# Patient Record
Sex: MALE | Race: WHITE | Employment: FULL TIME | URBAN - METROPOLITAN AREA
[De-identification: names, ages, dates, MRNs, and addresses within clinical notes are randomized per-mention and may not be internally consistent; named-entity substitution may affect disease eponyms.]

---

## 2017-09-01 PROCEDURE — 88305 TISSUE EXAM BY PATHOLOGIST: CPT | Performed by: INTERNAL MEDICINE

## 2017-09-05 ENCOUNTER — HOSPITAL ENCOUNTER (OUTPATIENT)
Dept: LAB | Age: 52
Discharge: HOME | End: 2017-09-05

## 2017-11-06 PROBLEM — N40.0 BENIGN PROSTATIC HYPERPLASIA WITHOUT LOWER URINARY TRACT SYMPTOMS: Status: ACTIVE | Noted: 2017-11-06

## 2017-11-06 PROBLEM — M17.11 ARTHRITIS OF KNEE, RIGHT: Status: ACTIVE | Noted: 2017-11-06

## 2018-02-26 PROBLEM — E66.01 OBESITY, MORBID (HCC): Status: ACTIVE | Noted: 2018-02-26

## 2018-05-23 ENCOUNTER — HOSPITAL ENCOUNTER (OUTPATIENT)
Dept: LAB | Age: 53
Discharge: HOME | End: 2018-05-23
Payer: COMMERCIAL

## 2018-05-23 LAB
ALBUMIN SERPL-MCNC: 3.5 G/DL (ref 3.5–5)
ALBUMIN/GLOB SERPL: 1 {RATIO} (ref 1.2–3.5)
ALP SERPL-CCNC: 87 U/L (ref 50–136)
ALT SERPL-CCNC: 25 U/L (ref 12–65)
ANION GAP SERPL CALC-SCNC: 6 MMOL/L (ref 7–16)
AST SERPL-CCNC: 19 U/L (ref 15–37)
BILIRUB SERPL-MCNC: 0.4 MG/DL (ref 0.2–1.1)
BUN SERPL-MCNC: 9 MG/DL (ref 6–23)
CALCIUM SERPL-MCNC: 9.1 MG/DL (ref 8.3–10.4)
CHLORIDE SERPL-SCNC: 108 MMOL/L (ref 98–107)
CHOLEST SERPL-MCNC: 177 MG/DL
CO2 SERPL-SCNC: 28 MMOL/L (ref 21–32)
CREAT SERPL-MCNC: 0.8 MG/DL (ref 0.8–1.5)
GLOBULIN SER CALC-MCNC: 3.4 G/DL (ref 2.3–3.5)
GLUCOSE SERPL-MCNC: 96 MG/DL (ref 65–100)
HDLC SERPL-MCNC: 33 MG/DL (ref 40–60)
HDLC SERPL: 5.4 {RATIO}
LDLC SERPL CALC-MCNC: 112.6 MG/DL
LIPID PROFILE,FLP: ABNORMAL
POTASSIUM SERPL-SCNC: 4.2 MMOL/L (ref 3.5–5.1)
PROT SERPL-MCNC: 6.9 G/DL (ref 6.3–8.2)
SODIUM SERPL-SCNC: 142 MMOL/L (ref 136–145)
TRIGL SERPL-MCNC: 157 MG/DL (ref 35–150)
URATE SERPL-MCNC: 4.3 MG/DL (ref 2.6–6)
VLDLC SERPL CALC-MCNC: 31.4 MG/DL (ref 6–23)

## 2018-05-23 PROCEDURE — 80061 LIPID PANEL: CPT | Performed by: INTERNAL MEDICINE

## 2018-05-23 PROCEDURE — 84550 ASSAY OF BLOOD/URIC ACID: CPT | Performed by: INTERNAL MEDICINE

## 2018-05-23 PROCEDURE — 80053 COMPREHEN METABOLIC PANEL: CPT | Performed by: INTERNAL MEDICINE

## 2018-05-23 PROCEDURE — 36415 COLL VENOUS BLD VENIPUNCTURE: CPT | Performed by: INTERNAL MEDICINE

## 2019-03-06 ENCOUNTER — HOSPITAL ENCOUNTER (OUTPATIENT)
Dept: WOUND CARE | Age: 54
Discharge: HOME | End: 2019-03-06
Attending: PODIATRIST
Payer: COMMERCIAL

## 2019-03-06 VITALS
TEMPERATURE: 98 F | HEART RATE: 76 BPM | DIASTOLIC BLOOD PRESSURE: 98 MMHG | RESPIRATION RATE: 18 BRPM | OXYGEN SATURATION: 95 % | BODY MASS INDEX: 39.17 KG/M2 | WEIGHT: 315 LBS | SYSTOLIC BLOOD PRESSURE: 116 MMHG | HEIGHT: 75 IN

## 2019-03-06 DIAGNOSIS — L97.313 NON-PRESSURE CHRONIC ULCER OF RIGHT ANKLE WITH NECROSIS OF MUSCLE (HCC): Primary | ICD-10-CM

## 2019-03-06 PROBLEM — I87.331 CHRONIC VENOUS HYPERTENSION WITH ULCER AND INFLAMMATION INVOLVING RIGHT SIDE (HCC): Status: ACTIVE | Noted: 2019-03-06

## 2019-03-06 PROBLEM — L97.919 CHRONIC VENOUS HYPERTENSION WITH ULCER AND INFLAMMATION INVOLVING RIGHT SIDE (HCC): Status: ACTIVE | Noted: 2019-03-06

## 2019-03-06 PROCEDURE — 29581 APPL MULTLAYER CMPRN SYS LEG: CPT

## 2019-03-06 PROCEDURE — 99215 OFFICE O/P EST HI 40 MIN: CPT

## 2019-03-06 NOTE — PROGRESS NOTES
Wound Center Progress Note    Patient: Marco A Valerio MRN: [id number]  SSN: [id number]    YOB: 1965  Age: 46 y.o. Sex: male      Subjective:     Chief Complaint:  Marco A Valerio is a 46 y.o. WHITE OR  male who presents with a wound to the right lower extremity at the medial ankle. This began 1 year ago when he had a rock hit the area during yard work. He has known venous disease and was recommended to have vein stripping but is debating this. He admits to pain at 2/10 and drainage. He is currently on keflex from his PCP. No culture is on file. No imaging is on file. He does not wear compression and works at [employer] on his feet on concrete floors. History of Present Illness:     Nature: 2/10, burning  Location: medial right ankle  Duration:   Onset: Patient states it started as trauma  Course: Stable  Aggravating/Alleviating: Worsened with pressure and dependency, improved with compression and rest  Treatment: peroxide, neosporin    Past Medical History:   Diagnosis Date    Anxiety     Erectile dysfunction 2014    Gout 2014    History of peptic ulcer     Hypertension     Osteoarthritis of hand, primary localized 2014    Personal history of urinary calculi     x2    Wart 2014      Past Surgical History:   Procedure Laterality Date    HX WISDOM TEETH EXTRACTION       History reviewed. No pertinent family history. Social History     Tobacco Use    Smoking status: Former Smoker     Years: 5.00     Last attempt to quit: 2011     Years since quittin.6    Smokeless tobacco: Never Used   Substance Use Topics    Alcohol use: No       Prior to Admission medications    Medication Sig Start Date End Date Taking? Authorizing Provider   cephALEXin (KEFLEX) 500 mg capsule Take 1 Cap by mouth three (3) times daily.  19   Ming Rubin MD   meloxicam (MOBIC) 7.5 mg tablet TAKE 1 TABLET BY MOUTH EVERY DAY 19 Azalea Oakes MD   sertraline (ZOLOFT) 50 mg tablet Take 1/2 tablet by mouth before breakfast, lunch and dinner. 1/25/19   Azalea Oakes MD   ondansetron (ZOFRAN ODT) 4 mg disintegrating tablet Take 1 Tab by mouth every eight (8) hours as needed for Nausea. 1/18/19   Azalea Oakes MD   lisinopril (PRINIVIL, ZESTRIL) 40 mg tablet Take 1 Tab by mouth daily. 1/18/19   Azalea Oakes MD   allopurinol (ZYLOPRIM) 300 mg tablet Take 1 Tab by mouth daily. 12/27/18   Azalea Oakes MD   colchicine 0.6 mg tablet Take 1 Tab by mouth daily. 11/21/18   Azalea Oakes MD   raNITIdine (ZANTAC) 300 mg tab Take 1 Tab by mouth daily. If not covered- buy otc 11/21/18   Azalea Oakes MD   furosemide (LASIX) 40 mg tablet TAKE 1 TAB BY MOUTH DAILY. 6/7/18   Azalea Oakes MD   HYDROcodone-acetaminophen (NORCO) 7.5-325 mg per tablet TAKE 1 TABLET BY MOUTH EVERY 4 TO 6 HOURS AS NEEDED FOR PAIN 5/14/18   Provider, Historical   cyclobenzaprine (FLEXERIL) 5 mg tablet Take 1 Tab by mouth nightly. 5/23/18   Caitlyn Rubin MD   clobetasol (TEMOVATE) 0.05 % ointment Apply  to affected area two (2) times a day. 5/23/18   Azalea Oakes MD   tamsulosin (FLOMAX) 0.4 mg capsule Take 1 Cap by mouth daily. 2/26/18   Gearline Dakins, MD     Allergies   Allergen Reactions    Codeine Nausea Only    Sulfa (Sulfonamide Antibiotics) Rash        Review of Systems:  The patient has no difficulty with chest pain or shortness of breath. No fever or chills. No Nausea, vomiting or diarrhea. Comprehensive review of systems was otherwise unremarkable except as noted above. Lab Results   Component Value Date/Time    Hemoglobin A1c (POC) 5.6 06/06/2016 01:15 PM        Immunization History   Administered Date(s) Administered    Tdap 02/26/2018       Body mass index is 41.05 kg/m². Counseling regarding nutrition done: Yes.  Recommend Brennon nutritional supplement for wound healing. Current medications:  Current Outpatient Medications   Medication Sig Dispense Refill    cephALEXin (KEFLEX) 500 mg capsule Take 1 Cap by mouth three (3) times daily. 21 Cap 0    meloxicam (MOBIC) 7.5 mg tablet TAKE 1 TABLET BY MOUTH EVERY DAY 90 Tab 0    sertraline (ZOLOFT) 50 mg tablet Take 1/2 tablet by mouth before breakfast, lunch and dinner. 90 Tab 3    ondansetron (ZOFRAN ODT) 4 mg disintegrating tablet Take 1 Tab by mouth every eight (8) hours as needed for Nausea. 90 Tab 3    lisinopril (PRINIVIL, ZESTRIL) 40 mg tablet Take 1 Tab by mouth daily. 90 Tab 3    allopurinol (ZYLOPRIM) 300 mg tablet Take 1 Tab by mouth daily. 90 Tab 3    colchicine 0.6 mg tablet Take 1 Tab by mouth daily. 90 Tab 1    raNITIdine (ZANTAC) 300 mg tab Take 1 Tab by mouth daily. If not covered- buy otc 90 Tab 1    furosemide (LASIX) 40 mg tablet TAKE 1 TAB BY MOUTH DAILY. 90 Tab 1    HYDROcodone-acetaminophen (NORCO) 7.5-325 mg per tablet TAKE 1 TABLET BY MOUTH EVERY 4 TO 6 HOURS AS NEEDED FOR PAIN  0    cyclobenzaprine (FLEXERIL) 5 mg tablet Take 1 Tab by mouth nightly. 90 Tab 3    clobetasol (TEMOVATE) 0.05 % ointment Apply  to affected area two (2) times a day. 60 g 3    tamsulosin (FLOMAX) 0.4 mg capsule Take 1 Cap by mouth daily. 90 Cap 3         Objective:     Physical Exam:     Visit Vitals  BP (!) 116/98 (BP 1 Location: Left arm)   Pulse 76   Temp 98 °F (36.7 °C)   Resp 18   Ht 6' 3\" (1.905 m)   Wt 149 kg (328 lb 6.4 oz)   SpO2 95%   BMI 41.05 kg/m²       General: well developed, well nourished, pleasant , NAD. Hygiene good  Psych: cooperative. Pleasant. No anxiety or depression. Normal mood and affect. HEENT: Normocephalic, atraumatic. EOMI. Conjunctiva clear. No scleral icterus. Neck: Normal range of motion. No masses. Chest: Good air entry bilaterally.   Respirations nonlabored  Cardio: Normal heart sounds,no rubs, murmurs or gallops  Abdomen: Soft, nontender, nondistended, normoactive bowel sounds    Vascular: DP and PT pulses are palpable at 2/4 bilateral. Skin temperature is uniform from proximal to distal bilateral. Hair growth is absent bilateral. No erythema or heat is appreciated bilateral.   Derm: Nails 1-5 bilateral are within normal limits. No paronychial infections are noted. Skin is atrophic and xerotic. No subcutaneous masses or hyperpigmented lesions are present. Neuro: Epicritic sensation is present bilateral. Protective sensation is present with 5.07 SWMF testing to all 10 sites bilateral. Muscle tone and bulk is symmetric bilateral.  Msk: Muscle strength is 5/5 for all prime movers of the foot bilateral. ROM of all joints is full and fluid without pain. No effusions are palpable. Ulceration to the medial right ankle below the malleolus. There is tendon visible within the wound and fibrosis surrounding. Rim is punched out and thickened. Serous drainage without odor. +2 pitting edema bilateral with multiple varicosities. Ulcer Description:   Wound Ankle Medial;Right (Active)   Wound Length (cm) 1.5 cm 3/6/2019  8:27 AM   Wound Width (cm) 1.5 cm 3/6/2019  8:27 AM   Wound Depth (cm) 0.3 3/6/2019  8:27 AM   Wound Surface area (cm^2) 2.25 cm^2 3/6/2019  8:27 AM   Tissue Type Percent Red 5 3/6/2019  8:27 AM   Tissue Type Percent Yellow 95 3/6/2019  8:27 AM   Drainage Amount  Small  3/6/2019  8:27 AM   Drainage Color Serosanguinous 3/6/2019  8:27 AM   Wound Odor None 3/6/2019  8:27 AM   Periwound Skin Condition Edematous 3/6/2019  8:27 AM   Cleansing and Cleansing Agents  Normal saline 3/6/2019  8:27 AM   Number of days: 0             Data Review:   No results found for this or any previous visit (from the past 336 hour(s)). I independently reviewed recent labs, pathology reports, microbiology reports and radiology studies as noted above.        Assessment:     46 y.o. male with venous hypertension and ulceration to the medial right ankle    Plan:     Patient was examined and evaluated today. They were educated on the barriers to healing. Radiographs ordered with plan for MRI pending those. Bedside ABIs performed and were 1.0. Coban 2 wrap applied. Discussed elevation and use of compression stockings long term. Return Friday for dressing change. Return in 1 week for serial debridement. Any procedures done today in the wound center are documented in a seperate note in connect care made part of this record by reference. Patient instructed on the following: Follow all wound dressing instructions. Do not get dressing or wound wet. May shower if wound can be effectively kept dry. Cast covers may be purchased at [facility] and [employer]. Should you experience increased redness, swelling, pain, foul odor, size of wound(s), or have a temperature over 101 degrees please contact the [address] [place] [address] Road at [phone] or if after hours contact your primary care physician or go to the hospital emergency department. Orders Placed This Encounter    XR ANKLE RT MIN 3 V     Standing Status:   Future     Standing Expiration Date:   4/6/2020     Order Specific Question:   Reason for Exam     Answer:   ulceration medial ankle -  eval forOM    WOUND CARE, DRESSING CHANGE     Cleanse wound with normal saline, Hydrofera Blue: Cut to wound size, moisten with saline, and apply to wound bed, cover with ABD, wrap with Coban 2 bilaterally. If there is any problem with the dressing (too tight, slides down, etc.) Patient to return to wound clinic to have re-wrapped by clinician. DO NOT GET WET. May purchase cast cover from [employer] to use when showering. [facility] [address] [facility]. Return in 1 week for appointment with physician. Patient to have xray and ABIs as ordered by physician.      Standing Status:   Standing     Number of Occurrences:   1       Follow-up Information     Follow up With Specialties Details Why Contact Info    [facility] Wound Care In 1 week Friday with clinician Herson Gibson [place] [id number]  [phone]             Signed By: Fabian Blackwood DPM     March 6, 2019      \

## 2019-03-06 NOTE — WOUND CARE
03/06/19 0827   Wound Ankle Medial;Right   Date First Assessed/Time First Assessed: 03/06/19 0826   POA: Yes  Location: Ankle  Orientation: Medial;Right   Dressing Status  (no dressing)   Dressing Type  (none)   Wound Length (cm) 1.5 cm   Wound Width (cm) 1.5 cm   Wound Depth (cm) 0.3   Wound Surface area (cm^2) 2.25 cm^2   Tissue Type Percent Red 5   Tissue Type Percent Yellow 95   Drainage Amount  Small    Drainage Color Serosanguinous   Wound Odor None   Periwound Skin Condition Edematous   Cleansing and Cleansing Agents  Normal saline

## 2019-03-06 NOTE — WOUND CARE
[address]  [address], [address] [facility]  Phone: [phone]  Fax: [phone]    Patient: Rupert Manning MRN: [id number]  SSN: [id number]    YOB: 1965  Age: 46 y.o. Sex: male       Return Appointment: 1 week with Lynne Licona DPM    Instructions: Cleanse wound with normal saline, Hydrofera Blue: Cut to wound size, moisten with saline, and apply to wound bed, cover with ABD, wrap with Coban 2 bilaterally. If there is any problem with the dressing (too tight, slides down, etc.) Patient to return to wound clinic to have re-wrapped by clinician. DO NOT GET WET. May purchase cast cover from [employer] to use when showering. Return to [address] [facility] Friday for dressing change. Return in 1 week for appointment with physician. Patient to have xray and ABIs as ordered by physician. Should you experience increased redness, swelling, pain, foul odor, size of wound(s), or have a temperature over 101 degrees please contact the [address] [place] [address] Road at [phone] or if after hours contact your primary care physician or go to the hospital emergency department.     Signed By: Suman Sepulveda RN     March 6, 2019

## 2019-11-14 ENCOUNTER — APPOINTMENT (OUTPATIENT)
Dept: ULTRASOUND IMAGING | Age: 54
DRG: 872 | End: 2019-11-14
Attending: FAMILY MEDICINE
Payer: COMMERCIAL

## 2019-11-14 ENCOUNTER — HOSPITAL ENCOUNTER (INPATIENT)
Age: 54
LOS: 6 days | Discharge: HOME | DRG: 872 | End: 2019-11-20
Attending: EMERGENCY MEDICINE | Admitting: FAMILY MEDICINE
Payer: COMMERCIAL

## 2019-11-14 ENCOUNTER — APPOINTMENT (OUTPATIENT)
Dept: GENERAL RADIOLOGY | Age: 54
DRG: 872 | End: 2019-11-14
Attending: EMERGENCY MEDICINE
Payer: COMMERCIAL

## 2019-11-14 DIAGNOSIS — T14.8XXA WOUND INFECTION: Primary | ICD-10-CM

## 2019-11-14 DIAGNOSIS — E78.00 PURE HYPERCHOLESTEROLEMIA: ICD-10-CM

## 2019-11-14 DIAGNOSIS — I10 ESSENTIAL HYPERTENSION WITH GOAL BLOOD PRESSURE LESS THAN 130/85: ICD-10-CM

## 2019-11-14 DIAGNOSIS — L08.9 WOUND INFECTION: Primary | ICD-10-CM

## 2019-11-14 DIAGNOSIS — Z87.39 HISTORY OF GOUT: ICD-10-CM

## 2019-11-14 DIAGNOSIS — Z87.442 HISTORY OF KIDNEY STONES: ICD-10-CM

## 2019-11-14 DIAGNOSIS — M10.00 IDIOPATHIC GOUT, UNSPECIFIED CHRONICITY, UNSPECIFIED SITE: ICD-10-CM

## 2019-11-14 PROBLEM — S91.001A ANKLE WOUND, RIGHT, INITIAL ENCOUNTER: Status: ACTIVE | Noted: 2019-11-14

## 2019-11-14 PROBLEM — A41.9 SEPSIS (HCC): Status: ACTIVE | Noted: 2019-11-14

## 2019-11-14 LAB
ALBUMIN SERPL-MCNC: 3.1 G/DL (ref 3.5–5)
ALBUMIN/GLOB SERPL: 0.8 {RATIO} (ref 1.2–3.5)
ALP SERPL-CCNC: 81 U/L (ref 50–136)
ALT SERPL-CCNC: 25 U/L (ref 12–65)
ANION GAP SERPL CALC-SCNC: 5 MMOL/L (ref 7–16)
AST SERPL-CCNC: 16 U/L (ref 15–37)
BASOPHILS # BLD: 0.1 K/UL (ref 0–0.2)
BASOPHILS NFR BLD: 1 % (ref 0–2)
BILIRUB SERPL-MCNC: 0.5 MG/DL (ref 0.2–1.1)
BUN SERPL-MCNC: 12 MG/DL (ref 6–23)
CALCIUM SERPL-MCNC: 9.2 MG/DL (ref 8.3–10.4)
CHLORIDE SERPL-SCNC: 110 MMOL/L (ref 98–107)
CO2 SERPL-SCNC: 29 MMOL/L (ref 21–32)
CREAT SERPL-MCNC: 0.65 MG/DL (ref 0.8–1.5)
DIFFERENTIAL METHOD BLD: ABNORMAL
EOSINOPHIL # BLD: 0.1 K/UL (ref 0–0.8)
EOSINOPHIL NFR BLD: 1 % (ref 0.5–7.8)
ERYTHROCYTE [DISTWIDTH] IN BLOOD BY AUTOMATED COUNT: 12.4 % (ref 11.9–14.6)
GLOBULIN SER CALC-MCNC: 4.1 G/DL (ref 2.3–3.5)
GLUCOSE SERPL-MCNC: 98 MG/DL (ref 65–100)
HCT VFR BLD AUTO: 43.8 % (ref 41.1–50.3)
HGB BLD-MCNC: 15.1 G/DL (ref 13.6–17.2)
IMM GRANULOCYTES # BLD AUTO: 0.1 K/UL (ref 0–0.5)
IMM GRANULOCYTES NFR BLD AUTO: 0 % (ref 0–5)
LACTATE BLD-SCNC: 0.9 MMOL/L (ref 0.5–1.9)
LYMPHOCYTES # BLD: 1.6 K/UL (ref 0.5–4.6)
LYMPHOCYTES NFR BLD: 13 % (ref 13–44)
MCH RBC QN AUTO: 35 PG (ref 26.1–32.9)
MCHC RBC AUTO-ENTMCNC: 34.5 G/DL (ref 31.4–35)
MCV RBC AUTO: 101.4 FL (ref 79.6–97.8)
MONOCYTES # BLD: 0.8 K/UL (ref 0.1–1.3)
MONOCYTES NFR BLD: 6 % (ref 4–12)
NEUTS SEG # BLD: 10.1 K/UL (ref 1.7–8.2)
NEUTS SEG NFR BLD: 80 % (ref 43–78)
NRBC # BLD: 0 K/UL (ref 0–0.2)
PLATELET # BLD AUTO: 251 K/UL (ref 150–450)
PMV BLD AUTO: 10.5 FL (ref 9.4–12.3)
POTASSIUM SERPL-SCNC: 3.8 MMOL/L (ref 3.5–5.1)
PROCALCITONIN SERPL-MCNC: <0.1 NG/ML
PROT SERPL-MCNC: 7.2 G/DL (ref 6.3–8.2)
RBC # BLD AUTO: 4.32 M/UL (ref 4.23–5.6)
SODIUM SERPL-SCNC: 144 MMOL/L (ref 136–145)
WBC # BLD AUTO: 12.8 K/UL (ref 4.3–11.1)

## 2019-11-14 PROCEDURE — 74011250636 HC RX REV CODE- 250/636: Performed by: FAMILY MEDICINE

## 2019-11-14 PROCEDURE — 65660000000 HC RM CCU STEPDOWN

## 2019-11-14 PROCEDURE — 74011250636 HC RX REV CODE- 250/636: Performed by: EMERGENCY MEDICINE

## 2019-11-14 PROCEDURE — 87040 BLOOD CULTURE FOR BACTERIA: CPT

## 2019-11-14 PROCEDURE — 83605 ASSAY OF LACTIC ACID: CPT

## 2019-11-14 PROCEDURE — 87186 SC STD MICRODIL/AGAR DIL: CPT

## 2019-11-14 PROCEDURE — 84145 PROCALCITONIN (PCT): CPT

## 2019-11-14 PROCEDURE — 74011000258 HC RX REV CODE- 258: Performed by: EMERGENCY MEDICINE

## 2019-11-14 PROCEDURE — 93922 UPR/L XTREMITY ART 2 LEVELS: CPT

## 2019-11-14 PROCEDURE — 93971 EXTREMITY STUDY: CPT

## 2019-11-14 PROCEDURE — 81003 URINALYSIS AUTO W/O SCOPE: CPT

## 2019-11-14 PROCEDURE — 99284 EMERGENCY DEPT VISIT MOD MDM: CPT | Performed by: EMERGENCY MEDICINE

## 2019-11-14 PROCEDURE — 71045 X-RAY EXAM CHEST 1 VIEW: CPT

## 2019-11-14 PROCEDURE — 87077 CULTURE AEROBIC IDENTIFY: CPT

## 2019-11-14 PROCEDURE — 96376 TX/PRO/DX INJ SAME DRUG ADON: CPT | Performed by: EMERGENCY MEDICINE

## 2019-11-14 PROCEDURE — 87205 SMEAR GRAM STAIN: CPT

## 2019-11-14 PROCEDURE — 93005 ELECTROCARDIOGRAM TRACING: CPT | Performed by: FAMILY MEDICINE

## 2019-11-14 PROCEDURE — 80053 COMPREHEN METABOLIC PANEL: CPT

## 2019-11-14 PROCEDURE — 85025 COMPLETE CBC W/AUTO DIFF WBC: CPT

## 2019-11-14 PROCEDURE — 73590 X-RAY EXAM OF LOWER LEG: CPT

## 2019-11-14 PROCEDURE — 96375 TX/PRO/DX INJ NEW DRUG ADDON: CPT | Performed by: EMERGENCY MEDICINE

## 2019-11-14 PROCEDURE — 77030020263 HC SOL INJ SOD CL0.9% LFCR 1000ML

## 2019-11-14 PROCEDURE — 36415 COLL VENOUS BLD VENIPUNCTURE: CPT

## 2019-11-14 RX ORDER — HEPARIN SODIUM 5000 [USP'U]/ML
5000 INJECTION, SOLUTION INTRAVENOUS; SUBCUTANEOUS EVERY 8 HOURS
Status: DISCONTINUED | OUTPATIENT
Start: 2019-11-14 | End: 2019-11-15

## 2019-11-14 RX ORDER — LISINOPRIL 20 MG/1
40 TABLET ORAL DAILY
Status: DISCONTINUED | OUTPATIENT
Start: 2019-11-15 | End: 2019-11-20 | Stop reason: HOSPADM

## 2019-11-14 RX ORDER — ALLOPURINOL 300 MG/1
300 TABLET ORAL DAILY
Status: DISCONTINUED | OUTPATIENT
Start: 2019-11-15 | End: 2019-11-20 | Stop reason: HOSPADM

## 2019-11-14 RX ORDER — NYSTATIN 100000 [USP'U]/G
POWDER TOPICAL 2 TIMES DAILY
Status: DISCONTINUED | OUTPATIENT
Start: 2019-11-15 | End: 2019-11-20 | Stop reason: HOSPADM

## 2019-11-14 RX ORDER — ONDANSETRON 2 MG/ML
4 INJECTION INTRAMUSCULAR; INTRAVENOUS
Status: COMPLETED | OUTPATIENT
Start: 2019-11-14 | End: 2019-11-14

## 2019-11-14 RX ORDER — HYDROMORPHONE HYDROCHLORIDE 1 MG/ML
1 INJECTION, SOLUTION INTRAMUSCULAR; INTRAVENOUS; SUBCUTANEOUS
Status: DISCONTINUED | OUTPATIENT
Start: 2019-11-14 | End: 2019-11-16

## 2019-11-14 RX ORDER — HYDRALAZINE HYDROCHLORIDE 20 MG/ML
20 INJECTION INTRAMUSCULAR; INTRAVENOUS
Status: DISCONTINUED | OUTPATIENT
Start: 2019-11-14 | End: 2019-11-20 | Stop reason: HOSPADM

## 2019-11-14 RX ORDER — SODIUM CHLORIDE 0.9 % (FLUSH) 0.9 %
5-10 SYRINGE (ML) INJECTION AS NEEDED
Status: DISCONTINUED | OUTPATIENT
Start: 2019-11-14 | End: 2019-11-20 | Stop reason: HOSPADM

## 2019-11-14 RX ORDER — ALBUTEROL SULFATE 0.83 MG/ML
2.5 SOLUTION RESPIRATORY (INHALATION)
Status: DISCONTINUED | OUTPATIENT
Start: 2019-11-14 | End: 2019-11-20 | Stop reason: HOSPADM

## 2019-11-14 RX ORDER — MORPHINE SULFATE 4 MG/ML
4 INJECTION INTRAVENOUS
Status: COMPLETED | OUTPATIENT
Start: 2019-11-14 | End: 2019-11-14

## 2019-11-14 RX ORDER — ONDANSETRON 2 MG/ML
4 INJECTION INTRAMUSCULAR; INTRAVENOUS
Status: DISCONTINUED | OUTPATIENT
Start: 2019-11-14 | End: 2019-11-20 | Stop reason: HOSPADM

## 2019-11-14 RX ORDER — COLCHICINE 0.6 MG/1
0.6 TABLET ORAL DAILY
Status: DISCONTINUED | OUTPATIENT
Start: 2019-11-15 | End: 2019-11-17

## 2019-11-14 RX ORDER — SERTRALINE HYDROCHLORIDE 50 MG/1
75 TABLET, FILM COATED ORAL DAILY
COMMUNITY
End: 2019-11-20

## 2019-11-14 RX ORDER — VANCOMYCIN 2 GRAM/500 ML IN 0.9 % SODIUM CHLORIDE INTRAVENOUS
2 ONCE
Status: DISCONTINUED | OUTPATIENT
Start: 2019-11-14 | End: 2019-11-14 | Stop reason: DRUGHIGH

## 2019-11-14 RX ORDER — TAMSULOSIN HYDROCHLORIDE 0.4 MG/1
0.4 CAPSULE ORAL DAILY
Status: DISCONTINUED | OUTPATIENT
Start: 2019-11-15 | End: 2019-11-20 | Stop reason: HOSPADM

## 2019-11-14 RX ORDER — ACETAMINOPHEN 325 MG/1
650 TABLET ORAL
Status: DISCONTINUED | OUTPATIENT
Start: 2019-11-14 | End: 2019-11-20 | Stop reason: HOSPADM

## 2019-11-14 RX ORDER — NALOXONE HYDROCHLORIDE 0.4 MG/ML
0.4 INJECTION, SOLUTION INTRAMUSCULAR; INTRAVENOUS; SUBCUTANEOUS AS NEEDED
Status: DISCONTINUED | OUTPATIENT
Start: 2019-11-14 | End: 2019-11-20 | Stop reason: HOSPADM

## 2019-11-14 RX ORDER — VANCOMYCIN 2 GRAM/500 ML IN 0.9 % SODIUM CHLORIDE INTRAVENOUS
2000 EVERY 12 HOURS
Status: DISCONTINUED | OUTPATIENT
Start: 2019-11-15 | End: 2019-11-19

## 2019-11-14 RX ORDER — FUROSEMIDE 40 MG/1
40 TABLET ORAL DAILY
Status: DISCONTINUED | OUTPATIENT
Start: 2019-11-15 | End: 2019-11-20 | Stop reason: HOSPADM

## 2019-11-14 RX ORDER — CYCLOBENZAPRINE HCL 10 MG
5 TABLET ORAL
Status: DISCONTINUED | OUTPATIENT
Start: 2019-11-14 | End: 2019-11-20 | Stop reason: HOSPADM

## 2019-11-14 RX ORDER — HYDROCODONE BITARTRATE AND ACETAMINOPHEN 5; 325 MG/1; MG/1
1 TABLET ORAL
Status: DISCONTINUED | OUTPATIENT
Start: 2019-11-14 | End: 2019-11-20 | Stop reason: HOSPADM

## 2019-11-14 RX ORDER — SERTRALINE HYDROCHLORIDE 50 MG/1
50 TABLET, FILM COATED ORAL DAILY
Status: DISCONTINUED | OUTPATIENT
Start: 2019-11-15 | End: 2019-11-20 | Stop reason: HOSPADM

## 2019-11-14 RX ADMIN — VANCOMYCIN HYDROCHLORIDE 2500 MG: 10 INJECTION, POWDER, LYOPHILIZED, FOR SOLUTION INTRAVENOUS at 20:59

## 2019-11-14 RX ADMIN — HYDROMORPHONE HYDROCHLORIDE 1 MG: 1 INJECTION, SOLUTION INTRAMUSCULAR; INTRAVENOUS; SUBCUTANEOUS at 21:02

## 2019-11-14 RX ADMIN — Medication 5 ML: at 21:05

## 2019-11-14 RX ADMIN — PIPERACILLIN SODIUM AND TAZOBACTAM SODIUM 4.5 G: 4; .5 INJECTION, POWDER, LYOPHILIZED, FOR SOLUTION INTRAVENOUS at 20:04

## 2019-11-14 RX ADMIN — MORPHINE SULFATE 4 MG: 4 INJECTION INTRAVENOUS at 19:37

## 2019-11-14 RX ADMIN — ONDANSETRON 4 MG: 2 INJECTION INTRAMUSCULAR; INTRAVENOUS at 19:37

## 2019-11-14 RX ADMIN — SODIUM CHLORIDE 1000 ML: 900 INJECTION, SOLUTION INTRAVENOUS at 20:03

## 2019-11-14 NOTE — ED TRIAGE NOTES
has a wound on his right ankle. States began about 1 year ago.  worsened after he went to the beach in April and August. Arrives with area wrapped.

## 2019-11-15 LAB
APPEARANCE UR: CLEAR
ATRIAL RATE: 86 BPM
BASOPHILS # BLD: 0.1 K/UL (ref 0–0.2)
BASOPHILS NFR BLD: 1 % (ref 0–2)
BILIRUB UR QL: NEGATIVE
CALCULATED P AXIS, ECG09: 42 DEGREES
CALCULATED R AXIS, ECG10: 36 DEGREES
CALCULATED T AXIS, ECG11: 33 DEGREES
COLOR UR: YELLOW
DIAGNOSIS, 93000: NORMAL
DIFFERENTIAL METHOD BLD: ABNORMAL
EOSINOPHIL # BLD: 0.1 K/UL (ref 0–0.8)
EOSINOPHIL NFR BLD: 1 % (ref 0.5–7.8)
ERYTHROCYTE [DISTWIDTH] IN BLOOD BY AUTOMATED COUNT: 12.8 % (ref 11.9–14.6)
GLUCOSE UR STRIP.AUTO-MCNC: NEGATIVE MG/DL
HCT VFR BLD AUTO: 36.6 % (ref 41.1–50.3)
HGB BLD-MCNC: 12.2 G/DL (ref 13.6–17.2)
HGB UR QL STRIP: NEGATIVE
IMM GRANULOCYTES # BLD AUTO: 0 K/UL (ref 0–0.5)
IMM GRANULOCYTES NFR BLD AUTO: 0 % (ref 0–5)
KETONES UR QL STRIP.AUTO: NEGATIVE MG/DL
LEUKOCYTE ESTERASE UR QL STRIP.AUTO: NEGATIVE
LYMPHOCYTES # BLD: 1.6 K/UL (ref 0.5–4.6)
LYMPHOCYTES NFR BLD: 16 % (ref 13–44)
MCH RBC QN AUTO: 34.9 PG (ref 26.1–32.9)
MCHC RBC AUTO-ENTMCNC: 33.3 G/DL (ref 31.4–35)
MCV RBC AUTO: 104.6 FL (ref 79.6–97.8)
MONOCYTES # BLD: 0.9 K/UL (ref 0.1–1.3)
MONOCYTES NFR BLD: 9 % (ref 4–12)
NEUTS SEG # BLD: 7.5 K/UL (ref 1.7–8.2)
NEUTS SEG NFR BLD: 73 % (ref 43–78)
NITRITE UR QL STRIP.AUTO: NEGATIVE
NRBC # BLD: 0 K/UL (ref 0–0.2)
P-R INTERVAL, ECG05: 150 MS
PH UR STRIP: 5.5 [PH] (ref 5–9)
PLATELET # BLD AUTO: 116 K/UL (ref 150–450)
PMV BLD AUTO: 11.5 FL (ref 9.4–12.3)
PROT UR STRIP-MCNC: NEGATIVE MG/DL
Q-T INTERVAL, ECG07: 372 MS
QRS DURATION, ECG06: 120 MS
QTC CALCULATION (BEZET), ECG08: 445 MS
RBC # BLD AUTO: 3.5 M/UL (ref 4.23–5.6)
SP GR UR REFRACTOMETRY: 1.03 (ref 1–1.02)
UROBILINOGEN UR QL STRIP.AUTO: 1 EU/DL (ref 0.2–1)
VENTRICULAR RATE, ECG03: 86 BPM
WBC # BLD AUTO: 10.2 K/UL (ref 4.3–11.1)

## 2019-11-15 PROCEDURE — 36415 COLL VENOUS BLD VENIPUNCTURE: CPT

## 2019-11-15 PROCEDURE — 74011250636 HC RX REV CODE- 250/636: Performed by: FAMILY MEDICINE

## 2019-11-15 PROCEDURE — 77030018836 HC SOL IRR NACL ICUM -A

## 2019-11-15 PROCEDURE — 74011250637 HC RX REV CODE- 250/637: Performed by: INTERNAL MEDICINE

## 2019-11-15 PROCEDURE — 77030020263 HC SOL INJ SOD CL0.9% LFCR 1000ML

## 2019-11-15 PROCEDURE — 74011250637 HC RX REV CODE- 250/637: Performed by: FAMILY MEDICINE

## 2019-11-15 PROCEDURE — 74011000258 HC RX REV CODE- 258: Performed by: FAMILY MEDICINE

## 2019-11-15 PROCEDURE — 74011250636 HC RX REV CODE- 250/636: Performed by: INTERNAL MEDICINE

## 2019-11-15 PROCEDURE — 74011250637 HC RX REV CODE- 250/637: Performed by: SURGERY

## 2019-11-15 PROCEDURE — 85025 COMPLETE CBC W/AUTO DIFF WBC: CPT

## 2019-11-15 PROCEDURE — 65660000000 HC RM CCU STEPDOWN

## 2019-11-15 RX ORDER — ENOXAPARIN SODIUM 100 MG/ML
40 INJECTION SUBCUTANEOUS EVERY 24 HOURS
Status: DISCONTINUED | OUTPATIENT
Start: 2019-11-15 | End: 2019-11-16

## 2019-11-15 RX ORDER — SAME BUTANEDISULFONATE/BETAINE 400-600 MG
250 POWDER IN PACKET (EA) ORAL 2 TIMES DAILY
Status: DISCONTINUED | OUTPATIENT
Start: 2019-11-15 | End: 2019-11-20 | Stop reason: HOSPADM

## 2019-11-15 RX ORDER — DIPHENHYDRAMINE HCL 50 MG
50 CAPSULE ORAL
Status: DISCONTINUED | OUTPATIENT
Start: 2019-11-15 | End: 2019-11-20 | Stop reason: HOSPADM

## 2019-11-15 RX ADMIN — ENOXAPARIN SODIUM 40 MG: 40 INJECTION SUBCUTANEOUS at 17:25

## 2019-11-15 RX ADMIN — PIPERACILLIN SODIUM,TAZOBACTAM SODIUM 3.38 G: 3; .375 INJECTION, POWDER, FOR SOLUTION INTRAVENOUS at 13:27

## 2019-11-15 RX ADMIN — HEPARIN SODIUM 5000 UNITS: 5000 INJECTION INTRAVENOUS; SUBCUTANEOUS at 00:08

## 2019-11-15 RX ADMIN — DIPHENHYDRAMINE HYDROCHLORIDE 50 MG: 25 CAPSULE ORAL at 14:03

## 2019-11-15 RX ADMIN — Medication 1 AMPULE: at 05:52

## 2019-11-15 RX ADMIN — CYCLOBENZAPRINE 5 MG: 10 TABLET, FILM COATED ORAL at 00:07

## 2019-11-15 RX ADMIN — Medication 250 MG: at 17:25

## 2019-11-15 RX ADMIN — PIPERACILLIN SODIUM,TAZOBACTAM SODIUM 3.38 G: 3; .375 INJECTION, POWDER, FOR SOLUTION INTRAVENOUS at 03:31

## 2019-11-15 RX ADMIN — SODIUM CHLORIDE 1000 ML: 900 INJECTION, SOLUTION INTRAVENOUS at 00:03

## 2019-11-15 RX ADMIN — HEPARIN SODIUM 5000 UNITS: 5000 INJECTION INTRAVENOUS; SUBCUTANEOUS at 08:43

## 2019-11-15 RX ADMIN — PIPERACILLIN SODIUM,TAZOBACTAM SODIUM 3.38 G: 3; .375 INJECTION, POWDER, FOR SOLUTION INTRAVENOUS at 21:51

## 2019-11-15 RX ADMIN — HYDROMORPHONE HYDROCHLORIDE 1 MG: 1 INJECTION, SOLUTION INTRAMUSCULAR; INTRAVENOUS; SUBCUTANEOUS at 08:36

## 2019-11-15 RX ADMIN — LISINOPRIL 40 MG: 20 TABLET ORAL at 08:43

## 2019-11-15 RX ADMIN — Medication 1 AMPULE: at 00:08

## 2019-11-15 RX ADMIN — CYCLOBENZAPRINE 5 MG: 10 TABLET, FILM COATED ORAL at 21:55

## 2019-11-15 RX ADMIN — TAMSULOSIN HYDROCHLORIDE 0.4 MG: 0.4 CAPSULE ORAL at 08:46

## 2019-11-15 RX ADMIN — HYDRALAZINE HYDROCHLORIDE 20 MG: 20 INJECTION INTRAMUSCULAR; INTRAVENOUS at 00:11

## 2019-11-15 RX ADMIN — VANCOMYCIN HYDROCHLORIDE 2000 MG: 10 INJECTION, POWDER, LYOPHILIZED, FOR SOLUTION INTRAVENOUS at 21:51

## 2019-11-15 RX ADMIN — HYDROCODONE BITARTRATE AND ACETAMINOPHEN 1 TABLET: 5; 325 TABLET ORAL at 14:03

## 2019-11-15 RX ADMIN — ALLOPURINOL 300 MG: 300 TABLET ORAL at 08:42

## 2019-11-15 RX ADMIN — VANCOMYCIN HYDROCHLORIDE 2000 MG: 10 INJECTION, POWDER, LYOPHILIZED, FOR SOLUTION INTRAVENOUS at 08:46

## 2019-11-15 RX ADMIN — Medication 10 ML: at 21:51

## 2019-11-15 RX ADMIN — SERTRALINE HYDROCHLORIDE 50 MG: 50 TABLET ORAL at 08:45

## 2019-11-15 RX ADMIN — HYDROMORPHONE HYDROCHLORIDE 1 MG: 1 INJECTION, SOLUTION INTRAMUSCULAR; INTRAVENOUS; SUBCUTANEOUS at 21:55

## 2019-11-15 RX ADMIN — FUROSEMIDE 40 MG: 40 TABLET ORAL at 08:42

## 2019-11-15 RX ADMIN — Medication 1 AMPULE: at 13:26

## 2019-11-15 RX ADMIN — SODIUM CHLORIDE 1000 ML: 900 INJECTION, SOLUTION INTRAVENOUS at 02:24

## 2019-11-15 RX ADMIN — HYDROCODONE BITARTRATE AND ACETAMINOPHEN 1 TABLET: 5; 325 TABLET ORAL at 02:33

## 2019-11-15 RX ADMIN — NYSTATIN: 100000 POWDER TOPICAL at 17:25

## 2019-11-15 RX ADMIN — Medication: at 21:59

## 2019-11-15 RX ADMIN — SODIUM CHLORIDE 110 ML: 900 INJECTION, SOLUTION INTRAVENOUS at 03:22

## 2019-11-15 RX ADMIN — SODIUM CHLORIDE 1000 ML: 900 INJECTION, SOLUTION INTRAVENOUS at 01:33

## 2019-11-15 NOTE — WOUND CARE
Pt seen for RLE wound, vascular present during assessment and removed bandage. Noted partial thickness wounds extending from mid tibial area around calf to medial malleolus, wounds weeping large amount of serous drainage, very painful. Pulses palpable. Cleansed with normal saline gently, primary RN gave pain medication, Applied zinc barrier cream to edges of wound for periwound protection, applied ABD pads over all open areas, covered with kerlix and secured with Ace bandage. Recommend dressing change 2 times per day and as needed. Redness has receded since admission and per family is looking a lot better, starting with light ace compression for now and will follow up Monday for possible tighter compression as tolerated. Wound team will continue to follow while in acute care setting.

## 2019-11-15 NOTE — PROGRESS NOTES
Hospitalist Progress Note    11/15/2019  Admit Date: 2019  6:22 PM   NAME: Irwin Clements   :  1965   MRN:  [id number]   Attending: Kamala Mata DO  PCP:  Ana Kennedy MD    SUBJECTIVE:   Patient admitted with severe right leg cellulitis    The pain in his right leg is better, his fever spikes has come down. His tachycardia is improving. Wound care consulted. Wound assessment assessment was reviewed with the with the team.    Review of Systems negative with exception of pertinent positives noted above. PHYSICAL EXAM     Visit Vitals  /69   Pulse 79   Temp 98.4 °F (36.9 °C)   Resp 20   Ht 6' 3\" (1.905 m)   Wt 137 kg (302 lb)   SpO2 94%   BMI 37.75 kg/m²      Temp (24hrs), Av.6 °F (37 °C), Min:97.6 °F (36.4 °C), Max:99.4 °F (37.4 °C)    Oxygen Therapy  O2 Sat (%): 94 % (11/15/19 0726)  O2 Device: Room air (11/15/19 0720)    Intake/Output Summary (Last 24 hours) at 11/15/2019 1130  Last data filed at 11/15/2019 0323  Gross per 24 hour   Intake 4611.26 ml   Output 425 ml   Net 4186.26 ml       General: No acute distress. Obese, alert.    Lungs:  CTAB. Normal expansion  Heart:  RRR, no murmur, rub, or gallop  Abdomen: Soft, distended, non-tender, +bs  Extremities:  partial thickness wounds extending from mid tibial area around calf to medial malleolus, wounds weeping large amount of serous drainage, very painful. Pulses palpable. Cleansed with normal saline gently, primary RN gave pain medication,  SKIN: Noted partial thickness wounds extending from mid tibial area around calf to medial malleolus, wounds weeping large amount of serous drainage, very painful. Pulses palpable. Cleansed with normal saline gently, primary RN gave pain medication,  Neurologic:  No focal deficits. Moves all extremities.     LABS AND STUDIES:  Recent Results (from the past 24 hour(s))   CBC WITH AUTOMATED DIFF    Collection Time: 19  5:54 PM   Result Value Ref Range    WBC 12.8 (H) 4.3 - 11.1 K/uL    RBC 4.32 4.23 - 5.6 M/uL    HGB 15.1 13.6 - 17.2 g/dL    HCT 43.8 41.1 - 50.3 %    .4 (H) 79.6 - 97.8 FL    MCH 35.0 (H) 26.1 - 32.9 PG    MCHC 34.5 31.4 - 35.0 g/dL    RDW 12.4 11.9 - 14.6 %    PLATELET 442 937 - 872 K/uL    MPV 10.5 9.4 - 12.3 FL    ABSOLUTE NRBC 0.00 0.0 - 0.2 K/uL    DF AUTOMATED      NEUTROPHILS 80 (H) 43 - 78 %    LYMPHOCYTES 13 13 - 44 %    MONOCYTES 6 4.0 - 12.0 %    EOSINOPHILS 1 0.5 - 7.8 %    BASOPHILS 1 0.0 - 2.0 %    IMMATURE GRANULOCYTES 0 0.0 - 5.0 %    ABS. NEUTROPHILS 10.1 (H) 1.7 - 8.2 K/UL    ABS. LYMPHOCYTES 1.6 0.5 - 4.6 K/UL    ABS. MONOCYTES 0.8 0.1 - 1.3 K/UL    ABS. EOSINOPHILS 0.1 0.0 - 0.8 K/UL    ABS. BASOPHILS 0.1 0.0 - 0.2 K/UL    ABS. IMM. GRANS. 0.1 0.0 - 0.5 K/UL   METABOLIC PANEL, COMPREHENSIVE    Collection Time: 11/14/19  5:54 PM   Result Value Ref Range    Sodium 144 136 - 145 mmol/L    Potassium 3.8 3.5 - 5.1 mmol/L    Chloride 110 (H) 98 - 107 mmol/L    CO2 29 21 - 32 mmol/L    Anion gap 5 (L) 7 - 16 mmol/L    Glucose 98 65 - 100 mg/dL    BUN 12 6 - 23 MG/DL    Creatinine 0.65 (L) 0.8 - 1.5 MG/DL    GFR est AA >60 >60 ml/min/1.73m2    GFR est non-AA >60 >60 ml/min/1.73m2    Calcium 9.2 8.3 - 10.4 MG/DL    Bilirubin, total 0.5 0.2 - 1.1 MG/DL    ALT (SGPT) 25 12 - 65 U/L    AST (SGOT) 16 15 - 37 U/L    Alk.  phosphatase 81 50 - 136 U/L    Protein, total 7.2 6.3 - 8.2 g/dL    Albumin 3.1 (L) 3.5 - 5.0 g/dL    Globulin 4.1 (H) 2.3 - 3.5 g/dL    A-G Ratio 0.8 (L) 1.2 - 3.5     PROCALCITONIN    Collection Time: 11/14/19  5:54 PM   Result Value Ref Range    Procalcitonin <0.1 ng/mL   CULTURE, BLOOD    Collection Time: 11/14/19  8:20 PM   Result Value Ref Range    Special Requests: LEFT  Antecubital        Culture result: NO GROWTH AFTER 11 HOURS     EKG, 12 LEAD, INITIAL    Collection Time: 11/14/19  9:04 PM   Result Value Ref Range    Ventricular Rate 86 BPM    Atrial Rate 86 BPM    P-R Interval 150 ms    QRS Duration 120 ms    Q-T Interval 372 ms    QTC Calculation (Bezet) 445 ms    Calculated P Axis 42 degrees    Calculated R Axis 36 degrees    Calculated T Axis 33 degrees    Diagnosis       Normal sinus rhythm    No previous ECGs available  Confirmed by [facility]  MD ()EDI ([id number]) on 11/15/2019 7:36:00 AM     POC LACTIC ACID    Collection Time: 11/14/19  9:18 PM   Result Value Ref Range    Lactic Acid (POC) 0.90 0.5 - 1.9 mmol/L   CULTURE, WOUND W GRAM STAIN    Collection Time: 11/14/19 11:13 PM   Result Value Ref Range    Special Requests: NO SPECIAL REQUESTS      GRAM STAIN NO WBCS SEEN      GRAM STAIN FEW GRAM POSITIVE COCCI      Culture result:        NO GROWTH AFTER SHORT PERIOD OF INCUBATION. FURTHER RESULTS TO FOLLOW AFTER OVERNIGHT INCUBATION. URINALYSIS W/ RFLX MICROSCOPIC    Collection Time: 11/14/19 11:17 PM   Result Value Ref Range    Color YELLOW      Appearance CLEAR      Specific gravity 1.027 (H) 1.001 - 1.023      pH (UA) 5.5 5.0 - 9.0      Protein NEGATIVE  NEG mg/dL    Glucose NEGATIVE  mg/dL    Ketone NEGATIVE  NEG mg/dL    Bilirubin NEGATIVE  NEG      Blood NEGATIVE  NEG      Urobilinogen 1.0 0.2 - 1.0 EU/dL    Nitrites NEGATIVE  NEG      Leukocyte Esterase NEGATIVE  NEG     CBC WITH AUTOMATED DIFF    Collection Time: 11/15/19  7:24 AM   Result Value Ref Range    WBC 10.2 4.3 - 11.1 K/uL    RBC 3.50 (L) 4.23 - 5.6 M/uL    HGB 12.2 (L) 13.6 - 17.2 g/dL    HCT 36.6 (L) 41.1 - 50.3 %    .6 (H) 79.6 - 97.8 FL    MCH 34.9 (H) 26.1 - 32.9 PG    MCHC 33.3 31.4 - 35.0 g/dL    RDW 12.8 11.9 - 14.6 %    PLATELET 358 (L) 028 - 450 K/uL    MPV 11.5 9.4 - 12.3 FL    ABSOLUTE NRBC 0.00 0.0 - 0.2 K/uL    NEUTROPHILS 73 43 - 78 %    LYMPHOCYTES 16 13 - 44 %    MONOCYTES 9 4.0 - 12.0 %    EOSINOPHILS 1 0.5 - 7.8 %    BASOPHILS 1 0.0 - 2.0 %    IMMATURE GRANULOCYTES 0 0.0 - 5.0 %    ABS. NEUTROPHILS 7.5 1.7 - 8.2 K/UL    ABS. LYMPHOCYTES 1.6 0.5 - 4.6 K/UL    ABS. MONOCYTES 0.9 0.1 - 1.3 K/UL    ABS.  EOSINOPHILS 0.1 0.0 - 0.8 K/UL ABS. BASOPHILS 0.1 0.0 - 0.2 K/UL    ABS. IMM. GRANS. 0.0 0.0 - 0.5 K/UL    DF AUTOMATED          Personally reviewed all labs, meds, and studies for past 24hrs. ASSESSMENT      Active Hospital Problems    Diagnosis Date Noted    Sepsis ([facility] 11/14/2019    Ankle wound, right, initial encounter 11/14/2019    Obesity, morbid ([facility] 02/26/2018    Benign prostatic hyperplasia without lower urinary tract symptoms 11/06/2017    HTN (hypertension) 03/16/2015    DIANA (generalized anxiety disorder) 05/07/2014    Gout 01/20/2014     1. Sepsis, secondary to right leg cellulitis with underlying chronic venous insufficiency and chronic lower extremity edema  2.. Obesity  3. Benign prostate hypertrophy  4.   Thrombocytopenia likely due to sepsis and infection    Doppler ultrasound of right leg showed no DVT    PLAN:  · Continue empiric Zosyn and vancomycin  · Continue wound care and dressing change  · Check on final wound culture results  · Pain control  · Change heparin to Lovenox for DVT prophylaxis  · Recheck CBC, his low platelets likely due to sepsis and infection      DVT prophylaxis: lovenox    Alfonso Barrios MD

## 2019-11-15 NOTE — H&P
Hospitalist Admission History and Physical     NAME:  Julia Kelly   Age:  53 y.o.  :   1965   MRN:   [id number]  PCP: Nicho Farris MD  Consulting MD:  Treatment Team: Attending Provider: Nupur French DO; Primary Nurse: Darryl Marquez    Chief Complaint   Patient presents with    Skin Problem         HPI:   Patient is a 53 y.o. male who presented to the ED for cc right ankle wound that has been worsening over the past 1 year. Patient has poor medical follow up and limited access to health care. Has been to wound care once per the patient but did not go back for his right ankle. Has been treated with doxycycline as outpatient. Right ankle wound started after a  accident one year ago. Since accident, his leg has continued to worse. Now having significant pain and clear discharge to area of concern. Hx of depression, HTN, gout, and homeless. Vitals - RR 18    Labs- WBC 12.8,   Past Medical History:   Diagnosis Date    Anxiety     Erectile dysfunction 2014    Gout 2014    History of peptic ulcer     Hypertension     Osteoarthritis of hand, primary localized 2014    Personal history of urinary calculi     x2    Wart 2014        Past Surgical History:   Procedure Laterality Date    HX WISDOM TEETH EXTRACTION          No family history on file. Social History     Social History Narrative    Not on file        Social History     Tobacco Use    Smoking status: Former Smoker     Years: 5.00     Last attempt to quit: 2011     Years since quittin.3    Smokeless tobacco: Never Used   Substance Use Topics    Alcohol use: No        Social History     Substance and Sexual Activity   Drug Use No         Allergies   Allergen Reactions    Codeine Nausea Only    Sulfa (Sulfonamide Antibiotics) Rash       Prior to Admission medications    Medication Sig Start Date End Date Taking?  Authorizing Provider   sertraline (ZOLOFT) 50 mg tablet Take 1/2 tablet by mouth before breakfast, lunch and dinner. 11/14/19   Angelina Callejas MD   lisinopril (PRINIVIL, ZESTRIL) 40 mg tablet Take 1 Tab by mouth daily. 11/14/19   Angelina Callejas MD   allopurinol (ZYLOPRIM) 300 mg tablet Take 1 Tab by mouth daily. 11/14/19   Angelina Callejas MD   cyclobenzaprine (FLEXERIL) 5 mg tablet Take 1 Tab by mouth nightly. 11/11/19   Neel Rubin MD   meloxicam (MOBIC) 15 mg tablet Take 1 Tab by mouth daily. As needed 10/10/19   Angelina Callejas MD   clobetasol (TEMOVATE) 0.05 % ointment Apply  to affected area two (2) times a day. 6/4/19   Angelina Callejas MD   colchicine 0.6 mg tablet Take 1 Tab by mouth daily. 6/4/19   Angelina Callejas MD   furosemide (LASIX) 40 mg tablet TAKE 1 TAB BY MOUTH DAILY. 6/4/19   Angelina Callejas MD   tamsulosin (FLOMAX) 0.4 mg capsule Take 1 Cap by mouth daily. 4/4/19   Angelina Callejas MD   ondansetron (ZOFRAN ODT) 4 mg disintegrating tablet Take 1 Tab by mouth every eight (8) hours as needed for Nausea. 1/18/19   Angelina Callejas MD           Review of Systems    Constitutional:NAD  Eyes:  no change in visual acuity, no photophobia  Ears, nose, mouth, throat, and face: no  Odynphagia, dysphagia, no thrush or exudate, negative for chronic sinus congestion, recurrent headaches  Respiratory: \"chest congestion\"  Cardiovascular: negative for CP, palpitations, or PND  Gastrointestinal: negative for abdominal pain, no hematemesis, hematochezia or BRBPR  Genitourinary: no urgency, frequency, or dysuria, no nocturia  Integument/breast: Right leg wound.    Hematologic/lymphatic: negative for known bleeding disorder  Musculoskeletal:Right leg pain  Neurological: negative for lightheadedness, syncope or presyncopal events, no seizure or CVA history  Behavioral/Psych: negative for depression or chronic anxiety,   Endocrine: negative for polydyspia, polyuria or intolerance to heat or cold  Allergic/Immunologic: negative for chronic allergic rhinitis, or known connective tissue disorder      Objective:     Visit Vitals  BP (!) 159/96 (BP 1 Location: Right arm, BP Patient Position: At rest)   Pulse 90   Temp 98.3 °F (36.8 °C)   Resp 22   Ht 6' 3\" (1.905 m)   Wt 137 kg (302 lb)   SpO2 96%   BMI 37.75 kg/m²        No intake/output data recorded. No intake/output data recorded. Data Review:   Recent Results (from the past 24 hour(s))   CBC WITH AUTOMATED DIFF    Collection Time: 11/14/19  5:54 PM   Result Value Ref Range    WBC 12.8 (H) 4.3 - 11.1 K/uL    RBC 4.32 4.23 - 5.6 M/uL    HGB 15.1 13.6 - 17.2 g/dL    HCT 43.8 41.1 - 50.3 %    .4 (H) 79.6 - 97.8 FL    MCH 35.0 (H) 26.1 - 32.9 PG    MCHC 34.5 31.4 - 35.0 g/dL    RDW 12.4 11.9 - 14.6 %    PLATELET 490 662 - 252 K/uL    MPV 10.5 9.4 - 12.3 FL    ABSOLUTE NRBC 0.00 0.0 - 0.2 K/uL    DF AUTOMATED      NEUTROPHILS 80 (H) 43 - 78 %    LYMPHOCYTES 13 13 - 44 %    MONOCYTES 6 4.0 - 12.0 %    EOSINOPHILS 1 0.5 - 7.8 %    BASOPHILS 1 0.0 - 2.0 %    IMMATURE GRANULOCYTES 0 0.0 - 5.0 %    ABS. NEUTROPHILS 10.1 (H) 1.7 - 8.2 K/UL    ABS. LYMPHOCYTES 1.6 0.5 - 4.6 K/UL    ABS. MONOCYTES 0.8 0.1 - 1.3 K/UL    ABS. EOSINOPHILS 0.1 0.0 - 0.8 K/UL    ABS. BASOPHILS 0.1 0.0 - 0.2 K/UL    ABS. IMM. GRANS. 0.1 0.0 - 0.5 K/UL   METABOLIC PANEL, COMPREHENSIVE    Collection Time: 11/14/19  5:54 PM   Result Value Ref Range    Sodium 144 136 - 145 mmol/L    Potassium 3.8 3.5 - 5.1 mmol/L    Chloride 110 (H) 98 - 107 mmol/L    CO2 29 21 - 32 mmol/L    Anion gap 5 (L) 7 - 16 mmol/L    Glucose 98 65 - 100 mg/dL    BUN 12 6 - 23 MG/DL    Creatinine 0.65 (L) 0.8 - 1.5 MG/DL    GFR est AA >60 >60 ml/min/1.73m2    GFR est non-AA >60 >60 ml/min/1.73m2    Calcium 9.2 8.3 - 10.4 MG/DL    Bilirubin, total 0.5 0.2 - 1.1 MG/DL    ALT (SGPT) 25 12 - 65 U/L    AST (SGOT) 16 15 - 37 U/L    Alk.  phosphatase 81 50 - 136 U/L    Protein, total 7.2 6.3 - 8.2 g/dL    Albumin 3.1 (L) 3.5 - 5.0 g/dL    Globulin 4.1 (H) 2.3 - 3.5 g/dL    A-G Ratio 0.8 (L) 1.2 - 3.5         Physical Exam:     General:  Alert, cooperative   Eyes:  Conjunctivae/corneas clear. Ears:  Normal TMs and external ear canals both ears. Nose: Nares normal. Septum midline. Mouth/Throat: Dry oral mucosa. Neck:  no JVD. Back:   deferred   Lungs: Inspiratory wheezing bilaterally    Heart:  Regular rate and rhythm, S1, S2 normal, no murmur, click, rub or gallop. Abdomen:   Soft, non-tender. Bowel sounds normal. No masses,  No organomegaly. Obese    Extremities: Good popliteal and femoral pulses bilaterally. Slow DP pulse to right foot. From dorsal portion of foot to 3/4 shin that is superficial skin sloughing with clear drainage noted. No gas formation or black necrosis. Pulses: 2+ and symmetric all extremities. Skin: As above, psoriatic patches noted on LE bilaterally    Lymph nodes: Cervical, supraclavicular, and axillary nodes normal.   Neurologic: CNII-XII intact. Assessment and Plan     Principal Problem:    Sepsis ([address] (11/14/2019)    Active Problems:    Gout (1/20/2014)      DIANA (generalized anxiety disorder) (5/7/2014)      HTN (hypertension) (3/16/2015)      Benign prostatic hyperplasia without lower urinary tract symptoms (11/6/2017)      Obesity, morbid ([address] (2/26/2018)      Ankle wound, right, initial encounter (11/14/2019)    Sepsis secondary to right LE wound - Start Zosyn and Vancomycin. Order wound culture. Consult both vascular surgery and wound care to see. Sepsis protocol. Right LE wound - as above. Order US right leg to ensure no DVT. HTN- Lisinopril 40mg daily. Chronic LE edema - Lasix 40mg daily    Gout - allopurinol, colchicine    Depression - Zoloft 50mg daily     Chronic pain - Flexeril 5mg qHS    Chest x ray has been ordered.      DVT prophylaxis - Heparin     Signed By: Aixa Lozano DO   November 14, 2019

## 2019-11-15 NOTE — PROGRESS NOTES
Spoke to Mr. Sunny Dao and his wife Julia Fisher (her cell is [phone]) in room 219 about Case Management and discharge planning. They live in [place], and he worked at [employer] However, he has been \"limping\" for about 4 weeks due to his right LE wound. They have been to [facility] wound center in the past, but had some issues with the co-pays and deductibles. We discussed discharge planning. Likely will be home, with OP wound follow-up. We also discussed financial assistance for their hospital co-pays and deductible, but said that there is really not a mechanism for that as he has [facility] [employer]. Suggested that if they receive a bill, ask the business office if they could write off any of the bill, or if there is a payment plan available. Case Management to follow and assist as much as feasible. Care Management Interventions  Current Support Network: Lives with Spouse, Own Home  Confirm Follow Up Transport: Family  Plan discussed with Pt/Family/Caregiver:  Yes

## 2019-11-15 NOTE — PROGRESS NOTES
11/14/19 6801   Dual Skin Pressure Injury Assessment   Dual Skin Pressure Injury Assessment WDL   Second Care Provider (Based on Facility Policy) John Costa   Skin Integumentary   Skin Integumentary (WDL) X   Skin Color Appropriate for ethnicity; Red;Pawel  (RLE)   Skin Condition/Temp Dry;Flaky; Inflamed;Peeling; Warm   Skin Integrity Tattoos (comment); Weeping   Turgor Non-tenting   Hair Growth Present   Varicosities Present  (RLE)    Pressure  Injury Documentation No Pressure Injury Noted-Pressure Ulcer Prevention Initiated   Wound Prevention and Protection Methods   Orientation of Wound Prevention Posterior   Location of Wound Prevention Sacrum/Coccyx   Dressing Present  No   Wound Offloading (Prevention Methods) Bed, pressure reduction mattress     Patient presented with red, inflamed, peeling, and weeping RLE. Xeroform, ABD, and Kerlix used to cover. Patient verbalized scaly, light red patches on LLE (below knee) and above coccyx are psoriasis. Skin intact.

## 2019-11-15 NOTE — ED NOTES
Dressing taken down while Dr Aamir Appiah in room. Areas of skin sloughing and erythema marked with skin pen by Dr. Aamir Appiah.  Pictures taken for pt chart

## 2019-11-15 NOTE — ED PROVIDER NOTES
Patient is a 52 yo male who presents with wound to right lower leg. States he has had wound for the past 11 months and went to wound care once in February (9 months ago) however stopped going because he felt he could manage it on his own and states over the last month her has had drainage, worsening wound, fevers/chills, fatigue and nausea. Went to PCP today who sent him to ED for further management           Past Medical History:   Diagnosis Date    Anxiety     Erectile dysfunction 2014    Gout 2014    History of peptic ulcer     Hypertension     Osteoarthritis of hand, primary localized 2014    Personal history of urinary calculi     x2    Wart 2014       Past Surgical History:   Procedure Laterality Date    HX WISDOM TEETH EXTRACTION           No family history on file.     Social History     Socioeconomic History    Marital status:      Spouse name: Not on file    Number of children: Not on file    Years of education: Not on file    Highest education level: Not on file   Occupational History    Not on file   Social Needs    Financial resource strain: Not on file    Food insecurity:     Worry: Not on file     Inability: Not on file    Transportation needs:     Medical: Not on file     Non-medical: Not on file   Tobacco Use    Smoking status: Former Smoker     Years: 5.00     Last attempt to quit: 2011     Years since quittin.3    Smokeless tobacco: Never Used   Substance and Sexual Activity    Alcohol use: No    Drug use: No    Sexual activity: Yes     Partners: Female     Birth control/protection: None   Lifestyle    Physical activity:     Days per week: Not on file     Minutes per session: Not on file    Stress: Not on file   Relationships    Social connections:     Talks on phone: Not on file     Gets together: Not on file     Attends Congregational service: Not on file     Active member of club or organization: Not on file     Attends meetings of clubs or organizations: Not on file     Relationship status: Not on file    Intimate partner violence:     Fear of current or ex partner: Not on file     Emotionally abused: Not on file     Physically abused: Not on file     Forced sexual activity: Not on file   Other Topics Concern    Not on file   Social History Narrative    Not on file         ALLERGIES: Codeine and Sulfa (sulfonamide antibiotics)    Review of Systems   Constitutional: Positive for chills, fatigue and fever. HENT: Negative for rhinorrhea and sore throat. Eyes: Negative for visual disturbance. Respiratory: Negative for cough and shortness of breath. Cardiovascular: Negative for chest pain and leg swelling. Gastrointestinal: Positive for nausea. Negative for abdominal pain, diarrhea and vomiting. Genitourinary: Negative for dysuria. Musculoskeletal: Negative for back pain and neck pain. Skin: Positive for rash and wound. Neurological: Negative for weakness and headaches. Psychiatric/Behavioral: The patient is not nervous/anxious. Vitals:    11/14/19 1745   BP: (!) 168/106   Pulse: 92   Resp: 18   Temp: 97.7 °F (36.5 °C)   SpO2: 97%   Weight: 137 kg (302 lb)   Height: 6' 3\" (1.905 m)            Physical Exam   Constitutional: He is oriented to person, place, and time. He appears well-developed and well-nourished. HENT:   Head: Normocephalic. Right Ear: External ear normal.   Left Ear: External ear normal.   Eyes: Pupils are equal, round, and reactive to light. Conjunctivae and EOM are normal.   Neck: Normal range of motion. Neck supple. No tracheal deviation present. Cardiovascular: Normal rate, regular rhythm, normal heart sounds and intact distal pulses. No murmur heard. Pulmonary/Chest: Effort normal and breath sounds normal. No respiratory distress. Abdominal: Soft. There is no tenderness. Musculoskeletal: Normal range of motion. He exhibits edema and tenderness.         Legs:  Ulceration of right lower extremity just proximal to ankle with erythema extending to dorsal foot and up to mid lower leg below the knee. There is drainage noted. DP pulse strong and easily detected in RLE by doppler   Neurological: He is alert and oriented to person, place, and time. No cranial nerve deficit. Skin: No rash noted. Nursing note and vitals reviewed. MDM  Number of Diagnoses or Management Options     Amount and/or Complexity of Data Reviewed  Clinical lab tests: ordered and reviewed  Tests in the radiology section of CPT®: ordered and reviewed  Tests in the medicine section of CPT®: ordered and reviewed  Review and summarize past medical records: yes    Risk of Complications, Morbidity, and/or Mortality  Presenting problems: high  Diagnostic procedures: high  Management options: high    Patient Progress  Patient progress: stable         Procedures    Recent Results (from the past 12 hour(s))   CBC WITH AUTOMATED DIFF    Collection Time: 11/14/19  5:54 PM   Result Value Ref Range    WBC 12.8 (H) 4.3 - 11.1 K/uL    RBC 4.32 4.23 - 5.6 M/uL    HGB 15.1 13.6 - 17.2 g/dL    HCT 43.8 41.1 - 50.3 %    .4 (H) 79.6 - 97.8 FL    MCH 35.0 (H) 26.1 - 32.9 PG    MCHC 34.5 31.4 - 35.0 g/dL    RDW 12.4 11.9 - 14.6 %    PLATELET 380 399 - 768 K/uL    MPV 10.5 9.4 - 12.3 FL    ABSOLUTE NRBC 0.00 0.0 - 0.2 K/uL    DF AUTOMATED      NEUTROPHILS 80 (H) 43 - 78 %    LYMPHOCYTES 13 13 - 44 %    MONOCYTES 6 4.0 - 12.0 %    EOSINOPHILS 1 0.5 - 7.8 %    BASOPHILS 1 0.0 - 2.0 %    IMMATURE GRANULOCYTES 0 0.0 - 5.0 %    ABS. NEUTROPHILS 10.1 (H) 1.7 - 8.2 K/UL    ABS. LYMPHOCYTES 1.6 0.5 - 4.6 K/UL    ABS. MONOCYTES 0.8 0.1 - 1.3 K/UL    ABS. EOSINOPHILS 0.1 0.0 - 0.8 K/UL    ABS. BASOPHILS 0.1 0.0 - 0.2 K/UL    ABS. IMM.  GRANS. 0.1 0.0 - 0.5 K/UL   METABOLIC PANEL, COMPREHENSIVE    Collection Time: 11/14/19  5:54 PM   Result Value Ref Range    Sodium 144 136 - 145 mmol/L    Potassium 3.8 3.5 - 5.1 mmol/L    Chloride 110 (H) 98 - 107 mmol/L    CO2 29 21 - 32 mmol/L    Anion gap 5 (L) 7 - 16 mmol/L    Glucose 98 65 - 100 mg/dL    BUN 12 6 - 23 MG/DL    Creatinine 0.65 (L) 0.8 - 1.5 MG/DL    GFR est AA >60 >60 ml/min/1.73m2    GFR est non-AA >60 >60 ml/min/1.73m2    Calcium 9.2 8.3 - 10.4 MG/DL    Bilirubin, total 0.5 0.2 - 1.1 MG/DL    ALT (SGPT) 25 12 - 65 U/L    AST (SGOT) 16 15 - 37 U/L    Alk. phosphatase 81 50 - 136 U/L    Protein, total 7.2 6.3 - 8.2 g/dL    Albumin 3.1 (L) 3.5 - 5.0 g/dL    Globulin 4.1 (H) 2.3 - 3.5 g/dL    A-G Ratio 0.8 (L) 1.2 - 3.5       52 yo male with right lower extremity wound infection:    Will start BSA, fluids, discussed with hospitalist for admission for wound management, IV abx and further care.

## 2019-11-15 NOTE — ED NOTES
Spoke with Monse Friedman in [address]. Pt to be brought to US with wound undressed.

## 2019-11-15 NOTE — PROGRESS NOTES
Pharmacokinetic Consult to Pharmacist    Ekaterinacarl Esteban is a 53 y.o. male being treated for SSTI with vancomycin. Height: 6' 3\" (190.5 cm)  Weight: 137 kg (302 lb)  Lab Results   Component Value Date/Time    BUN 12 11/14/2019 05:54 PM    Creatinine 0.65 (L) 11/14/2019 05:54 PM    WBC 12.8 (H) 11/14/2019 05:54 PM    Lactic Acid (POC) 0.90 11/14/2019 09:18 PM      Estimated Creatinine Clearance: 177.9 mL/min (A) (by C-G formula based on SCr of 0.65 mg/dL (L)). Day 1 of vancomycin. Goal trough is 10-20. Vancomycin dose initiated at 2500 mg x 1 dose; followed by Vanc 2000 mg IV q12h. Will continue to follow patient. Thank you,  Chris Garcia, PharmD.

## 2019-11-15 NOTE — PROGRESS NOTES
Nutrition  Reason for assessment: BPA - Malnutrition Screening Tool positive for unintended weight loss. Recently Lost Weight Without Trying: Unsure  If Yes, How Much Weight Loss: >33 lbs  Eating Poorly Due to Decreased Appetite: No(Pt has been trying to change his diet)  Assessment:   Admitted with sepsis, severe leg cellulitis. PMH remarkable for HTN, morbid obesity, CAD, Gout, chronic venous insufficiency. Food/Nutrition Patient History:  Visit with patient and wife at bedside. They recall modifying diet over the past year to promote weight loss secondary to recommendation from MD.  Changes made include decreasing soda intake, limiting salt, work, cutting portions, changing eating schedule. Both are uncertain of his UBW and report various numbers. Wife indicates he was over 400#, he reports 29# wt loss and doesn't recall a UBW, she reports 60# wt loss over ~1.5 year and states 39# wt loss \"prior to weight loss stalling out. \" They deny any changes in oral intake or weight that were not intentional.  Wife expressed concerns about inclusion of family  - kids playing football, different work schedules in sustaining lifestyle modifications. Pt does express concern about increase in fluid loss via wound over the past week. DIET CARDIAC Regular    Skin Status per Wound Care: partial thickness wounds extending from mid tibial area around calf to medial malleolus, wounds weeping large amount of serous drainage,  Anthropometrics:Height: 6' 3\" (190.5 cm),  Weight: 137 kg (302 lb), unspecified  , Body mass index is 37.75 kg/m². BMI class of  obese. Macronutrient needs: 137 kg listed weight  EER:  9866-4656 kcal /day (15-20 kcal/kg)  EPR:   grams protein/day (15% calories)    Intake/Comparative Standards: Recorded meal(s): none.  Pt and wife report 100% intake this am. This pattern if sustained will potentially meets ~100% of kcal and ~100% of protein needs    Nutrition Diagnosis:   Food and nutrition related knowledge deficit related to general healthy nutrition as evidenced by limited exposure to information. Intervention:  Meals and snacks: Continue current diet. Nutrition education: Provided patient and wife with verbal instruction on heart healthy diet and consuming adequate nutrients. Patient verbalizes good understanding of diet. Expect good compliance. Coordination of nutrition care: Discussed with Bryan Ricketts RN. Discharge Nutrition Plan: Too soon to determine.        [place], [place], [address]

## 2019-11-15 NOTE — CONSULTS
[address] FAX: [phone]    Vascular Surgery Consult    Subjective:      Eddie Mott is a 53 y.o. male who presented to the ER yesterday for c/o right lower extremity ulceration. The ulceration has been present for over a year but worsening the past few weeks. He is having increased drainage, pain and edema. He was followed by the wound care center at [facility] and sounds like he was evaluated by vascular surgery at [facility]. It was recommend he undergo vein stripping, however, the patient felt like the procedure was too invasive and did not want to go through with it. The patient had an appt to be evaluated by [facility] for potential vein treatment yesterday, but due to his admission missed his appt. He is unable to wear compression hose d/t pain. PMH: depression, gout, varicose veins, osteoarthritis, PUD and kidney stones. Past Medical History:   Diagnosis Date    Anxiety     Erectile dysfunction 2014    Gout 2014    History of peptic ulcer     Hypertension     Osteoarthritis of hand, primary localized 2014    Personal history of urinary calculi     x2    Wart 2014     Past Surgical History:   Procedure Laterality Date    HX WISDOM TEETH EXTRACTION        No family history on file.   Social History     Socioeconomic History    Marital status:      Spouse name: Not on file    Number of children: Not on file    Years of education: Not on file    Highest education level: Not on file   Tobacco Use    Smoking status: Former Smoker     Years: 5.00     Last attempt to quit: 2011     Years since quittin.3    Smokeless tobacco: Never Used   Substance and Sexual Activity    Alcohol use: No    Drug use: No    Sexual activity: Yes     Partners: Female     Birth control/protection: None      Current Facility-Administered Medications   Medication Dose Route Frequency    influenza vaccine  (6 mos+)(PF) (FLUARIX/FLULAVAL/FLUZONE QUAD) injection 0.5 mL  0.5 mL IntraMUSCular PRIOR TO DISCHARGE    pantothenic ac-min oil-pet,hyd (AQUAPHOR) 41 % ointment   Topical BID    acetaminophen (TYLENOL) tablet 650 mg  650 mg Oral Q4H PRN    HYDROcodone-acetaminophen (NORCO) 5-325 mg per tablet 1 Tab  1 Tab Oral Q4H PRN    naloxone (NARCAN) injection 0.4 mg  0.4 mg IntraVENous PRN    ondansetron (ZOFRAN) injection 4 mg  4 mg IntraVENous Q4H PRN    heparin (porcine) injection 5,000 Units  5,000 Units SubCUTAneous Q8H    sodium chloride (NS) flush 5-10 mL  5-10 mL IntraVENous PRN    piperacillin-tazobactam (ZOSYN) 3.375 g in 0.9% sodium chloride (MBP/ADV) 100 mL  3.375 g IntraVENous Q8H    allopurinol (ZYLOPRIM) tablet 300 mg  300 mg Oral DAILY    colchicine tablet 0.6 mg  0.6 mg Oral DAILY    cyclobenzaprine (FLEXERIL) tablet 5 mg  5 mg Oral QHS    furosemide (LASIX) tablet 40 mg  40 mg Oral DAILY    lisinopril (PRINIVIL, ZESTRIL) tablet 40 mg  40 mg Oral DAILY    sertraline (ZOLOFT) tablet 50 mg  50 mg Oral DAILY    tamsulosin (FLOMAX) capsule 0.4 mg  0.4 mg Oral DAILY    albuterol (PROVENTIL VENTOLIN) nebulizer solution 2.5 mg  2.5 mg Nebulization Q6H PRN    HYDROmorphone (PF) (DILAUDID) injection 1 mg  1 mg IntraVENous Q3H PRN    nystatin (MYCOSTATIN) 100,000 unit/gram powder   Topical BID    vancomycin (VANCOCIN) 2000 mg in  ml infusion  2,000 mg IntraVENous Q12H    alcohol 62% (NOZIN) nasal  1 Ampule  1 Ampule Topical Q8H    hydrALAZINE (APRESOLINE) 20 mg/mL injection 20 mg  20 mg IntraVENous Q6H PRN      Allergies   Allergen Reactions    Codeine Nausea Only    Sulfa (Sulfonamide Antibiotics) Rash       Review of Systems:  A comprehensive review of systems was negative except for that written in the History of Present Illness.     Objective:     Patient Vitals for the past 8 hrs:   BP Temp Pulse Resp SpO2   11/15/19 0726 131/69 98.4 °F (36.9 °C) 79 20 94 %   11/15/19 0333 146/79 99.4 °F (37.4 °C) 94 20 92 %   11/15/19 0258 132/81 98.9 °F (37.2 °C) 96 20 96 %   11/15/19 0229 156/89 99.1 °F (37.3 °C) 96 20 96 %     Temp (24hrs), Av.6 °F (37 °C), Min:97.6 °F (36.4 °C), Max:99.4 °F (37.4 °C)      Physical Exam:  GENERAL: alert, cooperative, no distress, appears stated age, LUNG: clear to auscultation bilaterally, HEART: regular rate and rhythm, S1, S2 normal, no murmur, click, rub or gallop, EXTREMITIES:  varicose veins noted to right upper thigh, ulcerations to right lower extremity consistent with venous disease, mild erythema and edema to RLE and foot. Palpable distal pulses. Lipodermatosclerosis. CEAP 6      Arterial duplex performed-History: Skin ulcerations of the right lower extremity with pain.     FINDINGS:     Ankle-brachial indices were obtained bilaterally. Noncompressible tibial vessels  on the right and at the left posterior tibial artery. Left anterior tibial ankle  brachial index of 1.1. Right and left toe index of 0.84 and 0.86 respectively.     IMPRESSION  IMPRESSION:     Evidence to suggest the presence of small vessel arterial occlusive disease. Assessment/Plan     Patient is a 44-year-old male admitted with right lower extremity cellulitis. Palpable distal pulses. Toe pressure of 148 on the right and 151 on the left. He should have sufficient blood flow for wound healing. Based on physical exam he has a large varicosities to his right upper thigh. This is likely contributing to his venous insufficiency with ulcerations to his right lower extremity. Continue antibiotics per primary team.  Would recommend continued wound care, elevation and compression. We will have him follow-up in our office in 2 weeks for a reflux study and follow-up with myself and Dr. Valerie Yepez to discuss vein treatment. Discharge when primary team feels patient is medically stable.     Reflux study on 19 @ 1:00 pm Vascular Surgery Office    Follow-up visit 19 @ 1:00 pm [facility], NP Vascular Surgery    Thank you for allowing us to participate in the care of Mr. Karlene Oakley. We will sign off and will follow him as an outpatient for potential vein treatment. Signed By: Ayush Maxwell NP     November 15, 2019       Elements of this note have been dictated using speech recognition software. As a result, errors of speech recognition may have occurred.

## 2019-11-15 NOTE — PROGRESS NOTES
[employer] received request to offer assistance with [address]. Spoke with nurse to ensure that patient was sufficiently alert and oriented to proceed with consult. I provided an overview of the document for Mr. Yifan Falcon and his wife. Mr. Yifan Falcon did not complete the HCPOA document during my visit; patient and spouse plan to complete it at a later time. I provided contact information in the event patient has further questions. Chaplains remain available for support. Rev.  Brenda Castro MDiv, BS  Board Certified

## 2019-11-15 NOTE — PROGRESS NOTES
TRANSFER - IN REPORT:    Verbal report received from [address] [place] [address] on Cande Munoz  being received from ED for routine progression of care      Report consisted of patients Situation, Background, Assessment and   Recommendations(SBAR). Information from the following report(s) SBAR, Kardex, ED Summary, [facility] and Recent Results was reviewed with the receiving nurse. Opportunity for questions and clarification was provided. Assessment completed upon patients arrival to unit and care assumed.

## 2019-11-15 NOTE — ED NOTES
Verbal report received from CAROL Jackson for continuation of patient care upon shift change. Patient care transferred at this time.

## 2019-11-15 NOTE — ROUTINE PROCESS
TRANSFER - OUT REPORT: 
 
Verbal report given to CAROL Martins on [employer]  being transferred to [facility] for routine progression of care Report consisted of patients Situation, Background, Assessment and  
Recommendations(SBAR). Information from the following report(s) SBAR, Kardex, ED Summary, MAR, Accordion and Recent Results was reviewed with the receiving nurse. Lines:  
Peripheral IV 11/14/19 Left Antecubital (Active) Site Assessment Clean, dry, & intact 11/14/2019  8:02 PM  
Phlebitis Assessment 0 11/14/2019  8:02 PM  
Infiltration Assessment 0 11/14/2019  8:02 PM  
Dressing Status Clean, dry, & intact 11/14/2019  8:02 PM  
Dressing Type Transparent 11/14/2019  8:02 PM  
Hub Color/Line Status Green 11/14/2019  8:02 PM  
Action Taken Blood drawn 11/14/2019  8:02 PM  
   
Peripheral IV 11/14/19 Right Hand (Active) Site Assessment Clean, dry, & intact 11/14/2019  8:28 PM  
Phlebitis Assessment 0 11/14/2019  8:28 PM  
Infiltration Assessment 0 11/14/2019  8:28 PM  
Dressing Status Clean, dry, & intact 11/14/2019  8:28 PM  
Dressing Type Transparent 11/14/2019  8:28 PM  
Hub Color/Line Status Pink 11/14/2019  8:28 PM  
  
 
Opportunity for questions and clarification was provided. Patient transported with: 
 [employer]

## 2019-11-15 NOTE — PROGRESS NOTES
Fluid boluses (initially ordered prior to patient's arrival to unit) were started following arrival to unit. Bolus administrations are now complete.

## 2019-11-15 NOTE — PROGRESS NOTES
Paged dr regarding patient's vitals. Dr to place order for BP. Dr advised this nurse to give the fluids as ordered for sepsis. This nurse explained that when patient arrived, an empty bag of NS was hanging. ED nurse, Princess Bettencourt, explained that patient received only 1 bag of NS while in ED.        Visit Vitals  BP (!) 184/110   Pulse 91   Temp 97.9 °F (36.6 °C)   Resp 24   Ht 6' 3\" (1.905 m)   Wt 137 kg (302 lb)   SpO2 96%   BMI 37.75 kg/m²

## 2019-11-16 LAB
ERYTHROCYTE [DISTWIDTH] IN BLOOD BY AUTOMATED COUNT: 12.6 % (ref 11.9–14.6)
HCT VFR BLD AUTO: 36.8 % (ref 41.1–50.3)
HGB BLD-MCNC: 12.2 G/DL (ref 13.6–17.2)
MAGNESIUM SERPL-MCNC: 2 MG/DL (ref 1.8–2.4)
MCH RBC QN AUTO: 34.4 PG (ref 26.1–32.9)
MCHC RBC AUTO-ENTMCNC: 33.2 G/DL (ref 31.4–35)
MCV RBC AUTO: 103.7 FL (ref 79.6–97.8)
NRBC # BLD: 0 K/UL (ref 0–0.2)
PHOSPHATE SERPL-MCNC: 2.4 MG/DL (ref 2.5–4.5)
PLATELET # BLD AUTO: 195 K/UL (ref 150–450)
PMV BLD AUTO: 9.9 FL (ref 9.4–12.3)
RBC # BLD AUTO: 3.55 M/UL (ref 4.23–5.6)
T4 FREE SERPL-MCNC: 1.1 NG/DL (ref 0.78–1.46)
TSH SERPL DL<=0.005 MIU/L-ACNC: 2.24 UIU/ML (ref 0.36–3.74)
VANCOMYCIN TROUGH SERPL-MCNC: 13.3 UG/ML (ref 5–20)
WBC # BLD AUTO: 9.2 K/UL (ref 4.3–11.1)

## 2019-11-16 PROCEDURE — 84439 ASSAY OF FREE THYROXINE: CPT

## 2019-11-16 PROCEDURE — 65660000000 HC RM CCU STEPDOWN

## 2019-11-16 PROCEDURE — 74011250636 HC RX REV CODE- 250/636: Performed by: FAMILY MEDICINE

## 2019-11-16 PROCEDURE — 74011000250 HC RX REV CODE- 250: Performed by: INTERNAL MEDICINE

## 2019-11-16 PROCEDURE — 74011250637 HC RX REV CODE- 250/637: Performed by: INTERNAL MEDICINE

## 2019-11-16 PROCEDURE — 74011000258 HC RX REV CODE- 258: Performed by: FAMILY MEDICINE

## 2019-11-16 PROCEDURE — 74011250636 HC RX REV CODE- 250/636: Performed by: INTERNAL MEDICINE

## 2019-11-16 PROCEDURE — 85027 COMPLETE CBC AUTOMATED: CPT

## 2019-11-16 PROCEDURE — 80202 ASSAY OF VANCOMYCIN: CPT

## 2019-11-16 PROCEDURE — 74011250637 HC RX REV CODE- 250/637: Performed by: FAMILY MEDICINE

## 2019-11-16 PROCEDURE — 74011250637 HC RX REV CODE- 250/637: Performed by: SURGERY

## 2019-11-16 PROCEDURE — 84100 ASSAY OF PHOSPHORUS: CPT

## 2019-11-16 PROCEDURE — 77030018836 HC SOL IRR NACL ICUM -A

## 2019-11-16 PROCEDURE — 93005 ELECTROCARDIOGRAM TRACING: CPT | Performed by: FAMILY MEDICINE

## 2019-11-16 PROCEDURE — 84443 ASSAY THYROID STIM HORMONE: CPT

## 2019-11-16 PROCEDURE — 83735 ASSAY OF MAGNESIUM: CPT

## 2019-11-16 PROCEDURE — 36415 COLL VENOUS BLD VENIPUNCTURE: CPT

## 2019-11-16 RX ORDER — DILTIAZEM HYDROCHLORIDE 5 MG/ML
10 INJECTION INTRAVENOUS ONCE
Status: COMPLETED | OUTPATIENT
Start: 2019-11-16 | End: 2019-11-16

## 2019-11-16 RX ORDER — HYDROMORPHONE HYDROCHLORIDE 1 MG/ML
2 INJECTION, SOLUTION INTRAMUSCULAR; INTRAVENOUS; SUBCUTANEOUS
Status: DISCONTINUED | OUTPATIENT
Start: 2019-11-16 | End: 2019-11-18

## 2019-11-16 RX ORDER — HEPARIN SODIUM 5000 [USP'U]/100ML
12-25 INJECTION, SOLUTION INTRAVENOUS
Status: DISCONTINUED | OUTPATIENT
Start: 2019-11-17 | End: 2019-11-18

## 2019-11-16 RX ORDER — HEPARIN SODIUM 5000 [USP'U]/ML
60 INJECTION, SOLUTION INTRAVENOUS; SUBCUTANEOUS ONCE
Status: DISCONTINUED | OUTPATIENT
Start: 2019-11-16 | End: 2019-11-16

## 2019-11-16 RX ORDER — ENOXAPARIN SODIUM 150 MG/ML
140 INJECTION SUBCUTANEOUS EVERY 12 HOURS
Status: DISCONTINUED | OUTPATIENT
Start: 2019-11-16 | End: 2019-11-16

## 2019-11-16 RX ORDER — LORAZEPAM 2 MG/ML
1 INJECTION INTRAMUSCULAR ONCE
Status: COMPLETED | OUTPATIENT
Start: 2019-11-16 | End: 2019-11-16

## 2019-11-16 RX ADMIN — Medication: at 16:30

## 2019-11-16 RX ADMIN — ENOXAPARIN SODIUM 40 MG: 40 INJECTION SUBCUTANEOUS at 17:30

## 2019-11-16 RX ADMIN — DIPHENHYDRAMINE HYDROCHLORIDE 50 MG: 25 CAPSULE ORAL at 10:30

## 2019-11-16 RX ADMIN — DILTIAZEM HYDROCHLORIDE 10 MG: 5 INJECTION INTRAVENOUS at 19:04

## 2019-11-16 RX ADMIN — Medication 250 MG: at 17:30

## 2019-11-16 RX ADMIN — Medication 250 MG: at 10:23

## 2019-11-16 RX ADMIN — Medication 1 AMPULE: at 13:04

## 2019-11-16 RX ADMIN — FUROSEMIDE 40 MG: 40 TABLET ORAL at 10:21

## 2019-11-16 RX ADMIN — NYSTATIN: 100000 POWDER TOPICAL at 17:31

## 2019-11-16 RX ADMIN — DIPHENHYDRAMINE HYDROCHLORIDE 50 MG: 25 CAPSULE ORAL at 20:43

## 2019-11-16 RX ADMIN — ALLOPURINOL 300 MG: 300 TABLET ORAL at 10:20

## 2019-11-16 RX ADMIN — HYDROCODONE BITARTRATE AND ACETAMINOPHEN 1 TABLET: 5; 325 TABLET ORAL at 04:56

## 2019-11-16 RX ADMIN — NYSTATIN: 100000 POWDER TOPICAL at 10:23

## 2019-11-16 RX ADMIN — LISINOPRIL 40 MG: 20 TABLET ORAL at 10:22

## 2019-11-16 RX ADMIN — TAMSULOSIN HYDROCHLORIDE 0.4 MG: 0.4 CAPSULE ORAL at 10:24

## 2019-11-16 RX ADMIN — LORAZEPAM 1 MG: 2 INJECTION INTRAMUSCULAR; INTRAVENOUS at 19:36

## 2019-11-16 RX ADMIN — ACETAMINOPHEN 650 MG: 325 TABLET, FILM COATED ORAL at 20:42

## 2019-11-16 RX ADMIN — VANCOMYCIN HYDROCHLORIDE 2000 MG: 10 INJECTION, POWDER, LYOPHILIZED, FOR SOLUTION INTRAVENOUS at 21:22

## 2019-11-16 RX ADMIN — Medication 10 ML: at 19:36

## 2019-11-16 RX ADMIN — Medication 1 AMPULE: at 06:04

## 2019-11-16 RX ADMIN — ENOXAPARIN SODIUM 140 MG: 150 INJECTION SUBCUTANEOUS at 20:14

## 2019-11-16 RX ADMIN — SODIUM CHLORIDE 2.5 MG/HR: 900 INJECTION, SOLUTION INTRAVENOUS at 21:07

## 2019-11-16 RX ADMIN — ACETAMINOPHEN 650 MG: 325 TABLET, FILM COATED ORAL at 16:43

## 2019-11-16 RX ADMIN — Medication 1 AMPULE: at 21:42

## 2019-11-16 RX ADMIN — PIPERACILLIN SODIUM,TAZOBACTAM SODIUM 3.38 G: 3; .375 INJECTION, POWDER, FOR SOLUTION INTRAVENOUS at 13:03

## 2019-11-16 RX ADMIN — SERTRALINE HYDROCHLORIDE 50 MG: 50 TABLET ORAL at 10:24

## 2019-11-16 RX ADMIN — VANCOMYCIN HYDROCHLORIDE 2000 MG: 10 INJECTION, POWDER, LYOPHILIZED, FOR SOLUTION INTRAVENOUS at 10:25

## 2019-11-16 RX ADMIN — HYDROMORPHONE HYDROCHLORIDE 1 MG: 1 INJECTION, SOLUTION INTRAMUSCULAR; INTRAVENOUS; SUBCUTANEOUS at 16:06

## 2019-11-16 RX ADMIN — PIPERACILLIN SODIUM,TAZOBACTAM SODIUM 3.38 G: 3; .375 INJECTION, POWDER, FOR SOLUTION INTRAVENOUS at 06:04

## 2019-11-16 RX ADMIN — HYDROCODONE BITARTRATE AND ACETAMINOPHEN 1 TABLET: 5; 325 TABLET ORAL at 10:30

## 2019-11-16 RX ADMIN — PIPERACILLIN SODIUM,TAZOBACTAM SODIUM 3.38 G: 3; .375 INJECTION, POWDER, FOR SOLUTION INTRAVENOUS at 22:41

## 2019-11-16 NOTE — PROGRESS NOTES
Patient premedicated with ordered 1mg of Dilaudid. Dressing changed to RLE with assistance of Prudencio Chiang RN. Unable to remove old dressing until saturated with normal saline. Old dressing removed with small amount of bleeding. Excoriated through most of lower right extremity with large amounts of flaky skin around foot and ankle. Xeroform applied and dressing changed per order. Patient tolerated.

## 2019-11-16 NOTE — PROGRESS NOTES
END OF SHIFT NOTE:    INTAKE/OUTPUT  11/15 0701 - 11/16 0700  In: 2122 [P.O.:480; I.V.:1642]  Out: 2500 [Urine:2500]  Voiding: YES  Catheter: NO  Drain:              Flatus: Patient does have flatus present. Stool:  0 occurrences. Characteristics:  Stool Assessment  Stool Color: Brown  Stool Appearance: Formed  Stool Amount: Large  Stool Source/Status: Rectum    Emesis: 0 occurrences. Characteristics:        VITAL SIGNS  Patient Vitals for the past 12 hrs:   Temp Pulse Resp BP SpO2   11/16/19 0445 98.7 °F (37.1 °C) 76 20 151/84 96 %   11/16/19 0011 98.6 °F (37 °C) 81 22 139/84 96 %   11/15/19 1934 98.1 °F (36.7 °C) 84 20 141/79 94 %       Pain Assessment  Pain Intensity 1: 5 (11/16/19 0456)  Pain Location 1: Leg  Pain Intervention(s) 1: Medication (see MAR)  Patient Stated Pain Goal: 0    Ambulating  Not oob this shift. Shift report given to oncoming nurse at the bedside.     Sugey Castaneda RN

## 2019-11-16 NOTE — PROGRESS NOTES
Hospitalist Progress Note    2019  Admit Date: 2019  6:22 PM   NAME: Ivonne Elmore   :  1965   MRN:  [id number]   Attending: Abrahan Levy DO  PCP:  Rodrigo Prince MD    SUBJECTIVE:   Patient admitted with severe right leg cellulitis    Overnight, the pain in his right leg is better. Still swollen and red with a receding. No fevers or chills. No shortness of breath. Review of Systems negative with exception of pertinent positives noted above. PHYSICAL EXAM     Visit Vitals  BP (!) 150/92   Pulse 75   Temp 98 °F (36.7 °C)   Resp 20   Ht 6' 3\" (1.905 m)   Wt 137 kg (302 lb)   SpO2 96%   BMI 37.75 kg/m²      Temp (24hrs), Av.3 °F (36.8 °C), Min:97.9 °F (36.6 °C), Max:98.7 °F (37.1 °C)    Oxygen Therapy  O2 Sat (%): 96 % (19 0808)  O2 Device: Room air (11/15/19 1400)    Intake/Output Summary (Last 24 hours) at 2019 1114  Last data filed at 2019 0808  Gross per 24 hour   Intake 2122 ml   Output 2700 ml   Net -578 ml       General: No acute distress. Obese, alert.    Lungs:  CTAB. Normal expansion  Heart:  RRR, no murmur, rub, or gallop  Abdomen: Soft, distended, non-tender, +bs  Extremities:  partial thickness wounds extending from mid tibial area around calf to medial malleolus, erythema improving and receding wounds weeping large amount of serous drainage, painful. SKIN: partial thickness wounds extending from mid tibial area around calf to medial malleolus, erythema improving and receding wounds weeping large amount of serous drainage, painful. Neurologic:  No focal deficits. Moves all extremities.     LABS AND STUDIES:  Recent Results (from the past 24 hour(s))   CBC W/O DIFF    Collection Time: 19  8:14 AM   Result Value Ref Range    WBC 9.2 4.3 - 11.1 K/uL    RBC 3.55 (L) 4.23 - 5.6 M/uL    HGB 12.2 (L) 13.6 - 17.2 g/dL    HCT 36.8 (L) 41.1 - 50.3 %    .7 (H) 79.6 - 97.8 FL    MCH 34.4 (H) 26.1 - 32.9 PG    MCHC 33.2 31.4 - 35.0 g/dL    RDW 12.6 11.9 - 14.6 %    PLATELET 059 987 - 886 K/uL    MPV 9.9 9.4 - 12.3 FL    ABSOLUTE NRBC 0.00 0.0 - 0.2 K/uL   VANCOMYCIN, TROUGH    Collection Time: 11/16/19  8:14 AM   Result Value Ref Range    Vancomycin,trough 13.3 5 - 20 ug/mL        Personally reviewed all labs, meds, and studies for past 24hrs. ASSESSMENT      Active Hospital Problems    Diagnosis Date Noted    Sepsis ([facility] [address] 11/14/2019    Ankle wound, right, initial encounter 11/14/2019    Obesity, morbid ([facility] [address] 02/26/2018    Benign prostatic hyperplasia without lower urinary tract symptoms 11/06/2017    HTN (hypertension) 03/16/2015    DIANA (generalized anxiety disorder) 05/07/2014    Gout 01/20/2014     1. Sepsis, secondary to right leg cellulitis with underlying chronic venous insufficiency and chronic lower extremity edema  2.. Obesity  3. Benign prostate hypertrophy  4. Thrombocytopenia likely due to sepsis and infection, resolved. Doppler ultrasound of right leg showed no DVT    Swab cultures growing moderate gram-negative rods and few gram-positive cocci, awaiting susceptibility results.     PLAN:  · Continue empiric Zosyn and vancomycin  · Continue wound care and dressing change  · Check on final wound culture results, preliminary results growing both gram-negative and gram-positive  · Pain control  · Thrombocytopenia resolved, thus reassuring      DVT prophylaxis: hal Kelly MD

## 2019-11-16 NOTE — PROGRESS NOTES
Pharmacokinetic Consult to Pharmacist    Tawnya Salvador is a 53 y.o. male being treated for SSTI with vancomycin. Height: 6' 3\" (190.5 cm)  Weight: 137 kg (302 lb)  Lab Results   Component Value Date/Time    BUN 12 11/14/2019 05:54 PM    Creatinine 0.65 (L) 11/14/2019 05:54 PM    WBC 9.2 11/16/2019 08:14 AM    Procalcitonin <0.1 11/14/2019 05:54 PM    Lactic Acid (POC) 0.90 11/14/2019 09:18 PM      Estimated Creatinine Clearance: 177.9 mL/min (A) (by C-G formula based on SCr of 0.65 mg/dL (L)). Lab Results   Component Value Date/Time    Vancomycin,trough 13.3 11/16/2019 08:14 AM       Day 3 of vancomycin. Goal trough is 10-20. Trough therapeutic. Will continue 2000 mg q12h. Pharmacy will continue to follow patient and check levels when clinically indicated. Thank you,  Opal Spann, Pharm. D.  PGY1 Pharmacy Resident  [phone]

## 2019-11-17 PROBLEM — L03.119 CELLULITIS OF LEG: Status: ACTIVE | Noted: 2019-11-17

## 2019-11-17 PROBLEM — I48.91 ATRIAL FIBRILLATION WITH RAPID VENTRICULAR RESPONSE (HCC): Status: ACTIVE | Noted: 2019-11-17

## 2019-11-17 LAB
ANION GAP SERPL CALC-SCNC: 6 MMOL/L (ref 7–16)
APTT PPP: 36.7 SEC (ref 24.7–39.8)
APTT PPP: 43.9 SEC (ref 24.7–39.8)
BASOPHILS # BLD: 0.1 K/UL (ref 0–0.2)
BASOPHILS NFR BLD: 1 % (ref 0–2)
BUN SERPL-MCNC: 8 MG/DL (ref 6–23)
CALCIUM SERPL-MCNC: 8 MG/DL (ref 8.3–10.4)
CHLORIDE SERPL-SCNC: 108 MMOL/L (ref 98–107)
CO2 SERPL-SCNC: 29 MMOL/L (ref 21–32)
CREAT SERPL-MCNC: 0.65 MG/DL (ref 0.8–1.5)
DIFFERENTIAL METHOD BLD: ABNORMAL
EOSINOPHIL # BLD: 0.3 K/UL (ref 0–0.8)
EOSINOPHIL NFR BLD: 3 % (ref 0.5–7.8)
ERYTHROCYTE [DISTWIDTH] IN BLOOD BY AUTOMATED COUNT: 12.5 % (ref 11.9–14.6)
GLUCOSE SERPL-MCNC: 116 MG/DL (ref 65–100)
HCT VFR BLD AUTO: 37.6 % (ref 41.1–50.3)
HGB BLD-MCNC: 12.5 G/DL (ref 13.6–17.2)
IMM GRANULOCYTES # BLD AUTO: 0 K/UL (ref 0–0.5)
IMM GRANULOCYTES NFR BLD AUTO: 0 % (ref 0–5)
LYMPHOCYTES # BLD: 1.6 K/UL (ref 0.5–4.6)
LYMPHOCYTES NFR BLD: 17 % (ref 13–44)
MCH RBC QN AUTO: 34.4 PG (ref 26.1–32.9)
MCHC RBC AUTO-ENTMCNC: 33.2 G/DL (ref 31.4–35)
MCV RBC AUTO: 103.6 FL (ref 79.6–97.8)
MONOCYTES # BLD: 0.7 K/UL (ref 0.1–1.3)
MONOCYTES NFR BLD: 8 % (ref 4–12)
NEUTS SEG # BLD: 6.6 K/UL (ref 1.7–8.2)
NEUTS SEG NFR BLD: 72 % (ref 43–78)
NRBC # BLD: 0 K/UL (ref 0–0.2)
PLATELET # BLD AUTO: 113 K/UL (ref 150–450)
PMV BLD AUTO: 12.6 FL (ref 9.4–12.3)
POTASSIUM SERPL-SCNC: 3.4 MMOL/L (ref 3.5–5.1)
RBC # BLD AUTO: 3.63 M/UL (ref 4.23–5.6)
SODIUM SERPL-SCNC: 143 MMOL/L (ref 136–145)
WBC # BLD AUTO: 9.2 K/UL (ref 4.3–11.1)

## 2019-11-17 PROCEDURE — 85730 THROMBOPLASTIN TIME PARTIAL: CPT

## 2019-11-17 PROCEDURE — 74011000258 HC RX REV CODE- 258: Performed by: FAMILY MEDICINE

## 2019-11-17 PROCEDURE — 74011250636 HC RX REV CODE- 250/636: Performed by: INTERNAL MEDICINE

## 2019-11-17 PROCEDURE — 80048 BASIC METABOLIC PNL TOTAL CA: CPT

## 2019-11-17 PROCEDURE — 74011250637 HC RX REV CODE- 250/637: Performed by: PHYSICIAN ASSISTANT

## 2019-11-17 PROCEDURE — 77030012935 HC DRSG AQUACEL BMS -B

## 2019-11-17 PROCEDURE — 74011250637 HC RX REV CODE- 250/637: Performed by: FAMILY MEDICINE

## 2019-11-17 PROCEDURE — 85025 COMPLETE CBC W/AUTO DIFF WBC: CPT

## 2019-11-17 PROCEDURE — 36415 COLL VENOUS BLD VENIPUNCTURE: CPT

## 2019-11-17 PROCEDURE — 74011250637 HC RX REV CODE- 250/637: Performed by: INTERNAL MEDICINE

## 2019-11-17 PROCEDURE — 74011250636 HC RX REV CODE- 250/636: Performed by: FAMILY MEDICINE

## 2019-11-17 PROCEDURE — 65660000000 HC RM CCU STEPDOWN

## 2019-11-17 PROCEDURE — 77030019605

## 2019-11-17 RX ORDER — DILTIAZEM HYDROCHLORIDE 180 MG/1
180 CAPSULE, COATED, EXTENDED RELEASE ORAL DAILY
Status: DISCONTINUED | OUTPATIENT
Start: 2019-11-17 | End: 2019-11-18

## 2019-11-17 RX ORDER — HEPARIN SODIUM 5000 [USP'U]/ML
35 INJECTION, SOLUTION INTRAVENOUS; SUBCUTANEOUS ONCE
Status: COMPLETED | OUTPATIENT
Start: 2019-11-17 | End: 2019-11-17

## 2019-11-17 RX ORDER — POTASSIUM CHLORIDE 20 MEQ/1
20 TABLET, EXTENDED RELEASE ORAL ONCE
Status: COMPLETED | OUTPATIENT
Start: 2019-11-17 | End: 2019-11-17

## 2019-11-17 RX ORDER — COLCHICINE 0.6 MG/1
0.6 TABLET ORAL
Status: DISCONTINUED | OUTPATIENT
Start: 2019-11-17 | End: 2019-11-20 | Stop reason: HOSPADM

## 2019-11-17 RX ADMIN — PIPERACILLIN SODIUM,TAZOBACTAM SODIUM 3.38 G: 3; .375 INJECTION, POWDER, FOR SOLUTION INTRAVENOUS at 21:50

## 2019-11-17 RX ADMIN — DIPHENHYDRAMINE HYDROCHLORIDE 50 MG: 25 CAPSULE ORAL at 10:08

## 2019-11-17 RX ADMIN — Medication 250 MG: at 17:38

## 2019-11-17 RX ADMIN — Medication: at 09:03

## 2019-11-17 RX ADMIN — VANCOMYCIN HYDROCHLORIDE 2000 MG: 10 INJECTION, POWDER, LYOPHILIZED, FOR SOLUTION INTRAVENOUS at 21:37

## 2019-11-17 RX ADMIN — HEPARIN SODIUM 12 UNITS/KG/HR: 5000 INJECTION, SOLUTION INTRAVENOUS at 02:34

## 2019-11-17 RX ADMIN — SODIUM CHLORIDE 15 MG/HR: 900 INJECTION, SOLUTION INTRAVENOUS at 04:42

## 2019-11-17 RX ADMIN — ACETAMINOPHEN 650 MG: 325 TABLET, FILM COATED ORAL at 22:32

## 2019-11-17 RX ADMIN — VANCOMYCIN HYDROCHLORIDE 2000 MG: 10 INJECTION, POWDER, LYOPHILIZED, FOR SOLUTION INTRAVENOUS at 10:06

## 2019-11-17 RX ADMIN — Medication 1 AMPULE: at 13:40

## 2019-11-17 RX ADMIN — DIPHENHYDRAMINE HYDROCHLORIDE 50 MG: 25 CAPSULE ORAL at 21:36

## 2019-11-17 RX ADMIN — HEPARIN SODIUM 12 UNITS/KG/HR: 5000 INJECTION, SOLUTION INTRAVENOUS at 07:17

## 2019-11-17 RX ADMIN — SERTRALINE HYDROCHLORIDE 50 MG: 50 TABLET ORAL at 08:55

## 2019-11-17 RX ADMIN — Medication: at 15:10

## 2019-11-17 RX ADMIN — PIPERACILLIN SODIUM,TAZOBACTAM SODIUM 3.38 G: 3; .375 INJECTION, POWDER, FOR SOLUTION INTRAVENOUS at 13:36

## 2019-11-17 RX ADMIN — NYSTATIN: 100000 POWDER TOPICAL at 08:56

## 2019-11-17 RX ADMIN — NYSTATIN: 100000 POWDER TOPICAL at 17:38

## 2019-11-17 RX ADMIN — HYDROMORPHONE HYDROCHLORIDE 2 MG: 1 INJECTION, SOLUTION INTRAMUSCULAR; INTRAVENOUS; SUBCUTANEOUS at 15:09

## 2019-11-17 RX ADMIN — ALLOPURINOL 300 MG: 300 TABLET ORAL at 08:55

## 2019-11-17 RX ADMIN — HYDROMORPHONE HYDROCHLORIDE 2 MG: 1 INJECTION, SOLUTION INTRAMUSCULAR; INTRAVENOUS; SUBCUTANEOUS at 05:49

## 2019-11-17 RX ADMIN — LISINOPRIL 40 MG: 20 TABLET ORAL at 08:53

## 2019-11-17 RX ADMIN — PIPERACILLIN SODIUM,TAZOBACTAM SODIUM 3.38 G: 3; .375 INJECTION, POWDER, FOR SOLUTION INTRAVENOUS at 05:53

## 2019-11-17 RX ADMIN — SODIUM CHLORIDE 15 MG/HR: 900 INJECTION, SOLUTION INTRAVENOUS at 00:55

## 2019-11-17 RX ADMIN — FUROSEMIDE 40 MG: 40 TABLET ORAL at 08:55

## 2019-11-17 RX ADMIN — HEPARIN SODIUM 20 UNITS/KG/HR: 5000 INJECTION, SOLUTION INTRAVENOUS at 18:50

## 2019-11-17 RX ADMIN — Medication 1 AMPULE: at 06:01

## 2019-11-17 RX ADMIN — HEPARIN SODIUM 4900 UNITS: 5000 INJECTION INTRAVENOUS; SUBCUTANEOUS at 09:47

## 2019-11-17 RX ADMIN — Medication 1 AMPULE: at 21:35

## 2019-11-17 RX ADMIN — POTASSIUM CHLORIDE 20 MEQ: 20 TABLET, EXTENDED RELEASE ORAL at 20:18

## 2019-11-17 RX ADMIN — HEPARIN SODIUM 4900 UNITS: 5000 INJECTION INTRAVENOUS; SUBCUTANEOUS at 18:47

## 2019-11-17 RX ADMIN — Medication 250 MG: at 08:55

## 2019-11-17 RX ADMIN — TAMSULOSIN HYDROCHLORIDE 0.4 MG: 0.4 CAPSULE ORAL at 08:55

## 2019-11-17 RX ADMIN — CYCLOBENZAPRINE 5 MG: 10 TABLET, FILM COATED ORAL at 22:28

## 2019-11-17 RX ADMIN — DILTIAZEM HYDROCHLORIDE 180 MG: 180 CAPSULE, COATED, EXTENDED RELEASE ORAL at 09:56

## 2019-11-17 NOTE — PROGRESS NOTES
Problem: Nutrition Deficit  Goal: *Optimize nutritional status  11/17/2019 0307 by Carlos Morales RN  Outcome: Progressing Towards Goal  11/17/2019 0303 by Carlos Morales RN  Outcome: Progressing Towards Goal     Problem: Falls - Risk of  Goal: *Absence of Falls  Description  Document Jonnathan Saldaña Fall Risk and appropriate interventions in the flowsheet. 11/17/2019 0307 by Carlos Morales RN  Outcome: Progressing Towards Goal  Note:   Fall Risk Interventions:  Mobility Interventions: Bed/chair exit alarm, Communicate number of staff needed for ambulation/transfer, Patient to call before getting OOB         Medication Interventions: Patient to call before getting OOB, Teach patient to arise slowly, Bed/chair exit alarm    Elimination Interventions: Bed/chair exit alarm, Call light in reach, Patient to call for help with toileting needs           11/17/2019 0303 by Carlos Morales RN  Outcome: Progressing Towards Goal  Note:   Fall Risk Interventions:  Mobility Interventions: Bed/chair exit alarm, Communicate number of staff needed for ambulation/transfer, Patient to call before getting OOB         Medication Interventions: Patient to call before getting OOB, Teach patient to arise slowly, Bed/chair exit alarm    Elimination Interventions: Bed/chair exit alarm, Call light in reach, Patient to call for help with toileting needs              Problem: Pressure Injury - Risk of  Goal: *Prevention of pressure injury  Description  Document Missael Scale and appropriate interventions in the flowsheet.   11/17/2019 0307 by Carlos Morales RN  Outcome: Progressing Towards Goal  Note:   Pressure Injury Interventions:       Moisture Interventions: Minimize layers    Activity Interventions: Increase time out of bed, Pressure redistribution bed/mattress(bed type)    Mobility Interventions: Pressure redistribution bed/mattress (bed type), HOB 30 degrees or less    Nutrition Interventions: Document food/fluid/supplement intake    Friction and Shear Interventions: Minimize layers, Foam dressings/transparent film/skin sealants, Transfer aides:transfer board/Nilda lift/ceiling lift             11/17/2019 0303 by Abiodun Butt RN  Outcome: Progressing Towards Goal  Note:   Pressure Injury Interventions:       Moisture Interventions: Minimize layers    Activity Interventions: Increase time out of bed, Pressure redistribution bed/mattress(bed type)    Mobility Interventions: Pressure redistribution bed/mattress (bed type), HOB 30 degrees or less    Nutrition Interventions: Document food/fluid/supplement intake    Friction and Shear Interventions: Minimize layers, Foam dressings/transparent film/skin sealants, Transfer aides:transfer board/Nilda lift/ceiling lift                Problem: Lower Extremity Wound Care  Goal: *Non-infected wound: Improvement of existing wound, absence of infection, and maintenance of skin integrity  11/17/2019 0307 by Abiodun Butt RN  Outcome: Progressing Towards Goal  11/17/2019 0303 by Abiodun Butt RN  Outcome: Progressing Towards Goal  Goal: *Infected Wound: Prevention of further infection and promotion of healing  Description  Infection control procedures (eg: clean dressings, clean gloves, hand washing, precautions to isolate wound from contamination, sterile instruments used for wound debridement) should be implemented.   11/17/2019 0307 by Abiodun Butt RN  Outcome: Progressing Towards Goal  11/17/2019 0303 by Abiodun Butt RN  Outcome: Progressing Towards Goal  Goal: Interventions  Outcome: Progressing Towards Goal     Problem: Afib Pathway: Day 1  Goal: Activity/Safety  Outcome: Progressing Towards Goal  Goal: Consults, if ordered  Outcome: Progressing Towards Goal  Goal: Diagnostic Test/Procedures  Outcome: Progressing Towards Goal  Goal: Nutrition/Diet  Outcome: Progressing Towards Goal  Goal: Medications  Outcome: Progressing Towards Goal  Goal: Respiratory  Outcome: Progressing Towards Goal  Goal: Treatments/Interventions/Procedures  Outcome: Progressing Towards Goal  Goal: Psychosocial  Outcome: Progressing Towards Goal  Goal: *Optimal pain control at patient's stated goal  Outcome: Progressing Towards Goal  Goal: *Hemodynamically stable  Outcome: Progressing Towards Goal  Goal: *Stable cardiac rhythm  Outcome: Progressing Towards Goal  Goal: *Lungs clear or at baseline  Outcome: Progressing Towards Goal  Goal: *Labs within defined limits  Outcome: Progressing Towards Goal  Goal: *Describes available resources and support systems  Outcome: Progressing Towards Goal

## 2019-11-17 NOTE — PROGRESS NOTES
TRANSFER - IN REPORT:    Verbal report received from [facility] on [employer] being received from 2nd floor for routine progression of care. Report consisted of patients Situation, Background, Assessment and Recommendations(SBAR). Information from the following report(s) SBAR, Kardex, MAR, Recent Results, Med Rec Status and Cardiac Rhythm Afib 150's was reviewed. Opportunity for questions and clarification was provided. Assessment completed upon patients arrival to unit and care assumed. Patient received to room 317. Patient connected to monitor and assessment completed. Plan of care reviewed. Patient oriented to room and call light. Patient aware to use call light to communicate any chest pain or needs.      Admission skin assessment completed with second RN and reveals the following: See skin note

## 2019-11-17 NOTE — PROGRESS NOTES
Pt's R leg dressing change completed. Cleaned with wound cleanser, foot cleaned with CHG wipes. \  Removed old dressing. Replaced with xeroform, abd pads, kerlix, and Ace wrap. Skin remains incredibly raw, moist, red, inflamed. Back side of leg near ankle bled and wept during dressing change. Will ensure wound comes back to see patient tomorrow, as pt and pt's wife concerned that skin is being ripped off when dressing changes occur, despite adequate saturation.

## 2019-11-17 NOTE — PROGRESS NOTES
Bedside and Verbal shift change report given to Joselin Rodriguez RN (oncoming nurse) by self Lakesha montoya). Report included the following information SBAR, Kardex, Intake/Output, MAR, Recent Results and Cardiac Rhythm SR with PACs/PVCs. Heparin gtt verified at bedside.

## 2019-11-17 NOTE — PROGRESS NOTES
Cardizem given by ICU nurses. Heartrate 120's in afib. ICU rovers calling hospitalists for plan/anxiety. 1937-given Ativan IV slowly. 2015 /afib/dozing Ativan effective. Lovenox given. 2030-To be transferred to 3rd floor. They will call when ready for report.

## 2019-11-17 NOTE — CONSULTS
[facility] Cardiology Consultation        Date of  Admission: 11/14/2019  6:22 PM     Primary Care Physician: Dr. Golden Weeks  Primary Cardiologist: none  Referring Physician: Dr. Antolin Noble Physician: Dr. Anahi Brady    CC/Reason for consult: AF    HPI:  Rajesh Sandra is a 53 y.o. obese WM with h/o HTN, chronic LE swelling, DIANA, Gout and GERD who has had a RLE ulcer for 11 months per Pt and wife. He saw wound care many months ago but was unable to afford the care. He has been dressing it himself with wife until it started draining and getting worse. He was seen by PCP and sent to ED for evaluation. Pt was admitted with RLE wound, sepsis and started on IV antibiotics. On the afternoon of 11/16, he had dressing change and some of his \"skin was ripped off. \" he then had acute onset of palpitations and feeling \"gas in my chest like I needed to burp. \" His HR was elevated and monitor room reported AF with RVR. Pt was started on IV Cardizem and heparin bolus and drip by the hospitalist. He was transferred to the telemetry floor. [facility] Cardiology was consulted for AF on 11/17. Pt is in NSR on monitor and ECG shows NSR. There is one rhythm strip showing what appears to be AF from yesterday. His electrolytes are normal and TSH is normal. Echo has been ordered and is pending.       Past Medical History:   Diagnosis Date    Anxiety     Erectile dysfunction 1/20/2014    Gout 1/20/2014    History of peptic ulcer     Hypertension     Osteoarthritis of hand, primary localized 1/20/2014    Personal history of urinary calculi     x2    Wart 1/20/2014      Past Surgical History:   Procedure Laterality Date    HX WISDOM TEETH EXTRACTION         Allergies   Allergen Reactions    Codeine Nausea Only    Sulfa (Sulfonamide Antibiotics) Rash      Social History     Socioeconomic History    Marital status:      Spouse name: Not on file    Number of children: Not on file    Years of education: Not on file    Highest education level: Not on file   Occupational History    Not on file   Social Needs    Financial resource strain: Not on file    Food insecurity:     Worry: Not on file     Inability: Not on file    Transportation needs:     Medical: Not on file     Non-medical: Not on file   Tobacco Use    Smoking status: Former Smoker     Years: 5.00     Last attempt to quit: 2011     Years since quittin.3    Smokeless tobacco: Never Used   Substance and Sexual Activity    Alcohol use: No    Drug use: No    Sexual activity: Yes     Partners: Female     Birth control/protection: None   Lifestyle    Physical activity:     Days per week: Not on file     Minutes per session: Not on file    Stress: Not on file   Relationships    Social connections:     Talks on phone: Not on file     Gets together: Not on file     Attends Holiness service: Not on file     Active member of club or organization: Not on file     Attends meetings of clubs or organizations: Not on file     Relationship status: Not on file    Intimate partner violence:     Fear of current or ex partner: Not on file     Emotionally abused: Not on file     Physically abused: Not on file     Forced sexual activity: Not on file   Other Topics Concern    Not on file   Social History Narrative    Not on file     No family history on file.      Current Facility-Administered Medications   Medication Dose Route Frequency    heparin (porcine) injection 4,900 Units  35 Units/kg IntraVENous ONCE    dilTIAZem CD (CARDIZEM CD) capsule 180 mg  180 mg Oral DAILY    HYDROmorphone (PF) (DILAUDID) injection 2 mg  2 mg IntraVENous Q6H PRN    heparin 25,000 units in dextrose 500 mL infusion  12-25 Units/kg/hr (Adjusted) IntraVENous TITRATE    influenza vaccine 2019- (6 mos+)(PF) (FLUARIX/FLULAVAL/FLUZONE QUAD) injection 0.5 mL  0.5 mL IntraMUSCular PRIOR TO DISCHARGE    pantothenic ac-min oil-pet,hyd (AQUAPHOR) 41 % ointment   Topical BID    Saccharomyces boulardii (FLORASTOR) capsule 250 mg  250 mg Oral BID    diphenhydrAMINE (BENADRYL) capsule 50 mg  50 mg Oral Q6H PRN    acetaminophen (TYLENOL) tablet 650 mg  650 mg Oral Q4H PRN    HYDROcodone-acetaminophen (NORCO) 5-325 mg per tablet 1 Tab  1 Tab Oral Q4H PRN    naloxone (NARCAN) injection 0.4 mg  0.4 mg IntraVENous PRN    ondansetron (ZOFRAN) injection 4 mg  4 mg IntraVENous Q4H PRN    sodium chloride (NS) flush 5-10 mL  5-10 mL IntraVENous PRN    piperacillin-tazobactam (ZOSYN) 3.375 g in 0.9% sodium chloride (MBP/ADV) 100 mL  3.375 g IntraVENous Q8H    allopurinol (ZYLOPRIM) tablet 300 mg  300 mg Oral DAILY    colchicine tablet 0.6 mg  0.6 mg Oral DAILY    cyclobenzaprine (FLEXERIL) tablet 5 mg  5 mg Oral QHS    furosemide (LASIX) tablet 40 mg  40 mg Oral DAILY    lisinopril (PRINIVIL, ZESTRIL) tablet 40 mg  40 mg Oral DAILY    sertraline (ZOLOFT) tablet 50 mg  50 mg Oral DAILY    tamsulosin (FLOMAX) capsule 0.4 mg  0.4 mg Oral DAILY    albuterol (PROVENTIL VENTOLIN) nebulizer solution 2.5 mg  2.5 mg Nebulization Q6H PRN    nystatin (MYCOSTATIN) 100,000 unit/gram powder   Topical BID    vancomycin (VANCOCIN) 2000 mg in  ml infusion  2,000 mg IntraVENous Q12H    alcohol 62% (NOZIN) nasal  1 Ampule  1 Ampule Topical Q8H    hydrALAZINE (APRESOLINE) 20 mg/mL injection 20 mg  20 mg IntraVENous Q6H PRN       Review of symptoms:  General: no recent weight loss/gain, weakness, +fatigue, +fever or chills   Skin: no rashes, lumps,+ skin changes   HEENT: no headache, dizziness, lightheadedness, vision changes, hearing changes, tinnitus, vertigo, sinus pressure/pain, bleeding gums, sore throat, or hoarseness   Neck: no swollen glands, goiter, pain or stiffness   Respiratory: no cough, sputum, hemoptysis, dyspnea, wheezing   Cardiovascular: no chest pain or discomfort, +palpitations, no dyspnea, orthopnea, paroxysmal nocturnal dyspnea, + chronic peripheral edema   Gastrointestinal: no trouble swallowing, heartburn, change of appetite, nausea, change in bowel habits, pain with defecation, rectal bleeding or black/tarry stools, hemorrhoids, constipation, diarrhea, abdominal pain, jaundice, liver or gallbladder problems   Urinary: no frequency, urgency , hematuria, burning/pain with urination, recent flank pain, polyuria, nocturia, or difficulty urinating   Peripheral Vascular: +claudication, leg cramps, +prior DVT, +swelling of calves, legs, or feet, color change, or swelling with redness or tenderness   Musculoskeletal: no muscle or joint pain/stiffness, joint swelling, erythema of joints, or back pain   Psychiatric: + anxiety, no depression, mental disorders, or excessive stress   Neurological: no history of CVA, dizziness, no sensory or motor loss, seizures, syncope, tremors, numbness, tingling, no changes in mood, attention, or speech, no changes in orientation, memory, insight, or judgment. no headache, vertigo. Hematologic: no anemia, easy bruising or bleeding   Endocrine: no diabetes, thyroid problems, heat or cold intolerance, excessive sweating, polyuria, polydipsia        Subjective:   Physical Exam    Visit Vitals  /77   Pulse 71   Temp 98.6 °F (37 °C)   Resp 16   Ht 6' 4\" (1.93 m)   Wt 139.9 kg (308 lb 6.4 oz)   SpO2 96%   BMI 37.54 kg/m²     General Appearance:  Well developed, obese, alert and oriented x 3, and individual in no acute distress. Ears/Nose/Mouth/Throat:   Hearing grossly normal.         Neck: Supple. Chest:   Lungs clear to auscultation bilaterally. Cardiovascular:  Regular rate and rhythm, S1, S2 normal, no murmur. Abdomen:   Soft, non-tender, bowel sounds are active. Extremities: + edema bilaterally. +RLE wound wrapped   Skin: Warm and dry.                Labs:   Recent Results (from the past 24 hour(s))   MAGNESIUM    Collection Time: 11/16/19  7:04 PM   Result Value Ref Range    Magnesium 2.0 1.8 - 2.4 mg/dL   PHOSPHORUS    Collection Time: 11/16/19 7:04 PM   Result Value Ref Range    Phosphorus 2.4 (L) 2.5 - 4.5 MG/DL   TSH 3RD GENERATION    Collection Time: 11/16/19  7:04 PM   Result Value Ref Range    TSH 2.240 0.358 - 3.740 uIU/mL   T4, FREE    Collection Time: 11/16/19  7:04 PM   Result Value Ref Range    T4, Free 1.1 0.78 - 1.46 NG/DL   EKG, 12 LEAD, INITIAL    Collection Time: 11/17/19  7:45 AM   Result Value Ref Range    Ventricular Rate 70 BPM    Atrial Rate 70 BPM    P-R Interval 164 ms    QRS Duration 128 ms    Q-T Interval 396 ms    QTC Calculation (Bezet) 427 ms    Calculated P Axis 36 degrees    Calculated R Axis 32 degrees    Calculated T Axis 19 degrees    Diagnosis       Normal sinus rhythm  Non-specific intra-ventricular conduction block  Abnormal ECG  When compared with ECG of 14-NOV-2019 21:04,  No significant change was found     PTT    Collection Time: 11/17/19  8:21 AM   Result Value Ref Range    aPTT 43.9 (H) 24.7 - 39.8 SEC       Pt has been seen and examined by Dr. Kelton Mendoza. He agrees with the following assessment and plan. Assessment/Plan:        Diagnosis    Sepsis ([address] secondary to worsening chronic RLE wound- IV antibiotics per primary team    Ankle wound, right- IV antibiotics per primary team    Paroxysmal Atrial fibrillation- in NSR this AM on IV Cardizem per primary team, change to PO Cardizem, continue heparin for now until echo results obtained then consider NOAC, TSH normal    H/O RLE DVT- no DVT on US    Obesity, morbid ([address]    Benign prostatic hyperplasia without lower urinary tract symptoms    HTN (hypertension)- monitor with addition of Cardizem    DIANA (generalized anxiety disorder)- meds per primary team    Gout- meds per primary team       Thank you for consulting [facility] and allowing us to participate in the care of this patient. We will continue to follow along with you.     Renetta Huston PA-C

## 2019-11-17 NOTE — PROGRESS NOTES
Called about atrial flutter that is new and elevated HR as high as 150 despite cardizem bolus earlier today. Order EKG. Consult cardiology for AM. Place on cardizem drip and heparin drip since CHADSVASC score elevated. ECHO ordered. Check phosphorus and TSH/T4.  Transfer to telemetry

## 2019-11-17 NOTE — PROGRESS NOTES
Problem: Nutrition Deficit  Goal: *Optimize nutritional status  Outcome: Progressing Towards Goal     Problem: Falls - Risk of  Goal: *Absence of Falls  Description  Document Desiree william Fall Risk and appropriate interventions in the flowsheet. Outcome: Progressing Towards Goal  Note:   Fall Risk Interventions:  Mobility Interventions: Bed/chair exit alarm, Communicate number of staff needed for ambulation/transfer, Patient to call before getting OOB         Medication Interventions: Patient to call before getting OOB, Teach patient to arise slowly, Bed/chair exit alarm    Elimination Interventions: Bed/chair exit alarm, Call light in reach, Patient to call for help with toileting needs              Problem: Pressure Injury - Risk of  Goal: *Prevention of pressure injury  Description  Document Missael Scale and appropriate interventions in the flowsheet. Outcome: Progressing Towards Goal  Note:   Pressure Injury Interventions:       Moisture Interventions: Minimize layers    Activity Interventions: Increase time out of bed, Pressure redistribution bed/mattress(bed type)    Mobility Interventions: Pressure redistribution bed/mattress (bed type), HOB 30 degrees or less    Nutrition Interventions: Document food/fluid/supplement intake    Friction and Shear Interventions: Minimize layers, Foam dressings/transparent film/skin sealants, Transfer aides:transfer board/Nilda lift/ceiling lift                Problem: Lower Extremity Wound Care  Goal: *Non-infected wound: Improvement of existing wound, absence of infection, and maintenance of skin integrity  Outcome: Progressing Towards Goal  Goal: *Infected Wound: Prevention of further infection and promotion of healing  Description  Infection control procedures (eg: clean dressings, clean gloves, hand washing, precautions to isolate wound from contamination, sterile instruments used for wound debridement) should be implemented.   Outcome: Progressing Towards Goal

## 2019-11-17 NOTE — PROGRESS NOTES
Remains in afib -130 after cardizem administration. Pt reports severe anxiety. Investigation reveals patient has consumed significant amounts of caffeine throughout the day to stay awake for football games (multiple coffee, pepsi, code [place] due, etc.). Discussed with Dr. Nicol Newell. Will administer 1mg ativan and monitor. BP remains stable. Will update MD on patient condition post ativan administration. Pt and family updated on plan of care and agreeable.

## 2019-11-17 NOTE — PROGRESS NOTES
Bedside and Verbal shift change report given to self (oncoming nurse) by Citlaly Jacinto RN (offgoing nurse). Report included the following information SBAR, Kardex, Intake/Output, MAR, Recent Results and Cardiac Rhythm SR. Cardizem and heparin gtts verified at bedside.

## 2019-11-17 NOTE — PROGRESS NOTES
Dressing changed on R-lower leg. 2 mgs of Dilaudid given prior to dressing change. Wound on back of ankle roughly the size of the bottom of a soda can. Wound and rest of leg washed with sterile saline and covered with xeroform, ABD pads and then Kerlix. Ace wrap reapplied after dressing changed. Pt. Has no complaints during or after dressing changed. Aquaphor applied to Pt.s foot and Zinc cream applied where needed.

## 2019-11-17 NOTE — PROGRESS NOTES
Bedside and Verbal report given to Amanda Aguirre RN by self. Report included SBAR, Kardex, ED summary, procedure summary, recent results and cardiac rhythm SR. Heparin and Cardizem drip rate verified with oncoming RN. Hourly vitals printed and placed in paper chart.

## 2019-11-17 NOTE — PROGRESS NOTES
Bedside and Verbal shift change report given to Fernando Wade (oncoming nurse) by [facility] (offgoing nurse). Report included the following information SBAR, Kardex and Recent Results.

## 2019-11-17 NOTE — PROGRESS NOTES
TRANSFER - OUT REPORT:    Verbal report given to Jose(name) on [employer]  being transferred to #christina(unit) for routine progression of care       Report consisted of patients Situation, Background, Assessment and   Recommendations(SBAR). Information from the following report(s) Kardex was reviewed with the receiving nurse. Lines:   Peripheral IV 11/14/19 Left Antecubital (Active)   Site Assessment Clean, dry, & intact 11/16/2019  7:10 AM   Phlebitis Assessment 0 11/16/2019  7:10 AM   Infiltration Assessment 0 11/16/2019  7:10 AM   Dressing Status Clean, dry, & intact 11/16/2019  7:10 AM   Dressing Type Tape;Transparent 11/16/2019  7:10 AM   Hub Color/Line Status Green;Flushed;Patent 11/16/2019  7:10 AM   Action Taken Blood drawn 11/14/2019  8:02 PM       Peripheral IV 11/14/19 Right Hand (Active)   Site Assessment Clean, dry, & intact 11/16/2019  7:10 AM   Phlebitis Assessment 0 11/16/2019  7:10 AM   Infiltration Assessment 0 11/16/2019  7:10 AM   Dressing Status Clean, dry, & intact 11/16/2019  7:10 AM   Dressing Type Tape;Transparent 11/16/2019  7:10 AM   Hub Color/Line Status Pink;Flushed;Patent 11/16/2019  7:10 AM        Opportunity for questions and clarification was provided.       Patient transported with:   Registered Nurse

## 2019-11-17 NOTE — CONSULTS
[facility] CARDIOLOGY     11/17/2019     9:56 AM    I have personally seen and examined Justyn Evans with  P[facility] DASILVA. I agree and confirm findings in history, physical exam, and assessment/plan as outlined . Atrial fibrillation has converted. Transition iv to po Cardizem. Continue heparin until echo results are known. Will follow along.       Eda Cortez MD

## 2019-11-17 NOTE — PROGRESS NOTES
Hospitalist Note     Admit Date:  2019  6:22 PM   Name:  Caitie Blanton   Age:  53 y.o.  :  1965   MRN:  [id number]   PCP:  Otoniel Lewis MD  Treatment Team: Attending Provider: Rossi Davila DO; Care Manager: Wolfgang iRzo RN; Consulting Provider: Charmayne Arms    HPI/Subjective:     Mr. Anel Virk is a 54 yo PMH of chronic right ankle wound admitted with worsening cellulitis. He had been on prior outpatient doxycycline and started on vancomycin/ zosyn. BC NGTD, wound cx pending (polymicrobial). He has been seen by vascular surgery who welcomes wound care, compression and LE elevation. He developed afib with RVR requiring transfer to tele and cardiology following. He has converted to NSR. He has been placed on IV heparin and cardizem. ECHO pending. Discharge plans pending. 19 family present, thinks RLE improving, eating ok, has chronic edema and wound to RLE       Objective:     Patient Vitals for the past 24 hrs:   Temp Pulse Resp BP SpO2   19 0853  71  124/77    19 0845 98.5 °F (36.9 °C) 70 16 130/78 93 %   19 0519 98.6 °F (37 °C) 82 16 117/66 96 %   19 0309  97      19 0057 98.5 °F (36.9 °C) (!) 107 18 128/70 98 %   19 2127 98.2 °F (36.8 °C) (!) 147 16 126/85 95 %   19  (!) 128 18 138/79 96 %   19 1917 99 °F (37.2 °C) (!) 137 20 143/89 96 %   19 1734    (!) 159/92    19 1727  (!) 145 22 (!) 150/96 96 %   19 1554 97.7 °F (36.5 °C) 80 20 154/81 95 %     Oxygen Therapy  O2 Sat (%): 93 % (19 0845)  O2 Device: Room air (19 0747)    Estimated body mass index is 37.54 kg/m² as calculated from the following:    Height as of this encounter: 6' 4\" (1.93 m). Weight as of this encounter: 139.9 kg (308 lb 6.4 oz).       Intake/Output Summary (Last 24 hours) at 2019 1158  Last data filed at 2019 1009  Gross per 24 hour   Intake 0 ml   Output 3255 ml   Net -3255 ml       *Note that automatically entered I/Os may not be accurate; dependent on patient compliance with collection and accurate  by techs. General:    Well nourished. Alert. Obese, no distress   CV:   RRR. No murmur, rub, or gallop. 1+ edema   Lungs:   CTAB. No wheezing, rhonchi, or rales. anterior  Abdomen:   Soft, nontender, nondistended. Obese, decreased BS  Extremities: Warm and dry.     Skin:     Venous stasis to RLE/ wrapped   Neuro:  No gross focal deficits    Data Review:  I have reviewed all labs, meds, and studies from the last 24 hours:    Recent Results (from the past 24 hour(s))   MAGNESIUM    Collection Time: 11/16/19  7:04 PM   Result Value Ref Range    Magnesium 2.0 1.8 - 2.4 mg/dL   PHOSPHORUS    Collection Time: 11/16/19  7:04 PM   Result Value Ref Range    Phosphorus 2.4 (L) 2.5 - 4.5 MG/DL   TSH 3RD GENERATION    Collection Time: 11/16/19  7:04 PM   Result Value Ref Range    TSH 2.240 0.358 - 3.740 uIU/mL   T4, FREE    Collection Time: 11/16/19  7:04 PM   Result Value Ref Range    T4, Free 1.1 0.78 - 1.46 NG/DL   EKG, 12 LEAD, INITIAL    Collection Time: 11/17/19  7:45 AM   Result Value Ref Range    Ventricular Rate 70 BPM    Atrial Rate 70 BPM    P-R Interval 164 ms    QRS Duration 128 ms    Q-T Interval 396 ms    QTC Calculation (Bezet) 427 ms    Calculated P Axis 36 degrees    Calculated R Axis 32 degrees    Calculated T Axis 19 degrees    Diagnosis       Normal sinus rhythm  Non-specific intra-ventricular conduction block  Abnormal ECG  When compared with ECG of 14-NOV-2019 21:04,  No significant change was found  Confirmed by Kay Bal ([id number]) on 11/17/2019 11:50:20 AM     PTT    Collection Time: 11/17/19  8:21 AM   Result Value Ref Range    aPTT 43.9 (H) 24.7 - 23.8 SEC   METABOLIC PANEL, BASIC    Collection Time: 11/17/19  8:21 AM   Result Value Ref Range    Sodium 143 136 - 145 mmol/L    Potassium 3.4 (L) 3.5 - 5.1 mmol/L    Chloride 108 (H) 98 - 107 mmol/L CO2 29 21 - 32 mmol/L    Anion gap 6 (L) 7 - 16 mmol/L    Glucose 116 (H) 65 - 100 mg/dL    BUN 8 6 - 23 MG/DL    Creatinine 0.65 (L) 0.8 - 1.5 MG/DL    GFR est AA >60 >60 ml/min/1.73m2    GFR est non-AA >60 >60 ml/min/1.73m2    Calcium 8.0 (L) 8.3 - 10.4 MG/DL        All Micro Results     Procedure Component Value Units Date/Time    CULTURE, Honey Jaymie STAIN [[id number]]  (Abnormal) Collected:  11/14/19 2313    Order Status:  Completed Specimen:  Wound from Ankle Updated:  11/17/19 0913     Special Requests: NO SPECIAL REQUESTS        GRAM STAIN NO WBCS SEEN         FEW GRAM POSITIVE COCCI        Culture result:       MODERATE GRAM NEGATIVE RODS IDENTIFICATION AND SUSCEPTIBILITY TO FOLLOW                  MODERATE 2ND GRAM NEGATIVE ENMANUEL IDENTIFICATION AND SUSCEPTIBILITY TO FOLLOW                  MODERATE STAPHYLOCOCCUS AUREUS SUBCULTURE IN PROGRESS                  MODERATE MIXED SKIN YUE ISOLATED          CULTURE, BLOOD [[id number]] Collected:  11/14/19 2020    Order Status:  Completed Specimen:  Blood Updated:  11/17/19 0710     Special Requests: --        LEFT  Antecubital       Culture result: NO GROWTH 3 DAYS       CULTURE, BLOOD [[id number]] Collected:  11/14/19 2239    Order Status:  Completed Specimen:  Blood Updated:  11/17/19 0710     Special Requests: --        LEFT  HAND       Culture result: NO GROWTH 2 DAYS             No results found for this visit on 11/14/19.     Current Meds:  Current Facility-Administered Medications   Medication Dose Route Frequency    dilTIAZem CD (CARDIZEM CD) capsule 180 mg  180 mg Oral DAILY    [START ON 11/18/2019] Vancomycin Trough Reminder   Other ONCE    colchicine tablet 0.6 mg  0.6 mg Oral DAILY PRN    HYDROmorphone (PF) (DILAUDID) injection 2 mg  2 mg IntraVENous Q6H PRN    heparin 25,000 units in dextrose 500 mL infusion  12-25 Units/kg/hr (Adjusted) IntraVENous TITRATE    influenza vaccine 2019-20 (6 mos+)(PF) (FLUARIX/FLULAVAL/FLUZONE QUAD) injection 0.5 mL 0.5 mL IntraMUSCular PRIOR TO DISCHARGE    pantothenic ac-min oil-pet,hyd (AQUAPHOR) 41 % ointment   Topical BID    Saccharomyces boulardii (FLORASTOR) capsule 250 mg  250 mg Oral BID    diphenhydrAMINE (BENADRYL) capsule 50 mg  50 mg Oral Q6H PRN    acetaminophen (TYLENOL) tablet 650 mg  650 mg Oral Q4H PRN    HYDROcodone-acetaminophen (NORCO) 5-325 mg per tablet 1 Tab  1 Tab Oral Q4H PRN    naloxone (NARCAN) injection 0.4 mg  0.4 mg IntraVENous PRN    ondansetron (ZOFRAN) injection 4 mg  4 mg IntraVENous Q4H PRN    sodium chloride (NS) flush 5-10 mL  5-10 mL IntraVENous PRN    piperacillin-tazobactam (ZOSYN) 3.375 g in 0.9% sodium chloride (MBP/ADV) 100 mL  3.375 g IntraVENous Q8H    allopurinol (ZYLOPRIM) tablet 300 mg  300 mg Oral DAILY    cyclobenzaprine (FLEXERIL) tablet 5 mg  5 mg Oral QHS    furosemide (LASIX) tablet 40 mg  40 mg Oral DAILY    lisinopril (PRINIVIL, ZESTRIL) tablet 40 mg  40 mg Oral DAILY    sertraline (ZOLOFT) tablet 50 mg  50 mg Oral DAILY    tamsulosin (FLOMAX) capsule 0.4 mg  0.4 mg Oral DAILY    albuterol (PROVENTIL VENTOLIN) nebulizer solution 2.5 mg  2.5 mg Nebulization Q6H PRN    nystatin (MYCOSTATIN) 100,000 unit/gram powder   Topical BID    vancomycin (VANCOCIN) 2000 mg in  ml infusion  2,000 mg IntraVENous Q12H    alcohol 62% (NOZIN) nasal  1 Ampule  1 Ampule Topical Q8H    hydrALAZINE (APRESOLINE) 20 mg/mL injection 20 mg  20 mg IntraVENous Q6H PRN       Other Studies (last 24 hours):  No results found.     Assessment and Plan:     Hospital Problems as of 11/17/2019 Date Reviewed: 7/10/2019          Codes Class Noted - Resolved POA    Atrial fibrillation with rapid ventricular response ([facility] ICD-10-CM: I48.91  ICD-9-CM: 427.31  11/17/2019 - Present Yes        Cellulitis of leg ICD-10-CM: A42.314  ICD-9-CM: 682.6  11/17/2019 - Present Yes        * (Principal) Sepsis ([facility] ICD-10-CM: A41.9  ICD-9-CM: 038.9, 995.91  11/14/2019 - Present Unknown Ankle wound, right, initial encounter ICD-10-CM: S91.001A  ICD-9-CM: 891.0  11/14/2019 - Present Unknown        Obesity, morbid ([address] ICD-10-CM: E66.01  ICD-9-CM: 278.01  2/26/2018 - Present Yes        Benign prostatic hyperplasia without lower urinary tract symptoms ICD-10-CM: N40.0  ICD-9-CM: 600.00  11/6/2017 - Present Yes        HTN (hypertension) ICD-10-CM: I10  ICD-9-CM: 401.9  3/16/2015 - Present Yes        DIANA (generalized anxiety disorder) ICD-10-CM: F41.1  ICD-9-CM: 300.02  5/7/2014 - Present Yes        Gout ICD-10-CM: M10.9  ICD-9-CM: 274.9  1/20/2014 - Present Yes              Plan:  · RLE cellulitis/ sepsis: continue wound care, followup wound cultures and BC,  Continue vancomycin/ zosyn, needs outpatient vascular followup  · AFIB with RVR: currently in NSR, on IV heparin with change to oral cardizem per cardiology, followup ECHO  · HTN: continued lisinopril  · Depression: continued zoloft  · Gout: continued allopurinol    DC planning/Dispo:  Pending to home      Diet:  DIET CARDIAC  DVT ppx:   IV heparin    Signed:  Curry Tolbert MD

## 2019-11-18 PROBLEM — E87.6 HYPOKALEMIA: Status: ACTIVE | Noted: 2019-11-18

## 2019-11-18 LAB
ANION GAP SERPL CALC-SCNC: 4 MMOL/L (ref 7–16)
APTT PPP: 34.7 SEC (ref 24.7–39.8)
APTT PPP: 63.2 SEC (ref 24.7–39.8)
ATRIAL RATE: 70 BPM
BASOPHILS # BLD: 0.1 K/UL (ref 0–0.2)
BASOPHILS NFR BLD: 1 % (ref 0–2)
BUN SERPL-MCNC: 10 MG/DL (ref 6–23)
CALCIUM SERPL-MCNC: 8.6 MG/DL (ref 8.3–10.4)
CALCULATED P AXIS, ECG09: 36 DEGREES
CALCULATED R AXIS, ECG10: 32 DEGREES
CALCULATED T AXIS, ECG11: 19 DEGREES
CHLORIDE SERPL-SCNC: 108 MMOL/L (ref 98–107)
CO2 SERPL-SCNC: 31 MMOL/L (ref 21–32)
CREAT SERPL-MCNC: 0.71 MG/DL (ref 0.8–1.5)
DIAGNOSIS, 93000: NORMAL
DIFFERENTIAL METHOD BLD: ABNORMAL
EOSINOPHIL # BLD: 0.3 K/UL (ref 0–0.8)
EOSINOPHIL NFR BLD: 3 % (ref 0.5–7.8)
ERYTHROCYTE [DISTWIDTH] IN BLOOD BY AUTOMATED COUNT: 12.6 % (ref 11.9–14.6)
GLUCOSE SERPL-MCNC: 117 MG/DL (ref 65–100)
HCT VFR BLD AUTO: 36.6 % (ref 41.1–50.3)
HGB BLD-MCNC: 12.4 G/DL (ref 13.6–17.2)
IMM GRANULOCYTES # BLD AUTO: 0 K/UL (ref 0–0.5)
IMM GRANULOCYTES NFR BLD AUTO: 0 % (ref 0–5)
LYMPHOCYTES # BLD: 1.8 K/UL (ref 0.5–4.6)
LYMPHOCYTES NFR BLD: 17 % (ref 13–44)
MAGNESIUM SERPL-MCNC: 1.9 MG/DL (ref 1.8–2.4)
MCH RBC QN AUTO: 35.1 PG (ref 26.1–32.9)
MCHC RBC AUTO-ENTMCNC: 33.9 G/DL (ref 31.4–35)
MCV RBC AUTO: 103.7 FL (ref 79.6–97.8)
MONOCYTES # BLD: 0.9 K/UL (ref 0.1–1.3)
MONOCYTES NFR BLD: 9 % (ref 4–12)
NEUTS SEG # BLD: 7.3 K/UL (ref 1.7–8.2)
NEUTS SEG NFR BLD: 70 % (ref 43–78)
NRBC # BLD: 0 K/UL (ref 0–0.2)
P-R INTERVAL, ECG05: 164 MS
PLATELET # BLD AUTO: 149 K/UL (ref 150–450)
PMV BLD AUTO: 11.8 FL (ref 9.4–12.3)
POTASSIUM SERPL-SCNC: 3.4 MMOL/L (ref 3.5–5.1)
Q-T INTERVAL, ECG07: 396 MS
QRS DURATION, ECG06: 128 MS
QTC CALCULATION (BEZET), ECG08: 427 MS
RBC # BLD AUTO: 3.53 M/UL (ref 4.23–5.6)
SODIUM SERPL-SCNC: 143 MMOL/L (ref 136–145)
VANCOMYCIN TROUGH SERPL-MCNC: 11.6 UG/ML (ref 5–20)
VENTRICULAR RATE, ECG03: 70 BPM
WBC # BLD AUTO: 10.5 K/UL (ref 4.3–11.1)

## 2019-11-18 PROCEDURE — 65660000000 HC RM CCU STEPDOWN

## 2019-11-18 PROCEDURE — 74011000258 HC RX REV CODE- 258: Performed by: FAMILY MEDICINE

## 2019-11-18 PROCEDURE — 74011250636 HC RX REV CODE- 250/636: Performed by: PHYSICIAN ASSISTANT

## 2019-11-18 PROCEDURE — 83735 ASSAY OF MAGNESIUM: CPT

## 2019-11-18 PROCEDURE — 80202 ASSAY OF VANCOMYCIN: CPT

## 2019-11-18 PROCEDURE — 77030018836 HC SOL IRR NACL ICUM -A

## 2019-11-18 PROCEDURE — 85730 THROMBOPLASTIN TIME PARTIAL: CPT

## 2019-11-18 PROCEDURE — 74011250636 HC RX REV CODE- 250/636: Performed by: FAMILY MEDICINE

## 2019-11-18 PROCEDURE — 74011250637 HC RX REV CODE- 250/637: Performed by: INTERNAL MEDICINE

## 2019-11-18 PROCEDURE — 77030019605

## 2019-11-18 PROCEDURE — 74011250637 HC RX REV CODE- 250/637: Performed by: PHYSICIAN ASSISTANT

## 2019-11-18 PROCEDURE — 97161 PT EVAL LOW COMPLEX 20 MIN: CPT

## 2019-11-18 PROCEDURE — 74011250637 HC RX REV CODE- 250/637: Performed by: FAMILY MEDICINE

## 2019-11-18 PROCEDURE — 80048 BASIC METABOLIC PNL TOTAL CA: CPT

## 2019-11-18 PROCEDURE — 74011250636 HC RX REV CODE- 250/636: Performed by: INTERNAL MEDICINE

## 2019-11-18 PROCEDURE — 85025 COMPLETE CBC W/AUTO DIFF WBC: CPT

## 2019-11-18 PROCEDURE — 29581 APPL MULTLAYER CMPRN SYS LEG: CPT

## 2019-11-18 PROCEDURE — 97116 GAIT TRAINING THERAPY: CPT

## 2019-11-18 PROCEDURE — 36415 COLL VENOUS BLD VENIPUNCTURE: CPT

## 2019-11-18 PROCEDURE — 2W1QX6Z COMPRESSION OF RIGHT LOWER LEG USING PRESSURE DRESSING: ICD-10-PCS | Performed by: INTERNAL MEDICINE

## 2019-11-18 PROCEDURE — C8929 TTE W OR WO FOL WCON,DOPPLER: HCPCS

## 2019-11-18 PROCEDURE — 97165 OT EVAL LOW COMPLEX 30 MIN: CPT

## 2019-11-18 RX ORDER — DILTIAZEM HYDROCHLORIDE 120 MG/1
240 CAPSULE, COATED, EXTENDED RELEASE ORAL DAILY
Status: DISCONTINUED | OUTPATIENT
Start: 2019-11-19 | End: 2019-11-20 | Stop reason: HOSPADM

## 2019-11-18 RX ORDER — HEPARIN SODIUM 5000 [USP'U]/ML
35 INJECTION, SOLUTION INTRAVENOUS; SUBCUTANEOUS ONCE
Status: COMPLETED | OUTPATIENT
Start: 2019-11-18 | End: 2019-11-18

## 2019-11-18 RX ORDER — DILTIAZEM HYDROCHLORIDE 30 MG/1
60 TABLET, FILM COATED ORAL ONCE
Status: COMPLETED | OUTPATIENT
Start: 2019-11-18 | End: 2019-11-18

## 2019-11-18 RX ORDER — POTASSIUM CHLORIDE 20 MEQ/1
40 TABLET, EXTENDED RELEASE ORAL
Status: COMPLETED | OUTPATIENT
Start: 2019-11-18 | End: 2019-11-18

## 2019-11-18 RX ADMIN — ACETAMINOPHEN 650 MG: 325 TABLET, FILM COATED ORAL at 20:38

## 2019-11-18 RX ADMIN — HYDROCODONE BITARTRATE AND ACETAMINOPHEN 1 TABLET: 5; 325 TABLET ORAL at 08:11

## 2019-11-18 RX ADMIN — HYDROCODONE BITARTRATE AND ACETAMINOPHEN 1 TABLET: 5; 325 TABLET ORAL at 18:51

## 2019-11-18 RX ADMIN — APIXABAN 5 MG: 5 TABLET, FILM COATED ORAL at 21:44

## 2019-11-18 RX ADMIN — DILTIAZEM HYDROCHLORIDE 60 MG: 30 TABLET, FILM COATED ORAL at 13:49

## 2019-11-18 RX ADMIN — Medication 1 AMPULE: at 05:33

## 2019-11-18 RX ADMIN — COLCHICINE 0.6 MG: 0.6 TABLET, FILM COATED ORAL at 15:43

## 2019-11-18 RX ADMIN — Medication 1 AMPULE: at 13:50

## 2019-11-18 RX ADMIN — PIPERACILLIN SODIUM,TAZOBACTAM SODIUM 3.38 G: 3; .375 INJECTION, POWDER, FOR SOLUTION INTRAVENOUS at 05:33

## 2019-11-18 RX ADMIN — Medication: at 08:14

## 2019-11-18 RX ADMIN — Medication 250 MG: at 08:13

## 2019-11-18 RX ADMIN — VANCOMYCIN HYDROCHLORIDE 2000 MG: 10 INJECTION, POWDER, LYOPHILIZED, FOR SOLUTION INTRAVENOUS at 09:40

## 2019-11-18 RX ADMIN — LISINOPRIL 40 MG: 20 TABLET ORAL at 08:13

## 2019-11-18 RX ADMIN — DILTIAZEM HYDROCHLORIDE 180 MG: 180 CAPSULE, COATED, EXTENDED RELEASE ORAL at 08:12

## 2019-11-18 RX ADMIN — APIXABAN 5 MG: 5 TABLET, FILM COATED ORAL at 09:39

## 2019-11-18 RX ADMIN — FUROSEMIDE 40 MG: 40 TABLET ORAL at 08:12

## 2019-11-18 RX ADMIN — VANCOMYCIN HYDROCHLORIDE 2000 MG: 10 INJECTION, POWDER, LYOPHILIZED, FOR SOLUTION INTRAVENOUS at 21:41

## 2019-11-18 RX ADMIN — HYDROMORPHONE HYDROCHLORIDE 1 MG: 1 INJECTION, SOLUTION INTRAMUSCULAR; INTRAVENOUS; SUBCUTANEOUS at 09:50

## 2019-11-18 RX ADMIN — POTASSIUM CHLORIDE 40 MEQ: 20 TABLET, EXTENDED RELEASE ORAL at 11:47

## 2019-11-18 RX ADMIN — HYDROCODONE BITARTRATE AND ACETAMINOPHEN 1 TABLET: 5; 325 TABLET ORAL at 12:09

## 2019-11-18 RX ADMIN — Medication 250 MG: at 17:53

## 2019-11-18 RX ADMIN — Medication 1 AMPULE: at 21:45

## 2019-11-18 RX ADMIN — CYCLOBENZAPRINE 5 MG: 10 TABLET, FILM COATED ORAL at 21:44

## 2019-11-18 RX ADMIN — HYDROMORPHONE HYDROCHLORIDE 2 MG: 1 INJECTION, SOLUTION INTRAMUSCULAR; INTRAVENOUS; SUBCUTANEOUS at 03:00

## 2019-11-18 RX ADMIN — NYSTATIN: 100000 POWDER TOPICAL at 17:54

## 2019-11-18 RX ADMIN — ALLOPURINOL 300 MG: 300 TABLET ORAL at 08:13

## 2019-11-18 RX ADMIN — SERTRALINE HYDROCHLORIDE 50 MG: 50 TABLET ORAL at 08:12

## 2019-11-18 RX ADMIN — PIPERACILLIN SODIUM,TAZOBACTAM SODIUM 3.38 G: 3; .375 INJECTION, POWDER, FOR SOLUTION INTRAVENOUS at 21:35

## 2019-11-18 RX ADMIN — HEPARIN SODIUM 4900 UNITS: 5000 INJECTION INTRAVENOUS; SUBCUTANEOUS at 02:08

## 2019-11-18 RX ADMIN — PERFLUTREN 1 ML: 6.52 INJECTION, SUSPENSION INTRAVENOUS at 09:20

## 2019-11-18 RX ADMIN — Medication: at 17:00

## 2019-11-18 RX ADMIN — PIPERACILLIN SODIUM,TAZOBACTAM SODIUM 3.38 G: 3; .375 INJECTION, POWDER, FOR SOLUTION INTRAVENOUS at 13:52

## 2019-11-18 RX ADMIN — TAMSULOSIN HYDROCHLORIDE 0.4 MG: 0.4 CAPSULE ORAL at 08:12

## 2019-11-18 RX ADMIN — NYSTATIN: 100000 POWDER TOPICAL at 08:16

## 2019-11-18 NOTE — PROGRESS NOTES
Care Management Interventions  PCP Verified by CM: Yes  Last Visit to PCP: 09/19/19  Palliative Care Criteria Met (RRAT>21 & CHF Dx)?: No(RRAT 5 Dx Sepsis)  Transition of Care Consult (CM Consult): Discharge Planning  Discharge Durable Medical Equipment: No(none)  Physical Therapy Consult: Yes  Occupational Therapy Consult: No  Speech Therapy Consult: No  Current Support Network: Lives with Spouse  Confirm Follow Up Transport: Family  Plan discussed with Pt/Family/Caregiver: Yes  Freedom of Choice Offered: Yes  Discharge Location  Discharge Placement: Other:  Patient alert and oriented x 3, independent of ADL's and lives with his wife René Dowling in one story home with 2 steps. He requires no DME and has transportation. He has [employer] and obtains medications at [facility] [phone]. He has wound care and will need follow up for dressings at d/c. CM following for d/c.

## 2019-11-18 NOTE — PROGRESS NOTES
Pt back in AF rate 110-130's per RN- will give IR Cardizem 60 mg x 1 now and  increase Cardizem CD dose to 240 mg starting tomorrow. Pt started on Eliquis. May need additional medications, monitor.     Renetta Huston PA-C

## 2019-11-18 NOTE — PROGRESS NOTES
Verbal and bedside report received from Crys Lee, [facility].

## 2019-11-18 NOTE — PROGRESS NOTES
Problem: Falls - Risk of  Goal: *Absence of Falls  Description  Document Mekhi Sweeney Fall Risk and appropriate interventions in the flowsheet. Outcome: Progressing Towards Goal  Note:   Fall Risk Interventions:  Mobility Interventions: Communicate number of staff needed for ambulation/transfer         Medication Interventions: Patient to call before getting OOB, Teach patient to arise slowly    Elimination Interventions: Call light in reach, Urinal in reach, Patient to call for help with toileting needs              Problem: Pressure Injury - Risk of  Goal: *Prevention of pressure injury  Description  Document Missael Scale and appropriate interventions in the flowsheet.   Outcome: Progressing Towards Goal  Note:   Pressure Injury Interventions:       Moisture Interventions: Absorbent underpads    Activity Interventions: Increase time out of bed    Mobility Interventions: Pressure redistribution bed/mattress (bed type)    Nutrition Interventions: Document food/fluid/supplement intake    Friction and Shear Interventions: Apply protective barrier, creams and emollients
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
## 2019-11-18 NOTE — WOUND CARE
Pt seen for follow up on RLE wound. Pt premedicated with pain medication for wrap removal. Removed ace and kerlix, soaked dressing with saline for removal. Cleansed RLE with saline, applied aquaphor to RLE, applied aquacel AG over open area on inner right ankle, covered other smaller open areas with xeroform gauze covered then with ABD and secured with kerlix and coban for compression. Recommend MWF dressing changes by wound care nurse. Pt informed that if the dressing causes too much pain we can go back to ACE daily. Wounds draining significantly less and a lot of the previous open areas have now healed. Expect wound to do so even more with a tighter compression if pt can tolerate. Wound team will follow up Wed for next dressing change.

## 2019-11-18 NOTE — PROGRESS NOTES
Problem: Mobility Impaired (Adult and Pediatric)  Goal: *Acute Goals and Plan of Care (Insert Text)  Description  LTG:  (1.)Mr. Clover Farris will transfer from bed to chair and chair to bed with INDEPENDENT using the least restrictive device within 7 treatment day(s). (2.)Mr. Clover Farris will ambulate with MODIFIED INDEPENDENCE for 250+ feet with the least restrictive device within 7 treatment day(s). (3.)Mr. Clover Farris will perform 2 steps without handrail and CGA within 7 treatment days for safety ascending and descending stairs for home.   ____________________________________________________________________________________________   Outcome: Progressing Towards Goal     PHYSICAL THERAPY: Initial Assessment and PM 11/18/2019  INPATIENT: PT Visit Days : 1  Payor: Loura Kussmaul / Plan: [place] [facility] / Product Type: PPO /       NAME/AGE/GENDER: Bethel Forbes is a 53 y.o. male   PRIMARY DIAGNOSIS: Sepsis ([address] [A41.9]  Ankle wound, right, initial encounter [S91.001A] Sepsis ([facility]   Sepsis ([facility]       ICD-10: Treatment Diagnosis:    Generalized Muscle Weakness (M62.81)  Difficulty in walking, Not elsewhere classified (R26.2)   Precaution/Allergies:  Codeine and Sulfa (sulfonamide antibiotics)      ASSESSMENT:     Mr. Clover Farris presents with decreased transfers, ambulation, balance, activity tolerance, and overall general functional mobility s/p hospital admission with above. Pt to ED on 11/14/19 with wound to R ankle, sepsis. Pt today A & O x 4, spouse present, states pain 7/10, has received pain medication. Pt reports lives in 1 story home, 2 steps to enter with spouse and 13year old. Pt works at [employer], concerned regarding returning to work, hospital bills, etc. Pt states uses [employer] at work to [facility] [employer] on for ambulating support, states independent ADLs at home. Pt has not been driving, spouse drives pt to work. Pt report no falls. Pt today independent with bed mobility and transfers.  Pt ambulated into hallway with RW for 100' with CGA to SBA. Treatment consisted of gait training with use of RW, weight shifting, posture, walker safety. Co-treatment provided this date with OT, PT working on balance and gait, while OT addressing self care and transfers. Pt overall doing well with mobility, pain R LE limits activity. Pt may like to try crutches or cane for another type of support for ambulation next visit. PT to cont to follow for acute care needs, discharge needs pending progress with mobility. This section established at most recent assessment   PROBLEM LIST (Impairments causing functional limitations):  Decreased ADL/Functional Activities  Decreased Transfer Abilities  Decreased Ambulation Ability/Technique  Decreased Balance  Decreased Activity Tolerance  Decreased [place] with Home Exercise Program   INTERVENTIONS PLANNED: (Benefits and precautions of physical therapy have been discussed with the patient.)  Balance Exercise  Bed Mobility  Family Education  Gait Training  Home Exercise Program (HEP)  Therapeutic Activites  Therapeutic Exercise/Strengthening  Transfer Training     TREATMENT PLAN: Frequency/Duration: 2 times a week for duration of hospital stay  Rehabilitation Potential For Stated Goals: Good     REHAB RECOMMENDATIONS (at time of discharge pending progress):    Placement: It is my opinion, based on this patient's performance to date, that Mr. Niki Palma may benefit from participating in 1-2 additional therapy sessions in order to continue to assess for rehab potential and then make recommendation for disposition at discharge. Equipment:   None at this time-possible walker or crutches pending progress              HISTORY:   History of Present Injury/Illness (Reason for Referral):  Patient is a 53 y.o. male who presented to the ED for cc right ankle wound that has been worsening over the past 1 year. Patient has poor medical follow up and limited access to health care.  Has been to wound care once per the patient but did not go back for his right ankle. Has been treated with doxycycline as outpatient. Right ankle wound started after a  accident one year ago. Since accident, his leg has continued to worse. Now having significant pain and clear discharge to area of concern. Hx of depression, HTN, gout, and homeless. Past Medical History/Comorbidities:   Mr. Jude Segura  has a past medical history of Anxiety, Erectile dysfunction (1/20/2014), Gout (1/20/2014), History of peptic ulcer, Hypertension, Osteoarthritis of hand, primary localized (1/20/2014), Personal history of urinary calculi, and Wart (1/20/2014). Mr. Jude Segura  has a past surgical history that includes hx wisdom teeth extraction. Social History/Living Environment:   Home Environment: Private residence  # Steps to Enter: 2  Rails to Enter: No  One/Two Story Residence: One story  Living Alone: No  Support Systems: Family member(s), Spouse/Significant Other/Partner  Patient Expects to be Discharged to[de-identified] Private residence  Current DME Used/Available at Home: (none)  Tub or Shower Type: Tub/Shower combination  Prior Level of Function/Work/Activity:  Lives with spouse, independent at baseline  Personal Factors:          Sex:  male        Age:  53 y.o.         Overall Behavior:  agreeable     Number of Personal Factors/Comorbidities that affect the Plan of Care:  Obesity, HTN 1-2: MODERATE COMPLEXITY   EXAMINATION:   Most Recent Physical Functioning:   Gross Assessment:  AROM: Generally decreased, functional(B LE)  Strength: Generally decreased, functional(B LE)  Coordination: Generally decreased, functional  Sensation: Intact(to light touch B LE)               Posture:  Posture (WDL): Within defined limits  Balance:  Sitting: Intact  Standing: Impaired  Standing - Static: Good  Standing - Dynamic : Good;Fair(with RW) Bed Mobility:  Rolling: Independent  Supine to Sit: Independent  Sit to Supine: Independent  Scooting: Independent  Wheelchair Mobility:     Transfers:  Sit to Stand: Stand-by assistance  Stand to Sit: Stand-by assistance  Gait:  Right Side Weight Bearing: As tolerated  Base of Support: Widened  Speed/Perla: Slow  Step Length: Left shortened;Right shortened  Swing Pattern: Left symmetrical;Right symmetrical  Gait Abnormalities: Antalgic;Decreased step clearance  Distance (ft): 100 Feet (ft)  Assistive Device: Walker, rolling  Ambulation - Level of Assistance: Stand-by assistance  Interventions: Verbal cues      Body Structures Involved:  Muscles Body Functions Affected: Movement Related Activities and Participation Affected:  General Tasks and Demands  Mobility  Self Care   Number of elements that affect the Plan of Care: 4+: HIGH COMPLEXITY   CLINICAL PRESENTATION:   Presentation: Evolving clinical presentation with changing clinical characteristics: MODERATE COMPLEXITY   CLINICAL DECISION MAKIN [facility] Mobility Inpatient Short Form  How much difficulty does the patient currently have. .. Unable A Lot A Little None   1. Turning over in bed (including adjusting bedclothes, sheets and blankets)? ? 1   ? 2   ? 3   ? 4   2. Sitting down on and standing up from a chair with arms ( e.g., wheelchair, bedside commode, etc.)   ? 1   ? 2   ? 3   ? 4   3. Moving from lying on back to sitting on the side of the bed?   ? 1   ? 2   ? 3   ? 4   How much help from another person does the patient currently need. .. Total A Lot A Little None   4. Moving to and from a bed to a chair (including a wheelchair)? ? 1   ? 2   ? 3   ? 4   5. Need to walk in hospital room? ? 1   ? 2   ? 3   ? 4   6. Climbing 3-5 steps with a railing? ? 1   ? 2   ? 3   ? 4   © , Trustees of [address] [place] [address] [address] [id number], under license to [employer].  All rights reserved      Score:  Initial: 21 Most Recent: X (Date: -- )    Interpretation of Tool:  Represents activities that are increasingly more difficult (i.e. Bed mobility, Transfers, Gait). Medical Necessity:     Patient is expected to demonstrate progress in strength, range of motion, balance and coordination   to decrease assistance required with overall functional mobility, transfers, ambulation   . Patient demonstrates good   rehab potential due to higher previous functional level. Reason for Services/Other Comments:  Patient continues to require present interventions due to patient's inability to perform transfers and ambulation safely and effectively   . Use of outcome tool(s) and clinical judgement create a POC that gives a: Clear prediction of patient's progress: LOW COMPLEXITY            TREATMENT:   (In addition to Assessment/Re-Assessment sessions the following treatments were rendered)   Pre-treatment Symptoms/Complaints:  \"I just need to get out of here and back to work\"  Pain: Initial:   Pain Intensity 1: 7(recently had medication)  Pain Location 1: Leg  Pain Orientation 1: Right  Post Session:  7/10 post session     Today's treatment session addressed Decreased Ambulation Ability/Technique to progress towards achieving goal(s) 2. During this session, Occupational Therapy addressed self care and transfers  to progress towards their discipline specific goal(s). Co-treatment was necessary to improve patient's ability to increase activity demands and ability to return to normal functional activity. Gait Training ( 8 min):  Gait training to improve and/or restore physical functioning as related to mobility, strength, balance and coordination. Ambulated 100 Feet (ft) with Stand-by assistance using a Walker, rolling and minimal Verbal cues related to their stance phase and stride length to promote proper body alignment, promote proper body posture and promote proper body breathing techniques. Instruction in performance of walker safety, posture to correct overall functional ambulation .     Braces/Orthotics/Lines/Etc:   O2 Device: Room air  Treatment/Session Assessment:    Response to Treatment:  tolerated well  Interdisciplinary Collaboration:   Physical Therapist  Occupational Therapist  Registered Nurse  After treatment position/precautions:   Supine in bed  Bed/Chair-wheels locked  Bed in low position  Call light within reach  RN notified  Family at bedside   Compliance with Program/Exercises: Will assess as treatment progresses  Recommendations/Intent for next treatment session: \"Next visit will focus on advancements to more challenging activities\".   Total Treatment Duration:  PT Patient Time In/Time Out  Time In: 1308  Time Out: [address] [place],

## 2019-11-18 NOTE — ADT AUTH CERT NOTES
Margarita Larios Physician Provider Summary Title Resident? Provider type DO No Physician Primary Contact Information Phone Fax E-mail Address Mika Lainez Not available [address] [place] [phone] Specialties Family Practice     
      
NPI AND UPIN  
 
NPI NPI  
NATIONAL PROVIDER ID [id number]

## 2019-11-18 NOTE — PROGRESS NOTES
Pharmacokinetic Consult to Pharmacist    Rosendo Manuel is a 53 y.o. male being treated for SSTI with vancomycin and pip/tazo. Height: 6' 4\" (193 cm)  Weight: 141.5 kg (312 lb)  Lab Results   Component Value Date/Time    BUN 10 11/18/2019 03:45 AM    Creatinine 0.71 (L) 11/18/2019 03:45 AM    WBC 10.5 11/18/2019 03:45 AM    Procalcitonin <0.1 11/14/2019 05:54 PM    Lactic Acid (POC) 0.90 11/14/2019 09:18 PM      Estimated Creatinine Clearance: 180.7 mL/min (A) (based on SCr of 0.71 mg/dL (L)). Lab Results   Component Value Date/Time    Vancomycin,trough 11.6 11/18/2019 09:37 AM       Day 5 of vancomycin. Goal trough is 10-20. No changes, continue 2g q12h. Move next dose forward to 1700. Will continue to follow patient. Thank you,  Casey Rosado, Pharm. D.   Clinical Pharmacist  [phone]

## 2019-11-18 NOTE — PROGRESS NOTES
PT note: PT evaluation received and chart reviewed. Pt about to have pain medication given for dressing change to LE. Will attempt evaluation later after dressing change performed. Thank you.    Silvia Leventhal, PT  11/18/2019

## 2019-11-18 NOTE — PROGRESS NOTES
am  11/18/2019 6:37 AM    Admit Date: 11/14/2019    Admit Diagnosis: Sepsis ([facility] [A41.9]; Ankle wound, right, initial encounter [S91.001A]      Subjective:    Patient with a brief episode of PAF on a monitor strip.  Now back in nsr    Objective:      Visit Vitals  /90 (BP 1 Location: Left arm, BP Patient Position: At rest)   Pulse 77   Temp 98.4 °F (36.9 °C)   Resp 18   Ht 6' 4\" (1.93 m)   Wt 141.5 kg (312 lb)   SpO2 96%   BMI 37.98 kg/m²       ROS:  General ROS: negative for - chills  Hematological and Lymphatic ROS: negative for - blood clots or jaundice  Respiratory ROS: no cough, shortness of breath, or wheezing  Cardiovascular ROS: no chest pain or dyspnea on exertion  Gastrointestinal ROS: no abdominal pain, change in bowel habits, or black or bloody stools  Neurological ROS: no TIA or stroke symptoms    Physical Exam:    Physical Examination: General appearance - alert, well appearing, and in no distress  Mental status - alert, oriented to person, place, and time  Eyes - pupils equal and reactive, extraocular eye movements intact  Neck/lymph - supple, no significant adenopathy  Chest/CV - clear to auscultation, no wheezes, rales or rhonchi, symmetric air entry  Heart - normal rate, regular rhythm, normal S1, S2, no murmurs, rubs, clicks or gallops  Abdomen/GI - soft, nontender, nondistended, no masses or organomegaly  Musculoskeletal - no joint tenderness, deformity or swelling  Extremities - peripheral pulses normal, no pedal edema, no clubbing or cyanosis  Skin - normal coloration and turgor, no rashes, no suspicious skin lesions noted    Current Facility-Administered Medications   Medication Dose Route Frequency    dilTIAZem CD (CARDIZEM CD) capsule 180 mg  180 mg Oral DAILY    Vancomycin Trough Reminder   Other ONCE    colchicine tablet 0.6 mg  0.6 mg Oral DAILY PRN    HYDROmorphone (PF) (DILAUDID) injection 2 mg  2 mg IntraVENous Q6H PRN    heparin 25,000 units in dextrose 500 mL infusion 12-25 Units/kg/hr (Adjusted) IntraVENous TITRATE    influenza vaccine 2019-20 (6 mos+)(PF) (FLUARIX/FLULAVAL/FLUZONE QUAD) injection 0.5 mL  0.5 mL IntraMUSCular PRIOR TO DISCHARGE    pantothenic ac-min oil-pet,hyd (AQUAPHOR) 41 % ointment   Topical BID    Saccharomyces boulardii (FLORASTOR) capsule 250 mg  250 mg Oral BID    diphenhydrAMINE (BENADRYL) capsule 50 mg  50 mg Oral Q6H PRN    acetaminophen (TYLENOL) tablet 650 mg  650 mg Oral Q4H PRN    HYDROcodone-acetaminophen (NORCO) 5-325 mg per tablet 1 Tab  1 Tab Oral Q4H PRN    naloxone (NARCAN) injection 0.4 mg  0.4 mg IntraVENous PRN    ondansetron (ZOFRAN) injection 4 mg  4 mg IntraVENous Q4H PRN    sodium chloride (NS) flush 5-10 mL  5-10 mL IntraVENous PRN    piperacillin-tazobactam (ZOSYN) 3.375 g in 0.9% sodium chloride (MBP/ADV) 100 mL  3.375 g IntraVENous Q8H    allopurinol (ZYLOPRIM) tablet 300 mg  300 mg Oral DAILY    cyclobenzaprine (FLEXERIL) tablet 5 mg  5 mg Oral QHS    furosemide (LASIX) tablet 40 mg  40 mg Oral DAILY    lisinopril (PRINIVIL, ZESTRIL) tablet 40 mg  40 mg Oral DAILY    sertraline (ZOLOFT) tablet 50 mg  50 mg Oral DAILY    tamsulosin (FLOMAX) capsule 0.4 mg  0.4 mg Oral DAILY    albuterol (PROVENTIL VENTOLIN) nebulizer solution 2.5 mg  2.5 mg Nebulization Q6H PRN    nystatin (MYCOSTATIN) 100,000 unit/gram powder   Topical BID    vancomycin (VANCOCIN) 2000 mg in  ml infusion  2,000 mg IntraVENous Q12H    alcohol 62% (NOZIN) nasal  1 Ampule  1 Ampule Topical Q8H    hydrALAZINE (APRESOLINE) 20 mg/mL injection 20 mg  20 mg IntraVENous Q6H PRN       Data Review:   @LABRCNT(Na,K,BUN,CREA,WBC,HGB,HCT,PLT,INR,TRP,TCHOL*,Triglyceride*,LDL*,LDLCPOC HDL*,HDL])@    TELEMETRY: nsr    Assessment/Plan:     Principal Problem:    Sepsis ([address] (11/14/2019)    Active Problems:    Gout (1/20/2014)      DIANA (generalized anxiety disorder) (5/7/2014)      HTN (hypertension) (3/16/2015)The current medical regimen is effective;  continue present plan and medications. Benign prostatic hyperplasia without lower urinary tract symptoms (11/6/2017)      Obesity, morbid ([facility] (2/26/2018)      Ankle wound, right, initial encounter (11/14/2019)      Atrial fibrillation with rapid ventricular response ([facility] (11/17/2019)brief transient AF. Was back in nsr by time he was seen by cardiology. Continue CCB no need for [facility] unless he has recurrance      Cellulitis of leg (11/17/2019)    Call if needed.  Ok to stop heparin urmila Snider MD

## 2019-11-18 NOTE — PROGRESS NOTES
Bedside and Verbal shift change report given to Norma Philippe (oncoming nurse) by Joy Mauricio RN (offgoing nurse). Report included the following information SBAR, Kardex and Recent Results.

## 2019-11-18 NOTE — PROGRESS NOTES
Bedside and Verbal shift change report given to Libertad Bain (oncoming nurse) by Adin Verma (offgoing nurse). Report included the following information SBAR, Kardex and Recent Results.

## 2019-11-18 NOTE — PROGRESS NOTES
Hospitalist Note     Admit Date:  2019  6:22 PM   Name:  Titus Coffey   Age:  53 y.o.  :  1965   MRN:  [id number]   PCP:  Valeria Jerry MD  Treatment Team: Attending Provider: Cliff Sandhu MD; Physical Therapist: Cesia Strickland PT; Occupational Therapist: Mame Velasquez OT; Care Manager: Srinivasa Velazquez RN; Consulting Provider: Stuart Rosales MD    HPI/Subjective:       Mr. Prachi Kenney is a 54 yo PMH of chronic right ankle wound admitted with worsening cellulitis. He had been on prior outpatient doxycycline and was unable to followup with wound care. since admit he started on vancomycin/ zosyn. BC NGTD, wound cx pending (polymicrobial). He has been seen by vascular surgery who welcomes wound care, compression and LE elevation, office followup. He developed afib with RVR requiring transfer to tele and cardiology following. He has spontaneously converted to NSR. He was placed on IV heparin then eliquis  and cardizem. ECHO pending. Discharge plans pending. 18 wife present and concerned about him coming home and going back in to afib, sleepy but had dilaudid for his wound care, wound looks better per wife,     Objective:     Patient Vitals for the past 24 hrs:   Temp Pulse Resp BP SpO2   19 1314 98.7 °F (37.1 °C) 75 22 114/62 96 %   19 0856 98.4 °F (36.9 °C) 89 18 127/74 95 %   19 0519 98.4 °F (36.9 °C) 77 18 157/90 96 %   19 0048 98.6 °F (37 °C) 84 18 155/72 93 %   19 2056 99 °F (37.2 °C) 90 18 134/80 97 %   19 1700 98.4 °F (36.9 °C) 73 16 147/90 96 %     Oxygen Therapy  O2 Sat (%): 96 % (19 1314)  O2 Device: Room air (19 1308)    Estimated body mass index is 37.98 kg/m² as calculated from the following:    Height as of this encounter: 6' 4\" (1.93 m). Weight as of this encounter: 141.5 kg (312 lb).       Intake/Output Summary (Last 24 hours) at 2019 1416  Last data filed at 2019 1210  Gross per 24 hour   Intake 1080 ml   Output 2475 ml   Net -1395 ml       *Note that automatically entered I/Os may not be accurate; dependent on patient compliance with collection and accurate  by techs. General:    Well nourished. Sleepy,  Obese, no distress   CV:   RRR. No murmur, rub, or gallop. 1+ edema   Lungs:   CTAB. No wheezing, rhonchi, or rales. anterior  Abdomen:   Soft, nontender, nondistended. Obese, decreased BS  Extremities: Warm and dry. Skin:     Venous stasis to RLE/ wrapped / less erythema  Neuro:  No gross focal deficits    Data Review:  I have reviewed all labs, meds, and studies from the last 24 hours:    Recent Results (from the past 24 hour(s))   PTT    Collection Time: 11/17/19  5:59 PM   Result Value Ref Range    aPTT 36.7 24.7 - 39.8 SEC   PTT    Collection Time: 11/18/19 12:35 AM   Result Value Ref Range    aPTT 63.2 (H) 24.7 - 39.8 SEC   CBC WITH AUTOMATED DIFF    Collection Time: 11/18/19  3:45 AM   Result Value Ref Range    WBC 10.5 4.3 - 11.1 K/uL    RBC 3.53 (L) 4.23 - 5.6 M/uL    HGB 12.4 (L) 13.6 - 17.2 g/dL    HCT 36.6 (L) 41.1 - 50.3 %    .7 (H) 79.6 - 97.8 FL    MCH 35.1 (H) 26.1 - 32.9 PG    MCHC 33.9 31.4 - 35.0 g/dL    RDW 12.6 11.9 - 14.6 %    PLATELET 907 (L) 365 - 450 K/uL    MPV 11.8 9.4 - 12.3 FL    ABSOLUTE NRBC 0.00 0.0 - 0.2 K/uL    DF AUTOMATED      NEUTROPHILS 70 43 - 78 %    LYMPHOCYTES 17 13 - 44 %    MONOCYTES 9 4.0 - 12.0 %    EOSINOPHILS 3 0.5 - 7.8 %    BASOPHILS 1 0.0 - 2.0 %    IMMATURE GRANULOCYTES 0 0.0 - 5.0 %    ABS. NEUTROPHILS 7.3 1.7 - 8.2 K/UL    ABS. LYMPHOCYTES 1.8 0.5 - 4.6 K/UL    ABS. MONOCYTES 0.9 0.1 - 1.3 K/UL    ABS. EOSINOPHILS 0.3 0.0 - 0.8 K/UL    ABS. BASOPHILS 0.1 0.0 - 0.2 K/UL    ABS. IMM.  GRANS. 0.0 0.0 - 0.5 K/UL   METABOLIC PANEL, BASIC    Collection Time: 11/18/19  3:45 AM   Result Value Ref Range    Sodium 143 136 - 145 mmol/L    Potassium 3.4 (L) 3.5 - 5.1 mmol/L    Chloride 108 (H) 98 - 107 mmol/L    CO2 31 21 - 32 mmol/L    Anion gap 4 (L) 7 - 16 mmol/L    Glucose 117 (H) 65 - 100 mg/dL    BUN 10 6 - 23 MG/DL    Creatinine 0.71 (L) 0.8 - 1.5 MG/DL    GFR est AA >60 >60 ml/min/1.73m2    GFR est non-AA >60 >60 ml/min/1.73m2    Calcium 8.6 8.3 - 10.4 MG/DL   VANCOMYCIN, TROUGH    Collection Time: 11/18/19  9:37 AM   Result Value Ref Range    Vancomycin,trough 11.6 5 - 20 ug/mL   PTT    Collection Time: 11/18/19  9:37 AM   Result Value Ref Range    aPTT 34.7 24.7 - 39.8 SEC   MAGNESIUM    Collection Time: 11/18/19  9:37 AM   Result Value Ref Range    Magnesium 1.9 1.8 - 2.4 mg/dL        All Micro Results     Procedure Component Value Units Date/Time    CULTURE, Fatoumata Ready STAIN [[id number]]  (Abnormal)  (Susceptibility) Collected:  11/14/19 2313    Order Status:  Completed Specimen:  Wound from Ankle Updated:  11/18/19 0656     Special Requests: NO SPECIAL REQUESTS        GRAM STAIN NO WBCS SEEN         FEW GRAM POSITIVE COCCI        Culture result:       MODERATE PROTEUS VULGARIS/PENNERI                  MODERATE CITROBACTER BRAAKII                  MODERATE STAPHYLOCOCCUS AUREUS SENSITIVITY TO FOLLOW                  MODERATE MIXED SKIN YUE ISOLATED          CULTURE, BLOOD [[id number]] Collected:  11/14/19 2020    Order Status:  Completed Specimen:  Blood Updated:  11/18/19 0618     Special Requests: --        LEFT  Antecubital       Culture result: NO GROWTH 4 DAYS       CULTURE, BLOOD [[id number]] Collected:  11/14/19 2239    Order Status:  Completed Specimen:  Blood Updated:  11/18/19 0618     Special Requests: --        LEFT  HAND       Culture result: NO GROWTH 3 DAYS             No results found for this visit on 11/14/19.     Current Meds:  Current Facility-Administered Medications   Medication Dose Route Frequency    apixaban (ELIQUIS) tablet 5 mg  5 mg Oral BID    [START ON 11/19/2019] dilTIAZem CD (CARDIZEM CD) capsule 240 mg  240 mg Oral DAILY    colchicine tablet 0.6 mg  0.6 mg Oral DAILY PRN    influenza vaccine 2019-20 (6 mos+)(PF) (FLUARIX/FLULAVAL/FLUZONE QUAD) injection 0.5 mL  0.5 mL IntraMUSCular PRIOR TO DISCHARGE    pantothenic ac-min oil-pet,hyd (AQUAPHOR) 41 % ointment   Topical BID    Saccharomyces boulardii (FLORASTOR) capsule 250 mg  250 mg Oral BID    diphenhydrAMINE (BENADRYL) capsule 50 mg  50 mg Oral Q6H PRN    acetaminophen (TYLENOL) tablet 650 mg  650 mg Oral Q4H PRN    HYDROcodone-acetaminophen (NORCO) 5-325 mg per tablet 1 Tab  1 Tab Oral Q4H PRN    naloxone (NARCAN) injection 0.4 mg  0.4 mg IntraVENous PRN    ondansetron (ZOFRAN) injection 4 mg  4 mg IntraVENous Q4H PRN    sodium chloride (NS) flush 5-10 mL  5-10 mL IntraVENous PRN    piperacillin-tazobactam (ZOSYN) 3.375 g in 0.9% sodium chloride (MBP/ADV) 100 mL  3.375 g IntraVENous Q8H    allopurinol (ZYLOPRIM) tablet 300 mg  300 mg Oral DAILY    cyclobenzaprine (FLEXERIL) tablet 5 mg  5 mg Oral QHS    furosemide (LASIX) tablet 40 mg  40 mg Oral DAILY    lisinopril (PRINIVIL, ZESTRIL) tablet 40 mg  40 mg Oral DAILY    sertraline (ZOLOFT) tablet 50 mg  50 mg Oral DAILY    tamsulosin (FLOMAX) capsule 0.4 mg  0.4 mg Oral DAILY    albuterol (PROVENTIL VENTOLIN) nebulizer solution 2.5 mg  2.5 mg Nebulization Q6H PRN    nystatin (MYCOSTATIN) 100,000 unit/gram powder   Topical BID    vancomycin (VANCOCIN) 2000 mg in  ml infusion  2,000 mg IntraVENous Q12H    alcohol 62% (NOZIN) nasal  1 Ampule  1 Ampule Topical Q8H    hydrALAZINE (APRESOLINE) 20 mg/mL injection 20 mg  20 mg IntraVENous Q6H PRN       Other Studies (last 24 hours):  No results found.     Assessment and Plan:     Hospital Problems as of 11/18/2019 Date Reviewed: 7/10/2019          Codes Class Noted - Resolved POA    Hypokalemia ICD-10-CM: E87.6  ICD-9-CM: 276.8  11/18/2019 - Present No        Atrial fibrillation with rapid ventricular response ([address] ICD-10-CM: I48.91  ICD-9-CM: 427.31  11/17/2019 - Present Yes        Cellulitis of leg ICD-10-CM: L03.119  ICD-9-CM: 682.6  11/17/2019 - Present Yes        * (Principal) Sepsis ([facility] ICD-10-CM: A41.9  ICD-9-CM: 038.9, 995.91  11/14/2019 - Present Unknown        Ankle wound, right, initial encounter ICD-10-CM: S91.001A  ICD-9-CM: 891.0  11/14/2019 - Present Unknown        Obesity, morbid ([facility] ICD-10-CM: E66.01  ICD-9-CM: 278.01  2/26/2018 - Present Yes        Benign prostatic hyperplasia without lower urinary tract symptoms ICD-10-CM: N40.0  ICD-9-CM: 600.00  11/6/2017 - Present Yes        HTN (hypertension) ICD-10-CM: I10  ICD-9-CM: 401.9  3/16/2015 - Present Yes        DIANA (generalized anxiety disorder) ICD-10-CM: F41.1  ICD-9-CM: 300.02  5/7/2014 - Present Yes        Gout ICD-10-CM: M10.9  ICD-9-CM: 274.9  1/20/2014 - Present Yes              Plan:    · RLE cellulitis/ sepsis: continue wound care, followup final wound cultures  and BC,  Continue vancomycin/ zosyn, needs outpatient vascular followup, stop IV narcotics, cotninued prn norco  · AFIB with RVR: currently in NSR, on  eliquis and  oral cardizem per cardiology, followup ECHO  · HTN: continued lisinopril  · Depression: continued zoloft  · Gout: continued allopurinol  · Hypokalemia: replace and repeat lab tomorrow     DC planning/Dispo:  Pending to home, PT/OT      Diet:  DIET CARDIAC  DVT ppx: eliquis     Signed:  Mireille Armas MD

## 2019-11-19 LAB
ANION GAP SERPL CALC-SCNC: 5 MMOL/L (ref 7–16)
BACTERIA SPEC CULT: ABNORMAL
BACTERIA SPEC CULT: NORMAL
BASOPHILS # BLD: 0.1 K/UL (ref 0–0.2)
BASOPHILS NFR BLD: 1 % (ref 0–2)
BUN SERPL-MCNC: 9 MG/DL (ref 6–23)
CALCIUM SERPL-MCNC: 8.8 MG/DL (ref 8.3–10.4)
CHLORIDE SERPL-SCNC: 108 MMOL/L (ref 98–107)
CO2 SERPL-SCNC: 30 MMOL/L (ref 21–32)
CREAT SERPL-MCNC: 0.69 MG/DL (ref 0.8–1.5)
DIFFERENTIAL METHOD BLD: ABNORMAL
EOSINOPHIL # BLD: 0.4 K/UL (ref 0–0.8)
EOSINOPHIL NFR BLD: 5 % (ref 0.5–7.8)
ERYTHROCYTE [DISTWIDTH] IN BLOOD BY AUTOMATED COUNT: 12.5 % (ref 11.9–14.6)
GLUCOSE SERPL-MCNC: 95 MG/DL (ref 65–100)
GRAM STN SPEC: ABNORMAL
GRAM STN SPEC: ABNORMAL
HCT VFR BLD AUTO: 37 % (ref 41.1–50.3)
HGB BLD-MCNC: 12.1 G/DL (ref 13.6–17.2)
IMM GRANULOCYTES # BLD AUTO: 0 K/UL (ref 0–0.5)
IMM GRANULOCYTES NFR BLD AUTO: 0 % (ref 0–5)
LYMPHOCYTES # BLD: 1.1 K/UL (ref 0.5–4.6)
LYMPHOCYTES NFR BLD: 14 % (ref 13–44)
MAGNESIUM SERPL-MCNC: 2 MG/DL (ref 1.8–2.4)
MCH RBC QN AUTO: 33.9 PG (ref 26.1–32.9)
MCHC RBC AUTO-ENTMCNC: 32.7 G/DL (ref 31.4–35)
MCV RBC AUTO: 103.6 FL (ref 79.6–97.8)
MONOCYTES # BLD: 0.6 K/UL (ref 0.1–1.3)
MONOCYTES NFR BLD: 7 % (ref 4–12)
NEUTS SEG # BLD: 5.9 K/UL (ref 1.7–8.2)
NEUTS SEG NFR BLD: 73 % (ref 43–78)
NRBC # BLD: 0 K/UL (ref 0–0.2)
PLATELET # BLD AUTO: 135 K/UL (ref 150–450)
PLATELET COMMENTS,PCOM: ABNORMAL
PMV BLD AUTO: 11.7 FL (ref 9.4–12.3)
POTASSIUM SERPL-SCNC: 3.6 MMOL/L (ref 3.5–5.1)
RBC # BLD AUTO: 3.57 M/UL (ref 4.23–5.6)
SERVICE CMNT-IMP: ABNORMAL
SERVICE CMNT-IMP: NORMAL
SODIUM SERPL-SCNC: 143 MMOL/L (ref 136–145)
WBC # BLD AUTO: 8.1 K/UL (ref 4.3–11.1)

## 2019-11-19 PROCEDURE — 74011250637 HC RX REV CODE- 250/637: Performed by: INTERNAL MEDICINE

## 2019-11-19 PROCEDURE — 85025 COMPLETE CBC W/AUTO DIFF WBC: CPT

## 2019-11-19 PROCEDURE — 74011250637 HC RX REV CODE- 250/637: Performed by: PHYSICIAN ASSISTANT

## 2019-11-19 PROCEDURE — 83735 ASSAY OF MAGNESIUM: CPT

## 2019-11-19 PROCEDURE — 74011000258 HC RX REV CODE- 258: Performed by: FAMILY MEDICINE

## 2019-11-19 PROCEDURE — 74011250637 HC RX REV CODE- 250/637: Performed by: FAMILY MEDICINE

## 2019-11-19 PROCEDURE — 80048 BASIC METABOLIC PNL TOTAL CA: CPT

## 2019-11-19 PROCEDURE — 74011250636 HC RX REV CODE- 250/636: Performed by: FAMILY MEDICINE

## 2019-11-19 PROCEDURE — 65660000000 HC RM CCU STEPDOWN

## 2019-11-19 PROCEDURE — 36415 COLL VENOUS BLD VENIPUNCTURE: CPT

## 2019-11-19 PROCEDURE — 77030012935 HC DRSG AQUACEL BMS -B

## 2019-11-19 RX ORDER — AMOXICILLIN AND CLAVULANATE POTASSIUM 875; 125 MG/1; MG/1
1 TABLET, FILM COATED ORAL EVERY 12 HOURS
Status: DISCONTINUED | OUTPATIENT
Start: 2019-11-19 | End: 2019-11-20 | Stop reason: HOSPADM

## 2019-11-19 RX ADMIN — Medication: at 16:00

## 2019-11-19 RX ADMIN — LISINOPRIL 40 MG: 20 TABLET ORAL at 08:46

## 2019-11-19 RX ADMIN — NYSTATIN: 100000 POWDER TOPICAL at 08:45

## 2019-11-19 RX ADMIN — Medication 250 MG: at 08:46

## 2019-11-19 RX ADMIN — DILTIAZEM HYDROCHLORIDE 240 MG: 120 CAPSULE, COATED, EXTENDED RELEASE ORAL at 08:46

## 2019-11-19 RX ADMIN — CIPROFLOXACIN HYDROCHLORIDE 750 MG: 250 TABLET, FILM COATED ORAL at 21:19

## 2019-11-19 RX ADMIN — FUROSEMIDE 40 MG: 40 TABLET ORAL at 08:46

## 2019-11-19 RX ADMIN — ALLOPURINOL 300 MG: 300 TABLET ORAL at 08:46

## 2019-11-19 RX ADMIN — Medication 250 MG: at 17:16

## 2019-11-19 RX ADMIN — APIXABAN 5 MG: 5 TABLET, FILM COATED ORAL at 08:46

## 2019-11-19 RX ADMIN — AMOXICILLIN AND CLAVULANATE POTASSIUM 1 TABLET: 875; 125 TABLET, FILM COATED ORAL at 12:31

## 2019-11-19 RX ADMIN — Medication: at 08:00

## 2019-11-19 RX ADMIN — PIPERACILLIN SODIUM,TAZOBACTAM SODIUM 3.38 G: 3; .375 INJECTION, POWDER, FOR SOLUTION INTRAVENOUS at 05:44

## 2019-11-19 RX ADMIN — NYSTATIN: 100000 POWDER TOPICAL at 18:00

## 2019-11-19 RX ADMIN — HYDROCODONE BITARTRATE AND ACETAMINOPHEN 1 TABLET: 5; 325 TABLET ORAL at 08:48

## 2019-11-19 RX ADMIN — Medication 1 AMPULE: at 21:20

## 2019-11-19 RX ADMIN — Medication 1 AMPULE: at 05:44

## 2019-11-19 RX ADMIN — CIPROFLOXACIN HYDROCHLORIDE 750 MG: 250 TABLET, FILM COATED ORAL at 12:30

## 2019-11-19 RX ADMIN — TAMSULOSIN HYDROCHLORIDE 0.4 MG: 0.4 CAPSULE ORAL at 08:45

## 2019-11-19 RX ADMIN — VANCOMYCIN HYDROCHLORIDE 2000 MG: 10 INJECTION, POWDER, LYOPHILIZED, FOR SOLUTION INTRAVENOUS at 09:19

## 2019-11-19 RX ADMIN — CYCLOBENZAPRINE 5 MG: 10 TABLET, FILM COATED ORAL at 21:20

## 2019-11-19 RX ADMIN — SERTRALINE HYDROCHLORIDE 50 MG: 50 TABLET ORAL at 08:46

## 2019-11-19 RX ADMIN — DIPHENHYDRAMINE HYDROCHLORIDE 50 MG: 25 CAPSULE ORAL at 22:38

## 2019-11-19 RX ADMIN — HYDROCODONE BITARTRATE AND ACETAMINOPHEN 1 TABLET: 5; 325 TABLET ORAL at 18:30

## 2019-11-19 RX ADMIN — AMOXICILLIN AND CLAVULANATE POTASSIUM 1 TABLET: 875; 125 TABLET, FILM COATED ORAL at 21:20

## 2019-11-19 RX ADMIN — APIXABAN 5 MG: 5 TABLET, FILM COATED ORAL at 21:19

## 2019-11-19 RX ADMIN — Medication 1 AMPULE: at 13:26

## 2019-11-19 NOTE — PROGRESS NOTES
am  11/19/2019 6:37 AM    Admit Date: 11/14/2019    Admit Diagnosis: Sepsis ([facility] [A41.9]; Ankle wound, right, initial encounter [S91.001A]      Subjective:    Patient with a brief episode of PAF on a monitor strip. Was back in nsr and then some recurrent PAF. Added eliquis. Increased meds.  Seems to be back in nsr     Objective:      Visit Vitals  /74   Pulse 72   Temp 98.6 °F (37 °C)   Resp 18   Ht 6' 4\" (1.93 m)   Wt 141.5 kg (312 lb)   SpO2 96%   BMI 37.98 kg/m²       ROS:  General ROS: negative for - chills  Hematological and Lymphatic ROS: negative for - blood clots or jaundice  Respiratory ROS: no cough, shortness of breath, or wheezing  Cardiovascular ROS: no chest pain or dyspnea on exertion  Gastrointestinal ROS: no abdominal pain, change in bowel habits, or black or bloody stools  Neurological ROS: no TIA or stroke symptoms    Physical Exam:    Physical Examination: General appearance - alert, well appearing, and in no distress  Mental status - alert, oriented to person, place, and time  Eyes - pupils equal and reactive, extraocular eye movements intact  Neck/lymph - supple, no significant adenopathy  Chest/CV - clear to auscultation, no wheezes, rales or rhonchi, symmetric air entry  Heart - normal rate, regular rhythm, normal S1, S2, no murmurs, rubs, clicks or gallops  Abdomen/GI - soft, nontender, nondistended, no masses or organomegaly  Musculoskeletal - no joint tenderness, deformity or swelling  Extremities - peripheral pulses normal, no pedal edema, no clubbing or cyanosis  Skin - normal coloration and turgor, no rashes, no suspicious skin lesions noted    Current Facility-Administered Medications   Medication Dose Route Frequency    apixaban (ELIQUIS) tablet 5 mg  5 mg Oral BID    dilTIAZem CD (CARDIZEM CD) capsule 240 mg  240 mg Oral DAILY    colchicine tablet 0.6 mg  0.6 mg Oral DAILY PRN    influenza vaccine 2019-20 (6 mos+)(PF) (FLUARIX/FLULAVAL/FLUZONE QUAD) injection 0.5 mL  0.5 mL IntraMUSCular PRIOR TO DISCHARGE    pantothenic ac-min oil-pet,hyd (AQUAPHOR) 41 % ointment   Topical BID    Saccharomyces boulardii (FLORASTOR) capsule 250 mg  250 mg Oral BID    diphenhydrAMINE (BENADRYL) capsule 50 mg  50 mg Oral Q6H PRN    acetaminophen (TYLENOL) tablet 650 mg  650 mg Oral Q4H PRN    HYDROcodone-acetaminophen (NORCO) 5-325 mg per tablet 1 Tab  1 Tab Oral Q4H PRN    naloxone (NARCAN) injection 0.4 mg  0.4 mg IntraVENous PRN    ondansetron (ZOFRAN) injection 4 mg  4 mg IntraVENous Q4H PRN    sodium chloride (NS) flush 5-10 mL  5-10 mL IntraVENous PRN    piperacillin-tazobactam (ZOSYN) 3.375 g in 0.9% sodium chloride (MBP/ADV) 100 mL  3.375 g IntraVENous Q8H    allopurinol (ZYLOPRIM) tablet 300 mg  300 mg Oral DAILY    cyclobenzaprine (FLEXERIL) tablet 5 mg  5 mg Oral QHS    furosemide (LASIX) tablet 40 mg  40 mg Oral DAILY    lisinopril (PRINIVIL, ZESTRIL) tablet 40 mg  40 mg Oral DAILY    sertraline (ZOLOFT) tablet 50 mg  50 mg Oral DAILY    tamsulosin (FLOMAX) capsule 0.4 mg  0.4 mg Oral DAILY    albuterol (PROVENTIL VENTOLIN) nebulizer solution 2.5 mg  2.5 mg Nebulization Q6H PRN    nystatin (MYCOSTATIN) 100,000 unit/gram powder   Topical BID    vancomycin (VANCOCIN) 2000 mg in  ml infusion  2,000 mg IntraVENous Q12H    alcohol 62% (NOZIN) nasal  1 Ampule  1 Ampule Topical Q8H    hydrALAZINE (APRESOLINE) 20 mg/mL injection 20 mg  20 mg IntraVENous Q6H PRN       Data Review:   @LABRCNT(Na,K,BUN,CREA,WBC,HGB,HCT,PLT,INR,TRP,TCHOL*,Triglyceride*,LDL*,LDLCPOC HDL*,HDL])@    TELEMETRY: nsr    Assessment/Plan:     Principal Problem:    Sepsis ([address] (11/14/2019)    Active Problems:    Gout (1/20/2014)      DIANA (generalized anxiety disorder) (5/7/2014)      HTN (hypertension) (3/16/2015)The current medical regimen is effective;  continue present plan and medications.       Benign prostatic hyperplasia without lower urinary tract symptoms (11/6/2017)      Obesity, morbid ([address] (2/26/2018)      Ankle wound, right, initial encounter (11/14/2019)      Atrial fibrillation with rapid ventricular response ([address] (11/17/2019)brief transient AF. Was back in nsr by time he was seen by cardiology. Continue CCB no need for [facility] unless he has recurrance      Cellulitis of leg (11/17/2019)    Call if needed. He may have more issues with PAF but as long as he is rate controlled and on [facility] then we are ok.       West García MD

## 2019-11-19 NOTE — PROGRESS NOTES
Follow up with patient and wife for d/c needs. He works at [employer] and is about to lose his insurance. Discussed options for insurance ie [employer], financial assistance with hospital bill. Explained if loses insurance then could check into the [facility] or [employer] and provided written information. Also discussed Medicaid, [employer](when no insurance) and good rx for medication assistance and provided written information on these services. They are to call HR see when his insurance will term. Discussed d/c plans and he states cannot afford to go to outpatient clinic and is planning to go back to work so will not qualify for home health as he is not home bound. Discussed wife doing dressing changes and she agreed she could learn and was doing it in past. She is concerned about the cost of supplies. Spoke with wound care Merna Hodge and could consider changed to Xerform as the Aquacel is expensive. Some his supplies will need to be purchased online. He is requesting [facility] paperwork to be completed and once received will see if MD can complete for patient. Current d/c plan is home with spouse.

## 2019-11-19 NOTE — PROGRESS NOTES
Right lower extremity dressing change completed. Patient unable to tolerate compression on leg, dressing changed per wound order. Old dressing removed, mild drainage on inner ankle and back of leg, yellow drainage. Skin is red and tender. Site cleaned with wound cleanser, xeroform and aquacel ag placed over site, ABD pad placed on top, leg wrapped in kerlex and ACE bandage. During change patient tolerated well, after dressing change complaining of pain, pain meds given.

## 2019-11-19 NOTE — PROGRESS NOTES
Hospitalist Note     Admit Date:  2019  6:22 PM   Name:  Igor Blanco   Age:  53 y.o.  :  1965   MRN:  [id number]   PCP:  Luis M Quintero MD  Treatment Team: Attending Provider: Rosendo Blanco MD; Care Manager: Krista Delcid RN    HPI/Subjective:       Mr. Angie Chahal is a 52 yo PMH of chronic right ankle wound admitted with worsening cellulitis. He had been on prior outpatient doxycycline and was unable to followup with wound care. since admit he started on vancomycin/ zosyn. BC NGTD, wound cx polymicrobial with MSSA/proteus/ citrobacter. He has been seen by vascular surgery who recommends wound care, compression and LE elevation, office followup. He developed afib with RVR requiring transfer to tele and cardiology following. He has spontaneously converted to NSR. He was placed on IV heparin then eliquis  and cardizem. ECHO shows preserved EF. Discharge plans pending to home. 19 wife present, feels much better, wound improving, eating ok, some pain, thinks has cold and dry throat     Objective:     Patient Vitals for the past 24 hrs:   Temp Pulse Resp BP SpO2   19 1315 98.3 °F (36.8 °C) 77 18 150/76 96 %   19 0853 98.3 °F (36.8 °C) 78 20 151/89 97 %   19 0444 98.6 °F (37 °C) 72 18 134/74 96 %   19 0021 98.6 °F (37 °C) 78 18 141/78 96 %   19 2157 99.1 °F (37.3 °C) 79 18 147/81 97 %   19 1649 98.2 °F (36.8 °C) 91 20 152/83 96 %     Oxygen Therapy  O2 Sat (%): 96 % (19 1315)  O2 Device: Room air (19 1234)    Estimated body mass index is 37.98 kg/m² as calculated from the following:    Height as of this encounter: 6' 4\" (1.93 m). Weight as of this encounter: 141.5 kg (312 lb).       Intake/Output Summary (Last 24 hours) at 2019 1440  Last data filed at 2019 1336  Gross per 24 hour   Intake 360 ml   Output 3250 ml   Net -2890 ml       *Note that automatically entered I/Os may not be accurate; dependent on patient compliance with collection and accurate  by [email or web address]. General:    Well nourished. Alert, no distress,  Obese  CV:   RRR. No murmur, rub, or gallop. 1+ edema   Lungs:   CTAB. No wheezing, rhonchi, or rales. anterior  Abdomen:   Soft, nontender, nondistended. Obese, decreased BS  Extremities: Warm and dry. Skin:     Venous stasis to RLE/ wrapped / less erythema  Neuro:  No gross focal deficits    Data Review:  I have reviewed all labs, meds, and studies from the last 24 hours:    Recent Results (from the past 24 hour(s))   METABOLIC PANEL, BASIC    Collection Time: 11/19/19  4:13 AM   Result Value Ref Range    Sodium 143 136 - 145 mmol/L    Potassium 3.6 3.5 - 5.1 mmol/L    Chloride 108 (H) 98 - 107 mmol/L    CO2 30 21 - 32 mmol/L    Anion gap 5 (L) 7 - 16 mmol/L    Glucose 95 65 - 100 mg/dL    BUN 9 6 - 23 MG/DL    Creatinine 0.69 (L) 0.8 - 1.5 MG/DL    GFR est AA >60 >60 ml/min/1.73m2    GFR est non-AA >60 >60 ml/min/1.73m2    Calcium 8.8 8.3 - 10.4 MG/DL   MAGNESIUM    Collection Time: 11/19/19  4:13 AM   Result Value Ref Range    Magnesium 2.0 1.8 - 2.4 mg/dL   CBC WITH AUTOMATED DIFF    Collection Time: 11/19/19 10:04 AM   Result Value Ref Range    WBC 8.1 4.3 - 11.1 K/uL    RBC 3.57 (L) 4.23 - 5.6 M/uL    HGB 12.1 (L) 13.6 - 17.2 g/dL    HCT 37.0 (L) 41.1 - 50.3 %    .6 (H) 79.6 - 97.8 FL    MCH 33.9 (H) 26.1 - 32.9 PG    MCHC 32.7 31.4 - 35.0 g/dL    RDW 12.5 11.9 - 14.6 %    PLATELET 789 (L) 953 - 450 K/uL    MPV 11.7 9.4 - 12.3 FL    ABSOLUTE NRBC 0.00 0.0 - 0.2 K/uL    NEUTROPHILS 73 43 - 78 %    LYMPHOCYTES 14 13 - 44 %    MONOCYTES 7 4.0 - 12.0 %    EOSINOPHILS 5 0.5 - 7.8 %    BASOPHILS 1 0.0 - 2.0 %    IMMATURE GRANULOCYTES 0 0.0 - 5.0 %    ABS. NEUTROPHILS 5.9 1.7 - 8.2 K/UL    ABS. LYMPHOCYTES 1.1 0.5 - 4.6 K/UL    ABS. MONOCYTES 0.6 0.1 - 1.3 K/UL    ABS. EOSINOPHILS 0.4 0.0 - 0.8 K/UL    ABS. BASOPHILS 0.1 0.0 - 0.2 K/UL    ABS. IMM.  GRANS. 0.0 0.0 - 0.5 K/UL    PLATELET COMMENTS RARE PLT CLUMPS     DF AUTOMATED          All Micro Results     Procedure Component Value Units Date/Time    CULTURE, BLOOD [[id number]] Collected:  19    Order Status:  Completed Specimen:  Blood Updated:  19     Special Requests: --        LEFT  HAND       Culture result: NO GROWTH 4 DAYS       CULTURE, BLOOD [[id number]] Collected:  19    Order Status:  Completed Specimen:  Blood Updated:  19     Special Requests: --        LEFT  Antecubital       Culture result: NO GROWTH 5 DAYS       CULTURE, WOUND Jade Geovani STAIN [[id number]]  (Abnormal)  (Susceptibility) Collected:  19    Order Status:  Completed Specimen:  Wound from Ankle Updated:  1949     Special Requests: NO SPECIAL REQUESTS        GRAM STAIN NO WBCS SEEN         FEW GRAM POSITIVE COCCI        Culture result:       MODERATE PROTEUS VULGARIS/PENNERI                  MODERATE CITROBACTER BRAAKII                  MODERATE STAPHYLOCOCCUS AUREUS SENSITIVITY TO FOLLOW                  MODERATE MIXED SKIN YUE ISOLATED                Results for orders placed or performed during the hospital encounter of 19   2D ECHO COMPLETE ADULT (TTE) W OR WO CONTR    Narrative    [facility] [address] [facility], [address] [place] [address]  [phone]    Transthoracic Echocardiogram  2D, M-mode, Doppler, and Color Doppler    Patient: Newton Wagner  MR #: [id number]  : 1965  Age: 53 years  Gender: Male  Study date: 2019  Account #: [de-identified]  Height: 76 in  Weight: 311.3 lb  BSA: 2.68 mï¾²  Status:Routine  Location: 317  BP: 157/ 90    Allergies: CODEINE, SULFA (SULFONAMIDE ANTIBIOTICS)    Sonographer:  ROJELIO Mulligan  Group:  [id number] [facility]  Referring Physician:  Doyle Monk MD  Reading Physician:  DR. JP MANTILLA DO    INDICATIONS: Atrial Fibrillation  *Technically difficult study. Limited windows, Definity used.     PROCEDURE: This was a routine study. A transthoracic echocardiogram was  performed. The study included complete 2D imaging, M-mode, complete spectral  Doppler, and color Doppler. Intravenous contrast (Definity) was administered. Echocardiographic views were limited by poor acoustic window availability. This  was a technically difficult study. LEFT VENTRICLE: Size was normal. Systolic function was normal. Ejection  fraction was estimated to be greater than 55 %. This study was inadequate for  the evaluation of regional wall motion. Wall thickness was normal. Left  ventricular diastolic function parameters were normal. Avg E/e': 9.05.    RIGHT VENTRICLE: The size was normal. Systolic function appears normal. The  tricuspid jet envelope definition was inadequate for estimation of RV   systolic  pressure. LEFT ATRIUM: Size was normal.    RIGHT ATRIUM: Size was normal.    SYSTEMIC VEINS: IVC: The inferior vena cava was mildly dilated. The  respirophasic change in diameter was more than 50%. AORTIC VALVE: The valve was trileaflet. Leaflets exhibited mildly increased  thickness and normal cuspal separation. There was no evidence for stenosis. There was no insufficiency. MITRAL VALVE: Valve structure was normal. There was no evidence for stenosis. There was no regurgitation. TRICUSPID VALVE: Not well visualized. There was no evidence for stenosis. There  was mild regurgitation. PULMONIC VALVE: Not well visualized. There was no evidence for stenosis. There  was no insufficiency. PERICARDIUM: There was no pericardial effusion. AORTA: The root exhibited normal size. SUMMARY:    -  Left ventricle: Systolic function was normal. Ejection fraction was  estimated to be greater than 55 %. This study was inadequate for the   evaluation  of regional wall motion. -  Inferior vena cava, hepatic veins: The inferior vena cava was mildly  dilated. The respirophasic change in diameter was more than 50%.     - Tricuspid valve: There was mild regurgitation. SYSTEM MEASUREMENT TABLES    2D mode  AoR Diam (2D): 2.9 cm  LA Dimension (2D): 3.7 cm  Left Atrium Systolic Volume Index; Method of Disks, Biplane; 2D mode;: 19   ml/m2  IVS/LVPW (2D): 1  IVSd (2D): 1 cm  LVIDd (2D): 4.9 cm  LVIDs (2D): 3.1 cm  LVOT Area (2D): 3.8 cm2  LVPWd (2D): 1.1 cm  RVIDd (2D): 1.9 cm    Tissue Doppler Imaging  LV Peak Early García Tissue Tommy; Lateral MA (TDI): 9 cm/s  LV Peak Early García Tissue Tommy; Medial MA (TDI): 8.6 cm/s    Unspecified Scan Mode  Peak Grad; Mean; Antegrade Flow: 11 mm[Hg]  Vmax; Antegrade Flow: 169 cm/s  LVOT Diam: 2.2 cm  MV Peak Tommy/LV Peak Tissue Tommy E-Wave; Lateral MA: 8.8  MV Peak Tommy/LV Peak Tissue Tommy E-Wave; Medial MA: 9.3    Prepared and signed by    DR. JP MANTILLA DO  Signed 18-Nov-2019 15:26:43         Current Meds:  Current Facility-Administered Medications   Medication Dose Route Frequency    ciprofloxacin HCl (CIPRO) tablet 750 mg  750 mg Oral Q12H    amoxicillin-clavulanate (AUGMENTIN) 875-125 mg per tablet 1 Tab  1 Tab Oral Q12H    apixaban (ELIQUIS) tablet 5 mg  5 mg Oral BID    dilTIAZem CD (CARDIZEM CD) capsule 240 mg  240 mg Oral DAILY    colchicine tablet 0.6 mg  0.6 mg Oral DAILY PRN    influenza vaccine 2019-20 (6 mos+)(PF) (FLUARIX/FLULAVAL/FLUZONE QUAD) injection 0.5 mL  0.5 mL IntraMUSCular PRIOR TO DISCHARGE    pantothenic ac-min oil-pet,hyd (AQUAPHOR) 41 % ointment   Topical BID    Saccharomyces boulardii (FLORASTOR) capsule 250 mg  250 mg Oral BID    diphenhydrAMINE (BENADRYL) capsule 50 mg  50 mg Oral Q6H PRN    acetaminophen (TYLENOL) tablet 650 mg  650 mg Oral Q4H PRN    HYDROcodone-acetaminophen (NORCO) 5-325 mg per tablet 1 Tab  1 Tab Oral Q4H PRN    naloxone (NARCAN) injection 0.4 mg  0.4 mg IntraVENous PRN    ondansetron (ZOFRAN) injection 4 mg  4 mg IntraVENous Q4H PRN    sodium chloride (NS) flush 5-10 mL  5-10 mL IntraVENous PRN    allopurinol (ZYLOPRIM) tablet 300 mg  300 mg Oral DAILY    cyclobenzaprine (FLEXERIL) tablet 5 mg  5 mg Oral QHS    furosemide (LASIX) tablet 40 mg  40 mg Oral DAILY    lisinopril (PRINIVIL, ZESTRIL) tablet 40 mg  40 mg Oral DAILY    sertraline (ZOLOFT) tablet 50 mg  50 mg Oral DAILY    tamsulosin (FLOMAX) capsule 0.4 mg  0.4 mg Oral DAILY    albuterol (PROVENTIL VENTOLIN) nebulizer solution 2.5 mg  2.5 mg Nebulization Q6H PRN    nystatin (MYCOSTATIN) 100,000 unit/gram powder   Topical BID    alcohol 62% (NOZIN) nasal  1 Ampule  1 Ampule Topical Q8H    hydrALAZINE (APRESOLINE) 20 mg/mL injection 20 mg  20 mg IntraVENous Q6H PRN       Other Studies (last 24 hours):  No results found.     Assessment and Plan:     Hospital Problems as of 11/19/2019 Date Reviewed: 7/10/2019          Codes Class Noted - Resolved POA    Hypokalemia ICD-10-CM: E87.6  ICD-9-CM: 276.8  11/18/2019 - Present No        Atrial fibrillation with rapid ventricular response ([facility] ICD-10-CM: I48.91  ICD-9-CM: 427.31  11/17/2019 - Present Yes        Cellulitis of leg ICD-10-CM: L03.119  ICD-9-CM: 682.6  11/17/2019 - Present Yes        * (Principal) Sepsis ([facility] ICD-10-CM: A41.9  ICD-9-CM: 038.9, 995.91  11/14/2019 - Present Unknown        Ankle wound, right, initial encounter ICD-10-CM: S91.001A  ICD-9-CM: 891.0  11/14/2019 - Present Unknown        Obesity, morbid ([facility] ICD-10-CM: E66.01  ICD-9-CM: 278.01  2/26/2018 - Present Yes        Benign prostatic hyperplasia without lower urinary tract symptoms ICD-10-CM: N40.0  ICD-9-CM: 600.00  11/6/2017 - Present Yes        HTN (hypertension) ICD-10-CM: I10  ICD-9-CM: 401.9  3/16/2015 - Present Yes        DIANA (generalized anxiety disorder) ICD-10-CM: F41.1  ICD-9-CM: 300.02  5/7/2014 - Present Yes        Gout ICD-10-CM: M10.9  ICD-9-CM: 274.9  1/20/2014 - Present Yes              Plan:    · RLE cellulitis/ sepsis: continued wound care, stop vancomycin/ zosyn and start augmentin/ cipro, needs outpatient vascular followup  · AFIB with RVR: currently in NSR, on  eliquis and  oral cardizem per cardiology  · HTN: continued lisinopril  · Depression: continued zoloft  · Gout: continued allopurinol  · Hypokalemia: replaced    DC planning/Dispo:  Pending to home tomororw, PT/OT      Diet:  DIET CARDIAC  DVT ppx: eliquis     Signed:  Iza Lew MD

## 2019-11-19 NOTE — PROGRESS NOTES
Bedside and Verbal shift change report given to Saniya Sales (oncoming nurse) by Xi Cabral (offgoing nurse). Report included the following information SBAR, Kardex and Recent Results.

## 2019-11-19 NOTE — PROGRESS NOTES
Bedside and Verbal shift change report given to Fernando Wade (oncoming nurse) by Ramon Griffith RN (offgoing nurse). Report included the following information SBAR, Kardex and Recent Results.

## 2019-11-19 NOTE — PROGRESS NOTES
Bedside and Verbal shift change report given to self (oncoming nurse) by Roni Diana RN (offgoing nurse). Report included the following information SBAR, Kardex, Intake/Output, MAR and Recent Results.

## 2019-11-19 NOTE — PROGRESS NOTES
Problem: Falls - Risk of  Goal: *Absence of Falls  Description  Document Montez Patel Fall Risk and appropriate interventions in the flowsheet.   Outcome: Progressing Towards Goal  Note:   Fall Risk Interventions:  Mobility Interventions: Patient to call before getting OOB         Medication Interventions: Patient to call before getting OOB, Teach patient to arise slowly    Elimination Interventions: Urinal in reach, Call light in reach

## 2019-11-19 NOTE — PROGRESS NOTES
Problem: Nutrition Deficit  Goal: *Optimize nutritional status  Outcome: Progressing Towards Goal     Problem: Falls - Risk of  Goal: *Absence of Falls  Description  Document Dion Plummer Fall Risk and appropriate interventions in the flowsheet. Outcome: Progressing Towards Goal  Note:   Fall Risk Interventions:  Mobility Interventions: Patient to call before getting OOB         Medication Interventions: Patient to call before getting OOB, Teach patient to arise slowly    Elimination Interventions: Call light in reach, Patient to call for help with toileting needs              Problem: Patient Education: Go to Patient Education Activity  Goal: Patient/Family Education  Outcome: Progressing Towards Goal     Problem: Pressure Injury - Risk of  Goal: *Prevention of pressure injury  Description  Document Missael Scale and appropriate interventions in the flowsheet.   Outcome: Progressing Towards Goal  Note:   Pressure Injury Interventions:       Moisture Interventions: Apply protective barrier, creams and emollients    Activity Interventions: Increase time out of bed, Pressure redistribution bed/mattress(bed type)    Mobility Interventions: Assess need for specialty bed, HOB 30 degrees or less, Pressure redistribution bed/mattress (bed type)    Nutrition Interventions: Document food/fluid/supplement intake, Discuss nutritional consult with provider, Offer support with meals,snacks and hydration    Friction and Shear Interventions: HOB 30 degrees or less

## 2019-11-19 NOTE — PROGRESS NOTES
Patient could not tolerate pain from new compression dressing that was ordered today. Right leg dressing cleaned with wound . Xeroform, aquacel, abd pads, kerlix, and ace bandage applied.

## 2019-11-19 NOTE — PROGRESS NOTES
Bedside and Verbal shift change report given to Tyra Esquivel RN (oncoming nurse) by self Kenny montoya). Report included the following information SBAR, Kardex, Intake/Output, MAR and Recent Results.

## 2019-11-20 ENCOUNTER — HOME HEALTH ADMISSION (OUTPATIENT)
Dept: HOME HEALTH SERVICES | Facility: HOME HEALTH | Age: 54
End: 2019-11-20

## 2019-11-20 VITALS
BODY MASS INDEX: 38.13 KG/M2 | SYSTOLIC BLOOD PRESSURE: 129 MMHG | TEMPERATURE: 98.5 F | WEIGHT: 313.1 LBS | HEIGHT: 76 IN | DIASTOLIC BLOOD PRESSURE: 78 MMHG | OXYGEN SATURATION: 95 % | HEART RATE: 80 BPM | RESPIRATION RATE: 18 BRPM

## 2019-11-20 LAB
BACTERIA SPEC CULT: NORMAL
SERVICE CMNT-IMP: NORMAL

## 2019-11-20 PROCEDURE — 74011250637 HC RX REV CODE- 250/637: Performed by: INTERNAL MEDICINE

## 2019-11-20 PROCEDURE — 74011250637 HC RX REV CODE- 250/637: Performed by: PHYSICIAN ASSISTANT

## 2019-11-20 PROCEDURE — 74011250637 HC RX REV CODE- 250/637: Performed by: FAMILY MEDICINE

## 2019-11-20 PROCEDURE — A6223 GAUZE >16<=48 NO W/SAL W/O B: HCPCS

## 2019-11-20 RX ORDER — CIPROFLOXACIN 750 MG/1
750 TABLET, FILM COATED ORAL EVERY 12 HOURS
Qty: 20 TAB | Refills: 0 | Status: SHIPPED | OUTPATIENT
Start: 2019-11-20 | End: 2019-11-30

## 2019-11-20 RX ORDER — AMOXICILLIN AND CLAVULANATE POTASSIUM 875; 125 MG/1; MG/1
1 TABLET, FILM COATED ORAL EVERY 12 HOURS
Qty: 20 TAB | Refills: 0 | Status: SHIPPED | OUTPATIENT
Start: 2019-11-20 | End: 2019-11-30

## 2019-11-20 RX ORDER — DILTIAZEM HYDROCHLORIDE 240 MG/1
240 CAPSULE, COATED, EXTENDED RELEASE ORAL DAILY
Qty: 90 CAP | Refills: 0 | Status: SHIPPED | OUTPATIENT
Start: 2019-11-21 | End: 2020-01-29 | Stop reason: SDUPTHER

## 2019-11-20 RX ORDER — NYSTATIN 100000 [USP'U]/G
POWDER TOPICAL 2 TIMES DAILY
Qty: 1 BOTTLE | Refills: 0 | Status: SHIPPED | OUTPATIENT
Start: 2019-11-20 | End: 2020-01-15

## 2019-11-20 RX ORDER — TAMSULOSIN HYDROCHLORIDE 0.4 MG/1
0.4 CAPSULE ORAL DAILY
Qty: 90 CAP | Refills: 0 | Status: SHIPPED | OUTPATIENT
Start: 2019-11-21 | End: 2020-01-29 | Stop reason: SDUPTHER

## 2019-11-20 RX ORDER — ALLOPURINOL 300 MG/1
300 TABLET ORAL DAILY
Qty: 90 TAB | Refills: 0 | Status: SHIPPED | OUTPATIENT
Start: 2019-11-20 | End: 2020-01-29 | Stop reason: SDUPTHER

## 2019-11-20 RX ORDER — LISINOPRIL 40 MG/1
40 TABLET ORAL DAILY
Qty: 90 TAB | Refills: 0 | Status: SHIPPED | OUTPATIENT
Start: 2019-11-20 | End: 2020-01-22

## 2019-11-20 RX ORDER — COLCHICINE 0.6 MG/1
0.6 TABLET ORAL DAILY
Qty: 90 TAB | Refills: 0 | Status: SHIPPED | OUTPATIENT
Start: 2019-11-20 | End: 2021-10-31

## 2019-11-20 RX ORDER — SERTRALINE HYDROCHLORIDE 50 MG/1
50 TABLET, FILM COATED ORAL DAILY
Qty: 90 TAB | Refills: 0 | Status: SHIPPED | OUTPATIENT
Start: 2019-11-21 | End: 2020-01-29 | Stop reason: SDUPTHER

## 2019-11-20 RX ORDER — SAME BUTANEDISULFONATE/BETAINE 400-600 MG
250 POWDER IN PACKET (EA) ORAL 2 TIMES DAILY
Qty: 14 CAP | Refills: 0 | Status: SHIPPED | OUTPATIENT
Start: 2019-11-20 | End: 2019-11-27

## 2019-11-20 RX ORDER — FUROSEMIDE 40 MG/1
40 TABLET ORAL DAILY
Qty: 90 TAB | Refills: 0 | Status: SHIPPED | OUTPATIENT
Start: 2019-11-20 | End: 2020-01-29 | Stop reason: SDUPTHER

## 2019-11-20 RX ADMIN — ALLOPURINOL 300 MG: 300 TABLET ORAL at 09:18

## 2019-11-20 RX ADMIN — Medication 1 AMPULE: at 06:06

## 2019-11-20 RX ADMIN — NYSTATIN: 100000 POWDER TOPICAL at 09:21

## 2019-11-20 RX ADMIN — APIXABAN 5 MG: 5 TABLET, FILM COATED ORAL at 09:18

## 2019-11-20 RX ADMIN — AMOXICILLIN AND CLAVULANATE POTASSIUM 1 TABLET: 875; 125 TABLET, FILM COATED ORAL at 09:18

## 2019-11-20 RX ADMIN — LISINOPRIL 40 MG: 20 TABLET ORAL at 09:18

## 2019-11-20 RX ADMIN — Medication 250 MG: at 09:18

## 2019-11-20 RX ADMIN — FUROSEMIDE 40 MG: 40 TABLET ORAL at 09:18

## 2019-11-20 RX ADMIN — SERTRALINE HYDROCHLORIDE 50 MG: 50 TABLET ORAL at 09:18

## 2019-11-20 RX ADMIN — CIPROFLOXACIN HYDROCHLORIDE 750 MG: 250 TABLET, FILM COATED ORAL at 09:18

## 2019-11-20 RX ADMIN — TAMSULOSIN HYDROCHLORIDE 0.4 MG: 0.4 CAPSULE ORAL at 09:18

## 2019-11-20 RX ADMIN — Medication: at 09:21

## 2019-11-20 RX ADMIN — DILTIAZEM HYDROCHLORIDE 240 MG: 120 CAPSULE, COATED, EXTENDED RELEASE ORAL at 09:18

## 2019-11-20 NOTE — DISCHARGE SUMMARY
Hospitalist Discharge Summary     Admit Date:  2019  6:22 PM   Name:  Srinivasa Shin   Age:  53 y.o.  :  1965   MRN:  [id number]   PCP:  Stewart Godinez MD  Treatment Team: Attending Provider: Hailee Roman MD; Care Manager: Maryana Keita, RN; Physical Therapist: Madonna Echavarria, PT, DPT    Problem List for this Hospitalization:  Hospital Problems as of 2019 Date Reviewed: 7/10/2019          Codes Class Noted - Resolved POA    Hypokalemia ICD-10-CM: E87.6  ICD-9-CM: 276.8  2019 - Present No        Atrial fibrillation with rapid ventricular response ([facility] ICD-10-CM: I48.91  ICD-9-CM: 427.31  2019 - Present Yes        Cellulitis of leg ICD-10-CM: L03.119  ICD-9-CM: 682.6  2019 - Present Yes        * (Principal) Sepsis ([facility] ICD-10-CM: A41.9  ICD-9-CM: 038.9, 995.91  2019 - Present Unknown        Ankle wound, right, initial encounter ICD-10-CM: S91.001A  ICD-9-CM: 891.0  2019 - Present Unknown        Obesity, morbid ([facility] ICD-10-CM: E66.01  ICD-9-CM: 278.01  2018 - Present Yes        Benign prostatic hyperplasia without lower urinary tract symptoms ICD-10-CM: N40.0  ICD-9-CM: 600.00  2017 - Present Yes        HTN (hypertension) ICD-10-CM: I10  ICD-9-CM: 401.9  3/16/2015 - Present Yes        DIANA (generalized anxiety disorder) ICD-10-CM: F41.1  ICD-9-CM: 300.02  2014 - Present Yes        Gout ICD-10-CM: M10.9  ICD-9-CM: 274.9  2014 - Present Yes                Hospital Course:    Mr. Мария Renee is a 54 yo male with PMH of chronic right ankle wound admitted with worsening cellulitis. He had been on prior outpatient doxycycline and was unable to followup with wound care. since admit he started on vancomycin/ zosyn. BC NGTD, wound cx polymicrobial with MSSA/proteus/ citrobacter. he will complete 14 days total antibiotics and will discharge with augmentin and cipro.   He has been seen by vascular surgery who recommends wound care, compression and LE elevation, office followup. He developed afib with RVR requiring transfer to tele and cardiology following. He has spontaneously converted to NSR. He was placed on IV heparin then eliquis  and cardizem. He has requested a change to xarelto due to finances. ECHO shows preserved EF. Discharge plans are to home. Disposition: Home or Self Care  Activity: Activity as tolerated  Diet: DIET CARDIAC Regular  Code Status: Full Code      Follow up instructions, discharge meds at bottom of this note. Plan was discussed with patient and wife. All questions answered. Patient was stable at time of discharge. Patient will call a physician or return if any concerns. Diagnostic Imaging/Tests:   Xr Tib/fib Rt    Result Date: 11/14/2019  Right tibia fibula HISTORY: Right leg wound, redness, swelling and drainage AP and lateral views of the right tibia and fibula were obtained. No prior studies are available for comparison. FINDINGS: There is no evidence of acute fracture or patient. There is diffuse soft tissue swelling. There is no soft tissue gas. There is no bony destruction. IMPRESSION: Diffuse soft tissue swelling without evidence of acute bony abnormality. Xr Chest Port    Result Date: 11/14/2019  Portable chest: History: Sepsis. Comparison: None Findings: A single view of the chest was obtained at 2108 hours. The cardiac and mediastinal silhouette are normal in size and configuration. The lungs and pleural spaces are clear. The pulmonary vascularity is within normal limits. Impression: Unremarkable portable chest radiograph     Duplex Lower Ext Venous Right    Result Date: 11/15/2019  Right lower extremity duplex venous doppler exam. INDICATION: Erythema, edema and tenderness to palpation at the right calf. Question DVT. Earnest Majano  TECHNIQUE: Gray-scale ultrasound with and without compression and color Doppler evaluation were performed of the deep veins of the right lower extremity from the level of the right common femoral vein to the level of the right popliteal vein. Peroneal and posterior tibial veins also interrogated. FINDINGS: The right common femoral vein, right femoral vein, peroneal, posterior tibial, and right popliteal veins are compressible and opacify with color at color Doppler evaluation. There is no evidence of deep vein thrombosis. Benign-appearing lymph noted is noted in the right groin, with echogenic hilum. IMPRESSION: Negative for DVT. Ankle Brachial Index    Result Date: 11/15/2019  History: Skin ulcerations of the right lower extremity with pain. FINDINGS: Ankle-brachial indices were obtained bilaterally. Noncompressible tibial vessels on the right and at the left posterior tibial artery. Left anterior tibial ankle brachial index of 1.1. Right and left toe index of 0.84 and 0.86 respectively. IMPRESSION: Evidence to suggest the presence of small vessel arterial occlusive disease. Echocardiogram results:  Results for orders placed or performed during the hospital encounter of 19   2D ECHO COMPLETE ADULT (TTE) W OR [address]  [phone]    Transthoracic Echocardiogram  2D, M-mode, Doppler, and Color Doppler    Patient: Dahlia Abebe  MR #: [id number]  : 1965  Age: 53 years  Gender: Male  Study date: 2019  Account #: [de-identified]  Height: 76 in  Weight: 311.3 lb  BSA: 2.68 mï¾²  Status:Routine  Location: [facility]  BP: 157/ 90    Allergies: CODEINE, SULFA (SULFONAMIDE ANTIBIOTICS)    Sonographer:  Marci [facility]  Group:  [id number] Cardiology  Referring Physician:  Sofi Reyes MD  Reading Physician:  DR. JP MANTILLA DO    INDICATIONS: Atrial Fibrillation  *Technically difficult study. Limited windows, Definity used. PROCEDURE: This was a routine study. A transthoracic echocardiogram was  performed.  The study included complete 2D imaging, M-mode, complete spectral  Doppler, and color Doppler. Intravenous contrast (Definity) was administered. Echocardiographic views were limited by poor acoustic window availability. This  was a technically difficult study. LEFT VENTRICLE: Size was normal. Systolic function was normal. Ejection  fraction was estimated to be greater than 55 %. This study was inadequate for  the evaluation of regional wall motion. Wall thickness was normal. Left  ventricular diastolic function parameters were normal. Avg E/e': 9.05.    RIGHT VENTRICLE: The size was normal. Systolic function appears normal. The  tricuspid jet envelope definition was inadequate for estimation of RV   systolic  pressure. LEFT ATRIUM: Size was normal.    RIGHT ATRIUM: Size was normal.    SYSTEMIC VEINS: IVC: The inferior vena cava was mildly dilated. The  respirophasic change in diameter was more than 50%. AORTIC VALVE: The valve was trileaflet. Leaflets exhibited mildly increased  thickness and normal cuspal separation. There was no evidence for stenosis. There was no insufficiency. MITRAL VALVE: Valve structure was normal. There was no evidence for stenosis. There was no regurgitation. TRICUSPID VALVE: Not well visualized. There was no evidence for stenosis. There  was mild regurgitation. PULMONIC VALVE: Not well visualized. There was no evidence for stenosis. There  was no insufficiency. PERICARDIUM: There was no pericardial effusion. AORTA: The root exhibited normal size. SUMMARY:    -  Left ventricle: Systolic function was normal. Ejection fraction was  estimated to be greater than 55 %. This study was inadequate for the   evaluation  of regional wall motion. -  Inferior vena cava, hepatic veins: The inferior vena cava was mildly  dilated. The respirophasic change in diameter was more than 50%. -  Tricuspid valve: There was mild regurgitation.     SYSTEM MEASUREMENT TABLES    2D mode  AoR Diam (2D): 2.9 cm  LA Dimension (2D): 3.7 cm  Left Atrium Systolic Volume Index; Method of Disks, Biplane; 2D mode;: 19   ml/m2  IVS/LVPW (2D): 1  IVSd (2D): 1 cm  LVIDd (2D): 4.9 cm  LVIDs (2D): 3.1 cm  LVOT Area (2D): 3.8 cm2  LVPWd (2D): 1.1 cm  RVIDd (2D): 1.9 cm    Tissue Doppler Imaging  LV Peak Early García Tissue Tommy; Lateral MA (TDI): 9 cm/s  LV Peak Early García Tissue Tommy; Medial MA (TDI): 8.6 cm/s    Unspecified Scan Mode  Peak Grad; Mean; Antegrade Flow: 11 mm[Hg]  Vmax; Antegrade Flow: 169 cm/s  LVOT Diam: 2.2 cm  MV Peak Tommy/LV Peak Tissue Tommy E-Wave; Lateral MA: 8.8  MV Peak Tommy/LV Peak Tissue Tommy E-Wave; Medial MA: 9.3    Prepared and signed by    DR. Faustino Muller DO  Signed 18-Nov-2019 15:26:43         Procedures done this admission:  * No surgery found *    All Micro Results     Procedure Component Value Units Date/Time    CULTURE, BLOOD [[id number]] Collected:  11/14/19 2239    Order Status:  Completed Specimen:  Blood Updated:  11/20/19 0903     Special Requests: --        LEFT  HAND       Culture result: NO GROWTH 5 DAYS       CULTURE, BLOOD [[id number]] Collected:  11/14/19 2020    Order Status:  Completed Specimen:  Blood Updated:  11/19/19 0752     Special Requests: --        LEFT  Antecubital       Culture result: NO GROWTH 5 DAYS       CULTURE, WOUND Marleny Tello STAIN [[id number]]  (Abnormal)  (Susceptibility) Collected:  11/14/19 2313    Order Status:  Completed Specimen:  Wound from Ankle Updated:  11/19/19 0635     Special Requests: NO SPECIAL REQUESTS        GRAM STAIN NO WBCS SEEN         FEW GRAM POSITIVE COCCI        Culture result:       MODERATE PROTEUS VULGARIS/PENNERI                  MODERATE CITROBACTER BRAAKII                  MODERATE STAPHYLOCOCCUS AUREUS SENSITIVITY TO FOLLOW                  MODERATE MIXED SKIN YUE ISOLATED                Labs: Results:       BMP, Mg, Phos Recent Labs     11/19/19  0413 11/18/19  0937 11/18/19  0345     --  143   K 3.6  --  3.4*   *  --  108* CO2 30  --  31   AGAP 5*  --  4*   BUN 9  --  10   CREA 0.69*  --  0.71*   CA 8.8  --  8.6   GLU 95  --  117*   MG 2.0 1.9  --       CBC Recent Labs     11/19/19  1004 11/18/19  0345   WBC 8.1 10.5   RBC 3.57* 3.53*   HGB 12.1* 12.4*   HCT 37.0* 36.6*   * 149*   GRANS 73 70   LYMPH 14 17   EOS 5 3   MONOS 7 9   BASOS 1 1   IG 0 0   ANEU 5.9 7.3   ABL 1.1 1.8   FLOWER 0.4 0.3   ABM 0.6 0.9   ABB 0.1 0.1   AIG 0.0 0.0      LFT No results for input(s): SGOT, ALT, TBIL, AP, TP, ALB, GLOB, AGRAT, GPT in the last 72 hours.    Cardiac Testing Lab Results   Component Value Date/Time    B-type Natriuretic Peptide 11.8 03/17/2016 10:47 AM      Coagulation Tests Lab Results   Component Value Date/Time    aPTT 34.7 11/18/2019 09:37 AM    aPTT 63.2 (H) 11/18/2019 12:35 AM    aPTT 36.7 11/17/2019 05:59 PM      A1c No results found for: HBA1C, HGBE8, QVD2BWQN, BQU9MGPC   Lipid Panel Lab Results   Component Value Date/Time    Cholesterol, total 173 07/10/2019 11:20 AM    HDL Cholesterol 32 (L) 07/10/2019 11:20 AM    LDL, calculated 121 (H) 07/10/2019 11:20 AM    VLDL, calculated 20 07/10/2019 11:20 AM    Triglyceride 102 07/10/2019 11:20 AM    CHOL/HDL Ratio 5.4 05/23/2018 01:50 PM      Thyroid Panel Lab Results   Component Value Date/Time    TSH 2.240 11/16/2019 07:04 PM    TSH 1.080 07/10/2019 11:20 AM    T4, Total 7.1 08/24/2016 11:21 AM    T4, Free 1.1 11/16/2019 07:04 PM        Most Recent UA Lab Results   Component Value Date/Time    Color YELLOW 11/14/2019 11:17 PM    Appearance CLEAR 11/14/2019 11:17 PM    Specific gravity 1.027 (H) 11/14/2019 11:17 PM    pH (UA) 5.5 11/14/2019 11:17 PM    Protein NEGATIVE  11/14/2019 11:17 PM    Glucose NEGATIVE  11/14/2019 11:17 PM    Ketone NEGATIVE  11/14/2019 11:17 PM    Bilirubin NEGATIVE  11/14/2019 11:17 PM    Blood NEGATIVE  11/14/2019 11:17 PM    Urobilinogen 1.0 11/14/2019 11:17 PM    Nitrites NEGATIVE  11/14/2019 11:17 PM    Leukocyte Esterase NEGATIVE  11/14/2019 11:17 PM Allergies   Allergen Reactions    Codeine Nausea Only    Sulfa (Sulfonamide Antibiotics) Rash     Immunization History   Administered Date(s) Administered    Tdap 02/26/2018       All Labs from Last 24 Hrs:  No results found for this or any previous visit (from the past 24 hour(s)).     Current Med List in Hospital:   Current Facility-Administered Medications   Medication Dose Route Frequency    ciprofloxacin HCl (CIPRO) tablet 750 mg  750 mg Oral Q12H    amoxicillin-clavulanate (AUGMENTIN) 875-125 mg per tablet 1 Tab  1 Tab Oral Q12H    apixaban (ELIQUIS) tablet 5 mg  5 mg Oral BID    dilTIAZem CD (CARDIZEM CD) capsule 240 mg  240 mg Oral DAILY    colchicine tablet 0.6 mg  0.6 mg Oral DAILY PRN    influenza vaccine 2019-20 (6 mos+)(PF) (FLUARIX/FLULAVAL/FLUZONE QUAD) injection 0.5 mL  0.5 mL IntraMUSCular PRIOR TO DISCHARGE    pantothenic ac-min oil-pet,hyd (AQUAPHOR) 41 % ointment   Topical BID    Saccharomyces boulardii (FLORASTOR) capsule 250 mg  250 mg Oral BID    diphenhydrAMINE (BENADRYL) capsule 50 mg  50 mg Oral Q6H PRN    acetaminophen (TYLENOL) tablet 650 mg  650 mg Oral Q4H PRN    HYDROcodone-acetaminophen (NORCO) 5-325 mg per tablet 1 Tab  1 Tab Oral Q4H PRN    naloxone (NARCAN) injection 0.4 mg  0.4 mg IntraVENous PRN    ondansetron (ZOFRAN) injection 4 mg  4 mg IntraVENous Q4H PRN    sodium chloride (NS) flush 5-10 mL  5-10 mL IntraVENous PRN    allopurinol (ZYLOPRIM) tablet 300 mg  300 mg Oral DAILY    cyclobenzaprine (FLEXERIL) tablet 5 mg  5 mg Oral QHS    furosemide (LASIX) tablet 40 mg  40 mg Oral DAILY    lisinopril (PRINIVIL, ZESTRIL) tablet 40 mg  40 mg Oral DAILY    sertraline (ZOLOFT) tablet 50 mg  50 mg Oral DAILY    tamsulosin (FLOMAX) capsule 0.4 mg  0.4 mg Oral DAILY    albuterol (PROVENTIL VENTOLIN) nebulizer solution 2.5 mg  2.5 mg Nebulization Q6H PRN    nystatin (MYCOSTATIN) 100,000 unit/gram powder   Topical BID    alcohol 62% (NOZIN) nasal  1 Ampule  1 Ampule Topical Q8H    hydrALAZINE (APRESOLINE) 20 mg/mL injection 20 mg  20 mg IntraVENous Q6H PRN       Discharge Exam:  Patient Vitals for the past 24 hrs:   Temp Pulse Resp BP SpO2   11/20/19 1315 98.5 °F (36.9 °C) 80 18 129/78 95 %   11/20/19 0850 98.4 °F (36.9 °C) 75 17 132/67 94 %   11/20/19 0548 98.4 °F (36.9 °C) 75 18 141/74 91 %   11/20/19 0004 99 °F (37.2 °C) 79 18 (!) 159/98 98 %   11/19/19 2043 98.3 °F (36.8 °C) 82 16 155/62 97 %   11/19/19 1646 98.1 °F (36.7 °C) 78 18 163/72 98 %     Oxygen Therapy  O2 Sat (%): 95 % (11/20/19 1315)  O2 Device: Room air (11/20/19 1214)    Estimated body mass index is 38.11 kg/m² as calculated from the following:    Height as of this encounter: 6' 4\" (1.93 m). Weight as of this encounter: 142 kg (313 lb 1.6 oz). Intake/Output Summary (Last 24 hours) at 11/20/2019 1433  Last data filed at 11/20/2019 0415  Gross per 24 hour   Intake 720 ml   Output    Net 720 ml       *Note that automatically entered I/Os may not be accurate; dependent on patient compliance with collection and accurate  by assistants. General:    Well nourished. Alert. Obese, no distress   CV:   Regular rate and rhythm. No murmur, rub, or gallop. 1+ edema  Lungs:  Clear to auscultation bilaterally. No wheezing, rhonchi, or rales. anterior  Abdomen: Soft, nontender, nondistended. Bowel sounds normal.   Neurologic:  grossly intact  Skin:     RLE wrapped  Psych:  Normal mood and affect. Discharge Info:   Current Discharge Medication List      START taking these medications    Details   Saccharomyces boulardii (FLORASTOR) 250 mg capsule Take 1 Cap by mouth two (2) times a day for 7 days. Qty: 14 Cap, Refills: 0      nystatin (MYCOSTATIN) powder Apply  to affected area two (2) times a day. Qty: 1 Bottle, Refills: 0      dilTIAZem CD (CARDIZEM CD) 240 mg ER capsule Take 1 Cap by mouth daily.   Qty: 90 Cap, Refills: 0      ciprofloxacin HCl (CIPRO) 750 mg tablet Take 1 Tab by mouth every twelve (12) hours for 10 days. Qty: 20 Tab, Refills: 0      amoxicillin-clavulanate (AUGMENTIN) 875-125 mg per tablet Take 1 Tab by mouth every twelve (12) hours for 10 days. Qty: 20 Tab, Refills: 0      rivaroxaban (XARELTO) 20 mg tab tablet Take 1 Tab by mouth daily (with dinner). Qty: 90 Tab, Refills: 0         CONTINUE these medications which have CHANGED    Details   tamsulosin (FLOMAX) 0.4 mg capsule Take 1 Cap by mouth daily. Qty: 90 Cap, Refills: 0      sertraline (ZOLOFT) 50 mg tablet Take 1 Tab by mouth daily. Qty: 90 Tab, Refills: 0      furosemide (LASIX) 40 mg tablet Take 1 Tab by mouth daily. Qty: 90 Tab, Refills: 0      colchicine 0.6 mg tablet Take 1 Tab by mouth daily. Qty: 90 Tab, Refills: 0      lisinopril (PRINIVIL, ZESTRIL) 40 mg tablet Take 1 Tab by mouth daily. Qty: 90 Tab, Refills: 0    Associated Diagnoses: Essential hypertension with goal blood pressure less than 130/85; History of kidney stones; Pure hypercholesterolemia; History of gout      allopurinol (ZYLOPRIM) 300 mg tablet Take 1 Tab by mouth daily. Qty: 90 Tab, Refills: 0    Associated Diagnoses: Idiopathic gout, unspecified chronicity, unspecified site         CONTINUE these medications which have NOT CHANGED    Details   cyclobenzaprine (FLEXERIL) 5 mg tablet Take 1 Tab by mouth nightly. Qty: 90 Tab, Refills: 0    Associated Diagnoses: Osteoarthritis of hand, primary localized, unspecified laterality      ondansetron (ZOFRAN ODT) 4 mg disintegrating tablet Take 1 Tab by mouth every eight (8) hours as needed for Nausea. Qty: 90 Tab, Refills: 3    Associated Diagnoses: History of kidney stones; Essential hypertension with goal blood pressure less than 130/85; Pure hypercholesterolemia; History of gout      clobetasol (TEMOVATE) 0.05 % ointment Apply  to affected area two (2) times a day.   Qty: 60 g, Refills: 3    Comments: NEEDS NEW RX  Associated Diagnoses: Psoriasis         STOP taking these medications       meloxicam (MOBIC) 15 mg tablet Comments:   Reason for Stopping: Follow Up Orders: Follow-up Appointments   Procedures    FOLLOW UP VISIT Appointment in: One Week 1 week pcp perri, 2 weeks [facility] cardiology, 4 weeks vascular surgery     1 week pcp perri, 2 weeks [facility] cardiology, 4 weeks vascular surgery     Standing Status:   Standing     Number of Occurrences:   1     Order Specific Question:   Appointment in     Answer: One Week       Follow-up Information     Follow up With Specialties Details Why Contact Info    [address] [place] [address] Vascular Surgery On 11/27/2019 Reflux ultrasound at 1:00 pm on 11/27/19 [address] [facility] [id number]  [phone]    Standard, Teresa Garcia NP Vascular Surgery On 12/2/2019 Follow-up reflux study @ 1:00 pm [address] [facility]      Paresh Piña MD Internal Medicine  12/3/19 at 3:00  [facility]      Jerad Guerra MD Cardiology  Monday December 9 at 3:45 PM [email or web address]  [address] [facility] [address]  [address] [place] [address].          Time spent in patient discharge planning and coordination 45 minutes.     Signed:  Grayson Watkins MD

## 2019-11-20 NOTE — PROGRESS NOTES
[address]  Face to Face Encounter    Patients Name: Srinivasa Shin    YOB: 1965    Ordering Physician:     Primary Diagnosis: Sepsis ([facility] [A41.9]  Ankle wound, right, initial encounter [S91.001A]    Date of Face to Face:   11/20/2019                                  Face to Face Encounter findings are related to primary reason for home care:   yes. 1. I certify that the patient needs intermittent care as follows: skilled nursing care:  skilled observation/assessment, patient education and complex care plan management    2. I certify that this patient is homebound, that is: 1) patient requires the use of a  device, special transportation, or assistance of another to leave the home; or 2) patient's condition makes leaving the home medically contraindicated; and 3) patient has a normal inability to leave the home and leaving the home requires considerable and taxing effort. Patient may leave the home for infrequent and short duration for medical reasons, and occasional absences for non-medical reasons. Homebound status is due to the following functional limitations:     3. I certify that this patient is under my care and that I, or a nurse practitioner or  [id number], or clinical nurse specialist, or certified nurse midwife, working with me, had a Face-to-Face Encounter that meets the physician Face-to-Face Encounter requirements. The following are the clinical findings from the [address] [place] [address] encounter that support the need for skilled services and is a summary of the encounter: [address] [place] [address]  11/20/2019      THE FOLLOWING TO BE COMPLETED BY THE COMMUNITY PHYSICIAN:    I concur with the findings described above from the [facility] encounter that this patient is homebound and in need of a skilled service.     Certifying Physician: _____________________________________      Printed Certifying Physician Name: _____________________________________    Date: _________________

## 2019-11-20 NOTE — PROGRESS NOTES
Per patient request, dressing change completed before discharge. Wife completed majority of change with RN supervision. Old dressing removed. Aquacel AG, xeroform, and ABD pad place over site, wrapped with gauze and ACE wrap. No bleeding during dressing change. Site is pink and tender. On inner ankle small amount of yellow slough. Patient tolerated well.

## 2019-11-20 NOTE — PROGRESS NOTES
Bedside and Verbal shift change report given to Jannet William (oncoming nurse) by Chelsea Peck (offgoing nurse). Report included the following information SBAR, Kardex, Intake/Output and Recent Results.

## 2019-11-20 NOTE — PROGRESS NOTES
[address] November 20, 2019       RE: Maximus Balderrama      To Whom It May Concern,    This is to certify that Maximus Balderrama may may return to work on 12-2-19.           Sincerely,  Donita Mcmullen MD

## 2019-11-20 NOTE — PROGRESS NOTES
Patient agrees with [facility] upon discharge. Order, referral, face to face and placed on the AVS.  Details given to patient and family. [facility] for wound care.

## 2019-11-20 NOTE — PROGRESS NOTES
Bedside and Verbal shift change report given to self (oncoming nurse) by Britany Dias RN (offgoing nurse). Report included the following information SBAR, Kardex, Intake/Output, MAR and Recent Results.

## 2019-11-20 NOTE — DISCHARGE INSTRUCTIONS
DISCHARGE SUMMARY from Nurse    PATIENT INSTRUCTIONS:    After general anesthesia or intravenous sedation, for 24 hours or while taking prescription Narcotics:  · Limit your activities  · Do not drive and operate hazardous machinery  · Do not make important personal or business decisions  · Do  not drink alcoholic beverages  · If you have not urinated within 8 hours after discharge, please contact your surgeon on call. Report the following to your surgeon:  · Excessive pain, swelling, redness or odor of or around the surgical area  · Temperature over 100.5  · Nausea and vomiting lasting longer than 4 hours or if unable to take medications  · Any signs of decreased circulation or nerve impairment to extremity: change in color, persistent  numbness, tingling, coldness or increase pain  · Any questions    What to do at Home:  Recommended activity: Activity as tolerated,     If you experience any of the following symptoms palpitations, chest pain, increased fever, please follow up with MD.    *  Please give a list of your current medications to your Primary Care Provider. *  Please update this list whenever your medications are discontinued, doses are      changed, or new medications (including over-the-counter products) are added. *  Please carry medication information at all times in case of emergency situations. These are general instructions for a healthy lifestyle:    No smoking/ No tobacco products/ Avoid exposure to second hand smoke  Surgeon General's Warning:  Quitting smoking now greatly reduces serious risk to your health.     Obesity, smoking, and sedentary lifestyle greatly increases your risk for illness    A healthy diet, regular physical exercise & weight monitoring are important for maintaining a healthy lifestyle    You may be retaining fluid if you have a history of heart failure or if you experience any of the following symptoms:  Weight gain of 3 pounds or more overnight or 5 pounds in a week, increased swelling in our hands or feet or shortness of breath while lying flat in bed. Please call your doctor as soon as you notice any of these symptoms; do not wait until your next office visit. The discharge information has been reviewed with the patient. The patient verbalized understanding. Discharge medications reviewed with the patient and appropriate educational materials and side effects teaching were provided.   ___________________________________________________________________________________________________________________________________

## 2019-11-20 NOTE — PROGRESS NOTES
Discharge instructions and medications reviewed with patient. Patient verbalizes understanding. All questions answered. IV and monitor removed by primary RN.

## 2019-11-20 NOTE — WOUND CARE
Follow up. Patient has been seen by wound team, reccommended Aquacel ag over deepest area and xeroform over shallow areas with compression. Patient has not been able to tolerate the coban wrap and has returned to ace wraps (loose). Patients spouse states she feels she can change the bandage. Patient admits to socioeconomic issues that will prevent purchasing supplies on his own. Given supplies of Aquacel ag, and hydrafera blue ready that can be used as substitute as needed for the Aquacel ag, patient and spouse verbalize understanding of all products use. Current plan is to go home with home health.

## 2019-11-20 NOTE — PROGRESS NOTES
Met with patient and his wife regarding discharge needs. Patient concerned about a leave of absence form to be filled out. Spoke to [employer], she has not received a email from his company. Relayed information to the patient. They would like home health for wound care, and the patient was calling his insurance company to see if they cover [place]. This patient is scheduled to go home today.

## 2019-11-21 ENCOUNTER — TELEPHONE (OUTPATIENT)
Dept: HOME HEALTH SERVICES | Facility: HOME HEALTH | Age: 54
End: 2019-11-21

## 2019-11-21 NOTE — TELEPHONE ENCOUNTER
Mr. Jolly Sandra returned my call. He had no medication questions, but did need help getting a slipper of some kind to wear on the foot with the cellulitis so not to damage it. He said the [facility] RN was coming out tomorrow and could I let her know of this need. I called our [facility] intake and found that he had been referred to [facility] because  was unable to see him tomorrow. I explained the shoe need to our intake RN who said the [facility] nurse should be able to help him with this. I called him back and told him to discuss with the nurse tomorrow.

## 2019-11-21 NOTE — TELEPHONE ENCOUNTER
[address] Dayron Rodriguez Pharmacist consult.  was discharged yesterday from [facility] with sepsis due to cellulitis of right ankle. I called to review his discharge medications and got voice mail. I left my name and cell phone number. I asked him to call me with any medication questions or issues.

## 2019-11-22 ENCOUNTER — TELEPHONE (OUTPATIENT)
Dept: HOME HEALTH SERVICES | Facility: HOME HEALTH | Age: 54
End: 2019-11-22

## 2019-11-22 NOTE — TELEPHONE ENCOUNTER
Shahana Joe called because the [facility] nurse said he just needed to go to a drugstore and buy his slippers. He needs a place where his insurance will pay. I do not have that information, but recommended he call the  wound center. He asked me to text him their number which I did. I hope he can get something so he does not re-damage his foot just wearing socks.

## 2019-12-03 PROBLEM — I87.2 VENOUS (PERIPHERAL) INSUFFICIENCY: Status: ACTIVE | Noted: 2019-12-03

## 2019-12-03 PROBLEM — I83.90 VARICOSE VEIN OF LEG: Status: ACTIVE | Noted: 2019-12-03

## 2020-01-15 ENCOUNTER — HOSPITAL ENCOUNTER (INPATIENT)
Age: 55
LOS: 7 days | Discharge: HOME HEALTH | DRG: 603 | End: 2020-01-22
Attending: EMERGENCY MEDICINE | Admitting: INTERNAL MEDICINE
Payer: COMMERCIAL

## 2020-01-15 DIAGNOSIS — L02.415 CELLULITIS AND ABSCESS OF RIGHT LOWER EXTREMITY: Primary | ICD-10-CM

## 2020-01-15 DIAGNOSIS — L03.115 CELLULITIS AND ABSCESS OF RIGHT LOWER EXTREMITY: Primary | ICD-10-CM

## 2020-01-15 PROBLEM — E66.01 OBESITY, MORBID (HCC): Chronic | Status: ACTIVE | Noted: 2018-02-26

## 2020-01-15 PROBLEM — L97.919 CHRONIC VENOUS HYPERTENSION WITH ULCER AND INFLAMMATION INVOLVING RIGHT SIDE (HCC): Chronic | Status: ACTIVE | Noted: 2019-03-06

## 2020-01-15 PROBLEM — I87.331 CHRONIC VENOUS HYPERTENSION WITH ULCER AND INFLAMMATION INVOLVING RIGHT SIDE (HCC): Chronic | Status: ACTIVE | Noted: 2019-03-06

## 2020-01-15 PROBLEM — D68.9 COAGULOPATHY (HCC): Status: ACTIVE | Noted: 2020-01-15

## 2020-01-15 PROBLEM — I77.1 ARTERIAL INSUFFICIENCY (HCC): Chronic | Status: ACTIVE | Noted: 2020-01-15

## 2020-01-15 PROBLEM — L03.119 CELLULITIS OF LEG: Chronic | Status: ACTIVE | Noted: 2019-11-17

## 2020-01-15 PROBLEM — I87.2 VENOUS (PERIPHERAL) INSUFFICIENCY: Chronic | Status: ACTIVE | Noted: 2019-12-03

## 2020-01-15 LAB
ALBUMIN SERPL-MCNC: 3.6 G/DL (ref 3.5–5)
ALBUMIN/GLOB SERPL: 0.9 {RATIO} (ref 1.2–3.5)
ALP SERPL-CCNC: 96 U/L (ref 50–136)
ALT SERPL-CCNC: 18 U/L (ref 12–65)
ANION GAP SERPL CALC-SCNC: 4 MMOL/L (ref 7–16)
AST SERPL-CCNC: 17 U/L (ref 15–37)
BASOPHILS # BLD: 0 K/UL (ref 0–0.2)
BASOPHILS NFR BLD: 0 % (ref 0–2)
BILIRUB SERPL-MCNC: 0.7 MG/DL (ref 0.2–1.1)
BUN SERPL-MCNC: 11 MG/DL (ref 6–23)
CALCIUM SERPL-MCNC: 9.7 MG/DL (ref 8.3–10.4)
CHLORIDE SERPL-SCNC: 108 MMOL/L (ref 98–107)
CO2 SERPL-SCNC: 28 MMOL/L (ref 21–32)
CREAT SERPL-MCNC: 0.72 MG/DL (ref 0.8–1.5)
CRP SERPL-MCNC: 3.4 MG/DL (ref 0–0.9)
DIFFERENTIAL METHOD BLD: ABNORMAL
EOSINOPHIL # BLD: 0.2 K/UL (ref 0–0.8)
EOSINOPHIL NFR BLD: 2 % (ref 0.5–7.8)
ERYTHROCYTE [DISTWIDTH] IN BLOOD BY AUTOMATED COUNT: 13.2 % (ref 11.9–14.6)
GLOBULIN SER CALC-MCNC: 3.8 G/DL (ref 2.3–3.5)
GLUCOSE BLD STRIP.AUTO-MCNC: 115 MG/DL (ref 65–100)
GLUCOSE BLD STRIP.AUTO-MCNC: 81 MG/DL (ref 65–100)
GLUCOSE SERPL-MCNC: 96 MG/DL (ref 65–100)
HCT VFR BLD AUTO: 44.3 % (ref 41.1–50.3)
HGB BLD-MCNC: 15 G/DL (ref 13.6–17.2)
IMM GRANULOCYTES # BLD AUTO: 0 K/UL (ref 0–0.5)
IMM GRANULOCYTES NFR BLD AUTO: 0 % (ref 0–5)
LACTATE BLD-SCNC: 0.9 MMOL/L (ref 0.5–1.9)
LYMPHOCYTES # BLD: 1.3 K/UL (ref 0.5–4.6)
LYMPHOCYTES NFR BLD: 12 % (ref 13–44)
MCH RBC QN AUTO: 34.5 PG (ref 26.1–32.9)
MCHC RBC AUTO-ENTMCNC: 33.9 G/DL (ref 31.4–35)
MCV RBC AUTO: 101.8 FL (ref 79.6–97.8)
MONOCYTES # BLD: 0.8 K/UL (ref 0.1–1.3)
MONOCYTES NFR BLD: 8 % (ref 4–12)
NEUTS SEG # BLD: 8.3 K/UL (ref 1.7–8.2)
NEUTS SEG NFR BLD: 77 % (ref 43–78)
NRBC # BLD: 0 K/UL (ref 0–0.2)
PLATELET # BLD AUTO: 207 K/UL (ref 150–450)
PMV BLD AUTO: 10.8 FL (ref 9.4–12.3)
POTASSIUM SERPL-SCNC: 4.2 MMOL/L (ref 3.5–5.1)
PROT SERPL-MCNC: 7.4 G/DL (ref 6.3–8.2)
RBC # BLD AUTO: 4.35 M/UL (ref 4.23–5.6)
SODIUM SERPL-SCNC: 140 MMOL/L (ref 136–145)
WBC # BLD AUTO: 10.7 K/UL (ref 4.3–11.1)

## 2020-01-15 PROCEDURE — 86140 C-REACTIVE PROTEIN: CPT

## 2020-01-15 PROCEDURE — 86580 TB INTRADERMAL TEST: CPT | Performed by: NURSE PRACTITIONER

## 2020-01-15 PROCEDURE — 83605 ASSAY OF LACTIC ACID: CPT

## 2020-01-15 PROCEDURE — 65270000029 HC RM PRIVATE

## 2020-01-15 PROCEDURE — 96374 THER/PROPH/DIAG INJ IV PUSH: CPT | Performed by: EMERGENCY MEDICINE

## 2020-01-15 PROCEDURE — 74011000258 HC RX REV CODE- 258: Performed by: EMERGENCY MEDICINE

## 2020-01-15 PROCEDURE — 93005 ELECTROCARDIOGRAM TRACING: CPT | Performed by: EMERGENCY MEDICINE

## 2020-01-15 PROCEDURE — 80053 COMPREHEN METABOLIC PANEL: CPT

## 2020-01-15 PROCEDURE — 87040 BLOOD CULTURE FOR BACTERIA: CPT

## 2020-01-15 PROCEDURE — 85025 COMPLETE CBC W/AUTO DIFF WBC: CPT

## 2020-01-15 PROCEDURE — 74011000302 HC RX REV CODE- 302: Performed by: NURSE PRACTITIONER

## 2020-01-15 PROCEDURE — 74011250637 HC RX REV CODE- 250/637: Performed by: NURSE PRACTITIONER

## 2020-01-15 PROCEDURE — 74011250636 HC RX REV CODE- 250/636: Performed by: NURSE PRACTITIONER

## 2020-01-15 PROCEDURE — 99284 EMERGENCY DEPT VISIT MOD MDM: CPT | Performed by: EMERGENCY MEDICINE

## 2020-01-15 PROCEDURE — 94760 N-INVAS EAR/PLS OXIMETRY 1: CPT

## 2020-01-15 PROCEDURE — 87205 SMEAR GRAM STAIN: CPT

## 2020-01-15 PROCEDURE — 96375 TX/PRO/DX INJ NEW DRUG ADDON: CPT | Performed by: EMERGENCY MEDICINE

## 2020-01-15 PROCEDURE — 82962 GLUCOSE BLOOD TEST: CPT

## 2020-01-15 PROCEDURE — 74011250636 HC RX REV CODE- 250/636: Performed by: EMERGENCY MEDICINE

## 2020-01-15 RX ORDER — ONDANSETRON 2 MG/ML
4 INJECTION INTRAMUSCULAR; INTRAVENOUS
Status: COMPLETED | OUTPATIENT
Start: 2020-01-15 | End: 2020-01-15

## 2020-01-15 RX ORDER — CYCLOBENZAPRINE HCL 10 MG
5 TABLET ORAL
Status: DISCONTINUED | OUTPATIENT
Start: 2020-01-15 | End: 2020-01-22 | Stop reason: HOSPADM

## 2020-01-15 RX ORDER — METOCLOPRAMIDE HYDROCHLORIDE 5 MG/ML
10 INJECTION INTRAMUSCULAR; INTRAVENOUS
Status: ACTIVE | OUTPATIENT
Start: 2020-01-15 | End: 2020-01-16

## 2020-01-15 RX ORDER — MORPHINE SULFATE 2 MG/ML
4 INJECTION, SOLUTION INTRAMUSCULAR; INTRAVENOUS
Status: DISCONTINUED | OUTPATIENT
Start: 2020-01-15 | End: 2020-01-16 | Stop reason: SDUPTHER

## 2020-01-15 RX ORDER — HYOSCYAMINE SULFATE 0.12 MG/1
0.12 TABLET SUBLINGUAL
Status: DISCONTINUED | OUTPATIENT
Start: 2020-01-15 | End: 2020-01-22 | Stop reason: HOSPADM

## 2020-01-15 RX ORDER — SODIUM CHLORIDE 0.9 % (FLUSH) 0.9 %
5-40 SYRINGE (ML) INJECTION EVERY 8 HOURS
Status: DISCONTINUED | OUTPATIENT
Start: 2020-01-15 | End: 2020-01-22 | Stop reason: HOSPADM

## 2020-01-15 RX ORDER — MORPHINE SULFATE 4 MG/ML
4 INJECTION INTRAVENOUS
Status: COMPLETED | OUTPATIENT
Start: 2020-01-15 | End: 2020-01-15

## 2020-01-15 RX ORDER — DILTIAZEM HYDROCHLORIDE 120 MG/1
240 CAPSULE, COATED, EXTENDED RELEASE ORAL DAILY
Status: DISCONTINUED | OUTPATIENT
Start: 2020-01-16 | End: 2020-01-22 | Stop reason: HOSPADM

## 2020-01-15 RX ORDER — LORAZEPAM 2 MG/ML
1 INJECTION INTRAMUSCULAR
Status: DISCONTINUED | OUTPATIENT
Start: 2020-01-15 | End: 2020-01-22 | Stop reason: HOSPADM

## 2020-01-15 RX ORDER — NALOXONE HYDROCHLORIDE 0.4 MG/ML
0.4 INJECTION, SOLUTION INTRAMUSCULAR; INTRAVENOUS; SUBCUTANEOUS AS NEEDED
Status: DISCONTINUED | OUTPATIENT
Start: 2020-01-15 | End: 2020-01-22 | Stop reason: HOSPADM

## 2020-01-15 RX ORDER — TAMSULOSIN HYDROCHLORIDE 0.4 MG/1
0.4 CAPSULE ORAL DAILY
Status: DISCONTINUED | OUTPATIENT
Start: 2020-01-16 | End: 2020-01-22 | Stop reason: HOSPADM

## 2020-01-15 RX ORDER — SODIUM CHLORIDE 0.9 % (FLUSH) 0.9 %
5-40 SYRINGE (ML) INJECTION AS NEEDED
Status: DISCONTINUED | OUTPATIENT
Start: 2020-01-15 | End: 2020-01-22 | Stop reason: HOSPADM

## 2020-01-15 RX ORDER — FAMOTIDINE 20 MG/1
40 TABLET, FILM COATED ORAL 2 TIMES DAILY
Status: DISCONTINUED | OUTPATIENT
Start: 2020-01-15 | End: 2020-01-22 | Stop reason: HOSPADM

## 2020-01-15 RX ORDER — MORPHINE SULFATE 2 MG/ML
4 INJECTION, SOLUTION INTRAMUSCULAR; INTRAVENOUS
Status: DISCONTINUED | OUTPATIENT
Start: 2020-01-15 | End: 2020-01-22 | Stop reason: HOSPADM

## 2020-01-15 RX ORDER — ONDANSETRON 2 MG/ML
4 INJECTION INTRAMUSCULAR; INTRAVENOUS
Status: DISCONTINUED | OUTPATIENT
Start: 2020-01-15 | End: 2020-01-15 | Stop reason: SDUPTHER

## 2020-01-15 RX ORDER — ALLOPURINOL 300 MG/1
300 TABLET ORAL DAILY
Status: DISCONTINUED | OUTPATIENT
Start: 2020-01-16 | End: 2020-01-22 | Stop reason: HOSPADM

## 2020-01-15 RX ORDER — ACETAMINOPHEN 325 MG/1
650 TABLET ORAL
Status: DISCONTINUED | OUTPATIENT
Start: 2020-01-15 | End: 2020-01-22 | Stop reason: HOSPADM

## 2020-01-15 RX ORDER — LISINOPRIL 20 MG/1
40 TABLET ORAL DAILY
Status: DISCONTINUED | OUTPATIENT
Start: 2020-01-16 | End: 2020-01-22 | Stop reason: HOSPADM

## 2020-01-15 RX ORDER — FUROSEMIDE 40 MG/1
40 TABLET ORAL DAILY
Status: DISCONTINUED | OUTPATIENT
Start: 2020-01-16 | End: 2020-01-22 | Stop reason: HOSPADM

## 2020-01-15 RX ORDER — ONDANSETRON 2 MG/ML
4 INJECTION INTRAMUSCULAR; INTRAVENOUS
Status: DISCONTINUED | OUTPATIENT
Start: 2020-01-15 | End: 2020-01-22 | Stop reason: HOSPADM

## 2020-01-15 RX ORDER — VANCOMYCIN 2 GRAM/500 ML IN 0.9 % SODIUM CHLORIDE INTRAVENOUS
2000 EVERY 12 HOURS
Status: DISCONTINUED | OUTPATIENT
Start: 2020-01-16 | End: 2020-01-16

## 2020-01-15 RX ORDER — OXYCODONE HYDROCHLORIDE 5 MG/1
10 TABLET ORAL
Status: DISCONTINUED | OUTPATIENT
Start: 2020-01-15 | End: 2020-01-16

## 2020-01-15 RX ADMIN — CYCLOBENZAPRINE 5 MG: 10 TABLET, FILM COATED ORAL at 21:56

## 2020-01-15 RX ADMIN — TUBERCULIN PURIFIED PROTEIN DERIVATIVE 5 UNITS: 5 INJECTION, SOLUTION INTRADERMAL at 23:25

## 2020-01-15 RX ADMIN — PIPERACILLIN AND TAZOBACTAM 4.5 G: 4; .5 INJECTION, POWDER, FOR SOLUTION INTRAVENOUS at 17:45

## 2020-01-15 RX ADMIN — ONDANSETRON 4 MG: 2 INJECTION INTRAMUSCULAR; INTRAVENOUS at 20:23

## 2020-01-15 RX ADMIN — VANCOMYCIN HYDROCHLORIDE 2500 MG: 10 INJECTION, POWDER, LYOPHILIZED, FOR SOLUTION INTRAVENOUS at 17:46

## 2020-01-15 RX ADMIN — ONDANSETRON 4 MG: 2 INJECTION INTRAMUSCULAR; INTRAVENOUS at 17:19

## 2020-01-15 RX ADMIN — FAMOTIDINE 40 MG: 20 TABLET, FILM COATED ORAL at 20:20

## 2020-01-15 RX ADMIN — MORPHINE SULFATE 4 MG: 4 INJECTION INTRAVENOUS at 17:19

## 2020-01-15 RX ADMIN — Medication 10 ML: at 22:01

## 2020-01-15 NOTE — PROGRESS NOTES
TRANSFER - IN REPORT:    Verbal report received from [facility]) on [employer]  being received from ER(unit) for routine progression of care      Report consisted of patients Situation, Background, Assessment and   Recommendations(SBAR). Information from the following report(s) SBAR was reviewed with the receiving nurse. Opportunity for questions and clarification was provided. Assessment completed upon patients arrival to unit and care assumed. Report given to Richie Knight RN.

## 2020-01-15 NOTE — H&P
Hospitalist Admission History and Physical     CHIEF COMPLAINT:  Acute on chronic right lower extremity cellulitis with weeping    Subjective:   Patient is a morbidly obese, unkempt, chronically ill appearing  53 y.o.  male who presents to ER today with long standing right lower extremity chronic stasis dermatitis under care of wound clinic and vascular surgery. .  Initial injury was being struck with a rock in April of last year while mowing. Had problems since with underlying vascular disease and chronic bilateral lower extremity lymphedema and stasis dermatitis. Chronic wound with admission for cellulitis in November of last year, wound grew MSSA/citrobacter/proteus . At one point, wound involved muscle necrosis. While he was hospitalized, he developed PAFand placed on cardizem and xaralto. Spontaneously converted to SR. He was cleared to return to work at [employer] 12/5, was doing well with all open areas scabbed over when pain, beefy erythema and weeping began to re-occur 2 days ago with sudden onset gush of clear fluid while at work today, soaking dressing and filling shoe. Wound not viewed by me as RNs dressed same just prior to my arrival.  foot is beefy red and grossly edematous. Erythema extends proximally to just below knee. Afebrile, does not appear septic. Additionally, 14 yo son has had N/V/D illness with patient reporting 2 day history of nausea and diarrhea. Additional history as noted below.      Past Medical History:   Diagnosis Date    Anxiety     Cellulitis of leg 11/17/2019    Coagulopathy ([facility] [address] 1/15/2020    Taking xaralto     Erectile dysfunction 1/20/2014    DIANA (generalized anxiety disorder) 5/7/2014    Gout 1/20/2014    History of gout 1/20/2014    History of peptic ulcer     HTN (hypertension) 3/16/2015    Hypertension     Obesity, morbid ([address] 2/26/2018    Osteoarthritis of hand, primary localized 1/20/2014    Personal history of urinary calculi     x2    Venous (peripheral) insufficiency 12/3/2019    Wart 1/20/2014      Past Surgical History:   Procedure Laterality Date    HX WISDOM TEETH EXTRACTION        History reviewed. No pertinent family history. Social History     Patient does not qualify to have social determinant information on file (likely too young). Social History Narrative    1/15/20:  PATIENT IS  TO [facility], THEY HAVE A 13YEAR OLD SON. 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 Social History     Substance and Sexual Activity   Drug Use No     Allergies   Allergen Reactions    Codeine Nausea Only    Sulfa (Sulfonamide Antibiotics) Rash     Prior to Admission medications    Medication Sig Start Date End Date Taking? Authorizing Provider   acetaminophen (TYLENOL) 500 mg tablet Take  by mouth every six (6) hours as needed for Pain. Provider, Historical   famotidine (PEPCID) 40 mg tablet Take 1 Tab by mouth two (2) times a day. 12/3/19   Dean Alvarenga MD   dilTIAZem CD (CARDIZEM CD) 240 mg ER capsule Take 1 Cap by mouth daily. 11/21/19   Julienne Duke MD   tamsulosin (FLOMAX) 0.4 mg capsule Take 1 Cap by mouth daily. 11/21/19   Julienne Duke MD   sertraline (ZOLOFT) 50 mg tablet Take 1 Tab by mouth daily. Patient taking differently: Take 75 mg by mouth daily. 11/21/19   Julienne Duke MD   furosemide (LASIX) 40 mg tablet Take 1 Tab by mouth daily. 11/20/19   Julienne Duke MD   colchicine 0.6 mg tablet Take 1 Tab by mouth daily. Patient taking differently: Take 0.6 mg by mouth daily. Takes for flair ups only 11/20/19   Julienne Duke MD   lisinopril (PRINIVIL, ZESTRIL) 40 mg tablet Take 1 Tab by mouth daily. 11/20/19   Julienne Duke MD   allopurinol (ZYLOPRIM) 300 mg tablet Take 1 Tab by mouth daily. 11/20/19   Julienne Duke MD   rivaroxaban (XARELTO) 20 mg tab tablet Take 1 Tab by mouth daily (with dinner).  11/20/19   Julienne Duke MD cyclobenzaprine (FLEXERIL) 5 mg tablet Take 1 Tab by mouth nightly. 11/11/19   Danna Gonzalez MD   ondansetron (ZOFRAN ODT) 4 mg disintegrating tablet Take 1 Tab by mouth every eight (8) hours as needed for Nausea. 1/18/19   Renea Rubin MD     HOME MEDS REVIEWED AND RECONCILED BY ME     PHP:  Danna Gonzalez MD  CARDIOLOGY:  [facility]     Review of Systems  A comprehensive review of systems was negative except for that written in the HPI. Objective:     Visit Vitals  /69   Pulse 82   Temp 97.7 °F (36.5 °C)   Resp 16   Ht 6' 4\" (1.93 m)   Wt 138.3 kg (305 lb)   SpO2 97%   BMI 37.13 kg/m²      Data Review:   Recent Results (from the past 24 hour(s))   CBC WITH AUTOMATED DIFF    Collection Time: 01/15/20  3:54 PM   Result Value Ref Range    WBC 10.7 4.3 - 11.1 K/uL    RBC 4.35 4.23 - 5.6 M/uL    HGB 15.0 13.6 - 17.2 g/dL    HCT 44.3 41.1 - 50.3 %    .8 (H) 79.6 - 97.8 FL    MCH 34.5 (H) 26.1 - 32.9 PG    MCHC 33.9 31.4 - 35.0 g/dL    RDW 13.2 11.9 - 14.6 %    PLATELET 504 172 - 648 K/uL    MPV 10.8 9.4 - 12.3 FL    ABSOLUTE NRBC 0.00 0.0 - 0.2 K/uL    DF AUTOMATED      NEUTROPHILS 77 43 - 78 %    LYMPHOCYTES 12 (L) 13 - 44 %    MONOCYTES 8 4.0 - 12.0 %    EOSINOPHILS 2 0.5 - 7.8 %    BASOPHILS 0 0.0 - 2.0 %    IMMATURE GRANULOCYTES 0 0.0 - 5.0 %    ABS. NEUTROPHILS 8.3 (H) 1.7 - 8.2 K/UL    ABS. LYMPHOCYTES 1.3 0.5 - 4.6 K/UL    ABS. MONOCYTES 0.8 0.1 - 1.3 K/UL    ABS. EOSINOPHILS 0.2 0.0 - 0.8 K/UL    ABS. BASOPHILS 0.0 0.0 - 0.2 K/UL    ABS. IMM.  GRANS. 0.0 0.0 - 0.5 K/UL   METABOLIC PANEL, COMPREHENSIVE    Collection Time: 01/15/20  3:54 PM   Result Value Ref Range    Sodium 140 136 - 145 mmol/L    Potassium 4.2 3.5 - 5.1 mmol/L    Chloride 108 (H) 98 - 107 mmol/L    CO2 28 21 - 32 mmol/L    Anion gap 4 (L) 7 - 16 mmol/L    Glucose 96 65 - 100 mg/dL    BUN 11 6 - 23 MG/DL    Creatinine 0.72 (L) 0.8 - 1.5 MG/DL    GFR est AA >60 >60 ml/min/1.73m2    GFR est non-AA >60 >60 ml/min/1.73m2    Calcium 9.7 8.3 - 10.4 MG/DL    Bilirubin, total 0.7 0.2 - 1.1 MG/DL    ALT (SGPT) 18 12 - 65 U/L    AST (SGOT) 17 15 - 37 U/L    Alk. phosphatase 96 50 - 136 U/L    Protein, total 7.4 6.3 - 8.2 g/dL    Albumin 3.6 3.5 - 5.0 g/dL    Globulin 3.8 (H) 2.3 - 3.5 g/dL    A-G Ratio 0.9 (L) 1.2 - 3.5     C REACTIVE PROTEIN, QT    Collection Time: 01/15/20  3:54 PM   Result Value Ref Range    C-Reactive protein 3.4 (H) 0.0 - 0.9 mg/dL   POC LACTIC ACID    Collection Time: 01/15/20  5:40 PM   Result Value Ref Range    Lactic Acid (POC) 0.90 0.5 - 1.9 mmol/L   GLUCOSE, POC    Collection Time: 01/15/20  6:14 PM   Result Value Ref Range    Glucose (POC) 81 65 - 100 mg/dL     Old records reviewed. PHYSICAL EXAM:  General appearance: Morbidly obese, unkempt. Oriented and alert, cooperative, does not appear septic. Head: Normocephalic, without obvious abnormality, atraumatic  Eyes: conjunctivae/corneas clear. PERRL  Throat: Lips, mucosa, and tongue normal. Teeth and gums normal  Neck: supple, symmetrical, trachea midline, and no JVD  Lungs: clear to auscultation bilaterally, diminished in bases  Heart: regular rate and rhythm, S1, S2 normal, no murmur, click, rub or gallop  Abdomen: Protuberant, soft, non-tender. Bowel sounds normal. No masses,  no organomegaly  Extremities: Right leg as above. Left leg with chronic expansive edema with mild erythema, no wound, no weeping. Skin thickened. Upper extremities normal, atraumatic, no cyanosis or edema  Skin: Skin color, texture, turgor normal other than noted.  No additional rashes or lesions  Neurologic: Grossly normal    Assessment/Plan:   Cellulitis of right leg:  Hospitalization 11/19 for same    Blood and wound cultures ordered   Continue zosyn begun in ER    Wound care consult    ID and vascular surgery consults   Monitor distal circulation    PT, OT, CM consults    Doppler in am    Analgesics     Chronic venous hypertension with ulcer and inflammation involving right side under care of Vascular surgery    Underlying lymphedema     PAD/PVD:  Taking xaralto at home    Will begin heparin for DVT prophy and hold xaralto  in case of intervention, reinstate after plan in place    RIght LE doppler in am    Nausea and diarrhea: exposed to Infectious gastritis   Stool cultures if diarrhea continues   Antiemetics      History of PAF on xaralto and cardizem   Currently in SR   Remote telemetry   Holding xaralto, reinstate ASAP    EF:  55%    History of gout:  Continue home allopurinol    Uric acid level in am     DIANA:  Continue home meds    HTN:  Continue home meds    Obesity, morbid     Coagulopathy on xaralto:  Holding as noted above. Discussed plans and findings with patient and Dr Carmencita Diaz, all in agreement with plan of care.      Signed By: Dioni Samano NP     January 15, 2020

## 2020-01-15 NOTE — ED NOTES
TRANSFER - OUT REPORT:    Verbal report given to Ericka Ma RN(name) on [employer]  being transferred to [id number] (unit) for routine progression of care       Report consisted of patients Situation, Background, Assessment and   Recommendations(SBAR). Information from the following report(s) SBAR was reviewed with the receiving nurse. Lines:   Peripheral IV 01/15/20 Left Antecubital (Active)       Peripheral IV 01/15/20 Left Hand (Active)   Site Assessment Clean, dry, & intact 1/15/2020  5:46 PM   Phlebitis Assessment 0 1/15/2020  5:46 PM   Infiltration Assessment 0 1/15/2020  5:46 PM   Dressing Status Clean, dry, & intact 1/15/2020  5:46 PM   Hub Color/Line Status Pink 1/15/2020  5:46 PM        Opportunity for questions and clarification was provided.

## 2020-01-15 NOTE — PROGRESS NOTES
responded to request by pt for care. Pt has many things on his plate, health, familial, amongst or things. Pt expressed gratefulness for care received from Alia. Pt is leaning into his alyce during this time.  offered prayer with Pt twice. Pt offered his own prayer and this seemed to help calm him down. No additional needs at this time. Please consult Spiritual Care as needed. Cuco [employer] Kimberly.

## 2020-01-15 NOTE — ED TRIAGE NOTES
Patient reports cellulitis to right leg. Hospitalization in November for this. Wearing boot to triage.  Patient also reports nausea since today

## 2020-01-15 NOTE — ED PROVIDER NOTES
[facility] Emergency Department  Arrival Date/Time: 1/15/2020 @  3:53 PM      [address]  MRN: [id number]      53 y.o. male    YOB: 1965   [phone] (home)     [facility] EMERGENCY DEPT ER10/10  Seen on 1/15/2020 @ 4:28 PM      Today's Chief Complaint:   Chief Complaint   Patient presents with    Wound Check     HPI: 50-year-old male presents to the emergency department with worsening redness swelling drainage of the right lower extremity    Patient has chronic wound. He was hit with a rock while mowing the lawn. Has had problems since then. Has some pre-existing lymphedema of the right lower extremity    He seen vascular. He seen wound care    He was admitted in November of last year was on IV antibiotics and went home. Was being seen by home health until recently when he went back to work. He works in the [employer] on [employer] comes in today with increased pain swelling redness and drainage. There is a large amount of purulent material in the patient's cast shoe. His bandages are soaked through. The right lower extremity is beefy red tender swollen and weeping   HPI    Review of Systems: Review of Systems   Constitutional: Negative. HENT: Negative. Respiratory: Negative. Negative for shortness of breath. Cardiovascular: Negative. Gastrointestinal: Negative. Negative for nausea and vomiting. Musculoskeletal: Negative. Skin: Positive for rash and wound. Neurological: Negative. Psychiatric/Behavioral: Negative. Past Medical History: Primary Care Doctor: Brandan Walden MD  Meds, PMH, PSHx, SocHx at end of this note     Allergies: Allergies   Allergen Reactions    Codeine Nausea Only    Sulfa (Sulfonamide Antibiotics) Rash         Patel Anti-Platelet Anticoagulant Meds             rivaroxaban (XARELTO) 20 mg tab tablet Take 1 Tab by mouth daily (with dinner).           Physical Exam:  Nursing documentation reviewed. Patient Vitals for the past 24 hrs:   Temp Pulse Resp BP SpO2   01/15/20 1552 97.7 °F (36.5 °C) 90 16 133/83 95 %     Oxygen Therapy  O2 Sat (%): 95 % (01/15/20 1552)  O2 Device: Room air (01/15/20 1552) Vital signs were reviewed. Physical Exam  Vitals signs and nursing note reviewed. Constitutional:       Appearance: Normal appearance. HENT:      Head: Normocephalic and atraumatic. Mouth/Throat:      Mouth: Mucous membranes are moist.   Neck:      Musculoskeletal: Normal range of motion. Cardiovascular:      Rate and Rhythm: Normal rate and regular rhythm. Pulses: Normal pulses. Heart sounds: Normal heart sounds. Pulmonary:      Effort: No respiratory distress. Breath sounds: Normal breath sounds. Skin:     Capillary Refill: Capillary refill takes less than 2 seconds. Neurological:      Mental Status: He is alert and oriented to person, place, and time. Psychiatric:         Mood and Affect: Mood normal.                         MEDICAL DECISION MAKING:   Differential Diagnosis:    MDM  Number of Diagnoses or Management Options  Diagnosis management comments: 79-year-old male history of right lower extremity wound chronic lymphedema    Presents with increased pain swelling redness    Please see the pictures    Patient is leg is beefy red indurated tender with some satellite lesions    His foot is swollen the skin is thickened. Has the appearance of being soaked in water. The wound itself smelled sickly sweet.        Amount and/or Complexity of Data Reviewed  Clinical lab tests: ordered and reviewed  Tests in the radiology section of CPT®: ordered and reviewed  Decide to obtain previous medical records or to obtain history from someone other than the patient: yes  Review and summarize past medical records: yes  Discuss the patient with other providers: yes  Independent visualization of images, tracings, or specimens: yes    Risk of Complications, Morbidity, and/or Mortality  Presenting problems: high  Diagnostic procedures: minimal  Management options: moderate          Data/Management:    Lab findings during this visit:   Recent Results (from the past 48 hour(s))   CBC WITH AUTOMATED DIFF    Collection Time: 01/15/20  3:54 PM   Result Value Ref Range    WBC 10.7 4.3 - 11.1 K/uL    RBC 4.35 4.23 - 5.6 M/uL    HGB 15.0 13.6 - 17.2 g/dL    HCT 44.3 41.1 - 50.3 %    .8 (H) 79.6 - 97.8 FL    MCH 34.5 (H) 26.1 - 32.9 PG    MCHC 33.9 31.4 - 35.0 g/dL    RDW 13.2 11.9 - 14.6 %    PLATELET 954 785 - 125 K/uL    MPV 10.8 9.4 - 12.3 FL    ABSOLUTE NRBC 0.00 0.0 - 0.2 K/uL    DF AUTOMATED      NEUTROPHILS 77 43 - 78 %    LYMPHOCYTES 12 (L) 13 - 44 %    MONOCYTES 8 4.0 - 12.0 %    EOSINOPHILS 2 0.5 - 7.8 %    BASOPHILS 0 0.0 - 2.0 %    IMMATURE GRANULOCYTES 0 0.0 - 5.0 %    ABS. NEUTROPHILS 8.3 (H) 1.7 - 8.2 K/UL    ABS. LYMPHOCYTES 1.3 0.5 - 4.6 K/UL    ABS. MONOCYTES 0.8 0.1 - 1.3 K/UL    ABS. EOSINOPHILS 0.2 0.0 - 0.8 K/UL    ABS. BASOPHILS 0.0 0.0 - 0.2 K/UL    ABS. IMM. GRANS. 0.0 0.0 - 0.5 K/UL   METABOLIC PANEL, COMPREHENSIVE    Collection Time: 01/15/20  3:54 PM   Result Value Ref Range    Sodium 140 136 - 145 mmol/L    Potassium 4.2 3.5 - 5.1 mmol/L    Chloride 108 (H) 98 - 107 mmol/L    CO2 28 21 - 32 mmol/L    Anion gap 4 (L) 7 - 16 mmol/L    Glucose 96 65 - 100 mg/dL    BUN 11 6 - 23 MG/DL    Creatinine 0.72 (L) 0.8 - 1.5 MG/DL    GFR est AA >60 >60 ml/min/1.73m2    GFR est non-AA >60 >60 ml/min/1.73m2    Calcium 9.7 8.3 - 10.4 MG/DL    Bilirubin, total PENDING MG/DL    ALT (SGPT) PENDING U/L    AST (SGOT) 17 15 - 37 U/L    Alk. phosphatase PENDING U/L    Protein, total PENDING g/dL    Albumin 3.6 3.5 - 5.0 g/dL    Globulin PENDING g/dL    A-G Ratio PENDING         Radiology studies during this visit: No results found.     Medications given in the ED: Medications - No data to display    Recheck and Additional Documentation:  (use .addrecheck .addsepsis   . addstroke   . addhip  . addhandoff  . addcctime)     58-year-old male with cellulitis, possibly a yeast cellulitis. Worsening. States he lives at home with his wife. Has been changing his dressings. Dressings are removed had not been changed in some time. They were soaked with drainage. Foul-smelling    White count is minimally elevated. He is not febrile. Although he reports chills at home. Procedure Documentation:   Procedures     Other ED Course Notes:        Assessment and Plan:    Impression: No diagnosis found. Disposition:    Follow-up:   Follow-up Information    None       DC Med:   Current Discharge Medication List          Past Medical History:      Past Medical History:   Diagnosis Date    Anxiety     Erectile dysfunction 2014    Gout 2014    History of peptic ulcer     Hypertension     Osteoarthritis of hand, primary localized 2014    Personal history of urinary calculi     x2    Wart 2014     Past Surgical History:   Procedure Laterality Date    HX WISDOM TEETH EXTRACTION       Social History     Tobacco Use    Smoking status: Former Smoker     Years: 5.00     Last attempt to quit: 2011     Years since quittin.4    Smokeless tobacco: Never Used   Substance Use Topics    Alcohol use: No    Drug use: No     Prior to Admission Medications   Prescriptions Last Dose Informant Patient Reported? Taking?   acetaminophen (TYLENOL) 500 mg tablet   Yes No   Sig: Take  by mouth every six (6) hours as needed for Pain. allopurinol (ZYLOPRIM) 300 mg tablet   No No   Sig: Take 1 Tab by mouth daily. clobetasol (TEMOVATE) 0.05 % ointment   No No   Sig: Apply  to affected area two (2) times a day. colchicine 0.6 mg tablet   No No   Sig: Take 1 Tab by mouth daily. cyclobenzaprine (FLEXERIL) 5 mg tablet   No No   Sig: Take 1 Tab by mouth nightly. dilTIAZem CD (CARDIZEM CD) 240 mg ER capsule   No No   Sig: Take 1 Cap by mouth daily.    famotidine (PEPCID) 40 mg tablet   No No   Sig: Take 1 Tab by mouth two (2) times a day. furosemide (LASIX) 40 mg tablet   No No   Sig: Take 1 Tab by mouth daily. lisinopril (PRINIVIL, ZESTRIL) 40 mg tablet   No No   Sig: Take 1 Tab by mouth daily. nystatin (MYCOSTATIN) powder   No No   Sig: Apply  to affected area two (2) times a day. ondansetron (ZOFRAN ODT) 4 mg disintegrating tablet   No No   Sig: Take 1 Tab by mouth every eight (8) hours as needed for Nausea. rivaroxaban (XARELTO) 20 mg tab tablet   No No   Sig: Take 1 Tab by mouth daily (with dinner). sertraline (ZOLOFT) 50 mg tablet   No No   Sig: Take 1 Tab by mouth daily. tamsulosin (FLOMAX) 0.4 mg capsule   No No   Sig: Take 1 Cap by mouth daily.       Facility-Administered Medications: None

## 2020-01-16 ENCOUNTER — APPOINTMENT (OUTPATIENT)
Dept: ULTRASOUND IMAGING | Age: 55
DRG: 603 | End: 2020-01-16
Payer: COMMERCIAL

## 2020-01-16 LAB
ALBUMIN SERPL-MCNC: 3 G/DL (ref 3.5–5)
ALBUMIN/GLOB SERPL: 0.9 {RATIO} (ref 1.2–3.5)
ALP SERPL-CCNC: 85 U/L (ref 50–136)
ALT SERPL-CCNC: 14 U/L (ref 12–65)
ANION GAP SERPL CALC-SCNC: 5 MMOL/L (ref 7–16)
AST SERPL-CCNC: 8 U/L (ref 15–37)
ATRIAL RATE: 53 BPM
BILIRUB SERPL-MCNC: 0.5 MG/DL (ref 0.2–1.1)
BUN SERPL-MCNC: 14 MG/DL (ref 6–23)
CALCIUM SERPL-MCNC: 9 MG/DL (ref 8.3–10.4)
CALCULATED P AXIS, ECG09: 57 DEGREES
CALCULATED R AXIS, ECG10: 50 DEGREES
CALCULATED T AXIS, ECG11: 50 DEGREES
CHLORIDE SERPL-SCNC: 110 MMOL/L (ref 98–107)
CO2 SERPL-SCNC: 26 MMOL/L (ref 21–32)
CREAT SERPL-MCNC: 1 MG/DL (ref 0.8–1.5)
DIAGNOSIS, 93000: NORMAL
GLOBULIN SER CALC-MCNC: 3.4 G/DL (ref 2.3–3.5)
GLUCOSE SERPL-MCNC: 88 MG/DL (ref 65–100)
MM INDURATION POC: 0 MM (ref 0–5)
P-R INTERVAL, ECG05: 200 MS
POTASSIUM SERPL-SCNC: 4 MMOL/L (ref 3.5–5.1)
PPD POC: NEGATIVE NEGATIVE
PROT SERPL-MCNC: 6.4 G/DL (ref 6.3–8.2)
PSA SERPL-MCNC: 0.3 NG/ML
Q-T INTERVAL, ECG07: 426 MS
QRS DURATION, ECG06: 122 MS
QTC CALCULATION (BEZET), ECG08: 399 MS
SODIUM SERPL-SCNC: 141 MMOL/L (ref 136–145)
URATE SERPL-MCNC: 5.2 MG/DL (ref 2.6–6)
VENTRICULAR RATE, ECG03: 53 BPM

## 2020-01-16 PROCEDURE — 74011000258 HC RX REV CODE- 258: Performed by: INTERNAL MEDICINE

## 2020-01-16 PROCEDURE — 97161 PT EVAL LOW COMPLEX 20 MIN: CPT

## 2020-01-16 PROCEDURE — 36415 COLL VENOUS BLD VENIPUNCTURE: CPT

## 2020-01-16 PROCEDURE — 84550 ASSAY OF BLOOD/URIC ACID: CPT

## 2020-01-16 PROCEDURE — 80053 COMPREHEN METABOLIC PANEL: CPT

## 2020-01-16 PROCEDURE — 74011250637 HC RX REV CODE- 250/637: Performed by: INTERNAL MEDICINE

## 2020-01-16 PROCEDURE — 97116 GAIT TRAINING THERAPY: CPT

## 2020-01-16 PROCEDURE — 93971 EXTREMITY STUDY: CPT

## 2020-01-16 PROCEDURE — 74011250636 HC RX REV CODE- 250/636: Performed by: INTERNAL MEDICINE

## 2020-01-16 PROCEDURE — 74011000258 HC RX REV CODE- 258: Performed by: NURSE PRACTITIONER

## 2020-01-16 PROCEDURE — 65660000000 HC RM CCU STEPDOWN

## 2020-01-16 PROCEDURE — 74011250636 HC RX REV CODE- 250/636: Performed by: NURSE PRACTITIONER

## 2020-01-16 PROCEDURE — 84153 ASSAY OF PSA TOTAL: CPT

## 2020-01-16 PROCEDURE — 74011250637 HC RX REV CODE- 250/637: Performed by: NURSE PRACTITIONER

## 2020-01-16 RX ORDER — HEPARIN SODIUM 5000 [USP'U]/ML
5000 INJECTION, SOLUTION INTRAVENOUS; SUBCUTANEOUS EVERY 8 HOURS
Status: DISCONTINUED | OUTPATIENT
Start: 2020-01-16 | End: 2020-01-17

## 2020-01-16 RX ORDER — OXYCODONE HYDROCHLORIDE 5 MG/1
5 TABLET ORAL
Status: DISCONTINUED | OUTPATIENT
Start: 2020-01-16 | End: 2020-01-22 | Stop reason: HOSPADM

## 2020-01-16 RX ORDER — FLUCONAZOLE 100 MG/1
200 TABLET ORAL DAILY
Status: DISCONTINUED | OUTPATIENT
Start: 2020-01-17 | End: 2020-01-21

## 2020-01-16 RX ORDER — CIPROFLOXACIN 500 MG/1
750 TABLET ORAL EVERY 12 HOURS
Status: DISCONTINUED | OUTPATIENT
Start: 2020-01-16 | End: 2020-01-22 | Stop reason: HOSPADM

## 2020-01-16 RX ORDER — FLUCONAZOLE 100 MG/1
400 TABLET ORAL DAILY
Status: COMPLETED | OUTPATIENT
Start: 2020-01-16 | End: 2020-01-16

## 2020-01-16 RX ADMIN — OXYCODONE HYDROCHLORIDE 5 MG: 5 TABLET ORAL at 10:07

## 2020-01-16 RX ADMIN — FAMOTIDINE 40 MG: 20 TABLET, FILM COATED ORAL at 10:07

## 2020-01-16 RX ADMIN — SODIUM CHLORIDE 1000 MG: 900 INJECTION, SOLUTION INTRAVENOUS at 21:15

## 2020-01-16 RX ADMIN — SERTRALINE HYDROCHLORIDE 75 MG: 50 TABLET ORAL at 10:08

## 2020-01-16 RX ADMIN — CYCLOBENZAPRINE 5 MG: 10 TABLET, FILM COATED ORAL at 21:16

## 2020-01-16 RX ADMIN — PIPERACILLIN AND TAZOBACTAM 3.38 G: 3; .375 INJECTION, POWDER, FOR SOLUTION INTRAVENOUS at 11:51

## 2020-01-16 RX ADMIN — FLUCONAZOLE 400 MG: 100 TABLET ORAL at 14:07

## 2020-01-16 RX ADMIN — Medication 10 ML: at 05:31

## 2020-01-16 RX ADMIN — ONDANSETRON 4 MG: 2 INJECTION INTRAMUSCULAR; INTRAVENOUS at 21:16

## 2020-01-16 RX ADMIN — HEPARIN SODIUM 5000 UNITS: 5000 INJECTION INTRAVENOUS; SUBCUTANEOUS at 05:31

## 2020-01-16 RX ADMIN — ACETAMINOPHEN 650 MG: 325 TABLET, FILM COATED ORAL at 06:38

## 2020-01-16 RX ADMIN — FUROSEMIDE 40 MG: 40 TABLET ORAL at 10:06

## 2020-01-16 RX ADMIN — ACETAMINOPHEN 650 MG: 325 TABLET, FILM COATED ORAL at 21:16

## 2020-01-16 RX ADMIN — SODIUM CHLORIDE 1000 MG: 900 INJECTION, SOLUTION INTRAVENOUS at 14:03

## 2020-01-16 RX ADMIN — PIPERACILLIN AND TAZOBACTAM 3.38 G: 3; .375 INJECTION, POWDER, FOR SOLUTION INTRAVENOUS at 04:15

## 2020-01-16 RX ADMIN — Medication 10 ML: at 21:26

## 2020-01-16 RX ADMIN — DILTIAZEM HYDROCHLORIDE 240 MG: 120 CAPSULE, COATED, EXTENDED RELEASE ORAL at 10:07

## 2020-01-16 RX ADMIN — FAMOTIDINE 40 MG: 20 TABLET, FILM COATED ORAL at 18:19

## 2020-01-16 RX ADMIN — HEPARIN SODIUM 5000 UNITS: 5000 INJECTION INTRAVENOUS; SUBCUTANEOUS at 21:16

## 2020-01-16 RX ADMIN — HEPARIN SODIUM 5000 UNITS: 5000 INJECTION INTRAVENOUS; SUBCUTANEOUS at 14:08

## 2020-01-16 RX ADMIN — CIPROFLOXACIN HYDROCHLORIDE 750 MG: 500 TABLET, FILM COATED ORAL at 14:07

## 2020-01-16 RX ADMIN — CIPROFLOXACIN HYDROCHLORIDE 750 MG: 500 TABLET, FILM COATED ORAL at 21:15

## 2020-01-16 RX ADMIN — Medication 10 ML: at 14:08

## 2020-01-16 RX ADMIN — TAMSULOSIN HYDROCHLORIDE 0.4 MG: 0.4 CAPSULE ORAL at 10:08

## 2020-01-16 RX ADMIN — ACETAMINOPHEN 650 MG: 325 TABLET, FILM COATED ORAL at 02:24

## 2020-01-16 RX ADMIN — ALLOPURINOL 300 MG: 300 TABLET ORAL at 10:08

## 2020-01-16 NOTE — WOUND CARE
Patient has chronic venous reflux disease, has been offered some vascular procedures to hopefully ease this and has not had time/ funds to accomplish as of yet. The edema and weeping will likely be chronic until or IF a procedure can be done to help with venous return. Elevating leg with compression is likely the only things that will help until then. Discussed at length with patient, he is willing to proceed, however his socioeconomic issues are obstacles. Patient told me that the ulcers nearly healed and the erythema and improved greatly when Home health signed off, he and his wife were changing the bandage, they explained the steps they were taking and I do believe they were doing a good job. Patient then started back to work and was on his feet for hours at a time without ability to elevate his legs. Today the erythema is mixed with a maculopapular rash and concerns for yeast/ fungus infection possibly with bacterial infection, noted ID following and antibiotics on board. Recommend daily cleansing with dermal wound , applying antifungal ointment to all areas, even webbed spaces between toes, cover open areas with xeroform and ABD and wrap with kerlex. Will add compression as infection/ erythema begin to resolve. Patient should elevate leg >30 degrees at all times when in bed and at least 2 pillows in recliner. Patient voiced concerns for boot that was made at orthotist for walking that was soiled and discarded by ER per patient report, notified NP Georgia Tatum of patients request for a new boot. Will monitor and consider compression.

## 2020-01-16 NOTE — PROGRESS NOTES
01/15/20 1930   Dual Skin Pressure Injury Assessment   Dual Skin Pressure Injury Assessment X   Second Care Provider (Based on [address] [place] [address]) Art Brown RN   Foot Right  (Cellulitis)   Skin Integumentary   Skin Integumentary (WDL) X   Skin Color Red  (BLE)   Skin Condition/Temp Dry;Flaky; Other (comment)  (cellulitis right leg)   Skin Integrity Wound (add Wound LDA); Weeping  (right leg)   Turgor Non-tenting   Hair Growth Present   Varicosities Absent    Pressure  Injury Documentation No Pressure Injury Noted-Pressure Ulcer Prevention Initiated   Wound Prevention and Protection Methods   Orientation of Wound Prevention Posterior   Location of Wound Prevention Sacrum/Coccyx   Dressing Present  No   Wound Offloading (Prevention Methods) Bed, pressure reduction mattress   Pt has cellulitis to right lower leg from approx. Knee down to foot. Dressing c/d/i. Wound care consulted. No redness or breakdown noted to sacrum. Psoriasis spot below left knee.

## 2020-01-16 NOTE — PROGRESS NOTES
Problem: Mobility Impaired (Adult and Pediatric)  Goal: *Acute Goals and Plan of Care (Insert Text)  Description  Acute PT Goals:  1. Patient will transfer bed to chair with INDEPENDENCE within 7 days. 2. Patient will demonstrate GOOD DYNAMIC STANDING balance within 7 day(s). 3. Patient will ambulate 150+ feet using least restrictive assistive device and MODIFIED INDEPENDENCE within 7 days. 4. Patient will tolerate 25+ minutes of therapeutic activity/exercise and/or neuromuscular re-education while maintaining stable vitals to improve functional strength and activity tolerance within 7 days. Outcome: Progressing Towards Goal       PHYSICAL THERAPY: Initial Assessment, Daily Note, and AM 1/16/2020  INPATIENT: PT Visit Days : 1  Payor: Delena Boeck / Plan: [facility] [email or web address] [facility] / Product Type: PPO /       NAME/AGE/GENDER: Brittani Caldwell is a 53 y.o. male   PRIMARY DIAGNOSIS: Cellulitis of right leg [L03.115] Cellulitis of right leg Cellulitis of right leg        ICD-10: Treatment Diagnosis:    · Difficulty in walking, Not elsewhere classified (R26.2)   Precaution/Allergies:  Codeine and Sulfa (sulfonamide antibiotics)      ASSESSMENT:     Mr. Geovany Goel is a 53year old male admitted with cellulitis of R LE. Patient seen this AM for initial physical therapy evaluation: presents supine in bed, oriented x4, and endorses 7/10 R LE pain. Patient lives with his spouse/family in a single story residence with 2 steps to enter. At baseline, patient is independent with ADLs, ambulates community-level distances without utilizing an assistive device, and works at [employer] Today, B UE and L LE strength/AROM WFL, R LE AROM limited distally by pain and edema. Pitting edema L LE. Patient endorses has been WBAT with PRAFO boot from orthotist for off loading R LE; boot soiled on admission and will need new; RN notified stated  addressing.  Mr. Geovany Goel performed bed mobility with modified independence, transfers with SBA, and ambulation x5ft within room with SBA. Demonstrated a slow, step-to, antalgic gait pattern with fair dynamic standing balance. Patient limited by pain in R LE; additional ambulation deferred until [facility] boot arrives. Encouraged elevation of R LE; patient voiced understanding. Will follow for gait training and functional mobility. Mr. Ming Baeza presents with decreased functional mobility and balance/gait status from baseline. Recommend continued skilled PT services to address stated deficits. Will follow and progress toward stated goals during acute stay. This section established at most recent assessment   PROBLEM LIST (Impairments causing functional limitations):  1. Decreased Strength  2. Decreased ADL/Functional Activities  3. Decreased Transfer Abilities  4. Decreased Ambulation Ability/Technique  5. Decreased Balance  6. Increased Pain  7. Decreased Activity Tolerance  8. Decreased Flexibility/Joint Mobility  9. Decreased Knowledge of Precautions  10. Decreased Skin Integrity/Hygeine   INTERVENTIONS PLANNED: (Benefits and precautions of physical therapy have been discussed with the patient.)  1. Balance Exercise  2. Bed Mobility  3. Family Education  4. Gait Training  5. Group Therapy  6. Home Exercise Program (HEP)  7. Neuromuscular Re-education/Strengthening  8. Range of Motion (ROM)  9. Therapeutic Activites  10. Therapeutic Exercise/Strengthening  11. Transfer Training     TREATMENT PLAN: Frequency/Duration: 3 times a week for duration of hospital stay  Rehabilitation Potential For Stated Goals: Good     REHAB RECOMMENDATIONS (at time of discharge pending progress):    Placement: It is my opinion, based on this patient's performance to date, that Mr. Ming Baeza may benefit from being discharged with NO further skilled therapy due to the high likelihood of returning to baseline.   Equipment:    May need RW to off load R LE; TBD pending clinical course               HISTORY:   History of Present Injury/Illness (Reason for Referral):  R LE cellulitis   Past Medical History/Comorbidities:   Mr. Lea Boswell  has a past medical history of Anxiety, Cellulitis of leg (11/17/2019), Coagulopathy ([facility] [address] (1/15/2020), Erectile dysfunction (1/20/2014), DIANA (generalized anxiety disorder) (5/7/2014), Gout (1/20/2014), History of gout (1/20/2014), History of peptic ulcer, HTN (hypertension) (3/16/2015), Hypertension, Obesity, morbid ([facility] [address] (2/26/2018), Osteoarthritis of hand, primary localized (1/20/2014), Personal history of urinary calculi, Venous (peripheral) insufficiency (12/3/2019), and Wart (1/20/2014). Mr. Lea Boswell  has a past surgical history that includes hx wisdom teeth extraction. Social History/Living Environment:   Home Environment: Private residence  # Steps to Enter: 2  One/Two Story Residence: One story  Living Alone: No  Support Systems: Spouse/Significant Other/Partner, Family member(s)  Patient Expects to be Discharged to[de-identified] Apartment  Current DME Used/Available at Home: None  Prior Level of Function/Work/Activity:  At baseline, patient is independent with ADLs, ambulates community-level distances without utilizing an assistive device, and works at [employer] Number of Personal Factors/Comorbidities that affect the Plan of Care: 0: LOW COMPLEXITY   EXAMINATION:   Most Recent Physical Functioning:   Gross Assessment:  AROM: Generally decreased, functional(distal R LE)  Strength: Within functional limits  Sensation: Intact(Light touch B UE/LE)               Posture:     Balance:  Sitting: Intact  Standing: Impaired  Standing - Static: Good  Standing - Dynamic : Fair Bed Mobility:  Supine to Sit: Modified independent  Sit to Supine: Modified independent  Scooting: Modified independent  Wheelchair Mobility:     Transfers:  Sit to Stand: Stand-by assistance  Stand to Sit: Stand-by assistance  Gait:     Base of Support: Shift to left  Speed/Perla: Shuffled; Slow  Gait Abnormalities: Step to gait  Distance (ft): 5 Feet (ft)  Assistive Device: (None; may benefit from RW with pain )  Ambulation - Level of Assistance: Stand-by assistance  Interventions: Safety awareness training; Tactile cues; Verbal cues      Body Structures Involved:  1. None Body Functions Affected:  1. Movement Related  2. Skin Related Activities and Participation Affected:  1. Mobility   Number of elements that affect the Plan of Care: 3: MODERATE COMPLEXITY   CLINICAL PRESENTATION:   Presentation: Stable and uncomplicated: LOW COMPLEXITY   CLINICAL DECISION MAKIN[address] [place] [id number] AM-PAC 6 Clicks   Basic Mobility Inpatient Short Form  How much difficulty does the patient currently have. .. Unable A Lot A Little None   1. Turning over in bed (including adjusting bedclothes, sheets and blankets)? [] 1   [] 2   [] 3   [x] 4   2. Sitting down on and standing up from a chair with arms ( e.g., wheelchair, bedside commode, etc.)   [] 1   [] 2   [x] 3   [] 4   3. Moving from lying on back to sitting on the side of the bed? [] 1   [] 2   [x] 3   [] 4   How much help from another person does the patient currently need. .. Total A Lot A Little None   4. Moving to and from a bed to a chair (including a wheelchair)? [] 1   [] 2   [x] 3   [] 4   5. Need to walk in hospital room? [] 1   [] 2   [x] 3   [] 4   6. Climbing 3-5 steps with a railing? [] 1   [] 2   [x] 3   [] 4   © , Trustees of [address] [place] [id number], under license to [employer]. All rights reserved      Score:  Initial: 19 Most Recent: 19 (Date: 20 )    Interpretation of Tool:  Represents activities that are increasingly more difficult (i.e. Bed mobility, Transfers, Gait). Medical Necessity:     · Patient demonstrates   · good  ·  rehab potential due to higher previous functional level. Reason for Services/Other Comments:  · Patient continues to require skilled intervention due to   · Decreased functional mobility and balance/gait status from baseline.     · .   Use of outcome tool(s) and clinical judgement create a POC that gives a: Clear prediction of patient's progress: LOW COMPLEXITY            TREATMENT:   (In addition to Assessment/Re-Assessment sessions the following treatments were rendered)   Pre-treatment Symptoms/Complaints:    Pain: Initial:   Pain Intensity 1: 7  Pain Location 1: Leg  Pain Orientation 1: Right  Pain Intervention(s) 1: Emotional support, Repositioned, Rest  Post Session:  7/10; endorses comfort in supine and recently medicated per RN. Initial Evaluation (Untimed Charge)  Gait Training (  10 Minutes):  Gait training to improve and/or restore physical functioning as related to mobility, strength and balance. Ambulated 5 Feet (ft) with Stand-by assistance using a (None; may benefit from RW with pain ) and minimal Safety awareness training; Tactile cues; Verbal cues related to their activity pacing, gait mechanics, and to limit weight bearing through R LE.    Braces/Orthotics/Lines/Etc:   · IV  · O2 Device: Room air  Treatment/Session Assessment:    · Response to Treatment:  See above. · Interdisciplinary Collaboration:   o Physical Therapist  o Registered Nurse  · After treatment position/precautions:   o Supine in bed  o Bed/Chair-wheels locked  o Bed in low position  o Call light within reach  o RN notified  o Side rails x 2  o Patient needs met and in NAD   · Compliance with Program/Exercises: Will assess as treatment progresses  · Recommendations/Intent for next treatment session: \"Next visit will focus on advancements to more challenging activities and reduction in assistance provided\".   Total Treatment Duration:  PT Patient Time In/Time Out  Time In: 1028  Time Out: Shiloh Pompa DPT

## 2020-01-16 NOTE — PROGRESS NOTES
Hourly rounds performed. Pain medication given per MAR. Leg redressed by wound care. Will continue to monitor.

## 2020-01-16 NOTE — PROGRESS NOTES
Hourly rounds completed this shift. Pt laying in bed with wife at bedside. All needs met at this time. Bed low/locked. Call light within reach. Will continue to monitor and give bedside report to oncoming nurse.

## 2020-01-16 NOTE — PROGRESS NOTES
Pharmacokinetic Consult to Pharmacist    Wild Augustus is a 53 y.o. male being treated for cellulitis with vancomycin and zosyn. Height: 6' 4\" (193 cm)  Weight: 138.3 kg (305 lb)  Lab Results   Component Value Date/Time    BUN 11 01/15/2020 03:54 PM    Creatinine 0.72 (L) 01/15/2020 03:54 PM    WBC 10.7 01/15/2020 03:54 PM    Procalcitonin <0.1 11/14/2019 05:54 PM    Lactic Acid (POC) 0.90 01/15/2020 05:40 PM      Estimated Creatinine Clearance: 176.1 mL/min (A) (based on SCr of 0.72 mg/dL (L)). CULTURES:  pending    Day 1 of vancomycin. Goal trough is 10 -20. Vancomycin dose initiated at 2.5g x1, then 2g q 12h. Will continue to follow patient.       Thank you,  Carola Vasquez, PharmD  Clinical Pharmacist  [phone]
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
## 2020-01-16 NOTE — CONSULTS
[address] [facility]  [phone]    History and Physical   [address] [facility] date: 1/15/2020    MRN: [id number]     : 1965     Age: 53 y.o.          2020 12:54 PM    Subjective/HPI:   This patient is a 53 y.o. white male seen at the request of Hospitalist service and evaluated for venous reflux disease. He presented to the ED 1/15/2020 with worsening R LE edema, erythema and drainage and was admitted for acute-on-chronic cellulitis. Patient is well-known to our office, was last seen 2019 for extensive venous reflux disease and R LE ulcer (that has since healed). Office imaging noted both superficial and deep venous reflux; Dr. Mirella Munoz recommended iliac venogram to address deep venous issues followed by radiofrequency ablation for superficial. Patient and his wife were apprehensive about treatment and decided to pursue an additional opinion. [facility] vascular surgeon Celine Waller had recommended vein stripping. After internet research and evaluation by an [facility] physician, patient and his wife chose to focus on treatment for superficial venous disease only but patient has not yet received treatment. Today patient dozes throughout much of H&P, wife provides most of history. This cellulitis occurrence began 2d ago with profuse R LE drainage without inciting incident or prior trauma. Patient admits to URI-type symptoms several days prior, also notes teenage son at home with GI virus symptoms. He admits to recent nausea and diarrhea but denies fever or chills. Wife notes hospitalization 2019 for cellulitis had significantly worse symptoms and that patient developed atrial fibrillation during that admission. PMH with HTN, aFib, HL, gout, OA, BPH and generalized anxiety disorder. Patient's past surgical, family and social histories were reviewed as noted here and below.     Review of Systems  Pertinent items are noted in HPI. Past Medical History:   Diagnosis Date    Anxiety     Cellulitis of leg 2019    Coagulopathy ([facility] [address] 1/15/2020    Taking xaralto     Erectile dysfunction 2014    DIANA (generalized anxiety disorder) 2014    Gout 2014    History of gout 2014    History of peptic ulcer     HTN (hypertension) 3/16/2015    Hypertension     Obesity, morbid ([facility] [address] 2018    Osteoarthritis of hand, primary localized 2014    Personal history of urinary calculi     x2    Venous (peripheral) insufficiency 12/3/2019    Wart 2014      Past Surgical History:   Procedure Laterality Date    HX WISDOM TEETH EXTRACTION        Allergies   Allergen Reactions    Codeine Nausea Only    Sulfa (Sulfonamide Antibiotics) Rash      Social History     Tobacco Use    Smoking status: Former Smoker     Years: 5.00     Last attempt to quit: 2011     Years since quittin.4    Smokeless tobacco: Never Used    Tobacco comment: quit in the 's    Substance Use Topics    Alcohol use: No      Social History     Patient does not qualify to have social determinant information on file (likely too young). Social History Narrative    1/15/20:  PATIENT IS  TO [facility], THEY HAVE A 13YEAR OLD SON. PATIENT RECENTLY WENT BACK TO WORK AT THE HOME "[employer]. History reviewed. No pertinent family history. Prior to Admission Medications   Prescriptions Last Dose Informant Patient Reported? Taking?   acetaminophen (TYLENOL) 500 mg tablet Unknown at Unknown time  Yes No   Sig: Take  by mouth every six (6) hours as needed for Pain. allopurinol (ZYLOPRIM) 300 mg tablet 2020 at Unknown time  No Yes   Sig: Take 1 Tab by mouth daily. colchicine 0.6 mg tablet Not Taking at Unknown time  No No   Sig: Take 1 Tab by mouth daily. Patient taking differently: Take 0.6 mg by mouth daily.  Takes for flair ups only   cyclobenzaprine (FLEXERIL) 5 mg tablet 2020  No No   Sig: Take 1 Tab by mouth nightly. dilTIAZem CD (CARDIZEM CD) 240 mg ER capsule 1/15/2020 at Unknown time  No Yes   Sig: Take 1 Cap by mouth daily. famotidine (PEPCID) 40 mg tablet 1/15/2020 at Unknown time  No Yes   Sig: Take 1 Tab by mouth two (2) times a day. furosemide (LASIX) 40 mg tablet 1/14/2020 at Unknown time  No Yes   Sig: Take 1 Tab by mouth daily. lisinopril (PRINIVIL, ZESTRIL) 40 mg tablet 1/15/2020 at Unknown time  No Yes   Sig: Take 1 Tab by mouth daily. ondansetron (ZOFRAN ODT) 4 mg disintegrating tablet Unknown at Unknown time  No No   Sig: Take 1 Tab by mouth every eight (8) hours as needed for Nausea. rivaroxaban (XARELTO) 20 mg tab tablet 1/14/2020 at Unknown time  No Yes   Sig: Take 1 Tab by mouth daily (with dinner). sertraline (ZOLOFT) 50 mg tablet 1/15/2020 at Unknown time  No Yes   Sig: Take 1 Tab by mouth daily. Patient taking differently: Take 75 mg by mouth daily. tamsulosin (FLOMAX) 0.4 mg capsule 1/14/2020 at Unknown time  No Yes   Sig: Take 1 Cap by mouth daily.       Facility-Administered Medications: None     Current Facility-Administered Medications   Medication Dose Route Frequency    heparin (porcine) injection 5,000 Units  5,000 Units SubCUTAneous Q8H    influenza vaccine 2019-20 (6 mos+)(PF) (FLUARIX/FLULAVAL/FLUZONE QUAD) injection 0.5 mL  0.5 mL IntraMUSCular PRIOR TO DISCHARGE    oxyCODONE IR (ROXICODONE) tablet 5 mg  5 mg Oral Q4H PRN    ondansetron (ZOFRAN) injection 4 mg  4 mg IntraVENous Q4H PRN    hyoscyamine SL (LEVSIN/SL) tablet 0.125 mg  0.125 mg SubLINGual Q4H PRN    allopurinoL (ZYLOPRIM) tablet 300 mg  300 mg Oral DAILY    cyclobenzaprine (FLEXERIL) tablet 5 mg  5 mg Oral QHS    dilTIAZem CD (CARDIZEM CD) capsule 240 mg  240 mg Oral DAILY    famotidine (PEPCID) tablet 40 mg  40 mg Oral BID    furosemide (LASIX) tablet 40 mg  40 mg Oral DAILY    lisinopril (PRINIVIL, ZESTRIL) tablet 40 mg  40 mg Oral DAILY    sodium chloride (NS) flush 5-40 mL  5-40 mL IntraVENous Q8H    sodium chloride (NS) flush 5-40 mL  5-40 mL IntraVENous PRN    tuberculin injection 5 Units  5 Units IntraDERMal ONCE    acetaminophen (TYLENOL) tablet 650 mg  650 mg Oral Q4H PRN    morphine injection 4 mg  4 mg IntraVENous Q4H PRN    naloxone (NARCAN) injection 0.4 mg  0.4 mg IntraVENous PRN    sertraline (ZOLOFT) tablet 75 mg  75 mg Oral DAILY    tamsulosin (FLOMAX) capsule 0.4 mg  0.4 mg Oral DAILY    LORazepam (ATIVAN) injection 1 mg  1 mg IntraVENous Q4H PRN    piperacillin-tazobactam (ZOSYN) 3.375 g in 0.9% sodium chloride (MBP/ADV) 100 mL  3.375 g IntraVENous Q8H    vancomycin (VANCOCIN) 2000 mg in  ml infusion  2,000 mg IntraVENous Q12H     Objective:     Vitals:    01/16/20 0305 01/16/20 0607 01/16/20 0723 01/16/20 1201   BP: 94/60  99/67 114/68   Pulse: 74  70 74   Resp: 18  18 18   Temp: 98.1 °F (36.7 °C)  97.7 °F (36.5 °C) 98 °F (36.7 °C)   SpO2: 92%  94% 95%   Weight:  301 lb 3.2 oz (136.6 kg)     Height:           Physical Exam:   General well-developed, obese, drowsy, in no acute distress  Skin  (see below for LE) warm, moist with texture appropriate for age, no jaundice or rashes  HEENT normocephalic, no lesions to the scalp; extraocular muscles intact, nares patent, mouth with adequate dentition, oral mucosa moist  Neck  thick but supple, trachea midline  Lungs  respirations unlabored, lungs clear to auscultation bilaterally  Heart  regular rate and mostly regular underlying rhythm, no appreciable murmurs  Abdomen soft, non-tender, protuberant but non-distended, bowel sounds normoactive  Extremities B UE warm with grossly normal ROM; R LE markedly edematous with bright erythema starting ~5\" distal to knee, skin intact but alternating maceration with crusted, dry flaking patches; distal left leg with hemosiderin staining  Pulses  easily palpable right DP, PT despite edema  Neurological alert and oriented; no gross sensorimotor deficits     Data Review   Recent Labs 01/15/20  1554   WBC 10.7   HGB 15.0   HCT 44.3        Recent Labs     01/16/20  0614 01/15/20  1554    140   K 4.0 4.2   * 108*   CO2 26 28   GLU 88 96   BUN 14 11   CREA 1.00 0.72*       Imaging  Examination:  Grayscale and color doppler ultrasound of right lower extremity, 1/16/2020. IMPRESSION  Impression: No evidence of right lower extremity deep venous thrombosis. Incidental note of lymphadenopathy in the right proximal thigh, may be  hyperplastic or neoplastic. Clinical correlation is recommended.       Assessment / Plan / Recommendations / Medical Decision Making:     Hospital Problems  Date Reviewed: 1/15/2020          Codes Class Noted POA    * (Principal) Cellulitis of right leg ICD-10-CM: L03.115  ICD-9-CM: 682.6  1/15/2020 Yes        Arterial insufficiency (HCC) (Chronic) ICD-10-CM: I77.1  ICD-9-CM: 447.1  1/15/2020 Yes        Coagulopathy ([facility] [address] ICD-10-CM: D68.9  ICD-9-CM: 286.9  1/15/2020 Unknown    Overview Signed 1/15/2020  6:29 PM by Tomi Tsai NP     Taking xaralto              Venous (peripheral) insufficiency (Chronic) ICD-10-CM: G06.2  ICD-9-CM: 459.81  12/3/2019 Yes        Chronic venous hypertension with ulcer and inflammation involving right side (HCC) (Chronic) ICD-10-CM: I87.331, L97.919  ICD-9-CM: 459.33  3/6/2019 Yes        Obesity, morbid (HCC) (Chronic) ICD-10-CM: E66.01  ICD-9-CM: 278.01  2/26/2018 Yes        HTN (hypertension) (Chronic) ICD-10-CM: I10  ICD-9-CM: 401.9  3/16/2015 Yes        DIANA (generalized anxiety disorder) (Chronic) ICD-10-CM: F41.1  ICD-9-CM: 300.02  5/7/2014 Yes        History of gout (Chronic) ICD-10-CM: Z87.39  ICD-9-CM: V12.29  1/20/2014 Yes              Patient Active Problem List    Diagnosis Date Noted    Cellulitis of right leg 01/15/2020    Arterial insufficiency ([address] 01/15/2020    Coagulopathy ([facility] [address] 01/15/2020    Venous (peripheral) insufficiency 12/03/2019    Varicose vein of leg 12/03/2019    Hypokalemia 11/18/2019    Atrial fibrillation with rapid ventricular response ([facility] [address] 11/17/2019    Sepsis ([address] 11/14/2019    Ankle wound, right, initial encounter 11/14/2019    Chronic venous hypertension with ulcer and inflammation involving right side ([address] 03/06/2019    Non-pressure chronic ulcer of right ankle with necrosis of muscle ([address] 03/06/2019    Obesity, morbid ([address] 02/26/2018    Arthritis of knee, right 11/06/2017    Benign prostatic hyperplasia without lower urinary tract symptoms 11/06/2017    HTN (hypertension) 03/16/2015    DIANA (generalized anxiety disorder) 05/07/2014    Generalized OA 05/02/2014    Osteoarthritis of hand, primary localized 01/20/2014    History of gout 01/20/2014    Erectile dysfunction 01/20/2014    Wart 01/20/2014    Plantar warts 01/20/2014    Hyperlipidemia 01/20/2014         Madelin Hector is a 53 y.o. male with known superficial and deep venous reflux disease. Patient's R LE ulcer has healed and B LE without new lesions. Patient's preference is to focus on treatment for superficial venous disease.  - recommend >30 degree elevation of R LE unless patient ambulating  - continue medical management  Patient was discussed with Dr. Butler, who agreed with assessment and plan. We will be available should patient change his mind and opt to pursue iliac venogram with intervention, but otherwise we will sign off. Thank you very much for this referral. We appreciate the opportunity to participate in this patient's care. We will follow along with above stated plan. Dominic Tamayo PA-C  Physician Assistant with [facility]  Yodit Malagon.  Chanelle Rojas MD / J Luis Degroot MD

## 2020-01-16 NOTE — PROGRESS NOTES
Hospitalist Progress Note    Subjective:   Daily Progress Note: 1/16/2020 2:38 PM    Patient presented to ER 1/15 with long standing right lower extremity chronic stasis dermatitis under care of wound clinic and vascular surgery. Initial injury was being struck with a rock in April of last year while mowing. Had problems since with underlying vascular disease and chronic bilateral lower extremity lymphedema and stasis dermatitis. Chronic wound with admission for cellulitis in November of last year, wound grew MSSA/citrobacter/proteus . At one point, wound involved muscle necrosis. While he was hospitalized, he developed PAFand placed on cardizem and xaralto. Spontaneously converted to SR. He was cleared to return to work at [employer] 12/5, was doing well with all open areas scabbed over when pain, beefy erythema and weeping began to re-occur 2 days ago with sudden onset gush of clear fluid while at work today, soaking dressing and filling shoe. Afebrile, does not appear septic. Additionally, 14 yo son has had N/V/D illness with patient reporting 2 day history of nausea and diarrhea. Holding xaralto in case intervention is needed, using heparin SQ for DVT prophy. Admitted on zosyn, vanc, ID, wound, and vascular consults. Additional history as noted below    1/16:  ID in, discontinue zosyn and vanc, begin cefazolin, cipro, fluconazole, screening for HIV and HCV. PT in for eval.  Vascular in with recommendations for surgery, patient unable to comply in the past and present due to lack of funds. Per wound care and vascular, current condition will re-occur until surgery can take place. Vascular signing off. Feeling much better today with notable improvement of heat, bright erythema and edema. Continued puffy distal edema. Continues to soak wraps frequently. Reinstating xaralto, discontinuing heparin.      ADDITIONAL HISTORY:  Morbid obesity, anxiety, chronic and acute right lower leg wound/cellulitis with proteus/MSSA/citrobacter. Coagulopathy on xaralto, history of PAF, ED, DIANA, gout, hypertension, hyperlipidemia, OA, venous insufficiency  Current Facility-Administered Medications   Medication Dose Route Frequency    heparin (porcine) injection 5,000 Units  5,000 Units SubCUTAneous Q8H    influenza vaccine 2019-20 (6 mos+)(PF) (FLUARIX/FLULAVAL/FLUZONE QUAD) injection 0.5 mL  0.5 mL IntraMUSCular PRIOR TO DISCHARGE    oxyCODONE IR (ROXICODONE) tablet 5 mg  5 mg Oral Q4H PRN    ceFAZolin (ANCEF) 1,000 mg in 0.9% sodium chloride (MBP/ADV) 50 mL ADV  1 g IntraVENous Q8H    ciprofloxacin HCl (CIPRO) tablet 750 mg  750 mg Oral Q12H    [START ON 1/17/2020] fluconazole (DIFLUCAN) tablet 200 mg  200 mg Oral DAILY    ondansetron (ZOFRAN) injection 4 mg  4 mg IntraVENous Q4H PRN    hyoscyamine SL (LEVSIN/SL) tablet 0.125 mg  0.125 mg SubLINGual Q4H PRN    allopurinoL (ZYLOPRIM) tablet 300 mg  300 mg Oral DAILY    cyclobenzaprine (FLEXERIL) tablet 5 mg  5 mg Oral QHS    dilTIAZem CD (CARDIZEM CD) capsule 240 mg  240 mg Oral DAILY    famotidine (PEPCID) tablet 40 mg  40 mg Oral BID    furosemide (LASIX) tablet 40 mg  40 mg Oral DAILY    lisinopril (PRINIVIL, ZESTRIL) tablet 40 mg  40 mg Oral DAILY    sodium chloride (NS) flush 5-40 mL  5-40 mL IntraVENous Q8H    sodium chloride (NS) flush 5-40 mL  5-40 mL IntraVENous PRN    tuberculin injection 5 Units  5 Units IntraDERMal ONCE    acetaminophen (TYLENOL) tablet 650 mg  650 mg Oral Q4H PRN    morphine injection 4 mg  4 mg IntraVENous Q4H PRN    naloxone (NARCAN) injection 0.4 mg  0.4 mg IntraVENous PRN    sertraline (ZOLOFT) tablet 75 mg  75 mg Oral DAILY    tamsulosin (FLOMAX) capsule 0.4 mg  0.4 mg Oral DAILY    LORazepam (ATIVAN) injection 1 mg  1 mg IntraVENous Q4H PRN      Review of Systems  A comprehensive review of systems was negative except for that written in the HPI.     Objective:     Visit Vitals  /68 (BP 1 Location: Right arm, BP Patient Position: At rest)   Pulse 74   Temp 98 °F (36.7 °C)   Resp 18   Ht 6' 4\" (1.93 m)   Wt 136.6 kg (301 lb 3.2 oz)   SpO2 95%   BMI 36.66 kg/m²      O2 Device: Room air    Temp (24hrs), Av.8 °F (36.6 °C), Min:97.4 °F (36.3 °C), Max:98.1 °F (36.7 °C)    701 - 1900  In: -   Out: 811 [UQUZ]  1901 -  07  In: -   Out: 450 [Urine:450]    General appearance: Morbidly obese, unkempt. Oriented and alert, cooperative, does not appear septic. Reports feeling much better today. Head: Normocephalic, without obvious abnormality, atraumatic  Eyes: conjunctivae/corneas clear. PERRL  Throat: Lips, mucosa, and tongue normal. Teeth and gums normal  Neck: supple, symmetrical, trachea midline, and no JVD  Lungs: clear to auscultation bilaterally, diminished in bases  Heart: regular rate and rhythm, S1, S2 normal, no murmur, click, rub or gallop  Abdomen: Protuberant, soft, non-tender. Bowel sounds normal. No masses,  no organomegaly  Extremities: Right leg wrapped, dressing dry. no erythema above dressing. Foot erythema, edema and heat improved. Left leg with chronic expansive edema with mild erythema, no wound, no weeping. Skin thickened. Upper extremities normal, atraumatic, no cyanosis or edema  Skin: Skin color, texture, turgor normal other than noted.  No additional rashes or lesions  Neurologic: Grossly normal    Additional comments: Notes,orders, test results, vitals reviewed    Data Review  Recent Results (from the past 24 hour(s))   CBC WITH AUTOMATED DIFF    Collection Time: 01/15/20  3:54 PM   Result Value Ref Range    WBC 10.7 4.3 - 11.1 K/uL    RBC 4.35 4.23 - 5.6 M/uL    HGB 15.0 13.6 - 17.2 g/dL    HCT 44.3 41.1 - 50.3 %    .8 (H) 79.6 - 97.8 FL    MCH 34.5 (H) 26.1 - 32.9 PG    MCHC 33.9 31.4 - 35.0 g/dL    RDW 13.2 11.9 - 14.6 %    PLATELET 549 881 - 433 K/uL    MPV 10.8 9.4 - 12.3 FL    ABSOLUTE NRBC 0.00 0.0 - 0.2 K/uL    DF AUTOMATED      NEUTROPHILS 77 43 - 78 %    LYMPHOCYTES 12 (L) 13 - 44 %    MONOCYTES 8 4.0 - 12.0 %    EOSINOPHILS 2 0.5 - 7.8 %    BASOPHILS 0 0.0 - 2.0 %    IMMATURE GRANULOCYTES 0 0.0 - 5.0 %    ABS. NEUTROPHILS 8.3 (H) 1.7 - 8.2 K/UL    ABS. LYMPHOCYTES 1.3 0.5 - 4.6 K/UL    ABS. MONOCYTES 0.8 0.1 - 1.3 K/UL    ABS. EOSINOPHILS 0.2 0.0 - 0.8 K/UL    ABS. BASOPHILS 0.0 0.0 - 0.2 K/UL    ABS. IMM. GRANS. 0.0 0.0 - 0.5 K/UL   METABOLIC PANEL, COMPREHENSIVE    Collection Time: 01/15/20  3:54 PM   Result Value Ref Range    Sodium 140 136 - 145 mmol/L    Potassium 4.2 3.5 - 5.1 mmol/L    Chloride 108 (H) 98 - 107 mmol/L    CO2 28 21 - 32 mmol/L    Anion gap 4 (L) 7 - 16 mmol/L    Glucose 96 65 - 100 mg/dL    BUN 11 6 - 23 MG/DL    Creatinine 0.72 (L) 0.8 - 1.5 MG/DL    GFR est AA >60 >60 ml/min/1.73m2    GFR est non-AA >60 >60 ml/min/1.73m2    Calcium 9.7 8.3 - 10.4 MG/DL    Bilirubin, total 0.7 0.2 - 1.1 MG/DL    ALT (SGPT) 18 12 - 65 U/L    AST (SGOT) 17 15 - 37 U/L    Alk.  phosphatase 96 50 - 136 U/L    Protein, total 7.4 6.3 - 8.2 g/dL    Albumin 3.6 3.5 - 5.0 g/dL    Globulin 3.8 (H) 2.3 - 3.5 g/dL    A-G Ratio 0.9 (L) 1.2 - 3.5     C REACTIVE PROTEIN, QT    Collection Time: 01/15/20  3:54 PM   Result Value Ref Range    C-Reactive protein 3.4 (H) 0.0 - 0.9 mg/dL   POC LACTIC ACID    Collection Time: 01/15/20  5:40 PM   Result Value Ref Range    Lactic Acid (POC) 0.90 0.5 - 1.9 mmol/L   CULTURE, BLOOD    Collection Time: 01/15/20  5:44 PM   Result Value Ref Range    Special Requests: LEFT  HAND        Culture result: NO GROWTH AFTER 14 HOURS     CULTURE, BLOOD    Collection Time: 01/15/20  5:47 PM   Result Value Ref Range    Special Requests: RIGHT  HAND        Culture result: NO GROWTH AFTER 14 HOURS     GLUCOSE, POC    Collection Time: 01/15/20  6:14 PM   Result Value Ref Range    Glucose (POC) 81 65 - 100 mg/dL   EKG, 12 LEAD, INITIAL    Collection Time: 01/15/20  6:21 PM   Result Value Ref Range    Ventricular Rate 53 BPM    Atrial Rate 53 BPM P-R Interval 200 ms    QRS Duration 122 ms    Q-T Interval 426 ms    QTC Calculation (Bezet) 399 ms    Calculated P Axis 57 degrees    Calculated R Axis 50 degrees    Calculated T Axis 50 degrees    Diagnosis         Sinus bradycardia  Non-specific intra-ventricular conduction delay  Borderline ECG  When compared with ECG of 17-NOV-2019 07:45,  No significant change was found  Confirmed by Andrew Rowley ([id number]) on 1/16/2020 7:38:28 AM     GLUCOSE, POC    Collection Time: 01/15/20  9:22 PM   Result Value Ref Range    Glucose (POC) 115 (H) 65 - 100 mg/dL   URIC ACID    Collection Time: 01/16/20  6:14 AM   Result Value Ref Range    Uric acid 5.2 2.6 - 6.0 MG/DL   PSA SCREENING (SCREENING)    Collection Time: 01/16/20  6:14 AM   Result Value Ref Range    Prostate Specific Ag 0.3 <0.5 ng/mL   METABOLIC PANEL, COMPREHENSIVE    Collection Time: 01/16/20  6:14 AM   Result Value Ref Range    Sodium 141 136 - 145 mmol/L    Potassium 4.0 3.5 - 5.1 mmol/L    Chloride 110 (H) 98 - 107 mmol/L    CO2 26 21 - 32 mmol/L    Anion gap 5 (L) 7 - 16 mmol/L    Glucose 88 65 - 100 mg/dL    BUN 14 6 - 23 MG/DL    Creatinine 1.00 0.8 - 1.5 MG/DL    GFR est AA >60 >60 ml/min/1.73m2    GFR est non-AA >60 >60 ml/min/1.73m2    Calcium 9.0 8.3 - 10.4 MG/DL    Bilirubin, total 0.5 0.2 - 1.1 MG/DL    ALT (SGPT) 14 12 - 65 U/L    AST (SGOT) 8 (L) 15 - 37 U/L    Alk. phosphatase 85 50 - 136 U/L    Protein, total 6.4 6.3 - 8.2 g/dL    Albumin 3.0 (L) 3.5 - 5.0 g/dL    Globulin 3.4 2.3 - 3.5 g/dL    A-G Ratio 0.9 (L) 1.2 - 3.5          1/16:  ULTRASOUND LOWER EXTREMITY;  No evidence of right lower extremity deep venous thrombosis. Incidental note of lymphadenopathy in the right proximal thigh, may be hyperplastic or neoplastic.   Clinical correlation is recommended     Assessment/Plan:   Cellulitis of right leg:  Hospitalization 11/19 for same               Blood and wound cultures ordered              Wound care in, will follow: new boot  ordered ID in:  abx changed to ancef, cipro po,  and diflucan   Vascular surgery in:  Needs surgery in  order for wounds to heal permanently,  patient unable to afford               Monitor distal circulation               PT, OT, CM consults               Doppler RLE negative for DVT               Analgesics      Chronic venous hypertension with ulcer and inflammation involving right side under care of Vascular surgery               Underlying lymphedema    Needs surgery as above, unable to afford     PAD/PVD:  Taking xaralto at home, discontinuing   Prophylactic heparing, reinstate home  dose xaralto (20 mg daily)              RIght LE doppler negative for DVT     Nausea and diarrhea: exposed to Infectious gastritis              Stool cultures if diarrhea continues              Antiemetics          History of PAF on xaralto and cardizem              Currently in SR              Remote telemetry              Holding xaralto, reinstate ASAP               EF:  55%     History of gout:  Continue home allopurinol               Uric acid level WNL 1/16      DIANA:  Continue home meds     HTN:  Continue home meds     Obesity, morbid      Coagulopathy on xaralto:  reinstate tomorrow     Care Plan discussed with: Patient, wound care, and Nurse    Signed By: Cailtyn Morris NP     January 16, 2020

## 2020-01-16 NOTE — PROGRESS NOTES
Problem: Falls - Risk of  Goal: *Absence of Falls  Description  Document Kamille Billings Fall Risk and appropriate interventions in the flowsheet.   Outcome: Progressing Towards Goal  Note: Fall Risk Interventions:  Mobility Interventions: Communicate number of staff needed for ambulation/transfer, OT consult for ADLs, Patient to call before getting OOB, PT Consult for mobility concerns, PT Consult for assist device competence         Medication Interventions: Evaluate medications/consider consulting pharmacy, Patient to call before getting OOB, Teach patient to arise slowly                   Problem: Patient Education: Go to Patient Education Activity  Goal: Patient/Family Education  Outcome: Progressing Towards Goal     Problem: Cellulitis Care Plan (Adult)  Goal: *Control of acute pain  Outcome: Progressing Towards Goal  Goal: *Skin integrity maintained  Outcome: Progressing Towards Goal  Goal: *Absence of infection signs and symptoms  Outcome: Progressing Towards Goal     Problem: Patient Education: Go to Patient Education Activity  Goal: Patient/Family Education  Outcome: Progressing Towards Goal

## 2020-01-16 NOTE — PROGRESS NOTES
Care Management Interventions  PCP Verified by CM: Yes(Dr. Rubin)  Mode of Transport at Discharge: Self  Transition of Care Consult (CM Consult): Discharge Planning  Discharge Durable Medical Equipment: No  Physical Therapy Consult: Yes  Occupational Therapy Consult: Yes  Speech Therapy Consult: No  Current Support Network: Own Home, Lives with Spouse  Confirm Follow Up Transport: Self  Discharge Location  Discharge Placement: Home    CM met with patient in room, Patient alert and orient wife at bedside. Patient verified demographic information to be correct. Patient stated he and his wife live in a one story home that has 2 steps to enter the residence. Patient stated his nephew is currently living with them. DME: n/a  O2: n/a  CPAP: n/a  HD: n/a  ADL: patient stated he is independent at baseline, patient drives and continues to work at [employer]. Patient would not qualify for [facility] as he is not home bound. Patient stated he has has someone from [address] [place] [address] visiting his home and teaching his wife how to do the dressing changes. Once they taught his wife they no longer came to the residence. Patient stated he has never been to [facility] and would like to return home at discharge. Patient stated he has been dealing with this wound for over 1year. Patient requested CM to call [employer] to bring patient a new shoe that was thrown away in the ER. Patient stated they threw the shoe in the trash as the shoe was extremely soiled from the weeping of his leg and were concerned this would cause infection. CM reached out to [facility] and the shoe has been ordered. [facility] will call CM when shoe is available and the shoe can be delivered to the hospital.  Patient has been notified. CM will continue to follow patient during hospitalization for discharge planning and needs. Please consult CM for new needs.

## 2020-01-17 LAB
ANION GAP SERPL CALC-SCNC: 4 MMOL/L (ref 7–16)
BASOPHILS # BLD: 0.1 K/UL (ref 0–0.2)
BASOPHILS NFR BLD: 1 % (ref 0–2)
BUN SERPL-MCNC: 15 MG/DL (ref 6–23)
CALCIUM SERPL-MCNC: 8.9 MG/DL (ref 8.3–10.4)
CHLORIDE SERPL-SCNC: 111 MMOL/L (ref 98–107)
CO2 SERPL-SCNC: 28 MMOL/L (ref 21–32)
CREAT SERPL-MCNC: 1.46 MG/DL (ref 0.8–1.5)
DIFFERENTIAL METHOD BLD: ABNORMAL
EOSINOPHIL # BLD: 0.3 K/UL (ref 0–0.8)
EOSINOPHIL NFR BLD: 3 % (ref 0.5–7.8)
ERYTHROCYTE [DISTWIDTH] IN BLOOD BY AUTOMATED COUNT: 13.2 % (ref 11.9–14.6)
GLUCOSE SERPL-MCNC: 101 MG/DL (ref 65–100)
HCT VFR BLD AUTO: 37.6 % (ref 41.1–50.3)
HGB BLD-MCNC: 12.4 G/DL (ref 13.6–17.2)
IMM GRANULOCYTES # BLD AUTO: 0 K/UL (ref 0–0.5)
IMM GRANULOCYTES NFR BLD AUTO: 1 % (ref 0–5)
LYMPHOCYTES # BLD: 1.5 K/UL (ref 0.5–4.6)
LYMPHOCYTES NFR BLD: 17 % (ref 13–44)
MCH RBC QN AUTO: 34.4 PG (ref 26.1–32.9)
MCHC RBC AUTO-ENTMCNC: 33 G/DL (ref 31.4–35)
MCV RBC AUTO: 104.4 FL (ref 79.6–97.8)
MONOCYTES # BLD: 0.8 K/UL (ref 0.1–1.3)
MONOCYTES NFR BLD: 10 % (ref 4–12)
NEUTS SEG # BLD: 6 K/UL (ref 1.7–8.2)
NEUTS SEG NFR BLD: 69 % (ref 43–78)
NRBC # BLD: 0 K/UL (ref 0–0.2)
PLATELET # BLD AUTO: 162 K/UL (ref 150–450)
PMV BLD AUTO: 10.7 FL (ref 9.4–12.3)
POTASSIUM SERPL-SCNC: 3.9 MMOL/L (ref 3.5–5.1)
RBC # BLD AUTO: 3.6 M/UL (ref 4.23–5.6)
SODIUM SERPL-SCNC: 143 MMOL/L (ref 136–145)
WBC # BLD AUTO: 8.7 K/UL (ref 4.3–11.1)

## 2020-01-17 PROCEDURE — 80048 BASIC METABOLIC PNL TOTAL CA: CPT

## 2020-01-17 PROCEDURE — 74011000258 HC RX REV CODE- 258: Performed by: INTERNAL MEDICINE

## 2020-01-17 PROCEDURE — 97165 OT EVAL LOW COMPLEX 30 MIN: CPT

## 2020-01-17 PROCEDURE — 74011250636 HC RX REV CODE- 250/636: Performed by: INTERNAL MEDICINE

## 2020-01-17 PROCEDURE — 87389 HIV-1 AG W/HIV-1&-2 AB AG IA: CPT

## 2020-01-17 PROCEDURE — 85025 COMPLETE CBC W/AUTO DIFF WBC: CPT

## 2020-01-17 PROCEDURE — 65660000000 HC RM CCU STEPDOWN

## 2020-01-17 PROCEDURE — 74011250637 HC RX REV CODE- 250/637: Performed by: INTERNAL MEDICINE

## 2020-01-17 PROCEDURE — 86803 HEPATITIS C AB TEST: CPT

## 2020-01-17 PROCEDURE — 97535 SELF CARE MNGMENT TRAINING: CPT

## 2020-01-17 PROCEDURE — 74011250637 HC RX REV CODE- 250/637: Performed by: NURSE PRACTITIONER

## 2020-01-17 PROCEDURE — 36415 COLL VENOUS BLD VENIPUNCTURE: CPT

## 2020-01-17 RX ADMIN — Medication 10 ML: at 05:37

## 2020-01-17 RX ADMIN — Medication 10 ML: at 17:24

## 2020-01-17 RX ADMIN — CYCLOBENZAPRINE 5 MG: 10 TABLET, FILM COATED ORAL at 21:55

## 2020-01-17 RX ADMIN — ACETAMINOPHEN 650 MG: 325 TABLET, FILM COATED ORAL at 10:32

## 2020-01-17 RX ADMIN — FLUCONAZOLE 200 MG: 100 TABLET ORAL at 08:35

## 2020-01-17 RX ADMIN — TAMSULOSIN HYDROCHLORIDE 0.4 MG: 0.4 CAPSULE ORAL at 08:34

## 2020-01-17 RX ADMIN — DILTIAZEM HYDROCHLORIDE 240 MG: 120 CAPSULE, COATED, EXTENDED RELEASE ORAL at 08:34

## 2020-01-17 RX ADMIN — FAMOTIDINE 40 MG: 20 TABLET, FILM COATED ORAL at 17:23

## 2020-01-17 RX ADMIN — SODIUM CHLORIDE 1000 MG: 900 INJECTION, SOLUTION INTRAVENOUS at 21:54

## 2020-01-17 RX ADMIN — CIPROFLOXACIN HYDROCHLORIDE 750 MG: 500 TABLET, FILM COATED ORAL at 21:54

## 2020-01-17 RX ADMIN — FUROSEMIDE 40 MG: 40 TABLET ORAL at 08:34

## 2020-01-17 RX ADMIN — SODIUM CHLORIDE 1000 MG: 900 INJECTION, SOLUTION INTRAVENOUS at 17:23

## 2020-01-17 RX ADMIN — Medication 10 ML: at 21:55

## 2020-01-17 RX ADMIN — RIVAROXABAN 20 MG: 20 TABLET, FILM COATED ORAL at 17:23

## 2020-01-17 RX ADMIN — ACETAMINOPHEN 650 MG: 325 TABLET, FILM COATED ORAL at 23:20

## 2020-01-17 RX ADMIN — SODIUM CHLORIDE 1000 MG: 900 INJECTION, SOLUTION INTRAVENOUS at 05:37

## 2020-01-17 RX ADMIN — FAMOTIDINE 40 MG: 20 TABLET, FILM COATED ORAL at 08:34

## 2020-01-17 RX ADMIN — CIPROFLOXACIN HYDROCHLORIDE 750 MG: 500 TABLET, FILM COATED ORAL at 08:34

## 2020-01-17 RX ADMIN — ALLOPURINOL 300 MG: 300 TABLET ORAL at 08:34

## 2020-01-17 RX ADMIN — SERTRALINE HYDROCHLORIDE 75 MG: 50 TABLET ORAL at 08:34

## 2020-01-17 NOTE — PROGRESS NOTES
Infectious Disease Consult    Today's Date: 1/17/2020   Admit Date: 1/15/2020    Impression:   · RLE Cellulitis: Primary symptom was of increasing redness and drainage; ED note described purulent drainage. · Right leg lymphedema  · Onychomycosis    Plan:   · On cefazolin, cipro, and fluconazole  · Once cellulitis has been treated, then he is going to need a strategy of edema management with compression. · Also, on discharge recommend terbinafine to treat onychomycosis as a portal for cellulitis. Anti-infectives:   · Cefazolin 1 gm IV q8h (rx 01/16-)  · Cipro po 750 BID (rx 01/16-)  · Fluconazole 200mg po Q24h (400mg rx 01/16/20)  · Vancomycin (01/15-01/16/20)  · Zosyn (01/15-01/16/20)    Subjective:   RLL still very painful. Tolerating antibiotics without side effects.     Patient Active Problem List   Diagnosis Code    Osteoarthritis of hand, primary localized M19.049    History of gout Z87.39    Erectile dysfunction N52.9    Wart B07.9    Plantar warts B07.0    Hyperlipidemia E78.5    Generalized OA M15.9    DIANA (generalized anxiety disorder) F41.1    HTN (hypertension) I10    Arthritis of knee, right M17.11    Benign prostatic hyperplasia without lower urinary tract symptoms N40.0    Obesity, morbid ([facility]) E66.01    Chronic venous hypertension with ulcer and inflammation involving right side ([facility]) I87.331, L97.919    Non-pressure chronic ulcer of right ankle with necrosis of muscle ([facility]) L97.313    Sepsis ([facility] [address] A41.9    Ankle wound, right, initial encounter S91.001A    Atrial fibrillation with rapid ventricular response ([facility]) I48.91    Hypokalemia E87.6    Venous (peripheral) insufficiency I87.2    Varicose vein of leg I83.90    Cellulitis of right leg L03.115    Arterial insufficiency ([facility]) I77.1    Coagulopathy ([facility]) D68.9       Allergies   Allergen Reactions    Codeine Nausea Only    Sulfa (Sulfonamide Antibiotics) Rash        Review of Systems:  A comprehensive review of systems was negative except for that written in the History of Present Illness. Objective:     Visit Vitals  BP (!) 94/34 (BP Patient Position: Standing)   Pulse 73   Temp 97.6 °F (36.4 °C)   Resp 16   Ht 6' 4\" (1.93 m)   Wt 137.8 kg (303 lb 12.8 oz)   SpO2 96%   BMI 36.98 kg/m²     Temp (24hrs), Av.8 °F (36.6 °C), Min:97.4 °F (36.3 °C), Max:98.1 °F (36.7 °C)       Lines:  Peripheral IV:       Physical Exam:    General:  Alert, cooperative, obese, well developed, appears stated age   Eyes:  Sclera anicteric. Pupils equally round and reactive to light. Mouth/Throat: Mucous membranes normal, oral pharynx clear   Neck: Supple   Lungs:   Clear to auscultation bilaterally, good effort   CV:  Regular rate and rhythm,soft 1/6 SM   Abdomen:   Soft, non-tender.  bowel sounds normal. non-distended   Extremities: RLE photos from ED reviewed; essentially no change: large area of demarcated erythema circumferential from ankle proximally to mid calf; shallow ulceration with minimal warmth; 2-3 plus diffuse edema; above and below erythema is scaly hypertrophic skin; + sloughing c/w tinea infection   Skin: Skin color, texture, turgor normal. no acute rash or lesions; right foot onychomycosis   Lymph nodes: Cervical and supraclavicular normal   Musculoskeletal: No swelling or deformity   Lines/Devices:  Intact, no erythema, drainage or tenderness   Psych: Alert and oriented, normal mood affect given the setting       Data Review:     CBC:  Recent Labs     20  0743 01/15/20  1554   WBC 8.7 10.7   GRANS 69 77   MONOS 10 8   EOS 3 2   ANEU 6.0 8.3*   ABL 1.5 1.3   HGB 12.4* 15.0   HCT 37.6* 44.3    207       BMP:  Recent Labs     20  0743 20  0614 01/15/20  1554   CREA 1.46 1.00 0.72*   BUN 15 14 11    141 140   K 3.9 4.0 4.2   * 110* 108*   CO2 28 26 28   AGAP 4* 5* 4*   * 88 96       LFTS:  Recent Labs     20  0614 01/15/20  1554   TBILI 0.5 0.7   ALT 14 18   SGOT 8* 17   AP 85 96 TP 6.4 7.4   ALB 3.0* 3.6       Microbiology:     All Micro Results     Procedure Component Value Units Date/Time    CULTURE, BLOOD [[id number]] Collected:  01/15/20 1744    Order Status:  Completed Specimen:  Blood Updated:  01/17/20 0740     Special Requests: --        LEFT  HAND       Culture result: NO GROWTH 2 DAYS       CULTURE, BLOOD [[id number]] Collected:  01/15/20 1747    Order Status:  Completed Specimen:  Blood Updated:  01/17/20 0740     Special Requests: --        RIGHT  HAND       Culture result: NO GROWTH 2 DAYS       CULTURE, WOUND Carola Aguirre STAIN [[id number]]     Order Status:  Canceled Specimen:  Wound Drainage           Imaging:   RLE ultrasound (01/15/20): IMPRESSION  Impression: No evidence of right lower extremity deep venous thrombosis. Incidental note of lymphadenopathy in the right proximal thigh, may be  hyperplastic or neoplastic. Clinical correlation is recommended.     Signed By: Clarissa Saha MD     January 17, 2020

## 2020-01-17 NOTE — PROGRESS NOTES
Problem: Falls - Risk of  Goal: *Absence of Falls  Description  Document Fredy Luis Fall Risk and appropriate interventions in the flowsheet.   Outcome: Progressing Towards Goal  Note: Fall Risk Interventions:  Mobility Interventions: Communicate number of staff needed for ambulation/transfer, OT consult for ADLs, Patient to call before getting OOB, PT Consult for mobility concerns, PT Consult for assist device competence         Medication Interventions: Evaluate medications/consider consulting pharmacy, Patient to call before getting OOB, Teach patient to arise slowly                   Problem: Patient Education: Go to Patient Education Activity  Goal: Patient/Family Education  Outcome: Progressing Towards Goal     Problem: Cellulitis Care Plan (Adult)  Goal: *Control of acute pain  Outcome: Progressing Towards Goal  Goal: *Skin integrity maintained  Outcome: Progressing Towards Goal  Goal: *Absence of infection signs and symptoms  Outcome: Progressing Towards Goal     Problem: Patient Education: Go to Patient Education Activity  Goal: Patient/Family Education  Outcome: Progressing Towards Goal     Problem: Patient Education: Go to Patient Education Activity  Goal: Patient/Family Education  Outcome: Progressing Towards Goal

## 2020-01-17 NOTE — PROGRESS NOTES
Hourly rounds completed, all needs have been met. Pt ambulated the halls this am. Dsg to R LE changed. New leads placed, old leads removed due to monitor not reading well. No other needs at this time.

## 2020-01-17 NOTE — PROGRESS NOTES
Hospitalist Progress Note    Subjective:   Daily Progress Note: 1/17/2020 11:06 AM    Patient presented to ER 1/15 with long standing right lower extremity chronic stasis dermatitis under care of wound clinic and vascular surgery. Initial injury was being struck with a rock in April of last year while mowing.  Had problems since with underlying vascular disease and chronic bilateral lower extremity lymphedema and stasis dermatitis.  Chronic wound with admission for cellulitis in November of last year, wound grew MSSA/citrobacter/proteus . At one point, wound involved muscle necrosis.  While he was hospitalized, he developed PAFand placed on cardizem and xaralto.  Spontaneously converted to [address] [place] [address] was cleared to return to work at [employer] 12/5, was doing well with all open areas scabbed over when pain, beefy erythema and weeping began to re-occur 2 days ago with sudden onset gush of clear fluid while at work today, soaking dressing and filling shoe.  Afebrile, does not appear septic.  Additionally, 12 yo son has had N/V/D illness with patient reporting 2 day history of nausea and diarrhea. Holding xaralto in case intervention is needed, using heparin SQ for DVT prophy. Admitted on zosyn, vanc, ID, wound, and vascular consults.  Fausto James history as noted below   1/16:  ID in, discontinue zosyn and vanc, begin cefazolin, cipro, fluconazole. PT in for eval.  Vascular in with recommendations for surgery, patient unable to comply in the past and present due to lack of funds. Per wound care and vascular, current condition will re-occur until surgery can take place. Vascular signing off. Feeling much better today with notable improvement of heat, bright erythema and edema. Continued puffy distal edema. Continues to soak wraps frequently. Reinstating xaralto, discontinuing heparin. GI symptoms subsided. 1/17:  Improving daily, beefy erythema subsiding.   Spoke with wife on phone, updating regarding plan and condition.      ADDITIONAL HISTORY:  Morbid obesity, anxiety, chronic and acute right lower leg wound/cellulitis with proteus/MSSA/citrobacter. Coagulopathy on xaralto, history of PAF, ED, DIANA, gout, hypertension, hyperlipidemia, OA, venous insufficiency      Current Facility-Administered Medications   Medication Dose Route Frequency    rivaroxaban (XARELTO) tablet 20 mg  20 mg Oral DAILY WITH DINNER    influenza vaccine 2019-20 (6 mos+)(PF) (FLUARIX/FLULAVAL/FLUZONE QUAD) injection 0.5 mL  0.5 mL IntraMUSCular PRIOR TO DISCHARGE    oxyCODONE IR (ROXICODONE) tablet 5 mg  5 mg Oral Q4H PRN    ceFAZolin (ANCEF) 1,000 mg in 0.9% sodium chloride (MBP/ADV) 50 mL ADV  1 g IntraVENous Q8H    ciprofloxacin HCl (CIPRO) tablet 750 mg  750 mg Oral Q12H    fluconazole (DIFLUCAN) tablet 200 mg  200 mg Oral DAILY    ondansetron (ZOFRAN) injection 4 mg  4 mg IntraVENous Q4H PRN    hyoscyamine SL (LEVSIN/SL) tablet 0.125 mg  0.125 mg SubLINGual Q4H PRN    allopurinoL (ZYLOPRIM) tablet 300 mg  300 mg Oral DAILY    cyclobenzaprine (FLEXERIL) tablet 5 mg  5 mg Oral QHS    dilTIAZem CD (CARDIZEM CD) capsule 240 mg  240 mg Oral DAILY    famotidine (PEPCID) tablet 40 mg  40 mg Oral BID    furosemide (LASIX) tablet 40 mg  40 mg Oral DAILY    lisinopril (PRINIVIL, ZESTRIL) tablet 40 mg  40 mg Oral DAILY    sodium chloride (NS) flush 5-40 mL  5-40 mL IntraVENous Q8H    sodium chloride (NS) flush 5-40 mL  5-40 mL IntraVENous PRN    acetaminophen (TYLENOL) tablet 650 mg  650 mg Oral Q4H PRN    morphine injection 4 mg  4 mg IntraVENous Q4H PRN    naloxone (NARCAN) injection 0.4 mg  0.4 mg IntraVENous PRN    sertraline (ZOLOFT) tablet 75 mg  75 mg Oral DAILY    tamsulosin (FLOMAX) capsule 0.4 mg  0.4 mg Oral DAILY    LORazepam (ATIVAN) injection 1 mg  1 mg IntraVENous Q4H PRN        Review of Systems  A comprehensive review of systems was negative except for that written in the HPI.     Objective:     Visit Vitals  BP 110/62 (BP 1 Location: Right arm, BP Patient Position: At rest)   Pulse 71   Temp 97.4 °F (36.3 °C)   Resp 16   Ht 6' 4\" (1.93 m)   Wt 137.8 kg (303 lb 12.8 oz)   SpO2 98%   BMI 36.98 kg/m²      O2 Device: Room air    Temp (24hrs), Av.8 °F (36.6 °C), Min:97.4 °F (36.3 °C), Max:98.1 °F (36.7 °C)     07 - 1900  In: -   Out: 950 [Urine:950]  01/15 1901 -  07  In: -   Out: 3300 [Urine:3300]    General appearance: Morbidly obese, unkempt.  Oriented and alert, cooperative, does not appear septic.  Reports feeling much better today. Head: Normocephalic, without obvious abnormality, atraumatic  Eyes: conjunctivae/corneas clear. PERRL  Throat: Lips, mucosa, and tongue normal. Teeth and gums normal  Neck: supple, symmetrical, trachea midline, and no JVD  Lungs: clear to auscultation bilaterally, diminished in bases  Heart: regular rate and rhythm, S1, S2 normal, no murmur, click, rub or gallop  Abdomen: Protuberant, soft, non-tender. Bowel sounds normal. No masses,  no organomegaly  Extremities: Right leg wrapped, dressing dry. no erythema above dressing. Foot erythema, edema and heat improved.   Left leg with chronic expansive edema with mild erythema, no wound, no weeping.  Skin thickened.  Upper extremities normal, atraumatic, no cyanosis or edema  Skin: Skin color, texture, turgor normal other than noted. No additional rashes or lesions  Neurologic: Grossly normal    Additional comments: Notes,orders, test results, vitals reviewed    Data Review  Recent Results (from the past 24 hour(s))   PLEASE READ & DOCUMENT PPD TEST IN 24 HRS    Collection Time: 20 11:25 PM   Result Value Ref Range    PPD Negative Negative    mm Induration 0 0 - 5 mm   METABOLIC PANEL, BASIC    Collection Time: 20  7:43 AM   Result Value Ref Range    Sodium 143 136 - 145 mmol/L    Potassium 3.9 3.5 - 5.1 mmol/L    Chloride 111 (H) 98 - 107 mmol/L    CO2 28 21 - 32 mmol/L    Anion gap 4 (L) 7 - 16 mmol/L Glucose 101 (H) 65 - 100 mg/dL    BUN 15 6 - 23 MG/DL    Creatinine 1.46 0.8 - 1.5 MG/DL    GFR est AA >60 >60 ml/min/1.73m2    GFR est non-AA 53 (L) >60 ml/min/1.73m2    Calcium 8.9 8.3 - 10.4 MG/DL   CBC WITH AUTOMATED DIFF    Collection Time: 01/17/20  7:43 AM   Result Value Ref Range    WBC 8.7 4.3 - 11.1 K/uL    RBC 3.60 (L) 4.23 - 5.6 M/uL    HGB 12.4 (L) 13.6 - 17.2 g/dL    HCT 37.6 (L) 41.1 - 50.3 %    .4 (H) 79.6 - 97.8 FL    MCH 34.4 (H) 26.1 - 32.9 PG    MCHC 33.0 31.4 - 35.0 g/dL    RDW 13.2 11.9 - 14.6 %    PLATELET 979 151 - 666 K/uL    MPV 10.7 9.4 - 12.3 FL    ABSOLUTE NRBC 0.00 0.0 - 0.2 K/uL    DF AUTOMATED      NEUTROPHILS 69 43 - 78 %    LYMPHOCYTES 17 13 - 44 %    MONOCYTES 10 4.0 - 12.0 %    EOSINOPHILS 3 0.5 - 7.8 %    BASOPHILS 1 0.0 - 2.0 %    IMMATURE GRANULOCYTES 1 0.0 - 5.0 %    ABS. NEUTROPHILS 6.0 1.7 - 8.2 K/UL    ABS. LYMPHOCYTES 1.5 0.5 - 4.6 K/UL    ABS. MONOCYTES 0.8 0.1 - 1.3 K/UL    ABS. EOSINOPHILS 0.3 0.0 - 0.8 K/UL    ABS. BASOPHILS 0.1 0.0 - 0.2 K/UL    ABS. IMM. GRANS. 0.0 0.0 - 0.5 K/UL      1/16:  ULTRASOUND LOWER EXTREMITY;  No evidence of right lower extremity deep venous thrombosis.   Incidental note of lymphadenopathy in the right proximal thigh, may be hyperplastic or neoplastic.  Clinical correlation is recommended     BLOOD CULTURE:  2/2:  NGTD    Assessment/Plan:   Cellulitis of right leg:  Hospitalization 11/19 for same               Blood cultures with NGTD   Wound culture:  ORDERED, NOT DONE              SPVWI care in, will follow: new boot   In  room      ID in 1/15:  abx changed to ancef, cipro  po, and diflucan              Vascular surgery in:  Needs surgery in              order for wounds to heal permanently,   patient unable to afford                         Monitor distal circulation               PT, OT, CM on board               Doppler RLE negative for DVT               Analgesics      Chronic venous hypertension with ulcer and inflammation involving right side under care of Vascular surgery               Underlying lymphedema               Needs surgery as above, unable to afford    PER ID:  Once cellulitis has been treated   Will need strategy of edema of edema  management with compression. On  discharge will need terbinafine.   Also on  discharge recommends to treat  onychomycosis a a port for cellulits    PAD/PVD: Miles Mac reinstated after negative  doppler RLE                 Nausea and diarrhea: exposed to Infectious gastritis:  Improving               Stool cultures if diarrhea continues              Antiemetics          History of PAF on xaralto and cardizem              Currently in SR              Remote telemetry              Xaralto, reinstated 1/16               EF:  55%     History of gout:  Continue home allopurinol               Uric acid level WNL 1/16      DIANA:  Continue home meds     HTN:  Continue home meds     Obesity, morbid      Coagulopathy on xaralto:  reinstate tomorrow     Care Plan discussed with: Patient, wife and Nurse    Signed By: Caitlyn Morris NP     January 17, 2020

## 2020-01-17 NOTE — PROGRESS NOTES
01/17/20 1218 01/17/20 1221 01/17/20 1227   Vitals   Pulse (Heart Rate) 69 70 73   O2 Sat (%) 95 % 97 % 96 %   Level of Consciousness Alert Alert Alert   BP 95/65 113/72 (!) 94/34   MAP (Calculated) 75 86 (!) 54   BP Patient Position Supine Sitting Standing     Orthostatic BPs. Pt alert and oriented eating lunch. Pt did state that he becomes light headed at times when standing.

## 2020-01-17 NOTE — PROGRESS NOTES
GRAYSON [facility] would [facility] to the hospital today. CM notified patient very thankful!

## 2020-01-17 NOTE — PROGRESS NOTES
Hourly rounds completed this shift. Pt laying in bed resting quietly with wife at bedside. Nausea relieved per MAR. All needs met at this time. Bed low/locked. Call light within reach. Will continue to monitor and give bedside report to oncoming nurse.

## 2020-01-17 NOTE — PROGRESS NOTES
Spoke with Pt. Pt expressed his struggle over giving things back to God. Requested prayer from ice.  offered prayer. Brought comfort to Pt. No additional needs. Please consult Spiritual Care as needed. Omar Patton, [employer].

## 2020-01-17 NOTE — PROGRESS NOTES
Problem: Self Care Deficits Care Plan (Adult)  Goal: *Acute Goals and Plan of Care (Insert Text)  Description  1. Patient will complete total body bathing and dressing with independence. 2. Patient will complete therapeutic exercises for 15 minutes to improve strength for ADL/functional transfers. 3. Patient will tolerate 30 minutes of OT treatment with 1-2 rest breaks to increase activity tolerance for ADLs. 4. Patient will complete dynamic standing balance for ADL tasks with good balance to decrease risk for falls. 5. Patient will complete functional mobility with independence and good safety. Timeframe: 7 visits      Outcome: Progressing Towards Goal     OCCUPATIONAL THERAPY: Initial Assessment, Daily Note, and PM 1/17/2020  INPATIENT: OT Visit Days: 1  Payor: Jaylan Jovel / Plan: [place] [facility] / Product Type: PPO /      NAME/AGE/GENDER: Jennifer Byrne is a 53 y.o. male   PRIMARY DIAGNOSIS:  Cellulitis of right leg [L03.115] Cellulitis of right leg Cellulitis of right leg        ICD-10: Treatment Diagnosis:    Generalized Muscle Weakness (M62.81)  Other lack of cordination (R27.8)  Localized edema (R60.1)   Precautions/Allergies:     Codeine and Sulfa (sulfonamide antibiotics)      ASSESSMENT:     Mr. Alexis Quinones presents to the hospital with cellulitis of the R LE. Pt reports hx of recent hospitalization in November with the same issue. Pt typically lives with his wife and is independent with ADL/functional mobility at baseline. Pt does report a hx of multiple near falls. Pt still works at [employer] and states he has been working there for the past 18 years. Pt was supine in the bed upon arrival. Pt is alert and oriented x 4. Pt reports 7/10 pain in the R LE with movement. Pt reports that he has been getting up and moving around by himself despite reporting episodes of orthostatic hypotension today. Pt completed bed mobility with modified independence.  Pt donned socks at the edge of the bed with supervision and some additional time. Pt has new PRAFO boot but declines to wear it despite encouragement due to wanting to don after wound was re-wrapped. Pt completed functional mobility into the bathroom and toileting with supervision. Pt was completed functional mobility around in the room with supervision and no major loss of balance. Pt set-up in the chair at the end of the session with LEs elevated. Pt verbalizes concerns about restrictions at work once returning due to limited standing tolerance. Pt will benefit from OT services to address stated goals and plan of care. This section established at most recent assessment   PROBLEM LIST (Impairments causing functional limitations):  Decreased Strength  Decreased ADL/Functional Activities  Decreased Transfer Abilities  Decreased Ambulation Ability/Technique  Decreased Balance  Increased Pain  Decreased Activity Tolerance  Decreased Flexibility/Joint Mobility  Edema/Girth  Decreased Skin Integrity/Hygeine  Decreased [place] with Home Exercise Program   INTERVENTIONS PLANNED: (Benefits and precautions of occupational therapy have been discussed with the patient.)  Activities of daily living training  Adaptive equipment training  Balance training  Clothing management  Cognitive training  Neuromuscular re-eduation  Therapeutic activity  Therapeutic exercise     TREATMENT PLAN: Frequency/Duration: Follow patient 2 times per week to address above goals. Rehabilitation Potential For Stated Goals: Excellent     REHAB RECOMMENDATIONS (at time of discharge pending progress):    Placement: It is my opinion, based on this patient's performance to date, that Mr. Eliazar Landrum may benefit from participating in 1-2 additional therapy sessions in order to continue to assess for rehab potential and then make recommendation for disposition at discharge.   Equipment:   TBD               OCCUPATIONAL PROFILE AND HISTORY:   History of Present Injury/Illness (Reason for Referral):  See H&P  Past Medical History/Comorbidities:   Mr. Romeo Metzger  has a past medical history of Anxiety, Cellulitis of leg (11/17/2019), Coagulopathy ([facility] [address] (1/15/2020), Erectile dysfunction (1/20/2014), DIANA (generalized anxiety disorder) (5/7/2014), Gout (1/20/2014), History of gout (1/20/2014), History of peptic ulcer, HTN (hypertension) (3/16/2015), Hypertension, Obesity, morbid ([facility] [address] (2/26/2018), Osteoarthritis of hand, primary localized (1/20/2014), Personal history of urinary calculi, Venous (peripheral) insufficiency (12/3/2019), and Wart (1/20/2014). Mr. Romeo Metzger  has a past surgical history that includes hx wisdom teeth extraction. Social History/Living Environment:   Home Environment: Private residence  # Steps to Enter: 2  One/Two Story Residence: One story  Living Alone: No  Support Systems: Spouse/Significant Other/Partner, Family member(s)  Patient Expects to be Discharged to[de-identified] Apartment  Current DME Used/Available at Home: None  Tub or Shower Type: Tub/Shower combination  Prior Level of Function/Work/Activity:  Pt typically lives with his wife and is independent with ADL/functional mobility at baseline. Pt does report a hx of multiple near falls. Pt still works at [employer] and states he has been working there for the past 18 years. Personal Factors:          Past/Current Experience:  multiple hospital admissions   Number of Personal Factors/Comorbidities that affect the Plan of Care: Expanded review of therapy/medical records (1-2):  MODERATE COMPLEXITY   ASSESSMENT OF OCCUPATIONAL PERFORMANCE[de-identified]   Activities of Daily Living:   Basic ADLs (From Assessment) Complex ADLs (From Assessment)   Feeding: Independent  Oral Facial Hygiene/Grooming: Independent  Bathing: Stand-by assistance  Upper Body Dressing: Modified independent  Lower Body Dressing: Stand-by assistance  Toileting: Supervision Instrumental ADL  Meal Preparation: Minimum assistance  Homemaking:  Moderate assistance Grooming/Bathing/Dressing Activities of Daily Living     Cognitive Retraining  Safety/Judgement: Fall prevention                       Bed/Mat Mobility  Rolling: Modified independent  Supine to Sit: Modified independent  Sit to Stand: Stand-by assistance  Stand to Sit: Stand-by assistance  Scooting: Independent     Most Recent Physical Functioning:   Gross Assessment:  AROM: Within functional limits  Strength: Generally decreased, functional               Posture:     Balance:  Sitting: Intact  Standing: Impaired  Standing - Static: Good  Standing - Dynamic : Fair Bed Mobility:  Rolling: Modified independent  Supine to Sit: Modified independent  Scooting: Independent  Wheelchair Mobility:     Transfers:  Sit to Stand: Stand-by assistance  Stand to Sit: Stand-by assistance            Patient Vitals for the past 6 hrs:   BP BP Patient Position SpO2 Pulse   01/17/20 1152 115/69 At rest 96 % 70   01/17/20 1218 95/65 Supine 95 % 69   01/17/20 1221 113/72 Sitting 97 % 70   01/17/20 1227 (!) 94/34 Standing 96 % 73       Mental Status  Neurologic State: Alert  Orientation Level: Oriented X4  Cognition: Follows commands  Perception: Appears intact  Perseveration: No perseveration noted  Safety/Judgement: Fall prevention                          Physical Skills Involved:  Range of Motion  Balance  Strength  Activity Tolerance  Pain (acute)  Edema  Skin Integrity Cognitive Skills Affected (resulting in the inability to perform in a timely and safe manner):  none  Psychosocial Skills Affected:  Habits/Routines   Number of elements that affect the Plan of Care: 5+:  HIGH COMPLEXITY   CLINICAL DECISION MAKING:   [facility] AM-PAC 6 Clicks   Daily Activity Inpatient Short Form  How much help from another person does the patient currently need. .. Total A Lot A Little None   1. Putting on and taking off regular lower body clothing? [] 1   [] 2   [x] 3   [] 4   2. Bathing (including washing, rinsing, drying)?    [] 1 [] 2   [x] 3   [] 4   3. Toileting, which includes using toilet, bedpan or urinal?   [] 1   [] 2   [x] 3   [] 4   4. Putting on and taking off regular upper body clothing? [] 1   [] 2   [] 3   [x] 4   5. Taking care of personal grooming such as brushing teeth? [] 1   [] 2   [] 3   [x] 4   6. Eating meals? [] 1   [] 2   [] 3   [x] 4   © 2007, Trustees of [address] [place] [address] [address] [address], under license to [employer]. All rights reserved      Score:  Initial: 21 Most Recent: X (Date: -- )    Interpretation of Tool:  Represents activities that are increasingly more difficult (i.e. Bed mobility, Transfers, Gait). Medical Necessity:     Patient demonstrates   excellent   rehab potential due to higher previous functional level. Reason for Services/Other Comments:  Patient continues to require skilled intervention due to   Decreased independence with ADL/functional transfers   . Use of outcome tool(s) and clinical judgement create a POC that gives a: LOW COMPLEXITY         TREATMENT:   (In addition to Assessment/Re-Assessment sessions the following treatments were rendered)     Pre-treatment Symptoms/Complaints:    Pain: Initial:   Pain Intensity 1: 7  Pain Location 1: Leg  Pain Orientation 1: Right  Pain Intervention(s) 1: Repositioned  Post Session:  same     Self Care: (8 minutes): Procedure(s) (per grid) utilized to improve and/or restore self-care/home management as related to dressing and grooming. Required minimal visual, verbal, and tactile cueing to facilitate activities of daily living skills and compensatory activities. Braces/Orthotics/Lines/Etc:   O2 Device: Room air  Treatment/Session Assessment:    Response to Treatment:  Pt tolerated it well with minimal complaints. Interdisciplinary Collaboration:   Occupational Therapist  Registered Nurse  After treatment position/precautions:   Up in chair  Bed/Chair-wheels locked  Call light within reach  RN notified   Compliance with Program/Exercises:  Will assess as treatment progresses. Recommendations/Intent for next treatment session: \"Next visit will focus on advancements to more challenging activities and reduction in assistance provided\".   Total Treatment Duration:  OT Patient Time In/Time Out  Time In: 1455  Time Out: Khang Cabrera 9, OT

## 2020-01-18 LAB
ANION GAP SERPL CALC-SCNC: 5 MMOL/L (ref 7–16)
BASOPHILS # BLD: 0.1 K/UL (ref 0–0.2)
BASOPHILS NFR BLD: 1 % (ref 0–2)
BUN SERPL-MCNC: 14 MG/DL (ref 6–23)
CALCIUM SERPL-MCNC: 9.1 MG/DL (ref 8.3–10.4)
CHLORIDE SERPL-SCNC: 111 MMOL/L (ref 98–107)
CO2 SERPL-SCNC: 28 MMOL/L (ref 21–32)
CREAT SERPL-MCNC: 1.43 MG/DL (ref 0.8–1.5)
DIFFERENTIAL METHOD BLD: ABNORMAL
EOSINOPHIL # BLD: 0.4 K/UL (ref 0–0.8)
EOSINOPHIL NFR BLD: 4 % (ref 0.5–7.8)
ERYTHROCYTE [DISTWIDTH] IN BLOOD BY AUTOMATED COUNT: 12.9 % (ref 11.9–14.6)
EST. AVERAGE GLUCOSE BLD GHB EST-MCNC: 123 MG/DL
GLUCOSE SERPL-MCNC: 138 MG/DL (ref 65–100)
HBA1C MFR BLD: 5.9 % (ref 4.8–6)
HCT VFR BLD AUTO: 37.2 % (ref 41.1–50.3)
HCV AB S/CO SERPL IA: <0.1 S/CO RATIO (ref 0–0.9)
HCV AB SERPL QL IA: NORMAL
HGB BLD-MCNC: 12.6 G/DL (ref 13.6–17.2)
HIV 1+2 AB+HIV1 P24 AG SERPL QL IA: NON REACTIVE
IMM GRANULOCYTES # BLD AUTO: 0 K/UL (ref 0–0.5)
IMM GRANULOCYTES NFR BLD AUTO: 0 % (ref 0–5)
LYMPHOCYTES # BLD: 1.6 K/UL (ref 0.5–4.6)
LYMPHOCYTES NFR BLD: 19 % (ref 13–44)
MCH RBC QN AUTO: 35.2 PG (ref 26.1–32.9)
MCHC RBC AUTO-ENTMCNC: 33.9 G/DL (ref 31.4–35)
MCV RBC AUTO: 103.9 FL (ref 79.6–97.8)
MM INDURATION POC: 0 MM (ref 0–5)
MONOCYTES # BLD: 0.9 K/UL (ref 0.1–1.3)
MONOCYTES NFR BLD: 10 % (ref 4–12)
NEUTS SEG # BLD: 5.4 K/UL (ref 1.7–8.2)
NEUTS SEG NFR BLD: 65 % (ref 43–78)
NRBC # BLD: 0 K/UL (ref 0–0.2)
PLATELET # BLD AUTO: 150 K/UL (ref 150–450)
PMV BLD AUTO: 10.8 FL (ref 9.4–12.3)
POTASSIUM SERPL-SCNC: 3.6 MMOL/L (ref 3.5–5.1)
PPD POC: NEGATIVE NEGATIVE
RBC # BLD AUTO: 3.58 M/UL (ref 4.23–5.6)
SODIUM SERPL-SCNC: 144 MMOL/L (ref 136–145)
WBC # BLD AUTO: 8.2 K/UL (ref 4.3–11.1)

## 2020-01-18 PROCEDURE — 80048 BASIC METABOLIC PNL TOTAL CA: CPT

## 2020-01-18 PROCEDURE — 74011250636 HC RX REV CODE- 250/636: Performed by: INTERNAL MEDICINE

## 2020-01-18 PROCEDURE — 83036 HEMOGLOBIN GLYCOSYLATED A1C: CPT

## 2020-01-18 PROCEDURE — 85025 COMPLETE CBC W/AUTO DIFF WBC: CPT

## 2020-01-18 PROCEDURE — 74011250636 HC RX REV CODE- 250/636: Performed by: NURSE PRACTITIONER

## 2020-01-18 PROCEDURE — 36415 COLL VENOUS BLD VENIPUNCTURE: CPT

## 2020-01-18 PROCEDURE — 74011000258 HC RX REV CODE- 258: Performed by: INTERNAL MEDICINE

## 2020-01-18 PROCEDURE — 74011250637 HC RX REV CODE- 250/637: Performed by: NURSE PRACTITIONER

## 2020-01-18 PROCEDURE — 74011250637 HC RX REV CODE- 250/637: Performed by: INTERNAL MEDICINE

## 2020-01-18 PROCEDURE — 65660000000 HC RM CCU STEPDOWN

## 2020-01-18 RX ADMIN — CIPROFLOXACIN HYDROCHLORIDE 750 MG: 500 TABLET, FILM COATED ORAL at 22:18

## 2020-01-18 RX ADMIN — SERTRALINE HYDROCHLORIDE 75 MG: 50 TABLET ORAL at 08:37

## 2020-01-18 RX ADMIN — CYCLOBENZAPRINE 5 MG: 10 TABLET, FILM COATED ORAL at 22:18

## 2020-01-18 RX ADMIN — SODIUM CHLORIDE 1000 MG: 900 INJECTION, SOLUTION INTRAVENOUS at 04:58

## 2020-01-18 RX ADMIN — ALLOPURINOL 300 MG: 300 TABLET ORAL at 08:37

## 2020-01-18 RX ADMIN — SODIUM CHLORIDE 1000 MG: 900 INJECTION, SOLUTION INTRAVENOUS at 14:49

## 2020-01-18 RX ADMIN — SODIUM CHLORIDE 1000 MG: 900 INJECTION, SOLUTION INTRAVENOUS at 22:18

## 2020-01-18 RX ADMIN — FAMOTIDINE 40 MG: 20 TABLET, FILM COATED ORAL at 17:47

## 2020-01-18 RX ADMIN — CIPROFLOXACIN HYDROCHLORIDE 750 MG: 500 TABLET, FILM COATED ORAL at 08:37

## 2020-01-18 RX ADMIN — Medication 10 ML: at 14:49

## 2020-01-18 RX ADMIN — DILTIAZEM HYDROCHLORIDE 240 MG: 120 CAPSULE, COATED, EXTENDED RELEASE ORAL at 08:36

## 2020-01-18 RX ADMIN — ACETAMINOPHEN 650 MG: 325 TABLET, FILM COATED ORAL at 22:17

## 2020-01-18 RX ADMIN — RIVAROXABAN 20 MG: 20 TABLET, FILM COATED ORAL at 17:47

## 2020-01-18 RX ADMIN — TAMSULOSIN HYDROCHLORIDE 0.4 MG: 0.4 CAPSULE ORAL at 08:37

## 2020-01-18 RX ADMIN — FAMOTIDINE 40 MG: 20 TABLET, FILM COATED ORAL at 08:37

## 2020-01-18 RX ADMIN — ONDANSETRON 4 MG: 2 INJECTION INTRAMUSCULAR; INTRAVENOUS at 08:43

## 2020-01-18 RX ADMIN — Medication 10 ML: at 22:22

## 2020-01-18 RX ADMIN — FUROSEMIDE 40 MG: 40 TABLET ORAL at 08:37

## 2020-01-18 RX ADMIN — OXYCODONE HYDROCHLORIDE 5 MG: 5 TABLET ORAL at 01:04

## 2020-01-18 RX ADMIN — FLUCONAZOLE 200 MG: 100 TABLET ORAL at 08:39

## 2020-01-18 RX ADMIN — Medication 10 ML: at 04:58

## 2020-01-18 NOTE — PROGRESS NOTES
Hourly rounds performed. Zofran 4 mg IV given once @0843 for complaints of nausea. Will continue to monitor.

## 2020-01-18 NOTE — PROGRESS NOTES
Hospitalist Progress Note    Subjective:   Daily Progress Note: 1/18/2020 11:43 AM    Patient presented to ER 1/15 with long standing right lower extremity chronic stasis dermatitis under care of wound clinic and vascular surgery. Initial injury was being struck with a rock in April of last year while mowing.  Had problems since with underlying vascular disease and chronic bilateral lower extremity lymphedema and stasis dermatitis.  Chronic wound with admission for cellulitis in November of last year, wound grew MSSA/citrobacter/proteus . At one point, wound involved muscle necrosis.  While he was hospitalized, he developed PAFand placed on cardizem and xaralto.  Spontaneously converted to [place] was cleared to return to work at [employer] 12/5, was doing well with all open areas scabbed over when pain, beefy erythema and weeping began to re-occur 2 days ago with sudden onset gush of clear fluid while at work today, soaking dressing and filling shoe. Afebrile, does not appear septic.  Additionally, 14 yo son has had N/V/D illness with patient reporting 2 day history of nausea and diarrhea.  Holding xaralto in case intervention is needed, using heparin SQ for DVT prophy.   Admitted on zosyn, vanc, ID, wound, and vascular consults.   Additional history as noted below   1/16:  ID in, discontinue zosyn and vanc, begin cefazolin, cipro, fluconazole. PT in for eval.  Vascular in with recommendations for surgery, patient unable to comply in the past and present due to lack of funds.  Per wound care and vascular, current condition will re-occur until surgery can take place.  Vascular signing off.  Feeling much better today with notable improvement of heat, bright erythema and edema.  Continued puffy distal edema.  Continues to soak wraps frequently.  Reinstating xaralto, discontinuing heparin. GI symptoms subsided. 1/17:  Improving daily, beefy erythema subsiding.   Spoke with wife on phone, updating regarding plan and condition. ORTHOSTATIC VITALS:   LIE: 95/65: 75  SIT: 113/72:  86  STAND:  94/34: 54      1/18:  Improving with receding edema, erythema and pain daily. In good spirits. Hoping to go home Monday 1/20. Will need ongoing wound care and antibiotics per ID.      ADDITIONAL HISTORY:  Morbid obesity, anxiety, chronic and acute right lower leg wound/cellulitis with proteus/MSSA/citrobacter.  Coagulopathy on xaralto, history of PAF, ED, DIANA, gout, hypertension, hyperlipidemia, OA, venous insufficiency     Current Facility-Administered Medications   Medication Dose Route Frequency    rivaroxaban (XARELTO) tablet 20 mg  20 mg Oral DAILY WITH DINNER    influenza vaccine 2019-20 (6 mos+)(PF) (FLUARIX/FLULAVAL/FLUZONE QUAD) injection 0.5 mL  0.5 mL IntraMUSCular PRIOR TO DISCHARGE    oxyCODONE IR (ROXICODONE) tablet 5 mg  5 mg Oral Q4H PRN    ceFAZolin (ANCEF) 1,000 mg in 0.9% sodium chloride (MBP/ADV) 50 mL ADV  1 g IntraVENous Q8H    ciprofloxacin HCl (CIPRO) tablet 750 mg  750 mg Oral Q12H    fluconazole (DIFLUCAN) tablet 200 mg  200 mg Oral DAILY    ondansetron (ZOFRAN) injection 4 mg  4 mg IntraVENous Q4H PRN    hyoscyamine SL (LEVSIN/SL) tablet 0.125 mg  0.125 mg SubLINGual Q4H PRN    allopurinoL (ZYLOPRIM) tablet 300 mg  300 mg Oral DAILY    cyclobenzaprine (FLEXERIL) tablet 5 mg  5 mg Oral QHS    dilTIAZem CD (CARDIZEM CD) capsule 240 mg  240 mg Oral DAILY    famotidine (PEPCID) tablet 40 mg  40 mg Oral BID    furosemide (LASIX) tablet 40 mg  40 mg Oral DAILY    lisinopril (PRINIVIL, ZESTRIL) tablet 40 mg  40 mg Oral DAILY    sodium chloride (NS) flush 5-40 mL  5-40 mL IntraVENous Q8H    sodium chloride (NS) flush 5-40 mL  5-40 mL IntraVENous PRN    acetaminophen (TYLENOL) tablet 650 mg  650 mg Oral Q4H PRN    morphine injection 4 mg  4 mg IntraVENous Q4H PRN    naloxone (NARCAN) injection 0.4 mg  0.4 mg IntraVENous PRN    sertraline (ZOLOFT) tablet 75 mg  75 mg Oral DAILY    tamsulosin (FLOMAX) capsule 0.4 mg  0.4 mg Oral DAILY    LORazepam (ATIVAN) injection 1 mg  1 mg IntraVENous Q4H PRN      Review of Systems  A comprehensive review of systems was negative except for that written in the HPI. Objective:     Visit Vitals  /74 (BP 1 Location: Left arm, BP Patient Position: At rest)   Pulse 78   Temp 98.5 °F (36.9 °C)   Resp 20   Ht 6' 4\" (1.93 m)   Wt 137.4 kg (303 lb)   SpO2 93%   BMI 36.88 kg/m²      O2 Device: Room air    Temp (24hrs), Av °F (36.7 °C), Min:97.6 °F (36.4 °C), Max:98.5 °F (36.9 °C)     1901 -  0700  In: -   Out: 2150 [Urine:2150]    General appearance: Morbidly obese.   Oriented and alert, cooperative, does not appear septic.  Reports feeling better daily.   Head: Normocephalic, without obvious abnormality, atraumatic  Eyes: conjunctivae/corneas clear. PERRL  Throat: Lips, mucosa, and tongue normal. Teeth and gums normal  Neck: supple, symmetrical, trachea midline, and no JVD  Lungs: clear to auscultation bilaterally, diminished in bases  Heart: regular rate and rhythm, S1, S2 normal, no murmur, click, rub or gallop  Abdomen: Protuberant, soft, non-tender.  Bowel sounds normal. No masses,  no organomegaly  Extremities: Right leg wrapped, dressing dry.  no erythema above dressing.  Foot erythema, edema and heat improved.  Left leg with chronic expansive edema with mild erythema, no wound, no weeping.  Skin thickened.  Upper extremities normal, atraumatic, no cyanosis or edema  Skin: Skin color, texture, turgor normal other than noted. No additional rashes or lesions  Neurologic: Grossly normal     Additional comments: Notes,orders, test results, vitals reviewed    Data Review  Recent Results (from the past 24 hour(s))   PLEASE READ & DOCUMENT PPD TEST IN 48 HRS    Collection Time: 20 11:25 PM   Result Value Ref Range    PPD Negative Negative    mm Induration 0 0 - 5 mm   METABOLIC PANEL, BASIC    Collection Time: 20  3:15 AM   Result Value Ref Range Sodium 144 136 - 145 mmol/L    Potassium 3.6 3.5 - 5.1 mmol/L    Chloride 111 (H) 98 - 107 mmol/L    CO2 28 21 - 32 mmol/L    Anion gap 5 (L) 7 - 16 mmol/L    Glucose 138 (H) 65 - 100 mg/dL    BUN 14 6 - 23 MG/DL    Creatinine 1.43 0.8 - 1.5 MG/DL    GFR est AA >60 >60 ml/min/1.73m2    GFR est non-AA 55 (L) >60 ml/min/1.73m2    Calcium 9.1 8.3 - 10.4 MG/DL   CBC WITH AUTOMATED DIFF    Collection Time: 01/18/20  3:15 AM   Result Value Ref Range    WBC 8.2 4.3 - 11.1 K/uL    RBC 3.58 (L) 4.23 - 5.6 M/uL    HGB 12.6 (L) 13.6 - 17.2 g/dL    HCT 37.2 (L) 41.1 - 50.3 %    .9 (H) 79.6 - 97.8 FL    MCH 35.2 (H) 26.1 - 32.9 PG    MCHC 33.9 31.4 - 35.0 g/dL    RDW 12.9 11.9 - 14.6 %    PLATELET 180 740 - 847 K/uL    MPV 10.8 9.4 - 12.3 FL    ABSOLUTE NRBC 0.00 0.0 - 0.2 K/uL    DF AUTOMATED      NEUTROPHILS 65 43 - 78 %    LYMPHOCYTES 19 13 - 44 %    MONOCYTES 10 4.0 - 12.0 %    EOSINOPHILS 4 0.5 - 7.8 %    BASOPHILS 1 0.0 - 2.0 %    IMMATURE GRANULOCYTES 0 0.0 - 5.0 %    ABS. NEUTROPHILS 5.4 1.7 - 8.2 K/UL    ABS. LYMPHOCYTES 1.6 0.5 - 4.6 K/UL    ABS. MONOCYTES 0.9 0.1 - 1.3 K/UL    ABS. EOSINOPHILS 0.4 0.0 - 0.8 K/UL    ABS. BASOPHILS 0.1 0.0 - 0.2 K/UL    ABS. IMM.  GRANS. 0.0 0.0 - 0.5 K/UL     1/16:  ULTRASOUND LOWER EXTREMITY;  No evidence of right lower extremity deep venous thrombosis.  Incidental note of lymphadenopathy in the right proximal thigh, may be hyperplastic or neoplastic.  Clinical correlation is recommended      1/14:  BLOOD CULTURE:  2/2:  NGTD    1/15:  BLOOD CULTURE:  2/2:  NGTD    Assessment/Plan:   Cellulitis of right leg:  Hospitalization 11/19 for same               Blood cultures 1/14 AND 1/15 with NGTD              Wound culture:  ORDERED, NOT DONE              SUUKY care in, will follow: new boot in  room                 ID in 1/15:  abx changed to ancef, cipro  po, and diflucan              Vascular surgery in:  Needs surgery in  order for wounds to heal   permanently, patient unable to afford,  past or current                      Monitor distal circulation               PT, OT, CM on board               Doppler RLE negative for DVT               Analgesics      Chronic venous hypertension with ulcer and inflammation involving right side under care of Vascular surgery               Underlying lymphedema               Needs surgery as above, unable to afford              PER ID:  Once cellulitis has been treated,  will need strategy of edema  management with compression. On    discharge will need terbinafine.   Also on  discharge recommends to treat  onychomycosis a a port for cellulits     PAD/PVD: [place] reinstated after negative doppler RLE                 Nausea and diarrhea: exposed to Infectious gastritis:  Improving               Stool cultures if diarrhea continues              Antiemetics          History of PAF on xaralto and cardizem              Currently in SR              Remote telemetry              Xaralto, reinstated 1/16               EF:  55%     History of gout:  Continue home allopurinol               Uric acid level WNL 1/16      DIANA:  Continue home meds     HTN:  Continue home meds     Obesity, morbid      Coagulopathy on xaralto:  reinstated 1/16    Hyperglycemia:  A1C: 5.5    Care Plan discussed with: Patient, wife and Nurse    Signed By: Briseida Martinez NP     January 18, 2020

## 2020-01-18 NOTE — PROGRESS NOTES
Hourly rounds performed. All needs met. Bed is in low position and call light is within reach. Pt complained of pain 2x during shift and pain was managed per MAR. Drsg has been changed during shift. Will continue to monitor and report to oncoming nurse.

## 2020-01-18 NOTE — PROGRESS NOTES
RLE drsg changed. Cleansed with wound cleanser, anti-fungal cream applied then xeroform w/ abd pads and kerlix.

## 2020-01-19 LAB
ANION GAP SERPL CALC-SCNC: 5 MMOL/L (ref 7–16)
BASOPHILS # BLD: 0.1 K/UL (ref 0–0.2)
BASOPHILS NFR BLD: 1 % (ref 0–2)
BUN SERPL-MCNC: 13 MG/DL (ref 6–23)
CALCIUM SERPL-MCNC: 9.2 MG/DL (ref 8.3–10.4)
CHLORIDE SERPL-SCNC: 111 MMOL/L (ref 98–107)
CO2 SERPL-SCNC: 29 MMOL/L (ref 21–32)
CREAT SERPL-MCNC: 1.35 MG/DL (ref 0.8–1.5)
DIFFERENTIAL METHOD BLD: ABNORMAL
EOSINOPHIL # BLD: 0.4 K/UL (ref 0–0.8)
EOSINOPHIL NFR BLD: 5 % (ref 0.5–7.8)
ERYTHROCYTE [DISTWIDTH] IN BLOOD BY AUTOMATED COUNT: 12.9 % (ref 11.9–14.6)
GLUCOSE SERPL-MCNC: 87 MG/DL (ref 65–100)
HCT VFR BLD AUTO: 38.4 % (ref 41.1–50.3)
HGB BLD-MCNC: 12.9 G/DL (ref 13.6–17.2)
IMM GRANULOCYTES # BLD AUTO: 0 K/UL (ref 0–0.5)
IMM GRANULOCYTES NFR BLD AUTO: 0 % (ref 0–5)
LYMPHOCYTES # BLD: 1.9 K/UL (ref 0.5–4.6)
LYMPHOCYTES NFR BLD: 22 % (ref 13–44)
MCH RBC QN AUTO: 35.1 PG (ref 26.1–32.9)
MCHC RBC AUTO-ENTMCNC: 33.6 G/DL (ref 31.4–35)
MCV RBC AUTO: 104.3 FL (ref 79.6–97.8)
MONOCYTES # BLD: 0.7 K/UL (ref 0.1–1.3)
MONOCYTES NFR BLD: 8 % (ref 4–12)
NEUTS SEG # BLD: 5.5 K/UL (ref 1.7–8.2)
NEUTS SEG NFR BLD: 65 % (ref 43–78)
NRBC # BLD: 0 K/UL (ref 0–0.2)
PLATELET # BLD AUTO: 193 K/UL (ref 150–450)
PMV BLD AUTO: 10.3 FL (ref 9.4–12.3)
POTASSIUM SERPL-SCNC: 4 MMOL/L (ref 3.5–5.1)
RBC # BLD AUTO: 3.68 M/UL (ref 4.23–5.6)
SODIUM SERPL-SCNC: 145 MMOL/L (ref 136–145)
WBC # BLD AUTO: 8.5 K/UL (ref 4.3–11.1)

## 2020-01-19 PROCEDURE — 36415 COLL VENOUS BLD VENIPUNCTURE: CPT

## 2020-01-19 PROCEDURE — 80048 BASIC METABOLIC PNL TOTAL CA: CPT

## 2020-01-19 PROCEDURE — 74011000258 HC RX REV CODE- 258: Performed by: INTERNAL MEDICINE

## 2020-01-19 PROCEDURE — 74011250637 HC RX REV CODE- 250/637: Performed by: NURSE PRACTITIONER

## 2020-01-19 PROCEDURE — 74011250637 HC RX REV CODE- 250/637: Performed by: INTERNAL MEDICINE

## 2020-01-19 PROCEDURE — 65660000000 HC RM CCU STEPDOWN

## 2020-01-19 PROCEDURE — 85025 COMPLETE CBC W/AUTO DIFF WBC: CPT

## 2020-01-19 PROCEDURE — 74011250636 HC RX REV CODE- 250/636: Performed by: INTERNAL MEDICINE

## 2020-01-19 RX ORDER — DIPHENHYDRAMINE HCL 25 MG
25 CAPSULE ORAL
Status: DISCONTINUED | OUTPATIENT
Start: 2020-01-19 | End: 2020-01-22 | Stop reason: HOSPADM

## 2020-01-19 RX ADMIN — FUROSEMIDE 40 MG: 40 TABLET ORAL at 08:58

## 2020-01-19 RX ADMIN — SODIUM CHLORIDE 1000 MG: 900 INJECTION, SOLUTION INTRAVENOUS at 21:39

## 2020-01-19 RX ADMIN — DILTIAZEM HYDROCHLORIDE 240 MG: 120 CAPSULE, COATED, EXTENDED RELEASE ORAL at 08:54

## 2020-01-19 RX ADMIN — ALLOPURINOL 300 MG: 300 TABLET ORAL at 08:56

## 2020-01-19 RX ADMIN — Medication 10 ML: at 21:39

## 2020-01-19 RX ADMIN — FAMOTIDINE 40 MG: 20 TABLET, FILM COATED ORAL at 08:55

## 2020-01-19 RX ADMIN — SODIUM CHLORIDE 1000 MG: 900 INJECTION, SOLUTION INTRAVENOUS at 05:52

## 2020-01-19 RX ADMIN — FAMOTIDINE 40 MG: 20 TABLET, FILM COATED ORAL at 19:16

## 2020-01-19 RX ADMIN — FLUCONAZOLE 200 MG: 100 TABLET ORAL at 08:54

## 2020-01-19 RX ADMIN — CIPROFLOXACIN HYDROCHLORIDE 750 MG: 500 TABLET, FILM COATED ORAL at 08:56

## 2020-01-19 RX ADMIN — Medication 10 ML: at 06:00

## 2020-01-19 RX ADMIN — SERTRALINE HYDROCHLORIDE 75 MG: 50 TABLET ORAL at 08:58

## 2020-01-19 RX ADMIN — RIVAROXABAN 20 MG: 20 TABLET, FILM COATED ORAL at 19:16

## 2020-01-19 RX ADMIN — SODIUM CHLORIDE 1000 MG: 900 INJECTION, SOLUTION INTRAVENOUS at 14:14

## 2020-01-19 RX ADMIN — CIPROFLOXACIN HYDROCHLORIDE 750 MG: 500 TABLET, FILM COATED ORAL at 21:39

## 2020-01-19 RX ADMIN — ACETAMINOPHEN 650 MG: 325 TABLET, FILM COATED ORAL at 15:31

## 2020-01-19 RX ADMIN — TAMSULOSIN HYDROCHLORIDE 0.4 MG: 0.4 CAPSULE ORAL at 08:59

## 2020-01-19 RX ADMIN — Medication 10 ML: at 14:14

## 2020-01-19 RX ADMIN — CYCLOBENZAPRINE 5 MG: 10 TABLET, FILM COATED ORAL at 21:39

## 2020-01-19 RX ADMIN — DIPHENHYDRAMINE HYDROCHLORIDE 25 MG: 25 CAPSULE ORAL at 15:30

## 2020-01-19 NOTE — PROGRESS NOTES
Hourly rounds performed. All needs met. Bed is in low position and call light is within reach. Pt had no complaints during shift. Will continue to monitor and report to oncoming nurse.

## 2020-01-19 NOTE — PROGRESS NOTES
Hospitalist Progress Note    Subjective:   Daily Progress Note: 1/19/2020 9:54 AM    Patient presented to ER 1/15 with long standing RLE chronic stasis dermatitis under care of wound clinic and vascular surgery. Initial injury was being struck with a rock in April of last year while mowing.  Had problems since with underlying vascular disease and chronic bilateral lower extremity lymphedema and stasis dermatitis. Chronic wound with admission for cellulitis in November of last year, wound grew MSSA/citrobacter/proteus . At one point, wound involved muscle necrosis.  While he was hospitalized, he developed PAFand placed on cardizem and xaralto.  Spontaneously converted to [place]  was cleared to return to work at [employer] 12/5, was doing well with all open areas scabbed over when pain, beefy erythema and weeping began to re-occur 2 days ago with sudden onset gush of clear fluid while at work today, soaking dressing and filling shoe. Afebrile, does not appear septic.  Additionally, 14 yo son has had N/V/D illness with patient reporting 2 day history of nausea and diarrhea.  Holding xaralto in case intervention is needed, using heparin SQ for DVT prophy.   Admitted on zosyn, vanc, ID, wound, and vascular consults.   Additional history as noted below   1/16:  ID in, discontinue zosyn and vanc, begin cefazolin, cipro, fluconazole. PT in for eval.  Vascular in with recommendations for surgery, patient unable to comply in the past and present due to lack of funds.  Per wound care and vascular, current condition will re-occur until surgery can take place.  Vascular signing off.  Feeling much better today with notable improvement of heat, bright erythema and edema. Continued puffy distal edema.  Continues to soak through wraps frequently. Reinstating xaralto, discontinuing heparin. GI symptoms subsided. 1/17:  Improving daily, beefy erythema subsiding.  Spoke with wife on phone, updating regarding plan and condition. ORTHOSTATIC VITALS:   LIE: 95/65: 75  SIT: 113/72:  86  STAND:  94/34: 54    1/18:  Improving with receding edema, erythema and pain daily. In good spirits. Hoping to go home Monday 1/20. Will need ongoing wound care and antibiotics per ID.     1/19:  Improving daily. Hopefully home on IV vs po abx after seen by ID 1/20.   Will need wound care on discharge also.      ADDITIONAL HISTORY:  Morbid obesity, anxiety, chronic and acute right lower leg wound/cellulitis with proteus/MSSA/citrobacter.  Coagulopathy on xaralto, history of PAF, ED, DIANA, gout, hypertension, hyperlipidemia, OA, venous insufficiency    Current Facility-Administered Medications   Medication Dose Route Frequency    rivaroxaban (XARELTO) tablet 20 mg  20 mg Oral DAILY WITH DINNER    influenza vaccine 2019-20 (6 mos+)(PF) (FLUARIX/FLULAVAL/FLUZONE QUAD) injection 0.5 mL  0.5 mL IntraMUSCular PRIOR TO DISCHARGE    oxyCODONE IR (ROXICODONE) tablet 5 mg  5 mg Oral Q4H PRN    ceFAZolin (ANCEF) 1,000 mg in 0.9% sodium chloride (MBP/ADV) 50 mL ADV  1 g IntraVENous Q8H    ciprofloxacin HCl (CIPRO) tablet 750 mg  750 mg Oral Q12H    fluconazole (DIFLUCAN) tablet 200 mg  200 mg Oral DAILY    ondansetron (ZOFRAN) injection 4 mg  4 mg IntraVENous Q4H PRN    hyoscyamine SL (LEVSIN/SL) tablet 0.125 mg  0.125 mg SubLINGual Q4H PRN    allopurinoL (ZYLOPRIM) tablet 300 mg  300 mg Oral DAILY    cyclobenzaprine (FLEXERIL) tablet 5 mg  5 mg Oral QHS    dilTIAZem CD (CARDIZEM CD) capsule 240 mg  240 mg Oral DAILY    famotidine (PEPCID) tablet 40 mg  40 mg Oral BID    furosemide (LASIX) tablet 40 mg  40 mg Oral DAILY    lisinopril (PRINIVIL, ZESTRIL) tablet 40 mg  40 mg Oral DAILY    sodium chloride (NS) flush 5-40 mL  5-40 mL IntraVENous Q8H    sodium chloride (NS) flush 5-40 mL  5-40 mL IntraVENous PRN    acetaminophen (TYLENOL) tablet 650 mg  650 mg Oral Q4H PRN    morphine injection 4 mg  4 mg IntraVENous Q4H PRN    naloxone (NARCAN) injection 0.4 mg  0.4 mg IntraVENous PRN    sertraline (ZOLOFT) tablet 75 mg  75 mg Oral DAILY    tamsulosin (FLOMAX) capsule 0.4 mg  0.4 mg Oral DAILY    LORazepam (ATIVAN) injection 1 mg  1 mg IntraVENous Q4H PRN      Review of Systems  A comprehensive review of systems was negative except for that written in the HPI. Objective:     Visit Vitals  /64 (BP 1 Location: Left arm, BP Patient Position: At rest)   Pulse 65   Temp 98 °F (36.7 °C)   Resp 20   Ht 6' 4\" (1.93 m)   Wt 138.5 kg (305 lb 4.8 oz)   SpO2 95%   BMI 37.16 kg/m²      O2 Device: Room air    Temp (24hrs), Av.2 °F (36.8 °C), Min:97.9 °F (36.6 °C), Max:98.6 °F (37 °C)     07 - 1900  In: -   Out: 450 [Urine:450]  1901 -  0700  In: -   Out: 2000 [Urine:2000]    General appearance: Morbidly obese. Oriented and alert, cooperative, does not appear septic.  Reports feeling better daily.   Head: Normocephalic, without obvious abnormality, atraumatic  Eyes: conjunctivae/corneas clear. PERRL  Throat: Lips, mucosa, and tongue normal. Teeth and gums normal  Neck: supple, symmetrical, trachea midline, and no JVD  Lungs: clear to auscultation bilaterally, diminished in bases  Heart: regular rate and rhythm, S1, S2 normal, no murmur, click, rub or gallop  Abdomen: Protuberant, soft, non-tender. Bowel sounds normal. No masses,  no organomegaly  Extremities: Right leg wrapped, dressing dry.  No erythema above dressing.  Foot erythema, edema and heat improved.  Left leg with chronic expansive edema with mild erythema, no wound, no weeping.  Skin thickened.  Upper extremities normal, atraumatic, no cyanosis or edema  Skin: Skin color, texture, turgor normal other than noted. No additional rashes or lesions  Neurologic: Grossly normal     Additional comments: Notes,orders, test results, vitals reviewed    Data Review  Recent Results (from the past 24 hour(s))   METABOLIC PANEL, BASIC    Collection Time: 20  5:36 AM   Result Value Ref Range    Sodium 145 136 - 145 mmol/L    Potassium 4.0 3.5 - 5.1 mmol/L    Chloride 111 (H) 98 - 107 mmol/L    CO2 29 21 - 32 mmol/L    Anion gap 5 (L) 7 - 16 mmol/L    Glucose 87 65 - 100 mg/dL    BUN 13 6 - 23 MG/DL    Creatinine 1.35 0.8 - 1.5 MG/DL    GFR est AA >60 >60 ml/min/1.73m2    GFR est non-AA 58 (L) >60 ml/min/1.73m2    Calcium 9.2 8.3 - 10.4 MG/DL   CBC WITH AUTOMATED DIFF    Collection Time: 01/19/20  5:36 AM   Result Value Ref Range    WBC 8.5 4.3 - 11.1 K/uL    RBC 3.68 (L) 4.23 - 5.6 M/uL    HGB 12.9 (L) 13.6 - 17.2 g/dL    HCT 38.4 (L) 41.1 - 50.3 %    .3 (H) 79.6 - 97.8 FL    MCH 35.1 (H) 26.1 - 32.9 PG    MCHC 33.6 31.4 - 35.0 g/dL    RDW 12.9 11.9 - 14.6 %    PLATELET 479 316 - 326 K/uL    MPV 10.3 9.4 - 12.3 FL    ABSOLUTE NRBC 0.00 0.0 - 0.2 K/uL    DF AUTOMATED      NEUTROPHILS 65 43 - 78 %    LYMPHOCYTES 22 13 - 44 %    MONOCYTES 8 4.0 - 12.0 %    EOSINOPHILS 5 0.5 - 7.8 %    BASOPHILS 1 0.0 - 2.0 %    IMMATURE GRANULOCYTES 0 0.0 - 5.0 %    ABS. NEUTROPHILS 5.5 1.7 - 8.2 K/UL    ABS. LYMPHOCYTES 1.9 0.5 - 4.6 K/UL    ABS. MONOCYTES 0.7 0.1 - 1.3 K/UL    ABS. EOSINOPHILS 0.4 0.0 - 0.8 K/UL    ABS. BASOPHILS 0.1 0.0 - 0.2 K/UL    ABS. IMM.  GRANS. 0.0 0.0 - 0.5 K/UL     1/16:  ULTRASOUND LOWER EXTREMITY;  No evidence of right lower extremity deep venous thrombosis.  Incidental note of lymphadenopathy in the right proximal thigh, may be hyperplastic or neoplastic.  Clinical correlation is recommended      1/14:  BLOOD CULTURE:  2/2:  NGTD     1/15:  BLOOD CULTURE:  2/2:  NGTD    Assessment/Plan:   Cellulitis of right leg:  Hospitalization 11/19 for same               Blood cultures 1/14 and 1/15 with NGTD              LDMBV culture:  ORDERED, NOT DONE              SVSXK care in, will follow: new boot in  room    Needs to keep RLE elevated 30 degrees:     at all times  wedge in room               ID in 1/15:  abx changed to ancef, cipro  po, and diflucan              Vascular surgery in:  Needs surgery in          order for wounds to heal              permanently, patient unable to afford,            past or current                      Monitor distal circulation               PT, OT, CM on board               Doppler RLE negative for DVT               Analgesics      Chronic venous hypertension with ulcer and inflammation involving right side under care of Vascular surgery               Underlying lymphedema               Needs surgery as above, unable to afford              PER ID:  Once cellulitis has been treated,  will need strategy of edema     management with compression. On  discharge will need terbinafine and wound  care.   Also on discharge recommends to  treat  onychomycosis  a a port for cellulits     PAD/PVD: [place] reinstated after negative doppler RLE                 Nausea and diarrhea: exposed to Infectious gastritis:  Improved               Stool cultures if diarrhea continues              Antiemetics          History of PAF on xaralto and cardizem              Currently in SR              Remote telemetry              Xaralto, reinstated 1/16               EF:  55%     History of gout:  Continue home allopurinol               Uric acid level WNL 1/16      DIANA:  Continue home meds     HTN:  Continue home meds     Obesity, morbid      Coagulopathy on xaralto:  reinstated 1/16     Hyperglycemia:  A1C: 5.5    Hopefully home on IV vs po abx after seen by ID 1/20    Care Plan discussed with: Patient and Nurse    Signed By: Chrissy Montelongo NP     January 19, 2020

## 2020-01-20 LAB
BACTERIA SPEC CULT: NORMAL
SERVICE CMNT-IMP: NORMAL

## 2020-01-20 PROCEDURE — 74011250637 HC RX REV CODE- 250/637: Performed by: NURSE PRACTITIONER

## 2020-01-20 PROCEDURE — 74011250636 HC RX REV CODE- 250/636: Performed by: INTERNAL MEDICINE

## 2020-01-20 PROCEDURE — 74011000258 HC RX REV CODE- 258: Performed by: INTERNAL MEDICINE

## 2020-01-20 PROCEDURE — 74011250637 HC RX REV CODE- 250/637: Performed by: INTERNAL MEDICINE

## 2020-01-20 PROCEDURE — 65270000029 HC RM PRIVATE

## 2020-01-20 RX ADMIN — DIPHENHYDRAMINE HYDROCHLORIDE 25 MG: 25 CAPSULE ORAL at 21:26

## 2020-01-20 RX ADMIN — FLUCONAZOLE 200 MG: 100 TABLET ORAL at 09:14

## 2020-01-20 RX ADMIN — SERTRALINE HYDROCHLORIDE 75 MG: 50 TABLET ORAL at 09:12

## 2020-01-20 RX ADMIN — CIPROFLOXACIN HYDROCHLORIDE 750 MG: 500 TABLET, FILM COATED ORAL at 09:13

## 2020-01-20 RX ADMIN — FAMOTIDINE 40 MG: 20 TABLET, FILM COATED ORAL at 09:15

## 2020-01-20 RX ADMIN — TAMSULOSIN HYDROCHLORIDE 0.4 MG: 0.4 CAPSULE ORAL at 09:12

## 2020-01-20 RX ADMIN — SODIUM CHLORIDE 1000 MG: 900 INJECTION, SOLUTION INTRAVENOUS at 14:03

## 2020-01-20 RX ADMIN — CYCLOBENZAPRINE 5 MG: 10 TABLET, FILM COATED ORAL at 21:26

## 2020-01-20 RX ADMIN — DIPHENHYDRAMINE HYDROCHLORIDE 25 MG: 25 CAPSULE ORAL at 02:55

## 2020-01-20 RX ADMIN — FAMOTIDINE 40 MG: 20 TABLET, FILM COATED ORAL at 17:30

## 2020-01-20 RX ADMIN — ALLOPURINOL 300 MG: 300 TABLET ORAL at 09:11

## 2020-01-20 RX ADMIN — CIPROFLOXACIN HYDROCHLORIDE 750 MG: 500 TABLET, FILM COATED ORAL at 21:28

## 2020-01-20 RX ADMIN — RIVAROXABAN 20 MG: 20 TABLET, FILM COATED ORAL at 16:44

## 2020-01-20 RX ADMIN — DILTIAZEM HYDROCHLORIDE 240 MG: 120 CAPSULE, COATED, EXTENDED RELEASE ORAL at 09:12

## 2020-01-20 RX ADMIN — Medication 10 ML: at 21:30

## 2020-01-20 RX ADMIN — SODIUM CHLORIDE 1000 MG: 900 INJECTION, SOLUTION INTRAVENOUS at 05:08

## 2020-01-20 RX ADMIN — Medication 10 ML: at 13:09

## 2020-01-20 RX ADMIN — ACETAMINOPHEN 650 MG: 325 TABLET, FILM COATED ORAL at 21:26

## 2020-01-20 RX ADMIN — ACETAMINOPHEN 650 MG: 325 TABLET, FILM COATED ORAL at 02:55

## 2020-01-20 RX ADMIN — SODIUM CHLORIDE 1000 MG: 900 INJECTION, SOLUTION INTRAVENOUS at 21:30

## 2020-01-20 RX ADMIN — Medication 10 ML: at 05:08

## 2020-01-20 NOTE — PROGRESS NOTES
Chart screened by  for discharge planning. Patient to discharge home with IV abx or PO abx, this is unclear until determined by ID. Patient may have a difficult time paying for home IV antibiotics as he is very concerned with his hospital bill at this time. Patient stated his wife is not working and having health issues and he fears he may lose his job due to this hospitalization. Patient stated he will lose insurance if he loses his job. Patient may not qualify for Medicaid at this time due to his income. CM will reach out to [facility] to see if they can assist.CM will continue to follow patient during hospitalization for discharge planning and needs. Please consult  if any new issues arise.

## 2020-01-20 NOTE — PROGRESS NOTES
Interdisciplinary Rounds completed. Nursing, Case Management, and Physician  present. Plan of care reviewed and updated. Waiting for ID recs.

## 2020-01-20 NOTE — PROGRESS NOTES
Hourly rounds performed. All needs met. Bed is in low position and call light is within reach. Pt complained of itching in the RLE and requested Hydrocortisone cream. Doctor was notified and no orders were given. Will continue to monitor and report to oncoming nurse.

## 2020-01-20 NOTE — PROGRESS NOTES
Hospitalist Progress Note    Subjective:   Daily Progress Note: 1/20/2020 9:54 AM    Patient is a 52 yo male with PMH of morbid obesity, anxiety, chronic and acute right lower leg wound/cellulitis with proteus/MSSA/citrobacter, coagulopathy on xaralto, history of PAF, ED, DIANA, gout, hypertension, hyperlipidemia, OA, venous insufficiency who came into the ER on 1/15   with long standing RLE chronic stasis dermatitis who is  Under the care of the wound clinic and vascular surgery. Patient was initially struck by  a rock in April of last year while mowing.  Had problems since with underlying vascular disease and chronic bilateral lower extremity lymphedema and stasis dermatitis. Patient was admitted in November for cellulitis in November,  wound culture grew MSSA/citrobacter/proteus . Patient  admitted on zosyn, vanc, ID, wound, and vascular consults.   - ID discontinue zosyn and vanc, begin cefazolin, cipro, fluconazole. - Vascular consulted recommended surgery, patient unable to comply in the past and present due to lack of funds.  Per wound care and vascular, current condition will re-occur until surgery can take place.  Vascular signed off. Subjective  Patient alert and oriented. Reports improvement in pain 3/10. Denies n/v/d. Reports sweating/chills. Temperature in room elevated. No fever documented. Wife concerned with patient's low BP.  's.        ADDITIONAL HISTORY:     Current Facility-Administered Medications   Medication Dose Route Frequency    diphenhydrAMINE (BENADRYL) capsule 25 mg  25 mg Oral Q6H PRN    rivaroxaban (XARELTO) tablet 20 mg  20 mg Oral DAILY WITH DINNER    influenza vaccine 2019-20 (6 mos+)(PF) (FLUARIX/FLULAVAL/FLUZONE QUAD) injection 0.5 mL  0.5 mL IntraMUSCular PRIOR TO DISCHARGE    oxyCODONE IR (ROXICODONE) tablet 5 mg  5 mg Oral Q4H PRN    ceFAZolin (ANCEF) 1,000 mg in 0.9% sodium chloride (MBP/ADV) 50 mL ADV  1 g IntraVENous Q8H    ciprofloxacin HCl (CIPRO) tablet 750 mg  750 mg Oral Q12H    fluconazole (DIFLUCAN) tablet 200 mg  200 mg Oral DAILY    ondansetron (ZOFRAN) injection 4 mg  4 mg IntraVENous Q4H PRN    hyoscyamine SL (LEVSIN/SL) tablet 0.125 mg  0.125 mg SubLINGual Q4H PRN    allopurinoL (ZYLOPRIM) tablet 300 mg  300 mg Oral DAILY    cyclobenzaprine (FLEXERIL) tablet 5 mg  5 mg Oral QHS    dilTIAZem CD (CARDIZEM CD) capsule 240 mg  240 mg Oral DAILY    famotidine (PEPCID) tablet 40 mg  40 mg Oral BID    furosemide (LASIX) tablet 40 mg  40 mg Oral DAILY    [Held by provider] lisinopril (PRINIVIL, ZESTRIL) tablet 40 mg  40 mg Oral DAILY    sodium chloride (NS) flush 5-40 mL  5-40 mL IntraVENous Q8H    sodium chloride (NS) flush 5-40 mL  5-40 mL IntraVENous PRN    acetaminophen (TYLENOL) tablet 650 mg  650 mg Oral Q4H PRN    morphine injection 4 mg  4 mg IntraVENous Q4H PRN    naloxone (NARCAN) injection 0.4 mg  0.4 mg IntraVENous PRN    sertraline (ZOLOFT) tablet 75 mg  75 mg Oral DAILY    tamsulosin (FLOMAX) capsule 0.4 mg  0.4 mg Oral DAILY    LORazepam (ATIVAN) injection 1 mg  1 mg IntraVENous Q4H PRN      Review of Systems  A comprehensive review of systems was negative except for that written in the HPI. Objective:     Visit Vitals  /54 (BP 1 Location: Right arm, BP Patient Position: At rest)   Pulse (!) 59   Temp 97.9 °F (36.6 °C)   Resp 19   Ht 6' 4\" (1.93 m)   Wt 130 kg (286 lb 9.6 oz)   SpO2 96%   BMI 34.89 kg/m²      O2 Device: Room air    Temp (24hrs), Av.1 °F (36.7 °C), Min:97.9 °F (36.6 °C), Max:98.5 °F (36.9 °C)    No intake/output data recorded.  1901 -  0700  In: -   Out: 4102 [Urine:3650]    General appearance: Morbidly obese. Oriented and alert, cooperative, does not appear septic.  Reports feeling better daily.   Head: Normocephalic, without obvious abnormality, atraumatic  Eyes: conjunctivae/corneas clear.  PERRL  Throat: Lips, mucosa, and tongue normal. Teeth and gums normal  Neck: supple, symmetrical, trachea midline, and no JVD  Lungs: clear to auscultation bilaterally, diminished in bases  Heart: regular rate and rhythm, S1, S2 normal, no murmur, click, rub or gallop  Abdomen: Protuberant, soft, non-tender. Bowel sounds normal. No masses,  no organomegaly  Extremities: Right leg wrapped, dressing dry. No erythema above dressing.  Foot erythema, edema and heat improved.  Left leg with chronic expansive edema with mild erythema, no wound, no weeping.  Skin thickened.  Upper extremities normal, atraumatic, no cyanosis or edema  Skin: Skin color, texture, turgor normal other than noted. No additional rashes or lesions  Neurologic: Grossly normal     Additional comments: Notes,orders, test results, vitals reviewed    Data Review  No results found for this or any previous visit (from the past 24 hour(s)). 1/16:  ULTRASOUND LOWER EXTREMITY;  No evidence of right lower extremity deep venous thrombosis.  Incidental note of lymphadenopathy in the right proximal thigh, may be hyperplastic or neoplastic.  Clinical correlation is recommended      1/14:  BLOOD CULTURE:  2/2:  NGTD     1/15:  BLOOD CULTURE:  2/2:  NGTD    Assessment/Plan:   Cellulitis of right leg:  Hospitalization 11/19 for same               Blood cultures 1/14 and 1/15 with NGTD              YZROQ care in, will follow: new boot in room, will need as OP   Needs to keep RLE elevated 30 degrees: Not done on exam, reminded patient, wife,                      nurse.  at all times  wedge in room               ID in 1/15:  abx changed to ancef, cipro po, and diflucan              Vascular surgery in:  Needs surgery in order for wounds to heal              permanently, patient unable to afford, past or current                      Monitor distal circulation               PT, OT, CM on board               Doppler RLE negative for DVT               Analgesics - pain improving     Chronic venous hypertension with ulcer and inflammation involving right side under care of Vascular surgery               Underlying lymphedema               Needs surgery as above, unable to afford              PER ID:  Once cellulitis has been treated, will need strategy of edema  management with compression. On discharge will need terbinafine and wound care.   Also on discharge recommends to treat  onychomycosis as a port for cellulits     PAD/PVD: Esthela Flatten reinstated after negative doppler RLE                 Nausea and diarrhea: exposed to Infectious gastritis:  Improved               Stool cultures if diarrhea continues              Antiemetics          History of PAF on xaralto and cardizem              Currently in SR              Remote telemetry              Xaralto, reinstated 1/16               EF:  55%     History of gout:  Continue home allopurinol               Uric acid level WNL 1/16      DIANA:  Continue home meds     HTN:  Borderline low, hold lisinopril  Continue cardizem with parameters      Obesity, morbid      Coagulopathy on xaralto:  reinstated 1/16     Hyperglycemia:  A1C: 5.5    Hopefully home on IV vs po abx after seen by ID     Care Plan discussed with: Patient and Nurse    Discussed plan of care with Dr. Kristy Schwartz,     Signed By: Yudi Avitia NP     January 20, 2020

## 2020-01-20 NOTE — ADT AUTH CERT NOTES
Internal Medicine Note 1/17 by Kendrick Millan RN  
 
   
Review Entered Review Status 1/20/2020 14:21 In Primary  
   
Criteria Review IM NOTE 1/17 
  
Cellulitis of right leg:  Hospitalization 11/19 for same  
            Blood cultures with NGTD 
            OEVBM culture:  ORDERED, NOT DONE 
            AXJVV care in, will follow: new boot    In         room    
            ID in 1/15:  abx changed to ancef, cipro           po, and diflucan 
            Vascular surgery in:  Needs surgery in              order for wounds to heal permanently,      patient unable to afford            
            Monitor distal circulation  
            PT, OT, CM on board  
            Doppler RLE negative for DVT  
            Analgesics  
  
Chronic venous hypertension with ulcer and inflammation involving right side under care of Vascular surgery             Underlying lymphedema  
            Needs surgery as above, unable to afford 
            PER ID:  Once cellulitis has been treated 
            Will need strategy of edema of edema           management with compression. On   discharge will need terbinafine.  Also on            discharge recommends to treat          onychomycosis a a port for cellulits 
  
PAD/PVD: Uriel Jose reinstated after negative             doppler RLE 
  
Nausea and diarrhea: exposed to Infectious gastritis:  Improving  
            Stool cultures if diarrhea continues             Antiemetics      
  
History of PAF on xaralto and cardizem 
            Currently in SR 
            Remote telemetry             RZCAETW, reinstated 1/16  
            EF:  55% 
  History of gout:  Continue home allopurinol  
            Uric acid level WNL 1/16  
  
DIANA:  Continue home meds 
  
HTN:  Continue home meds 
  
Obesity, morbid  
  
Coagulopathy on xaralto:  reinstate tomorrow  
  
Care Plan discussed with: Patient, wife and Nurse

## 2020-01-21 LAB
BACTERIA SPEC CULT: ABNORMAL
BACTERIA SPEC CULT: ABNORMAL
GRAM STN SPEC: ABNORMAL
SERVICE CMNT-IMP: ABNORMAL

## 2020-01-21 PROCEDURE — 74011250637 HC RX REV CODE- 250/637: Performed by: FAMILY MEDICINE

## 2020-01-21 PROCEDURE — 74011250637 HC RX REV CODE- 250/637: Performed by: INTERNAL MEDICINE

## 2020-01-21 PROCEDURE — 65270000029 HC RM PRIVATE

## 2020-01-21 PROCEDURE — 74011000258 HC RX REV CODE- 258: Performed by: INTERNAL MEDICINE

## 2020-01-21 PROCEDURE — 74011250637 HC RX REV CODE- 250/637: Performed by: NURSE PRACTITIONER

## 2020-01-21 PROCEDURE — 74011250637 HC RX REV CODE- 250/637: Performed by: HOSPITALIST

## 2020-01-21 PROCEDURE — 74011250636 HC RX REV CODE- 250/636: Performed by: INTERNAL MEDICINE

## 2020-01-21 RX ADMIN — DILTIAZEM HYDROCHLORIDE 240 MG: 120 CAPSULE, COATED, EXTENDED RELEASE ORAL at 09:14

## 2020-01-21 RX ADMIN — ALLOPURINOL 300 MG: 300 TABLET ORAL at 09:13

## 2020-01-21 RX ADMIN — Medication 10 ML: at 14:29

## 2020-01-21 RX ADMIN — RIVAROXABAN 20 MG: 20 TABLET, FILM COATED ORAL at 17:57

## 2020-01-21 RX ADMIN — Medication 10 ML: at 05:24

## 2020-01-21 RX ADMIN — SODIUM CHLORIDE 1000 MG: 900 INJECTION, SOLUTION INTRAVENOUS at 05:24

## 2020-01-21 RX ADMIN — DIPHENHYDRAMINE HYDROCHLORIDE 25 MG: 25 CAPSULE ORAL at 22:44

## 2020-01-21 RX ADMIN — OTC TOPICAL ANALGESIC DRUG PRODUCTS: .005; .005 LOTION TOPICAL at 01:40

## 2020-01-21 RX ADMIN — CIPROFLOXACIN HYDROCHLORIDE 750 MG: 500 TABLET, FILM COATED ORAL at 22:38

## 2020-01-21 RX ADMIN — SERTRALINE HYDROCHLORIDE 75 MG: 50 TABLET ORAL at 09:13

## 2020-01-21 RX ADMIN — FAMOTIDINE 40 MG: 20 TABLET, FILM COATED ORAL at 09:13

## 2020-01-21 RX ADMIN — FLUCONAZOLE 200 MG: 100 TABLET ORAL at 09:14

## 2020-01-21 RX ADMIN — Medication: at 17:58

## 2020-01-21 RX ADMIN — FUROSEMIDE 40 MG: 40 TABLET ORAL at 09:14

## 2020-01-21 RX ADMIN — CIPROFLOXACIN HYDROCHLORIDE 750 MG: 500 TABLET, FILM COATED ORAL at 09:13

## 2020-01-21 RX ADMIN — ACETAMINOPHEN 650 MG: 325 TABLET, FILM COATED ORAL at 22:44

## 2020-01-21 RX ADMIN — CYCLOBENZAPRINE 5 MG: 10 TABLET, FILM COATED ORAL at 22:39

## 2020-01-21 RX ADMIN — TAMSULOSIN HYDROCHLORIDE 0.4 MG: 0.4 CAPSULE ORAL at 09:14

## 2020-01-21 RX ADMIN — FAMOTIDINE 40 MG: 20 TABLET, FILM COATED ORAL at 17:57

## 2020-01-21 NOTE — PROGRESS NOTES
CM spoke with patient regarding discharge planning. Patient stated he would like to use [facility]  at discharge. Patient became tearful about discharge and stated he was not ready today but would be ok to discharge tomorrow. CM explained to patient CM is not aware of a discharge for today, possibly tomorrow. Patient verbalized understanding.   CM sent referral to [facility] for a nurse to assist with wound care

## 2020-01-21 NOTE — PROGRESS NOTES
Patient is up walking laps and ad dayne in his room. No longer needs PT and will discharge.     Negra Espinosa PTA

## 2020-01-21 NOTE — PROGRESS NOTES
Hourly rounds completed this shift. Patient complained of pain & itchy rash. Medicated per MAR. All needs met at this time. Will continue to monitor & give report to oncoming RN.

## 2020-01-21 NOTE — PROGRESS NOTES
Patient ambulating around room without difficulty, R LE dressing in place. Wife has numerous questions re: deep venous reflux. Reviewed anatomy, patient's pathophysiology and treatments recommended by Dr. Nancy Hendrix with patient and wife in detail and at level of their comprehension. Questions were answered to their satisfaction. We will be available should patient opt to pursue iliac venogram with intervention (not necessarily during this admission), but otherwise we will sign off. Moncho Ji PA-C  Physician Assistant with [facility] Vascular Surgery  [facility].  Lydia Cavazos MD / Faith Celis MD

## 2020-01-21 NOTE — PROGRESS NOTES
Infectious Disease Consult    Today's Date: 1/21/2020   Admit Date: 1/15/2020    Impression:   · RLE Cellulitis: Primary symptom was of increasing redness and drainage; ED note described purulent drainage. · Right leg lymphedema  · Onychomycosis    Currently improving on a combination of PO and IV medications, though I'm not certain there will ever be full resolution given degree of edema and patient noncompliance with leg elevation and compression. Seen by vascular and appreciate their assistance, plan for possible revasc procedure in near future. I'm not certain given his degree of edema if PO is going to fully be successful and discussed this with patient today. But reasonable to attempt and will do longer duration then see him in our clinic. If he is improving at that time could consider stopping altogether vs PO PCN for suppression until he undergoes procedure. Can consider extending by another week at that time then transition to suppression, or if worsening he may require IV and restart of vanc. Plan:   · On cefazolin, cipro, and fluconazole - will transition to cipro 750mg BID and duricef 500mg BID (stop fluc) with plan for 2-3 weeks and follow up in ID clinic  · Once cellulitis has been treated, then he is going to need a strategy of edema management with compression; vascular following. · On discharge recommend terbinafine to treat onychomycosis as a portal for cellulitis. · ID follow up 2/4 @ 2:30pm - discussion of continuing regimen x another week vs transition to PO suppression with PCN vs restart of IV. Thank you for allowing us to participate in the care of this patient, ID will sign off. Please don't hesitate to contact us with further questions or concerns. Anti-infectives:   · Cefazolin 1 gm IV q8h (rx 01/16-)  · Cipro po 750 BID (rx 01/16-)  · Fluconazole 200mg po Q24h (400mg rx 01/16/20)  · Vancomycin (01/15-01/16/20)  · Zosyn (01/15-01/16/20)    Subjective:    Thinks RLE is improving, quite pruritic. Developed pruritic rash overnight that is on arms, abdomen and neck. Allergies   Allergen Reactions    Codeine Nausea Only    Sulfa (Sulfonamide Antibiotics) Rash        Review of Systems:  A comprehensive review of systems was negative except for that written in the History of Present Illness. Objective:     Visit Vitals  /73 (BP 1 Location: Left arm, BP Patient Position: At rest)   Pulse 63   Temp 98.5 °F (36.9 °C)   Resp 20   Ht 6' 4\" (1.93 m)   Wt 130 kg (286 lb 9.6 oz)   SpO2 96%   BMI 34.89 kg/m²     Temp (24hrs), Av.3 °F (36.8 °C), Min:98 °F (36.7 °C), Max:98.7 °F (37.1 °C)       Lines:  Peripheral IV:       Physical Exam:    General:  Alert, cooperative, NAD   Eyes:  Sclera anicteric. Pupils equally round and reactive to light. Mouth/Throat: Mucous membranes normal, oral pharynx clear   Neck: Supple   Lungs:   Normal WOB on RA   CV:  RRR   Abdomen:   S/ND +BS   Extremities: Compared to ED pictures, RLE with improving erythema, skin slough   Skin: Skin color, texture, turgor normal. no acute rash or lesions; right foot onychomycosis   Lymph nodes: Cervical and supraclavicular normal   Musculoskeletal: No swelling or deformity   Lines/Devices:  Intact, no erythema, drainage or tenderness   Psych: Alert and oriented, normal mood affect given the setting       Data Review:     CBC:  Recent Labs     20  0536   WBC 8.5   GRANS 65   MONOS 8   EOS 5   ANEU 5.5   ABL 1.9   HGB 12.9*   HCT 38.4*          BMP:  Recent Labs     20  0536   CREA 1.35   BUN 13      K 4.0   *   CO2 29   AGAP 5*   GLU 87       LFTS:  No results for input(s): TBILI, ALT, SGOT, AP, TP, ALB in the last 72 hours.     Microbiology:     All Micro Results     Procedure Component Value Units Date/Time    CULTURE, BLOOD [[id number]]  (Abnormal) Collected:  01/15/20 3832    Order Status:  Completed Specimen:  Blood Updated:  20 9917     Special Requests: -- LEFT  HAND       GRAM STAIN GRAM POSITIVE RODS         AEROBIC BOTTLE POSITIVE               RESULTS VERIFIED, PHONED TO AND READ BACK BY Slick Belcher RN AT 5049 1.19.20 CW           Culture result: BACILLUS SPECIES               THIS ORGANISM MAY BE INDICATIVE OF CULTURE CONTAMINATION, HOWEVER, CLINICAL CORRELATION NEEDS TO BE EVALUATED, AS EACH CASE IS UNIQUE. CULTURE, BLOOD [[id number]] Collected:  01/15/20 1747    Order Status:  Completed Specimen:  Blood Updated:  01/20/20 0809     Special Requests: --        RIGHT  HAND       Culture result: NO GROWTH 5 DAYS       CULTURE, WOUND January Sacks STAIN [[id number]]     Order Status:  Canceled Specimen:  Wound Drainage           Imaging:   RLE ultrasound (01/15/20): IMPRESSION  Impression: No evidence of right lower extremity deep venous thrombosis. Incidental note of lymphadenopathy in the right proximal thigh, may be  hyperplastic or neoplastic. Clinical correlation is recommended.     Signed By: Sd Sánchez MD     January 21, 2020

## 2020-01-21 NOTE — PROGRESS NOTES
Interdisciplinary Rounds completed. Nursing, Case Management, and Physician  present. Plan of care reviewed and updated. Patient to discharge tomorrow on PO antibiotics.

## 2020-01-22 VITALS
SYSTOLIC BLOOD PRESSURE: 116 MMHG | WEIGHT: 286.6 LBS | DIASTOLIC BLOOD PRESSURE: 73 MMHG | HEART RATE: 62 BPM | RESPIRATION RATE: 20 BRPM | TEMPERATURE: 98 F | BODY MASS INDEX: 34.9 KG/M2 | OXYGEN SATURATION: 94 % | HEIGHT: 76 IN

## 2020-01-22 PROCEDURE — 74011250637 HC RX REV CODE- 250/637: Performed by: NURSE PRACTITIONER

## 2020-01-22 PROCEDURE — 74011250637 HC RX REV CODE- 250/637: Performed by: INTERNAL MEDICINE

## 2020-01-22 RX ORDER — CIPROFLOXACIN 750 MG/1
750 TABLET, FILM COATED ORAL EVERY 12 HOURS
Qty: 42 TAB | Refills: 0 | Status: SHIPPED | OUTPATIENT
Start: 2020-01-22 | End: 2020-02-12

## 2020-01-22 RX ORDER — CEFADROXIL 1000 MG/1
0.5 TABLET ORAL 2 TIMES DAILY
Qty: 14 TAB | Refills: 0 | Status: SHIPPED | OUTPATIENT
Start: 2020-01-22 | End: 2020-01-29 | Stop reason: DRUGHIGH

## 2020-01-22 RX ORDER — TERBINAFINE HYDROCHLORIDE 250 MG/1
250 TABLET ORAL DAILY
Qty: 21 TAB | Refills: 0 | Status: SHIPPED | OUTPATIENT
Start: 2020-01-22 | End: 2020-01-29 | Stop reason: SDUPTHER

## 2020-01-22 RX ADMIN — CIPROFLOXACIN HYDROCHLORIDE 750 MG: 500 TABLET, FILM COATED ORAL at 09:18

## 2020-01-22 RX ADMIN — FUROSEMIDE 40 MG: 40 TABLET ORAL at 09:21

## 2020-01-22 RX ADMIN — FAMOTIDINE 40 MG: 20 TABLET, FILM COATED ORAL at 09:19

## 2020-01-22 RX ADMIN — SERTRALINE HYDROCHLORIDE 75 MG: 50 TABLET ORAL at 09:21

## 2020-01-22 RX ADMIN — DILTIAZEM HYDROCHLORIDE 240 MG: 120 CAPSULE, COATED, EXTENDED RELEASE ORAL at 09:18

## 2020-01-22 RX ADMIN — ALLOPURINOL 300 MG: 300 TABLET ORAL at 09:20

## 2020-01-22 RX ADMIN — TAMSULOSIN HYDROCHLORIDE 0.4 MG: 0.4 CAPSULE ORAL at 09:20

## 2020-01-22 RX ADMIN — ACETAMINOPHEN 650 MG: 325 TABLET, FILM COATED ORAL at 09:20

## 2020-01-22 NOTE — PROGRESS NOTES
Discharge instructions administered to pt all needs have been met. Prescriptions and work excuse also given. Dsg change to RLE. HH will follow up with pt. Pt verbally refused flu shot.

## 2020-01-22 NOTE — PROGRESS NOTES
[address] January 22, 2020       RE: Brad Wheatley      To Whom It May Concern,    This is to certify that Brad Wheatley has been admitted to the hospital from 1/15. He can return to work from 1/29/20. Please feel free to contact my office if you have any questions or concerns. Thank you for your assistance in this matter.       Sincerely,  Yaneli Navarrete MD

## 2020-01-22 NOTE — DISCHARGE SUMMARY
Hospitalist Discharge Summary     Admit Date:  1/15/2020  3:53 PM   Name:  Lanre Moreno   Age:  53 y.o.  :  1965   MRN:  [id number]   PCP:  Christopher Turner MD  Treatment Team: Hospitalist: Elisabet Blackwood NP; Care Manager: Lali Marie RN; Occupational Therapist: Kamala Cochran OT    Problem List for this Hospitalization:  Hospital Problems as of 2020 Date Reviewed: 1/15/2020          Codes Class Noted - Resolved POA    * (Principal) Cellulitis of right leg ICD-10-CM: L03.115  ICD-9-CM: 682.6  1/15/2020 - Present Yes        Arterial insufficiency (HCC) (Chronic) ICD-10-CM: I77.1  ICD-9-CM: 447.1  1/15/2020 - Present Yes        Coagulopathy ([address] ICD-10-CM: D68.9  ICD-9-CM: 286.9  1/15/2020 - Present Unknown    Overview Signed 1/15/2020  6:29 PM by Elisabet Blackwood NP     Taking xaralto              Venous (peripheral) insufficiency (Chronic) ICD-10-CM: M59.2  ICD-9-CM: 459.81  12/3/2019 - Present Yes        Chronic venous hypertension with ulcer and inflammation involving right side (HCC) (Chronic) ICD-10-CM: U10.042, L97.919  ICD-9-CM: 459.33  3/6/2019 - Present Yes        Obesity, morbid ([address] (Chronic) ICD-10-CM: E66.01  ICD-9-CM: 278.01  2018 - Present Yes        HTN (hypertension) (Chronic) ICD-10-CM: I10  ICD-9-CM: 401.9  3/16/2015 - Present Yes        DIANA (generalized anxiety disorder) (Chronic) ICD-10-CM: F41.1  ICD-9-CM: 300.02  2014 - Present Yes        History of gout (Chronic) ICD-10-CM: Z87.39  ICD-9-CM: V12.29  2014 - Present Yes                Admission HPI from 1/15/2020:    Patient is a 52 yo male with PMH of morbid obesity, anxiety, chronic and acute right lower leg wound/cellulitis with proteus/MSSA/citrobacter, coagulopathy on xaralto, history of PAF, ED, DIANA, gout, hypertension, hyperlipidemia, OA, venous insufficiency who came into the ER on 1/15   with long standing RLE chronic stasis dermatitis who is  Under the care of the wound clinic and vascular surgery. Patient was initially struck by  a rock in April of last year while mowing.  Had problems since with underlying vascular disease and chronic bilateral lower extremity lymphedema and stasis dermatitis. Patient was admitted in November for cellulitis in November,  wound culture grew MSSA/citrobacter/proteus . Patient  admitted on zosyn, vanc, ID, wound, and vascular consults.   - ID discontinue zosyn and vanc, begin cefazolin, cipro, fluconazole. - Vascular consulted recommended surgery, patient unable to comply in the past and present due to lack of funds.  Per wound care and vascular, current condition will re-occur until surgery can take place.  Vascular signed off. Hospital Course:    During the hospitalisation, the following problems have been taken care of:     Cellulitis of right leg:  Hospitalization 11/19 for same               Blood cultures 1/14 and 1/15 with NGTD              [facility] care in, will follow: new boot in room, will need as OP              Needs to keep RLE elevated 30 degrees:  ID consulted 1/15:  antibiotics transitioned to Cipro 750 mg po bid with Duricef 500 mg po bid for 2-3 weeks. Terbinafine course as out-pt  for Onychomycosis.             Vascular surgery: signed off,  outpatient follow up              Monitor distal circulation               PT, OT, CM on board               Doppler RLE negative for DVT               Analgesics - pain improving  Outpatient follow-up with ID. Scheduled for 2/4     Chronic venous hypertension with ulcer and inflammation involving right side under care of Vascular surgery               Underlying lymphedema               Needs surgery as above, unable to afford              PER ID:  Once cellulitis has been treated, will need strategy of edema  management with compression.  Wound care.   Also on discharge,  recommends to treat  onychomycosis as a port for cellulits     PAD/PVD: Philip Ree reinstated after negative doppler RLE                 Nausea and diarrhea: improved       History of PAF on xaralto and cardizem              Currently in SR              Remote telemetry              Xaralto, reinstated 1/16               EF:  55%     History of gout:  Continue home allopurinol               Uric acid level WNL 1/16      DIANA:  Continue home meds     HTN:  Borderline low, hold lisinopril  Continue cardizem with parameters      Obesity, morbid      Coagulopathy on xaralto:  reinstated 1/16     Hyperglycemia:  A1C: 5.5    Disposition: Home Health Care Sv  Activity: as tolerated Diet: No diet orders on file    Follow up instructions, discharge meds at bottom of this note. Plan was discussed with the pt. All questions answered. Patient was stable at time of discharge. Patient will call a physician or return if any concerns. Diagnostic Imaging/Tests:   Duplex Lower Ext Venous Right    Result Date: 1/16/2020  Examination:  Grayscale and color doppler ultrasound of right lower extremity. History: Right lower extremity swelling. Comparison: None available Findings: The right common femoral, femoral, popliteal, peroneal, and posterior tibial veins demonstrate normal compressibility and color-flow. No evidence of deep venous thrombosis. No abnormal fluid collection or Baker's cyst. Incidental note is made of a an enlarged lymph node in the proximal right thigh, measuring up to 2.1 cm in short axis, containing fatty hilum, this may be hyperplastic or neoplastic. Impression: No evidence of right lower extremity deep venous thrombosis. Incidental note of lymphadenopathy in the right proximal thigh, may be hyperplastic or neoplastic. Clinical correlation is recommended. Echocardiogram results:  No results found for this visit on 01/15/20.     Procedures done this admission:  * No surgery found *    All Micro Results     Procedure Component Value Units Date/Time    CULTURE, BLOOD [[id number]]  (Abnormal) Collected:  01/15/20 5539 Order Status:  Completed Specimen:  Blood Updated:  01/21/20 4302     Special Requests: --        LEFT  HAND       GRAM STAIN GRAM POSITIVE RODS         AEROBIC BOTTLE POSITIVE               RESULTS VERIFIED, PHONED TO AND READ BACK BY Nidhi Maher RN AT 3078 1.19.20 CW           Culture result: BACILLUS SPECIES               THIS ORGANISM MAY BE INDICATIVE OF CULTURE CONTAMINATION, HOWEVER, CLINICAL CORRELATION NEEDS TO BE EVALUATED, AS EACH CASE IS UNIQUE. CULTURE, BLOOD [[id number]] Collected:  01/15/20 1747    Order Status:  Completed Specimen:  Blood Updated:  01/20/20 0809     Special Requests: --        RIGHT  HAND       Culture result: NO GROWTH 5 DAYS       CULTURE, WOUND [place] STAIN [[id number]]     Order Status:  Canceled Specimen:  Wound Drainage           Labs: Results:       BMP, Mg, Phos No results for input(s): NA, K, CL, CO2, AGAP, BUN, CREA, CA, GLU, MG, PHOS in the last 72 hours. CBC No results for input(s): WBC, RBC, HGB, HCT, PLT, GRANS, LYMPH, EOS, MONOS, BASOS, IG, ANEU, ABL, FLOWER, ABM, ABB, AIG, HGBEXT, HCTEXT, PLTEXT in the last 72 hours. LFT No results for input(s): SGOT, ALT, TBIL, AP, TP, ALB, GLOB, AGRAT, GPT in the last 72 hours.    Cardiac Testing Lab Results   Component Value Date/Time    B-type Natriuretic Peptide 11.8 03/17/2016 10:47 AM      Coagulation Tests Lab Results   Component Value Date/Time    aPTT 34.7 11/18/2019 09:37 AM    aPTT 63.2 (H) 11/18/2019 12:35 AM    aPTT 36.7 11/17/2019 05:59 PM      A1c Lab Results   Component Value Date/Time    Hemoglobin A1c 5.9 01/18/2020 03:15 AM      Lipid Panel Lab Results   Component Value Date/Time    Cholesterol, total 173 07/10/2019 11:20 AM    HDL Cholesterol 32 (L) 07/10/2019 11:20 AM    LDL, calculated 121 (H) 07/10/2019 11:20 AM    VLDL, calculated 20 07/10/2019 11:20 AM    Triglyceride 102 07/10/2019 11:20 AM    CHOL/HDL Ratio 5.4 05/23/2018 01:50 PM      Thyroid Panel Lab Results   Component Value Date/Time    TSH 2.240 11/16/2019 07:04 PM    TSH 1.080 07/10/2019 11:20 AM    T4, Total 7.1 08/24/2016 11:21 AM    T4, Free 1.1 11/16/2019 07:04 PM        Most Recent UA Lab Results   Component Value Date/Time    Color YELLOW 11/14/2019 11:17 PM    Appearance CLEAR 11/14/2019 11:17 PM    Specific gravity 1.027 (H) 11/14/2019 11:17 PM    pH (UA) 5.5 11/14/2019 11:17 PM    Protein NEGATIVE  11/14/2019 11:17 PM    Glucose NEGATIVE  11/14/2019 11:17 PM    Ketone NEGATIVE  11/14/2019 11:17 PM    Bilirubin NEGATIVE  11/14/2019 11:17 PM    Blood NEGATIVE  11/14/2019 11:17 PM    Urobilinogen 1.0 11/14/2019 11:17 PM    Nitrites NEGATIVE  11/14/2019 11:17 PM    Leukocyte Esterase NEGATIVE  11/14/2019 11:17 PM        Allergies   Allergen Reactions    Codeine Nausea Only    Sulfa (Sulfonamide Antibiotics) Rash     Immunization History   Administered Date(s) Administered    TB Skin Test (PPD) Intradermal 01/15/2020    Tdap 02/26/2018       All Labs from Last 24 Hrs:  No results found for this or any previous visit (from the past 24 hour(s)). Current Med List in Hospital:   No current facility-administered medications for this encounter. Current Outpatient Medications   Medication Sig    ciprofloxacin HCl (CIPRO) 750 mg tablet Take 1 Tab by mouth every twelve (12) hours for 21 days.  cefadroxil (DURICEF) 1 gram tablet Take 0.5 Tabs by mouth two (2) times a day for 14 days.  terbinafine HCl (LAMISIL) 250 mg tablet Take 1 Tab by mouth daily for 21 days.  famotidine (PEPCID) 40 mg tablet Take 1 Tab by mouth two (2) times a day.  dilTIAZem CD (CARDIZEM CD) 240 mg ER capsule Take 1 Cap by mouth daily.  tamsulosin (FLOMAX) 0.4 mg capsule Take 1 Cap by mouth daily.  sertraline (ZOLOFT) 50 mg tablet Take 1 Tab by mouth daily. (Patient taking differently: Take 75 mg by mouth daily.)    furosemide (LASIX) 40 mg tablet Take 1 Tab by mouth daily.  allopurinol (ZYLOPRIM) 300 mg tablet Take 1 Tab by mouth daily.     rivaroxaban (XARELTO) 20 mg tab tablet Take 1 Tab by mouth daily (with dinner).  acetaminophen (TYLENOL) 500 mg tablet Take  by mouth every six (6) hours as needed for Pain.  colchicine 0.6 mg tablet Take 1 Tab by mouth daily. (Patient taking differently: Take 0.6 mg by mouth daily. Takes for flair ups only)    cyclobenzaprine (FLEXERIL) 5 mg tablet Take 1 Tab by mouth nightly.  ondansetron (ZOFRAN ODT) 4 mg disintegrating tablet Take 1 Tab by mouth every eight (8) hours as needed for Nausea. Discharge Exam:  Patient Vitals for the past 24 hrs:   Temp Pulse Resp BP SpO2   01/22/20 0803 98 °F (36.7 °C) 62 20 116/73 94 %   01/22/20 0458 98 °F (36.7 °C) 72 20 129/75 94 %   01/22/20 0032 98.2 °F (36.8 °C) 68 20 132/78 95 %   01/21/20 2017 98 °F (36.7 °C) 98 20 121/71 97 %     Oxygen Therapy  O2 Sat (%): 94 % (01/22/20 0803)  Pulse via Oximetry: 82 beats per minute (01/15/20 1720)  O2 Device: Room air (01/22/20 0803)    Intake/Output Summary (Last 24 hours) at 1/22/2020 1820  Last data filed at 1/22/2020 0803  Gross per 24 hour   Intake    Output 300 ml   Net -300 ml       *Note that automatically entered I/Os may not be accurate; dependent on patient compliance with collection and accurate  by assistants. General:    Well nourished. Alert. Eyes:   Normal sclerae. Extraocular movements intact. ENT:  Normocephalic, atraumatic. Moist mucous membranes  CV:   Regular rate and rhythm. No murmur, rub, or gallop. Lungs:  Clear to auscultation bilaterally. No wheezing, rhonchi, or rales. Abdomen: Soft, nontender, nondistended. Bowel sounds normal.   Extremities: Warm and dry. Right leg dressing in place, No cyanosis or edema. Neurologic: CN II-XII grossly intact. Sensation intact. Skin:     No rashes or jaundice. Psych:  Normal mood and affect.       Discharge Info:   Discharge Medication List as of 1/22/2020 10:34 AM      START taking these medications    Details   ciprofloxacin HCl (CIPRO) 750 mg tablet Take 1 Tab by mouth every twelve (12) hours for 21 days. , Print, Disp-42 Tab, R-0      cefadroxil (DURICEF) 1 gram tablet Take 0.5 Tabs by mouth two (2) times a day for 14 days. , Print, Disp-14 Tab, R-0      terbinafine HCl (LAMISIL) 250 mg tablet Take 1 Tab by mouth daily for 21 days. , Print, Disp-21 Tab, R-0         CONTINUE these medications which have NOT CHANGED    Details   famotidine (PEPCID) 40 mg tablet Take 1 Tab by mouth two (2) times a day., Normal, Disp-180 Tab, R-1      dilTIAZem CD (CARDIZEM CD) 240 mg ER capsule Take 1 Cap by mouth daily. , Print, Disp-90 Cap, R-0      tamsulosin (FLOMAX) 0.4 mg capsule Take 1 Cap by mouth daily. , Print, Disp-90 Cap, R-0      sertraline (ZOLOFT) 50 mg tablet Take 1 Tab by mouth daily. , Print, Disp-90 Tab, R-0      furosemide (LASIX) 40 mg tablet Take 1 Tab by mouth daily. , Print, Disp-90 Tab, R-0      allopurinol (ZYLOPRIM) 300 mg tablet Take 1 Tab by mouth daily. , Print, Disp-90 Tab, R-0      rivaroxaban (XARELTO) 20 mg tab tablet Take 1 Tab by mouth daily (with dinner). , Print, Disp-90 Tab, R-0      acetaminophen (TYLENOL) 500 mg tablet Take  by mouth every six (6) hours as needed for Pain., Historical Med      colchicine 0.6 mg tablet Take 1 Tab by mouth daily. , Print, Disp-90 Tab, R-0      cyclobenzaprine (FLEXERIL) 5 mg tablet Take 1 Tab by mouth nightly., Normal, Disp-90 Tab, R-0      ondansetron (ZOFRAN ODT) 4 mg disintegrating tablet Take 1 Tab by mouth every eight (8) hours as needed for Nausea., Normal, Disp-90 Tab, R-3         STOP taking these medications       nystatin (MYCOSTATIN) powder Comments:   Reason for Stopping:         lisinopril (PRINIVIL, ZESTRIL) 40 mg tablet Comments:   Reason for Stopping: Follow Up Orders:   Follow-up Appointments   Procedures    FOLLOW UP VISIT Appointment in: 3 - 5 Days F/u with PCP in 3-5 days F/u with ID on  2/4 (appt already made) F/u with Vascular surgery in 4 weeks     F/u with PCP in 3-5 days  F/u with ID on  2/4 (appt already made)  F/u with Vascular surgery in 4 weeks     Standing Status:   Standing     Number of Occurrences:   1     Order Specific Question:   Appointment in     Answer:   3 - 5 Days       Follow-up Information     Follow up With Specialties Details Why Contact Info    [facility]  Go to 2/4/2020 @ 2:30pm [address]  [place] [address] [facility]  Lana Hardin MD Internal Medicine Go on 1/29/2020 at 9:30 [address] [facility]  [phone]      [facility]  Go on 2/19/2020 at 10:30 [address] [facility] [address] Mabel Jimenez. [address] [facility] [phone]    Dylan Rios MD Internal Medicine   [address]  [phone]            Time spent in patient discharge planning and coordination 38 minutes.     Signed:  Abena Garzon MD

## 2020-01-22 NOTE — PROGRESS NOTES
Care Management Interventions  PCP Verified by CM: Yes(Dr. Rubin)  Mode of Transport at Discharge: Self  Transition of Care Consult (CM Consult): Discharge Planning  Discharge Durable Medical Equipment: No  Physical Therapy Consult: Yes  Occupational Therapy Consult: Yes  Speech Therapy Consult: No  Current Support Network: Own Home, Lives with Spouse  Confirm Follow Up Transport: Self  Discharge Location  Discharge Placement: Home with home health    Patient to discharge home with [facility]. All milestones have been met. No other CM needs have been identified. Patient will transport home with spouse.

## 2020-01-24 NOTE — ADT AUTH CERT NOTES
Cellulitis - Care Day 7 (1/21/2020) by Jolynn Barnes RN  
 
   
Review Entered Review Status 1/21/2020 12:02 Completed  
   
Criteria Review Care Day: 7 Care Date: 1/21/2020 Level of Care: Inpatient Floor Guideline Day 3 Clinical Status   
(X) * Hemodynamic stability   
(X) * Fever absent or improved   
(X) * Skin exam stable or improved   
(X) * Mental status at baseline ( ) * Antibiotic treatment needs appropriate for next level of care   
(X) * Pain absent or manageable at next level of care ( ) * Discharge plans and education understood Activity   
(X) * Ambulatory Routes   
(X) * Oral hydration, medications, [F] and diet Interventions (X) WBC Medications (X) Parenteral or oral antibiotics [B] * Milestone Additional Notes 1/21  
  
98.5 63 20 96 135/73 Internal Medicine Pt reports feeling okay, discussed with pt and his wife about requirement of antibiotics. Swelling and redness is improving. No fever, chills, nausea/vomiting, abdominal pain, diarrhea General appearance: Morbidly obese, alert, awake, NAD Head: Normocephalic, without obvious abnormality, atraumatic Eyes: conjunctivae/corneas clear. PERRL Throat: Lips, mucosa, and tongue normal. Teeth and gums normal  
Neck: supple, symmetrical, trachea midline, and no JVD Lungs: clear to auscultation bilaterally, diminished in bases Heart: regular rate and rhythm, S1, S2 normal, no murmur, click, rub or gallop Abdomen: Protuberant, soft, non-tender. Bowel sounds normal. No masses,  no organomegaly Extremities: Right leg wrapped, dressing dry. No erythema above dressing.  Foot erythema, edema and heat improved.  chronic venous insufficiency changes in  LLE. Skin: Skin color, texture, turgor normal other than noted. No additional rashes or lesions Neurologic: Grossly normal  
   
Additional comments: Notes,orders, test results, vitals reviewed Cellulitis of right leg:  Hospitalization 11/19 for same   
            Blood cultures 1/14 and 1/15 with NGTD  
            Wound care in, will follow: new boot in room, will need as OP  
            Needs to keep RLE elevated 30 degrees: Not done on exam, reminded patient, wife,                      nurse. at all times  wedge in room   
            ID in 1/15:  antibiotics transitioned to Cipro 750 mg po bid with Duricef 500 mg po bid for 2-3 weeks.  
            Vascular surgery: signed off,  outpatient follow up  
            Monitor distal circulation   
            PT, OT, CM on board   
            Doppler RLE negative for DVT   
            Analgesics - pain improving  
   
Chronic venous hypertension with ulcer and inflammation involving right side under care of Vascular surgery             Underlying lymphedema   
            Needs surgery as above, unable to afford  
            PER ID:  Once cellulitis has been treated, will need strategy of edema  management with compression. On discharge will need terbinafine and wound care.  Also on discharge recommends to treat  onychomycosis as a port for cellulits  
   
PAD/PVD: Haley Olivia reinstated after negative doppler RLE  
               
Nausea and diarrhea: exposed to Infectious gastritis:  Improved   
            Stool cultures if diarrhea continues             Antiemetics       
   
History of PAF on xaralto and cardizem  
            Currently in SR  
            Remote telemetry  
            Xaralto, reinstated 1/16   
            EF:  55%  
  History of gout:  Continue home allopurinol   
            Uric acid level WNL 1/16   
   
DIANA:  Continue home meds  
   
HTN:  Borderline low, hold lisinopril Continue cardizem with parameters   
   
Obesity, morbid   
   
Coagulopathy on xaralto:  reinstated 1/16  
   
Hyperglycemia:  A1C: 5.5  
   
Discharge to home in AM with oral antibiotics.  
   
Care Plan discussed with: Patient and Nurse  
   
 Vascular Note Patient ambulating around room without difficulty, R LE dressing in place. Wife has numerous questions re: deep venous reflux. Reviewed anatomy, patient's pathophysiology and treatments recommended by Dr. Harsh Lawrence with patient and wife in detail and at level of their comprehension. Questions were answered to their satisfaction. We will be available should patient opt to pursue iliac venogram with intervention (not necessarily during this admission), but otherwise we will sign off.  
   
Infectious Disease Thinks RLE is improving, quite pruritic. Developed pruritic rash overnight that is on arms, abdomen and neck. Impression \" RLE Cellulitis: Primary symptom was of increasing redness and drainage; ED note described purulent drainage.    
\" Right leg lymphedema \" Onychomycosis  
   
Currently improving on a combination of PO and IV medications, though I'm not certain there will ever be full resolution given degree of edema and patient noncompliance with leg elevation and compression. Seen by vascular and appreciate their assistance, plan for possible revasc procedure in near future.   
   
I'm not certain given his degree of edema if PO is going to fully be successful and discussed this with patient today. But reasonable to attempt and will do longer duration then see him in our clinic. If he is improving at that time could consider stopping altogether vs PO PCN for suppression until he undergoes procedure. Can consider extending by another week at that time then transition to suppression, or if worsening he may require IV and restart of vanc. Plan  
\" On cefazolin, cipro, and fluconazole - will transition to cipro 750mg BID and duricef 500mg BID (stop fluc) with plan for 2-3 weeks and follow up in ID clinic \" Once cellulitis has been treated, then he is going to need a strategy of edema management with compression; vascular following. \" On discharge recommend terbinafine to treat onychomycosis as a portal for cellulitis. \" ID follow up  @ 2:30pm - discussion of continuing regimen x another week vs transition to PO suppression with PCN vs restart of IV.  
   
Thank you for allowing us to participate in the care of this patient, ID will sign off. Please don't hesitate to contact us with further questions or concerns. Anti-Infectives  
   
\" Cefazolin 1 gm IV q8h (rx -) \" Cipro po 750 BID (rx -) \" Fluconazole 200mg po Q24h (400mg rx 20) \" Vancomycin (01/15-20) Zosyn (01/15-20) PT NOTE Patient is up walking laps and ad dayne in his room.  No longer needs PT and will discharge Visit Vitals /74 (BP 1 Location: Left arm, BP Patient Position: At rest) Pulse 78 Temp 98.5 °F (36.9 °C) Resp 20 Ht 6' 4\" (1.93 m) Wt 137.4 kg (303 lb) SpO2 93% BMI 36.88 kg/m²  
   O2 Device: Room air  
   
Temp (24hrs), Av °F (36.7 °C), Min:97.6 °F (36.4 °C), Max:98.5 °F (36.9 °C)  
   
  
   
Cellulitis - Care Day 6 (2020) by Jean Claude Hart RN  
 
   
Review Entered Review Status 2020 11:57 Completed  
   
Criteria Review Care Day: 6 Care Date: 2020 Level of Care: Inpatient Floor Guideline Day 3 Clinical Status   
(X) * Hemodynamic stability   
(X) * Fever absent or improved   
(X) * Skin exam stable or improved   
(X) * Mental status at baseline ( ) * Antibiotic treatment needs appropriate for next level of care   
(X) * Pain absent or manageable at next level of care ( ) * Discharge plans and education understood Activity   
(X) * Ambulatory Routes   
(X) * Oral hydration, medications, [F] and diet Interventions (X) WBC Medications (X) Parenteral or oral antibiotics [B] * Milestone Additional Notes   
  
98 78 18 93% 138/82 Internal Medicine Subjective Patient alert and oriented. Reports improvement in pain 3/10. Denies n/v/d. Reports sweating/chills. Temperature in room elevated. No fever documented. Wife concerned with patient's low BP. 's.   
   
Cellulitis of right leg:  Hospitalization 11/19 for same   
            Blood cultures 1/14 and 1/15 with NGTD  
            Wound care in, will follow: new boot in room, will need as OP  
            Needs to keep RLE elevated 30 degrees: Not done on exam, reminded patient, wife,                      nurse. at all times  wedge in room   
            ID in 1/15:  abx changed to ancef, cipro po, and diflucan  
            Vascular surgery in:  Needs surgery in order for wounds to heal  
            permanently, patient unable to afford, past or current          
            Monitor distal circulation   
            PT, OT, CM on board   
            Doppler RLE negative for DVT   
            Analgesics - pain improving  
   
Chronic venous hypertension with ulcer and inflammation involving right side under care of Vascular surgery             Underlying lymphedema   
            Needs surgery as above, unable to afford  
            PER ID:  Once cellulitis has been treated, will need strategy of edema  management with compression. On discharge will need terbinafine and wound care.  Also on discharge recommends to treat  onychomycosis as a port for cellulits  
   
PAD/PVD: Grayson Sables reinstated after negative doppler RLE  
               
Nausea and diarrhea: exposed to Infectious gastritis:  Improved   
            Stool cultures if diarrhea continues             Antiemetics       
   
History of PAF on xaralto and cardizem  
            Currently in SR  
            Remote telemetry  
            Xaralto, reinstated 1/16   
            EF:  55%  
  History of gout:  Continue home allopurinol   
            Uric acid level WNL 1/16   
   
DIANA:  Continue home meds  
   
 HTN:  Borderline low, hold lisinopril Continue cardizem with parameters   
   
Obesity, morbid   
   
Coagulopathy on xaralto:  reinstated 1/16  
   
Hyperglycemia:  A1C: 5.5  
   
Hopefully home on IV vs po abx after seen by ID   
   
Care Plan discussed with: Patient and Nurse  
   
Discussed plan of care with Dr. Cynthia Rincon,   
  
[address] Chart screened by  for discharge planning. Patient to discharge home with IV abx or PO abx, this is unclear until determined by ID.  Patient may have a difficult time paying for home IV antibiotics as he is very concerned with his hospital bill at this time. Patient stated his wife is not working and having health issues and he fears he may lose his job due to this hospitalization. Patient stated he will lose insurance if he loses his job. Patient may not qualify for Medicaid at this time due to his income. CM will reach out to [facility] to see if they can assist.CM will continue to follow patient during hospitalization for discharge planning and needs. [facility] consult  if any new issues arise  
  
   
Cellulitis - Care Day 5 (1/19/2020) by Jaxson Salvador RN  
 
   
Review Entered Review Status 1/20/2020 14:22 Completed  
   
Criteria Review Care Day: 5 Care Date: 1/19/2020 Level of Care: Inpatient Floor Guideline Day 3 Clinical Status   
(X) * Hemodynamic stability   
(X) * Fever absent or improved   
(X) * Skin exam stable or improved   
(X) * Mental status at baseline ( ) * Antibiotic treatment needs appropriate for next level of care   
(X) * Pain absent or manageable at next level of care ( ) * Discharge plans and education understood Activity   
(X) * Ambulatory Routes   
(X) * Oral hydration, medications, [F] and diet Interventions (X) WBC Medications (X) Parenteral or oral antibiotics [B] * Milestone Additional Notes 1/19  
  
97.9 67 18 96% 127/74  
  
1/19/2020 05:36 RBC 3.68 (L) HGB 12.9 (L) HCT 38.4 (L) .3 (H) MCH 35.1 (H)  
  
1/19/2020 05:36 Chloride 111 (H) Anion gap 5 (L) Glucose 87 BUN 13 Creatinine 1.35 Calcium 9.2 GFR est non-AA 58 (L) Internal Medicine 1/19:  Improving daily.  Hopefully home on IV vs po abx after seen by ID 1/20.  Will need wound care on discharge also. Cellulitis of right leg:  Hospitalization 11/19 for same   
            Blood cultures 1/14 and 1/15 with NGTD  
            Wound culture:  ORDERED, NOT DONE  
            Wound care in, will follow: new boot in          room   
            Needs to keep RLE elevated 30 degrees:   
             at all times  wedge in room   
            ID in 1/15:  abx changed to ancef, cipro        po, and diflucan  
            Vascular surgery in:  Needs surgery in          order for wounds to heal  
            permanently, patient unable to afford,            past or current          
            Monitor distal circulation   
            PT, OT, CM on board   
            Doppler RLE negative for DVT   
            Analgesics   
   
Chronic venous hypertension with ulcer and inflammation involving right side under care of Vascular surgery             Underlying lymphedema   
            Needs surgery as above, unable to afford  
            PER ID:  Once cellulitis has been treated,     will need strategy of edema       management with compression. On            discharge will need terbinafine and wound   Merrick Munir on discharge recommends to     treat  onychomycosis   a a port for cellulits  
   
PAD/PVD: Jose A Kub reinstated after negative doppler RLE  
               
Nausea and diarrhea: exposed to Infectious gastritis:  Improved   
            Stool cultures if diarrhea continues             Antiemetics       
   
History of PAF on xaralto and cardizem  
            Currently in SR  
            Remote telemetry  
            Xaralto, reinstated 1/16   
            EF:  55%    
History of gout:  Continue home allopurinol   
            Uric acid level WNL 1/16   
   
DIANA:  Continue home meds  
   
HTN:  Continue home meds  
   
Obesity, morbid   
   
Coagulopathy on xaralto:  reinstated 1/16  
   
Hyperglycemia:  A1C: 5.5  
   
Hopefully home on IV vs po abx after seen by ID 1/20  
   
Care Plan discussed with: Patient and Nurse  
   
 
 
 
ADDITIONAL CLINICAL REQUEST FOR 01/18/2020 by Samy Ambrocio RN  
 
   
Review Status Review Entered In Primary 1/23/2020 16:33  
   
Criteria Review REVIEW 01/18/2020 
  
INTERNAL MED: 
1/18:  Improving with receding edema, erythema and pain daily. In good spirits. Hoping to go home Monday 1/20. Will need ongoing wound care and antibiotics per ID.  
Cellulitis of right leg:  Hospitalization 11/19 for same  
            Blood cultures 1/14 AND 1/15 with NGTD 
            RXJIM culture:  ORDERED, NOT DONE 
            DMDDY care in, will follow: new boot in          room    
            ID in 1/15:  abx changed to ancef, cipro          po, and diflucan 
            Vascular surgery in:  Needs surgery in          order for wounds to heal 
            permanently, patient unable to afford,            past or current         
            Monitor distal circulation  
            PT, OT, CM on board  
            Doppler RLE negative for DVT  
            Analgesics  
  
Chronic venous hypertension with ulcer and inflammation involving right side under care of Vascular surgery             Underlying lymphedema  
            Needs surgery as above, unable to afford 
            PER ID:  Once cellulitis has been treated,     will need strategy of edema    management with compression. On              discharge will need terbinafine.  Also on        discharge recommends to treat  onychomycosis a a port for cellulits 
  
PAD/PVD: Katyis Ka reinstated after negative doppler RLE 
  
Nausea and diarrhea: exposed to Infectious gastritis:  Improving             Stool cultures if diarrhea continues             Antiemetics      
  
History of PAF on xaralto and cardizem 
            Currently in SR 
            Remote telemetry             [id number], reinstated   
            EF:  55% 
  History of gout:  Continue home allopurinol  
            Uric acid level WNL   
  
DIANA:  Continue home meds 
  
HTN:  Continue home meds 
  
Obesity, morbid  
  
Coagulopathy on xaralto:  reinstated  
  
Hyperglycemia:  A1C: 5.5 
  
  
MEDS: HOME MEDS; OXYCODONE 5MG PO X1;ZOFRAN 4MG IV X1;DIFLUCAN 200MG PO QD; CIPRO 750MG PO Q12HRS;TYLENOL 650MG PO X1;ANCEF 1000MG IV 8HRS 
  
LABS: WBC 8.2; HGB 12.6; HCT 37.2; ; ; ANION GAP 5; GLUC 138;GFR JOSÉ MIGUEL 55 
  
  
Visit Vitals /74 (BP 1 Location: Left arm, BP Patient Position: At rest) Pulse 78 Temp 98.5 °F (36.9 °C) Resp 20 Ht 6' 4\" (1.93 m) Wt 137.4 kg (303 lb) SpO2 93% BMI 36.88 kg/m²  
   O2 Device: Room air 
  
Temp (24hrs), Av °F (36.7 °C), Min:97.6 °F (36.4 °C), Max:98.5 °F (36.9 °C)

## 2020-05-04 ENCOUNTER — HOSPITAL ENCOUNTER (EMERGENCY)
Age: 55
Discharge: HOME | End: 2020-05-04
Attending: EMERGENCY MEDICINE
Payer: COMMERCIAL

## 2020-05-04 VITALS
RESPIRATION RATE: 16 BRPM | BODY MASS INDEX: 38.36 KG/M2 | HEIGHT: 76 IN | OXYGEN SATURATION: 99 % | DIASTOLIC BLOOD PRESSURE: 96 MMHG | HEART RATE: 89 BPM | SYSTOLIC BLOOD PRESSURE: 167 MMHG | WEIGHT: 315 LBS | TEMPERATURE: 98.6 F

## 2020-05-04 DIAGNOSIS — F41.1 ANXIETY STATE: Primary | ICD-10-CM

## 2020-05-04 DIAGNOSIS — Z63.0 MARITAL CONFLICT: ICD-10-CM

## 2020-05-04 DIAGNOSIS — I87.2 VENOUS (PERIPHERAL) INSUFFICIENCY: Chronic | ICD-10-CM

## 2020-05-04 DIAGNOSIS — I87.331 CHRONIC VENOUS HYPERTENSION WITH ULCER AND INFLAMMATION INVOLVING RIGHT SIDE (HCC): Chronic | ICD-10-CM

## 2020-05-04 DIAGNOSIS — L97.919 CHRONIC VENOUS HYPERTENSION WITH ULCER AND INFLAMMATION INVOLVING RIGHT SIDE (HCC): Chronic | ICD-10-CM

## 2020-05-04 PROCEDURE — 99284 EMERGENCY DEPT VISIT MOD MDM: CPT

## 2020-05-04 PROCEDURE — 74011250637 HC RX REV CODE- 250/637: Performed by: EMERGENCY MEDICINE

## 2020-05-04 RX ORDER — PENICILLIN V POTASSIUM 250 MG/1
500 TABLET, FILM COATED ORAL
Status: COMPLETED | OUTPATIENT
Start: 2020-05-04 | End: 2020-05-04

## 2020-05-04 RX ORDER — LORAZEPAM 1 MG/1
1 TABLET ORAL
Status: COMPLETED | OUTPATIENT
Start: 2020-05-04 | End: 2020-05-04

## 2020-05-04 RX ORDER — SAME BUTANEDISULFONATE/BETAINE 400-600 MG
250 POWDER IN PACKET (EA) ORAL 2 TIMES DAILY
COMMUNITY
Start: 2020-03-03 | End: 2020-06-30 | Stop reason: CLARIF

## 2020-05-04 RX ORDER — PENICILLIN V POTASSIUM 500 MG/1
500 TABLET, FILM COATED ORAL 4 TIMES DAILY
COMMUNITY
Start: 2020-03-03 | End: 2020-06-30 | Stop reason: CLARIF

## 2020-05-04 RX ADMIN — PENICILLIN V POTASIUM 500 MG: 250 TABLET ORAL at 06:27

## 2020-05-04 RX ADMIN — LORAZEPAM 1 MG: 1 TABLET ORAL at 06:27

## 2020-05-04 SDOH — SOCIAL STABILITY - SOCIAL INSECURITY: PROBLEMS IN RELATIONSHIP WITH SPOUSE OR PARTNER: Z63.0

## 2020-05-04 NOTE — DISCHARGE INSTRUCTIONS
Change dressings as directed     Call your home health service today     Call the ID doctor today    Take penicillin as directed
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
## 2020-05-04 NOTE — ED PROVIDER NOTES
[facility] Emergency Department  Arrival Date/Time: 5/4/2020 @  5:43 AM      Evgeny Robbins  MRN: [id number]      53 y.o. male    YOB: 1965   [phone] (home)     [facility] EMERGENCY DEPT ER04/04  Seen on 5/4/2020 @ 5:54 AM      Today's Chief Complaint:   Chief Complaint   Patient presents with    Leg Pain     HPI: 50-year-old male presents to the emergency department via EMS secondary to itching and irritation in the right leg    Patient has a chronic poorly healing leg wound. He is followed by vascular    States he is not been changing the dressing as frequently located because he does not have gloves    Patient states it is itching and he is tired. Feels like it is infected    No fever. Is not tachycardic. Not tachypneic or hypotensive    He called EMS this morning because he and his wife have been fussing at each other all night and he started to have a panic attack this morning and came to the emergency department       HPI    Review of Systems: Review of Systems   Constitutional: Positive for fatigue. Negative for chills and fever. Respiratory: Negative for shortness of breath. Skin: Positive for wound. Past Medical History: Primary Care Doctor: Bobo Juan MD  Meds, PMH, PSHx, SocHx at end of this note     Allergies: Allergies   Allergen Reactions    Codeine Nausea Only    Sulfa (Sulfonamide Antibiotics) Rash         Patel Anti-Platelet Anticoagulant Meds             rivaroxaban (XARELTO) 20 mg tab tablet Take 1 Tab by mouth daily (with dinner). Physical Exam:  Nursing documentation reviewed. Patient Vitals for the past 24 hrs:   Temp Pulse Resp BP SpO2   05/04/20 0653 98.6 °F (37 °C) 89 16  99 %   05/04/20 0630    (!) 167/96    05/04/20 0547 98.4 °F (36.9 °C) 74 20 (!) 174/107 97 %    Vital signs were reviewed. Physical Exam  Vitals signs and nursing note reviewed. Constitutional:       Appearance: He is not ill-appearing. HENT:      Head: Normocephalic. Cardiovascular:      Rate and Rhythm: Normal rate and regular rhythm. Pulmonary:      Breath sounds: Normal breath sounds. Skin:     Comments: Chronic changes of the right lower extremity. Patient has a Ace bandage across the upper part of his leg. Way too tight. He states that the bandage has been in place for 4 days. He has random maxipads attached to his leg. Believe the wound is probably been bandaged this way for several days. Neurological:      Mental Status: He is alert and oriented to person, place, and time. Reviewed old images from visit in January. Leg is much less beefy red. No satellite lesions. I do not think he has an ongoing cellulitis at this time. He is supposed to be on penicillin 4 times a day. MEDICAL DECISION MAKING:   Differential Diagnosis:    MDM  Number of Diagnoses or Management Options  Anxiety state:   Chronic venous hypertension with ulcer and inflammation involving right side ([address]):   Marital conflict:   Venous (peripheral) insufficiency:   Diagnosis management comments: 58-year-old male with poorly healing  leg wound. Has not been changing his dressing secondary to apply ice    He is afebrile not tachycardic. He is requesting blood cultures. States his leg is itchy. I do not think he has a recurrent cellulitis. Will redress his wound. Risk of Complications, Morbidity, and/or Mortality  Presenting problems: moderate  Diagnostic procedures: minimal  Management options: low          Data/Management:    Lab findings during this visit: No results found for this or any previous visit (from the past 48 hour(s)). Radiology studies during this visit: No results found.   Medications given in the ED:   Medications   LORazepam (ATIVAN) tablet 1 mg (1 mg Oral Given 5/4/20 5111)   penicillin v potassium (VEETID) tablet 500 mg (500 mg Oral Given 5/4/20 0043)       Recheck and Additional Documentation:  (use .addrecheck  . addsepsis   . addstroke   . addhip  . addhandoff  . addcctime)     Reviewed his old charts. Leg today looks much better than the leg several months ago. He does not have a fever    I do not think labs are indicated. Given dose of his prophylactic penicillin. Rewrapped his legs. Given him some supplies. Have asked case management to follow-up with him and help arrange wound care. Procedure Documentation:   Procedures     Other ED Course Notes:        I wore appropriate PPE throughout this patient's ED encounter. Assessment and Plan:    Impression:     ICD-10-CM ICD-9-CM    1. Anxiety state F41.1 300.00    2. Marital conflict O10.5 P07.65    3. Venous (peripheral) insufficiency I87.2 459.81    4.  Chronic venous hypertension with ulcer and inflammation involving right side [facility]) I87.331 459.33     L97.919        Disposition:    Follow-up Information     Follow up With Specialties Details Why Contact Info    Tori Lisa MD Internal Medicine   [address] [place] [id number]          Discharge Medication List as of 2020  6:38 AM          Past Medical History:      Past Medical History:   Diagnosis Date    Anxiety     Cellulitis of leg 2019    Coagulopathy ([address] 1/15/2020    Taking xaralto     Erectile dysfunction 2014    DIANA (generalized anxiety disorder) 2014    Gout 2014    History of gout 2014    History of peptic ulcer     HTN (hypertension) 3/16/2015    Hypertension     Obesity, morbid ([address] 2018    Osteoarthritis of hand, primary localized 2014    Personal history of urinary calculi     x2    Venous (peripheral) insufficiency 12/3/2019    Wart 2014     Past Surgical History:   Procedure Laterality Date    HX WISDOM TEETH EXTRACTION       Social History     Tobacco Use    Smoking status: Former Smoker     Years: 5.00     Last attempt to quit: 2011     Years since quittin.7    Smokeless tobacco: Never Used    Tobacco comment: quit in the 80's    Substance Use Topics    Alcohol use: No    Drug use: No     Prior to Admission Medications   Prescriptions Last Dose Informant Patient Reported? Taking?   acetaminophen (TYLENOL) 500 mg tablet   Yes No   Sig: Take  by mouth every six (6) hours as needed for Pain. allopurinoL (ZYLOPRIM) 300 mg tablet   No No   Sig: Take 1 Tab by mouth daily. cefadroxil (DURICEF) 500 mg capsule   Yes No   Sig: Take 500 mg by mouth two (2) times a day. colchicine 0.6 mg tablet   No No   Sig: Take 1 Tab by mouth daily. Patient taking differently: Take 0.6 mg by mouth daily. Takes for flair ups only   cyclobenzaprine (FLEXERIL) 5 mg tablet   No No   Sig: Take 1 Tab by mouth nightly. dilTIAZem CD (CARDIZEM CD) 240 mg ER capsule   No No   Sig: Take 1 Cap by mouth daily. famotidine (PEPCID) 40 mg tablet   No No   Sig: Take 1 Tab by mouth two (2) times a day. furosemide (LASIX) 40 mg tablet   No No   Sig: Take 1 Tab by mouth daily. lisinopril (PRINIVIL, ZESTRIL) 40 mg tablet   No No   Sig: Take 1 Tab by mouth daily. meloxicam (MOBIC) 15 mg tablet   Yes No   Sig: Take 15 mg by mouth daily. ondansetron (ZOFRAN ODT) 4 mg disintegrating tablet   No No   Sig: Take 1 Tab by mouth every eight (8) hours as needed for Nausea. rivaroxaban (XARELTO) 20 mg tab tablet   No No   Sig: Take 1 Tab by mouth daily (with dinner). sertraline (ZOLOFT) 50 mg tablet   No No   Sig: Take 1.5 Tabs by mouth daily. tamsulosin (FLOMAX) 0.4 mg capsule   No No   Sig: Take 1 Cap by mouth daily.       Facility-Administered Medications: None

## 2020-05-07 NOTE — PROGRESS NOTES
Called patient per MD request about wound care [place] assist.  Patient declines at this time. Patient states he is following up with his PCP as soon as possible. Patient states he thought he was supposed to have 2 prescriptions by MD at discharge / AVS shown to charge nurse and no prescriptions noted on AVS.  Will follow up with MD to see if prescriptions written.

## 2020-05-07 NOTE — PROGRESS NOTES
Attempted to contact patient per MD request to discuss [facility] services / no answer / left VM.

## 2020-06-16 ENCOUNTER — HOSPITAL ENCOUNTER (INPATIENT)
Age: 55
LOS: 6 days | Discharge: HOME | DRG: 603 | End: 2020-06-23
Attending: EMERGENCY MEDICINE | Admitting: FAMILY MEDICINE
Payer: COMMERCIAL

## 2020-06-16 ENCOUNTER — APPOINTMENT (OUTPATIENT)
Dept: GENERAL RADIOLOGY | Age: 55
DRG: 603 | End: 2020-06-16
Payer: COMMERCIAL

## 2020-06-16 DIAGNOSIS — I87.2 VENOUS INSUFFICIENCY (CHRONIC) (PERIPHERAL): ICD-10-CM

## 2020-06-16 DIAGNOSIS — L03.115 CELLULITIS OF RIGHT LOWER EXTREMITY: Primary | ICD-10-CM

## 2020-06-16 DIAGNOSIS — I48.21 PERMANENT ATRIAL FIBRILLATION (HCC): ICD-10-CM

## 2020-06-16 PROBLEM — L03.119 RECURRENT CELLULITIS OF LOWER EXTREMITY: Status: ACTIVE | Noted: 2020-06-16

## 2020-06-16 LAB
ALBUMIN SERPL-MCNC: 3.3 G/DL (ref 3.5–5)
ALBUMIN/GLOB SERPL: 0.9 {RATIO} (ref 1.2–3.5)
ALP SERPL-CCNC: 95 U/L (ref 50–136)
ALT SERPL-CCNC: 18 U/L (ref 12–65)
ANION GAP SERPL CALC-SCNC: 7 MMOL/L (ref 7–16)
AST SERPL-CCNC: 12 U/L (ref 15–37)
BASOPHILS # BLD: 0.1 K/UL (ref 0–0.2)
BASOPHILS NFR BLD: 1 % (ref 0–2)
BILIRUB SERPL-MCNC: 0.3 MG/DL (ref 0.2–1.1)
BUN SERPL-MCNC: 22 MG/DL (ref 6–23)
CALCIUM SERPL-MCNC: 8.9 MG/DL (ref 8.3–10.4)
CHLORIDE SERPL-SCNC: 110 MMOL/L (ref 98–107)
CO2 SERPL-SCNC: 27 MMOL/L (ref 21–32)
CREAT SERPL-MCNC: 0.88 MG/DL (ref 0.8–1.5)
CRP SERPL-MCNC: 1.3 MG/DL (ref 0–0.9)
DIFFERENTIAL METHOD BLD: ABNORMAL
EOSINOPHIL # BLD: 0.2 K/UL (ref 0–0.8)
EOSINOPHIL NFR BLD: 2 % (ref 0.5–7.8)
ERYTHROCYTE [DISTWIDTH] IN BLOOD BY AUTOMATED COUNT: 13.2 % (ref 11.9–14.6)
GLOBULIN SER CALC-MCNC: 3.6 G/DL (ref 2.3–3.5)
GLUCOSE SERPL-MCNC: 127 MG/DL (ref 65–100)
HCT VFR BLD AUTO: 42.6 % (ref 41.1–50.3)
HGB BLD-MCNC: 15.2 G/DL (ref 13.6–17.2)
IMM GRANULOCYTES # BLD AUTO: 0.1 K/UL (ref 0–0.5)
IMM GRANULOCYTES NFR BLD AUTO: 1 % (ref 0–5)
LACTATE SERPL-SCNC: 1.6 MMOL/L (ref 0.4–2)
LYMPHOCYTES # BLD: 2.4 K/UL (ref 0.5–4.6)
LYMPHOCYTES NFR BLD: 20 % (ref 13–44)
MCH RBC QN AUTO: 35.4 PG (ref 26.1–32.9)
MCHC RBC AUTO-ENTMCNC: 35.7 G/DL (ref 31.4–35)
MCV RBC AUTO: 99.3 FL (ref 79.6–97.8)
MONOCYTES # BLD: 0.9 K/UL (ref 0.1–1.3)
MONOCYTES NFR BLD: 7 % (ref 4–12)
NEUTS SEG # BLD: 8.4 K/UL (ref 1.7–8.2)
NEUTS SEG NFR BLD: 70 % (ref 43–78)
NRBC # BLD: 0 K/UL (ref 0–0.2)
PLATELET # BLD AUTO: 228 K/UL (ref 150–450)
PMV BLD AUTO: 10 FL (ref 9.4–12.3)
POTASSIUM SERPL-SCNC: 3.4 MMOL/L (ref 3.5–5.1)
PROCALCITONIN SERPL-MCNC: <0.05 NG/ML
PROT SERPL-MCNC: 6.9 G/DL (ref 6.3–8.2)
RBC # BLD AUTO: 4.29 M/UL (ref 4.23–5.6)
SODIUM SERPL-SCNC: 144 MMOL/L (ref 136–145)
WBC # BLD AUTO: 12 K/UL (ref 4.3–11.1)

## 2020-06-16 PROCEDURE — 86140 C-REACTIVE PROTEIN: CPT

## 2020-06-16 PROCEDURE — 74011250637 HC RX REV CODE- 250/637: Performed by: FAMILY MEDICINE

## 2020-06-16 PROCEDURE — 99218 HC RM OBSERVATION: CPT

## 2020-06-16 PROCEDURE — 87205 SMEAR GRAM STAIN: CPT

## 2020-06-16 PROCEDURE — 96375 TX/PRO/DX INJ NEW DRUG ADDON: CPT

## 2020-06-16 PROCEDURE — 84145 PROCALCITONIN (PCT): CPT

## 2020-06-16 PROCEDURE — 65270000029 HC RM PRIVATE

## 2020-06-16 PROCEDURE — 74011250636 HC RX REV CODE- 250/636: Performed by: PHYSICIAN ASSISTANT

## 2020-06-16 PROCEDURE — 74011250636 HC RX REV CODE- 250/636: Performed by: EMERGENCY MEDICINE

## 2020-06-16 PROCEDURE — 73590 X-RAY EXAM OF LOWER LEG: CPT

## 2020-06-16 PROCEDURE — 96365 THER/PROPH/DIAG IV INF INIT: CPT

## 2020-06-16 PROCEDURE — 96376 TX/PRO/DX INJ SAME DRUG ADON: CPT

## 2020-06-16 PROCEDURE — 74011000258 HC RX REV CODE- 258: Performed by: PHYSICIAN ASSISTANT

## 2020-06-16 PROCEDURE — 74011250636 HC RX REV CODE- 250/636: Performed by: INTERNAL MEDICINE

## 2020-06-16 PROCEDURE — 87186 SC STD MICRODIL/AGAR DIL: CPT

## 2020-06-16 PROCEDURE — 74011000258 HC RX REV CODE- 258: Performed by: INTERNAL MEDICINE

## 2020-06-16 PROCEDURE — 77030041073 HC DRSG SILVASRB MDII -A

## 2020-06-16 PROCEDURE — 87077 CULTURE AEROBIC IDENTIFY: CPT

## 2020-06-16 PROCEDURE — 74011250636 HC RX REV CODE- 250/636: Performed by: FAMILY MEDICINE

## 2020-06-16 PROCEDURE — 99284 EMERGENCY DEPT VISIT MOD MDM: CPT

## 2020-06-16 PROCEDURE — 96366 THER/PROPH/DIAG IV INF ADDON: CPT

## 2020-06-16 PROCEDURE — 96367 TX/PROPH/DG ADDL SEQ IV INF: CPT

## 2020-06-16 PROCEDURE — 85025 COMPLETE CBC W/AUTO DIFF WBC: CPT

## 2020-06-16 PROCEDURE — 83605 ASSAY OF LACTIC ACID: CPT

## 2020-06-16 PROCEDURE — 87040 BLOOD CULTURE FOR BACTERIA: CPT

## 2020-06-16 PROCEDURE — 80053 COMPREHEN METABOLIC PANEL: CPT

## 2020-06-16 RX ORDER — ALLOPURINOL 300 MG/1
300 TABLET ORAL DAILY
Status: DISCONTINUED | OUTPATIENT
Start: 2020-06-17 | End: 2020-06-23 | Stop reason: HOSPADM

## 2020-06-16 RX ORDER — NALOXONE HYDROCHLORIDE 0.4 MG/ML
0.4 INJECTION, SOLUTION INTRAMUSCULAR; INTRAVENOUS; SUBCUTANEOUS AS NEEDED
Status: DISCONTINUED | OUTPATIENT
Start: 2020-06-16 | End: 2020-06-23 | Stop reason: HOSPADM

## 2020-06-16 RX ORDER — MORPHINE SULFATE 4 MG/ML
4 INJECTION INTRAVENOUS
Status: COMPLETED | OUTPATIENT
Start: 2020-06-16 | End: 2020-06-16

## 2020-06-16 RX ORDER — ACETAMINOPHEN 325 MG/1
650 TABLET ORAL
Status: DISCONTINUED | OUTPATIENT
Start: 2020-06-16 | End: 2020-06-23 | Stop reason: HOSPADM

## 2020-06-16 RX ORDER — POTASSIUM CHLORIDE 20 MEQ/1
40 TABLET, EXTENDED RELEASE ORAL
Status: COMPLETED | OUTPATIENT
Start: 2020-06-16 | End: 2020-06-16

## 2020-06-16 RX ORDER — SODIUM CHLORIDE 9 MG/ML
1000 INJECTION, SOLUTION INTRAVENOUS ONCE
Status: COMPLETED | OUTPATIENT
Start: 2020-06-16 | End: 2020-06-16

## 2020-06-16 RX ORDER — FAMOTIDINE 20 MG/1
40 TABLET, FILM COATED ORAL 2 TIMES DAILY
Status: DISCONTINUED | OUTPATIENT
Start: 2020-06-16 | End: 2020-06-23 | Stop reason: HOSPADM

## 2020-06-16 RX ORDER — VANCOMYCIN 2 GRAM/500 ML IN 0.9 % SODIUM CHLORIDE INTRAVENOUS
2000 ONCE
Status: COMPLETED | OUTPATIENT
Start: 2020-06-16 | End: 2020-06-16

## 2020-06-16 RX ORDER — AMOXICILLIN 250 MG
2 CAPSULE ORAL
Status: DISCONTINUED | OUTPATIENT
Start: 2020-06-16 | End: 2020-06-23 | Stop reason: HOSPADM

## 2020-06-16 RX ORDER — TAMSULOSIN HYDROCHLORIDE 0.4 MG/1
0.4 CAPSULE ORAL DAILY
Status: DISCONTINUED | OUTPATIENT
Start: 2020-06-17 | End: 2020-06-23 | Stop reason: HOSPADM

## 2020-06-16 RX ORDER — ONDANSETRON 2 MG/ML
4 INJECTION INTRAMUSCULAR; INTRAVENOUS
Status: DISCONTINUED | OUTPATIENT
Start: 2020-06-16 | End: 2020-06-23 | Stop reason: HOSPADM

## 2020-06-16 RX ORDER — DIPHENHYDRAMINE HCL 25 MG
25 CAPSULE ORAL
Status: DISCONTINUED | OUTPATIENT
Start: 2020-06-16 | End: 2020-06-23 | Stop reason: HOSPADM

## 2020-06-16 RX ORDER — ONDANSETRON 2 MG/ML
4 INJECTION INTRAMUSCULAR; INTRAVENOUS
Status: COMPLETED | OUTPATIENT
Start: 2020-06-16 | End: 2020-06-16

## 2020-06-16 RX ORDER — LISINOPRIL 20 MG/1
40 TABLET ORAL DAILY
Status: DISCONTINUED | OUTPATIENT
Start: 2020-06-17 | End: 2020-06-21

## 2020-06-16 RX ORDER — HYDROCODONE BITARTRATE AND ACETAMINOPHEN 5; 325 MG/1; MG/1
1 TABLET ORAL
Status: DISCONTINUED | OUTPATIENT
Start: 2020-06-16 | End: 2020-06-23 | Stop reason: HOSPADM

## 2020-06-16 RX ORDER — CYCLOBENZAPRINE HCL 10 MG
5 TABLET ORAL
Status: DISCONTINUED | OUTPATIENT
Start: 2020-06-16 | End: 2020-06-23 | Stop reason: HOSPADM

## 2020-06-16 RX ORDER — MORPHINE SULFATE 2 MG/ML
1 INJECTION, SOLUTION INTRAMUSCULAR; INTRAVENOUS
Status: DISCONTINUED | OUTPATIENT
Start: 2020-06-16 | End: 2020-06-23 | Stop reason: HOSPADM

## 2020-06-16 RX ORDER — SERTRALINE HYDROCHLORIDE 50 MG/1
75 TABLET, FILM COATED ORAL DAILY
Status: DISCONTINUED | OUTPATIENT
Start: 2020-06-17 | End: 2020-06-23 | Stop reason: HOSPADM

## 2020-06-16 RX ORDER — FUROSEMIDE 40 MG/1
40 TABLET ORAL DAILY
Status: DISCONTINUED | OUTPATIENT
Start: 2020-06-17 | End: 2020-06-23 | Stop reason: HOSPADM

## 2020-06-16 RX ORDER — DILTIAZEM HYDROCHLORIDE 240 MG/1
240 CAPSULE, COATED, EXTENDED RELEASE ORAL DAILY
Status: DISCONTINUED | OUTPATIENT
Start: 2020-06-17 | End: 2020-06-23 | Stop reason: HOSPADM

## 2020-06-16 RX ORDER — SAME BUTANEDISULFONATE/BETAINE 400-600 MG
250 POWDER IN PACKET (EA) ORAL 2 TIMES DAILY
Status: DISCONTINUED | OUTPATIENT
Start: 2020-06-16 | End: 2020-06-23 | Stop reason: HOSPADM

## 2020-06-16 RX ORDER — VANCOMYCIN 2 GRAM/500 ML IN 0.9 % SODIUM CHLORIDE INTRAVENOUS
2000 EVERY 12 HOURS
Status: DISCONTINUED | OUTPATIENT
Start: 2020-06-16 | End: 2020-06-19

## 2020-06-16 RX ORDER — LORAZEPAM 2 MG/ML
0.5 INJECTION INTRAMUSCULAR
Status: COMPLETED | OUTPATIENT
Start: 2020-06-16 | End: 2020-06-16

## 2020-06-16 RX ADMIN — VANCOMYCIN HYDROCHLORIDE 2000 MG: 10 INJECTION, POWDER, LYOPHILIZED, FOR SOLUTION INTRAVENOUS at 21:18

## 2020-06-16 RX ADMIN — SODIUM CHLORIDE 1000 ML: 9 INJECTION, SOLUTION INTRAVENOUS at 09:56

## 2020-06-16 RX ADMIN — FAMOTIDINE 40 MG: 20 TABLET, FILM COATED ORAL at 18:30

## 2020-06-16 RX ADMIN — CYCLOBENZAPRINE 5 MG: 10 TABLET, FILM COATED ORAL at 21:56

## 2020-06-16 RX ADMIN — LORAZEPAM 0.5 MG: 2 INJECTION INTRAMUSCULAR; INTRAVENOUS at 09:56

## 2020-06-16 RX ADMIN — VANCOMYCIN HYDROCHLORIDE 2000 MG: 10 INJECTION, POWDER, LYOPHILIZED, FOR SOLUTION INTRAVENOUS at 10:40

## 2020-06-16 RX ADMIN — RDII 250 MG CAPSULE 250 MG: at 18:30

## 2020-06-16 RX ADMIN — CEFEPIME HYDROCHLORIDE 2 G: 2 INJECTION, POWDER, FOR SOLUTION INTRAVENOUS at 18:25

## 2020-06-16 RX ADMIN — ONDANSETRON 4 MG: 2 INJECTION INTRAMUSCULAR; INTRAVENOUS at 11:30

## 2020-06-16 RX ADMIN — POTASSIUM CHLORIDE 40 MEQ: 20 TABLET, EXTENDED RELEASE ORAL at 14:00

## 2020-06-16 RX ADMIN — PIPERACILLIN AND TAZOBACTAM 4.5 G: 4; .5 INJECTION, POWDER, FOR SOLUTION INTRAVENOUS at 09:55

## 2020-06-16 RX ADMIN — MORPHINE SULFATE 4 MG: 4 INJECTION INTRAVENOUS at 11:29

## 2020-06-16 NOTE — PROGRESS NOTES
Pharmacokinetic Consult to Pharmacist    Giovanny Fall is a 53 y.o. male being treated with vancomycin. Height: 6' 4\" (193 cm)  Weight: 142.9 kg (315 lb 0.6 oz)  Lab Results   Component Value Date/Time    BUN 22 06/16/2020 09:03 AM    Creatinine 0.88 06/16/2020 09:03 AM    WBC 12.0 (H) 06/16/2020 09:03 AM    Procalcitonin <0.05 06/16/2020 09:03 AM    Lactic acid 1.6 06/16/2020 09:03 AM    Lactic Acid (POC) 0.90 01/15/2020 05:40 PM      Estimated Creatinine Clearance: 146.5 mL/min (based on SCr of 0.88 mg/dL). CULTURES:  pending    Day 1 of vancomycin. Goal trough is 10-20. Vancomycin dose initiated at 2000 mg q 12 hours. Will continue to follow patient and order levels when clinically indicated.     Thank you,  Calista Hendricks, PharmD  Clinical Pharmacist  [phone]

## 2020-06-16 NOTE — ED PROVIDER NOTES
Patient is here with redness, burning, pain and draining to his right lower leg. He has a history of cellulitis with MSSA infection and recently finished doxycycline 3 days ago. He was admitted here in January for it as well. He feels like he has had subjective fevers over the last 3 days with some nausea and vomiting. He states that he waited to come in because his wife is a germaphobe and did not want him to come. He states he came while she was sleeping so that he could be evaluated. He is not a diabetic. He has peripheral vascular disease. He also states he feels quite anxious and would like something for that while he is here. He takes Zoloft but does not think that it is helping. He was scheduled to have vascular surgery before the coronavirus hit and it has not been done. He did ambulate to the room. No chest pain, shortness of breath, abdominal pain, dizziness, weakness, dyspnea on exertion, orthopnea, swelling/weakness or tingling to the other extremities, headache or other new symptoms. Patient states this all began after a rock hit his right lower leg while mowing the lawn in April 2019. The history is provided by the patient. Leg Pain    This is a new problem. The current episode started more than 2 days ago. The problem occurs constantly. The problem has been gradually worsening. The pain is present in the right lower leg. The quality of the pain is described as aching. The pain is at a severity of 6/10. The pain is moderate. Pertinent negatives include no numbness, full range of motion, no stiffness, no tingling, no itching, no back pain and no neck pain. The symptoms are aggravated by movement. He has tried nothing for the symptoms. There has been a history of trauma.         Past Medical History:   Diagnosis Date    Anxiety     Cellulitis of leg 11/17/2019    Coagulopathy ([address] 1/15/2020    Taking xaralto     Erectile dysfunction 1/20/2014    DIANA (generalized anxiety disorder) 2014    Gout 2014    History of gout 2014    History of peptic ulcer     HTN (hypertension) 3/16/2015    Hypertension     Obesity, morbid ([address] 2018    Osteoarthritis of hand, primary localized 2014    Personal history of urinary calculi     x2    Venous (peripheral) insufficiency 12/3/2019    Wart 2014       Past Surgical History:   Procedure Laterality Date    HX WISDOM TEETH EXTRACTION           No family history on file.     Social History     Socioeconomic History    Marital status:      Spouse name: Not on file    Number of children: Not on file    Years of education: Not on file    Highest education level: Not on file   Occupational History    Not on file   Social Needs    Financial resource strain: Not on file    Food insecurity     Worry: Not on file     Inability: Not on file    Transportation needs     Medical: Not on file     Non-medical: Not on file   Tobacco Use    Smoking status: Former Smoker     Years: 5.00     Last attempt to quit: 2011     Years since quittin.8    Smokeless tobacco: Never Used    Tobacco comment: quit in the 80's    Substance and Sexual Activity    Alcohol use: No    Drug use: No    Sexual activity: Yes     Partners: Female     Birth control/protection: None   Lifestyle    Physical activity     Days per week: Not on file     Minutes per session: Not on file    Stress: Not on file   Relationships    Social connections     Talks on phone: Not on file     Gets together: Not on file     Attends Catholic service: Not on file     Active member of club or organization: Not on file     Attends meetings of clubs or organizations: Not on file     Relationship status: Not on file    Intimate partner violence     Fear of current or ex partner: Not on file     Emotionally abused: Not on file     Physically abused: Not on file     Forced sexual activity: Not on file   Other Topics Concern    Not on file   Social History Narrative    1/15/20:  PATIENT IS  TO [employer], THEY HAVE A 13YEAR OLD SON. 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 ALLERGIES: Codeine and Sulfa (sulfonamide antibiotics)    Review of Systems   Constitutional: Negative. HENT: Negative. Eyes: Negative. Respiratory: Negative. Cardiovascular: Negative. Gastrointestinal: Negative. Genitourinary: Negative. Musculoskeletal: Negative. Negative for back pain, neck pain and stiffness. Right leg pain, redness, draining    Skin: Negative. Negative for itching. Neurological: Negative. Negative for tingling and numbness. Psychiatric/Behavioral: Negative. All other systems reviewed and are negative. Vitals:    06/16/20 0853   BP: 177/90   Pulse: 89   Resp: 16   Temp: 97.9 °F (36.6 °C)   SpO2: 95%            Physical Exam  Vitals signs and nursing note reviewed. Constitutional:       Appearance: He is well-developed. HENT:      Head: Normocephalic and atraumatic. Right Ear: External ear normal.      Left Ear: External ear normal.      Nose: Nose normal.   Eyes:      Conjunctiva/sclera: Conjunctivae normal.      Pupils: Pupils are equal, round, and reactive to light. Neck:      Musculoskeletal: Normal range of motion and neck supple. Cardiovascular:      Rate and Rhythm: Normal rate and regular rhythm. Heart sounds: Normal heart sounds. Pulmonary:      Effort: Pulmonary effort is normal.      Breath sounds: Normal breath sounds. Abdominal:      General: Bowel sounds are normal.      Palpations: Abdomen is soft. Musculoskeletal: Normal range of motion. Legs:    Skin:     General: Skin is warm and dry. Neurological:      Mental Status: He is alert and oriented to person, place, and time. Deep Tendon Reflexes: Reflexes are normal and symmetric. Psychiatric:         Behavior: Behavior normal.         Thought Content:  Thought content normal.         Judgment: Judgment normal. MDM  Number of Diagnoses or Management Options         Amount and/or Complexity of Data Reviewed  Clinical lab tests: ordered  Tests in the radiology section of CPT®: ordered  Discuss the patient with other providers: yes (Dr. Elena Topete)    Risk of Complications, Morbidity, and/or Mortality  Presenting problems: moderate  Diagnostic procedures: moderate  Management options: moderate           Procedures      9:15 AM Spoke with Dr. Elena Topete regarding patient. We discussed the history, physical exam, workup. 11:15 AM patient's leg looks worse now than it did in January and his white count is slightly elevated. Hospitalist paged for admission. Patient is resting comfortably in the room. 11:19 AM Dr. Maddie Engle, forwarding admission to Dr. Diaz Olmos.

## 2020-06-16 NOTE — ED TRIAGE NOTES
Patient ambulatory to room without difficulty with mask in place. Patient reports infection to RLE. States began as an ulcer because he has reflux in his veins.

## 2020-06-16 NOTE — PROGRESS NOTES
Spiritual Care Visit, in the Emergency Department. Visit requested by patient. Patient related some problems he is having. 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 He and his wife are anxious about loss of income. He feels that he needs to be in hospital because of the cellulitis, but his wife does not want him here because of possible further infection.  encouraged him to speak to the doctor, and to the  about possible resources for him and his family. Prayed with him for healing and for wisdom. If admitted to the hospital,  will attempt to follow up. Signed by Adelita Vaz, Staff

## 2020-06-16 NOTE — ROUTINE PROCESS
END OF SHIFT NOTE:    INTAKE/OUTPUT  No intake/output data recorded. Voiding: YES  Catheter: NO  Drain:              Flatus: Patient does have flatus present. Stool:  0 occurrences. Characteristics:       Emesis: 0 occurrences. Characteristics:        VITAL SIGNS  Patient Vitals for the past 12 hrs:   Temp Pulse Resp BP SpO2   06/16/20 1551 98.2 °F (36.8 °C) 67 16 130/76 93 %   06/16/20 1257 98.1 °F (36.7 °C) 69 16 149/85 96 %   06/16/20 0853 97.9 °F (36.6 °C) 89 16 177/90 95 %       Pain Assessment  Pain Intensity 1: 0 (06/16/20 1528)        Patient Stated Pain Goal: 0    Ambulating  No,. instructed to remain in bed due to cellulitis of RLE for now. Extremity elevated. Pt agreeable. Shift report given to oncoming nurse at the bedside.     Munir Lagunas RN

## 2020-06-16 NOTE — PROGRESS NOTES
Problem: Falls - Risk of  Goal: *Absence of Falls  Description: Document Jamee Grace Fall Risk and appropriate interventions in the flowsheet.   Outcome: Not Progressing Towards Goal  Note: Fall Risk Interventions:  Mobility Interventions: Communicate number of staff needed for ambulation/transfer, Patient to call before getting OOB, PT Consult for mobility concerns         Medication Interventions: Patient to call before getting OOB, Teach patient to arise slowly, Evaluate medications/consider consulting pharmacy         History of Falls Interventions: Door open when patient unattended, Investigate reason for fall, Assess for delayed presentation/identification of injury for 48 hrs (comment for end date), Evaluate medications/consider consulting pharmacy         Problem: Patient Education: Go to Patient Education Activity  Goal: Patient/Family Education  Outcome: Not Progressing Towards Goal

## 2020-06-16 NOTE — WOUND CARE
Patient seen for RLE venous disease causing shallow ulcerations. Presently lower leg is  Painful, red, swollen with irregular scattered open dry red ulcers from ankle to below knee. Patient history noted. Patient would benefit from compression when the cellulitis improves. He would require outpatient wound clinic or home health nurse to continue compression at home. Started silver gel to open areas and then covered with nonadherent gauze. Padded with ABD and secured with stretch opal roll. Added ACE from mid foot to below knee for very light compression for today. Will follow up tomorrow to see if pain and cellulitis improves and if it is appropriate to wrap, will complete.

## 2020-06-16 NOTE — ED NOTES
TRANSFER - OUT REPORT:    Verbal report given to Joana on Nery Jurado  being transferred to [facility] for routine progression of care       Report consisted of patients Situation, Background, Assessment and   Recommendations(SBAR). Information from the following report(s) SBAR, Kardex, ED Summary and MAR was reviewed with the receiving nurse. Lines:   Peripheral IV 06/16/20 Left Antecubital (Active)   Site Assessment Clean, dry, & intact 6/16/2020  9:11 AM   Phlebitis Assessment 0 6/16/2020  9:11 AM   Infiltration Assessment 0 6/16/2020  9:11 AM   Dressing Status Clean, dry, & intact 6/16/2020  9:11 AM        Opportunity for questions and clarification was provided.       Patient transported with:   ALOSKO

## 2020-06-16 NOTE — PROGRESS NOTES
06/16/20 1300   Dual Skin Pressure Injury Assessment   Dual Skin Pressure Injury Assessment WDL   Second Care Provider (Based on [address] [place] [address]) April Lester

## 2020-06-16 NOTE — ED NOTES
Report to [facility], [address] [facility]. Care transferred at this time.

## 2020-06-16 NOTE — ROUTINE PROCESS
TRANSFER - IN REPORT:    Verbal report received from [facility] [address] alireza [employer]  being received from ER(unit) for routine progression of care      Report consisted of patients Situation, Background, Assessment and   Recommendations(SBAR). Information from the following report(s) SBAR, ED Summary, [facility] and Recent Results was reviewed with the receiving nurse. Opportunity for questions and clarification was provided. Assessment completed upon patients arrival to unit and care assumed.

## 2020-06-16 NOTE — H&P
Hospitalist Admission History and Physical     NAME:  Lety Hicks   Age:  53 y.o.  :   1965   MRN:   [id number]  PCP: Gregor Martinez MD  Consulting MD:  Treatment Team: Attending Provider: Josh Phillips MD; Physician Assistant: BROWN Bauman; Primary Nurse: Nathaniel Patel    Chief Complaint   Patient presents with    Leg Pain         HPI:   Patient is a 53 y.o. male who presented to the ED for worsening leg wound. Hx of chronic leg wound since  accident 2019, gout, chronic leg edema, depression, HTN. Follows ID and vascular surgery outpatient for his chronic leg wound. Recently changed to Penicillin in March of this year for chronic suppression. Per pharmacy, only filled one month supply though he is stating he has been compliant with medication. Wound cultures in the past have grown staph aureus, citrobacter, and proteus. Vitals - /90    Labs- WBC 12, K 3.4. Past Medical History:   Diagnosis Date    Anxiety     Cellulitis of leg 2019    Co[address] 1/15/2020    Taking xaralto     Erectile dysfunction 2014    DIANA (generalized anxiety disorder) 2014    Gout 2014    History of gout 2014    History of peptic ulcer     HTN (hypertension) 3/16/2015    Hypertension     Obesity, morbid ([address] 2018    Osteoarthritis of hand, primary localized 2014    Personal history of urinary calculi     x2    Venous (peripheral) insufficiency 12/3/2019    Wart 2014        Past Surgical History:   Procedure Laterality Date    HX WISDOM TEETH EXTRACTION          No family history on file. Social History     Social History Narrative    1/15/20:  PATIENT IS  TO [employer]REE, THEY HAVE A 13YEAR OLD SON. PATIENT RECENTLY WENT BACK TO WORK AT THE [employer].          Social History     Tobacco Use    Smoking status: Former Smoker     Years: 5.00     Last attempt to quit: 2011     Years since quittin.8    Smokeless tobacco: Never Used    Tobacco comment: quit in the 80's    Substance Use Topics    Alcohol use: No        Social History     Substance and Sexual Activity   Drug Use No         Allergies   Allergen Reactions    Codeine Nausea Only    Sulfa (Sulfonamide Antibiotics) Rash       Prior to Admission medications    Medication Sig Start Date End Date Taking? Authorizing Provider   cyclobenzaprine (FLEXERIL) 5 mg tablet TAKE 1 TABLET BY MOUTH EVERY DAY AT NIGHT 20   Marion SOLOMON MD   penicillin v potassium (VEETID) 500 mg tablet Take 500 mg by mouth four (4) times daily. 3/3/20   Emre Barbosa MD   Saccharomyces boulardii (FLORASTOR) 250 mg capsule Take 250 mg by mouth two (2) times a day. 3/3/20   Emre Barbosa MD   meloxicam (MOBIC) 15 mg tablet Take 15 mg by mouth daily. 1/10/20   ProviderJohana   lisinopril (PRINIVIL, ZESTRIL) 40 mg tablet Take 1 Tab by mouth daily. 20   Marion Johnston MD   famotidine (PEPCID) 40 mg tablet Take 1 Tab by mouth two (2) times a day. 20   Marion Johnston MD   dilTIAZem CD (CARDIZEM CD) 240 mg ER capsule Take 1 Cap by mouth daily. 20   Marion Johnston MD   tamsulosin (FLOMAX) 0.4 mg capsule Take 1 Cap by mouth daily. 20   Marion Johnston MD   sertraline (ZOLOFT) 50 mg tablet Take 1.5 Tabs by mouth daily. 20   Marion Johnston MD   furosemide (LASIX) 40 mg tablet Take 1 Tab by mouth daily. 20   Marion Johnston MD   allopurinoL (ZYLOPRIM) 300 mg tablet Take 1 Tab by mouth daily. 20   Marion Johnston MD   rivaroxaban (XARELTO) 20 mg tab tablet Take 1 Tab by mouth daily (with dinner). 20   Marion Johnston MD   ondansetron (ZOFRAN ODT) 4 mg disintegrating tablet Take 1 Tab by mouth every eight (8) hours as needed for Nausea.  20   Marion Johnston MD   acetaminophen (TYLENOL) 500 mg tablet Take  by mouth every six (6) hours as needed for Pain. Provider, Historical   colchicine 0.6 mg tablet Take 1 Tab by mouth daily. Patient taking differently: Take 0.6 mg by mouth daily. Takes for flair ups only 11/20/19   Michoacano Veloz MD           Review of Systems    Constitutional:  NAD  Eyes:  no change in visual acuity, no photophobia  Ears, nose, mouth, throat, and face: no  Odynphagia, dysphagia, no thrush or exudate, negative for chronic sinus congestion, recurrent headaches  Respiratory: negative for SOB, hemoptysis or cough  Cardiovascular: negative for CP, palpitations, or PND  Gastrointestinal: negative for abdominal pain, no hematemesis, hematochezia or BRBPR  Genitourinary: no urgency, frequency, or dysuria, no nocturia  Integument/breast: chronic skin wounds to right LE. Hematologic/lymphatic: negative for known bleeding disorder  Musculoskeletal:pain in right LE. Neurological: negative for lightheadedness, syncope or presyncopal events, no seizure or CVA history  Behavioral/Psych: negative for depression or chronic anxiety,   Endocrine: negative for polydyspia, polyuria or intolerance to heat or cold  Allergic/Immunologic: negative for chronic allergic rhinitis, or known connective tissue disorder      Objective:     Visit Vitals  /90 (BP 1 Location: Right arm, BP Patient Position: Sitting)   Pulse 89   Temp 97.9 °F (36.6 °C)   Resp 16   SpO2 95%        No intake/output data recorded. No intake/output data recorded.     Data Review:   Recent Results (from the past 24 hour(s))   CBC WITH AUTOMATED DIFF    Collection Time: 06/16/20  9:03 AM   Result Value Ref Range    WBC 12.0 (H) 4.3 - 11.1 K/uL    RBC 4.29 4.23 - 5.6 M/uL    HGB 15.2 13.6 - 17.2 g/dL    HCT 42.6 41.1 - 50.3 %    MCV 99.3 (H) 79.6 - 97.8 FL    MCH 35.4 (H) 26.1 - 32.9 PG    MCHC 35.7 (H) 31.4 - 35.0 g/dL    RDW 13.2 11.9 - 14.6 %    PLATELET 204 229 - 025 K/uL    MPV 10.0 9.4 - 12.3 FL    ABSOLUTE NRBC 0.00 0.0 - 0.2 K/uL    DF AUTOMATED      NEUTROPHILS 70 43 - 78 %    LYMPHOCYTES 20 13 - 44 %    MONOCYTES 7 4.0 - 12.0 %    EOSINOPHILS 2 0.5 - 7.8 %    BASOPHILS 1 0.0 - 2.0 %    IMMATURE GRANULOCYTES 1 0.0 - 5.0 %    ABS. NEUTROPHILS 8.4 (H) 1.7 - 8.2 K/UL    ABS. LYMPHOCYTES 2.4 0.5 - 4.6 K/UL    ABS. MONOCYTES 0.9 0.1 - 1.3 K/UL    ABS. EOSINOPHILS 0.2 0.0 - 0.8 K/UL    ABS. BASOPHILS 0.1 0.0 - 0.2 K/UL    ABS. IMM. GRANS. 0.1 0.0 - 0.5 K/UL   METABOLIC PANEL, COMPREHENSIVE    Collection Time: 06/16/20  9:03 AM   Result Value Ref Range    Sodium 144 136 - 145 mmol/L    Potassium 3.4 (L) 3.5 - 5.1 mmol/L    Chloride 110 (H) 98 - 107 mmol/L    CO2 27 21 - 32 mmol/L    Anion gap 7 7 - 16 mmol/L    Glucose 127 (H) 65 - 100 mg/dL    BUN 22 6 - 23 MG/DL    Creatinine 0.88 0.8 - 1.5 MG/DL    GFR est AA >60 >60 ml/min/1.73m2    GFR est non-AA >60 >60 ml/min/1.73m2    Calcium 8.9 8.3 - 10.4 MG/DL    Bilirubin, total 0.3 0.2 - 1.1 MG/DL    ALT (SGPT) 18 12 - 65 U/L    AST (SGOT) 12 (L) 15 - 37 U/L    Alk. phosphatase 95 50 - 136 U/L    Protein, total 6.9 6.3 - 8.2 g/dL    Albumin 3.3 (L) 3.5 - 5.0 g/dL    Globulin 3.6 (H) 2.3 - 3.5 g/dL    A-G Ratio 0.9 (L) 1.2 - 3.5     LACTIC ACID    Collection Time: 06/16/20  9:03 AM   Result Value Ref Range    Lactic acid 1.6 0.4 - 2.0 MMOL/L   PROCALCITONIN    Collection Time: 06/16/20  9:03 AM   Result Value Ref Range    Procalcitonin <0.05 ng/mL   C REACTIVE PROTEIN, QT    Collection Time: 06/16/20  9:03 AM   Result Value Ref Range    C-Reactive protein 1.3 (H) 0.0 - 0.9 mg/dL       Physical Exam:     General:  Alert, cooperative, no distress, appears stated age. Eyes:  Conjunctivae/corneas clear. Ears:  Normal TMs and external ear canals both ears. Nose: Nares normal. Septum midline. Mouth/Throat: Wearing mask   Neck:  no JVD. Back:   deferred. Lungs:   Clear to auscultation bilaterally. Heart:  Regular rate and rhythm, S1, S2 normal   Abdomen:   Soft, non-tender.  Bowel sounds normal.    Extremities: Right LE with superficial skin sloughing that is tender to touch. Clear discharge noted to these areas without purulent discharge. Marked area of erythema that is 15CM and covers whole leg from dorsal portion of foot to 1/4 shin   Pulses: 2+ and symmetric all extremities. Skin: as above    Lymph nodes: Cervical, supraclavicular, and axillary nodes normal.   Neurologic: CNII-XII intact. Assessment and Plan     Principal Problem:    Recurrent cellulitis of lower extremity (6/16/2020)    Active Problems:    History of gout (1/20/2014)      DIANA (generalized anxiety disorder) (5/7/2014)      HTN (hypertension) (3/16/2015)      Obesity, morbid ([address] (2/26/2018)      Chronic venous hypertension with ulcer and inflammation involving right side ([address] (3/6/2019)      Hypokalemia (11/18/2019)    Recurrent cellulitis - Place on Vancomycin and Zosyn due to previous culture reports. Order wound culture. Consult ID to see tomorrow for recommendations on medication to use as outpatient since his compliance with Penicillin I am sure is low. Consult wound care. Hypokalemia - Supplement. HTN- Cardizem, ACE    Depression - Zoloft. Coagulopathy in the past - Xarelto     Chronic LE edema - Lasix    Likely can dc in 1-2 days.       DVT prophylaxis - Xarelto  Signed By: Yvette Perez DO   June 16, 2020

## 2020-06-16 NOTE — CONSULTS
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 Discussed today that he will likely need suppressive therapy and to be compliant with this regimen as well as avoiding things like yardwork where the wound can become soiled. Plan:   · Continue vanc/cefepime for now (pip/tazo discontinued for renal protection and last isolates ctx resistant)  · Continue to monitor wound - no current concern for necrotizing process   · If larger vessels are amenable to intervention to improve blood flow this would likely help prevent recurrence - imaging indicates smaller vessel disease so not certain this is possible but he's been considered for intervention before  · Discussed with patient importance of suppressive therapy and keeping wound clean  · ID will continue to follow - if he shows improvement in the next 48-72 hours could reasonably transition to PO to complete course    Anti-infectives:   1. Vancomycin 6/15-present  2. Piperacillin/tazobactam 6/15-present    Subjective:   Date of Consultation:  June 16, 2020  Date of Admission: 6/16/2020   Referring Physician: Jason Mendez    Patient is a 53 y.o. male with h/o chronic LE edema, PVD, and recurrent cellulitis, last followed inpatient by our team in January for the same, who is admitted again with worsening cellulitis. In January when he was admitted he had increased purulent drainage. There is not a culture from that admission though there is one from November with proteus, citrobacter and MSSA.  He was on cipro and duricef and discharge with plan for a few weeks then seen in our clinic in March. Noted to be doing better and transitioned to PO PCN for suppression. Today he is being admitted for worsening pain, most notable in the RLE, and subjective fevers. He was recently given doxycycline outpatient which he completed about 3 days ago but continued to worsen despite this. He endorses compliance with his PCN but pharmacy called and indicates that he has not refilled this medicine since March. He says he has been out for about 1.5 weeks. Picture reviewed from admission shows minimal edema in the leg but extensive erythema extending from the dorsum of the foot almost to the knee with areas of skin cracking and serous drainage. No fluctuance or purulence. This is notably worse from picture obtained in May, where it appears he has been keeping the leg wrapped and has minimal erythema, but comparable to the picture obtained in January. He has been afebrile, white count of 12, normal vital signs. Started empirically on vanc/zosyn.      Patient Active Problem List   Diagnosis Code    Osteoarthritis of hand, primary localized M19.049    History of gout Z87.39    Erectile dysfunction N52.9    Wart B07.9    Plantar warts B07.0    Hyperlipidemia E78.5    Generalized OA M15.9    DIANA (generalized anxiety disorder) F41.1    HTN (hypertension) I10    Arthritis of knee, right M17.11    Benign prostatic hyperplasia without lower urinary tract symptoms N40.0    Obesity, morbid ([facility]) E66.01    Chronic venous hypertension with ulcer and inflammation involving right side ([facility]) I87.331, L97.919    Non-pressure chronic ulcer of right ankle with necrosis of muscle ([facility]) L97.313    Sepsis ([facility] [address] A41.9    Ankle wound, right, initial encounter S91.001A    Atrial fibrillation with rapid ventricular response ([facility]) I48.91    Hypokalemia E87.6    Venous (peripheral) insufficiency I87.2    Varicose vein of leg I83.90    Cellulitis of right leg L03.115  Arterial insufficiency (HCC) I77.1    Coagulopathy ([facility]) D68.9    Recurrent cellulitis of lower extremity L03.119     Past Medical History:   Diagnosis Date    Anxiety     Cellulitis of leg 2019    Coagulopathy ([facility] [address] 1/15/2020    Taking xaralto     Erectile dysfunction 2014    DIANA (generalized anxiety disorder) 2014    Gout 2014    History of gout 2014    History of peptic ulcer     HTN (hypertension) 3/16/2015    Hypertension     Obesity, morbid ([facility] [address] 2018    Osteoarthritis of hand, primary localized 2014    Personal history of urinary calculi     x2    Venous (peripheral) insufficiency 12/3/2019    Wart 2014      History reviewed. No pertinent family history. Social History     Tobacco Use    Smoking status: Former Smoker     Years: 5.00     Last attempt to quit: 2011     Years since quittin.8    Smokeless tobacco: Never Used    Tobacco comment: quit in the     Substance Use Topics    Alcohol use: No     Past Surgical History:   Procedure Laterality Date    HX WISDOM TEETH EXTRACTION        Prior to Admission medications    Medication Sig Start Date End Date Taking? Authorizing Provider   cyclobenzaprine (FLEXERIL) 5 mg tablet TAKE 1 TABLET BY MOUTH EVERY DAY AT NIGHT 20  Yes Rogers SOLOMON MD   penicillin v potassium (VEETID) 500 mg tablet Take 500 mg by mouth four (4) times daily. 3/3/20  Yes Other, MD Emre   meloxicam (MOBIC) 15 mg tablet Take 15 mg by mouth daily. 1/10/20  Yes Provider, Historical   famotidine (PEPCID) 40 mg tablet Take 1 Tab by mouth two (2) times a day. 20  Yes Rogers Kimbrough MD   dilTIAZem CD (CARDIZEM CD) 240 mg ER capsule Take 1 Cap by mouth daily. 20  Yes Rogers Kimbrough MD   furosemide (LASIX) 40 mg tablet Take 1 Tab by mouth daily.  20  Yes Rogers Kimbrough MD   ondansetron (ZOFRAN ODT) 4 mg disintegrating tablet Take 1 Tab by mouth every eight (8) hours as needed for Nausea. 20  Yes MD Sal Brown boulardii (FLORASTOR) 250 mg capsule Take 250 mg by mouth two (2) times a day. 3/3/20   Other, MD Emre   lisinopril (PRINIVIL, ZESTRIL) 40 mg tablet Take 1 Tab by mouth daily. 20   Rogers Kimbrough MD   tamsulosin (FLOMAX) 0.4 mg capsule Take 1 Cap by mouth daily. 20   Rogers Kimbrough MD   sertraline (ZOLOFT) 50 mg tablet Take 1.5 Tabs by mouth daily. 20   Rogers Kimbrough MD   allopurinoL (ZYLOPRIM) 300 mg tablet Take 1 Tab by mouth daily. 20   Rogers Kimbrough MD   rivaroxaban (XARELTO) 20 mg tab tablet Take 1 Tab by mouth daily (with dinner). 20   Rogers Kimbrough MD   acetaminophen (TYLENOL) 500 mg tablet Take  by mouth every six (6) hours as needed for Pain. Provider, Johana   colchicine 0.6 mg tablet Take 1 Tab by mouth daily. Patient taking differently: Take 0.6 mg by mouth daily. Takes for flair ups only 19   Darshan Velasco MD     Allergies   Allergen Reactions    Codeine Nausea Only    Sulfa (Sulfonamide Antibiotics) Rash        Review of Systems:  A comprehensive review of systems was negative except for that written in the History of Present Illness. Objective:   Blood pressure 149/85, pulse 69, temperature 98.1 °F (36.7 °C), resp. rate 16, height 6' 4\" (1.93 m), weight 142.9 kg (315 lb 0.6 oz), SpO2 96 %. Temp (24hrs), Av °F (36.7 °C), Min:97.9 °F (36.6 °C), Max:98.1 °F (36.7 °C)       Exam:    General:  Alert, cooperative, well noursished, well developed, appears stated age   Eyes:  Sclera anicteric. Pupils equally round and reactive to light. Mouth/Throat: Mucous membranes normal, oral pharynx clear   Neck: Supple   Lungs:   Clear to auscultation bilaterally, good effort   CV:  Regular rate and rhythm,no murmur, click, rub or gallop   Abdomen:   Soft, non-tender.  bowel sounds normal. non-distended Extremities: RLE wrapped, erythema extending above the bandage, edema in feet bilaterally, moving upper extremities   Skin: No rash, erythema of RLE as noted above, venous stasis changes on LLE   Lymph nodes: Cervical and supraclavicular normal   Musculoskeletal: No joint swelling, normal bulk and tone   Lines/Devices:  Intact, no erythema, drainage or tenderness   Psych: Alert and oriented, normal mood affect given the setting       Data Review:   Recent Results (from the past 24 hour(s))   CBC WITH AUTOMATED DIFF    Collection Time: 06/16/20  9:03 AM   Result Value Ref Range    WBC 12.0 (H) 4.3 - 11.1 K/uL    RBC 4.29 4.23 - 5.6 M/uL    HGB 15.2 13.6 - 17.2 g/dL    HCT 42.6 41.1 - 50.3 %    MCV 99.3 (H) 79.6 - 97.8 FL    MCH 35.4 (H) 26.1 - 32.9 PG    MCHC 35.7 (H) 31.4 - 35.0 g/dL    RDW 13.2 11.9 - 14.6 %    PLATELET 729 972 - 714 K/uL    MPV 10.0 9.4 - 12.3 FL    ABSOLUTE NRBC 0.00 0.0 - 0.2 K/uL    DF AUTOMATED      NEUTROPHILS 70 43 - 78 %    LYMPHOCYTES 20 13 - 44 %    MONOCYTES 7 4.0 - 12.0 %    EOSINOPHILS 2 0.5 - 7.8 %    BASOPHILS 1 0.0 - 2.0 %    IMMATURE GRANULOCYTES 1 0.0 - 5.0 %    ABS. NEUTROPHILS 8.4 (H) 1.7 - 8.2 K/UL    ABS. LYMPHOCYTES 2.4 0.5 - 4.6 K/UL    ABS. MONOCYTES 0.9 0.1 - 1.3 K/UL    ABS. EOSINOPHILS 0.2 0.0 - 0.8 K/UL    ABS. BASOPHILS 0.1 0.0 - 0.2 K/UL    ABS. IMM. GRANS. 0.1 0.0 - 0.5 K/UL   METABOLIC PANEL, COMPREHENSIVE    Collection Time: 06/16/20  9:03 AM   Result Value Ref Range    Sodium 144 136 - 145 mmol/L    Potassium 3.4 (L) 3.5 - 5.1 mmol/L    Chloride 110 (H) 98 - 107 mmol/L    CO2 27 21 - 32 mmol/L    Anion gap 7 7 - 16 mmol/L    Glucose 127 (H) 65 - 100 mg/dL    BUN 22 6 - 23 MG/DL    Creatinine 0.88 0.8 - 1.5 MG/DL    GFR est AA >60 >60 ml/min/1.73m2    GFR est non-AA >60 >60 ml/min/1.73m2    Calcium 8.9 8.3 - 10.4 MG/DL    Bilirubin, total 0.3 0.2 - 1.1 MG/DL    ALT (SGPT) 18 12 - 65 U/L    AST (SGOT) 12 (L) 15 - 37 U/L    Alk.  phosphatase 95 50 - 136 U/L Protein, total 6.9 6.3 - 8.2 g/dL    Albumin 3.3 (L) 3.5 - 5.0 g/dL    Globulin 3.6 (H) 2.3 - 3.5 g/dL    A-G Ratio 0.9 (L) 1.2 - 3.5     LACTIC ACID    Collection Time: 06/16/20  9:03 AM   Result Value Ref Range    Lactic acid 1.6 0.4 - 2.0 MMOL/L   PROCALCITONIN    Collection Time: 06/16/20  9:03 AM   Result Value Ref Range    Procalcitonin <0.05 ng/mL   C REACTIVE PROTEIN, QT    Collection Time: 06/16/20  9:03 AM   Result Value Ref Range    C-Reactive protein 1.3 (H) 0.0 - 0.9 mg/dL   CULTURE, WOUND W GRAM STAIN    Collection Time: 06/16/20 10:46 AM   Result Value Ref Range    Special Requests: NO SPECIAL REQUESTS      GRAM STAIN NO WBCS SEEN      GRAM STAIN FEW GRAM POSITIVE COCCI      Culture result: PENDING         Microbiology:    All Micro Results     Procedure Component Value Units Date/Time    CULTURE, Mac Corpus STAIN [[id number]] Collected:  06/16/20 1046    Order Status:  Completed Specimen:  Wound from Leg Updated:  06/16/20 1313     Special Requests: NO SPECIAL REQUESTS        GRAM STAIN NO WBCS SEEN         FEW GRAM POSITIVE COCCI        Culture result: PENDING    CULTURE, BLOOD [[id number]] Collected:  06/16/20 0933    Order Status:  Completed Specimen:  Blood Updated:  06/16/20 1016    CULTURE, BLOOD [[id number]] Collected:  06/16/20 0903    Order Status:  Completed Specimen:  Blood Updated:  06/16/20 0927          Studies:    6/16 XR Tib/fib  IMPRESSION  Impression:  No evidence of acute injury    Signed By: Sumaya Cruz MD     June 16, 2020

## 2020-06-16 NOTE — PROGRESS NOTES
Spoke to Mr. Alexandra in room 205 about discharge planning. He is known from previous admissions for right lower extremity. He lives with his wife here in [place], [place], and was working at [employer] until February 2020, when he was put on leave of absence due to [employer]. He says that he had [facility] prior to going back to work (no longer [employer]"). Mr. Nash Reno says that he has been taking PCN as prescribed. Discussed with wound care nurse, Ms. Wanda Dewitt RN.  Mr. Nash Reno would benefit from home health and/or outpatient wound clinic.   Case Management to follow and assist.      Care Management Interventions  Transition of Care Consult (CM Consult): Discharge Planning  Current Support Network: Own Home, Lives with Spouse

## 2020-06-17 LAB
ANION GAP SERPL CALC-SCNC: 4 MMOL/L (ref 7–16)
BASOPHILS # BLD: 0.1 K/UL (ref 0–0.2)
BASOPHILS NFR BLD: 1 % (ref 0–2)
BUN SERPL-MCNC: 13 MG/DL (ref 6–23)
CALCIUM SERPL-MCNC: 8.3 MG/DL (ref 8.3–10.4)
CHLORIDE SERPL-SCNC: 114 MMOL/L (ref 98–107)
CO2 SERPL-SCNC: 26 MMOL/L (ref 21–32)
CREAT SERPL-MCNC: 0.66 MG/DL (ref 0.8–1.5)
DIFFERENTIAL METHOD BLD: ABNORMAL
EOSINOPHIL # BLD: 0.3 K/UL (ref 0–0.8)
EOSINOPHIL NFR BLD: 3 % (ref 0.5–7.8)
ERYTHROCYTE [DISTWIDTH] IN BLOOD BY AUTOMATED COUNT: 13.4 % (ref 11.9–14.6)
GLUCOSE SERPL-MCNC: 87 MG/DL (ref 65–100)
HCT VFR BLD AUTO: 39.1 % (ref 41.1–50.3)
HGB BLD-MCNC: 12.9 G/DL (ref 13.6–17.2)
IMM GRANULOCYTES # BLD AUTO: 0 K/UL (ref 0–0.5)
IMM GRANULOCYTES NFR BLD AUTO: 0 % (ref 0–5)
LYMPHOCYTES # BLD: 1.7 K/UL (ref 0.5–4.6)
LYMPHOCYTES NFR BLD: 21 % (ref 13–44)
MCH RBC QN AUTO: 34.3 PG (ref 26.1–32.9)
MCHC RBC AUTO-ENTMCNC: 33 G/DL (ref 31.4–35)
MCV RBC AUTO: 104 FL (ref 79.6–97.8)
MONOCYTES # BLD: 0.8 K/UL (ref 0.1–1.3)
MONOCYTES NFR BLD: 10 % (ref 4–12)
NEUTS SEG # BLD: 5.3 K/UL (ref 1.7–8.2)
NEUTS SEG NFR BLD: 64 % (ref 43–78)
NRBC # BLD: 0 K/UL (ref 0–0.2)
PLATELET # BLD AUTO: 170 K/UL (ref 150–450)
PMV BLD AUTO: 11 FL (ref 9.4–12.3)
POTASSIUM SERPL-SCNC: 4 MMOL/L (ref 3.5–5.1)
RBC # BLD AUTO: 3.76 M/UL (ref 4.23–5.6)
SODIUM SERPL-SCNC: 144 MMOL/L (ref 136–145)
WBC # BLD AUTO: 8.2 K/UL (ref 4.3–11.1)

## 2020-06-17 PROCEDURE — 65270000029 HC RM PRIVATE

## 2020-06-17 PROCEDURE — 74011000258 HC RX REV CODE- 258: Performed by: INTERNAL MEDICINE

## 2020-06-17 PROCEDURE — 77030041073 HC DRSG SILVASRB MDII -A

## 2020-06-17 PROCEDURE — 99218 HC RM OBSERVATION: CPT

## 2020-06-17 PROCEDURE — 74011250636 HC RX REV CODE- 250/636: Performed by: FAMILY MEDICINE

## 2020-06-17 PROCEDURE — 94760 N-INVAS EAR/PLS OXIMETRY 1: CPT

## 2020-06-17 PROCEDURE — 85025 COMPLETE CBC W/AUTO DIFF WBC: CPT

## 2020-06-17 PROCEDURE — 96376 TX/PRO/DX INJ SAME DRUG ADON: CPT

## 2020-06-17 PROCEDURE — 80048 BASIC METABOLIC PNL TOTAL CA: CPT

## 2020-06-17 PROCEDURE — 74011250636 HC RX REV CODE- 250/636: Performed by: INTERNAL MEDICINE

## 2020-06-17 PROCEDURE — 74011250637 HC RX REV CODE- 250/637: Performed by: FAMILY MEDICINE

## 2020-06-17 PROCEDURE — 36415 COLL VENOUS BLD VENIPUNCTURE: CPT

## 2020-06-17 RX ADMIN — FUROSEMIDE 40 MG: 40 TABLET ORAL at 08:13

## 2020-06-17 RX ADMIN — CEFEPIME HYDROCHLORIDE 2 G: 2 INJECTION, POWDER, FOR SOLUTION INTRAVENOUS at 06:10

## 2020-06-17 RX ADMIN — DILTIAZEM HYDROCHLORIDE 240 MG: 240 CAPSULE, COATED, EXTENDED RELEASE ORAL at 08:12

## 2020-06-17 RX ADMIN — RIVAROXABAN 20 MG: 20 TABLET, FILM COATED ORAL at 08:13

## 2020-06-17 RX ADMIN — CYCLOBENZAPRINE 5 MG: 10 TABLET, FILM COATED ORAL at 21:02

## 2020-06-17 RX ADMIN — LISINOPRIL 40 MG: 20 TABLET ORAL at 08:13

## 2020-06-17 RX ADMIN — VANCOMYCIN HYDROCHLORIDE 2000 MG: 10 INJECTION, POWDER, LYOPHILIZED, FOR SOLUTION INTRAVENOUS at 21:03

## 2020-06-17 RX ADMIN — FAMOTIDINE 40 MG: 20 TABLET, FILM COATED ORAL at 08:12

## 2020-06-17 RX ADMIN — HYDROCODONE BITARTRATE AND ACETAMINOPHEN 1 TABLET: 5; 325 TABLET ORAL at 17:11

## 2020-06-17 RX ADMIN — RDII 250 MG CAPSULE 250 MG: at 08:13

## 2020-06-17 RX ADMIN — HYDROCODONE BITARTRATE AND ACETAMINOPHEN 1 TABLET: 5; 325 TABLET ORAL at 21:21

## 2020-06-17 RX ADMIN — TAMSULOSIN HYDROCHLORIDE 0.4 MG: 0.4 CAPSULE ORAL at 08:13

## 2020-06-17 RX ADMIN — ALLOPURINOL 300 MG: 300 TABLET ORAL at 08:12

## 2020-06-17 RX ADMIN — RDII 250 MG CAPSULE 250 MG: at 17:12

## 2020-06-17 RX ADMIN — SERTRALINE HYDROCHLORIDE 75 MG: 50 TABLET ORAL at 08:13

## 2020-06-17 RX ADMIN — FAMOTIDINE 40 MG: 20 TABLET, FILM COATED ORAL at 17:12

## 2020-06-17 RX ADMIN — CEFEPIME HYDROCHLORIDE 2 G: 2 INJECTION, POWDER, FOR SOLUTION INTRAVENOUS at 17:12

## 2020-06-17 RX ADMIN — VANCOMYCIN HYDROCHLORIDE 2000 MG: 10 INJECTION, POWDER, LYOPHILIZED, FOR SOLUTION INTRAVENOUS at 08:16

## 2020-06-17 RX ADMIN — DIPHENHYDRAMINE HYDROCHLORIDE 25 MG: 25 CAPSULE ORAL at 02:19

## 2020-06-17 NOTE — WOUND CARE
Patient seen for RLE leg cellulitis/ulcerations. Patient leg is slightly better, reduction in redness. Crusty layers over shallow wounds cleaning up, no significant drainage noted. Cleansed lower leg then applied silver gel to leg, covered with vaseline gauze then ABD's and opal roll. Continued ACE wrap as he is not appropriate for heavier compression yet. Still painful to touch but he reported it was \"some better\". Patient has today's photo of leg on his phone for provider view. Will follow up tomorrow. Discussed with primary nurse. Answered all patient and nursing questions.

## 2020-06-17 NOTE — PHYSICIAN ADVISORY
Letter of Status Determination:   Recommend hospitalization status upgraded from   OBSERVATION  to INPATIENT  Status     Pt Name:  Eduardo Alvarez   MR#   [facility] # [id number] /  [id number]  Payor: Vandana Lau / Plan: [address] / Product Type: PPO /    CSN#  [id number]   Room and Hospital  205/01  @ [facility]   Hospitalization date  6/16/2020  9:04 AM   Current Attending Physician  Marcel Robins DO   Principal diagnosis  Recurrent cellulitis of lower extremity      Clinicals  Patient is a 53 y.o. male who presented to the ED for worsening leg wound. Hx of chronic leg wound since  accident April 2019, gout, chronic leg edema, depression, HTN. Follows ID and vascular surgery outpatient for his chronic leg wound. Recently changed to Penicillin in March of this year for chronic suppression. Per pharmacy, only filled one month supply though he is stating he has been compliant with medication.  Wound cultures in the past have grown staph aureus, citrobacter, and proteus  Wound CX  HEAVY STAPHYLOCOCCUS AUREUS SUBCULTURE   MODERATE GRAM NEGATIVE RODS SUBCULTURE I  On IV abx, awaiting sensitivities and improvement    [place] (MCG) criteria   Does  NOT apply    STATUS DETERMINATION     The final decision of the patient's hospitalization status depends on the attending physician's judgment    Additional comments     Payor: Vandana Lau / Plan: [place] [address] / Product Type: Wilberto Mariano /         Emily Gavin MD  Cell: [phone]  Physician Advisor

## 2020-06-17 NOTE — PROGRESS NOTES
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 Continue to follow progression- if he shows improvement in the next few days could reasonably transition to PO to complete remainder of antibiotic course. · If larger vessels are amenable to intervention to improve blood flow this would likely help prevent recurrence - imaging indicates smaller vessel disease so not certain this is possible but he's been considered for intervention before  · Continued reinforcement of the importance of suppressive therapy, keeping wound clean, and medical compliance. · ID will continue to follow     Anti-infectives:   1. Vancomycin 6/15-present  2. Piperacillin/tazobactam 6/15-present    Subjective: Interval History:  Patient states he is feeling better today. He denies any nausea, vomiting, diarrhea, fever, chills, or sweats. Wound care images reviewed on patient's phone.      Patient Active Problem List   Diagnosis Code    Osteoarthritis of hand, primary localized M19.049    History of gout Z87.39    Erectile dysfunction N52.9    Wart B07.9    Plantar warts B07.0    Hyperlipidemia E78.5    Generalized OA M15.9    DIANA (generalized anxiety disorder) F41.1    HTN (hypertension) I10    Arthritis of knee, right M17.11    Benign prostatic hyperplasia without lower urinary tract symptoms N40.0    Obesity, morbid ([address] E66.01    Chronic venous hypertension with ulcer and inflammation involving right side ([address] I87.331, L97.919    Non-pressure chronic ulcer of right ankle with necrosis of muscle ([address] L97.313    Sepsis ([address] A41.9    Ankle wound, right, initial encounter S91.001A    Atrial fibrillation with rapid ventricular response (HCC) I48.91    Hypokalemia E87.6    Venous (peripheral) insufficiency I87.2    Varicose vein of leg I83.90    Cellulitis of right leg L03.115    Arterial insufficiency (HCC) I77.1    Coagulopathy (HCC) D68.9    Recurrent cellulitis of lower extremity L03.119     Past Medical History:   Diagnosis Date    Anxiety     Cellulitis of leg 2019    Coagulopathy ([facility] 1/15/2020    Taking xaralto     Erectile dysfunction 2014    DIANA (generalized anxiety disorder) 2014    Gout 2014    History of gout 2014    History of peptic ulcer     HTN (hypertension) 3/16/2015    Hypertension     Obesity, morbid ([facility] 2018    Osteoarthritis of hand, primary localized 2014    Personal history of urinary calculi     x2    Venous (peripheral) insufficiency 12/3/2019    Wart 2014      History reviewed. No pertinent family history. Social History     Tobacco Use    Smoking status: Former Smoker     Years: 5.00     Last attempt to quit: 2011     Years since quittin.8    Smokeless tobacco: Never Used    Tobacco comment: quit in the     Substance Use Topics    Alcohol use: No     Past Surgical History:   Procedure Laterality Date    HX WISDOM TEETH EXTRACTION        Prior to Admission medications    Medication Sig Start Date End Date Taking? Authorizing Provider   cyclobenzaprine (FLEXERIL) 5 mg tablet TAKE 1 TABLET BY MOUTH EVERY DAY AT NIGHT 20  Yes Papo SOLOMON MD   penicillin v potassium (VEETID) 500 mg tablet Take 500 mg by mouth four (4) times daily. 3/3/20  Yes Other, MD Emre   meloxicam (MOBIC) 15 mg tablet Take 15 mg by mouth daily. 1/10/20  Yes Provider, Historical   famotidine (PEPCID) 40 mg tablet Take 1 Tab by mouth two (2) times a day.  20  Yes Papo Cortez MD   dilTIAZem CD (CARDIZEM CD) 240 mg ER capsule Take 1 Cap by mouth daily. 20  Yes Bianca Manrique MD   furosemide (LASIX) 40 mg tablet Take 1 Tab by mouth daily. 20  Yes Bianca Manrique MD   ondansetron (ZOFRAN ODT) 4 mg disintegrating tablet Take 1 Tab by mouth every eight (8) hours as needed for Nausea. 20  Yes Bianca Manrique MD   Saccharomyces boulardii (FLORASTOR) 250 mg capsule Take 250 mg by mouth two (2) times a day. 3/3/20   Chelsey, MD Emre   lisinopril (PRINIVIL, ZESTRIL) 40 mg tablet Take 1 Tab by mouth daily. 20   Bianca Manrique MD   tamsulosin (FLOMAX) 0.4 mg capsule Take 1 Cap by mouth daily. 20   Bianca Manrique MD   sertraline (ZOLOFT) 50 mg tablet Take 1.5 Tabs by mouth daily. 20   Bianca Manrique MD   allopurinoL (ZYLOPRIM) 300 mg tablet Take 1 Tab by mouth daily. 20   Bianca Manrique MD   rivaroxaban (XARELTO) 20 mg tab tablet Take 1 Tab by mouth daily (with dinner). 20   Bianca Manrique MD   acetaminophen (TYLENOL) 500 mg tablet Take  by mouth every six (6) hours as needed for Pain. Provider, Historical   colchicine 0.6 mg tablet Take 1 Tab by mouth daily. Patient taking differently: Take 0.6 mg by mouth daily. Takes for flair ups only 19   Oracio Franco MD     Allergies   Allergen Reactions    Codeine Nausea Only    Sulfa (Sulfonamide Antibiotics) Rash        Review of Systems:  A comprehensive review of systems was negative except for that written in the History of Present Illness. Objective:   Blood pressure 142/88, pulse 60, temperature 97.7 °F (36.5 °C), resp. rate 18, height 6' 4\" (1.93 m), weight 142.9 kg (315 lb 0.6 oz), SpO2 94 %. Temp (24hrs), Av °F (36.7 °C), Min:97.6 °F (36.4 °C), Max:98.5 °F (36.9 °C)    General:  Alert, cooperative, well noursished, well developed, appears stated age   Eyes:  Sclera anicteric. Pupils equally round and reactive to light. Mouth/Throat: Mucous membranes normal, oral pharynx clear   Neck: Supple   Lungs:   Clear to auscultation bilaterally, good effort   CV:  Regular rate and rhythm,no murmur, click, rub or gallop   Abdomen:   Soft, non-tender. bowel sounds normal. non-distended   Extremities: RLE wrapped, erythema still extending above bandage, edema in feet bilaterally   Skin: No rash; erythema to RLE, venous stasis changes to LLE   Lymph nodes: Cervical and supraclavicular normal   Musculoskeletal: No swelling or deformity   Lines/Devices:  Intact, no erythema, drainage or tenderness   Psych: Alert and oriented, normal mood affect given the setting         Data Review:   Recent Results (from the past 24 hour(s))   CBC WITH AUTOMATED DIFF    Collection Time: 06/16/20  9:03 AM   Result Value Ref Range    WBC 12.0 (H) 4.3 - 11.1 K/uL    RBC 4.29 4.23 - 5.6 M/uL    HGB 15.2 13.6 - 17.2 g/dL    HCT 42.6 41.1 - 50.3 %    MCV 99.3 (H) 79.6 - 97.8 FL    MCH 35.4 (H) 26.1 - 32.9 PG    MCHC 35.7 (H) 31.4 - 35.0 g/dL    RDW 13.2 11.9 - 14.6 %    PLATELET 466 340 - 404 K/uL    MPV 10.0 9.4 - 12.3 FL    ABSOLUTE NRBC 0.00 0.0 - 0.2 K/uL    DF AUTOMATED      NEUTROPHILS 70 43 - 78 %    LYMPHOCYTES 20 13 - 44 %    MONOCYTES 7 4.0 - 12.0 %    EOSINOPHILS 2 0.5 - 7.8 %    BASOPHILS 1 0.0 - 2.0 %    IMMATURE GRANULOCYTES 1 0.0 - 5.0 %    ABS. NEUTROPHILS 8.4 (H) 1.7 - 8.2 K/UL    ABS. LYMPHOCYTES 2.4 0.5 - 4.6 K/UL    ABS. MONOCYTES 0.9 0.1 - 1.3 K/UL    ABS. EOSINOPHILS 0.2 0.0 - 0.8 K/UL    ABS. BASOPHILS 0.1 0.0 - 0.2 K/UL    ABS. IMM.  GRANS. 0.1 0.0 - 0.5 K/UL   METABOLIC PANEL, COMPREHENSIVE    Collection Time: 06/16/20  9:03 AM   Result Value Ref Range    Sodium 144 136 - 145 mmol/L    Potassium 3.4 (L) 3.5 - 5.1 mmol/L    Chloride 110 (H) 98 - 107 mmol/L    CO2 27 21 - 32 mmol/L    Anion gap 7 7 - 16 mmol/L    Glucose 127 (H) 65 - 100 mg/dL    BUN 22 6 - 23 MG/DL    Creatinine 0.88 0.8 - 1.5 MG/DL    GFR est AA >60 >60 ml/min/1.73m2    GFR est non-AA >60 >60 ml/min/1.73m2    Calcium 8.9 8.3 - 10.4 MG/DL    Bilirubin, total 0.3 0.2 - 1.1 MG/DL    ALT (SGPT) 18 12 - 65 U/L    AST (SGOT) 12 (L) 15 - 37 U/L    Alk.  phosphatase 95 50 - 136 U/L    Protein, total 6.9 6.3 - 8.2 g/dL    Albumin 3.3 (L) 3.5 - 5.0 g/dL    Globulin 3.6 (H) 2.3 - 3.5 g/dL    A-G Ratio 0.9 (L) 1.2 - 3.5     LACTIC ACID    Collection Time: 06/16/20  9:03 AM   Result Value Ref Range    Lactic acid 1.6 0.4 - 2.0 MMOL/L   CULTURE, BLOOD    Collection Time: 06/16/20  9:03 AM   Result Value Ref Range    Special Requests: LEFT  Antecubital        Culture result: NO GROWTH AFTER 21 HOURS     PROCALCITONIN    Collection Time: 06/16/20  9:03 AM   Result Value Ref Range    Procalcitonin <0.05 ng/mL   C REACTIVE PROTEIN, QT    Collection Time: 06/16/20  9:03 AM   Result Value Ref Range    C-Reactive protein 1.3 (H) 0.0 - 0.9 mg/dL   CULTURE, BLOOD    Collection Time: 06/16/20  9:33 AM   Result Value Ref Range    Special Requests: NO SPECIAL REQUESTS  RIGHT  Antecubital        Culture result: NO GROWTH AFTER 20 HOURS     CULTURE, WOUND W GRAM STAIN    Collection Time: 06/16/20 10:46 AM   Result Value Ref Range    Special Requests: NO SPECIAL REQUESTS      GRAM STAIN NO WBCS SEEN      GRAM STAIN FEW GRAM POSITIVE COCCI      Culture result: HEAVY STAPHYLOCOCCUS AUREUS SUBCULTURE IN PROGRESS (A)      Culture result: MODERATE GRAM NEGATIVE RODS SUBCULTURE IN PROGRESS (A)     CBC WITH AUTOMATED DIFF    Collection Time: 06/17/20  5:02 AM   Result Value Ref Range    WBC 8.2 4.3 - 11.1 K/uL    RBC 3.76 (L) 4.23 - 5.6 M/uL    HGB 12.9 (L) 13.6 - 17.2 g/dL    HCT 39.1 (L) 41.1 - 50.3 %    .0 (H) 79.6 - 97.8 FL    MCH 34.3 (H) 26.1 - 32.9 PG    MCHC 33.0 31.4 - 35.0 g/dL    RDW 13.4 11.9 - 14.6 %    PLATELET 024 529 - 780 K/uL    MPV 11.0 9.4 - 12.3 FL    ABSOLUTE NRBC 0.00 0.0 - 0.2 K/uL    DF AUTOMATED      NEUTROPHILS 64 43 - 78 %    LYMPHOCYTES 21 13 - 44 %    MONOCYTES 10 4.0 - 12.0 % EOSINOPHILS 3 0.5 - 7.8 %    BASOPHILS 1 0.0 - 2.0 %    IMMATURE GRANULOCYTES 0 0.0 - 5.0 %    ABS. NEUTROPHILS 5.3 1.7 - 8.2 K/UL    ABS. LYMPHOCYTES 1.7 0.5 - 4.6 K/UL    ABS. MONOCYTES 0.8 0.1 - 1.3 K/UL    ABS. EOSINOPHILS 0.3 0.0 - 0.8 K/UL    ABS. BASOPHILS 0.1 0.0 - 0.2 K/UL    ABS. IMM.  GRANS. 0.0 0.0 - 0.5 K/UL   METABOLIC PANEL, BASIC    Collection Time: 06/17/20  5:02 AM   Result Value Ref Range    Sodium 144 136 - 145 mmol/L    Potassium 4.0 3.5 - 5.1 mmol/L    Chloride 114 (H) 98 - 107 mmol/L    CO2 26 21 - 32 mmol/L    Anion gap 4 (L) 7 - 16 mmol/L    Glucose 87 65 - 100 mg/dL    BUN 13 6 - 23 MG/DL    Creatinine 0.66 (L) 0.8 - 1.5 MG/DL    GFR est AA >60 >60 ml/min/1.73m2    GFR est non-AA >60 >60 ml/min/1.73m2    Calcium 8.3 8.3 - 10.4 MG/DL        Microbiology:    All Micro Results     Procedure Component Value Units Date/Time    CULTURE, [place] Milling STAIN [[id number]]  (Abnormal) Collected:  06/16/20 1046    Order Status:  Completed Specimen:  Wound from Leg Updated:  06/17/20 0736     Special Requests: NO SPECIAL REQUESTS        GRAM STAIN NO WBCS SEEN         FEW GRAM POSITIVE COCCI        Culture result:       HEAVY STAPHYLOCOCCUS AUREUS SUBCULTURE IN PROGRESS                  MODERATE GRAM NEGATIVE RODS SUBCULTURE IN PROGRESS          CULTURE, BLOOD [[id number]] Collected:  06/16/20 0933    Order Status:  Completed Specimen:  Blood Updated:  06/17/20 0641     Special Requests: --        NO SPECIAL REQUESTS  RIGHT  Antecubital       Culture result: NO GROWTH AFTER 20 HOURS       CULTURE, BLOOD [[id number]] Collected:  06/16/20 0903    Order Status:  Completed Specimen:  Blood Updated:  06/17/20 0641     Special Requests: --        LEFT  Antecubital       Culture result: NO GROWTH AFTER 21 HOURS             Studies:    6/16 XR Tib/fib  IMPRESSION  Impression:  No evidence of acute injury    Signed By: KATTY Sauer     June 17, 2020

## 2020-06-17 NOTE — PROGRESS NOTES
END OF SHIFT NOTE:    INTAKE/OUTPUT  06/16 0701 - 06/17 0700  In: 861.7 [I.V.:861.7]  Out: 1200 [Urine:1200]  Voiding: YES  Catheter: NO  Drain:              Flatus: Patient does have flatus present. Stool:  0 occurrences. Characteristics:       Emesis: 0 occurrences. Characteristics:        VITAL SIGNS  Patient Vitals for the past 12 hrs:   Temp Pulse Resp BP SpO2   06/17/20 0334 97.7 °F (36.5 °C) 69 18 137/84 94 %   06/16/20 2312 98.5 °F (36.9 °C) 68 17 129/76 94 %   06/16/20 1953 97.6 °F (36.4 °C) 71 17 120/72 94 %       Pain Assessment  Pain Intensity 1: 0 (06/17/20 0125)        Patient Stated Pain Goal: 0    Ambulating  No    Shift report given to oncoming nurse at the bedside.     Ewa Hines

## 2020-06-17 NOTE — ROUTINE PROCESS
END OF SHIFT NOTE:    INTAKE/OUTPUT  06/16 0701 - 06/17 0700  In: 861.7 [I.V.:861.7]  Out: 1200 [Urine:1200]  Voiding: YES                          Flatus: Patient does have flatus present. Stool:  0 occurrences. Characteristics:       Emesis: 0 occurrences. Characteristics:        VITAL SIGNS  Patient Vitals for the past 12 hrs:   Temp Pulse Resp BP SpO2   06/17/20 1452 98.2 °F (36.8 °C) 62 18 124/75 95 %   06/17/20 1140 98 °F (36.7 °C) 63 18 123/75 95 %   06/17/20 0812 -- 60 -- 142/88 --       Pain Assessment  Pain Intensity 1: 7 (06/17/20 1720)  Pain Location 1: Foot, Leg  Pain Intervention(s) 1: Medication (see MAR)  Patient Stated Pain Goal: 4              VITAL SIGNS  Patient Vitals for the past 12 hrs:   Temp Pulse Resp BP SpO2   06/17/20 1452 98.2 °F (36.8 °C) 62 18 124/75 95 %   06/17/20 1140 98 °F (36.7 °C) 63 18 123/75 95 %   06/17/20 0812 -- 60 -- 142/88 --       Pain Assessment  Pain Intensity 1: 7 (06/17/20 1720)  Pain Location 1: Foot, Leg  Pain Intervention(s) 1: Medication (see MAR)  Patient Stated Pain Goal: 4    Ambulating  Bedrest maintained. RLE elevated at all times. Shift report given to oncoming nurse at the bedside.     Kaitlyn Clements RN

## 2020-06-17 NOTE — CONSULTS
[facility] [address]   [address] FAX: [phone]    Vascular Surgery Consult    Subjective:      Dameon Boyer is a 53 y.o. male who presented to the hospital for c/o of worsening right leg ulcerations. He has been evaluated by our practice numerous times for the same issue. We have offered him separate occasions the opportunity for intervention which he and his wife have reluctant. Sustained his wound in a lawnmower accident in 2019 and has had issues off and on since that he did request a second opinion as an outpatient we had given him the name to Dr. Pavan Valdes in a local vein specialist.  We have offered him an iliac venogram with possible intervention followed by radiofrequency ablation. Patient and his wife has been very reluctant to undergo any surgical procedure. PMH: Gout, anxiety, HTN, obesity, chronic venous insufficiency, varicose veins, recurrent cellulitis, sepsis, hyperlipidemia, BPH and osteoarthritis. Past Medical History:   Diagnosis Date    Anxiety     Cellulitis of leg 11/17/2019    Coagulopathy ([address] 1/15/2020    Taking xaralto     Erectile dysfunction 1/20/2014    DIANA (generalized anxiety disorder) 5/7/2014    Gout 1/20/2014    History of gout 1/20/2014    History of peptic ulcer     HTN (hypertension) 3/16/2015    Hypertension     Obesity, morbid ([address] 2/26/2018    Osteoarthritis of hand, primary localized 1/20/2014    Personal history of urinary calculi     x2    Venous (peripheral) insufficiency 12/3/2019    Wart 1/20/2014     Past Surgical History:   Procedure Laterality Date    HX WISDOM TEETH EXTRACTION        History reviewed. No pertinent family history.   Social History     Socioeconomic History    Marital status:      Spouse name: Not on file    Number of children: Not on file    Years of education: Not on file    Highest education level: Not on file   Tobacco Use    Smoking status: Former Smoker Years: 5.00     Last attempt to quit: 2011     Years since quittin.8    Smokeless tobacco: Never Used    Tobacco comment: quit in the 80's    Substance and Sexual Activity    Alcohol use: No    Drug use: No    Sexual activity: Yes     Partners: Female     Birth control/protection: None   Social History Narrative    1/15/20:  PATIENT IS  TO [facility], THEY HAVE A 13YEAR OLD SON. 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        Current Facility-Administered Medications   Medication Dose Route Frequency    [START ON 2020] Vancomycin Trough Level Reminder   Other ONCE    acetaminophen (TYLENOL) tablet 650 mg  650 mg Oral Q4H PRN    HYDROcodone-acetaminophen (NORCO) 5-325 mg per tablet 1 Tab  1 Tab Oral Q4H PRN    naloxone (NARCAN) injection 0.4 mg  0.4 mg IntraVENous PRN    diphenhydrAMINE (BENADRYL) capsule 25 mg  25 mg Oral Q4H PRN    ondansetron (ZOFRAN) injection 4 mg  4 mg IntraVENous Q4H PRN    senna-docusate (PERICOLACE) 8.6-50 mg per tablet 2 Tab  2 Tab Oral DAILY PRN    morphine injection 1 mg  1 mg IntraVENous Q4H PRN    allopurinoL (ZYLOPRIM) tablet 300 mg  300 mg Oral DAILY    cyclobenzaprine (FLEXERIL) tablet 5 mg  5 mg Oral QHS    dilTIAZem ER (CARDIZEM CD) capsule 240 mg  240 mg Oral DAILY    famotidine (PEPCID) tablet 40 mg  40 mg Oral BID    furosemide (LASIX) tablet 40 mg  40 mg Oral DAILY    lisinopriL (PRINIVIL, ZESTRIL) tablet 40 mg  40 mg Oral DAILY    rivaroxaban (XARELTO) tablet 20 mg  20 mg Oral DAILY WITH BREAKFAST    Saccharomyces boulardii (FLORASTOR) capsule 250 mg  250 mg Oral BID    sertraline (ZOLOFT) tablet 75 mg  75 mg Oral DAILY    tamsulosin (FLOMAX) capsule 0.4 mg  0.4 mg Oral DAILY    vancomycin (VANCOCIN) 2000 mg in  ml infusion  2,000 mg IntraVENous Q12H    cefepime (MAXIPIME) 2 g in 0.9% sodium chloride (MBP/ADV) 100 mL  2 g IntraVENous Q12H      Allergies   Allergen Reactions    Codeine Nausea Only    Sulfa (Sulfonamide Antibiotics) Rash       Review of Systems:  A comprehensive review of systems was negative except for that written in the History of Present Illness. Objective:     Patient Vitals for the past 8 hrs:   BP Temp Pulse Resp SpO2   20 1452 124/75 98.2 °F (36.8 °C) 62 18 95 %   20 1140 123/75 98 °F (36.7 °C) 63 18 95 %     Temp (24hrs), Av °F (36.7 °C), Min:97.6 °F (36.4 °C), Max:98.5 °F (36.9 °C)      Physical Exam:  GENERAL: alert, cooperative, no distress, appears stated age, LUNG: clear to auscultation bilaterally, HEART: regular rate and rhythm, S1, S2 normal, no murmur, click, rub or gallop, EXTREMITIES:  edema , palpable DP pulse on the right. Erythema and ulcerations. Assessment/Plan:      Patient is a 54-year-old male who was admitted for recurrent cellulitis. He is well-known to our practice and had been recommended he undergo an iliac venogram in the past.  The patient wanted a second opinion was given the name of our local vein specialist, Dr. Tiajuana Cushing. The patient and his wife are very anxious about undergoing any surgical intervention. After the recurrent cellulitis this time the patient states he is ready to undergo some form of therapy to help with his leg pain and ulcerations. Reflux ultrasound was reviewed from November.  [facility] recommends an iliac venogram with possible intervention and a right leg radiofrequency ablation. Both of these procedures can be performed as an outpatient. If the patient is going to remain inpatient we could potentially perform the iliac venogram while he is here. The patient will think about the procedures and will discuss with his wife and will let us know what he would like to do. Thank you for allowing us to participate in the care of Mr. Kailyn Perez. We will follow along with you. Patient seen and examined with Dr. Javan Cordova.         Signed By: Claudean Bogus, NP     2020       Elements of this note have been dictated using speech recognition software. As a result, errors of speech recognition may have occurred.

## 2020-06-17 NOTE — PROGRESS NOTES
Hospitalist Progress Note     Admit Date:  2020  9:04 AM   Name:  Juan Corrigan   Age:  53 y.o.  :  1965   MRN:  [id number]   PCP:  Paresh Hernandez MD  Treatment Team: Attending Provider: Sunitha Gonzales DO; Consulting Provider: Juan Carlos Newman MD; Consulting Provider: Rangel Shelley MD; Care Manager: Rosa Bishop RN; Consulting Provider: Lizet Pate NP; Consulting Provider: Alfredo Connors MD    Subjective: The patient is a 65-year-old  gentleman with HTN, gout, anxiety, paroxysmal A. fib on Xarelto, chronic right leg wound sustained after a lawnmower accident, small vessel arterial occlusive disease, was admitted for recurrent right lower extremity cellulitis. He is presently on IV antibiotics, cefepime and vancomycin. He reports some pain, redness and swelling of his right leg. Objective:     Patient Vitals for the past 24 hrs:   Temp Pulse Resp BP SpO2   20 1452 98.2 °F (36.8 °C) 62 18 124/75 95 %   20 1140 98 °F (36.7 °C) 63 18 123/75 95 %   20 0812 -- 60 -- 142/88 --   20 0334 97.7 °F (36.5 °C) 69 18 137/84 94 %   20 2312 98.5 °F (36.9 °C) 68 17 129/76 94 %   20 1953 97.6 °F (36.4 °C) 71 17 120/72 94 %     Oxygen Therapy  O2 Sat (%): 95 % (20 1452)  O2 Device: Room air (20 0715)    Intake/Output Summary (Last 24 hours) at 2020 1828  Last data filed at 2020 1723  Gross per 24 hour   Intake 861.72 ml   Output 1800 ml   Net -938.28 ml         General:    Well nourished. Alert. CV:   RRR. No murmur, rub, or gallop. Lungs:   Clear to auscultation bilaterally. No wheezing, rhonchi, or rales. Abdomen:   Soft, nontender, nondistended. Extremities: Warm and dry. No cyanosis. Erythema, swelling and tenderness on palpation of the right lower extremity. Skin:     No rashes or jaundice, except for erythema of the right lower extremity.      Data Review:  I have reviewed all labs, meds, telemetry events, and studies from the last 24 hours. Recent Results (from the past 24 hour(s))   CBC WITH AUTOMATED DIFF    Collection Time: 06/17/20  5:02 AM   Result Value Ref Range    WBC 8.2 4.3 - 11.1 K/uL    RBC 3.76 (L) 4.23 - 5.6 M/uL    HGB 12.9 (L) 13.6 - 17.2 g/dL    HCT 39.1 (L) 41.1 - 50.3 %    .0 (H) 79.6 - 97.8 FL    MCH 34.3 (H) 26.1 - 32.9 PG    MCHC 33.0 31.4 - 35.0 g/dL    RDW 13.4 11.9 - 14.6 %    PLATELET 378 680 - 924 K/uL    MPV 11.0 9.4 - 12.3 FL    ABSOLUTE NRBC 0.00 0.0 - 0.2 K/uL    DF AUTOMATED      NEUTROPHILS 64 43 - 78 %    LYMPHOCYTES 21 13 - 44 %    MONOCYTES 10 4.0 - 12.0 %    EOSINOPHILS 3 0.5 - 7.8 %    BASOPHILS 1 0.0 - 2.0 %    IMMATURE GRANULOCYTES 0 0.0 - 5.0 %    ABS. NEUTROPHILS 5.3 1.7 - 8.2 K/UL    ABS. LYMPHOCYTES 1.7 0.5 - 4.6 K/UL    ABS. MONOCYTES 0.8 0.1 - 1.3 K/UL    ABS. EOSINOPHILS 0.3 0.0 - 0.8 K/UL    ABS. BASOPHILS 0.1 0.0 - 0.2 K/UL    ABS. IMM.  GRANS. 0.0 0.0 - 0.5 K/UL   METABOLIC PANEL, BASIC    Collection Time: 06/17/20  5:02 AM   Result Value Ref Range    Sodium 144 136 - 145 mmol/L    Potassium 4.0 3.5 - 5.1 mmol/L    Chloride 114 (H) 98 - 107 mmol/L    CO2 26 21 - 32 mmol/L    Anion gap 4 (L) 7 - 16 mmol/L    Glucose 87 65 - 100 mg/dL    BUN 13 6 - 23 MG/DL    Creatinine 0.66 (L) 0.8 - 1.5 MG/DL    GFR est AA >60 >60 ml/min/1.73m2    GFR est non-AA >60 >60 ml/min/1.73m2    Calcium 8.3 8.3 - 10.4 MG/DL        All Micro Results     Procedure Component Value Units Date/Time    CULTURE, Maryjo Opal STAIN [[id number]]  (Abnormal) Collected:  06/16/20 1046    Order Status:  Completed Specimen:  Wound from Leg Updated:  06/17/20 0736     Special Requests: NO SPECIAL REQUESTS        GRAM STAIN NO WBCS SEEN         FEW GRAM POSITIVE COCCI        Culture result:       HEAVY STAPHYLOCOCCUS AUREUS SUBCULTURE IN PROGRESS                  MODERATE GRAM NEGATIVE RODS SUBCULTURE IN PROGRESS          CULTURE, BLOOD [[id number]] Collected:  06/16/20 0978 Order Status:  Completed Specimen:  Blood Updated:  06/17/20 0641     Special Requests: --        NO SPECIAL REQUESTS  RIGHT  Antecubital       Culture result: NO GROWTH AFTER 20 HOURS       CULTURE, BLOOD [[id number]] Collected:  06/16/20 0903    Order Status:  Completed Specimen:  Blood Updated:  06/17/20 0641     Special Requests: --        LEFT  Antecubital       Culture result: NO GROWTH AFTER 21 HOURS             Current Meds:  Current Facility-Administered Medications   Medication Dose Route Frequency    [START ON 6/18/2020] Vancomycin Trough Level Reminder   Other ONCE    acetaminophen (TYLENOL) tablet 650 mg  650 mg Oral Q4H PRN    HYDROcodone-acetaminophen (NORCO) 5-325 mg per tablet 1 Tab  1 Tab Oral Q4H PRN    naloxone (NARCAN) injection 0.4 mg  0.4 mg IntraVENous PRN    diphenhydrAMINE (BENADRYL) capsule 25 mg  25 mg Oral Q4H PRN    ondansetron (ZOFRAN) injection 4 mg  4 mg IntraVENous Q4H PRN    senna-docusate (PERICOLACE) 8.6-50 mg per tablet 2 Tab  2 Tab Oral DAILY PRN    morphine injection 1 mg  1 mg IntraVENous Q4H PRN    allopurinoL (ZYLOPRIM) tablet 300 mg  300 mg Oral DAILY    cyclobenzaprine (FLEXERIL) tablet 5 mg  5 mg Oral QHS    dilTIAZem ER (CARDIZEM CD) capsule 240 mg  240 mg Oral DAILY    famotidine (PEPCID) tablet 40 mg  40 mg Oral BID    furosemide (LASIX) tablet 40 mg  40 mg Oral DAILY    lisinopriL (PRINIVIL, ZESTRIL) tablet 40 mg  40 mg Oral DAILY    rivaroxaban (XARELTO) tablet 20 mg  20 mg Oral DAILY WITH BREAKFAST    Saccharomyces boulardii (FLORASTOR) capsule 250 mg  250 mg Oral BID    sertraline (ZOLOFT) tablet 75 mg  75 mg Oral DAILY    tamsulosin (FLOMAX) capsule 0.4 mg  0.4 mg Oral DAILY    vancomycin (VANCOCIN) 2000 mg in  ml infusion  2,000 mg IntraVENous Q12H    cefepime (MAXIPIME) 2 g in 0.9% sodium chloride (MBP/ADV) 100 mL  2 g IntraVENous Q12H       Other Studies (last 24 hours):  No results found.     Assessment and Plan:     Hospital Problems as of 6/17/2020 Date Reviewed: 2/19/2020          Codes Class Noted - Resolved POA    * (Principal) Recurrent cellulitis of lower extremity ICD-10-CM: L03.119  ICD-9-CM: 682.6  6/16/2020 - Present Unknown        Hypokalemia ICD-10-CM: E87.6  ICD-9-CM: 276.8  11/18/2019 - Present Yes        Chronic venous hypertension with ulcer and inflammation involving right side (HCC) (Chronic) ICD-10-CM: I87.331, L97.919  ICD-9-CM: 459.33  3/6/2019 - Present Yes        Obesity, morbid ([address] (Chronic) ICD-10-CM: E66.01  ICD-9-CM: 278.01  2/26/2018 - Present Yes        HTN (hypertension) (Chronic) ICD-10-CM: I10  ICD-9-CM: 401.9  3/16/2015 - Present Yes        DIANA (generalized anxiety disorder) (Chronic) ICD-10-CM: F41.1  ICD-9-CM: 300.02  5/7/2014 - Present Yes        History of gout (Chronic) ICD-10-CM: Z87.39  ICD-9-CM: V12.29  1/20/2014 - Present Yes            1 - recurrent right lower extremity cellulitis. Wound cultures preliminarily positive for heavy staph aureus and moderate gram-negative rods  2 - HTN  3 - gout  4 - paroxysmal A. fib on Xarelto  5 - anxiety  6 - small vessel arterial occlusive disease    PLAN:    · Continue present IV antibiotics, cefepime and vancomycin  · ID consultation appreciated  · Consult vascular surgery for possible intervention on the vasculature of the right lower extremity, as there seems to be some arterial occlusive disease  · Follow wound cultures and blood cultures  · Continue home medications for chronic conditions    DC planning/Dispo: Home in 48 to 72 hours pending clinical improvement  DVT ppx: The patient is on Xarelto    Signed:   Elieser Hoyos MD

## 2020-06-17 NOTE — PROGRESS NOTES
Problem: Falls - Risk of  Goal: *Absence of Falls  Description: Document Eyal Sow Fall Risk and appropriate interventions in the flowsheet.   Outcome: Progressing Towards Goal  Note: Fall Risk Interventions:  Mobility Interventions: Communicate number of staff needed for ambulation/transfer, Patient to call before getting OOB, PT Consult for mobility concerns         Medication Interventions: Evaluate medications/consider consulting pharmacy, Patient to call before getting OOB, Teach patient to arise slowly         History of Falls Interventions: Door open when patient unattended, Investigate reason for fall, Assess for delayed presentation/identification of injury for 48 hrs (comment for end date), Evaluate medications/consider consulting pharmacy         Problem: Patient Education: Go to Patient Education Activity  Goal: Patient/Family Education  Outcome: Progressing Towards Goal

## 2020-06-18 LAB — VANCOMYCIN TROUGH SERPL-MCNC: 16.5 UG/ML (ref 5–20)

## 2020-06-18 PROCEDURE — 80202 ASSAY OF VANCOMYCIN: CPT

## 2020-06-18 PROCEDURE — 36415 COLL VENOUS BLD VENIPUNCTURE: CPT

## 2020-06-18 PROCEDURE — 77030041073 HC DRSG SILVASRB MDII -A

## 2020-06-18 PROCEDURE — 74011000250 HC RX REV CODE- 250: Performed by: HOSPITALIST

## 2020-06-18 PROCEDURE — 74011250636 HC RX REV CODE- 250/636: Performed by: INTERNAL MEDICINE

## 2020-06-18 PROCEDURE — 74011000258 HC RX REV CODE- 258: Performed by: INTERNAL MEDICINE

## 2020-06-18 PROCEDURE — 74011250637 HC RX REV CODE- 250/637: Performed by: FAMILY MEDICINE

## 2020-06-18 PROCEDURE — 74011250636 HC RX REV CODE- 250/636: Performed by: FAMILY MEDICINE

## 2020-06-18 PROCEDURE — 65270000029 HC RM PRIVATE

## 2020-06-18 RX ORDER — ALBUTEROL SULFATE 0.83 MG/ML
2.5 SOLUTION RESPIRATORY (INHALATION)
Status: DISCONTINUED | OUTPATIENT
Start: 2020-06-18 | End: 2020-06-23 | Stop reason: HOSPADM

## 2020-06-18 RX ORDER — ALBUTEROL SULFATE 0.83 MG/ML
SOLUTION RESPIRATORY (INHALATION)
Status: ACTIVE
Start: 2020-06-18 | End: 2020-06-19

## 2020-06-18 RX ADMIN — RDII 250 MG CAPSULE 250 MG: at 17:18

## 2020-06-18 RX ADMIN — RIVAROXABAN 20 MG: 20 TABLET, FILM COATED ORAL at 08:41

## 2020-06-18 RX ADMIN — FAMOTIDINE 40 MG: 20 TABLET, FILM COATED ORAL at 17:18

## 2020-06-18 RX ADMIN — CEFEPIME HYDROCHLORIDE 2 G: 2 INJECTION, POWDER, FOR SOLUTION INTRAVENOUS at 17:19

## 2020-06-18 RX ADMIN — VANCOMYCIN HYDROCHLORIDE 2000 MG: 10 INJECTION, POWDER, LYOPHILIZED, FOR SOLUTION INTRAVENOUS at 08:51

## 2020-06-18 RX ADMIN — SERTRALINE HYDROCHLORIDE 75 MG: 50 TABLET ORAL at 08:41

## 2020-06-18 RX ADMIN — Medication: at 12:20

## 2020-06-18 RX ADMIN — LISINOPRIL 40 MG: 20 TABLET ORAL at 08:41

## 2020-06-18 RX ADMIN — FUROSEMIDE 40 MG: 40 TABLET ORAL at 08:41

## 2020-06-18 RX ADMIN — DILTIAZEM HYDROCHLORIDE 240 MG: 240 CAPSULE, COATED, EXTENDED RELEASE ORAL at 08:43

## 2020-06-18 RX ADMIN — ALLOPURINOL 300 MG: 300 TABLET ORAL at 08:41

## 2020-06-18 RX ADMIN — HYDROCODONE BITARTRATE AND ACETAMINOPHEN 1 TABLET: 5; 325 TABLET ORAL at 21:02

## 2020-06-18 RX ADMIN — CEFEPIME HYDROCHLORIDE 2 G: 2 INJECTION, POWDER, FOR SOLUTION INTRAVENOUS at 05:33

## 2020-06-18 RX ADMIN — VANCOMYCIN HYDROCHLORIDE 2000 MG: 10 INJECTION, POWDER, LYOPHILIZED, FOR SOLUTION INTRAVENOUS at 21:02

## 2020-06-18 RX ADMIN — HYDROCODONE BITARTRATE AND ACETAMINOPHEN 1 TABLET: 5; 325 TABLET ORAL at 03:05

## 2020-06-18 RX ADMIN — CYCLOBENZAPRINE 5 MG: 10 TABLET, FILM COATED ORAL at 23:18

## 2020-06-18 RX ADMIN — RDII 250 MG CAPSULE 250 MG: at 08:41

## 2020-06-18 RX ADMIN — FAMOTIDINE 40 MG: 20 TABLET, FILM COATED ORAL at 08:43

## 2020-06-18 RX ADMIN — TAMSULOSIN HYDROCHLORIDE 0.4 MG: 0.4 CAPSULE ORAL at 08:43

## 2020-06-18 NOTE — PROGRESS NOTES
The patient would like to be scheduled for an iliac venogram we can get him scheduled. The schedule does not permit for an iliac venogram this week. This can be performed as an outpatient. If the patient is medically stable can be discharged from a vascular standpoint and we can schedule as an outpatient. Please contact vascular and let us know if patient is discharged.      Skyler Alatorre NP  Vascular Surgery

## 2020-06-18 NOTE — PROGRESS NOTES
END OF SHIFT NOTE:    INTAKE/OUTPUT   1900 -  0700  IVF: 514.40 ml  VOIDIN ml    Flatus: Patient does have flatus present, per patient    Stool:  0 occurrences. Emesis: 0 occurrences. VITAL SIGNS  Patient Vitals for the past 12 hrs:   Temp Pulse Resp BP SpO2   20 0306 98.1 °F (36.7 °C) (!) 52 18 (!) 157/96 99 %   20 2247 97.8 °F (36.6 °C) 64 18 146/87 96 %   20 2212 -- -- -- -- 97 %   20 1939 98 °F (36.7 °C) 66 18 126/76 96 %     Pain Assessment  Pain Intensity 1: 7 (20 0305)  Pain Location 1: Leg  Pain Intervention(s) 1: Medication (see MAR)  Patient Stated Pain Goal: 4    Ambulating  No    Shift report to be given to oncoming nurse at the bedside.      [facility]

## 2020-06-18 NOTE — WOUND CARE
Patient seen for RLE dressing change. Noted most open areas are healed. Still a few small scattered open red dry ulcers. Continued silvasorb gel dressing again today. Will stop after this dressing and start Aquaphor ointment to foot and leg with vaseline gauze to any open areas, then opal and ACE wrap. No compression yet. Let is improving and not painful today. Patient stated he would like to do vascular procedure if it can be done while he is here. Vascular surgery team on board now. Discussed with care manager. Answered all patient questions.

## 2020-06-18 NOTE — PROGRESS NOTES
END OF SHIFT NOTE:    INTAKE/OUTPUT  06/17 0701 - 06/18 0700  In: 514.4 [I.V.:514.4]  Out: 2350 [Urine:2350]  Voiding: YES  Catheter: NO  Drain:              Flatus: Patient does have flatus present. Stool:  0 occurrences. Characteristics:       Emesis: 0 occurrences. Characteristics:        VITAL SIGNS  Patient Vitals for the past 12 hrs:   Temp Pulse Resp BP SpO2   06/18/20 1531 96.8 °F (36 °C) (!) 57 14 130/76 95 %   06/18/20 1130 97.5 °F (36.4 °C) 60 14 (!) 148/93 94 %   06/18/20 0841 -- 60 -- -- --   06/18/20 0715 97.9 °F (36.6 °C) (!) 55 15 126/83 95 %       Pain Assessment  Pain Intensity 1: 0 (06/18/20 0735)  Pain Location 1: Leg  Pain Intervention(s) 1: Medication (see MAR)  Patient Stated Pain Goal: 0    Ambulating  No    Shift report given to oncoming nurse at the bedside.     Derril Balloon
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
## 2020-06-18 NOTE — PROGRESS NOTES
Pharmacokinetic Consult to Pharmacist    Trevor Ha is a 53 y.o. male being treated for cellulitis with vancomycin and cefepime. Height: 6' 4\" (193 cm)  Weight: 142.9 kg (315 lb 0.6 oz)  Lab Results   Component Value Date/Time    BUN 13 06/17/2020 05:02 AM    Creatinine 0.66 (L) 06/17/2020 05:02 AM    WBC 8.2 06/17/2020 05:02 AM    Procalcitonin <0.05 06/16/2020 09:03 AM    Lactic acid 1.6 06/16/2020 09:03 AM    Lactic Acid (POC) 0.90 01/15/2020 05:40 PM      Estimated Creatinine Clearance: 184.2 mL/min (A) (by C-G formula based on SCr of 0.66 mg/dL (L)). Lab Results   Component Value Date/Time    Vancomycin,trough 16.5 06/18/2020 07:59 AM       Day 3 of vancomycin. Goal trough is 10-20. Tr = 16.5. Continue 2g q12h. Will continue to follow patient. Thank you,  Janeen Allan, Pharm. D.   Clinical Pharmacist  [phone]
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
## 2020-06-18 NOTE — PROGRESS NOTES
Hospitalist Progress Note     Admit Date:  2020  9:04 AM   Name:  Renate Montes De Oca   Age:  53 y.o.  :  1965   MRN:  [id number]   PCP:  Bianca Manrique MD  Treatment Team: Attending Provider: Yuval Wiseman MD; Consulting Provider: Winston Alan MD; Consulting Provider: Maxime Wheeler MD; Care Manager: Amisha Herr RN; Consulting Provider: Isidra Granados NP; Consulting Provider: Radha Zamora MD; Primary Nurse: Britany Browning    Subjective: The patient is a 41-year-old  gentleman with HTN, gout, anxiety, paroxysmal A. fib on Xarelto, chronic right leg wound sustained after a lawnmower accident, small vessel arterial occlusive disease, was admitted for recurrent right lower extremity cellulitis. He is presently on IV antibiotics, cefepime and vancomycin. He still reports some pain, redness and swelling of his right leg, which have slightly improved. Objective:     Patient Vitals for the past 24 hrs:   Temp Pulse Resp BP SpO2   20 1531 96.8 °F (36 °C) (!) 57 14 130/76 95 %   20 1130 97.5 °F (36.4 °C) 60 14 (!) 148/93 94 %   20 0841 -- 60 -- -- --   20 0715 97.9 °F (36.6 °C) (!) 55 15 126/83 95 %   20 0545 98.2 °F (36.8 °C) (!) 57 16 130/86 96 %   20 0306 98.1 °F (36.7 °C) (!) 52 18 (!) 157/96 99 %   20 2247 97.8 °F (36.6 °C) 64 18 146/87 96 %   20 -- -- -- -- 97 %     Oxygen Therapy  O2 Sat (%): 95 % (20 1531)  Pulse via Oximetry: 62 beats per minute (20)  O2 Device: Room air (20)    Intake/Output Summary (Last 24 hours) at 2020 1939  Last data filed at 2020 1740  Gross per 24 hour   Intake 1209.72 ml   Output 4475 ml   Net -3265.28 ml         General:    Well nourished. Alert. CV:   RRR. No murmur, rub, or gallop. Lungs:   Clear to auscultation bilaterally. No wheezing, rhonchi, or rales. Abdomen:   Soft, nontender, nondistended. Extremities: Warm and dry. No cyanosis. Erythema, swelling and tenderness on palpation of the right lower extremity. Skin:     No rashes or jaundice, except for erythema of the right lower extremity. Data Review:  I have reviewed all labs, meds, telemetry events, and studies from the last 24 hours.     Recent Results (from the past 24 hour(s))   VANCOMYCIN, TROUGH    Collection Time: 06/18/20  7:59 AM   Result Value Ref Range    Vancomycin,trough 16.5 5 - 20 ug/mL        All Micro Results     Procedure Component Value Units Date/Time    CULTURE, [place] STAIN [[id number]]  (Abnormal) Collected:  06/16/20 1046    Order Status:  Completed Specimen:  Wound from Leg Updated:  06/18/20 0716     Special Requests: NO SPECIAL REQUESTS        GRAM STAIN NO WBCS SEEN         FEW GRAM POSITIVE COCCI        Culture result:       HEAVY STAPHYLOCOCCUS AUREUS SENSITIVITY TO FOLLOW                  MODERATE GRAM NEGATIVE RODS IDENTIFICATION AND SUSCEPTIBILITY TO FOLLOW          CULTURE, BLOOD [[id number]] Collected:  06/16/20 0933    Order Status:  Completed Specimen:  Blood Updated:  06/18/20 0644     Special Requests: --        NO SPECIAL REQUESTS  RIGHT  Antecubital       Culture result: NO GROWTH 2 DAYS       CULTURE, BLOOD [[id number]] Collected:  06/16/20 0903    Order Status:  Completed Specimen:  Blood Updated:  06/18/20 0644     Special Requests: --        LEFT  Antecubital       Culture result: NO GROWTH 2 DAYS             Current Meds:  Current Facility-Administered Medications   Medication Dose Route Frequency    pantothenic ac-min oil-pet,hyd (AQUAPHOR) 41 % ointment   Topical DAILY    acetaminophen (TYLENOL) tablet 650 mg  650 mg Oral Q4H PRN    HYDROcodone-acetaminophen (NORCO) 5-325 mg per tablet 1 Tab  1 Tab Oral Q4H PRN    naloxone (NARCAN) injection 0.4 mg  0.4 mg IntraVENous PRN    diphenhydrAMINE (BENADRYL) capsule 25 mg  25 mg Oral Q4H PRN    ondansetron (ZOFRAN) injection 4 mg  4 mg IntraVENous Q4H PRN    senna-docusate (PERICOLACE) 8.6-50 mg per tablet 2 Tab  2 Tab Oral DAILY PRN    morphine injection 1 mg  1 mg IntraVENous Q4H PRN    allopurinoL (ZYLOPRIM) tablet 300 mg  300 mg Oral DAILY    cyclobenzaprine (FLEXERIL) tablet 5 mg  5 mg Oral QHS    dilTIAZem ER (CARDIZEM CD) capsule 240 mg  240 mg Oral DAILY    famotidine (PEPCID) tablet 40 mg  40 mg Oral BID    furosemide (LASIX) tablet 40 mg  40 mg Oral DAILY    lisinopriL (PRINIVIL, ZESTRIL) tablet 40 mg  40 mg Oral DAILY    rivaroxaban (XARELTO) tablet 20 mg  20 mg Oral DAILY WITH BREAKFAST    Saccharomyces boulardii (FLORASTOR) capsule 250 mg  250 mg Oral BID    sertraline (ZOLOFT) tablet 75 mg  75 mg Oral DAILY    tamsulosin (FLOMAX) capsule 0.4 mg  0.4 mg Oral DAILY    vancomycin (VANCOCIN) 2000 mg in  ml infusion  2,000 mg IntraVENous Q12H    cefepime (MAXIPIME) 2 g in 0.9% sodium chloride (MBP/ADV) 100 mL  2 g IntraVENous Q12H       Other Studies (last 24 hours):  No results found.     Assessment and Plan:     Hospital Problems as of 6/18/2020 Date Reviewed: 2/19/2020          Codes Class Noted - Resolved POA    * (Principal) Recurrent cellulitis of lower extremity ICD-10-CM: L03.119  ICD-9-CM: 682.6  6/16/2020 - Present Unknown        Hypokalemia ICD-10-CM: E87.6  ICD-9-CM: 276.8  11/18/2019 - Present Yes        Chronic venous hypertension with ulcer and inflammation involving right side (HCC) (Chronic) ICD-10-CM: I87.331, L97.919  ICD-9-CM: 459.33  3/6/2019 - Present Yes        Obesity, morbid ([address] (Chronic) ICD-10-CM: E66.01  ICD-9-CM: 278.01  2/26/2018 - Present Yes        HTN (hypertension) (Chronic) ICD-10-CM: I10  ICD-9-CM: 401.9  3/16/2015 - Present Yes        DIANA (generalized anxiety disorder) (Chronic) ICD-10-CM: F41.1  ICD-9-CM: 300.02  5/7/2014 - Present Yes        History of gout (Chronic) ICD-10-CM: Z87.39  ICD-9-CM: V12.29  1/20/2014 - Present Yes              1 - recurrent right lower extremity cellulitis. Wound cultures preliminarily positive for heavy staph aureus and moderate gram-negative rods  2 - HTN  3 - gout  4 - paroxysmal A. fib on Xarelto  5 - anxiety  6 - small vessel arterial occlusive disease     PLAN:    · Continue present IV antibiotics, cefepime and vancomycin  · ID consultation appreciated  · Vascular surgery consultation appreciated. Iliac venogram with possible intervention and a right leg radiofrequency ablation is being considered. The patient is agreeable for the procedure at this time. · Follow wound cultures and blood cultures  · Continue home medications for chronic conditions     DC planning/Dispo: Home in 48 to 72 hours pending clinical improvement  DVT ppx: The patient is on Xarelto      Signed:   Elieser Hoyos MD

## 2020-06-19 ENCOUNTER — APPOINTMENT (OUTPATIENT)
Dept: GENERAL RADIOLOGY | Age: 55
DRG: 603 | End: 2020-06-19
Attending: NURSE PRACTITIONER
Payer: COMMERCIAL

## 2020-06-19 LAB
ANION GAP SERPL CALC-SCNC: 6 MMOL/L (ref 7–16)
BACTERIA SPEC CULT: ABNORMAL
BACTERIA SPEC CULT: ABNORMAL
BUN SERPL-MCNC: 12 MG/DL (ref 6–23)
CALCIUM SERPL-MCNC: 8.7 MG/DL (ref 8.3–10.4)
CHLORIDE SERPL-SCNC: 109 MMOL/L (ref 98–107)
CO2 SERPL-SCNC: 29 MMOL/L (ref 21–32)
CREAT SERPL-MCNC: 0.75 MG/DL (ref 0.8–1.5)
ERYTHROCYTE [DISTWIDTH] IN BLOOD BY AUTOMATED COUNT: 13 % (ref 11.9–14.6)
GLUCOSE SERPL-MCNC: 87 MG/DL (ref 65–100)
GRAM STN SPEC: ABNORMAL
GRAM STN SPEC: ABNORMAL
HCT VFR BLD AUTO: 38.8 % (ref 41.1–50.3)
HGB BLD-MCNC: 13.4 G/DL (ref 13.6–17.2)
MCH RBC QN AUTO: 34.9 PG (ref 26.1–32.9)
MCHC RBC AUTO-ENTMCNC: 34.5 G/DL (ref 31.4–35)
MCV RBC AUTO: 101 FL (ref 79.6–97.8)
NRBC # BLD: 0 K/UL (ref 0–0.2)
PLATELET # BLD AUTO: 184 K/UL (ref 150–450)
PMV BLD AUTO: 10.7 FL (ref 9.4–12.3)
POTASSIUM SERPL-SCNC: 3.9 MMOL/L (ref 3.5–5.1)
RBC # BLD AUTO: 3.84 M/UL (ref 4.23–5.6)
SERVICE CMNT-IMP: ABNORMAL
SODIUM SERPL-SCNC: 144 MMOL/L (ref 136–145)
WBC # BLD AUTO: 9.3 K/UL (ref 4.3–11.1)

## 2020-06-19 PROCEDURE — 74011000258 HC RX REV CODE- 258: Performed by: NURSE PRACTITIONER

## 2020-06-19 PROCEDURE — 74011250637 HC RX REV CODE- 250/637: Performed by: FAMILY MEDICINE

## 2020-06-19 PROCEDURE — 80048 BASIC METABOLIC PNL TOTAL CA: CPT

## 2020-06-19 PROCEDURE — 36415 COLL VENOUS BLD VENIPUNCTURE: CPT

## 2020-06-19 PROCEDURE — 85027 COMPLETE CBC AUTOMATED: CPT

## 2020-06-19 PROCEDURE — 65270000029 HC RM PRIVATE

## 2020-06-19 PROCEDURE — 73590 X-RAY EXAM OF LOWER LEG: CPT

## 2020-06-19 PROCEDURE — 74011000258 HC RX REV CODE- 258: Performed by: INTERNAL MEDICINE

## 2020-06-19 PROCEDURE — 74011250636 HC RX REV CODE- 250/636: Performed by: NURSE PRACTITIONER

## 2020-06-19 PROCEDURE — 74011250636 HC RX REV CODE- 250/636: Performed by: INTERNAL MEDICINE

## 2020-06-19 RX ADMIN — SERTRALINE HYDROCHLORIDE 75 MG: 50 TABLET ORAL at 10:02

## 2020-06-19 RX ADMIN — FAMOTIDINE 40 MG: 20 TABLET, FILM COATED ORAL at 10:02

## 2020-06-19 RX ADMIN — DILTIAZEM HYDROCHLORIDE 240 MG: 240 CAPSULE, COATED, EXTENDED RELEASE ORAL at 10:03

## 2020-06-19 RX ADMIN — FUROSEMIDE 40 MG: 40 TABLET ORAL at 10:02

## 2020-06-19 RX ADMIN — CYCLOBENZAPRINE 5 MG: 10 TABLET, FILM COATED ORAL at 21:51

## 2020-06-19 RX ADMIN — CEFEPIME HYDROCHLORIDE 2 G: 2 INJECTION, POWDER, FOR SOLUTION INTRAVENOUS at 06:15

## 2020-06-19 RX ADMIN — RDII 250 MG CAPSULE 250 MG: at 17:52

## 2020-06-19 RX ADMIN — RDII 250 MG CAPSULE 250 MG: at 10:02

## 2020-06-19 RX ADMIN — HYDROCODONE BITARTRATE AND ACETAMINOPHEN 1 TABLET: 5; 325 TABLET ORAL at 10:13

## 2020-06-19 RX ADMIN — LISINOPRIL 40 MG: 20 TABLET ORAL at 10:03

## 2020-06-19 RX ADMIN — HYDROCODONE BITARTRATE AND ACETAMINOPHEN 1 TABLET: 5; 325 TABLET ORAL at 21:51

## 2020-06-19 RX ADMIN — AMPICILLIN SODIUM AND SULBACTAM SODIUM 3 G: 2; 1 INJECTION, POWDER, FOR SOLUTION INTRAMUSCULAR; INTRAVENOUS at 17:52

## 2020-06-19 RX ADMIN — AMPICILLIN SODIUM AND SULBACTAM SODIUM 3 G: 2; 1 INJECTION, POWDER, FOR SOLUTION INTRAMUSCULAR; INTRAVENOUS at 12:00

## 2020-06-19 RX ADMIN — ALLOPURINOL 300 MG: 300 TABLET ORAL at 10:04

## 2020-06-19 RX ADMIN — TAMSULOSIN HYDROCHLORIDE 0.4 MG: 0.4 CAPSULE ORAL at 10:04

## 2020-06-19 RX ADMIN — FAMOTIDINE 40 MG: 20 TABLET, FILM COATED ORAL at 17:52

## 2020-06-19 RX ADMIN — RIVAROXABAN 20 MG: 20 TABLET, FILM COATED ORAL at 10:02

## 2020-06-19 RX ADMIN — Medication: at 10:04

## 2020-06-19 NOTE — PROGRESS NOTES
Message sent via perfect serve -   Patient has expository wheezing in upper/lower lungs. Can RT treatments be ordered and an incentive spirometer? Hospitalist responded, new orders received.

## 2020-06-19 NOTE — PROGRESS NOTES
END OF SHIFT NOTE:    INTAKE/OUTPUT     1900 -  0700  IVF: 630.57 ml  VOIDIN ml    Flatus: Patient does have flatus present. Stool:  1 occurrences. Characteristics:  Stool Assessment  Stool Color: Brown  Stool Appearance: Formed  Stool Amount: Medium  Stool Source/Status: Rectum    Emesis: 0 occurrences. VITAL SIGNS  Patient Vitals for the past 12 hrs:   Temp Pulse Resp BP SpO2   20 0332 98.1 °F (36.7 °C) 75 16 136/77 96 %   20 2256 97.5 °F (36.4 °C) 78 15 138/70 92 %   20 1932 98.1 °F (36.7 °C) (!) 59 15 129/77 94 %     Pain Assessment  Pain Intensity 1: 7 (20)  Pain Location 1: Leg  Pain Intervention(s) 1: Medication (see MAR)  Patient Stated Pain Goal: 0    Ambulating  Yes    Shift report to be given to oncoming nurse at the bedside.      [facility]

## 2020-06-19 NOTE — PROGRESS NOTES
END OF SHIFT NOTE:    INTAKE/OUTPUT  06/18 0701 - 06/19 0700  In: 1325.9 [I.V.:1325.9]  Out: 8803 [Urine:4825]  Voiding: YES  Catheter: NO  Drain:              Flatus: Patient does have flatus present. Stool:  0 occurrences. Characteristics:  Stool Assessment  Stool Color: Brown  Stool Appearance: Formed  Stool Amount: Medium  Stool Source/Status: Rectum    Emesis: 0 occurrences. Characteristics:        VITAL SIGNS  Patient Vitals for the past 12 hrs:   Temp Pulse Resp BP SpO2   06/19/20 1515 97.7 °F (36.5 °C) (!) 58 18 116/65 96 %   06/19/20 1224 97.5 °F (36.4 °C) (!) 58 19 126/81 94 %   06/19/20 0721 97.5 °F (36.4 °C) 60 18 137/87 96 %       Pain Assessment  Pain Intensity 1: 6 (06/19/20 1013)  Pain Location 1: Leg  Pain Intervention(s) 1: Medication (see MAR)  Patient Stated Pain Goal: 0    Ambulating  No    Shift report given to oncoming nurse at the bedside.     Dwayne Richardson

## 2020-06-19 NOTE — WOUND CARE
Pt seen for follow up on RLE. Dressing had slid down the leg and was due to be changed today. Removed opal abd and vaseline gauze cleansed with dermal wound cleanser, noted RLE with erythema and macerated skin, some small open areas and some serous drainage noted on old dressing. Applied aquaphor on RLE and Vaseline gauze, wrapped with opal and ace wrap. Pt tolerated well, states that he hopes they can do the vascular procedure necessary to heal his leg while he is in the hospital. Recommend to continue current treatment and wound team will follow up next week and as needed. Wound team will continue to follow while in acute care setting.

## 2020-06-19 NOTE — PROGRESS NOTES
Spiritual Care Visit, initial visit. Visited with patient at bedside. Patient's affect seems somewhat improved;   still uncertain about next steps. Anticipating vascular surgery when surgeon says he is ready for it. Prayed for patient's healing and health. Visit by Joseph Ventura, Staff .  NEHA.Rick., Th.B., B.A.

## 2020-06-19 NOTE — PROGRESS NOTES
Infectious Disease Progress Note    Impression:   · RLE recurrent cellulitis, again with extensive erythema and drainage, prior swab culture (11/2019) with MSSA, Acinetobacter   · RLE lymphedema in setting of RLE wound following accident 4/2019  · JUANI 11/2019 with noncompressible vessels on R and evidence to suggest presence of small vessel arterial occlusive disease  · Onychomycosis complicating #1    Plan:   · Change to Unasyn over weekend and expect to finish 14 day course with oral abx   · Check plain film for soft tissue gas due to severe pain. Anti-infectives:   1. Vancomycin 6/15-6/19  2. Piperacillin/tazobactam 6/15-6/19    Subjective: Interval History:  RLE has improved but very tender.  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 ([address] L97.313    Sepsis ([address] A41.9    Ankle wound, right, initial encounter S91.001A    Atrial fibrillation with rapid ventricular response (HCC) I48.91    Hypokalemia E87.6    Venous (peripheral) insufficiency I87.2    Varicose vein of leg I83.90    Cellulitis of right leg L03.115    Arterial insufficiency (HCC) I77.1    Coagulopathy (HCC) D68.9    Recurrent cellulitis of lower extremity L03.119     Past Medical History:   Diagnosis Date    Anxiety     Cellulitis of leg 11/17/2019    Coagulopathy ([address] 1/15/2020    Taking xaralto     Erectile dysfunction 1/20/2014  DIANA (generalized anxiety disorder) 2014    Gout 2014    History of gout 2014    History of peptic ulcer     HTN (hypertension) 3/16/2015    Hypertension     Obesity, morbid ([address] 2018    Osteoarthritis of hand, primary localized 2014    Personal history of urinary calculi     x2    Venous (peripheral) insufficiency 12/3/2019    Wart 2014      History reviewed. No pertinent family history. Social History     Tobacco Use    Smoking status: Former Smoker     Years: 5.00     Last attempt to quit: 2011     Years since quittin.9    Smokeless tobacco: Never Used    Tobacco comment: quit in the     Substance Use Topics    Alcohol use: No     Past Surgical History:   Procedure Laterality Date    HX WISDOM TEETH EXTRACTION        Prior to Admission medications    Medication Sig Start Date End Date Taking? Authorizing Provider   cyclobenzaprine (FLEXERIL) 5 mg tablet TAKE 1 TABLET BY MOUTH EVERY DAY AT NIGHT 20  Yes Jacqui SOLOMON MD   penicillin v potassium (VEETID) 500 mg tablet Take 500 mg by mouth four (4) times daily. 3/3/20  Yes Other, MD Emre   meloxicam (MOBIC) 15 mg tablet Take 15 mg by mouth daily. 1/10/20  Yes Provider, Historical   famotidine (PEPCID) 40 mg tablet Take 1 Tab by mouth two (2) times a day. 20  Yes Jacqui Hamlin MD   dilTIAZem CD (CARDIZEM CD) 240 mg ER capsule Take 1 Cap by mouth daily. 20  Yes Jacqui Hamlin MD   furosemide (LASIX) 40 mg tablet Take 1 Tab by mouth daily. 20  Yes Jacqui Hamlin MD   ondansetron (ZOFRAN ODT) 4 mg disintegrating tablet Take 1 Tab by mouth every eight (8) hours as needed for Nausea. 20  Yes Jacqui Hamlin MD   Saccharomyces boulardii (FLORASTOR) 250 mg capsule Take 250 mg by mouth two (2) times a day. 3/3/20   Emre Barbosa MD   lisinopril (PRINIVIL, ZESTRIL) 40 mg tablet Take 1 Tab by mouth daily.  20   Caryn Krishna Holt MD   tamsulosin (FLOMAX) 0.4 mg capsule Take 1 Cap by mouth daily. 20   Kellie Moreno MD   sertraline (ZOLOFT) 50 mg tablet Take 1.5 Tabs by mouth daily. 20   Kellie Moreno MD   allopurinoL (ZYLOPRIM) 300 mg tablet Take 1 Tab by mouth daily. 20   Kellie Moreno MD   rivaroxaban (XARELTO) 20 mg tab tablet Take 1 Tab by mouth daily (with dinner). 20   Kellie Moreno MD   acetaminophen (TYLENOL) 500 mg tablet Take  by mouth every six (6) hours as needed for Pain. Provider, Historical   colchicine 0.6 mg tablet Take 1 Tab by mouth daily. Patient taking differently: Take 0.6 mg by mouth daily. Takes for flair ups only 19   Lakeisha Barrios MD     Allergies   Allergen Reactions    Codeine Nausea Only    Sulfa (Sulfonamide Antibiotics) Rash        Review of Systems:  A comprehensive review of systems was negative except for that written in the History of Present Illness. Objective:   Blood pressure 137/87, pulse 60, temperature 97.5 °F (36.4 °C), resp. rate 18, height 6' 4\" (1.93 m), weight 142.9 kg (315 lb 0.6 oz), SpO2 96 %. Temp (24hrs), Av.6 °F (36.4 °C), Min:96.8 °F (36 °C), Max:98.1 °F (36.7 °C)    Patient was seen and examined on 20 and physical exam remains unchanged since yesterday, unless noted below:    General:  Alert, cooperative, well noursished, well developed, appears stated age   Eyes:  Sclera anicteric. Pupils equally round and reactive to light. Mouth/Throat: Mucous membranes normal, oral pharynx clear   Neck: Supple   Lungs:   Clear to auscultation bilaterally, good effort   CV:  Regular rate and rhythm,no murmur, click, rub or gallop   Abdomen:   Soft, non-tender. bowel sounds normal. non-distended   Extremities: RLE erythema receding--extends to top of knee .  tender   Skin: No rash; erythema to RLE, venous stasis changes to LLE   Lymph nodes: Cervical and supraclavicular normal Musculoskeletal: No swelling or deformity   Lines/Devices:  Intact, no erythema, drainage or tenderness   Psych: Alert and oriented, normal mood affect given the setting         Data Review:   Recent Results (from the past 24 hour(s))   CBC W/O DIFF    Collection Time: 06/19/20  4:43 AM   Result Value Ref Range    WBC 9.3 4.3 - 11.1 K/uL    RBC 3.84 (L) 4.23 - 5.6 M/uL    HGB 13.4 (L) 13.6 - 17.2 g/dL    HCT 38.8 (L) 41.1 - 50.3 %    .0 (H) 79.6 - 97.8 FL    MCH 34.9 (H) 26.1 - 32.9 PG    MCHC 34.5 31.4 - 35.0 g/dL    RDW 13.0 11.9 - 14.6 %    PLATELET 752 135 - 210 K/uL    MPV 10.7 9.4 - 12.3 FL    ABSOLUTE NRBC 0.00 0.0 - 0.2 K/uL   METABOLIC PANEL, BASIC    Collection Time: 06/19/20  4:43 AM   Result Value Ref Range    Sodium 144 136 - 145 mmol/L    Potassium 3.9 3.5 - 5.1 mmol/L    Chloride 109 (H) 98 - 107 mmol/L    CO2 29 21 - 32 mmol/L    Anion gap 6 (L) 7 - 16 mmol/L    Glucose 87 65 - 100 mg/dL    BUN 12 6 - 23 MG/DL    Creatinine 0.75 (L) 0.8 - 1.5 MG/DL    GFR est AA >60 >60 ml/min/1.73m2    GFR est non-AA >60 >60 ml/min/1.73m2    Calcium 8.7 8.3 - 10.4 MG/DL        Microbiology:    All Micro Results     Procedure Component Value Units Date/Time    CULTURE, WOUND [place] STAIN [[id number]]  (Abnormal)  (Susceptibility) Collected:  06/16/20 1046    Order Status:  Completed Specimen:  Wound from Leg Updated:  06/19/20 0745     Special Requests: NO SPECIAL REQUESTS        GRAM STAIN NO WBCS SEEN         FEW GRAM POSITIVE COCCI        Culture result:       HEAVY STAPHYLOCOCCUS AUREUS                  MODERATE ACINETOBACTER BAUMANNII          CULTURE, BLOOD [[id number]] Collected:  06/16/20 0933    Order Status:  Completed Specimen:  Blood Updated:  06/18/20 0644     Special Requests: --        NO SPECIAL REQUESTS  RIGHT  Antecubital       Culture result: NO GROWTH 2 DAYS       CULTURE, BLOOD [[id number]] Collected:  06/16/20 0903    Order Status:  Completed Specimen:  Blood Updated:  06/18/20 7503 Special Requests: --        LEFT  Antecubital       Culture result: NO GROWTH 2 DAYS             Studies:    6/16 XR Tib/fib  IMPRESSION  Impression:  No evidence of acute injury    Signed By: Carolina Richardson NP     June 19, 2020

## 2020-06-19 NOTE — PROGRESS NOTES
Assessed patient for the need of a breathing treatment. Breath sounds are clear and patient states that he feels no sort of distress. He also wanted to refuse the treatment because he does not like the \"jitters\" that it gives him. I told him to call if he feels any change.

## 2020-06-20 PROCEDURE — 74011000258 HC RX REV CODE- 258: Performed by: NURSE PRACTITIONER

## 2020-06-20 PROCEDURE — 65270000029 HC RM PRIVATE

## 2020-06-20 PROCEDURE — 77030018846 HC SOL IRR STRL H20 ICUM -A

## 2020-06-20 PROCEDURE — 74011250637 HC RX REV CODE- 250/637: Performed by: FAMILY MEDICINE

## 2020-06-20 PROCEDURE — 74011250636 HC RX REV CODE- 250/636: Performed by: NURSE PRACTITIONER

## 2020-06-20 RX ADMIN — CYCLOBENZAPRINE 5 MG: 10 TABLET, FILM COATED ORAL at 21:32

## 2020-06-20 RX ADMIN — LISINOPRIL 40 MG: 20 TABLET ORAL at 09:57

## 2020-06-20 RX ADMIN — SERTRALINE HYDROCHLORIDE 75 MG: 50 TABLET ORAL at 09:57

## 2020-06-20 RX ADMIN — AMPICILLIN SODIUM AND SULBACTAM SODIUM 3 G: 2; 1 INJECTION, POWDER, FOR SOLUTION INTRAMUSCULAR; INTRAVENOUS at 17:08

## 2020-06-20 RX ADMIN — AMPICILLIN SODIUM AND SULBACTAM SODIUM 3 G: 2; 1 INJECTION, POWDER, FOR SOLUTION INTRAMUSCULAR; INTRAVENOUS at 05:32

## 2020-06-20 RX ADMIN — AMPICILLIN SODIUM AND SULBACTAM SODIUM 3 G: 2; 1 INJECTION, POWDER, FOR SOLUTION INTRAMUSCULAR; INTRAVENOUS at 11:48

## 2020-06-20 RX ADMIN — HYDROCODONE BITARTRATE AND ACETAMINOPHEN 1 TABLET: 5; 325 TABLET ORAL at 09:56

## 2020-06-20 RX ADMIN — AMPICILLIN SODIUM AND SULBACTAM SODIUM 3 G: 2; 1 INJECTION, POWDER, FOR SOLUTION INTRAMUSCULAR; INTRAVENOUS at 00:28

## 2020-06-20 RX ADMIN — FUROSEMIDE 40 MG: 40 TABLET ORAL at 09:56

## 2020-06-20 RX ADMIN — Medication 1 EACH: at 15:30

## 2020-06-20 RX ADMIN — TAMSULOSIN HYDROCHLORIDE 0.4 MG: 0.4 CAPSULE ORAL at 09:56

## 2020-06-20 RX ADMIN — RDII 250 MG CAPSULE 250 MG: at 09:56

## 2020-06-20 RX ADMIN — RDII 250 MG CAPSULE 250 MG: at 17:08

## 2020-06-20 RX ADMIN — HYDROCODONE BITARTRATE AND ACETAMINOPHEN 1 TABLET: 5; 325 TABLET ORAL at 20:26

## 2020-06-20 RX ADMIN — ACETAMINOPHEN 650 MG: 325 TABLET, FILM COATED ORAL at 20:26

## 2020-06-20 RX ADMIN — FAMOTIDINE 40 MG: 20 TABLET, FILM COATED ORAL at 17:08

## 2020-06-20 RX ADMIN — RIVAROXABAN 20 MG: 20 TABLET, FILM COATED ORAL at 09:57

## 2020-06-20 RX ADMIN — FAMOTIDINE 40 MG: 20 TABLET, FILM COATED ORAL at 09:56

## 2020-06-20 RX ADMIN — ALLOPURINOL 300 MG: 300 TABLET ORAL at 09:56

## 2020-06-20 RX ADMIN — DILTIAZEM HYDROCHLORIDE 240 MG: 240 CAPSULE, COATED, EXTENDED RELEASE ORAL at 09:56

## 2020-06-20 NOTE — PROGRESS NOTES
Patient standing int the doorway \"watching\" the halls. Patient has been instructed by Jessica Sims repeatedly to not stand and walk around due to the wounds on his RLE. Patient verbalizes understanding and complies. Hours later he has to be reminded again. Patient is Ax4 but does not comply with requests.

## 2020-06-20 NOTE — PROGRESS NOTES
Hospitalist Progress Note     Admit Date:  2020  9:04 AM   Name:  Raul Carver   Age:  53 y.o.  :  1965   MRN:  [id number]   PCP:  Arash Horan MD  Treatment Team: Attending Provider: Poli Mejia MD; Consulting Provider: Nino Pack MD; Consulting Provider: Leatha Heller MD; Care Manager: Alexys Llanos RN; Consulting Provider: Mary Silvestre NP; Consulting Provider: Paolo Back MD; Primary Nurse: Cruz Porter    Subjective: The patient is a 79-year-old  gentleman with HTN, gout, anxiety, paroxysmal A. fib on Xarelto, chronic right leg wound sustained after a lawnmower accident, small vessel arterial occlusive disease, was admitted for recurrent right lower extremity cellulitis. Adelina Mars is presently on IV antibiotics, cefepime and vancomycin.  He still reports some pain, redness and swelling of his right leg, which have improved. Wound cultures are growing MSSA and Acinetobacter baumannii. Objective:     Patient Vitals for the past 24 hrs:   Temp Pulse Resp BP SpO2   20 1515 97.7 °F (36.5 °C) (!) 58 18 116/65 96 %   20 1224 97.5 °F (36.4 °C) (!) 58 19 126/81 94 %   20 0721 97.5 °F (36.4 °C) 60 18 137/87 96 %   20 0332 98.1 °F (36.7 °C) 75 16 136/77 96 %   20 2256 97.5 °F (36.4 °C) 78 15 138/70 92 %     Oxygen Therapy  O2 Sat (%): 96 % (20 1515)  Pulse via Oximetry: 62 beats per minute (20 2212)  O2 Device: Room air (20)    Intake/Output Summary (Last 24 hours) at 2020  Last data filed at 2020 1801  Gross per 24 hour   Intake 764.01 ml   Output 4250 ml   Net -3485.99 ml         General:    Well nourished. Alert. CV:   RRR. No murmur, rub, or gallop. Lungs:   Clear to auscultation bilaterally. No wheezing, rhonchi, or rales. Abdomen:   Soft, nontender, nondistended. Extremities: Warm and dry. No cyanosis or edema.  Erythema, swelling and tenderness on palpation of the right lower extremity  Skin:     No rashes or jaundice, except for erythema of the right lower extremity. Data Review:  I have reviewed all labs, meds, telemetry events, and studies from the last 24 hours.     Recent Results (from the past 24 hour(s))   CBC W/O DIFF    Collection Time: 06/19/20  4:43 AM   Result Value Ref Range    WBC 9.3 4.3 - 11.1 K/uL    RBC 3.84 (L) 4.23 - 5.6 M/uL    HGB 13.4 (L) 13.6 - 17.2 g/dL    HCT 38.8 (L) 41.1 - 50.3 %    .0 (H) 79.6 - 97.8 FL    MCH 34.9 (H) 26.1 - 32.9 PG    MCHC 34.5 31.4 - 35.0 g/dL    RDW 13.0 11.9 - 14.6 %    PLATELET 448 051 - 839 K/uL    MPV 10.7 9.4 - 12.3 FL    ABSOLUTE NRBC 0.00 0.0 - 0.2 K/uL   METABOLIC PANEL, BASIC    Collection Time: 06/19/20  4:43 AM   Result Value Ref Range    Sodium 144 136 - 145 mmol/L    Potassium 3.9 3.5 - 5.1 mmol/L    Chloride 109 (H) 98 - 107 mmol/L    CO2 29 21 - 32 mmol/L    Anion gap 6 (L) 7 - 16 mmol/L    Glucose 87 65 - 100 mg/dL    BUN 12 6 - 23 MG/DL    Creatinine 0.75 (L) 0.8 - 1.5 MG/DL    GFR est AA >60 >60 ml/min/1.73m2    GFR est non-AA >60 >60 ml/min/1.73m2    Calcium 8.7 8.3 - 10.4 MG/DL        All Micro Results     Procedure Component Value Units Date/Time    CULTURE, BLOOD [[id number]] Collected:  06/16/20 0903    Order Status:  Completed Specimen:  Blood Updated:  06/19/20 1115     Special Requests: --        LEFT  Antecubital       Culture result: NO GROWTH 3 DAYS       CULTURE, BLOOD [[id number]] Collected:  06/16/20 0933    Order Status:  Completed Specimen:  Blood Updated:  06/19/20 1115     Special Requests: --        NO SPECIAL REQUESTS  RIGHT  Antecubital       Culture result: NO GROWTH 3 DAYS       CULTURE, WOUND Arch Pickles STAIN [[id number]]  (Abnormal)  (Susceptibility) Collected:  06/16/20 1046    Order Status:  Completed Specimen:  Wound from Leg Updated:  06/19/20 0723     Special Requests: NO SPECIAL REQUESTS        GRAM STAIN NO WBCS SEEN         FEW GRAM POSITIVE COCCI Culture result:       HEAVY STAPHYLOCOCCUS AUREUS                  MODERATE ACINETOBACTER BAUMANNII                Current Meds:  Current Facility-Administered Medications   Medication Dose Route Frequency    ampicillin-sulbactam (UNASYN) 3 g in 0.9% sodium chloride (MBP/ADV) 100 mL  3 g IntraVENous Q6H    pantothenic ac-min oil-pet,hyd (AQUAPHOR) 41 % ointment   Topical DAILY    albuterol (PROVENTIL VENTOLIN) nebulizer solution 2.5 mg  2.5 mg Nebulization Q6H PRN    acetaminophen (TYLENOL) tablet 650 mg  650 mg Oral Q4H PRN    HYDROcodone-acetaminophen (NORCO) 5-325 mg per tablet 1 Tab  1 Tab Oral Q4H PRN    naloxone (NARCAN) injection 0.4 mg  0.4 mg IntraVENous PRN    diphenhydrAMINE (BENADRYL) capsule 25 mg  25 mg Oral Q4H PRN    ondansetron (ZOFRAN) injection 4 mg  4 mg IntraVENous Q4H PRN    senna-docusate (PERICOLACE) 8.6-50 mg per tablet 2 Tab  2 Tab Oral DAILY PRN    morphine injection 1 mg  1 mg IntraVENous Q4H PRN    allopurinoL (ZYLOPRIM) tablet 300 mg  300 mg Oral DAILY    cyclobenzaprine (FLEXERIL) tablet 5 mg  5 mg Oral QHS    dilTIAZem ER (CARDIZEM CD) capsule 240 mg  240 mg Oral DAILY    famotidine (PEPCID) tablet 40 mg  40 mg Oral BID    furosemide (LASIX) tablet 40 mg  40 mg Oral DAILY    lisinopriL (PRINIVIL, ZESTRIL) tablet 40 mg  40 mg Oral DAILY    rivaroxaban (XARELTO) tablet 20 mg  20 mg Oral DAILY WITH BREAKFAST    Saccharomyces boulardii (FLORASTOR) capsule 250 mg  250 mg Oral BID    sertraline (ZOLOFT) tablet 75 mg  75 mg Oral DAILY    tamsulosin (FLOMAX) capsule 0.4 mg  0.4 mg Oral DAILY       Other Studies (last 24 hours):  Xr Tib/fib Rt    Result Date: 6/19/2020  History: Question of subcutaneous kidneys emphysema 2 views right leg FINDINGS: No fracture, dislocation, or additional bony normality. No evidence of subcutaneous emphysema. IMPRESSION: No acute abnormality.       Assessment and Plan:     Hospital Problems as of 6/19/2020 Date Reviewed: 2/19/2020 Codes Class Noted - Resolved POA    * (Principal) Recurrent cellulitis of lower extremity ICD-10-CM: L03.119  ICD-9-CM: 682.6  6/16/2020 - Present Unknown        Hypokalemia ICD-10-CM: E87.6  ICD-9-CM: 276.8  11/18/2019 - Present Yes        Chronic venous hypertension with ulcer and inflammation involving right side (HCC) (Chronic) ICD-10-CM: I87.331, L97.919  ICD-9-CM: 459.33  3/6/2019 - Present Yes        Obesity, morbid ([address] (Chronic) ICD-10-CM: E66.01  ICD-9-CM: 278.01  2/26/2018 - Present Yes        HTN (hypertension) (Chronic) ICD-10-CM: I10  ICD-9-CM: 401.9  3/16/2015 - Present Yes        DIANA (generalized anxiety disorder) (Chronic) ICD-10-CM: F41.1  ICD-9-CM: 300.02  5/7/2014 - Present Yes        History of gout (Chronic) ICD-10-CM: Z87.39  ICD-9-CM: V12.29  1/20/2014 - Present Yes              1 - recurrent right lower extremity cellulitis.  Wound cultures preliminarily positive for heavy staph aureus and moderate gram-negative rods  2 - HTN  3 - gout  4 - paroxysmal A. fib on Xarelto  5 - anxiety  6 - small vessel arterial occlusive disease     PLAN:    · IV antibiotics switched to Ampicillin-sulbactam  · ID consultation appreciated  · Vascular surgery consultation appreciated. Iliac venogram with possible intervention and a right leg radiofrequency ablation is being considered. The patient is agreeable for the procedure at this time. · Wound cultures  growing MSSA and Acinetobacter baumannii. Blood cultures negative to date  · Continue home medications for chronic conditions     DC planning/Dispo: Home in 48 to 72 hours pending clinical improvement  DVT ppx: The patient is on Xarelto        Signed:   Neha Chowdhury MD

## 2020-06-20 NOTE — PROGRESS NOTES
END OF SHIFT NOTE:    INTAKE/OUTPUT  06/19 0701 - 06/20 0700  In: 463 [I.V.:463]  Out: 1614 [Urine:3525]  Voiding: YES  Catheter: NO  Drain:        Flatus: Patient does have flatus present. Stool:  1 occurrences. Characteristics:  Stool Assessment  Stool Color: Brown  Stool Appearance: Formed(per pt )  Stool Amount: Medium  Stool Source/Status: Rectum    Emesis: 0 occurrences. Characteristics:        VITAL SIGNS  Patient Vitals for the past 12 hrs:   Temp Pulse Resp BP SpO2   06/20/20 1535 98 °F (36.7 °C) (!) 57 18 108/65 94 %   06/20/20 1114 97.5 °F (36.4 °C) (!) 54 18 126/87 96 %       Pain Assessment  Pain Intensity 1: 3 (06/20/20 1310)  Pain Location 1: Leg  Pain Intervention(s) 1: Medication (see MAR)  Patient Stated Pain Goal: 0    Ambulating  Yes    Shift report given to oncoming nurse at the bedside.     Sakina Mcclendon RN

## 2020-06-20 NOTE — PROGRESS NOTES
END OF SHIFT NOTE:    INTAKE/OUTPUT      1900 -  0700  IVF:329.57 ml  VOIDIN ml    Flatus: Patient does have flatus present, per patient    Stool:  0 occurrences. Emesis: 0 occurrences. VITAL SIGNS  Patient Vitals for the past 12 hrs:   Temp Pulse Resp BP SpO2   20 0238 98.1 °F (36.7 °C) (!) 52 18 129/90 97 %   20 2246 97.9 °F (36.6 °C) (!) 58 18 145/88 95 %   20 97.3 °F (36.3 °C) (!) 55 18 135/76 94 %     Pain Assessment  Pain Intensity 1: 7 (20)  Pain Location 1: Leg  Pain Intervention(s) 1: Medication (see MAR)  Patient Stated Pain Goal: 0    Ambulating  Yes    Shift report to be given to oncoming nurse at the bedside.      [facility]

## 2020-06-21 LAB
ANION GAP SERPL CALC-SCNC: 8 MMOL/L (ref 7–16)
BACTERIA SPEC CULT: NORMAL
BACTERIA SPEC CULT: NORMAL
BUN SERPL-MCNC: 20 MG/DL (ref 6–23)
CALCIUM SERPL-MCNC: 9 MG/DL (ref 8.3–10.4)
CHLORIDE SERPL-SCNC: 105 MMOL/L (ref 98–107)
CO2 SERPL-SCNC: 29 MMOL/L (ref 21–32)
CREAT SERPL-MCNC: 0.78 MG/DL (ref 0.8–1.5)
ERYTHROCYTE [DISTWIDTH] IN BLOOD BY AUTOMATED COUNT: 12.9 % (ref 11.9–14.6)
GLUCOSE SERPL-MCNC: 99 MG/DL (ref 65–100)
HCT VFR BLD AUTO: 42.4 % (ref 41.1–50.3)
HGB BLD-MCNC: 14.1 G/DL (ref 13.6–17.2)
MCH RBC QN AUTO: 33.9 PG (ref 26.1–32.9)
MCHC RBC AUTO-ENTMCNC: 33.3 G/DL (ref 31.4–35)
MCV RBC AUTO: 101.9 FL (ref 79.6–97.8)
NRBC # BLD: 0 K/UL (ref 0–0.2)
PLATELET # BLD AUTO: 177 K/UL (ref 150–450)
PMV BLD AUTO: 10.9 FL (ref 9.4–12.3)
POTASSIUM SERPL-SCNC: 3.8 MMOL/L (ref 3.5–5.1)
RBC # BLD AUTO: 4.16 M/UL (ref 4.23–5.6)
SERVICE CMNT-IMP: NORMAL
SERVICE CMNT-IMP: NORMAL
SODIUM SERPL-SCNC: 142 MMOL/L (ref 136–145)
WBC # BLD AUTO: 8.8 K/UL (ref 4.3–11.1)

## 2020-06-21 PROCEDURE — 36415 COLL VENOUS BLD VENIPUNCTURE: CPT

## 2020-06-21 PROCEDURE — 74011000258 HC RX REV CODE- 258: Performed by: NURSE PRACTITIONER

## 2020-06-21 PROCEDURE — 74011250637 HC RX REV CODE- 250/637: Performed by: FAMILY MEDICINE

## 2020-06-21 PROCEDURE — 80048 BASIC METABOLIC PNL TOTAL CA: CPT

## 2020-06-21 PROCEDURE — 65270000029 HC RM PRIVATE

## 2020-06-21 PROCEDURE — 74011250636 HC RX REV CODE- 250/636: Performed by: NURSE PRACTITIONER

## 2020-06-21 PROCEDURE — 85027 COMPLETE CBC AUTOMATED: CPT

## 2020-06-21 RX ADMIN — AMPICILLIN SODIUM AND SULBACTAM SODIUM 3 G: 2; 1 INJECTION, POWDER, FOR SOLUTION INTRAMUSCULAR; INTRAVENOUS at 23:45

## 2020-06-21 RX ADMIN — FAMOTIDINE 40 MG: 20 TABLET, FILM COATED ORAL at 17:43

## 2020-06-21 RX ADMIN — AMPICILLIN SODIUM AND SULBACTAM SODIUM 3 G: 2; 1 INJECTION, POWDER, FOR SOLUTION INTRAMUSCULAR; INTRAVENOUS at 05:56

## 2020-06-21 RX ADMIN — FAMOTIDINE 40 MG: 20 TABLET, FILM COATED ORAL at 09:14

## 2020-06-21 RX ADMIN — RDII 250 MG CAPSULE 250 MG: at 09:14

## 2020-06-21 RX ADMIN — AMPICILLIN SODIUM AND SULBACTAM SODIUM 3 G: 2; 1 INJECTION, POWDER, FOR SOLUTION INTRAMUSCULAR; INTRAVENOUS at 11:28

## 2020-06-21 RX ADMIN — AMPICILLIN SODIUM AND SULBACTAM SODIUM 3 G: 2; 1 INJECTION, POWDER, FOR SOLUTION INTRAMUSCULAR; INTRAVENOUS at 17:43

## 2020-06-21 RX ADMIN — RIVAROXABAN 20 MG: 20 TABLET, FILM COATED ORAL at 09:14

## 2020-06-21 RX ADMIN — DILTIAZEM HYDROCHLORIDE 240 MG: 240 CAPSULE, COATED, EXTENDED RELEASE ORAL at 09:16

## 2020-06-21 RX ADMIN — SERTRALINE HYDROCHLORIDE 75 MG: 50 TABLET ORAL at 09:14

## 2020-06-21 RX ADMIN — Medication: at 11:08

## 2020-06-21 RX ADMIN — ACETAMINOPHEN 650 MG: 325 TABLET, FILM COATED ORAL at 21:42

## 2020-06-21 RX ADMIN — FUROSEMIDE 40 MG: 40 TABLET ORAL at 09:16

## 2020-06-21 RX ADMIN — AMPICILLIN SODIUM AND SULBACTAM SODIUM 3 G: 2; 1 INJECTION, POWDER, FOR SOLUTION INTRAMUSCULAR; INTRAVENOUS at 00:46

## 2020-06-21 RX ADMIN — CYCLOBENZAPRINE 5 MG: 10 TABLET, FILM COATED ORAL at 21:39

## 2020-06-21 RX ADMIN — ALLOPURINOL 300 MG: 300 TABLET ORAL at 09:14

## 2020-06-21 RX ADMIN — HYDROCODONE BITARTRATE AND ACETAMINOPHEN 1 TABLET: 5; 325 TABLET ORAL at 21:39

## 2020-06-21 RX ADMIN — TAMSULOSIN HYDROCHLORIDE 0.4 MG: 0.4 CAPSULE ORAL at 09:13

## 2020-06-21 RX ADMIN — RDII 250 MG CAPSULE 250 MG: at 17:43

## 2020-06-21 NOTE — PROGRESS NOTES
Hospitalist Progress Note     Admit Date:  2020  9:04 AM   Name:  Jonelle Huddleston   Age:  53 y.o.  :  1965   MRN:  [id number]   PCP:  Ashley Mccarthy MD  Treatment Team: Attending Provider: Melisa Ramirez MD; Consulting Provider: Munir Medina MD; Consulting Provider: Lashon Hart MD; Care Manager: Aruna Ching RN; Consulting Provider: Radha Phillips NP; Consulting Provider: Moriah Leyva MD    Subjective: The patient is a 58-year-old  gentleman with HTN, gout, anxiety, paroxysmal A. fib on Xarelto, chronic right leg wound sustained after a lawnmower accident, small vessel arterial occlusive disease, was admitted for recurrent right lower extremity cellulitis. Juan Francisco Garvin is presently on IV antibiotics, Unasyn.  He still reports some pain, redness and swelling of his right leg, which have improved. Wound cultures are growing MSSA and Acinetobacter baumannii. Objective:     Patient Vitals for the past 24 hrs:   Temp Pulse Resp BP SpO2   20 98 °F (36.7 °C) (!) 55 18 131/88 97 %   20 1535 98 °F (36.7 °C) (!) 57 18 108/65 94 %   20 1114 97.5 °F (36.4 °C) (!) 54 18 126/87 96 %   20 0702 97.9 °F (36.6 °C) (!) 58 18 134/77 93 %   20 0238 98.1 °F (36.7 °C) (!) 52 18 129/90 97 %   20 2246 97.9 °F (36.6 °C) (!) 58 18 145/88 95 %     Oxygen Therapy  O2 Sat (%): 97 % (20)  Pulse via Oximetry: 62 beats per minute (20)  O2 Device: Room air (20)    Intake/Output Summary (Last 24 hours) at 2020  Last data filed at 2020 1842  Gross per 24 hour   Intake 571.85 ml   Output 1850 ml   Net -1278.15 ml         General:    Well nourished. Alert. CV:   RRR. No murmur, rub, or gallop. Lungs:   Clear to auscultation bilaterally. No wheezing, rhonchi, or rales. Abdomen:   Soft, nontender, nondistended. Extremities: Warm and dry. No cyanosis or edema.  Erythema, swelling and tenderness on palpation of the right lower extremity  Skin:     No rashes or jaundice, except for erythema of the right lower extremity. Data Review:  I have reviewed all labs, meds, telemetry events, and studies from the last 24 hours. No results found for this or any previous visit (from the past 24 hour(s)).      All Micro Results     Procedure Component Value Units Date/Time    CULTURE, BLOOD [[id number]] Collected:  06/16/20 0933    Order Status:  Completed Specimen:  Blood Updated:  06/20/20 0811     Special Requests: --        NO SPECIAL REQUESTS  RIGHT  Antecubital       Culture result: NO GROWTH 4 DAYS       CULTURE, BLOOD [[id number]] Collected:  06/16/20 0903    Order Status:  Completed Specimen:  Blood Updated:  06/20/20 0811     Special Requests: --        LEFT  Antecubital       Culture result: NO GROWTH 4 DAYS       CULTURE, WOUND Donye Coombe STAIN [[id number]]  (Abnormal)  (Susceptibility) Collected:  06/16/20 1046    Order Status:  Completed Specimen:  Wound from Leg Updated:  06/19/20 0723     Special Requests: NO SPECIAL REQUESTS        GRAM STAIN NO WBCS SEEN         FEW GRAM POSITIVE COCCI        Culture result:       HEAVY STAPHYLOCOCCUS AUREUS                  MODERATE ACINETOBACTER BAUMANNII                Current Meds:  Current Facility-Administered Medications   Medication Dose Route Frequency    ampicillin-sulbactam (UNASYN) 3 g in 0.9% sodium chloride (MBP/ADV) 100 mL  3 g IntraVENous Q6H    pantothenic ac-min oil-pet,hyd (AQUAPHOR) 41 % ointment   Topical DAILY    albuterol (PROVENTIL VENTOLIN) nebulizer solution 2.5 mg  2.5 mg Nebulization Q6H PRN    acetaminophen (TYLENOL) tablet 650 mg  650 mg Oral Q4H PRN    HYDROcodone-acetaminophen (NORCO) 5-325 mg per tablet 1 Tab  1 Tab Oral Q4H PRN    naloxone (NARCAN) injection 0.4 mg  0.4 mg IntraVENous PRN    diphenhydrAMINE (BENADRYL) capsule 25 mg  25 mg Oral Q4H PRN    ondansetron (ZOFRAN) injection 4 mg  4 mg IntraVENous Q4H PRN    senna-docusate (PERICOLACE) 8.6-50 mg per tablet 2 Tab  2 Tab Oral DAILY PRN    morphine injection 1 mg  1 mg IntraVENous Q4H PRN    allopurinoL (ZYLOPRIM) tablet 300 mg  300 mg Oral DAILY    cyclobenzaprine (FLEXERIL) tablet 5 mg  5 mg Oral QHS    dilTIAZem ER (CARDIZEM CD) capsule 240 mg  240 mg Oral DAILY    famotidine (PEPCID) tablet 40 mg  40 mg Oral BID    furosemide (LASIX) tablet 40 mg  40 mg Oral DAILY    lisinopriL (PRINIVIL, ZESTRIL) tablet 40 mg  40 mg Oral DAILY    rivaroxaban (XARELTO) tablet 20 mg  20 mg Oral DAILY WITH BREAKFAST    Saccharomyces boulardii (FLORASTOR) capsule 250 mg  250 mg Oral BID    sertraline (ZOLOFT) tablet 75 mg  75 mg Oral DAILY    tamsulosin (FLOMAX) capsule 0.4 mg  0.4 mg Oral DAILY       Other Studies (last 24 hours):  No results found.     Assessment and Plan:     Hospital Problems as of 6/20/2020 Date Reviewed: 2/19/2020          Codes Class Noted - Resolved POA    * (Principal) Recurrent cellulitis of lower extremity ICD-10-CM: L03.119  ICD-9-CM: 682.6  6/16/2020 - Present Unknown        Hypokalemia ICD-10-CM: E87.6  ICD-9-CM: 276.8  11/18/2019 - Present Yes        Chronic venous hypertension with ulcer and inflammation involving right side (HCC) (Chronic) ICD-10-CM: I87.331, L97.919  ICD-9-CM: 459.33  3/6/2019 - Present Yes        Obesity, morbid ([address] (Chronic) ICD-10-CM: E66.01  ICD-9-CM: 278.01  2/26/2018 - Present Yes        HTN (hypertension) (Chronic) ICD-10-CM: I10  ICD-9-CM: 401.9  3/16/2015 - Present Yes        DIANA (generalized anxiety disorder) (Chronic) ICD-10-CM: F41.1  ICD-9-CM: 300.02  5/7/2014 - Present Yes        History of gout (Chronic) ICD-10-CM: Z87.39  ICD-9-CM: V12.29  1/20/2014 - Present Yes              1 - recurrent right lower extremity cellulitis.  Wound cultures positive for heavy staph aureus (MSSA) and moderate gram-negative rods (Acinetobacter baumannii)  2 - HTN  3 - gout  4 - paroxysmal A. fib on Xarelto  5 - anxiety  6 - small vessel arterial occlusive disease     PLAN:    · IV antibiotics switched to Ampicillin-sulbactam  · ID consultation appreciated  · Vascular surgery consultation appreciated. Iliac venogram with possible intervention and a right leg radiofrequency ablation is being considered. The patient is agreeable for the procedure at this time. · Wound cultures  growing MSSA and Acinetobacter baumannii. Blood cultures negative to date  · Continue home medications for chronic conditions     DC planning/Dispo: Home in 48 to 72 hours pending clinical improvement  DVT ppx: The patient is on Xarelto          Signed:   Neha Chowdhury MD

## 2020-06-21 NOTE — PROGRESS NOTES
Hospitalist Progress Note     Admit Date:  2020  9:04 AM   Name:  Shari Kapoor   Age:  53 y.o.  :  1965   MRN:  [id number]   PCP:  Yojana Alexander MD  Treatment Team: Attending Provider: Robert eDnney MD; Consulting Provider: Greyson Burleson MD; Consulting Provider: Hyun Kinney MD; Care Manager: Anna Price RN; Consulting Provider: Ward Addison NP; Consulting Provider: Manjinder Miller MD; Primary Nurse: Manish Interiano RN    Subjective: The patient is a 41-year-old  gentleman with HTN, gout, anxiety, paroxysmal A. fib on Xarelto, chronic right leg wound sustained after a lawnmower accident, small vessel arterial occlusive disease, was admitted for recurrent right lower extremity cellulitis. Lyubov Moralez is presently on IV antibiotics, Unasyn.  He still reports some pain, redness and swelling of his right leg, which have improved.  Wound cultures are growing MSSA and Acinetobacter baumannii. Objective:     Patient Vitals for the past 24 hrs:   Temp Pulse Resp BP SpO2   20 1505 97.6 °F (36.4 °C) 60 18 125/68 94 %   20 1107 97.8 °F (36.6 °C) (!) 58 16 131/73 92 %   20 0916 -- (!) 57 -- 125/79 --   20 0732 97.9 °F (36.6 °C) (!) 57 16 128/81 92 %   20 0334 98.1 °F (36.7 °C) (!) 56 17 122/69 94 %   20 2356 98.1 °F (36.7 °C) (!) 55 18 121/77 96 %   20 2037 98 °F (36.7 °C) (!) 55 18 131/88 97 %     Oxygen Therapy  O2 Sat (%): 94 % (20 1505)  Pulse via Oximetry: 62 beats per minute (20 2212)  O2 Device: Room air (20)    Intake/Output Summary (Last 24 hours) at 2020 1744  Last data filed at 2020 1532  Gross per 24 hour   Intake 716.28 ml   Output 2425 ml   Net -1708.72 ml         General:    Well nourished. Alert. CV:   RRR. No murmur, rub, or gallop. Lungs:   Clear to auscultation bilaterally. No wheezing, rhonchi, or rales. Abdomen:   Soft, nontender, nondistended. Extremities: Warm and dry. No cyanosis. Erythema, swelling and tenderness on palpation of the right lower extremity. Skin:     No rashes or jaundice, except for erythema of the right lower extremity. Data Review:  I have reviewed all labs, meds, telemetry events, and studies from the last 24 hours.     Recent Results (from the past 24 hour(s))   CBC W/O DIFF    Collection Time: 06/21/20  4:07 AM   Result Value Ref Range    WBC 8.8 4.3 - 11.1 K/uL    RBC 4.16 (L) 4.23 - 5.6 M/uL    HGB 14.1 13.6 - 17.2 g/dL    HCT 42.4 41.1 - 50.3 %    .9 (H) 79.6 - 97.8 FL    MCH 33.9 (H) 26.1 - 32.9 PG    MCHC 33.3 31.4 - 35.0 g/dL    RDW 12.9 11.9 - 14.6 %    PLATELET 947 653 - 194 K/uL    MPV 10.9 9.4 - 12.3 FL    ABSOLUTE NRBC 0.00 0.0 - 0.2 K/uL   METABOLIC PANEL, BASIC    Collection Time: 06/21/20  4:07 AM   Result Value Ref Range    Sodium 142 136 - 145 mmol/L    Potassium 3.8 3.5 - 5.1 mmol/L    Chloride 105 98 - 107 mmol/L    CO2 29 21 - 32 mmol/L    Anion gap 8 7 - 16 mmol/L    Glucose 99 65 - 100 mg/dL    BUN 20 6 - 23 MG/DL    Creatinine 0.78 (L) 0.8 - 1.5 MG/DL    GFR est AA >60 >60 ml/min/1.73m2    GFR est non-AA >60 >60 ml/min/1.73m2    Calcium 9.0 8.3 - 10.4 MG/DL        All Micro Results     Procedure Component Value Units Date/Time    CULTURE, BLOOD [[id number]] Collected:  06/16/20 0933    Order Status:  Completed Specimen:  Blood Updated:  06/21/20 0827     Special Requests: --        NO SPECIAL REQUESTS  RIGHT  Antecubital       Culture result: NO GROWTH 5 DAYS       CULTURE, BLOOD [[id number]] Collected:  06/16/20 0903    Order Status:  Completed Specimen:  Blood Updated:  06/21/20 0827     Special Requests: --        LEFT  Antecubital       Culture result: NO GROWTH 5 DAYS       CULTURE, WOUND Sydell Lina STAIN [[id number]]  (Abnormal)  (Susceptibility) Collected:  06/16/20 1046    Order Status:  Completed Specimen:  Wound from Leg Updated:  06/19/20 0723     Special Requests: NO SPECIAL REQUESTS GRAM STAIN NO WBCS SEEN         FEW GRAM POSITIVE COCCI        Culture result:       HEAVY STAPHYLOCOCCUS AUREUS                  MODERATE ACINETOBACTER BAUMANNII                Current Meds:  Current Facility-Administered Medications   Medication Dose Route Frequency    ampicillin-sulbactam (UNASYN) 3 g in 0.9% sodium chloride (MBP/ADV) 100 mL  3 g IntraVENous Q6H    pantothenic ac-min oil-pet,hyd (AQUAPHOR) 41 % ointment   Topical DAILY    albuterol (PROVENTIL VENTOLIN) nebulizer solution 2.5 mg  2.5 mg Nebulization Q6H PRN    acetaminophen (TYLENOL) tablet 650 mg  650 mg Oral Q4H PRN    HYDROcodone-acetaminophen (NORCO) 5-325 mg per tablet 1 Tab  1 Tab Oral Q4H PRN    naloxone (NARCAN) injection 0.4 mg  0.4 mg IntraVENous PRN    diphenhydrAMINE (BENADRYL) capsule 25 mg  25 mg Oral Q4H PRN    ondansetron (ZOFRAN) injection 4 mg  4 mg IntraVENous Q4H PRN    senna-docusate (PERICOLACE) 8.6-50 mg per tablet 2 Tab  2 Tab Oral DAILY PRN    morphine injection 1 mg  1 mg IntraVENous Q4H PRN    allopurinoL (ZYLOPRIM) tablet 300 mg  300 mg Oral DAILY    cyclobenzaprine (FLEXERIL) tablet 5 mg  5 mg Oral QHS    dilTIAZem ER (CARDIZEM CD) capsule 240 mg  240 mg Oral DAILY    famotidine (PEPCID) tablet 40 mg  40 mg Oral BID    furosemide (LASIX) tablet 40 mg  40 mg Oral DAILY    rivaroxaban (XARELTO) tablet 20 mg  20 mg Oral DAILY WITH BREAKFAST    Saccharomyces boulardii (FLORASTOR) capsule 250 mg  250 mg Oral BID    sertraline (ZOLOFT) tablet 75 mg  75 mg Oral DAILY    tamsulosin (FLOMAX) capsule 0.4 mg  0.4 mg Oral DAILY       Other Studies (last 24 hours):  No results found.     Assessment and Plan:     Hospital Problems as of 6/21/2020 Date Reviewed: 2/19/2020          Codes Class Noted - Resolved POA    * (Principal) Recurrent cellulitis of lower extremity ICD-10-CM: L03.119  ICD-9-CM: 682.6  6/16/2020 - Present Unknown        Hypokalemia ICD-10-CM: E87.6  ICD-9-CM: 276.8  11/18/2019 - Present Yes Chronic venous hypertension with ulcer and inflammation involving right side (HCC) (Chronic) ICD-10-CM: I87.331, L97.919  ICD-9-CM: 459.33  3/6/2019 - Present Yes        Obesity, morbid (HCC) (Chronic) ICD-10-CM: E66.01  ICD-9-CM: 278.01  2/26/2018 - Present Yes        HTN (hypertension) (Chronic) ICD-10-CM: I10  ICD-9-CM: 401.9  3/16/2015 - Present Yes        DIANA (generalized anxiety disorder) (Chronic) ICD-10-CM: F41.1  ICD-9-CM: 300.02  5/7/2014 - Present Yes        History of gout (Chronic) ICD-10-CM: Z87.39  ICD-9-CM: V12.29  1/20/2014 - Present Yes              1 - recurrent right lower extremity cellulitis.  Wound cultures positive for MSSA and Acinetobacter baumannii  2 - HTN  3 - gout  4 - paroxysmal A. fib on Xarelto  5 - anxiety  6 - small vessel arterial occlusive disease     PLAN:    · IV antibiotics switched to Ampicillin-sulbactam  · ID consultation appreciated  · Vascular surgery consultation appreciated. Iliac venogram with possible intervention and a right leg radiofrequency ablation is being considered. The patient is agreeable for the procedure at this time. · Wound cultures  growing MSSA and Acinetobacter baumannii. Blood cultures negative to date  · Continue home medications for chronic conditions. Lisinopril discontinued due to borderline low BPs     DC planning/Dispo: Home in 48 to 72 hours pending clinical improvement  DVT ppx: The patient is on Xarelto            Signed:   Carl Ross MD

## 2020-06-21 NOTE — PROGRESS NOTES
Problem: Falls - Risk of  Goal: *Absence of Falls  Description: Document Emilee Mendoza Fall Risk and appropriate interventions in the flowsheet. Outcome: Progressing Towards Goal  Note: Fall Risk Interventions:  Mobility Interventions: Communicate number of staff needed for ambulation/transfer, Patient to call before getting OOB         Medication Interventions: Patient to call before getting OOB, Teach patient to arise slowly         History of Falls Interventions: Door open when patient unattended         Problem: Patient Education: Go to Patient Education Activity  Goal: Patient/Family Education  Outcome: Progressing Towards Goal     Problem: Pressure Injury - Risk of  Goal: *Prevention of pressure injury  Description: Document Missael Scale and appropriate interventions in the flowsheet.   Outcome: Progressing Towards Goal  Note: Pressure Injury Interventions:  Sensory Interventions: Assess changes in LOC    Moisture Interventions: Contain wound drainage    Activity Interventions: Increase time out of bed, Pressure redistribution bed/mattress(bed type)    Mobility Interventions: HOB 30 degrees or less, Pressure redistribution bed/mattress (bed type)    Nutrition Interventions: Document food/fluid/supplement intake    Friction and Shear Interventions: Feet elevated on foot rest, Minimize layers                Problem: Patient Education: Go to Patient Education Activity  Goal: Patient/Family Education  Outcome: Progressing Towards Goal

## 2020-06-21 NOTE — PROGRESS NOTES
END OF SHIFT NOTE:    INTAKE/OUTPUT  06/20 0701 - 06/21 0700  In: 242.3 [I.V.:242.3]  Out: 1226 [Urine:2825]  Voiding: YES  Catheter: NO  Drain:              Flatus: Patient does have flatus present. Stool:  1 occurrences. Characteristics:    Emesis: 0 occurrences. Characteristics:        VITAL SIGNS  Patient Vitals for the past 12 hrs:   Temp Pulse Resp BP SpO2   06/21/20 1505 97.6 °F (36.4 °C) 60 18 125/68 94 %   06/21/20 1107 97.8 °F (36.6 °C) (!) 58 16 131/73 92 %   06/21/20 0916 -- (!) 57 -- 125/79 --   06/21/20 0732 97.9 °F (36.6 °C) (!) 57 16 128/81 92 %       Pain Assessment  Pain Intensity 1: 0 (06/21/20 0748)  Pain Location 1: Leg  Pain Intervention(s) 1: Medication (see MAR)  Patient Stated Pain Goal: 0    Ambulating  Yes    Shift report given to oncoming nurse at the bedside.     Rd Beavers RN

## 2020-06-22 LAB
ANION GAP SERPL CALC-SCNC: 6 MMOL/L (ref 7–16)
BUN SERPL-MCNC: 17 MG/DL (ref 6–23)
CALCIUM SERPL-MCNC: 9.3 MG/DL (ref 8.3–10.4)
CHLORIDE SERPL-SCNC: 107 MMOL/L (ref 98–107)
CO2 SERPL-SCNC: 30 MMOL/L (ref 21–32)
CREAT SERPL-MCNC: 0.84 MG/DL (ref 0.8–1.5)
ERYTHROCYTE [DISTWIDTH] IN BLOOD BY AUTOMATED COUNT: 12.9 % (ref 11.9–14.6)
GLUCOSE SERPL-MCNC: 88 MG/DL (ref 65–100)
HCT VFR BLD AUTO: 46.2 % (ref 41.1–50.3)
HGB BLD-MCNC: 15.7 G/DL (ref 13.6–17.2)
MCH RBC QN AUTO: 34.3 PG (ref 26.1–32.9)
MCHC RBC AUTO-ENTMCNC: 34 G/DL (ref 31.4–35)
MCV RBC AUTO: 100.9 FL (ref 79.6–97.8)
NRBC # BLD: 0 K/UL (ref 0–0.2)
PLATELET # BLD AUTO: 207 K/UL (ref 150–450)
PMV BLD AUTO: 10.4 FL (ref 9.4–12.3)
POTASSIUM SERPL-SCNC: 3.9 MMOL/L (ref 3.5–5.1)
RBC # BLD AUTO: 4.58 M/UL (ref 4.23–5.6)
SODIUM SERPL-SCNC: 143 MMOL/L (ref 136–145)
WBC # BLD AUTO: 8.6 K/UL (ref 4.3–11.1)

## 2020-06-22 PROCEDURE — 74011250636 HC RX REV CODE- 250/636: Performed by: NURSE PRACTITIONER

## 2020-06-22 PROCEDURE — 36415 COLL VENOUS BLD VENIPUNCTURE: CPT

## 2020-06-22 PROCEDURE — 74011250637 HC RX REV CODE- 250/637: Performed by: FAMILY MEDICINE

## 2020-06-22 PROCEDURE — 80048 BASIC METABOLIC PNL TOTAL CA: CPT

## 2020-06-22 PROCEDURE — 85027 COMPLETE CBC AUTOMATED: CPT

## 2020-06-22 PROCEDURE — 74011000258 HC RX REV CODE- 258: Performed by: NURSE PRACTITIONER

## 2020-06-22 PROCEDURE — 65270000029 HC RM PRIVATE

## 2020-06-22 RX ADMIN — FUROSEMIDE 40 MG: 40 TABLET ORAL at 08:26

## 2020-06-22 RX ADMIN — AMPICILLIN SODIUM AND SULBACTAM SODIUM 3 G: 2; 1 INJECTION, POWDER, FOR SOLUTION INTRAMUSCULAR; INTRAVENOUS at 12:16

## 2020-06-22 RX ADMIN — RDII 250 MG CAPSULE 250 MG: at 17:25

## 2020-06-22 RX ADMIN — RIVAROXABAN 20 MG: 20 TABLET, FILM COATED ORAL at 08:26

## 2020-06-22 RX ADMIN — CYCLOBENZAPRINE 5 MG: 10 TABLET, FILM COATED ORAL at 22:54

## 2020-06-22 RX ADMIN — FAMOTIDINE 40 MG: 20 TABLET, FILM COATED ORAL at 08:27

## 2020-06-22 RX ADMIN — FAMOTIDINE 40 MG: 20 TABLET, FILM COATED ORAL at 17:25

## 2020-06-22 RX ADMIN — RDII 250 MG CAPSULE 250 MG: at 08:27

## 2020-06-22 RX ADMIN — SERTRALINE HYDROCHLORIDE 75 MG: 50 TABLET ORAL at 08:26

## 2020-06-22 RX ADMIN — AMPICILLIN SODIUM AND SULBACTAM SODIUM 3 G: 2; 1 INJECTION, POWDER, FOR SOLUTION INTRAMUSCULAR; INTRAVENOUS at 05:55

## 2020-06-22 RX ADMIN — HYDROCODONE BITARTRATE AND ACETAMINOPHEN 1 TABLET: 5; 325 TABLET ORAL at 22:54

## 2020-06-22 RX ADMIN — DILTIAZEM HYDROCHLORIDE 240 MG: 240 CAPSULE, COATED, EXTENDED RELEASE ORAL at 08:27

## 2020-06-22 RX ADMIN — Medication: at 08:35

## 2020-06-22 RX ADMIN — AMPICILLIN SODIUM AND SULBACTAM SODIUM 3 G: 2; 1 INJECTION, POWDER, FOR SOLUTION INTRAMUSCULAR; INTRAVENOUS at 17:25

## 2020-06-22 RX ADMIN — TAMSULOSIN HYDROCHLORIDE 0.4 MG: 0.4 CAPSULE ORAL at 08:27

## 2020-06-22 RX ADMIN — ALLOPURINOL 300 MG: 300 TABLET ORAL at 08:27

## 2020-06-22 RX ADMIN — AMPICILLIN SODIUM AND SULBACTAM SODIUM 3 G: 2; 1 INJECTION, POWDER, FOR SOLUTION INTRAMUSCULAR; INTRAVENOUS at 22:55

## 2020-06-22 NOTE — PROGRESS NOTES
Hospitalist Progress Note     Admit Date:  2020  9:04 AM   Name:  Mindy Maguire   Age:  53 y.o.  :  1965   MRN:  [id number]   PCP:  Kellie Moreno MD  Treatment Team: Attending Provider: Reyes Pat, MD; Consulting Provider: Terri Goff MD; Consulting Provider: Millard Boas, MD; Care Manager: Lavern Vargas RN; Consulting Provider: Sary Amor NP; Consulting Provider: Krystal Sanders MD; Staff Nurse: Bella Anne RN    Subjective:   As per prior documentation:    \"Patient with a chronic right leg wound sustained after a lawnmower accident, small vessel arterial occlusive disease, was admitted for recurrent right lower extremity cellulitis. Kevin Long is presently on IV antibiotics, Unasyn.  He still reports some pain, redness and swelling of his right leg, which have improved.  Wound cultures are growing MSSA and Acinetobacter baumannii. \"            Objective:     Patient Vitals for the past 24 hrs:   Temp Pulse Resp BP SpO2   20 0705 97.5 °F (36.4 °C) (!) 59 19 149/72 95 %   20 2341 97.6 °F (36.4 °C) (!) 53 20 144/84 97 %   20 1957 97.4 °F (36.3 °C) (!) 53 19 132/77 97 %   20 1505 97.6 °F (36.4 °C) 60 18 125/68 94 %   20 1107 97.8 °F (36.6 °C) (!) 58 16 131/73 92 %     Oxygen Therapy  O2 Sat (%): 95 % (20 0705)  Pulse via Oximetry: 62 beats per minute (20 2212)  O2 Device: Room air (20)    Intake/Output Summary (Last 24 hours) at 2020 0946  Last data filed at 2020 0420  Gross per 24 hour   Intake 590 ml   Output 2250 ml   Net -1660 ml         General:    Well nourished. Alert. CV:   RRR. No murmur, rub, or gallop. Lungs:   CTAB. No wheezing, rhonchi, or rales. Abdomen:   Soft, nontender, nondistended. Extremities: Warm and dry. No cyanosis or edema. His right leg is wrapped in bandages  Skin:     No rashes or jaundice.      Data Review:  I have reviewed all labs, meds, telemetry events, and studies from the last 24 hours.     Recent Results (from the past 24 hour(s))   CBC W/O DIFF    Collection Time: 06/22/20  3:27 AM   Result Value Ref Range    WBC 8.6 4.3 - 11.1 K/uL    RBC 4.58 4.23 - 5.6 M/uL    HGB 15.7 13.6 - 17.2 g/dL    HCT 46.2 41.1 - 50.3 %    .9 (H) 79.6 - 97.8 FL    MCH 34.3 (H) 26.1 - 32.9 PG    MCHC 34.0 31.4 - 35.0 g/dL    RDW 12.9 11.9 - 14.6 %    PLATELET 062 463 - 593 K/uL    MPV 10.4 9.4 - 12.3 FL    ABSOLUTE NRBC 0.00 0.0 - 0.2 K/uL   METABOLIC PANEL, BASIC    Collection Time: 06/22/20  3:27 AM   Result Value Ref Range    Sodium 143 136 - 145 mmol/L    Potassium 3.9 3.5 - 5.1 mmol/L    Chloride 107 98 - 107 mmol/L    CO2 30 21 - 32 mmol/L    Anion gap 6 (L) 7 - 16 mmol/L    Glucose 88 65 - 100 mg/dL    BUN 17 6 - 23 MG/DL    Creatinine 0.84 0.8 - 1.5 MG/DL    GFR est AA >60 >60 ml/min/1.73m2    GFR est non-AA >60 >60 ml/min/1.73m2    Calcium 9.3 8.3 - 10.4 MG/DL        All Micro Results     Procedure Component Value Units Date/Time    CULTURE, BLOOD [[id number]] Collected:  06/16/20 0933    Order Status:  Completed Specimen:  Blood Updated:  06/21/20 0827     Special Requests: --        NO SPECIAL REQUESTS  RIGHT  Antecubital       Culture result: NO GROWTH 5 DAYS       CULTURE, BLOOD [[id number]] Collected:  06/16/20 0903    Order Status:  Completed Specimen:  Blood Updated:  06/21/20 0827     Special Requests: --        LEFT  Antecubital       Culture result: NO GROWTH 5 DAYS       CULTURE, WOUND Joyce Ly STAIN [[id number]]  (Abnormal)  (Susceptibility) Collected:  06/16/20 1046    Order Status:  Completed Specimen:  Wound from Leg Updated:  06/19/20 0723     Special Requests: NO SPECIAL REQUESTS        GRAM STAIN NO WBCS SEEN         FEW GRAM POSITIVE COCCI        Culture result:       HEAVY STAPHYLOCOCCUS AUREUS                  MODERATE ACINETOBACTER BAUMANNII                Current Meds:  Current Facility-Administered Medications   Medication Dose Route Frequency    ampicillin-sulbactam (UNASYN) 3 g in 0.9% sodium chloride (MBP/ADV) 100 mL  3 g IntraVENous Q6H    pantothenic ac-min oil-pet,hyd (AQUAPHOR) 41 % ointment   Topical DAILY    albuterol (PROVENTIL VENTOLIN) nebulizer solution 2.5 mg  2.5 mg Nebulization Q6H PRN    acetaminophen (TYLENOL) tablet 650 mg  650 mg Oral Q4H PRN    HYDROcodone-acetaminophen (NORCO) 5-325 mg per tablet 1 Tab  1 Tab Oral Q4H PRN    naloxone (NARCAN) injection 0.4 mg  0.4 mg IntraVENous PRN    diphenhydrAMINE (BENADRYL) capsule 25 mg  25 mg Oral Q4H PRN    ondansetron (ZOFRAN) injection 4 mg  4 mg IntraVENous Q4H PRN    senna-docusate (PERICOLACE) 8.6-50 mg per tablet 2 Tab  2 Tab Oral DAILY PRN    morphine injection 1 mg  1 mg IntraVENous Q4H PRN    allopurinoL (ZYLOPRIM) tablet 300 mg  300 mg Oral DAILY    cyclobenzaprine (FLEXERIL) tablet 5 mg  5 mg Oral QHS    dilTIAZem ER (CARDIZEM CD) capsule 240 mg  240 mg Oral DAILY    famotidine (PEPCID) tablet 40 mg  40 mg Oral BID    furosemide (LASIX) tablet 40 mg  40 mg Oral DAILY    rivaroxaban (XARELTO) tablet 20 mg  20 mg Oral DAILY WITH BREAKFAST    Saccharomyces boulardii (FLORASTOR) capsule 250 mg  250 mg Oral BID    sertraline (ZOLOFT) tablet 75 mg  75 mg Oral DAILY    tamsulosin (FLOMAX) capsule 0.4 mg  0.4 mg Oral DAILY       Other Studies (last 24 hours):  No results found.     Assessment and Plan:     Hospital Problems as of 6/22/2020 Date Reviewed: 2/19/2020          Codes Class Noted - Resolved POA    * (Principal) Recurrent cellulitis of lower extremity ICD-10-CM: L03.119  ICD-9-CM: 682.6  6/16/2020 - Present Unknown        Hypokalemia ICD-10-CM: E87.6  ICD-9-CM: 276.8  11/18/2019 - Present Yes        Chronic venous hypertension with ulcer and inflammation involving right side (HCC) (Chronic) ICD-10-CM: I87.331, L97.919  ICD-9-CM: 459.33  3/6/2019 - Present Yes        Obesity, morbid ([facility] (Chronic) ICD-10-CM: E66.01  ICD-9-CM: 278.01  2/26/2018 - Present Yes        HTN (hypertension) (Chronic) ICD-10-CM: I10  ICD-9-CM: 401.9  3/16/2015 - Present Yes        DIANA (generalized anxiety disorder) (Chronic) ICD-10-CM: F41.1  ICD-9-CM: 300.02  5/7/2014 - Present Yes        History of gout (Chronic) ICD-10-CM: Z87.39  ICD-9-CM: V12.29  1/20/2014 - Present Yes              PLAN:    · Recurrent MSSA AND ACINETOBACTER cellulitis of the rt lower extremity- continue IV abx per id recommendations; they plan to review the wound in am to determine further iv or po therapy  · Small vessel arterial occlusive disease- pt awaiting venogram with vascular and possible further intervention per them- d/w vascular the earliest possible date is July 1st. They will see if they can get him in sooner. However, he can be discharged when medically stable and have this done as an outpatient. D/w the patient and he is agreeable  · Rt sided enlarged Lymph node- noted on ultrasound in January of this year- pt states he was told it may need biopsy but he did not follow up. As he is having recurrent cellulitis on the left with continue abx and him follow up with pcp as an outpatient and possibly get repeat ultrasound with biopsy at that time if indicated  · HTN -continue current medical management. Per nursing staff the patient is requesting sausage sandwiches and other highly salt products for dietary in between eating. Discussed with the patient and we will increase his diet to double portions, and he will try to have PRN and salads consistent with a low-salt diet; will allow him a bag of pretzels or other chips of his preference every other day. Nutritionist also consulted for management of diet and further discussions with the patient. He states he has lost over 50 pounds since last year. Thinks he weighs about 285 and actually his weight is trended back up to 315lbs. He was previously around 360 pounds.     DC planning/Dispo: Likely home in the next 24 to 48 hours depending on final antibiotic plans and medical stability  DVT ppx: Xarelto    Signed:  Ector Belle MD

## 2020-06-22 NOTE — PROGRESS NOTES
Spiritual Care Visit, initial visit. Visited with patient at bedside. Prayed for patient's healing and health. Visit by Aileen Carlos, Staff .  Henrietta., Burton.B., B.A.

## 2020-06-22 NOTE — PROGRESS NOTES
Infectious Disease Progress Note    Impression:   · RLE recurrent cellulitis, again with extensive erythema and drainage, most recent culture with MSSA (tetra/clinda-R) and acinetobacter   · RLE lymphedema in setting of RLE wound following accident 2019  · JUANI 2019 with noncompressible vessels on R and evidence to suggest presence of small vessel arterial occlusive disease; prior swab culture (2019) with MSSA (clinda-R), citrobacter, proteus vulgaris  · Onychomycosis complicating #1    Plan:   · Continue Unasyn. Will continue while he is inpatient, and then transition to oral cefadroxil to complete a two week course. Continue wound care. · He would like to have his Vascular procedure while he is inpatient if possible. Anti-infectives:   1. unasyn ( -  2. Vancomycin 6/15-  3. Piperacillin/tazobactam 6/15-    Subjective: Interval History:  Afebrile. WBCs 8.6k, creatinine 0.84. Xray negative. Denies nausea, vomiting, diarrhea, fevers, chills, sweats, headaches or dizziness. Denies other complaints. Thinks his leg is improving slowly. Allergies   Allergen Reactions    Codeine Nausea Only    Sulfa (Sulfonamide Antibiotics) Rash        Review of Systems:  Pertinent items are noted in the History of Present Illness. Objective:   Blood pressure 149/72, pulse (!) 59, temperature 97.5 °F (36.4 °C), resp. rate 19, height 6' 4\" (1.93 m), weight 142.9 kg (315 lb 0.6 oz), SpO2 95 %.   Temp (24hrs), Av.6 °F (36.4 °C), Min:97.4 °F (36.3 °C), Max:97.8 °F (36.6 °C)    General: Alert, no acute distress, appears stated age, obese  Head:    Normocephalic, atraumatic  Eyes:   Anicteric sclerae, no drainage, not injected, EOMI  Mouth:  Moist mucosa, op clear, teeth in good repair  Neck:   Supple, symmetrical, trachea midline, no JVD  Lungs:   Clear without increased work of breathing or audible wheezes  CV:   Regular rate and rhythm without audible murmur  Abdomen:  Soft, non tender, not distended, active bowel sounds  Extremities:  RLE erythema, swelling, weeping skin, improved  Pulses:  Weak pedal pulses  Musculoskeletal: Moves all extremities with equal strength, no deformity  Skin:   No acute rash   Psych:   Alert, oriented and appropriate without evidence of thought disorder  Lines:    benign    Data Review:   Recent Results (from the past 24 hour(s))   CBC W/O DIFF    Collection Time: 06/22/20  3:27 AM   Result Value Ref Range    WBC 8.6 4.3 - 11.1 K/uL    RBC 4.58 4.23 - 5.6 M/uL    HGB 15.7 13.6 - 17.2 g/dL    HCT 46.2 41.1 - 50.3 %    .9 (H) 79.6 - 97.8 FL    MCH 34.3 (H) 26.1 - 32.9 PG    MCHC 34.0 31.4 - 35.0 g/dL    RDW 12.9 11.9 - 14.6 %    PLATELET 121 476 - 213 K/uL    MPV 10.4 9.4 - 12.3 FL    ABSOLUTE NRBC 0.00 0.0 - 0.2 K/uL   METABOLIC PANEL, BASIC    Collection Time: 06/22/20  3:27 AM   Result Value Ref Range    Sodium 143 136 - 145 mmol/L    Potassium 3.9 3.5 - 5.1 mmol/L    Chloride 107 98 - 107 mmol/L    CO2 30 21 - 32 mmol/L    Anion gap 6 (L) 7 - 16 mmol/L    Glucose 88 65 - 100 mg/dL    BUN 17 6 - 23 MG/DL    Creatinine 0.84 0.8 - 1.5 MG/DL    GFR est AA >60 >60 ml/min/1.73m2    GFR est non-AA >60 >60 ml/min/1.73m2    Calcium 9.3 8.3 - 10.4 MG/DL        Microbiology:    All Micro Results     Procedure Component Value Units Date/Time    CULTURE, BLOOD [[id number]] Collected:  06/16/20 0933    Order Status:  Completed Specimen:  Blood Updated:  06/21/20 0827     Special Requests: --        NO SPECIAL REQUESTS  RIGHT  Antecubital       Culture result: NO GROWTH 5 DAYS       CULTURE, BLOOD [[id number]] Collected:  06/16/20 0903    Order Status:  Completed Specimen:  Blood Updated:  06/21/20 0827     Special Requests: --        LEFT  Antecubital       Culture result: NO GROWTH 5 DAYS       CULTURE, WOUND Vishal Loupe STAIN [[id number]]  (Abnormal)  (Susceptibility) Collected:  06/16/20 1046    Order Status:  Completed Specimen:  Wound from Leg Updated:  06/19/20 0796     Special Requests: NO SPECIAL REQUESTS        GRAM STAIN NO WBCS SEEN         FEW GRAM POSITIVE COCCI        Culture result:       HEAVY STAPHYLOCOCCUS AUREUS                  MODERATE ACINETOBACTER BAUMANNII                Studies:    6/16 XR Tib/fib  IMPRESSION  Impression:  No evidence of acute injury    Signed By: Leia Mccoy NP     June 22, 2020

## 2020-06-22 NOTE — PROGRESS NOTES
Pt or pt's friend ordered chick filet for pt. Bag contained 3 sandwiches and 1 large leon. Spoke with hospitalist.  Pt is not to be eating this type of food. Explained to pt that he is not to order inappropriate food. because it is against MD orders and we cannot give to him.   Food placed in the refrigerator for his nighttime meal.

## 2020-06-22 NOTE — PROGRESS NOTES
Nutrition Assessment for:   Pt assessed per request of Dr. Peter Ross. Assessment: Pt admitted with cellulitis to R leg. PMH notable for HTN, Venous (peripheral) insufficiency. Pt is receiving daily lasix while admitted and PTA. Pt reports that he is starving and requests larger servings sent with meals. Pt states endorses an 84 pound weight loss over the past year. Per pt, he had a UBW of 366 pounds and now weighs 282 pounds. Pt states that he now eats \"healthier stuff\" and it more active. Pt states that he works at [employer] Per pt, he typically has a small breakfast consisting of yogurt and peanuts, of a pop-tart to go. Pt states that lunches are larger (salad, large roast beef sandwich, raisins, peanuts, etc.). DIET CARDIAC Regular    Per documentation, patient consuming average 100% of all recorded meals in 7 days. Anthropometrics:  Height: 6' 4\" (193 cm), Weight: 142.9 kg (315 lb 0.6 oz),  , Body mass index is 38.35 kg/m². BMI class of obese. Edema: 1-2+ to BLEs  Macronutrient needs: (using CBW (Current body weight) unknown source 142.9 kg)  EER: 3627-4345 kcal/day (12-15 kcal/kg)  EPR:  g/day (20% of kcals)    Nutrition Diagnosis:  Inadequate protein intake related to diet restriction and body habitus as evidenced by pt requiring more protein than what current cardiac diet offers. Nutrition Intervention:  Meals and snacks: Continue current diet. Allow double protein portions and 1 extra snack (fruit or a side salad). Reinforced importance of choosing low sodium items - unsalted peanuts, low sodium lunch meats, etc.   Coordination of care: POC discussed with Dr. Peter Ross. Discharge Nutrition Plan: Too soon to determine.     Kristopher Vargas [facility], [facility], [facility] [place], [address]

## 2020-06-22 NOTE — PROGRESS NOTES
Bilateral iliac venogram scheduled for 7/1/2020. Patient can continue his Xarelto. The procedure can be performed as an outpatient. Okay from a vascular standpoint to discharge when medically stable per primary attending physician. This was discussed with the patient via myself and Dr. Bryan Lawrence.

## 2020-06-22 NOTE — PROGRESS NOTES
END OF SHIFT NOTE:    INTAKE/OUTPUT  06/21 0701 - 06/22 0700  In: 590 [I.V.:590]  Out: 2250 [Urine:2250]  Voiding: YES  Catheter: NO  Drain:              Flatus: Patient does have flatus present. Stool:  0 occurrences. Characteristics:  Stool Assessment  Stool Color: Brown  Stool Appearance: Formed(per pt )  Stool Amount: Medium  Stool Source/Status: Rectum    Emesis: 0 occurrences. Characteristics:        VITAL SIGNS  Patient Vitals for the past 12 hrs:   Temp Pulse Resp BP SpO2   06/21/20 2341 97.6 °F (36.4 °C) (!) 53 20 144/84 97 %   06/21/20 1957 97.4 °F (36.3 °C) (!) 53 19 132/77 97 %       Pain Assessment  Pain Intensity 1: 6 (06/21/20 2135)  Pain Location 1: Leg  Pain Intervention(s) 1: Medication (see MAR)  Patient Stated Pain Goal: 0    Ambulating  Yes    Shift report given to oncoming nurse at the bedside.     Soledad Valero RN

## 2020-06-22 NOTE — WOUND CARE
Patient seen for RLE wounds. Noted most areas healed on leg. Dry red old wound to lateral aspect near knee remains but no dressing required. Washed then applied Aquaphor ointment, padded with ABD and then opal and ACE wrap. Patient did not have pain with dressing today. He stated he feels much better. Recommend continued use of Aquaphor ointment and a compression sock at home. Will continue to follow 1-2 times per week while in acute care.

## 2020-06-23 ENCOUNTER — HOME HEALTH ADMISSION (OUTPATIENT)
Dept: HOME HEALTH SERVICES | Facility: HOME HEALTH | Age: 55
End: 2020-06-23

## 2020-06-23 VITALS
OXYGEN SATURATION: 95 % | TEMPERATURE: 98 F | SYSTOLIC BLOOD PRESSURE: 126 MMHG | HEIGHT: 76 IN | HEART RATE: 60 BPM | DIASTOLIC BLOOD PRESSURE: 82 MMHG | BODY MASS INDEX: 38.36 KG/M2 | RESPIRATION RATE: 20 BRPM | WEIGHT: 315 LBS

## 2020-06-23 LAB
ANION GAP SERPL CALC-SCNC: 8 MMOL/L (ref 7–16)
BUN SERPL-MCNC: 20 MG/DL (ref 6–23)
CALCIUM SERPL-MCNC: 9.3 MG/DL (ref 8.3–10.4)
CHLORIDE SERPL-SCNC: 106 MMOL/L (ref 98–107)
CO2 SERPL-SCNC: 29 MMOL/L (ref 21–32)
CREAT SERPL-MCNC: 0.92 MG/DL (ref 0.8–1.5)
ERYTHROCYTE [DISTWIDTH] IN BLOOD BY AUTOMATED COUNT: 12.9 % (ref 11.9–14.6)
GLUCOSE SERPL-MCNC: 97 MG/DL (ref 65–100)
HCT VFR BLD AUTO: 45.3 % (ref 41.1–50.3)
HGB BLD-MCNC: 15.1 G/DL (ref 13.6–17.2)
MCH RBC QN AUTO: 33.7 PG (ref 26.1–32.9)
MCHC RBC AUTO-ENTMCNC: 33.3 G/DL (ref 31.4–35)
MCV RBC AUTO: 101.1 FL (ref 79.6–97.8)
NRBC # BLD: 0 K/UL (ref 0–0.2)
PLATELET # BLD AUTO: 199 K/UL (ref 150–450)
PMV BLD AUTO: 10.2 FL (ref 9.4–12.3)
POTASSIUM SERPL-SCNC: 4.1 MMOL/L (ref 3.5–5.1)
RBC # BLD AUTO: 4.48 M/UL (ref 4.23–5.6)
SODIUM SERPL-SCNC: 143 MMOL/L (ref 136–145)
WBC # BLD AUTO: 10 K/UL (ref 4.3–11.1)

## 2020-06-23 PROCEDURE — 74011250636 HC RX REV CODE- 250/636: Performed by: NURSE PRACTITIONER

## 2020-06-23 PROCEDURE — 74011250637 HC RX REV CODE- 250/637: Performed by: FAMILY MEDICINE

## 2020-06-23 PROCEDURE — 85027 COMPLETE CBC AUTOMATED: CPT

## 2020-06-23 PROCEDURE — 74011000258 HC RX REV CODE- 258: Performed by: NURSE PRACTITIONER

## 2020-06-23 PROCEDURE — 36415 COLL VENOUS BLD VENIPUNCTURE: CPT

## 2020-06-23 PROCEDURE — 80048 BASIC METABOLIC PNL TOTAL CA: CPT

## 2020-06-23 RX ORDER — CEFADROXIL 1000 MG/1
1 TABLET ORAL 2 TIMES DAILY
Qty: 16 TAB | Refills: 0 | Status: SHIPPED | OUTPATIENT
Start: 2020-06-23 | End: 2020-07-01

## 2020-06-23 RX ADMIN — DILTIAZEM HYDROCHLORIDE 240 MG: 240 CAPSULE, COATED, EXTENDED RELEASE ORAL at 08:33

## 2020-06-23 RX ADMIN — ALLOPURINOL 300 MG: 300 TABLET ORAL at 08:33

## 2020-06-23 RX ADMIN — HYDROCODONE BITARTRATE AND ACETAMINOPHEN 1 TABLET: 5; 325 TABLET ORAL at 07:54

## 2020-06-23 RX ADMIN — TAMSULOSIN HYDROCHLORIDE 0.4 MG: 0.4 CAPSULE ORAL at 08:32

## 2020-06-23 RX ADMIN — SERTRALINE HYDROCHLORIDE 75 MG: 50 TABLET ORAL at 08:33

## 2020-06-23 RX ADMIN — RDII 250 MG CAPSULE 250 MG: at 08:33

## 2020-06-23 RX ADMIN — AMPICILLIN SODIUM AND SULBACTAM SODIUM 3 G: 2; 1 INJECTION, POWDER, FOR SOLUTION INTRAMUSCULAR; INTRAVENOUS at 05:49

## 2020-06-23 RX ADMIN — RIVAROXABAN 20 MG: 20 TABLET, FILM COATED ORAL at 08:32

## 2020-06-23 RX ADMIN — FUROSEMIDE 40 MG: 40 TABLET ORAL at 08:33

## 2020-06-23 RX ADMIN — FAMOTIDINE 40 MG: 20 TABLET, FILM COATED ORAL at 08:33

## 2020-06-23 NOTE — DISCHARGE SUMMARY
Hospitalist Discharge Summary     Admit Date:  2020  9:04 AM   DC date:    Name:  Sarah Reyes   Age:  53 y.o.  :  1965   MRN:  [id number]   PCP:  Kali Qureshi MD  Treatment Team: Attending Provider: Ara El MD; Consulting Provider: Padmini August MD; Consulting Provider: Bill Ivy MD; Care Manager: Bob May, RN; Consulting Provider: Noris Constantino NP; Consulting Provider: Alfred Leigh MD; Staff Nurse: Tam Benavides RN    Problem List for this Hospitalization:  Hospital Problems as of 2020 Date Reviewed: 2020          Codes Class Noted - Resolved POA    * (Principal) Recurrent cellulitis of lower extremity ICD-10-CM: L03.119  ICD-9-CM: 682.6  2020 - Present Unknown        Hypokalemia ICD-10-CM: E87.6  ICD-9-CM: 276.8  2019 - Present Yes        Chronic venous hypertension with ulcer and inflammation involving right side (HCC) (Chronic) ICD-10-CM: I87.331, L97.919  ICD-9-CM: 459.33  3/6/2019 - Present Yes        Obesity, morbid ([address] (Chronic) ICD-10-CM: E66.01  ICD-9-CM: 278.01  2018 - Present Yes        HTN (hypertension) (Chronic) ICD-10-CM: I10  ICD-9-CM: 401.9  3/16/2015 - Present Yes        DIANA (generalized anxiety disorder) (Chronic) ICD-10-CM: F41.1  ICD-9-CM: 300.02  2014 - Present Yes        History of gout (Chronic) ICD-10-CM: Z87.39  ICD-9-CM: V12.29  2014 - Present Yes                Admission HPI from 2020:    \"Patient is a 53 y.o. male who presented to the ED for worsening leg wound. Hx of chronic leg wound since  accident 2019, gout, chronic leg edema, depression, HTN. Follows ID and vascular surgery outpatient for his chronic leg wound. Recently changed to Penicillin in March of this year for chronic suppression. Per pharmacy, only filled one month supply though he is stating he has been compliant with medication.  Wound cultures in the past have grown staph aureus, citrobacter, and proteus. \"    Hospital Course: As per prior documentation:  \"Patient with a chronic right leg wound sustained after a lawnmower accident, small vessel arterial occlusive disease, was admitted for recurrent right lower extremity cellulitis. [facility] is presently on IV antibiotics, Unasyn.  He still reports some pain, redness and swelling of his right leg, which have improved.  Wound cultures are growing MSSA and Acinetobacter baumannii. \"  ID and vascular have both seen pt and cleared for discharge and follow up. Complete course of abx with cefadroxil per ID. Bilateral iliac venogram scheduled for 7/1/2020 by vascular. Antibiotic course can be extended if physicians feel that wound starts getting worse again     Disposition: Home or Self Care  Activity: Activity as tolerated  Diet: DIET CARDIAC Regular  Code Status: Full Code    Follow Up Orders:  No orders of the defined types were placed in this encounter. Follow-up Information     Follow up With Specialties Details Why Odalis Norris MD Internal Medicine Call As needed [address] [place] [address]      Alfred Leigh MD Vascular Surgery On 7/1/2020 follow up with vascular surgery [address]  [phone]            Discharge meds at bottom of this note. Plan was discussed with pt. All questions answered. Patient was stable at time of discharge. Given instructions to call a physician or return if any concerns. Discharge summary and encounter summary was sent to PCP electronically via \"Comm [facility]gt\" link in [facility], if possible. Diagnostic Imaging/Tests:   Xr Tib/fib Rt    Result Date: 6/19/2020  History: Question of subcutaneous kidneys emphysema 2 views right leg FINDINGS: No fracture, dislocation, or additional bony normality. No evidence of subcutaneous emphysema. IMPRESSION: No acute abnormality.     Xr Tib/fib Rt    Result Date: 6/16/2020  Exam:  Right tibia fibula radiographs History:  pain, Right lower leg pain, redness, chronic venous insufficiency, ? osteomyelitis, 53 years Male Comparison: Right tibia fibula radiographs November 14, 2019 Findings:  No evidence of acute fracture or dislocation. Normal alignment, joint spaces preserved. Normal mineralization. No definite cortical erosions are seen to suggest radiographic evidence of osteomyelitis. Small plantar calcaneal spur. No evidence of ankle joint effusion. Visualized soft tissues otherwise unremarkable. Impression:  No evidence of acute injury. Echocardiogram results:  No results found for this visit on 06/16/20.     Procedures done this admission:  Procedure(s):  BILATERAL VENOGRAM/ ROOM 205    All Micro Results     Procedure Component Value Units Date/Time    CULTURE, BLOOD [[id number]] Collected:  06/16/20 0933    Order Status:  Completed Specimen:  Blood Updated:  06/21/20 0827     Special Requests: --        NO SPECIAL REQUESTS  RIGHT  Antecubital       Culture result: NO GROWTH 5 DAYS       CULTURE, BLOOD [[id number]] Collected:  06/16/20 0903    Order Status:  Completed Specimen:  Blood Updated:  06/21/20 0827     Special Requests: --        LEFT  Antecubital       Culture result: NO GROWTH 5 DAYS       CULTURE, WOUND W GRAM STAIN [[id number]]  (Abnormal)  (Susceptibility) Collected:  06/16/20 1046    Order Status:  Completed Specimen:  Wound from Leg Updated:  06/19/20 0723     Special Requests: NO SPECIAL REQUESTS        GRAM STAIN NO WBCS SEEN         FEW GRAM POSITIVE COCCI        Culture result:       HEAVY STAPHYLOCOCCUS AUREUS                  MODERATE ACINETOBACTER BAUMANNII                SARS-CoV-2 Lab Results  \"Novel Coronavirus\" Test: No results found for: COV2NT   \"Emergent Disease\" Test: No results found for: EDPR  \"SARS-COV-2\" Test: No results found for: XGCOVT  As of: 7:47 AM on 6/23/2020        Labs: Results:       BMP, Mg, Phos Recent Labs     06/23/20  0621 06/22/20  0327 06/21/20  0407    143 142   K 4.1 3.9 3.8    107 105   CO2 29 30 29   AGAP 8 6* 8   BUN 20 17 20   CREA 0.92 0.84 0.78*   CA 9.3 9.3 9.0   GLU 97 88 99      CBC Recent Labs     06/23/20  0621 06/22/20  0327 06/21/20  0407   WBC 10.0 8.6 8.8   RBC 4.48 4.58 4.16*   HGB 15.1 15.7 14.1   HCT 45.3 46.2 42.4    207 177      LFT No results for input(s): ALT, TBIL, AP, TP, ALB, GLOB, AGRAT in the last 72 hours.     No lab exists for component: SGOT, GPT   Cardiac Testing Lab Results   Component Value Date/Time    B-type Natriuretic Peptide 11.8 03/17/2016 10:47 AM      Coagulation Tests Lab Results   Component Value Date/Time    aPTT 34.7 11/18/2019 09:37 AM    aPTT 63.2 (H) 11/18/2019 12:35 AM    aPTT 36.7 11/17/2019 05:59 PM      A1c Lab Results   Component Value Date/Time    Hemoglobin A1c 5.9 01/18/2020 03:15 AM      Lipid Panel Lab Results   Component Value Date/Time    Cholesterol, total 173 07/10/2019 11:20 AM    HDL Cholesterol 32 (L) 07/10/2019 11:20 AM    LDL, calculated 121 (H) 07/10/2019 11:20 AM    VLDL, calculated 20 07/10/2019 11:20 AM    Triglyceride 102 07/10/2019 11:20 AM    CHOL/HDL Ratio 5.4 05/23/2018 01:50 PM      Thyroid Panel Lab Results   Component Value Date/Time    TSH 2.240 11/16/2019 07:04 PM    TSH 1.080 07/10/2019 11:20 AM    T4, Total 7.1 08/24/2016 11:21 AM    T4, Free 1.1 11/16/2019 07:04 PM        Most Recent UA Lab Results   Component Value Date/Time    Color YELLOW 11/14/2019 11:17 PM    Appearance CLEAR 11/14/2019 11:17 PM    Specific gravity 1.027 (H) 11/14/2019 11:17 PM    pH (UA) 5.5 11/14/2019 11:17 PM    Protein NEGATIVE  11/14/2019 11:17 PM    Glucose NEGATIVE  11/14/2019 11:17 PM    Ketone NEGATIVE  11/14/2019 11:17 PM    Bilirubin NEGATIVE  11/14/2019 11:17 PM    Blood NEGATIVE  11/14/2019 11:17 PM    Urobilinogen 1.0 11/14/2019 11:17 PM    Nitrites NEGATIVE  11/14/2019 11:17 PM    Leukocyte Esterase NEGATIVE  11/14/2019 11:17 PM        Allergies   Allergen Reactions    Codeine Nausea Only    Sulfa (Sulfonamide Antibiotics) Rash     Immunization History   Administered Date(s) Administered    TB Skin Test (PPD) Intradermal 01/15/2020    Tdap 02/26/2018       All Labs from Last 24 Hrs:  Recent Results (from the past 24 hour(s))   CBC W/O DIFF    Collection Time: 06/23/20  6:21 AM   Result Value Ref Range    WBC 10.0 4.3 - 11.1 K/uL    RBC 4.48 4.23 - 5.6 M/uL    HGB 15.1 13.6 - 17.2 g/dL    HCT 45.3 41.1 - 50.3 %    .1 (H) 79.6 - 97.8 FL    MCH 33.7 (H) 26.1 - 32.9 PG    MCHC 33.3 31.4 - 35.0 g/dL    RDW 12.9 11.9 - 14.6 %    PLATELET 817 673 - 091 K/uL    MPV 10.2 9.4 - 12.3 FL    ABSOLUTE NRBC 0.00 0.0 - 0.2 K/uL   METABOLIC PANEL, BASIC    Collection Time: 06/23/20  6:21 AM   Result Value Ref Range    Sodium 143 136 - 145 mmol/L    Potassium 4.1 3.5 - 5.1 mmol/L    Chloride 106 98 - 107 mmol/L    CO2 29 21 - 32 mmol/L    Anion gap 8 7 - 16 mmol/L    Glucose 97 65 - 100 mg/dL    BUN 20 6 - 23 MG/DL    Creatinine 0.92 0.8 - 1.5 MG/DL    GFR est AA >60 >60 ml/min/1.73m2    GFR est non-AA >60 >60 ml/min/1.73m2    Calcium 9.3 8.3 - 10.4 MG/DL       Discharge Exam:  Patient Vitals for the past 24 hrs:   Temp Pulse Resp BP SpO2   06/23/20 0259 98.1 °F (36.7 °C) 64 20 130/88 94 %   06/22/20 2258 98 °F (36.7 °C) 69 20 128/79 100 %   06/22/20 1847 97.5 °F (36.4 °C) 63 18 132/84 95 %   06/22/20 1459 97.6 °F (36.4 °C) (!) 58 18 121/83 93 %   06/22/20 1118 97.6 °F (36.4 °C) (!) 55 17 132/78 96 %     Oxygen Therapy  O2 Sat (%): 94 % (06/23/20 0259)  Pulse via Oximetry: 62 beats per minute (06/17/20 2212)  O2 Device: Room air (06/22/20 2250)    Estimated body mass index is 38.35 kg/m² as calculated from the following:    Height as of this encounter: 6' 4\" (1.93 m). Weight as of this encounter: 142.9 kg (315 lb 0.6 oz).       Intake/Output Summary (Last 24 hours) at 6/23/2020 0792  Last data filed at 6/23/2020 0535  Gross per 24 hour Intake 649 ml   Output 1000 ml   Net -351 ml       *Note that automatically entered I/Os may not be accurate; dependent on patient compliance with collection and accurate  by assistants. General:    Well nourished. Alert. Eyes:   Normal sclerae. Extraocular movements intact. ENT:  Normocephalic, atraumatic. Moist mucous membranes  CV:   Regular rate and rhythm. No murmur, rub, or gallop. Lungs:  Clear to auscultation bilaterally. No wheezing, rhonchi, or rales. Abdomen: Soft, nontender, nondistended. Extremities: Warm and dry. No cyanosis or edema. Neurologic: CN II-XII grossly intact. No gross focal deficits   Skin:     No rashes or jaundice. Psych:  Normal mood and affect.     Current Med List in Hospital:   Current Facility-Administered Medications   Medication Dose Route Frequency    ampicillin-sulbactam (UNASYN) 3 g in 0.9% sodium chloride (MBP/ADV) 100 mL  3 g IntraVENous Q6H    pantothenic ac-min oil-pet,hyd (AQUAPHOR) 41 % ointment   Topical DAILY    albuterol (PROVENTIL VENTOLIN) nebulizer solution 2.5 mg  2.5 mg Nebulization Q6H PRN    acetaminophen (TYLENOL) tablet 650 mg  650 mg Oral Q4H PRN    HYDROcodone-acetaminophen (NORCO) 5-325 mg per tablet 1 Tab  1 Tab Oral Q4H PRN    naloxone (NARCAN) injection 0.4 mg  0.4 mg IntraVENous PRN    diphenhydrAMINE (BENADRYL) capsule 25 mg  25 mg Oral Q4H PRN    ondansetron (ZOFRAN) injection 4 mg  4 mg IntraVENous Q4H PRN    senna-docusate (PERICOLACE) 8.6-50 mg per tablet 2 Tab  2 Tab Oral DAILY PRN    morphine injection 1 mg  1 mg IntraVENous Q4H PRN    allopurinoL (ZYLOPRIM) tablet 300 mg  300 mg Oral DAILY    cyclobenzaprine (FLEXERIL) tablet 5 mg  5 mg Oral QHS    dilTIAZem ER (CARDIZEM CD) capsule 240 mg  240 mg Oral DAILY    famotidine (PEPCID) tablet 40 mg  40 mg Oral BID    furosemide (LASIX) tablet 40 mg  40 mg Oral DAILY    rivaroxaban (XARELTO) tablet 20 mg  20 mg Oral DAILY WITH BREAKFAST    Saccharomyces boulardii (FLORASTOR) capsule 250 mg  250 mg Oral BID    sertraline (ZOLOFT) tablet 75 mg  75 mg Oral DAILY    tamsulosin (FLOMAX) capsule 0.4 mg  0.4 mg Oral DAILY       Discharge Info:   Current Discharge Medication List      START taking these medications    Details   cefadroxil (DURICEF) 1 gram tablet Take 1 Tab by mouth two (2) times a day for 8 days. Qty: 16 Tab, Refills: 0         CONTINUE these medications which have NOT CHANGED    Details   cyclobenzaprine (FLEXERIL) 5 mg tablet TAKE 1 TABLET BY MOUTH EVERY DAY AT NIGHT  Qty: 90 Tab, Refills: 0      penicillin v potassium (VEETID) 500 mg tablet Take 500 mg by mouth four (4) times daily. meloxicam (MOBIC) 15 mg tablet Take 15 mg by mouth daily. famotidine (PEPCID) 40 mg tablet Take 1 Tab by mouth two (2) times a day. Qty: 180 Tab, Refills: 3    Associated Diagnoses: Gastroesophageal reflux disease without esophagitis      dilTIAZem CD (CARDIZEM CD) 240 mg ER capsule Take 1 Cap by mouth daily. Qty: 90 Cap, Refills: 3      furosemide (LASIX) 40 mg tablet Take 1 Tab by mouth daily. Qty: 90 Tab, Refills: 3      ondansetron (ZOFRAN ODT) 4 mg disintegrating tablet Take 1 Tab by mouth every eight (8) hours as needed for Nausea. Qty: 90 Tab, Refills: 3    Associated Diagnoses: History of gout      Xarelto 20 mg tab tablet TAKE 1 TAB BY MOUTH DAILY (WITH DINNER). Qty: 90 Tab, Refills: 0    Comments: DX Code Needed  . Associated Diagnoses: Permanent atrial fibrillation (HCC)      Saccharomyces boulardii (FLORASTOR) 250 mg capsule Take 250 mg by mouth two (2) times a day. lisinopril (PRINIVIL, ZESTRIL) 40 mg tablet Take 1 Tab by mouth daily. Qty: 90 Tab, Refills: 3      tamsulosin (FLOMAX) 0.4 mg capsule Take 1 Cap by mouth daily. Qty: 90 Cap, Refills: 3      sertraline (ZOLOFT) 50 mg tablet Take 1.5 Tabs by mouth daily. Qty: 135 Tab, Refills: 3      allopurinoL (ZYLOPRIM) 300 mg tablet Take 1 Tab by mouth daily.   Qty: 90 Tab, Refills: 0 Associated Diagnoses: History of gout      acetaminophen (TYLENOL) 500 mg tablet Take  by mouth every six (6) hours as needed for Pain. colchicine 0.6 mg tablet Take 1 Tab by mouth daily. Qty: 90 Tab, Refills: 0               Time spent in patient discharge planning and coordination 35 minutes.     Signed:  Rony Golden MD

## 2020-06-23 NOTE — PROGRESS NOTES
Sleep Disorder Breathing Screen:     Patient reports symptoms of:   · Snoring   · Excessive daytime sleepiness  · STOP-BAN  · Gregory Score: 13   · Height: 6'4\"  · Weight: 315 lbs  · BMI: 38.4     Discussed the physiological detriment and pathways to co-morbid conditions resulting from sleep disordered breathing. Sleep study was previously ordered in 2019. Patient stated he believed he needed a sleep study, was receptive, and agreed to undergo polysomnography. Refer patient for sleep study based on above assessment.

## 2020-06-23 NOTE — PROGRESS NOTES
Spoke to Mr. Kailyn Perez again in room 205 about discharge planning. He is going home with his wife. CM suggested home health to assist with R LE wounds (and stressed that they will not be out every day). He agrees. From list of providers, he agreed to [facility] home health RN. Order and referral placed into Epic/CC. No other discharge needs identified.       Care Management Interventions  Transition of Care Consult (CM Consult): [address]: Yes  Current Support Network: Own Home, Lives with Spouse  The Plan for Transition of Care is Related to the Following Treatment Goals : home health RN   The Patient and/or Patient Representative was Provided with a Choice of Provider and Agrees with the Discharge Plan?: Yes  Name of the Patient Representative Who was Provided with a Choice of Provider and Agrees with the Discharge Plan: Mr. Kailyn Perez, the patient   Freedom of Choice List was Provided with Basic Dialogue that Supports the Patient's Individualized Plan of Care/Goals, Treatment Preferences and Shares the Quality Data Associated with the Providers?: Yes  Discharge Location  Discharge Placement: Home with home health([facility] RN )

## 2020-06-23 NOTE — ADT AUTH CERT NOTES
Cellulitis - Care Day 7 (6/22/2020) by Jhonny Jorgensen RN         Review Entered Review Status   6/23/2020 12:41 Completed       Criteria Review      Care Day: 7 Care Date: 6/22/2020 Level of Care: Inpatient Floor    Guideline Day 3    Clinical Status    (X) * Hemodynamic stability    (X) * Fever absent or improved    (X) * Skin exam stable or improved    (X) * Mental status at baseline    (X) * Antibiotic treatment needs appropriate for next level of care    (X) * Pain absent or manageable at next level of care    ( ) * Discharge plans and education understood    Activity    (X) * Ambulatory    Routes    (X) * Oral hydration, medications, [F] and diet    Medications    (X) Parenteral or oral antibiotics [B]    * Milestone   Additional Notes   6/22      98 69 20 100% 128/79 r/a      6/22/2020 03:27   .9 (H)   MCH 34.3 (H)         6/22/2020 03:27   Anion gap 6 (L)      Wound CX      GRAM STAIN Final   FEW GRAM POSITIVE COCCI    Culture result: Abnormal Final   HEAVY STAPHYLOCOCCUS AUREUS    Culture result: Abnormal Final   MODERATE ACINETOBACTER BAUMANNII      Orders Medical OBS, Full Code, VS, SCDS, Cardiac Diet, Ambulate w/ assist, Infectious Disease Consult, Pharmacy to dose Vanc Dosing, Vascular Surgery Consult,       Wound Care orders: RLE: clean with dermal wound cleanser. Aquaphor ointment to leg and foot. May cover open areas with piece of vaseline gauze, then gauze.  Wrap with opal roll then ACE from mid foot to below knee      Meds, Pepcid 40 mg po x2, Zyloprim 300 mg po x1, Cardizem 240 mg po x1, Lisinopril 40 mg po x1, Xarelto 20 mg po x1, Zoloft 75 mg po x1, Flomax 0.4 mg po x1,Unasyn 3gm IV x4,Lasix 40 mg p ox1, Norco 5/325 mg po x1, Flexeril 5 mg po x1, Florastor 250 mg po x2,      Infectious Disease Note      Impression   \" RLE recurrent cellulitis, again with extensive erythema and drainage, most recent culture with MSSA (tetra/clinda-R) and acinetobacter    \" RLE lymphedema in setting of RLE wound following accident 4/2019   \" JUANI 11/2019 with noncompressible vessels on R and evidence to suggest presence of small vessel arterial occlusive disease; prior swab culture (11/2019) with MSSA (clinda-R), citrobacter, proteus vulgaris   Onychomycosis complicating #   Plan   \" Continue Unasyn. Will continue while he is inpatient, and then transition to oral cefadroxil to complete a two week course. Continue wound care. He would like to have his Vascular procedure while he is inpatient if possible. Anti Infectives   1. unasyn (6/19 -   2. Vancomycin 6/15-6/19   Piperacillin/tazobactam 6/15-6/19      IM NOTE      As per prior documentation:       \"Patient with a chronic right leg wound sustained after a lawnmower accident, small vessel arterial occlusive disease, was admitted for recurrent right lower extremity cellulitis. [facility] is presently on IV antibiotics, Susan Rai still reports some pain, redness and swelling of his right leg, which have improved.  Wound cultures are growing MSSA and Acinetobacter baumannii. \"       General:          Well nourished.  Alert.     CV:                  RRR.  No murmur, rub, or gallop. Lungs:             CTAB.  No wheezing, rhonchi, or rales. Abdomen:        Soft, nontender, nondistended.     Extremities:     Warm and dry.  No cyanosis or edema.  His right leg is wrapped in bandages   Skin:                No rashes or jaundice.        (Principal) Recurrent cellulitis of lower extremity ICD-10-CM: L03.119   ICD-9-CM: 682.6 6/16/2020 - Present Unknown           Hypokalemia ICD-10-CM: E87.6   ICD-9-CM: 276.8 11/18/2019 - Present Yes          Chronic venous hypertension with ulcer and inflammation involving right side (HCC) (Chronic) ICD-10-CM: I87.331, L97.919   ICD-9-CM: 459.33 3/6/2019 - Present Yes          Obesity, morbid (HCC) (Chronic) ICD-10-CM: E66.01   ICD-9-CM: 278.01 2/26/2018 - Present Yes          HTN (hypertension) (Chronic) ICD-10-CM: I10   ICD-9-CM: 401.9 3/16/2015 - Present Yes          DIANA (generalized anxiety disorder) (Chronic) ICD-10-CM: F41.1   ICD-9-CM: 300.02 5/7/2014 - Present Yes          History of gout (Chronic) ICD-10-CM: Z87.39   ICD-9-CM: V12.29 1/20/2014 - Present Yes                    PLAN:     \" Recurrent MSSA AND ACINETOBACTER cellulitis of the rt lower extremity- continue IV abx per id recommendations; they plan to review the wound in am to determine further iv or po therapy   \" Small vessel arterial occlusive disease- pt awaiting venogram with vascular and possible further intervention per them- d/w vascular the earliest possible date is July 1st. They will see if they can get him in sooner. However, he can be discharged when medically stable and have this done as an outpatient. D/w the patient and he is agreeable   \" Rt sided enlarged Lymph node- noted on ultrasound in January of this year- pt states he was told it may need biopsy but he did not follow up.  As he is having recurrent cellulitis on the left with continue abx and him follow up with pcp as an outpatient and possibly get repeat ultrasound with biopsy at that time if indicated   \" HTN -continue current medical management.  Per nursing staff the patient is requesting sausage sandwiches and other highly salt products for dietary in between eating.  Discussed with the patient and we will increase his diet to double portions, and he will try to have PRN and salads consistent with a low-salt diet; will allow him a bag of pretzels or other chips of his preference every other day.  Nutritionist also consulted for management of diet and further discussions with the patient. Mckenna Justin states he has lost over 50 pounds since last year. Durga Richmond he weighs about 285 and actually his weight is trended back up to 315lbs.  He was previously around 360 pounds.       DC planning/Dispo: Likely home in the next 24 to 48 hours depending on final antibiotic plans and medical stability   DVT ppx: Xarelto Wound Care note   Patient seen for RLE wounds. Noted most areas healed on leg. Dry red old wound to lateral aspect near knee remains but no dressing required. Washed then applied Aquaphor ointment, padded with ABD and then opal and ACE wrap. Patient did not have pain with dressing today. He stated he feels much better. Recommend continued use of Aquaphor ointment and a compression sock at home. Will continue to follow 1-2 times per week while in acute care. Nutrition note   Nutrition Assessment for:    Pt assessed per request of Dr. Dl Sims.        Assessment: Pt admitted with cellulitis to  leg. PMH notable for HTN, Venous (peripheral) insufficiency. Pt is receiving daily lasix while admitted and PTA. Pt reports that he is starving and requests larger servings sent with meals. Pt states endorses an 84 pound weight loss over the past year. Per pt, he had a UBW of 366 pounds and now weighs 282 pounds. Pt states that he now eats \"healthier stuff\" and it more active. Pt states that he works at [employer] Per pt, he typically has a small breakfast consisting of yogurt and peanuts, of a pop-tart to go. Pt states that lunches are larger (salad, large roast beef sandwich, raisins, peanuts, etc.). DIET CARDIAC Regular     Per documentation, patient consuming average 100% of all recorded meals in 7 days.       Anthropometrics:   Height: 6' 4\" (193 cm), Weight: 142.9 kg (315 lb 0.6 oz),  , Body mass index is 38.35 kg/m². BMI class of obese. Edema: 1-2+ to BLEs   Macronutrient needs: (using CBW (Current body weight) unknown source 142.9 kg)   EER: 4754-9388 kcal/day (12-15 kcal/kg)   EPR:  g/day (20% of kcals)       Nutrition Diagnosis:   Inadequate protein intake related to diet restriction and body habitus as evidenced by pt requiring more protein than what current cardiac diet offers.       Nutrition Intervention:   Meals and snacks: Continue current diet.  Allow double protein portions and 1 extra snack (fruit or a side salad). Reinforced importance of choosing low sodium items - unsalted peanuts, low sodium lunch meats, etc.    Coordination of care: POC discussed with Dr. Sherine Coffey. Discharge Nutrition Plan: Too soon to determine. Vascular Surgery   Bilateral iliac venogram scheduled for 7/1/2020.  Patient can continue his Xarelto.  The procedure can be performed as an outpatient. Riki Machado from a vascular standpoint to discharge when medically stable per primary attending physician. This was discussed with the patient via myself and Dr. Narda Collier.          Cellulitis - Care Day 6 (6/21/2020) by Gabriel Dye RN         Review Entered Review Status   6/22/2020 15:45 Completed       Criteria Review      Care Day: 6 Care Date: 6/21/2020 Level of Care: Inpatient Floor    Guideline Day 3    Clinical Status    (X) * Hemodynamic stability    (X) * Fever absent or improved    (X) * Skin exam stable or improved    (X) * Mental status at baseline    (X) * Antibiotic treatment needs appropriate for next level of care    (X) * Pain absent or manageable at next level of care    ( ) * Discharge plans and education understood    Activity    (X) * Ambulatory    Routes    (X) * Oral hydration, medications, [F] and diet    Medications    (X) Parenteral or oral antibiotics [B]    * Milestone   Additional Notes   6/21      97.6 53 20 97% 144/84 r/a      6/21/2020 04:07   RBC 4.16 (L)   HGB 14.1   HCT 42.4   .9 (H)   MCH 33.9 (H)      6/21/2020 04:07   Creatinine 0.78 (L)      Wound CX      GRAM STAIN Final   FEW GRAM POSITIVE COCCI    Culture result: Abnormal Final   HEAVY STAPHYLOCOCCUS AUREUS    Culture result: Abnormal Final   MODERATE ACINETOBACTER BAUMANNII      Orders Medical OBS, Full Code, VS, SCDS, Cardiac Diet, Ambulate w/ assist, Infectious Disease Consult, Pharmacy to dose Vanc Dosing, Vascular Surgery Consult,       Wound Care orders: RLE: clean with dermal wound cleanser.  Aquaphor ointment to leg and foot. May cover open areas with piece of vaseline gauze, then gauze. Wrap with opal roll then ACE from mid foot to below knee      Meds, Pepcid 40 mg po x2, Zyloprim 300 mg po x1, Cardizem 240 mg po x1, Lisinopril 40 mg po x1, Xarelto 20 mg po x1, Zoloft 75 mg po x1, Flomax 0.4 mg po x1,Unasyn 3gm IV x4,Lasix 40 mg p ox1, Norco 5/325 mg po x1,Tylenol 650 mg po x1, Flexeril 5 mg po x1, Florastor 250 mg po x2,       IM NOTE      The patient is a 25-year-old  gentleman with HTN, gout, anxiety, paroxysmal A. fib on Xarelto, chronic right leg wound sustained after a lawnmower accident, small vessel arterial occlusive disease, was admitted for recurrent right lower extremity cellulitis.  He is presently on IV antibiotics, Unasyn.  He still reports some pain, redness and swelling of his right leg, which have improved.  Wound cultures are growing MSSA and Acinetobacter baumannii. General:          Well nourished.  Alert.     CV:                  RRR.  No murmur, rub, or gallop. Lungs:             Clear to auscultation bilaterally.  No wheezing, rhonchi, or rales. Abdomen:        Soft, nontender, nondistended. Extremities:     Warm and dry.  No cyanosis or edema. Erythema, swelling and tenderness on palpation of the right lower extremity   Skin:                No rashes or jaundice, except for erythema of the right lower extremity.    A/p   * (Principal) Recurrent cellulitis of lower extremity ICD-10-CM: L03.119   ICD-9-CM: 682.6 6/16/2020 - Present Unknown           Hypokalemia ICD-10-CM: E87.6   ICD-9-CM: 276.8 11/18/2019 - Present Yes          Chronic venous hypertension with ulcer and inflammation involving right side (HCC) (Chronic) ICD-10-CM: I87.331, L97.919   ICD-9-CM: 459.33 3/6/2019 - Present Yes          Obesity, morbid (HCC) (Chronic) ICD-10-CM: E66.01   ICD-9-CM: 278.01 2/26/2018 - Present Yes          HTN (hypertension) (Chronic) ICD-10-CM: I10   ICD-9-CM: 401.9 3/16/2015 - Present Yes        DIAAN (generalized anxiety disorder) (Chronic) ICD-10-CM: F41.1   ICD-9-CM: 300.02 5/7/2014 - Present Yes          History of gout (Chronic) ICD-10-CM: Z87.39   ICD-9-CM: V12.29 1/20/2014 - Present Yes                                1 - recurrent right lower extremity cellulitis.  Wound cultures positive for MSSA and Acinetobacter baumannii   2 - HTN   3 - gout   4 - paroxysmal A. fib on Xarelto   5 - anxiety   6 - small vessel arterial occlusive disease       PLAN:     \" IV antibiotics switched to Ampicillin-sulbactam   \" ID consultation appreciated   \" Vascular surgery consultation appreciated. Iliac venogram with possible intervention and a right leg radiofrequency ablation is being considered. The patient is agreeable for the procedure at this time. \" Wound cultures  growing MSSA and Acinetobacter baumannii. Blood cultures negative to date   \" Continue home medications for chronic conditions. Lisinopril discontinued due to borderline low BPs       DC planning/Dispo: Home in 48 to 72 hours pending clinical improvement   DVT ppx:  The patient is on Xarelto                     Cellulitis - Care Day 5 (6/20/2020) by Sally Hendrickson RN         Review Entered Review Status   6/22/2020 15:41 Completed       Criteria Review      Care Day: 5 Care Date: 6/20/2020 Level of Care: Inpatient Floor    Guideline Day 3    Clinical Status    (X) * Hemodynamic stability    (X) * Fever absent or improved    (X) * Skin exam stable or improved    (X) * Mental status at baseline    (X) * Antibiotic treatment needs appropriate for next level of care    (X) * Pain absent or manageable at next level of care    ( ) * Discharge plans and education understood    Activity    (X) * Ambulatory    Routes    (X) * Oral hydration, medications, [F] and diet    Medications    (X) Parenteral or oral antibiotics [B]    * Milestone   Additional Notes   6/20      98.1 55 18 96% 121/77 r/a      Wound CX      GRAM STAIN Final   FEW GRAM POSITIVE COCCI    Culture result: Abnormal Final   HEAVY STAPHYLOCOCCUS AUREUS    Culture result: Abnormal Final   MODERATE ACINETOBACTER BAUMANNII      Orders Medical OBS, Full Code, VS, SCDS, Cardiac Diet, Ambulate w/ assist, Infectious Disease Consult, Pharmacy to dose Vanc Dosing, Vascular Surgery Consult,       Wound Care orders: RLE: clean with dermal wound cleanser. Aquaphor ointment to leg and foot. May cover open areas with piece of vaselinge gauze, then gauze. Wrap with opal roll then ACE from mid foot to below knee      Meds Cefepime 2 gms IV x1, Pepcid 40 mg po x2, Zyloprim 300 mg po x1, Cardizem 240 mg po x1, Lisinopril 40 mg po x1, Xarelto 20 mg po x1, Zoloft 75 mg po x1, Flomax 0.4 mg po x1,Unasyn 3gm IV x4,Lasix 40 mg p ox1, Norco 5/325 mg po x2,Tylenol 650 mg po x1, Flexeril 5 mg po x1, Florastor 250 mg po x2,       IM NOTE      The patient is a 23-year-old  gentleman with HTN, gout, anxiety, paroxysmal A. fib on Xarelto, chronic right leg wound sustained after a lawnmower accident, small vessel arterial occlusive disease, was admitted for recurrent right lower extremity cellulitis.  He is presently on IV antibiotics, Shay Juarez still reports some pain, redness and swelling of his right leg, which have improved.  Wound cultures are growing MSSA and Acinetobacter baumannii. General:          Well nourished.  Alert.     CV:                  RRR.  No murmur, rub, or gallop. Lungs:             Clear to auscultation bilaterally.  No wheezing, rhonchi, or rales. Abdomen:        Soft, nontender, nondistended. Extremities:     Warm and dry.  No cyanosis or edema. Erythema, swelling and tenderness on palpation of the right lower extremity   Skin:                No rashes or jaundice, except for erythema of the right lower extremity.    A/p   * (Principal) Recurrent cellulitis of lower extremity ICD-10-CM: L03.119   ICD-9-CM: 682.6 6/16/2020 - Present Unknown           Hypokalemia ICD-10-CM: E87.6   ICD-9-CM: 276.8 11/18/2019 - Present Yes          Chronic venous hypertension with ulcer and inflammation involving right side (HCC) (Chronic) ICD-10-CM: U86.883, L97.919   ICD-9-CM: 459.33 3/6/2019 - Present Yes          Obesity, morbid (HCC) (Chronic) ICD-10-CM: E66.01   ICD-9-CM: 278.01 2/26/2018 - Present Yes          HTN (hypertension) (Chronic) ICD-10-CM: I10   ICD-9-CM: 401.9 3/16/2015 - Present Yes          DIANA (generalized anxiety disorder) (Chronic) ICD-10-CM: F41.1   ICD-9-CM: 300.02 5/7/2014 - Present Yes          History of gout (Chronic) ICD-10-CM: Z87.39   ICD-9-CM: V12.29 1/20/2014 - Present Yes                    1 - recurrent right lower extremity cellulitis.  Wound cultures positive for heavy staph aureus (MSSA) and moderate gram-negative rods (Acinetobacter baumannii)   2 - HTN   3 - gout   4 - paroxysmal A. fib on Xarelto   5 - anxiety   6 - small vessel arterial occlusive disease       PLAN:     \" IV antibiotics switched to Ampicillin-sulbactam   \" ID consultation appreciated   \" Vascular surgery consultation appreciated. Iliac venogram with possible intervention and a right leg radiofrequency ablation is being considered. The patient is agreeable for the procedure at this time. \" Wound cultures  growing MSSA and Acinetobacter baumannii. Blood cultures negative to date   \" Continue home medications for chronic conditions       DC planning/Dispo: Home in 48 to 72 hours pending clinical improvement   DVT ppx:  The patient is on Xarelto                          Cellulitis - Care Day 4 (6/19/2020) by Gabriel Dye RN         Review Entered Review Status   6/19/2020 16:44 Completed       Criteria Review      Care Day: 4 Care Date: 6/19/2020 Level of Care: Inpatient Floor    Guideline Day 3    Clinical Status    (X) * Hemodynamic stability    6/19/2020 16:44:03 EDT by Cate Gao      97.7 58 18 96% 116/65 r/a    (X) * Fever absent or improved    (X) * Skin exam stable or improved    (X) * Mental status at baseline    (X) * Antibiotic treatment needs appropriate for next level of care    (X) * Pain absent or manageable at next level of care    ( ) * Discharge plans and education understood    Activity    (X) * Ambulatory    Routes    (X) * Oral hydration, medications, [F] and diet    6/19/2020 16:44:03 EDT by Cate Gao      Cefepime 2 gms IV x1, Pepcid 40 mg po x1, Zyloprim 300 mg po x1, Cardizem 240 mg po x1, Lisinopril 40 mg po x1, Xarelto 20 mg po x1, Zoloft 75 mg po x1, Flomax 0.4 mg po x1,Unasyn 3gm IV x1,Lasix 40 mg p ox1, Norco 5/325 mg po x1,    Medications    (X) Parenteral or oral antibiotics [B]    6/19/2020 16:44:03 EDT by Cate Gao      Cefepime 2 gms IV x1  Unasyn 3gm IV x1    * Milestone   Additional Notes   6/19/20      97.7 58 18 96% 116/65 r/a      6/19/2020 04:43   RBC 3.84 (L)   HGB 13.4 (L)   HCT 38.8 (L)   .0 (H)   MCH 34.9 (H)         6/19/2020 04:43   Chloride 109 (H)   Anion gap 6 (L)   Glucose 87   BUN 12   Creatinine 0.75 (L)      Wound CX      GRAM STAIN Final   FEW GRAM POSITIVE COCCI    Culture result: Abnormal Final   HEAVY STAPHYLOCOCCUS AUREUS    Culture result: Abnormal Final   MODERATE ACINETOBACTER BAUMANNII       XR R TIB/FIB       No acute abnormality. Orders Medical OBS, Full Code, VS, SCDS, Cardiac Diet, Ambulate w/ assist, Infectious Disease Consult, Pharmacy to dose Vanc Dosing, Vascular Surgery Consult,    Wound Care orders: RLE: clean with dermal wound cleanser. Aquaphor ointment to leg and foot. May cover open areas with piece of vaseling gauze, then gauze.  Wrap with opal roll then ACE from mid foot to below knee      Meds Cefepime 2 gms IV x1, Pepcid 40 mg po x1, Zyloprim 300 mg po x1, Cardizem 240 mg po x1, Lisinopril 40 mg po x1, Xarelto 20 mg po x1, Zoloft 75 mg po x1, Flomax 0.4 mg po x1,Unasyn 3gm IV x1,Lasix 40 mg p ox1, Norco 5/325 mg po x1,       Wound Care note      Pt seen for follow up on RLE. Dressing had slid down the leg and was due to be changed today. Removed opal abd and vaseline gauze cleansed with dermal wound cleanser, noted RLE with erythema and macerated skin, some small open areas and some serous drainage noted on old dressing. Applied aquaphor on RLE and Vaseline gauze, wrapped with opal and ace wrap. Pt tolerated well, states that he hopes they can do the vascular procedure necessary to heal his leg while he is in the hospital. Recommend to continue current treatment and wound team will follow up next week and as needed. Wound team will continue to follow while in acute care setting. Infectious Disease Note   Impression   \" RLE recurrent cellulitis, again with extensive erythema and drainage, prior swab culture (11/2019) with MSSA, Acinetobacter    \" RLE lymphedema in setting of RLE wound following accident 4/2019   \" JUANI 11/2019 with noncompressible vessels on R and evidence to suggest presence of small vessel arterial occlusive disease   Onychomycosis complicating #1   Plan   \" Change to Unasyn over weekend and expect to finish 14 day course with oral abx    \" Check plain film for soft tissue gas due to severe pain.       Anti Infectives        1. Vancomycin 6/15-6/19   Piperacillin/tazobactam 6/15-6/19      IM NOTE PENDING

## 2020-06-23 NOTE — DISCHARGE INSTRUCTIONS
Disposition: Home or Self Care  Activity: Activity as tolerated  Diet: DIET CARDIAC Regular     RLE: clean with dermal wound cleanser. Aquaphor ointment to leg and foot. May cover open areas with piece of vaseling gauze, then gauze. Wrap with opal roll then ACE from mid foot to below knee. Cellulitis: Care Instructions  Your Care Instructions     Cellulitis is a skin infection caused by bacteria, most often strep or staph. It often occurs after a break in the skin from a scrape, cut, bite, or puncture, or after a rash. Cellulitis may be treated without doing tests to find out what caused it. But your doctor may do tests, if needed, to look for a specific bacteria, like methicillin-resistant Staphylococcus aureus (MRSA). The doctor has checked you carefully, but problems can develop later. If you notice any problems or new symptoms, get medical treatment right away. Follow-up care is a key part of your treatment and safety. Be sure to make and go to all appointments, and call your doctor if you are having problems. It's also a good idea to know your test results and keep a list of the medicines you take. How can you care for yourself at home? · Take your antibiotics as directed. Do not stop taking them just because you feel better. You need to take the full course of antibiotics. · Prop up the infected area on pillows to reduce pain and swelling. Try to keep the area above the level of your heart as often as you can. · If your doctor told you how to care for your wound, follow your doctor's instructions. If you did not get instructions, follow this general advice:  ? Wash the wound with clean water 2 times a day. Don't use hydrogen peroxide or alcohol, which can slow healing. ? You may cover the wound with a thin layer of petroleum jelly, such as Vaseline, and a nonstick bandage. ? Apply more petroleum jelly and replace the bandage as needed. · Be safe with medicines.  Take pain medicines exactly as directed. ? If the doctor gave you a prescription medicine for pain, take it as prescribed. ? If you are not taking a prescription pain medicine, ask your doctor if you can take an over-the-counter medicine. To prevent cellulitis in the future  · Try to prevent cuts, scrapes, or other injuries to your skin. Cellulitis most often occurs where there is a break in the skin. · If you get a scrape, cut, mild burn, or bite, wash the wound with clean water as soon as you can to help avoid infection. Don't use hydrogen peroxide or alcohol, which can slow healing. · If you have swelling in your legs (edema), support stockings and good skin care may help prevent leg sores and cellulitis. · Take care of your feet, especially if you have diabetes or other conditions that increase the risk of infection. Wear shoes and socks. Do not go barefoot. If you have athlete's foot or other skin problems on your feet, talk to your doctor about how to treat them. When should you call for help? Call your doctor now or seek immediate medical care if:  · You have signs that your infection is getting worse, such as:  ? Increased pain, swelling, warmth, or redness. ? Red streaks leading from the area. ? Pus draining from the area. ? A fever. · You get a rash. Watch closely for changes in your health, and be sure to contact your doctor if:  · You do not get better as expected. Where can you learn more? Go to http://abbie-jon.info/  Enter X309 in the search box to learn more about \"Cellulitis: Care Instructions. \"  Current as of: October 31, 2019               Content Version: 12.5  © 1565-2763 [employer]. Care instructions adapted under license by [employer] (which disclaims liability or warranty for this information).  If you have questions about a medical condition or this instruction, always ask your healthcare professional. Melissa Santana disclaims any warranty or liability for your use of this information.

## 2020-06-24 ENCOUNTER — HOME CARE VISIT (OUTPATIENT)
Dept: HOME HEALTH SERVICES | Facility: HOME HEALTH | Age: 55
End: 2020-06-24

## 2020-06-24 ENCOUNTER — HOME CARE VISIT (OUTPATIENT)
Dept: SCHEDULING | Facility: HOME HEALTH | Age: 55
End: 2020-06-24

## 2020-06-24 NOTE — PROGRESS NOTES
SN placed call to Dr. Josh Casanova office today spoke with Maisha Johnson and left message on Nursing Voicemail regarding patient's request to think about [facility] services following his hospitalization and home health referral for cellulitis to left lower leg. SN was ordered for wound care and medication assessment, education, compliance monitoring. Patient reports that his wife does not want any healthcare workers to come inside his h ome secondary to [place] pandemic and for his visits to take place on his porch. SN explained how insurance requirements requires an initial home assessment for safety and to assess how patient functions safely inside his home and this visit has to take place in order for home health services to begin - patient request to hold off on his admission and give him a day or two to talk this over with his wife.  not able to admit patient on o riginal ordered date of referral and requested extended [facility] (start of care) date to admit patient in 2 days. SN waiting to hear back from PCP regarding [facility] extension.

## 2020-06-25 NOTE — PROGRESS NOTES
SN received return phone call back from patient who states that instead of [facility] coming out to see him, he will just come to [facility] office to have his wound care performed. SN explained how this is not allowed and how if he is able to get out and about he needs a wound center referral and SN will notify his MD of him wanting a wound center referral instead of [facility] services secondary to [place] with the patient agreeable. MD office notified and agr eeable. MD office working on wound center referral for the patient. Patient not admitted to [facility] services secondary to patient request.  notified of patient not being admitted along with all other disciplines involved in care.

## 2020-06-30 ENCOUNTER — ANESTHESIA EVENT (OUTPATIENT)
Dept: SURGERY | Age: 55
End: 2020-06-30
Payer: COMMERCIAL

## 2020-07-01 ENCOUNTER — ANESTHESIA (OUTPATIENT)
Dept: SURGERY | Age: 55
End: 2020-07-01
Payer: COMMERCIAL

## 2020-07-01 ENCOUNTER — APPOINTMENT (OUTPATIENT)
Dept: INTERVENTIONAL RADIOLOGY/VASCULAR | Age: 55
End: 2020-07-01
Attending: SURGERY
Payer: COMMERCIAL

## 2020-07-01 ENCOUNTER — HOSPITAL ENCOUNTER (OUTPATIENT)
Age: 55
Setting detail: OUTPATIENT SURGERY
Discharge: HOME | End: 2020-07-01
Attending: SURGERY | Admitting: SURGERY
Payer: COMMERCIAL

## 2020-07-01 VITALS
TEMPERATURE: 98.5 F | RESPIRATION RATE: 16 BRPM | SYSTOLIC BLOOD PRESSURE: 116 MMHG | WEIGHT: 291 LBS | HEART RATE: 58 BPM | OXYGEN SATURATION: 96 % | DIASTOLIC BLOOD PRESSURE: 66 MMHG | BODY MASS INDEX: 36.18 KG/M2 | HEIGHT: 75 IN

## 2020-07-01 LAB
ANION GAP SERPL CALC-SCNC: 5 MMOL/L (ref 7–16)
BUN SERPL-MCNC: 11 MG/DL (ref 6–23)
CALCIUM SERPL-MCNC: 9.1 MG/DL (ref 8.3–10.4)
CHLORIDE SERPL-SCNC: 111 MMOL/L (ref 98–107)
CO2 SERPL-SCNC: 28 MMOL/L (ref 21–32)
CREAT BLD-MCNC: 0.9 MG/DL (ref 0.8–1.5)
CREAT SERPL-MCNC: 0.9 MG/DL (ref 0.8–1.5)
ERYTHROCYTE [DISTWIDTH] IN BLOOD BY AUTOMATED COUNT: 12.8 % (ref 11.9–14.6)
GLUCOSE SERPL-MCNC: 97 MG/DL (ref 65–100)
HCT VFR BLD AUTO: 44.1 % (ref 41.1–50.3)
HGB BLD-MCNC: 14.6 G/DL (ref 13.6–17.2)
MCH RBC QN AUTO: 33.7 PG (ref 26.1–32.9)
MCHC RBC AUTO-ENTMCNC: 33.1 G/DL (ref 31.4–35)
MCV RBC AUTO: 101.8 FL (ref 79.6–97.8)
NRBC # BLD: 0 K/UL (ref 0–0.2)
PLATELET # BLD AUTO: 150 K/UL (ref 150–450)
PMV BLD AUTO: 11.4 FL (ref 9.4–12.3)
POTASSIUM SERPL-SCNC: 3.8 MMOL/L (ref 3.5–5.1)
RBC # BLD AUTO: 4.33 M/UL (ref 4.23–5.6)
SODIUM SERPL-SCNC: 144 MMOL/L (ref 136–145)
WBC # BLD AUTO: 10.1 K/UL (ref 4.3–11.1)

## 2020-07-01 PROCEDURE — 37252 INTRVASC US NONCORONARY 1ST: CPT

## 2020-07-01 PROCEDURE — C1894 INTRO/SHEATH, NON-LASER: HCPCS

## 2020-07-01 PROCEDURE — 74011250636 HC RX REV CODE- 250/636: Performed by: SURGERY

## 2020-07-01 PROCEDURE — 82565 ASSAY OF CREATININE: CPT

## 2020-07-01 PROCEDURE — 77030019908 HC STETH ESOPH SIMS -A: Performed by: ANESTHESIOLOGY

## 2020-07-01 PROCEDURE — 77030021532 HC CATH ANGI DX IMPRS MRTM -B

## 2020-07-01 PROCEDURE — 77030040922 HC BLNKT HYPOTHRM STRY -A: Performed by: ANESTHESIOLOGY

## 2020-07-01 PROCEDURE — 74011250636 HC RX REV CODE- 250/636: Performed by: ANESTHESIOLOGY

## 2020-07-01 PROCEDURE — 76010000161 HC OR TIME 1 TO 1.5 HR INTENSV-TIER 1: Performed by: SURGERY

## 2020-07-01 PROCEDURE — 74011250636 HC RX REV CODE- 250/636: Performed by: STUDENT IN AN ORGANIZED HEALTH CARE EDUCATION/TRAINING PROGRAM

## 2020-07-01 PROCEDURE — 36011 PLACE CATHETER IN VEIN: CPT

## 2020-07-01 PROCEDURE — 74011000250 HC RX REV CODE- 250: Performed by: STUDENT IN AN ORGANIZED HEALTH CARE EDUCATION/TRAINING PROGRAM

## 2020-07-01 PROCEDURE — C1753 CATH, INTRAVAS ULTRASOUND: HCPCS

## 2020-07-01 PROCEDURE — 74011250636 HC RX REV CODE- 250/636: Performed by: NURSE ANESTHETIST, CERTIFIED REGISTERED

## 2020-07-01 PROCEDURE — 76060000033 HC ANESTHESIA 1 TO 1.5 HR: Performed by: SURGERY

## 2020-07-01 PROCEDURE — 76210000020 HC REC RM PH II FIRST 0.5 HR: Performed by: SURGERY

## 2020-07-01 PROCEDURE — 74011000250 HC RX REV CODE- 250: Performed by: NURSE ANESTHETIST, CERTIFIED REGISTERED

## 2020-07-01 PROCEDURE — 76210000063 HC OR PH I REC FIRST 0.5 HR: Performed by: SURGERY

## 2020-07-01 PROCEDURE — 80048 BASIC METABOLIC PNL TOTAL CA: CPT

## 2020-07-01 PROCEDURE — C1769 GUIDE WIRE: HCPCS

## 2020-07-01 PROCEDURE — 77030037088 HC TUBE ENDOTRACH ORAL NSL COVD-A: Performed by: ANESTHESIOLOGY

## 2020-07-01 PROCEDURE — 85027 COMPLETE CBC AUTOMATED: CPT

## 2020-07-01 PROCEDURE — 77030010507 HC ADH SKN DERMBND J&J -B

## 2020-07-01 PROCEDURE — 77030039425 HC BLD LARYNG TRULITE DISP TELE -A: Performed by: ANESTHESIOLOGY

## 2020-07-01 RX ORDER — LIDOCAINE HYDROCHLORIDE 10 MG/ML
0.1 INJECTION INFILTRATION; PERINEURAL AS NEEDED
Status: DISCONTINUED | OUTPATIENT
Start: 2020-07-01 | End: 2020-07-01 | Stop reason: HOSPADM

## 2020-07-01 RX ORDER — MIDAZOLAM HYDROCHLORIDE 1 MG/ML
2 INJECTION, SOLUTION INTRAMUSCULAR; INTRAVENOUS ONCE
Status: COMPLETED | OUTPATIENT
Start: 2020-07-01 | End: 2020-07-01

## 2020-07-01 RX ORDER — HEPARIN SODIUM 1000 [USP'U]/ML
INJECTION, SOLUTION INTRAVENOUS; SUBCUTANEOUS AS NEEDED
Status: DISCONTINUED | OUTPATIENT
Start: 2020-07-01 | End: 2020-07-01 | Stop reason: HOSPADM

## 2020-07-01 RX ORDER — SODIUM CHLORIDE 0.9 % (FLUSH) 0.9 %
5-40 SYRINGE (ML) INJECTION AS NEEDED
Status: DISCONTINUED | OUTPATIENT
Start: 2020-07-01 | End: 2020-07-01 | Stop reason: HOSPADM

## 2020-07-01 RX ORDER — SODIUM CHLORIDE, SODIUM LACTATE, POTASSIUM CHLORIDE, CALCIUM CHLORIDE 600; 310; 30; 20 MG/100ML; MG/100ML; MG/100ML; MG/100ML
25 INJECTION, SOLUTION INTRAVENOUS CONTINUOUS
Status: DISCONTINUED | OUTPATIENT
Start: 2020-07-01 | End: 2020-07-01 | Stop reason: HOSPADM

## 2020-07-01 RX ORDER — NALOXONE HYDROCHLORIDE 0.4 MG/ML
0.2 INJECTION, SOLUTION INTRAMUSCULAR; INTRAVENOUS; SUBCUTANEOUS AS NEEDED
Status: DISCONTINUED | OUTPATIENT
Start: 2020-07-01 | End: 2020-07-01 | Stop reason: HOSPADM

## 2020-07-01 RX ORDER — FENTANYL CITRATE 50 UG/ML
INJECTION, SOLUTION INTRAMUSCULAR; INTRAVENOUS AS NEEDED
Status: DISCONTINUED | OUTPATIENT
Start: 2020-07-01 | End: 2020-07-01 | Stop reason: HOSPADM

## 2020-07-01 RX ORDER — HYDROMORPHONE HYDROCHLORIDE 2 MG/ML
0.5 INJECTION, SOLUTION INTRAMUSCULAR; INTRAVENOUS; SUBCUTANEOUS
Status: DISCONTINUED | OUTPATIENT
Start: 2020-07-01 | End: 2020-07-01 | Stop reason: HOSPADM

## 2020-07-01 RX ORDER — MIDAZOLAM HYDROCHLORIDE 1 MG/ML
2 INJECTION, SOLUTION INTRAMUSCULAR; INTRAVENOUS
Status: DISCONTINUED | OUTPATIENT
Start: 2020-07-01 | End: 2020-07-01 | Stop reason: HOSPADM

## 2020-07-01 RX ORDER — HALOPERIDOL 5 MG/ML
1 INJECTION INTRAMUSCULAR
Status: DISCONTINUED | OUTPATIENT
Start: 2020-07-01 | End: 2020-07-01 | Stop reason: HOSPADM

## 2020-07-01 RX ORDER — FENTANYL CITRATE 50 UG/ML
100 INJECTION, SOLUTION INTRAMUSCULAR; INTRAVENOUS ONCE
Status: DISCONTINUED | OUTPATIENT
Start: 2020-07-01 | End: 2020-07-01 | Stop reason: HOSPADM

## 2020-07-01 RX ORDER — PROPOFOL 10 MG/ML
INJECTION, EMULSION INTRAVENOUS AS NEEDED
Status: DISCONTINUED | OUTPATIENT
Start: 2020-07-01 | End: 2020-07-01 | Stop reason: HOSPADM

## 2020-07-01 RX ORDER — ONDANSETRON 2 MG/ML
4 INJECTION INTRAMUSCULAR; INTRAVENOUS
Status: DISCONTINUED | OUTPATIENT
Start: 2020-07-01 | End: 2020-07-01 | Stop reason: HOSPADM

## 2020-07-01 RX ORDER — NALOXONE HYDROCHLORIDE 0.4 MG/ML
0.2 INJECTION, SOLUTION INTRAMUSCULAR; INTRAVENOUS; SUBCUTANEOUS
Status: DISCONTINUED | OUTPATIENT
Start: 2020-07-01 | End: 2020-07-01 | Stop reason: HOSPADM

## 2020-07-01 RX ORDER — ROCURONIUM BROMIDE 10 MG/ML
INJECTION, SOLUTION INTRAVENOUS AS NEEDED
Status: DISCONTINUED | OUTPATIENT
Start: 2020-07-01 | End: 2020-07-01 | Stop reason: HOSPADM

## 2020-07-01 RX ORDER — NEOSTIGMINE METHYLSULFATE 1 MG/ML
INJECTION, SOLUTION INTRAVENOUS AS NEEDED
Status: DISCONTINUED | OUTPATIENT
Start: 2020-07-01 | End: 2020-07-01 | Stop reason: HOSPADM

## 2020-07-01 RX ORDER — LIDOCAINE HYDROCHLORIDE 20 MG/ML
INJECTION, SOLUTION EPIDURAL; INFILTRATION; INTRACAUDAL; PERINEURAL AS NEEDED
Status: DISCONTINUED | OUTPATIENT
Start: 2020-07-01 | End: 2020-07-01 | Stop reason: HOSPADM

## 2020-07-01 RX ORDER — SODIUM CHLORIDE 0.9 % (FLUSH) 0.9 %
5-40 SYRINGE (ML) INJECTION EVERY 8 HOURS
Status: DISCONTINUED | OUTPATIENT
Start: 2020-07-01 | End: 2020-07-01 | Stop reason: HOSPADM

## 2020-07-01 RX ORDER — EPHEDRINE SULFATE/0.9% NACL/PF 50 MG/5 ML
SYRINGE (ML) INTRAVENOUS AS NEEDED
Status: DISCONTINUED | OUTPATIENT
Start: 2020-07-01 | End: 2020-07-01 | Stop reason: HOSPADM

## 2020-07-01 RX ORDER — SODIUM CHLORIDE, SODIUM LACTATE, POTASSIUM CHLORIDE, CALCIUM CHLORIDE 600; 310; 30; 20 MG/100ML; MG/100ML; MG/100ML; MG/100ML
75 INJECTION, SOLUTION INTRAVENOUS CONTINUOUS
Status: DISCONTINUED | OUTPATIENT
Start: 2020-07-01 | End: 2020-07-01 | Stop reason: HOSPADM

## 2020-07-01 RX ORDER — GLYCOPYRROLATE 0.2 MG/ML
INJECTION INTRAMUSCULAR; INTRAVENOUS AS NEEDED
Status: DISCONTINUED | OUTPATIENT
Start: 2020-07-01 | End: 2020-07-01 | Stop reason: HOSPADM

## 2020-07-01 RX ORDER — ONDANSETRON 2 MG/ML
INJECTION INTRAMUSCULAR; INTRAVENOUS AS NEEDED
Status: DISCONTINUED | OUTPATIENT
Start: 2020-07-01 | End: 2020-07-01 | Stop reason: HOSPADM

## 2020-07-01 RX ORDER — OXYCODONE HYDROCHLORIDE 5 MG/1
5 TABLET ORAL
Status: DISCONTINUED | OUTPATIENT
Start: 2020-07-01 | End: 2020-07-01 | Stop reason: HOSPADM

## 2020-07-01 RX ADMIN — LIDOCAINE HYDROCHLORIDE 60 MG: 20 INJECTION, SOLUTION EPIDURAL; INFILTRATION; INTRACAUDAL; PERINEURAL at 11:44

## 2020-07-01 RX ADMIN — ROCURONIUM BROMIDE 10 MG: 10 INJECTION, SOLUTION INTRAVENOUS at 12:08

## 2020-07-01 RX ADMIN — ROCURONIUM BROMIDE 40 MG: 10 INJECTION, SOLUTION INTRAVENOUS at 11:45

## 2020-07-01 RX ADMIN — HEPARIN SODIUM 3000 UNITS: 1000 INJECTION, SOLUTION INTRAVENOUS; SUBCUTANEOUS at 12:17

## 2020-07-01 RX ADMIN — Medication 15 MG: at 12:22

## 2020-07-01 RX ADMIN — ONDANSETRON 4 MG: 2 INJECTION INTRAMUSCULAR; INTRAVENOUS at 12:45

## 2020-07-01 RX ADMIN — SODIUM CHLORIDE, SODIUM LACTATE, POTASSIUM CHLORIDE, AND CALCIUM CHLORIDE 25 ML/HR: 600; 310; 30; 20 INJECTION, SOLUTION INTRAVENOUS at 11:22

## 2020-07-01 RX ADMIN — FENTANYL CITRATE 100 MCG: 50 INJECTION INTRAMUSCULAR; INTRAVENOUS at 11:44

## 2020-07-01 RX ADMIN — PROPOFOL 250 MG: 10 INJECTION, EMULSION INTRAVENOUS at 11:44

## 2020-07-01 RX ADMIN — PHENYLEPHRINE HYDROCHLORIDE 120 MCG: 10 INJECTION INTRAVENOUS at 12:15

## 2020-07-01 RX ADMIN — SODIUM CHLORIDE, SODIUM LACTATE, POTASSIUM CHLORIDE, AND CALCIUM CHLORIDE: 600; 310; 30; 20 INJECTION, SOLUTION INTRAVENOUS at 12:06

## 2020-07-01 RX ADMIN — MIDAZOLAM 2 MG: 1 INJECTION INTRAMUSCULAR; INTRAVENOUS at 11:21

## 2020-07-01 RX ADMIN — Medication 15 MG: at 12:04

## 2020-07-01 RX ADMIN — Medication 3 MG: at 12:29

## 2020-07-01 RX ADMIN — CEFAZOLIN 3 G: 1 INJECTION, POWDER, FOR SOLUTION INTRAVENOUS at 11:55

## 2020-07-01 RX ADMIN — PHENYLEPHRINE HYDROCHLORIDE 100 MCG: 10 INJECTION INTRAVENOUS at 12:06

## 2020-07-01 RX ADMIN — PHENYLEPHRINE HYDROCHLORIDE 240 MCG: 10 INJECTION INTRAVENOUS at 12:20

## 2020-07-01 RX ADMIN — ONDANSETRON 4 MG: 2 INJECTION INTRAMUSCULAR; INTRAVENOUS at 11:49

## 2020-07-01 RX ADMIN — GLYCOPYRROLATE 0.3 MG: 0.2 INJECTION, SOLUTION INTRAMUSCULAR; INTRAVENOUS at 12:29

## 2020-07-01 NOTE — ANESTHESIA PREPROCEDURE EVALUATION
Relevant Problems   No relevant active problems       Anesthetic History     PONV          Review of Systems / Medical History  Patient summary reviewed and pertinent labs reviewed    Pulmonary  Within defined limits        Undiagnosed apnea         Neuro/Psych   Within defined limits           Cardiovascular    Hypertension: well controlled        Dysrhythmias : atrial fibrillation      Exercise tolerance: >4 METS  Comments: venous insufficiency   GI/Hepatic/Renal     GERD: well controlled           Endo/Other        Morbid obesity and arthritis     Other Findings            Physical Exam    Airway  Mallampati: III  TM Distance: 4 - 6 cm  Neck ROM: decreased range of motion   Mouth opening: Normal     Cardiovascular  Regular rate and rhythm,  S1 and S2 normal,  no murmur, click, rub, or gallop             Dental         Pulmonary  Breath sounds clear to auscultation               Abdominal         Other Findings            Anesthetic Plan    ASA: 3  Anesthesia type: general          Induction: Intravenous  Anesthetic plan and risks discussed with: Patient

## 2020-07-01 NOTE — DISCHARGE INSTRUCTIONS
Venogram Discharge Instructions    General Information:   A venogram is a procedure that allows the doctor to take pictures of the blood flow in the vascular system. A radiology contrast (or dye) is injected into the veins so that the condition of the veins and valves may be visualized. This procedure can be used to diagnose narrowing of the veins or the presence of a blood clot in the deep veins of the body. During this process, a stent, filter or a special intravenous line could be placed. Home Care Instructions: You can resume your regular diet. Do not shower, bathe, swim, drink alcohol, or make any important legal decisions in the next 24 hours. Do not lift anything heavier than a gallon of milk for 5 days. If you take Glucophage (metformin) for diabetes, do not take it for the next 48 hours. If you were asked to hold any blood thinners prior to the procedure, you may restart that medicine the day after the procedure is completed. DRESSING CARE: Remove outer dressing tomorrow. Allow skin glue to fall off on its own. Do not soak incision in water for 2 weeks. Showering is OK. Call If:   You should call your Physician and/or the Radiology Nurse if you have any signs of infection; fever, drainage, redness, and/or swelling. If the puncture site should ooze, please call. Also call if you have any pain, decreased sensation, numbness, tingling, swelling, or change in color to the affected extremity. SEEK IMMEDIATE MEDICAL CARE IF YOUR PUNCTURE SITE STARTS TO BLEED. APPLY ENOUGH FIRM PRESSURE TO THE SITE WITH THE TIPS OF YOUR FINGERS TO STOP THE BLEEDING. AFTER ANESTHESIA   · For the first 24 hours: DO NOT Drive, Drink alcoholic beverages, or Make important decisions. · Be aware of dizziness following anesthesia and while taking pain medication.      After general anesthesia or intravenous sedation, for 24 hours or while taking prescription Narcotics:  · Limit your activities  · A responsible adult needs to be with you for the next 24 hours  · Do not drive and operate hazardous machinery  · Do not make important personal or business decisions  · Do not drink alcoholic beverages  · If you have not urinated within 8 hours after discharge, please contact your surgeon on call. · If you have sleep apnea and have a CPAP machine, please use it for all naps and sleeping. · Please use caution when taking narcotics and any of your home medications that may cause drowsiness. *  Please give a list of your current medications to your Primary Care Provider. *  Please update this list whenever your medications are discontinued, doses are      changed, or new medications (including over-the-counter products) are added. *  Please carry medication information at all times in case of emergency situations. These are general instructions for a healthy lifestyle:  No smoking/ No tobacco products/ Avoid exposure to second hand smoke  Surgeon General's Warning:  Quitting smoking now greatly reduces serious risk to your health. Obesity, smoking, and sedentary lifestyle greatly increases your risk for illness  A healthy diet, regular physical exercise & weight monitoring are important for maintaining a healthy lifestyle    You may be retaining fluid if you have a history of heart failure or if you experience any of the following symptoms:  Weight gain of 3 pounds or more overnight or 5 pounds in a week, increased swelling in our hands or feet or shortness of breath while lying flat in bed. Please call your doctor as soon as you notice any of these symptoms; do not wait until your next office visit.

## 2020-07-01 NOTE — ANESTHESIA POSTPROCEDURE EVALUATION
Procedure(s):  BILATERAL VENOGRAM.    total IV anesthesia    Anesthesia Post Evaluation      Multimodal analgesia: multimodal analgesia used between 6 hours prior to anesthesia start to PACU discharge  Patient location during evaluation: PACU  Patient participation: complete - patient participated  Level of consciousness: awake  Pain management: adequate  Airway patency: patent  Anesthetic complications: no  Cardiovascular status: acceptable and hemodynamically stable  Respiratory status: acceptable  Hydration status: acceptable  Comments: Acceptable for discharge from PACU.   Post anesthesia nausea and vomiting:  none  Final Post Anesthesia Temperature Assessment:  Normothermia (36.0-37.5 degrees C)      INITIAL Post-op Vital signs:   Vitals Value Taken Time   /65 7/1/2020  1:01 PM   Temp 37 °C (98.6 °F) 7/1/2020 12:55 PM   Pulse 58 7/1/2020  1:01 PM   Resp 12 7/1/2020  1:01 PM   SpO2 94 % 7/1/2020  1:01 PM

## 2020-07-01 NOTE — H&P
[address] FAX: [place]  1965    No chief complaint on file. Maximus Balderrama is a 53 y.o. male who presented to the hospital for c/o of worsening right leg ulcerations. He has been evaluated by our practice numerous times for the same issue. We have offered him separate occasions the opportunity for intervention which he and his wife have reluctant. Sustained his wound in a lawnmower accident in 2019 and has had issues off and on since that he did request a second opinion as an outpatient we had given him the name to Dr. Sudha Garcia in a local vein specialist. We have offered him an iliac venogram with possible intervention followed by radiofrequency ablation. Patient and his wife has been very reluctant to undergo any surgical procedure. PMH: Gout, anxiety, HTN, obesity, chronic venous insufficiency, varicose veins, recurrent cellulitis, sepsis, hyperlipidemia, BPH and osteoarthritis. Past Medical History:   Diagnosis Date    Anxiety     Cellulitis of leg 11/17/2019    Coagulopathy ([address] 1/15/2020    Taking xaralto     Erectile dysfunction 1/20/2014    DIANA (generalized anxiety disorder) 5/7/2014    Gout 1/20/2014    History of gout 1/20/2014    History of peptic ulcer     HTN (hypertension) 3/16/2015    Hypertension     Obesity, morbid ([address] 2/26/2018    Osteoarthritis of hand, primary localized 1/20/2014    Personal history of urinary calculi     x2    Venous (peripheral) insufficiency 12/3/2019    Wart 1/20/2014           Past Surgical History:   Procedure Laterality Date    HX WISDOM TEETH EXTRACTION       History reviewed. No pertinent family history.    Social History                                                                                                                                             Current Facility-Administered Medications   Medication Dose Route Frequency  [START ON 2020] Vancomycin Trough Level Reminder  Other ONCE    acetaminophen (TYLENOL) tablet 650 mg 650 mg Oral Q4H PRN    HYDROcodone-acetaminophen (NORCO) 5-325 mg per tablet 1 Tab 1 Tab Oral Q4H PRN    naloxone (NARCAN) injection 0.4 mg 0.4 mg IntraVENous PRN    diphenhydrAMINE (BENADRYL) capsule 25 mg 25 mg Oral Q4H PRN    ondansetron (ZOFRAN) injection 4 mg 4 mg IntraVENous Q4H PRN    senna-docusate (PERICOLACE) 8.6-50 mg per tablet 2 Tab 2 Tab Oral DAILY PRN    morphine injection 1 mg 1 mg IntraVENous Q4H PRN    allopurinoL (ZYLOPRIM) tablet 300 mg 300 mg Oral DAILY    cyclobenzaprine (FLEXERIL) tablet 5 mg 5 mg Oral QHS    dilTIAZem ER (CARDIZEM CD) capsule 240 mg 240 mg Oral DAILY    famotidine (PEPCID) tablet 40 mg 40 mg Oral BID    furosemide (LASIX) tablet 40 mg 40 mg Oral DAILY    lisinopriL (PRINIVIL, ZESTRIL) tablet 40 mg 40 mg Oral DAILY    rivaroxaban (XARELTO) tablet 20 mg 20 mg Oral DAILY WITH BREAKFAST    Saccharomyces boulardii (FLORASTOR) capsule 250 mg 250 mg Oral BID    sertraline (ZOLOFT) tablet 75 mg 75 mg Oral DAILY    tamsulosin (FLOMAX) capsule 0.4 mg 0.4 mg Oral DAILY    vancomycin (VANCOCIN) 2000 mg in  ml infusion 2,000 mg IntraVENous Q12H    cefepime (MAXIPIME) 2 g in 0.9% sodium chloride (MBP/ADV) 100 mL 2 g IntraVENous Q12H          Allergies   Allergen Reactions    Codeine Nausea Only    Sulfa (Sulfonamide Antibiotics) Rash     Review of Systems:   A comprehensive review of systems was negative except for that written in the History of Present Illness.    Objective:   Patient Vitals for the past 8 hrs:    BP Temp Pulse Resp SpO2   20 1452 124/75 98.2 °F (36.8 °C) 62 18 95 %   20 1140 123/75 98 °F (36.7 °C) 63 18 95 %     Temp (24hrs), Av °F (36.7 °C), Min:97.6 °F (36.4 °C), Max:98.5 °F (36.9 °C)     Physical Exam:   GENERAL: alert, cooperative, no distress, appears stated age, LUNG: clear to auscultation bilaterally, HEART: regular rate and rhythm, S1, S2 normal, no murmur, click, rub or gallop, EXTREMITIES: edema , palpable DP pulse on the right. Erythema and ulcerations. Assessment/Plan:    Patient is a 41-year-old male who was admitted for recurrent cellulitis. He is well-known to our practice and had been recommended he undergo an iliac venogram in the past. The patient wanted a second opinion was given the name of our local vein specialist, Dr. Jose A Rai. The patient and his wife are very anxious about undergoing any surgical intervention. After the recurrent cellulitis this time the patient states he is ready to undergo some form of therapy to help with his leg pain and ulcerations. Reflux ultrasound was reviewed from November. Dr. Gia Wisdom recommends an iliac venogram with possible intervention and a right leg radiofrequency ablation. Both of these procedures can be performed as an outpatient. If the patient is going to remain inpatient we could potentially perform the iliac venogram while he is here. The patient will think about the procedures and will discuss with his wife and will let us know what he would like to do. Thank you for allowing us to participate in the care of Mr. Burce Forde. We will follow along with you. Patient seen and examined with Dr. Rajesh Roldan. H&P is current. Physical exam unchanged. I have discussed the need for the procedure with the patient. I have discussed the procedure with the patient/family in detail today including but not limited to the risk of death, bleeding requiring transfusion, infection, limb loss, or the necessity of subsequent procedures. All questions were answered. He understands and agree to proceed.     Rajesh Roldan MD

## 2020-07-01 NOTE — BRIEF OP NOTE
Brief Postoperative Note    Patient: Prashant Hanley  YOB: 1965  MRN: [id number]    Date of Procedure: 7/1/2020     Pre-Op Diagnosis: Peripheral venous insufficiency [I87.2]    Post-Op Diagnosis: Same as preoperative diagnosis.       Procedure(s):  BILATERAL VENOGRAM    Surgeon(s):  Bill Edouard MD    Surgical Assistant: None    Anesthesia: General     Estimated Blood Loss (mL): less than 50     Complications: None    Specimens: * No specimens in log *     Implants: * No implants in log *    Drains: * No LDAs found *    Findings:     Electronically Signed by Karthikeyan Villalobos MD on 7/1/2020 at 12:38 PM

## 2020-07-07 NOTE — PROCEDURES
[facility]  PROCEDURE NOTE    Name:  Mikel Conde  MR#:  [id number]  :  1965  ACCOUNT #:  [de-identified]  DATE OF SERVICE:  2020    PREOPERATIVE DIAGNOSIS:  Venous insufficiency with class V skin changes. POSTOPERATIVE DIAGNOSIS:  Venous insufficiency with class V skin changes. PROCEDURE PERFORMED:  Bilateral iliac venogram, intravascular ultrasound x2. SURGEON:  Zenobia Quispe MD    ASSISTANT:  None. ANESTHESIA:  General endotracheal.    ESTIMATED BLOOD LOSS:  Minimal.    SPECIMENS REMOVED:  None. COMPLICATIONS:  None. IMPLANTS:  None. DRAINS:  None. TOTAL CONTRAST USED:  10 mL of Isovue 300. TOTAL FLUORO TIME:  5 minutes. DESCRIPTION OF PROCEDURE:  The patient was brought to the angio suite, placed on the angio table in supine position. Following adequate IV sedation and time-out procedure, the right neck was draped and prepped in sterile fashion. The right internal jugular vein was then percutaneously punctured under direct vision using ultrasound. An 0.035 guidewire was advanced in the IVC. A KMP cath was then passed over the guidewire and used to direct the guidewire to the right side. Catheter followed the wire down the level of common femoral vein. From this level an iliac venogram was performed. Next, catheter was removed and the 12-Wolof sheath was placed over the guidewire and positioned at the proximal aspect of the IVC. Next, the IVUS catheter was introduced over the guidewire and used to interrogate the right iliac system. Multiple measurements were made throughout the iliac system and recorded in the chart. I see no evidence of significant stenosis in the iliac system on this side. The IVUS catheter was removed and the KMP catheter again was inserted over the guidewire and used to direct the guidewire over to the left side. The catheter followed down to the common femoral vein on this side.   The IVUS catheter was then reintroduced and used to interrogate the left iliac system. Again multiple measurements were made throughout the common and external iliac vein as well as common femoral vein. I see no abnormal diameters on this side as well. The catheter was removed and the delivery sheath was pulled and direct pressure was held. Hemostasis was achieved. The patient tolerated the procedure well. No complications. ANGIOGRAPHIC FINDINGS:  The iliac system bilaterally appeared to be widely patent. Hypogastric vein fills well in retrograde fashion. The IVUS measurements are normal on both sides. I see no evidence of significant iliac vein stenosis.         Tal Laboy MD      [id number]  D:  07/06/2020 17:44  T:  07/07/2020 3:08  JOB #:  [id number]

## 2020-07-08 NOTE — ANCILLARY DISCHARGE INSTRUCTIONS
[address] July 8, 2020       RE: Raul Carver      To Whom It May Concern,    This is to certify that Raul Carver was admitted into the hospital from 6/16/2020 to 6/23/2020. Please feel free to contact my office at the hospital ([phone]) if you have any questions or concerns. Thank you for your assistance in this matter.     Sincerely,  Darius Ford MD

## 2020-10-10 ENCOUNTER — HOSPITAL ENCOUNTER (EMERGENCY)
Age: 55
Discharge: HOME | End: 2020-10-10
Attending: EMERGENCY MEDICINE
Payer: COMMERCIAL

## 2020-10-10 ENCOUNTER — APPOINTMENT (OUTPATIENT)
Dept: GENERAL RADIOLOGY | Age: 55
End: 2020-10-10
Attending: EMERGENCY MEDICINE
Payer: COMMERCIAL

## 2020-10-10 VITALS
SYSTOLIC BLOOD PRESSURE: 140 MMHG | HEIGHT: 75 IN | DIASTOLIC BLOOD PRESSURE: 85 MMHG | OXYGEN SATURATION: 94 % | WEIGHT: 300 LBS | TEMPERATURE: 98 F | RESPIRATION RATE: 10 BRPM | HEART RATE: 65 BPM | BODY MASS INDEX: 37.3 KG/M2

## 2020-10-10 DIAGNOSIS — F43.0 ACUTE REACTION TO SITUATIONAL STRESS: ICD-10-CM

## 2020-10-10 DIAGNOSIS — F41.9 ANXIETY: Primary | ICD-10-CM

## 2020-10-10 DIAGNOSIS — R07.89 ATYPICAL CHEST PAIN: ICD-10-CM

## 2020-10-10 LAB
ALBUMIN SERPL-MCNC: 3.6 G/DL (ref 3.5–5)
ALBUMIN/GLOB SERPL: 1.2 {RATIO} (ref 1.2–3.5)
ALP SERPL-CCNC: 81 U/L (ref 50–136)
ALT SERPL-CCNC: 23 U/L (ref 12–65)
ANION GAP SERPL CALC-SCNC: 5 MMOL/L (ref 7–16)
AST SERPL-CCNC: 17 U/L (ref 15–37)
ATRIAL RATE: 77 BPM
BASOPHILS # BLD: 0.1 K/UL (ref 0–0.2)
BASOPHILS NFR BLD: 1 % (ref 0–2)
BILIRUB SERPL-MCNC: 0.5 MG/DL (ref 0.2–1.1)
BUN SERPL-MCNC: 16 MG/DL (ref 6–23)
CALCIUM SERPL-MCNC: 8.9 MG/DL (ref 8.3–10.4)
CALCULATED P AXIS, ECG09: 54 DEGREES
CALCULATED R AXIS, ECG10: 32 DEGREES
CALCULATED T AXIS, ECG11: 45 DEGREES
CHLORIDE SERPL-SCNC: 110 MMOL/L (ref 98–107)
CO2 SERPL-SCNC: 27 MMOL/L (ref 21–32)
CREAT SERPL-MCNC: 1.04 MG/DL (ref 0.8–1.5)
DIAGNOSIS, 93000: NORMAL
DIFFERENTIAL METHOD BLD: ABNORMAL
EOSINOPHIL # BLD: 0.2 K/UL (ref 0–0.8)
EOSINOPHIL NFR BLD: 2 % (ref 0.5–7.8)
ERYTHROCYTE [DISTWIDTH] IN BLOOD BY AUTOMATED COUNT: 12.9 % (ref 11.9–14.6)
GLOBULIN SER CALC-MCNC: 3 G/DL (ref 2.3–3.5)
GLUCOSE SERPL-MCNC: 100 MG/DL (ref 65–100)
HCT VFR BLD AUTO: 43.2 % (ref 41.1–50.3)
HGB BLD-MCNC: 14.9 G/DL (ref 13.6–17.2)
IMM GRANULOCYTES # BLD AUTO: 0 K/UL (ref 0–0.5)
IMM GRANULOCYTES NFR BLD AUTO: 0 % (ref 0–5)
LYMPHOCYTES # BLD: 2.5 K/UL (ref 0.5–4.6)
LYMPHOCYTES NFR BLD: 24 % (ref 13–44)
MCH RBC QN AUTO: 35.7 PG (ref 26.1–32.9)
MCHC RBC AUTO-ENTMCNC: 34.5 G/DL (ref 31.4–35)
MCV RBC AUTO: 103.6 FL (ref 79.6–97.8)
MONOCYTES # BLD: 0.9 K/UL (ref 0.1–1.3)
MONOCYTES NFR BLD: 8 % (ref 4–12)
NEUTS SEG # BLD: 6.6 K/UL (ref 1.7–8.2)
NEUTS SEG NFR BLD: 64 % (ref 43–78)
NRBC # BLD: 0 K/UL (ref 0–0.2)
P-R INTERVAL, ECG05: 174 MS
PLATELET # BLD AUTO: 169 K/UL (ref 150–450)
PMV BLD AUTO: 10.8 FL (ref 9.4–12.3)
POTASSIUM SERPL-SCNC: 3.5 MMOL/L (ref 3.5–5.1)
PROT SERPL-MCNC: 6.6 G/DL (ref 6.3–8.2)
Q-T INTERVAL, ECG07: 372 MS
QRS DURATION, ECG06: 124 MS
QTC CALCULATION (BEZET), ECG08: 420 MS
RBC # BLD AUTO: 4.17 M/UL (ref 4.23–5.6)
SODIUM SERPL-SCNC: 142 MMOL/L (ref 136–145)
TROPONIN-HIGH SENSITIVITY: 10.3 PG/ML (ref 0–14)
TROPONIN-HIGH SENSITIVITY: 7.4 PG/ML (ref 0–14)
VENTRICULAR RATE, ECG03: 77 BPM
WBC # BLD AUTO: 10.3 K/UL (ref 4.3–11.1)

## 2020-10-10 PROCEDURE — 85025 COMPLETE CBC W/AUTO DIFF WBC: CPT

## 2020-10-10 PROCEDURE — 99285 EMERGENCY DEPT VISIT HI MDM: CPT

## 2020-10-10 PROCEDURE — 74011250637 HC RX REV CODE- 250/637: Performed by: EMERGENCY MEDICINE

## 2020-10-10 PROCEDURE — 93005 ELECTROCARDIOGRAM TRACING: CPT | Performed by: EMERGENCY MEDICINE

## 2020-10-10 PROCEDURE — 71045 X-RAY EXAM CHEST 1 VIEW: CPT

## 2020-10-10 PROCEDURE — 74011250636 HC RX REV CODE- 250/636: Performed by: EMERGENCY MEDICINE

## 2020-10-10 PROCEDURE — 80053 COMPREHEN METABOLIC PANEL: CPT

## 2020-10-10 PROCEDURE — 84484 ASSAY OF TROPONIN QUANT: CPT

## 2020-10-10 PROCEDURE — 96374 THER/PROPH/DIAG INJ IV PUSH: CPT

## 2020-10-10 RX ORDER — IBUPROFEN 600 MG/1
600 TABLET ORAL
Status: COMPLETED | OUTPATIENT
Start: 2020-10-10 | End: 2020-10-10

## 2020-10-10 RX ORDER — LORAZEPAM 2 MG/ML
1 INJECTION INTRAMUSCULAR
Status: COMPLETED | OUTPATIENT
Start: 2020-10-10 | End: 2020-10-10

## 2020-10-10 RX ORDER — HYDRALAZINE HYDROCHLORIDE 10 MG/1
10 TABLET, FILM COATED ORAL
Status: DISCONTINUED | OUTPATIENT
Start: 2020-10-10 | End: 2020-10-10 | Stop reason: HOSPADM

## 2020-10-10 RX ADMIN — LORAZEPAM 1 MG: 2 INJECTION INTRAMUSCULAR; INTRAVENOUS at 05:43

## 2020-10-10 RX ADMIN — IBUPROFEN 600 MG: 600 TABLET, FILM COATED ORAL at 05:57

## 2020-10-10 NOTE — ED TRIAGE NOTES
Pt arrives via ems from home. Ems reports pt has been arguing with his wife, when pt began feeling \"like im going to have a heart attack\". Reported tingling in left arm and throbbing in his chest. nsr with ems, 190/110 with ems. Hx anxiety. Pt denies sob, states \"I feel anxious, im a little scared and nervous\". Reports current cp.

## 2020-10-10 NOTE — ED NOTES
I have reviewed discharge instructions with the patient. The patient verbalized understanding. Patient left ED via Discharge Method: ambulatory to Home with his wife. Opportunity for questions and clarification provided. Patient given 0 scripts. To continue your aftercare when you leave the hospital, you may receive an automated call from our care team to check in on how you are doing. This is a free service and part of our promise to provide the best care and service to meet your aftercare needs.  If you have questions, or wish to unsubscribe from this service please call [phone]. Thank you for Choosing our [facility] [employer] [facility] Emergency Department.

## 2020-10-10 NOTE — LETTER
[facility] EMERGENCY DEPT 
ONE  [address] [facility] [address] [facility] 
[phone] Work/School Note Date: 10/10/2020 To Whom It May concern: 
 
Will Lenz was seen and treated today in the emergency room by the following provider(s): 
No providers found. Will Lenz is excused from work/school on 10/10/20 and 10/11/20. He is medically clear to return to work/school on 10/12/2020.   
 
 
Sincerely,

## 2020-10-10 NOTE — ED PROVIDER NOTES
Patient is a 54-year-old male with a known history of anxiety presenting to the emergency department today stating he does not feel well and complaining of some chest pain. Patient states that he and his wife are going through a separation and they got into a big argument this evening just prior to onset of his symptoms. Patient denies any known heart disease except for atrial fibrillation which is paroxysmal.  He is taking Xarelto and Cardizem. Patient says that he left the house and was walking around and when he started walking he felt both of his hands go numb and that is when he decided to call 911. The patient says he takes Zoloft normally but is requesting Ativan for his anxiety. The patient also is complaining of some abdominal discomfort with belching. He denies any nausea or vomiting. Past Medical History:   Diagnosis Date    Anxiety     Arrhythmia     a-fib    Cellulitis of leg 11/17/2019    Erectile dysfunction 1/20/2014    DIANA (generalized anxiety disorder) 5/7/2014    GERD (gastroesophageal reflux disease)     Gout 1/20/2014    History of peptic ulcer     Hypertension     Nausea & vomiting     Obesity, morbid ([address] 2/26/2018    Osteoarthritis of hand, primary localized 1/20/2014    Personal history of urinary calculi     x2    Venous (peripheral) insufficiency 12/3/2019       Past Surgical History:   Procedure Laterality Date    HX WISDOM TEETH EXTRACTION      VASCULAR SURGERY PROCEDURE UNLIST Bilateral 07/01/2020    Bilateral iliac venogram, intravascular ultrasound x2. No family history on file.     Social History     Socioeconomic History    Marital status:      Spouse name: Not on file    Number of children: Not on file    Years of education: Not on file    Highest education level: Not on file   Occupational History    Not on file   Social Needs    Financial resource strain: Not on file    Food insecurity     Worry: Not on file     Inability: Not on file   [employer] needs     Medical: Not on file     Non-medical: Not on file   Tobacco Use    Smoking status: Former Smoker     Years: 5.00     Last attempt to quit: 2011     Years since quittin.2    Smokeless tobacco: Never Used    Tobacco comment: quit in the 80's    Substance and Sexual Activity    Alcohol use: No    Drug use: No    Sexual activity: Yes     Partners: Female     Birth control/protection: None   Lifestyle    Physical activity     Days per week: Not on file     Minutes per session: Not on file    Stress: Not on file   Relationships    Social connections     Talks on phone: Not on file     Gets together: Not on file     Attends Shinto service: Not on file     Active member of club or organization: Not on file     Attends meetings of clubs or organizations: Not on file     Relationship status: Not on file    Intimate partner violence     Fear of current or ex partner: Not on file     Emotionally abused: Not on file     Physically abused: Not on file     Forced sexual activity: Not on file   Other Topics Concern    Not on file   Social History Narrative    1/15/20:  PATIENT IS  TO [facility], THEY HAVE A [address] 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 ALLERGIES: Codeine and Sulfa (sulfonamide antibiotics)    Review of Systems   Respiratory: Negative for cough. Cardiovascular: Positive for chest pain. Gastrointestinal: Positive for abdominal pain. All other systems reviewed and are negative. Vitals:    10/10/20 0514 10/10/20 0539 10/10/20 0556 10/10/20 0601   BP: (!) 196/109 (!) 184/104 (!) 166/79 (!) 148/89   Pulse: 76 72 75 74   Resp: 16 17 16    Temp: 98.1 °F (36.7 °C)      SpO2: 96% 95% 94% 92%   Weight: 136.1 kg (300 lb)      Height: 6' 3\" (1.905 m)               Physical Exam     GENERAL:The patient has Body mass index is 37.5 kg/m². Well-hydrated. No acute distress.   VITAL SIGNS: Heart rate, blood pressure, respiratory rate reviewed as recorded in  nurse's notes  EYES: Pupils reactive. Extraocular motion intact. No conjunctival redness or drainage. LUNGS: Breath sounds clear and equal bilaterally no accessory muscle use  CHEST: No deformity  CARDIOVASCULAR: Regular rate and rhythm  ABDOMEN: Soft without tenderness. No palpable masses or organomegaly. No  peritoneal signs. No rigidity. EXTREMITIES: No clubbing or cyanosis. No joint swelling. Normal muscle tone. No  restricted range of motion appreciated. NEUROLOGIC: Sensation is grossly intact. Cranial nerve exam reveals face is  symmetrical, tongue is midline speech is clear. SKIN: No rash or petechiae. Good skin turgor palpated. PSYCHIATRIC: Alert and oriented. MDM  Number of Diagnoses or Management Options  Diagnosis management comments: CHF, COPD, pneumonia, PE,    MI, coronary artery disease, unstable angina, coronary artery disease,    Atrial fibrillation, cardiac arrhythmia, PVC, medication induced palpitations, heart block,  electrolyte induced palpitations,    Aortic dissection, aortic aneurysm,    GERD, musculoskeletal pain, costochondritis, rib fracture, pleurisy,         Amount and/or Complexity of Data Reviewed  Clinical lab tests: ordered and reviewed  Tests in the radiology section of CPT®: ordered and reviewed  Tests in the medicine section of CPT®: ordered and reviewed  Review and summarize past medical records: yes  Independent visualization of images, tracings, or specimens: yes      ED Course as of Oct 10 0626   Sat Oct 10, 2020   0618 IMPRESSION:     1. Suggestion of mild vascular congestion. No pulmonary consolidation. XR CHEST PORT [KH]   1208 Patient has been on the phone with his wife and continues to argue with her. At this time there are no findings consistent with acute MI. The patient will have a repeat cardiac enzyme done as long as this is negative he will be discharged.     [KH]      ED Course User Index  [KH] Margo Martinez D, DO       Procedures

## 2020-10-10 NOTE — DISCHARGE INSTRUCTIONS
Continue taking your normal medications as prescribed. If you develop any new or concerning symptoms return to the ER at that time.

## 2020-11-26 ENCOUNTER — HOSPITAL ENCOUNTER (EMERGENCY)
Age: 55
Discharge: HOME | End: 2020-11-26
Attending: EMERGENCY MEDICINE
Payer: COMMERCIAL

## 2020-11-26 ENCOUNTER — APPOINTMENT (OUTPATIENT)
Dept: GENERAL RADIOLOGY | Age: 55
End: 2020-11-26
Attending: EMERGENCY MEDICINE
Payer: COMMERCIAL

## 2020-11-26 VITALS
OXYGEN SATURATION: 96 % | BODY MASS INDEX: 35.43 KG/M2 | HEART RATE: 78 BPM | DIASTOLIC BLOOD PRESSURE: 89 MMHG | WEIGHT: 285 LBS | SYSTOLIC BLOOD PRESSURE: 155 MMHG | RESPIRATION RATE: 18 BRPM | HEIGHT: 75 IN | TEMPERATURE: 98.1 F

## 2020-11-26 DIAGNOSIS — L03.115 CELLULITIS OF RIGHT LOWER EXTREMITY: Primary | ICD-10-CM

## 2020-11-26 DIAGNOSIS — I87.2 VENOUS STASIS DERMATITIS OF RIGHT LOWER EXTREMITY: ICD-10-CM

## 2020-11-26 LAB
ALBUMIN SERPL-MCNC: 3.8 G/DL (ref 3.5–5)
ALBUMIN/GLOB SERPL: 1.2 {RATIO} (ref 1.2–3.5)
ALP SERPL-CCNC: 87 U/L (ref 50–136)
ALT SERPL-CCNC: 23 U/L (ref 12–65)
ANION GAP SERPL CALC-SCNC: 7 MMOL/L (ref 7–16)
AST SERPL-CCNC: 16 U/L (ref 15–37)
BASOPHILS # BLD: 0.1 K/UL (ref 0–0.2)
BASOPHILS NFR BLD: 1 % (ref 0–2)
BILIRUB SERPL-MCNC: 0.5 MG/DL (ref 0.2–1.1)
BUN SERPL-MCNC: 18 MG/DL (ref 6–23)
CALCIUM SERPL-MCNC: 8.9 MG/DL (ref 8.3–10.4)
CHLORIDE SERPL-SCNC: 109 MMOL/L (ref 98–107)
CO2 SERPL-SCNC: 25 MMOL/L (ref 21–32)
CREAT SERPL-MCNC: 0.95 MG/DL (ref 0.8–1.5)
DIFFERENTIAL METHOD BLD: ABNORMAL
EOSINOPHIL # BLD: 0.3 K/UL (ref 0–0.8)
EOSINOPHIL NFR BLD: 2 % (ref 0.5–7.8)
ERYTHROCYTE [DISTWIDTH] IN BLOOD BY AUTOMATED COUNT: 12.5 % (ref 11.9–14.6)
GLOBULIN SER CALC-MCNC: 3.2 G/DL (ref 2.3–3.5)
GLUCOSE SERPL-MCNC: 129 MG/DL (ref 65–100)
HCT VFR BLD AUTO: 45.1 % (ref 41.1–50.3)
HGB BLD-MCNC: 15.8 G/DL (ref 13.6–17.2)
IMM GRANULOCYTES # BLD AUTO: 0.1 K/UL (ref 0–0.5)
IMM GRANULOCYTES NFR BLD AUTO: 1 % (ref 0–5)
LACTATE SERPL-SCNC: 1.9 MMOL/L (ref 0.4–2)
LYMPHOCYTES # BLD: 2.2 K/UL (ref 0.5–4.6)
LYMPHOCYTES NFR BLD: 20 % (ref 13–44)
MCH RBC QN AUTO: 35.7 PG (ref 26.1–32.9)
MCHC RBC AUTO-ENTMCNC: 35 G/DL (ref 31.4–35)
MCV RBC AUTO: 102 FL (ref 79.6–97.8)
MONOCYTES # BLD: 0.7 K/UL (ref 0.1–1.3)
MONOCYTES NFR BLD: 7 % (ref 4–12)
NEUTS SEG # BLD: 7.8 K/UL (ref 1.7–8.2)
NEUTS SEG NFR BLD: 70 % (ref 43–78)
NRBC # BLD: 0 K/UL (ref 0–0.2)
PLATELET # BLD AUTO: 190 K/UL (ref 150–450)
PMV BLD AUTO: 10.1 FL (ref 9.4–12.3)
POTASSIUM SERPL-SCNC: 3.5 MMOL/L (ref 3.5–5.1)
PROCALCITONIN SERPL-MCNC: <0.05 NG/ML
PROT SERPL-MCNC: 7 G/DL (ref 6.3–8.2)
RBC # BLD AUTO: 4.42 M/UL (ref 4.23–5.6)
SODIUM SERPL-SCNC: 141 MMOL/L (ref 136–145)
WBC # BLD AUTO: 11.1 K/UL (ref 4.3–11.1)

## 2020-11-26 PROCEDURE — 96366 THER/PROPH/DIAG IV INF ADDON: CPT

## 2020-11-26 PROCEDURE — 85025 COMPLETE CBC W/AUTO DIFF WBC: CPT

## 2020-11-26 PROCEDURE — 80053 COMPREHEN METABOLIC PANEL: CPT

## 2020-11-26 PROCEDURE — 99284 EMERGENCY DEPT VISIT MOD MDM: CPT

## 2020-11-26 PROCEDURE — 96365 THER/PROPH/DIAG IV INF INIT: CPT

## 2020-11-26 PROCEDURE — 73590 X-RAY EXAM OF LOWER LEG: CPT

## 2020-11-26 PROCEDURE — 83605 ASSAY OF LACTIC ACID: CPT

## 2020-11-26 PROCEDURE — 74011250636 HC RX REV CODE- 250/636: Performed by: EMERGENCY MEDICINE

## 2020-11-26 PROCEDURE — 96375 TX/PRO/DX INJ NEW DRUG ADDON: CPT

## 2020-11-26 PROCEDURE — 84145 PROCALCITONIN (PCT): CPT

## 2020-11-26 PROCEDURE — 87040 BLOOD CULTURE FOR BACTERIA: CPT

## 2020-11-26 RX ORDER — CEFADROXIL 500 MG/1
500 CAPSULE ORAL 2 TIMES DAILY
Qty: 30 CAP | Refills: 2 | Status: SHIPPED | OUTPATIENT
Start: 2020-11-26 | End: 2021-10-31

## 2020-11-26 RX ORDER — CIPROFLOXACIN 500 MG/1
500 TABLET ORAL 2 TIMES DAILY
Qty: 20 TAB | Refills: 0 | Status: SHIPPED | OUTPATIENT
Start: 2020-11-26 | End: 2020-12-06

## 2020-11-26 RX ORDER — HYDROCODONE BITARTRATE AND ACETAMINOPHEN 5; 325 MG/1; MG/1
1 TABLET ORAL
Qty: 12 TAB | Refills: 0 | Status: SHIPPED | OUTPATIENT
Start: 2020-11-26 | End: 2020-11-29

## 2020-11-26 RX ORDER — CLINDAMYCIN PHOSPHATE 900 MG/50ML
900 INJECTION INTRAVENOUS
Status: COMPLETED | OUTPATIENT
Start: 2020-11-26 | End: 2020-11-26

## 2020-11-26 RX ORDER — CIPROFLOXACIN 500 MG/1
500 TABLET ORAL 2 TIMES DAILY
Qty: 20 TAB | Refills: 0 | Status: SHIPPED | OUTPATIENT
Start: 2020-11-26 | End: 2020-11-26

## 2020-11-26 RX ORDER — ONDANSETRON 2 MG/ML
4 INJECTION INTRAMUSCULAR; INTRAVENOUS
Status: COMPLETED | OUTPATIENT
Start: 2020-11-26 | End: 2020-11-26

## 2020-11-26 RX ORDER — HYDROMORPHONE HYDROCHLORIDE 1 MG/ML
1 INJECTION, SOLUTION INTRAMUSCULAR; INTRAVENOUS; SUBCUTANEOUS
Status: COMPLETED | OUTPATIENT
Start: 2020-11-26 | End: 2020-11-26

## 2020-11-26 RX ADMIN — CLINDAMYCIN PHOSPHATE 900 MG: 900 INJECTION, SOLUTION INTRAVENOUS at 04:06

## 2020-11-26 RX ADMIN — HYDROMORPHONE HYDROCHLORIDE 1 MG: 1 INJECTION, SOLUTION INTRAMUSCULAR; INTRAVENOUS; SUBCUTANEOUS at 03:41

## 2020-11-26 RX ADMIN — ONDANSETRON 4 MG: 2 INJECTION INTRAMUSCULAR; INTRAVENOUS at 03:41

## 2020-11-26 NOTE — ED PROVIDER NOTES
69-year-old male has a history of atrial fibrillation and remedy vascular insufficiency with some lymphedema and chronic ulcerations and occasional cellulitis. Patient has had worsening over the last few days to a week or 2 of his right lower extremity wounds with increased drainage. He has intermittently been taking antibiotics. He has had no fever or chills. He has had no vomiting or diarrhea. Called EMS due to increased pain tonight. No abdominal pain no vomiting no shortness of breath. Per old records: Hx of chronic leg wound since  accident April 2019, gout, chronic leg edema, depression, HTN. Follows ID and vascular surgery outpatient for his chronic leg wound. Recently changed to Penicillin in March of this year for chronic suppression. Wound cultures in the past have grown staph aureus, citrobacter, and proteus. Also history of atrial fibrillation and patient takes no anticoagulants on a chronic basis. Also Cardizem. History of hypertension as well but has held off lisinopril due to issues with low blood pressure. The history is provided by the patient. Leg Pain    This is a new problem. The current episode started more than 1 week ago. The problem occurs constantly. The problem has been gradually worsening. The pain is present in the right upper leg and right lower leg. The quality of the pain is described as aching and dull. The pain is moderate. Pertinent negatives include no numbness and no back pain. He has tried nothing for the symptoms. There has been a history of trauma.         Past Medical History:   Diagnosis Date    Anxiety     Arrhythmia     a-fib    Cellulitis of leg 11/17/2019    Erectile dysfunction 1/20/2014    DIANA (generalized anxiety disorder) 5/7/2014    GERD (gastroesophageal reflux disease)     Gout 1/20/2014    History of peptic ulcer     Hypertension     Nausea & vomiting     Obesity, morbid ([address] 2/26/2018    Osteoarthritis of hand, primary localized 2014    Personal history of urinary calculi     x2    Venous (peripheral) insufficiency 12/3/2019       Past Surgical History:   Procedure Laterality Date    HX WISDOM TEETH EXTRACTION      VASCULAR SURGERY PROCEDURE UNLIST Bilateral 2020    Bilateral iliac venogram, intravascular ultrasound x2. No family history on file. Social History     Socioeconomic History    Marital status:      Spouse name: Not on file    Number of children: Not on file    Years of education: Not on file    Highest education level: Not on file   Occupational History    Not on file   Social Needs    Financial resource strain: Not on file    Food insecurity     Worry: Not on file     Inability: Not on file    Transportation needs     Medical: Not on file     Non-medical: Not on file   Tobacco Use    Smoking status: Former Smoker     Years: 5.00     Last attempt to quit: 2011     Years since quittin.3    Smokeless tobacco: Never Used    Tobacco comment: quit in the s    Substance and Sexual Activity    Alcohol use: No    Drug use: No    Sexual activity: Yes     Partners: Female     Birth control/protection: None   Lifestyle    Physical activity     Days per week: Not on file     Minutes per session: Not on file    Stress: Not on file   Relationships    Social connections     Talks on phone: Not on file     Gets together: Not on file     Attends Episcopal service: Not on file     Active member of club or organization: Not on file     Attends meetings of clubs or organizations: Not on file     Relationship status: Not on file    Intimate partner violence     Fear of current or ex partner: Not on file     Emotionally abused: Not on file     Physically abused: Not on file     Forced sexual activity: Not on file   Other Topics Concern    Not on file   Social History Narrative    1/15/20:  PATIENT [facility]  [facility] [place], THEY HAVE A [address]   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 ALLERGIES: Codeine and Sulfa (sulfonamide antibiotics)    Review of Systems   Constitutional: Negative for chills and fever. Respiratory: Negative for cough and shortness of breath. Cardiovascular: Positive for leg swelling. Negative for chest pain. Gastrointestinal: Negative for abdominal pain and vomiting. Musculoskeletal: Negative for back pain. Skin: Positive for color change and wound. Negative for rash. Neurological: Negative for numbness. Vitals:    11/26/20 0316   BP: (!) 182/105   Pulse: 86   Resp: 16   Temp: 98.2 °F (36.8 °C)   SpO2: 96%   Weight: 129.3 kg (285 lb)   Height: 6' 3\" (1.905 m)            Physical Exam  Vitals signs and nursing note reviewed. Constitutional:       General: He is not in acute distress. Appearance: He is well-developed. HENT:      Head: Normocephalic and atraumatic. Right Ear: External ear normal.      Left Ear: External ear normal.   Eyes:      Conjunctiva/sclera: Conjunctivae normal.      Pupils: Pupils are equal, round, and reactive to light. Cardiovascular:      Rate and Rhythm: Normal rate. Rhythm irregular. Heart sounds: No murmur. Pulmonary:      Effort: No respiratory distress. Breath sounds: Normal breath sounds. Abdominal:      General: Bowel sounds are normal.      Palpations: Abdomen is soft. There is no mass. Tenderness: There is no abdominal tenderness. There is no guarding or rebound. Hernia: No hernia is present. Musculoskeletal:      Right lower leg: He exhibits tenderness and swelling. Edema present. Comments: Right lower leg: Changes of venous stasis disease with some hyperpigmentation. Some swelling. Also ulcerations and areas with some weeping and hyperemic. Distal pulses 2+. 1+ edema on the foot. Left lower legs with hyperpigmentation. Left thigh with area of dermatitis with maculopapular rash to the inner aspect of the thigh with slight induration.   No real erythema or tenderness. Skin:     General: Skin is warm and dry. Neurological:      Mental Status: He is alert and oriented to person, place, and time. Gait: Gait normal.      Comments: Nl speech   Psychiatric:         Speech: Speech normal.          MDM  Number of Diagnoses or Management Options  Diagnosis management comments: Venous stasis ulcers with some secondary infection. Some edema. Doubt DVT. Screening blood work. Check for sepsis. Imaging to check for soft tissue air. Amount and/or Complexity of Data Reviewed  Clinical lab tests: ordered and reviewed  Tests in the radiology section of CPT®: ordered and reviewed  Review and summarize past medical records: yes  Independent visualization of images, tracings, or specimens: yes    Risk of Complications, Morbidity, and/or Mortality  Presenting problems: moderate  Diagnostic procedures: minimal  Management options: low    Patient Progress  Patient progress: stable         Procedures                Patient's wounds were initially covered with some Xeroform some gauze but also duct tape and cotton fabric.

## 2020-11-26 NOTE — ED TRIAGE NOTES
Pt arrives via [facility] from home for complaint of cellulitis of right leg that has progressively gotten more painful. [facility] PT has a homemade dressing on his leg.

## 2020-11-26 NOTE — DISCHARGE INSTRUCTIONS
Leave dressing in place until rechecked. Call your doctor on Friday to recheck. Consider rechecking with wound center as well. Referral can be made by your primary care doctor. Patient Education        Cellulitis: Care Instructions  Your Care Instructions     Cellulitis is a skin infection caused by bacteria, most often strep or staph. It often occurs after a break in the skin from a scrape, cut, bite, or puncture, or after a rash. Cellulitis may be treated without doing tests to find out what caused it. But your doctor may do tests, if needed, to look for a specific bacteria, like methicillin-resistant Staphylococcus aureus (MRSA). The doctor has checked you carefully, but problems can develop later. If you notice any problems or new symptoms, get medical treatment right away. Follow-up care is a key part of your treatment and safety. Be sure to make and go to all appointments, and call your doctor if you are having problems. It's also a good idea to know your test results and keep a list of the medicines you take. How can you care for yourself at home? · Take your antibiotics as directed. Do not stop taking them just because you feel better. You need to take the full course of antibiotics. · Prop up the infected area on pillows to reduce pain and swelling. Try to keep the area above the level of your heart as often as you can. · If your doctor told you how to care for your wound, follow your doctor's instructions. If you did not get instructions, follow this general advice:  ? Wash the wound with clean water 2 times a day. Don't use hydrogen peroxide or alcohol, which can slow healing. ? You may cover the wound with a thin layer of petroleum jelly, such as Vaseline, and a nonstick bandage. ? Apply more petroleum jelly and replace the bandage as needed. · Be safe with medicines. Take pain medicines exactly as directed.   ? If the doctor gave you a prescription medicine for pain, take it as prescribed. ? If you are not taking a prescription pain medicine, ask your doctor if you can take an over-the-counter medicine. To prevent cellulitis in the future  · Try to prevent cuts, scrapes, or other injuries to your skin. Cellulitis most often occurs where there is a break in the skin. · If you get a scrape, cut, mild burn, or bite, wash the wound with clean water as soon as you can to help avoid infection. Don't use hydrogen peroxide or alcohol, which can slow healing. · If you have swelling in your legs (edema), support stockings and good skin care may help prevent leg sores and cellulitis. · Take care of your feet, especially if you have diabetes or other conditions that increase the risk of infection. Wear shoes and socks. Do not go barefoot. If you have athlete's foot or other skin problems on your feet, talk to your doctor about how to treat them. When should you call for help? Call your doctor now or seek immediate medical care if:    · You have signs that your infection is getting worse, such as:  ? Increased pain, swelling, warmth, or redness. ? Red streaks leading from the area. ? Pus draining from the area. ? A fever.     · You get a rash. Watch closely for changes in your health, and be sure to contact your doctor if:    · You do not get better as expected. Where can you learn more? Go to http://www.gray.com/  Enter X309 in the search box to learn more about \"Cellulitis: Care Instructions. \"  Current as of: July 2, 2020               Content Version: 12.6  © 6395-1449 [employer]. Care instructions adapted under license by [employer] (which disclaims liability or warranty for this information). If you have questions about a medical condition or this instruction, always ask your healthcare professional. [place] [address] any warranty or liability for your use of this information.          Patient Education Learning About Venous Insufficiency  What is it? Venous insufficiency is a problem with the flow of blood from the veins of the legs back to the heart. It's also called chronic venous insufficiency or chronic venous stasis. Your veins bring blood back to the heart after it flows through your body. Veins have valves that keep the blood moving in one direction--toward the heart. But with venous insufficiency, the veins of the legs might not work as they should. This can allow blood to leak backward. Fluid can pool in the legs. This can lead to problems that include varicose veins. What causes it? Venous insufficiency is sometimes caused by deep vein thrombosis and high blood pressure inside leg veins. You are more likely to have venous insufficiency if you:  · Are older. · Are female. · Are overweight. · Don't get enough physical activity. · Smoke. · Have a family history of varicose veins. What are the symptoms? Symptoms of venous insufficiency affect the legs. They may include:  · Swelling, often in the ankles. · A rash. · Varicose veins. · Itching. · Cramping. · Skin sores (ulcers). · Aching or a feeling of heaviness. · Changes in skin color. How is it diagnosed? Your doctor can diagnose venous insufficiency by examining your legs and by using a type of ultrasound test (duplex Doppler) to find out how well blood is flowing in your legs. How is it treated? To reduce swelling and relieve pain caused by venous insufficiency, you can wear compression stockings. They are tighter at the ankles than at the top of the legs. They also can help venous skin ulcers heal. But there are different types of stockings, and they need to fit right. So your doctor will recommend what you need. You also can try to:  · Get more exercise, especially walking. It can increase blood flow. · Avoid standing still or sitting for a long time, which can make the fluid pool in your legs.   · Try not to sit with your legs crossed at the knee. · Keep your legs raised above your heart when you're lying down. This reduces swelling. If these treatments don't work, you may need medicine or a procedure to help relieve symptoms. Procedures might be done to close the vein, to remove the vein, or to improve blood flow. Follow-up care is a key part of your treatment and safety. Be sure to make and go to all appointments, and call your doctor if you are having problems. It's also a good idea to know your test results and keep a list of the medicines you take. Current as of: March 4, 2020               Content Version: 12.6  © 0937-9045 [employer]. Care instructions adapted under license by [employer] (which disclaims liability or warranty for this information). If you have questions about a medical condition or this instruction, always ask your healthcare professional. [address] any warranty or liability for your use of this information.

## 2020-11-26 NOTE — ED NOTES
Received handoff from Dr. Corry Wiseman. Instructed to follow-up on labs and imaging. Labs unremarkable. X-ray with no findings concerning for osteomyelitis. Patient with antibiotic prescription for discharge. Patient instructed to follow-up with PCP/ID. Patient given return precautions.

## 2020-12-01 LAB
BACTERIA SPEC CULT: NORMAL
BACTERIA SPEC CULT: NORMAL
SERVICE CMNT-IMP: NORMAL
SERVICE CMNT-IMP: NORMAL

## 2021-04-15 ENCOUNTER — HOSPITAL ENCOUNTER (EMERGENCY)
Age: 56
Discharge: HOME | End: 2021-04-15
Attending: EMERGENCY MEDICINE
Payer: COMMERCIAL

## 2021-04-15 VITALS
SYSTOLIC BLOOD PRESSURE: 150 MMHG | TEMPERATURE: 98.8 F | RESPIRATION RATE: 16 BRPM | HEIGHT: 75 IN | BODY MASS INDEX: 35.81 KG/M2 | DIASTOLIC BLOOD PRESSURE: 98 MMHG | OXYGEN SATURATION: 99 % | HEART RATE: 84 BPM | WEIGHT: 288 LBS

## 2021-04-15 DIAGNOSIS — L97.312 VENOUS STASIS ULCER OF RIGHT ANKLE WITH FAT LAYER EXPOSED WITH VARICOSE VEINS (HCC): Primary | ICD-10-CM

## 2021-04-15 DIAGNOSIS — K08.89 PAIN, DENTAL: ICD-10-CM

## 2021-04-15 DIAGNOSIS — I83.013 VENOUS STASIS ULCER OF RIGHT ANKLE WITH FAT LAYER EXPOSED WITH VARICOSE VEINS (HCC): Primary | ICD-10-CM

## 2021-04-15 DIAGNOSIS — F41.1 ANXIETY STATE: ICD-10-CM

## 2021-04-15 LAB
ALBUMIN SERPL-MCNC: 3.5 G/DL (ref 3.5–5)
ALBUMIN/GLOB SERPL: 1 {RATIO} (ref 1.2–3.5)
ALP SERPL-CCNC: 74 U/L (ref 50–136)
ALT SERPL-CCNC: 26 U/L (ref 12–65)
ANION GAP SERPL CALC-SCNC: 6 MMOL/L (ref 7–16)
AST SERPL-CCNC: 21 U/L (ref 15–37)
BASOPHILS # BLD: 0.1 K/UL (ref 0–0.2)
BASOPHILS NFR BLD: 1 % (ref 0–2)
BILIRUB SERPL-MCNC: 0.4 MG/DL (ref 0.2–1.1)
BUN SERPL-MCNC: 20 MG/DL (ref 6–23)
CALCIUM SERPL-MCNC: 9.4 MG/DL (ref 8.3–10.4)
CHLORIDE SERPL-SCNC: 109 MMOL/L (ref 98–107)
CO2 SERPL-SCNC: 25 MMOL/L (ref 21–32)
CREAT SERPL-MCNC: 0.84 MG/DL (ref 0.8–1.5)
DIFFERENTIAL METHOD BLD: ABNORMAL
EOSINOPHIL # BLD: 0.2 K/UL (ref 0–0.8)
EOSINOPHIL NFR BLD: 2 % (ref 0.5–7.8)
ERYTHROCYTE [DISTWIDTH] IN BLOOD BY AUTOMATED COUNT: 12.7 % (ref 11.9–14.6)
GLOBULIN SER CALC-MCNC: 3.6 G/DL (ref 2.3–3.5)
GLUCOSE SERPL-MCNC: 86 MG/DL (ref 65–100)
HCT VFR BLD AUTO: 45.8 % (ref 41.1–50.3)
HGB BLD-MCNC: 15.7 G/DL (ref 13.6–17.2)
IMM GRANULOCYTES # BLD AUTO: 0 K/UL (ref 0–0.5)
IMM GRANULOCYTES NFR BLD AUTO: 0 % (ref 0–5)
LYMPHOCYTES # BLD: 2.1 K/UL (ref 0.5–4.6)
LYMPHOCYTES NFR BLD: 18 % (ref 13–44)
MCH RBC QN AUTO: 35.2 PG (ref 26.1–32.9)
MCHC RBC AUTO-ENTMCNC: 34.3 G/DL (ref 31.4–35)
MCV RBC AUTO: 102.7 FL (ref 79.6–97.8)
MONOCYTES # BLD: 1 K/UL (ref 0.1–1.3)
MONOCYTES NFR BLD: 8 % (ref 4–12)
NEUTS SEG # BLD: 8.4 K/UL (ref 1.7–8.2)
NEUTS SEG NFR BLD: 72 % (ref 43–78)
NRBC # BLD: 0 K/UL (ref 0–0.2)
PLATELET # BLD AUTO: 183 K/UL (ref 150–450)
PMV BLD AUTO: 10.7 FL (ref 9.4–12.3)
POTASSIUM SERPL-SCNC: 4.7 MMOL/L (ref 3.5–5.1)
PROT SERPL-MCNC: 7.1 G/DL (ref 6.3–8.2)
RBC # BLD AUTO: 4.46 M/UL (ref 4.23–5.6)
SODIUM SERPL-SCNC: 140 MMOL/L (ref 136–145)
WBC # BLD AUTO: 11.6 K/UL (ref 4.3–11.1)

## 2021-04-15 PROCEDURE — 99284 EMERGENCY DEPT VISIT MOD MDM: CPT

## 2021-04-15 PROCEDURE — 80053 COMPREHEN METABOLIC PANEL: CPT

## 2021-04-15 PROCEDURE — 74011250637 HC RX REV CODE- 250/637: Performed by: EMERGENCY MEDICINE

## 2021-04-15 PROCEDURE — 85025 COMPLETE CBC W/AUTO DIFF WBC: CPT

## 2021-04-15 RX ORDER — ONDANSETRON 8 MG/1
8 TABLET, ORALLY DISINTEGRATING ORAL
Status: COMPLETED | OUTPATIENT
Start: 2021-04-15 | End: 2021-04-15

## 2021-04-15 RX ORDER — HYDROCODONE BITARTRATE AND ACETAMINOPHEN 5; 325 MG/1; MG/1
1-2 TABLET ORAL
Qty: 11 TAB | Refills: 0 | Status: SHIPPED | OUTPATIENT
Start: 2021-04-15 | End: 2021-04-20

## 2021-04-15 RX ORDER — ONDANSETRON 4 MG/1
4 TABLET, FILM COATED ORAL
Qty: 15 TAB | Refills: 0 | Status: SHIPPED | OUTPATIENT
Start: 2021-04-15 | End: 2021-10-31

## 2021-04-15 RX ORDER — HYDROCODONE BITARTRATE AND ACETAMINOPHEN 7.5; 325 MG/1; MG/1
1 TABLET ORAL
Status: COMPLETED | OUTPATIENT
Start: 2021-04-15 | End: 2021-04-15

## 2021-04-15 RX ADMIN — ONDANSETRON 8 MG: 8 TABLET, ORALLY DISINTEGRATING ORAL at 02:08

## 2021-04-15 RX ADMIN — HYDROCODONE BITARTRATE AND ACETAMINOPHEN 1 TABLET: 7.5; 325 TABLET ORAL at 02:08

## 2021-04-15 NOTE — DISCHARGE INSTRUCTIONS
Continue antibiotics as prescribed by your specialists  Continue all your other current medications  Keep your leg elevated as much as possible  Do not drink alcohol or drive while taking the prescription pain medications  Return to ER for any worsening symptoms or new problems which may arise
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
## 2021-04-15 NOTE — ED NOTES
I have reviewed discharge instructions with the patient. The patient verbalized understanding. Patient left ED via Discharge Method: ambulatory to Home with self    Opportunity for questions and clarification provided. Patient given 2 scripts. To continue your aftercare when you leave the hospital, you may receive an automated call from our care team to check in on how you are doing. This is a free service and part of our promise to provide the best care and service to meet your aftercare needs.  If you have questions, or wish to unsubscribe from this service please call [phone]. Thank you for Choosing our [facility].

## 2021-04-15 NOTE — ED TRIAGE NOTES
Pt arrives via [facility] from the side of the road. Pt complains of L molar toothache x days. Reports he was seen and prescribed antibiotics but doesn't feel as though they are working. Reports associated nausea. Pt complains of a chronic R ankle wound that he manages by seeing ER physicians. Reports at this time he is out of all antibiotics. Pt states he has been referred to wound care for the management of this wound and \"has been meaning to follow up\".

## 2021-10-28 ENCOUNTER — HOSPITAL ENCOUNTER (INPATIENT)
Age: 56
LOS: 2 days | Discharge: HOME | DRG: 603 | End: 2021-10-31
Attending: STUDENT IN AN ORGANIZED HEALTH CARE EDUCATION/TRAINING PROGRAM | Admitting: HOSPITALIST
Payer: COMMERCIAL

## 2021-10-28 DIAGNOSIS — I87.2 VENOUS (PERIPHERAL) INSUFFICIENCY: ICD-10-CM

## 2021-10-28 DIAGNOSIS — L03.115 CELLULITIS OF RIGHT LOWER EXTREMITY: ICD-10-CM

## 2021-10-28 DIAGNOSIS — L03.90 WOUND CELLULITIS: Primary | ICD-10-CM

## 2021-10-28 LAB
ALBUMIN SERPL-MCNC: 3.2 G/DL (ref 3.5–5)
ALBUMIN/GLOB SERPL: 0.8 {RATIO} (ref 1.2–3.5)
ALP SERPL-CCNC: 68 U/L (ref 50–136)
ALT SERPL-CCNC: 19 U/L (ref 12–65)
ANION GAP SERPL CALC-SCNC: 5 MMOL/L (ref 7–16)
AST SERPL-CCNC: 16 U/L (ref 15–37)
BASOPHILS # BLD: 0.1 K/UL (ref 0–0.2)
BASOPHILS NFR BLD: 1 % (ref 0–2)
BILIRUB SERPL-MCNC: 0.5 MG/DL (ref 0.2–1.1)
BUN SERPL-MCNC: 13 MG/DL (ref 6–23)
CALCIUM SERPL-MCNC: 9.1 MG/DL (ref 8.3–10.4)
CHLORIDE SERPL-SCNC: 108 MMOL/L (ref 98–107)
CO2 SERPL-SCNC: 27 MMOL/L (ref 21–32)
CREAT SERPL-MCNC: 0.85 MG/DL (ref 0.8–1.5)
DIFFERENTIAL METHOD BLD: ABNORMAL
EOSINOPHIL # BLD: 0.2 K/UL (ref 0–0.8)
EOSINOPHIL NFR BLD: 1 % (ref 0.5–7.8)
ERYTHROCYTE [DISTWIDTH] IN BLOOD BY AUTOMATED COUNT: 12.4 % (ref 11.9–14.6)
GLOBULIN SER CALC-MCNC: 3.8 G/DL (ref 2.3–3.5)
GLUCOSE SERPL-MCNC: 100 MG/DL (ref 65–100)
HCT VFR BLD AUTO: 42.6 % (ref 41.1–50.3)
HGB BLD-MCNC: 14.1 G/DL (ref 13.6–17.2)
IMM GRANULOCYTES # BLD AUTO: 0.1 K/UL (ref 0–0.5)
IMM GRANULOCYTES NFR BLD AUTO: 1 % (ref 0–5)
LACTATE SERPL-SCNC: 0.9 MMOL/L (ref 0.4–2)
LYMPHOCYTES # BLD: 1.8 K/UL (ref 0.5–4.6)
LYMPHOCYTES NFR BLD: 15 % (ref 13–44)
MCH RBC QN AUTO: 33.7 PG (ref 26.1–32.9)
MCHC RBC AUTO-ENTMCNC: 33.1 G/DL (ref 31.4–35)
MCV RBC AUTO: 101.9 FL (ref 79.6–97.8)
MONOCYTES # BLD: 1.1 K/UL (ref 0.1–1.3)
MONOCYTES NFR BLD: 9 % (ref 4–12)
NEUTS SEG # BLD: 8.6 K/UL (ref 1.7–8.2)
NEUTS SEG NFR BLD: 74 % (ref 43–78)
NRBC # BLD: 0 K/UL (ref 0–0.2)
PLATELET # BLD AUTO: 188 K/UL (ref 150–450)
PMV BLD AUTO: 11.5 FL (ref 9.4–12.3)
POTASSIUM SERPL-SCNC: 4.5 MMOL/L (ref 3.5–5.1)
PROT SERPL-MCNC: 7 G/DL (ref 6.3–8.2)
RBC # BLD AUTO: 4.18 M/UL (ref 4.23–5.6)
SODIUM SERPL-SCNC: 140 MMOL/L (ref 136–145)
WBC # BLD AUTO: 11.7 K/UL (ref 4.3–11.1)

## 2021-10-28 PROCEDURE — 81003 URINALYSIS AUTO W/O SCOPE: CPT

## 2021-10-28 PROCEDURE — 85025 COMPLETE CBC W/AUTO DIFF WBC: CPT

## 2021-10-28 PROCEDURE — 80053 COMPREHEN METABOLIC PANEL: CPT

## 2021-10-28 PROCEDURE — 83605 ASSAY OF LACTIC ACID: CPT

## 2021-10-28 PROCEDURE — 99285 EMERGENCY DEPT VISIT HI MDM: CPT

## 2021-10-28 RX ORDER — AMOXICILLIN AND CLAVULANATE POTASSIUM 875; 125 MG/1; MG/1
TABLET, FILM COATED ORAL EVERY 12 HOURS
COMMUNITY
End: 2021-10-31

## 2021-10-28 RX ORDER — HYDROCODONE BITARTRATE AND ACETAMINOPHEN 5; 325 MG/1; MG/1
1 TABLET ORAL ONCE
Status: COMPLETED | OUTPATIENT
Start: 2021-10-28 | End: 2021-10-29

## 2021-10-29 ENCOUNTER — APPOINTMENT (OUTPATIENT)
Dept: GENERAL RADIOLOGY | Age: 56
DRG: 603 | End: 2021-10-29
Attending: STUDENT IN AN ORGANIZED HEALTH CARE EDUCATION/TRAINING PROGRAM
Payer: COMMERCIAL

## 2021-10-29 PROBLEM — I10 HYPERTENSION: Status: ACTIVE | Noted: 2021-10-29

## 2021-10-29 PROBLEM — I48.20 CHRONIC ATRIAL FIBRILLATION (HCC): Status: ACTIVE | Noted: 2021-10-29

## 2021-10-29 PROBLEM — L03.115 CELLULITIS OF RIGHT LOWER EXTREMITY: Status: ACTIVE | Noted: 2021-10-29

## 2021-10-29 PROBLEM — T14.8XXA NONHEALING NONSURGICAL WOUND: Status: ACTIVE | Noted: 2021-10-29

## 2021-10-29 LAB
BACTERIA URNS QL MICRO: 0 /HPF
CASTS URNS QL MICRO: 0 /LPF
EPI CELLS #/AREA URNS HPF: 0 /HPF
ERYTHROCYTE [SEDIMENTATION RATE] IN BLOOD: 32 MM/HR (ref 0–20)
PROCALCITONIN SERPL-MCNC: <0.05 NG/ML
RBC #/AREA URNS HPF: NORMAL /HPF
WBC URNS QL MICRO: NORMAL /HPF

## 2021-10-29 PROCEDURE — 77030040718 HC DRSG HYDRGEL SKINTEGRITY MDII -A

## 2021-10-29 PROCEDURE — 74011250636 HC RX REV CODE- 250/636: Performed by: HOSPITALIST

## 2021-10-29 PROCEDURE — 74011250637 HC RX REV CODE- 250/637: Performed by: STUDENT IN AN ORGANIZED HEALTH CARE EDUCATION/TRAINING PROGRAM

## 2021-10-29 PROCEDURE — 65270000029 HC RM PRIVATE

## 2021-10-29 PROCEDURE — 74011250636 HC RX REV CODE- 250/636: Performed by: STUDENT IN AN ORGANIZED HEALTH CARE EDUCATION/TRAINING PROGRAM

## 2021-10-29 PROCEDURE — 2709999900 HC NON-CHARGEABLE SUPPLY

## 2021-10-29 PROCEDURE — 81015 MICROSCOPIC EXAM OF URINE: CPT

## 2021-10-29 PROCEDURE — 74011000258 HC RX REV CODE- 258: Performed by: HOSPITALIST

## 2021-10-29 PROCEDURE — 74011250637 HC RX REV CODE- 250/637: Performed by: HOSPITALIST

## 2021-10-29 PROCEDURE — 85652 RBC SED RATE AUTOMATED: CPT

## 2021-10-29 PROCEDURE — 74011250637 HC RX REV CODE- 250/637: Performed by: NURSE PRACTITIONER

## 2021-10-29 PROCEDURE — 96374 THER/PROPH/DIAG INJ IV PUSH: CPT

## 2021-10-29 PROCEDURE — 87040 BLOOD CULTURE FOR BACTERIA: CPT

## 2021-10-29 PROCEDURE — 73590 X-RAY EXAM OF LOWER LEG: CPT

## 2021-10-29 PROCEDURE — 36415 COLL VENOUS BLD VENIPUNCTURE: CPT

## 2021-10-29 PROCEDURE — 84145 PROCALCITONIN (PCT): CPT

## 2021-10-29 RX ORDER — OXYCODONE HYDROCHLORIDE 5 MG/1
10 TABLET ORAL
Status: DISCONTINUED | OUTPATIENT
Start: 2021-10-29 | End: 2021-10-31 | Stop reason: HOSPADM

## 2021-10-29 RX ORDER — FUROSEMIDE 40 MG/1
40 TABLET ORAL DAILY
Status: DISCONTINUED | OUTPATIENT
Start: 2021-10-29 | End: 2021-10-31 | Stop reason: HOSPADM

## 2021-10-29 RX ORDER — ONDANSETRON 2 MG/ML
4 INJECTION INTRAMUSCULAR; INTRAVENOUS
Status: DISCONTINUED | OUTPATIENT
Start: 2021-10-29 | End: 2021-10-31 | Stop reason: HOSPADM

## 2021-10-29 RX ORDER — ACETAMINOPHEN 325 MG/1
650 TABLET ORAL
Status: DISCONTINUED | OUTPATIENT
Start: 2021-10-29 | End: 2021-10-31 | Stop reason: HOSPADM

## 2021-10-29 RX ORDER — SODIUM CHLORIDE 0.9 % (FLUSH) 0.9 %
5-40 SYRINGE (ML) INJECTION AS NEEDED
Status: DISCONTINUED | OUTPATIENT
Start: 2021-10-29 | End: 2021-10-31 | Stop reason: HOSPADM

## 2021-10-29 RX ORDER — VANCOMYCIN HYDROCHLORIDE 1 G/20ML
INJECTION, POWDER, LYOPHILIZED, FOR SOLUTION INTRAVENOUS EVERY 12 HOURS
Status: DISCONTINUED | OUTPATIENT
Start: 2021-10-29 | End: 2021-10-29 | Stop reason: DRUGHIGH

## 2021-10-29 RX ORDER — TAMSULOSIN HYDROCHLORIDE 0.4 MG/1
0.4 CAPSULE ORAL DAILY
Status: DISCONTINUED | OUTPATIENT
Start: 2021-10-29 | End: 2021-10-31 | Stop reason: HOSPADM

## 2021-10-29 RX ORDER — LISINOPRIL 20 MG/1
40 TABLET ORAL DAILY
Status: DISCONTINUED | OUTPATIENT
Start: 2021-10-29 | End: 2021-10-31 | Stop reason: HOSPADM

## 2021-10-29 RX ORDER — SODIUM CHLORIDE 0.9 % (FLUSH) 0.9 %
5-40 SYRINGE (ML) INJECTION EVERY 8 HOURS
Status: DISCONTINUED | OUTPATIENT
Start: 2021-10-29 | End: 2021-10-31 | Stop reason: HOSPADM

## 2021-10-29 RX ORDER — CYCLOBENZAPRINE HCL 10 MG
5 TABLET ORAL
Status: DISCONTINUED | OUTPATIENT
Start: 2021-10-29 | End: 2021-10-31 | Stop reason: HOSPADM

## 2021-10-29 RX ORDER — MORPHINE SULFATE 4 MG/ML
4 INJECTION INTRAVENOUS
Status: DISCONTINUED | OUTPATIENT
Start: 2021-10-29 | End: 2021-10-30

## 2021-10-29 RX ORDER — UREA 10 %
2 LOTION (ML) TOPICAL 2 TIMES DAILY
Status: DISCONTINUED | OUTPATIENT
Start: 2021-10-29 | End: 2021-10-31 | Stop reason: HOSPADM

## 2021-10-29 RX ORDER — ONDANSETRON 4 MG/1
4 TABLET, ORALLY DISINTEGRATING ORAL
Status: DISCONTINUED | OUTPATIENT
Start: 2021-10-29 | End: 2021-10-31 | Stop reason: HOSPADM

## 2021-10-29 RX ORDER — MORPHINE SULFATE 2 MG/ML
2 INJECTION, SOLUTION INTRAMUSCULAR; INTRAVENOUS
Status: COMPLETED | OUTPATIENT
Start: 2021-10-29 | End: 2021-10-29

## 2021-10-29 RX ORDER — ACETAMINOPHEN 650 MG/1
650 SUPPOSITORY RECTAL
Status: DISCONTINUED | OUTPATIENT
Start: 2021-10-29 | End: 2021-10-31 | Stop reason: HOSPADM

## 2021-10-29 RX ORDER — VANCOMYCIN HYDROCHLORIDE 1 G/20ML
2000 INJECTION, POWDER, LYOPHILIZED, FOR SOLUTION INTRAVENOUS EVERY 12 HOURS
Status: DISCONTINUED | OUTPATIENT
Start: 2021-10-29 | End: 2021-10-29 | Stop reason: SDUPTHER

## 2021-10-29 RX ORDER — DILTIAZEM HYDROCHLORIDE 120 MG/1
240 CAPSULE, COATED, EXTENDED RELEASE ORAL DAILY
Status: DISCONTINUED | OUTPATIENT
Start: 2021-10-29 | End: 2021-10-31 | Stop reason: HOSPADM

## 2021-10-29 RX ORDER — VANCOMYCIN 2 GRAM/500 ML IN 0.9 % SODIUM CHLORIDE INTRAVENOUS
2000 EVERY 12 HOURS
Status: DISCONTINUED | OUTPATIENT
Start: 2021-10-29 | End: 2021-10-31

## 2021-10-29 RX ORDER — OXYCODONE HYDROCHLORIDE 5 MG/1
5 TABLET ORAL
Status: DISCONTINUED | OUTPATIENT
Start: 2021-10-29 | End: 2021-10-31 | Stop reason: HOSPADM

## 2021-10-29 RX ORDER — SERTRALINE HYDROCHLORIDE 50 MG/1
75 TABLET, FILM COATED ORAL DAILY
Status: DISCONTINUED | OUTPATIENT
Start: 2021-10-29 | End: 2021-10-31 | Stop reason: HOSPADM

## 2021-10-29 RX ORDER — POLYETHYLENE GLYCOL 3350 17 G/17G
17 POWDER, FOR SOLUTION ORAL DAILY PRN
Status: DISCONTINUED | OUTPATIENT
Start: 2021-10-29 | End: 2021-10-31 | Stop reason: HOSPADM

## 2021-10-29 RX ORDER — ALLOPURINOL 300 MG/1
300 TABLET ORAL DAILY
Status: DISCONTINUED | OUTPATIENT
Start: 2021-10-29 | End: 2021-10-31 | Stop reason: HOSPADM

## 2021-10-29 RX ADMIN — Medication 10 ML: at 21:21

## 2021-10-29 RX ADMIN — DILTIAZEM HYDROCHLORIDE 240 MG: 240 CAPSULE, COATED, EXTENDED RELEASE ORAL at 09:59

## 2021-10-29 RX ADMIN — VANCOMYCIN HYDROCHLORIDE 2000 MG: 10 INJECTION, POWDER, LYOPHILIZED, FOR SOLUTION INTRAVENOUS at 18:05

## 2021-10-29 RX ADMIN — Medication 5 ML: at 09:13

## 2021-10-29 RX ADMIN — OXYCODONE 5 MG: 5 TABLET ORAL at 21:21

## 2021-10-29 RX ADMIN — ALLOPURINOL 300 MG: 300 TABLET ORAL at 09:59

## 2021-10-29 RX ADMIN — LACTOBACILLUS TAB 2 TABLET: TAB at 18:08

## 2021-10-29 RX ADMIN — LISINOPRIL 40 MG: 20 TABLET ORAL at 09:12

## 2021-10-29 RX ADMIN — CYCLOBENZAPRINE 5 MG: 10 TABLET, FILM COATED ORAL at 21:21

## 2021-10-29 RX ADMIN — MORPHINE SULFATE 4 MG: 4 INJECTION INTRAVENOUS at 09:13

## 2021-10-29 RX ADMIN — FUROSEMIDE 40 MG: 40 TABLET ORAL at 09:12

## 2021-10-29 RX ADMIN — TAMSULOSIN HYDROCHLORIDE 0.4 MG: 0.4 CAPSULE ORAL at 09:12

## 2021-10-29 RX ADMIN — VANCOMYCIN HYDROCHLORIDE 2000 MG: 10 INJECTION, POWDER, LYOPHILIZED, FOR SOLUTION INTRAVENOUS at 04:18

## 2021-10-29 RX ADMIN — CEFEPIME HYDROCHLORIDE 1 G: 1 INJECTION, POWDER, FOR SOLUTION INTRAMUSCULAR; INTRAVENOUS at 21:20

## 2021-10-29 RX ADMIN — MORPHINE SULFATE 2 MG: 2 INJECTION, SOLUTION INTRAMUSCULAR; INTRAVENOUS at 02:54

## 2021-10-29 RX ADMIN — HYDROCODONE BITARTRATE AND ACETAMINOPHEN 1 TABLET: 5; 325 TABLET ORAL at 00:05

## 2021-10-29 RX ADMIN — CEFEPIME HYDROCHLORIDE 1 G: 1 INJECTION, POWDER, FOR SOLUTION INTRAMUSCULAR; INTRAVENOUS at 13:43

## 2021-10-29 RX ADMIN — SERTRALINE 75 MG: 50 TABLET, FILM COATED ORAL at 09:12

## 2021-10-29 RX ADMIN — Medication 10 ML: at 15:59

## 2021-10-29 NOTE — PROGRESS NOTES
Hospitalist Progress Note   Admit Date:  10/28/2021 10:17 PM   Name:  Eduardo Alvarez   Age:  61 y.o. Sex:  male  :  1965   MRN:  [id number]   Room:  [facility]/    Presenting Complaint: Leg Pain    Reason(s) for Admission: Cellulitis of right lower extremity [R29.835]  Nonhealing nonsurgical wound [T14. 8XXA]  Hypertension [I10]  Chronic atrial fibrillation [facility]) [facility] Course & Interval History:   Mr. Trell Wu is a 61 y.o. male with PMH of hypertension, chronic atrial fibrillation who presented to the ER with worsening of redness, swelling over R lower extremity. Patient reports seeing wound care as outpatient with some results but recently he did not follow, for past few weeks it was getting more red, draining wound over R lower extremity just below medial malleolus. Redness is extending right foot to below ankle. Patient reports severe pain with ambulation. He endorses a few days of subjective fevers, chills. He was admitted to the Hospitalist service for further evaluation and management. Subjective (10/29/21):  Patient hungry and states he is in pain. No CP/SOB. XR w/o evidence of soft tissue gas or osteo. Follow up labs, cultures in the morning. Assessment & Plan:     Principal Problem:  Cellulitis of right lower extremity (10/29/2021)  Ordered blood cultures, continue cefepime and vancomycin  WOCN consulted  LA, PCT normal  XR tib/fib w/o acute finding  PRN analgesia     Active Problems:  Non-healing non-surgical wound (10/29/2021)  We will request wound care evaluation.     Hypertension (10/29/2021)  Continue home medications.     Chronic atrial fibrillation ([facility] [address] (10/29/2021)  Continue home Cardizem, Xarelto.     Obesity Class 2  Lifestyle modification      Dispo/Discharge Plannin-3 midnights    Diet:  ADULT DIET Regular  DVT PPx: rivaroxaban  Code status: Full Code    Hospital Problems as of 10/29/2021 Date Reviewed: 2020        Codes Class Noted - Resolved POA    Hypertension ICD-10-CM: I10  ICD-9-CM: 401.9  10/29/2021 - Present Unknown        Nonhealing nonsurgical wound ICD-10-CM: T14. 8XXA  ICD-9-CM: 879.9  10/29/2021 - Present Unknown        Chronic atrial fibrillation (HCC) ICD-10-CM: I48.20  ICD-9-CM: 427.31  10/29/2021 - Present Unknown        * (Principal) Cellulitis of right lower extremity ICD-10-CM: L03.115  ICD-9-CM: 682.6  10/29/2021 - Present Unknown              Objective:     Patient Vitals for the past 24 hrs:   Temp Pulse Resp BP SpO2   10/29/21 1457 98.4 °F (36.9 °C) 70 20 99/70 94 %   10/29/21 1130  80  101/60 95 %   10/29/21 1101  77  (!) 117/57 96 %   10/29/21 1056  77   94 %   10/29/21 1030  80  113/61 91 %   10/29/21 1001    127/73    10/29/21 0930    (!) 140/72    10/29/21 0905  86   97 %   10/29/21 0901  92  137/66    10/29/21 0830  88  130/76 94 %   10/29/21 0801  90  123/65 96 %   10/29/21 0730    139/80    10/29/21 0701 98.9 °F (37.2 °C) 94 18 (!) 143/77 98 %   10/29/21 0630    (!) 157/76    10/29/21 0601  (!) 105  (!) 155/81 97 %   10/29/21 0530  92  (!) 161/80 96 %   10/28/21 2214 100 °F (37.8 °C) 91 15 (!) 158/100 98 %     Oxygen Therapy  O2 Sat (%): 94 % (10/29/21 1457)  Pulse via Oximetry: 80 beats per minute (10/29/21 1130)  O2 Device: None (Room air) (10/29/21 1515)    Estimated body mass index is 37.22 kg/m² as calculated from the following:    Height as of this encounter: 6' 3\" (1.905 m). Weight as of this encounter: 135.1 kg (297 lb 12.8 oz). Intake/Output Summary (Last 24 hours) at 10/29/2021 1555  Last data filed at 10/29/2021 3452  Gross per 24 hour   Intake 500 ml   Output    Net 500 ml         Physical Exam:   Blood pressure 99/70, pulse 70, temperature 98.4 °F (36.9 °C), resp. rate 20, height 6' 3\" (1.905 m), weight 135.1 kg (297 lb 12.8 oz), SpO2 94 %.   General:    No overt distress  Head:  Normocephalic, atraumatic  Eyes:  Sclerae appear normal.  Pupils equally round.  ENT:  Nares appear normal, no drainage. Moist oral mucosa  Neck:  No restricted ROM. Trachea midline   CV:   RRR. No m/r/g. Lungs: No wheezing. Respirations even, unlabored on room air. Abdomen:   Soft, nontender, nondistended. Extremities: R lower leg erythema, edema, wounds; L lower leg w/o erythema, w/o wounds  Skin:     No rashes and normal coloration except for above. Warm and dry. Neuro:  CN II-XII grossly intact. A&Ox3  Psych:  Normal mood and affect. I have reviewed ordered lab tests and independently visualized imaging below:    Recent Labs:  Recent Results (from the past 48 hour(s))   METABOLIC PANEL, COMPREHENSIVE    Collection Time: 10/28/21 10:18 PM   Result Value Ref Range    Sodium 140 136 - 145 mmol/L    Potassium 4.5 3.5 - 5.1 mmol/L    Chloride 108 (H) 98 - 107 mmol/L    CO2 27 21 - 32 mmol/L    Anion gap 5 (L) 7 - 16 mmol/L    Glucose 100 65 - 100 mg/dL    BUN 13 6 - 23 MG/DL    Creatinine 0.85 0.8 - 1.5 MG/DL    GFR est AA >60 >60 ml/min/1.73m2    GFR est non-AA >60 >60 ml/min/1.73m2    Calcium 9.1 8.3 - 10.4 MG/DL    Bilirubin, total 0.5 0.2 - 1.1 MG/DL    ALT (SGPT) 19 12 - 65 U/L    AST (SGOT) 16 15 - 37 U/L    Alk.  phosphatase 68 50 - 136 U/L    Protein, total 7.0 6.3 - 8.2 g/dL    Albumin 3.2 (L) 3.5 - 5.0 g/dL    Globulin 3.8 (H) 2.3 - 3.5 g/dL    A-G Ratio 0.8 (L) 1.2 - 3.5     CBC WITH AUTOMATED DIFF    Collection Time: 10/28/21 10:18 PM   Result Value Ref Range    WBC 11.7 (H) 4.3 - 11.1 K/uL    RBC 4.18 (L) 4.23 - 5.6 M/uL    HGB 14.1 13.6 - 17.2 g/dL    HCT 42.6 41.1 - 50.3 %    .9 (H) 79.6 - 97.8 FL    MCH 33.7 (H) 26.1 - 32.9 PG    MCHC 33.1 31.4 - 35.0 g/dL    RDW 12.4 11.9 - 14.6 %    PLATELET 900 172 - 227 K/uL    MPV 11.5 9.4 - 12.3 FL    ABSOLUTE NRBC 0.00 0.0 - 0.2 K/uL    DF AUTOMATED      NEUTROPHILS 74 43 - 78 %    LYMPHOCYTES 15 13 - 44 %    MONOCYTES 9 4.0 - 12.0 %    EOSINOPHILS 1 0.5 - 7.8 %    BASOPHILS 1 0.0 - 2.0 %    IMMATURE GRANULOCYTES 1 0.0 - 5.0 %    ABS. NEUTROPHILS 8.6 (H) 1.7 - 8.2 K/UL    ABS. LYMPHOCYTES 1.8 0.5 - 4.6 K/UL    ABS. MONOCYTES 1.1 0.1 - 1.3 K/UL    ABS. EOSINOPHILS 0.2 0.0 - 0.8 K/UL    ABS. BASOPHILS 0.1 0.0 - 0.2 K/UL    ABS. IMM. GRANS. 0.1 0.0 - 0.5 K/UL   LACTIC ACID    Collection Time: 10/28/21 10:18 PM   Result Value Ref Range    Lactic acid 0.9 0.4 - 2.0 MMOL/L   PROCALCITONIN    Collection Time: 10/29/21 12:00 AM   Result Value Ref Range    Procalcitonin <0.05 ng/mL   SED RATE, AUTOMATED    Collection Time: 10/29/21 12:00 AM   Result Value Ref Range    Sed rate, automated 32 (H) 0 - 20 mm/hr   URINE MICROSCOPIC    Collection Time: 10/29/21  1:10 AM   Result Value Ref Range    WBC 0-3 0 /hpf    RBC 0-3 0 /hpf    Epithelial cells 0 0 /hpf    Bacteria 0 0 /hpf    Casts 0 0 /lpf       All Micro Results     Procedure Component Value Units Date/Time    CULTURE, BLOOD [[id number]] Collected: 10/29/21 0343    Order Status: Completed Updated: 10/29/21 0346    CULTURE, BLOOD [[id number]] Collected: 10/29/21 0320    Order Status: Canceled Specimen: Blood     CULTURE, BLOOD [[id number]]     Order Status: Sent Specimen: Blood           Other Studies:  XR TIB/FIB RT    Result Date: 10/29/2021  Clinical history: Cellulitis, chronic wound, evaluate for subcutaneous gas TECHNIQUE: AP and lateral views of the right tibia/fibula. FINDINGS: There is subcutaneous soft tissue swelling. No definite soft tissue gas. There is no acute fracture or dislocation. No erosive or destructive bone lesion. Alignment is maintained. 1. Soft tissue swelling/edema. No definite soft tissue gas. 2. No acute bone abnormality or radiographic evidence of osteomyelitis.       Current Meds:  Current Facility-Administered Medications   Medication Dose Route Frequency    sodium chloride (NS) flush 5-40 mL  5-40 mL IntraVENous Q8H    sodium chloride (NS) flush 5-40 mL  5-40 mL IntraVENous PRN    acetaminophen (TYLENOL) tablet 650 mg  650 mg Oral Q6H PRN    Or    acetaminophen (TYLENOL) suppository 650 mg  650 mg Rectal Q6H PRN    polyethylene glycol (MIRALAX) packet 17 g  17 g Oral DAILY PRN    ondansetron (ZOFRAN ODT) tablet 4 mg  4 mg Oral Q8H PRN    Or    ondansetron (ZOFRAN) injection 4 mg  4 mg IntraVENous Q6H PRN    cefepime (MAXIPIME) 1 g in 0.9% sodium chloride (MBP/ADV) 50 mL MBP  1 g IntraVENous Q8H    lisinopriL (PRINIVIL, ZESTRIL) tablet 40 mg  40 mg Oral DAILY    tamsulosin (FLOMAX) capsule 0.4 mg  0.4 mg Oral DAILY    dilTIAZem ER (CARDIZEM CD) capsule 240 mg  240 mg Oral DAILY    [START ON 10/30/2021] rivaroxaban (XARELTO) tablet 20 mg  20 mg Oral DAILY WITH BREAKFAST    furosemide (LASIX) tablet 40 mg  40 mg Oral DAILY    allopurinoL (ZYLOPRIM) tablet 300 mg  300 mg Oral DAILY    sertraline (ZOLOFT) tablet 75 mg  75 mg Oral DAILY    cyclobenzaprine (FLEXERIL) tablet 5 mg  5 mg Oral QHS    morphine injection 4 mg  4 mg IntraVENous Q4H PRN    vancomycin (VANCOCIN) 2000 mg in  ml infusion  2,000 mg IntraVENous Q12H    oxyCODONE IR (ROXICODONE) tablet 5 mg  5 mg Oral Q4H PRN    oxyCODONE IR (ROXICODONE) tablet 10 mg  10 mg Oral Q6H PRN       Signed:  Sierra Dominguez NP    Part of this note may have been written by using a voice dictation software. The note has been proof read but may still contain some grammatical/other typographical errors.

## 2021-10-29 NOTE — ED NOTES
RN at bedside; pt moved into  3 to use urinal; POC urine completed; trace blood noted; urine micro sent to lab; pt alert and oriented x4; breathing even and unlabored; no distress noted; pt states the pain is now coming and going and his leg feels very tight; pt states pain medicine \"kind of\" helped; RN to continue to monitor

## 2021-10-29 NOTE — PROGRESS NOTES
[address] [facility] Pharmacokinetic Monitoring Service - Vancomycin     Nery Jurado is a 61 y.o. male starting on vancomycin therapy for SSTI. Pharmacy consulted by Dr Robert Gore for monitoring and adjustment. Target Concentration: Goal trough of 10-15 mg/L and AUC/KAZ <500 mg*hr/L    Additional Antimicrobials: cefepime    Pertinent Laboratory Values: Wt Readings from Last 1 Encounters:   10/28/21 127 kg (280 lb)     Temp Readings from Last 1 Encounters:   10/28/21 100 °F (37.8 °C)     No components found for: PROCAL  Recent Labs     10/29/21  0000 10/28/21  2218   BUN  --  13   CREA  --  0.85   WBC  --  11.7*   PCT <0.05  --    LAC  --  0.9     Estimated Creatinine Clearance: 135.8 mL/min (based on SCr of 0.85 mg/dL). Lab Results   Component Value Date/Time    Vancomycin,trough 16.5 06/18/2020 07:59 AM       MRSA Nasal Swab: N/A. Non-respiratory infection. .    Plan:  Dosing recommendations based on Bayesian software  Start vancomycin 2000 mg q12h  Historical dosing chosen, patient was therapeutic on 2000 mg q12h in 6/2020 with similar renal function  Renal labs as indicated   Vancomycin concentration ordered for 10/30 @ 0800  Pharmacy will continue to monitor patient and adjust therapy as indicated    Thank you,  Shan Fisher, PharmD  Clinical Pharmacy Specialist

## 2021-10-29 NOTE — PROGRESS NOTES
[address] [facility] Pharmacokinetic Monitoring Service - Vancomycin     Oapl Woodard is a 61 y.o. male starting on vancomycin therapy for SSTI. Pharmacy consulted by Dr Ada Elmore for monitoring and adjustment. Target Concentration: Goal trough of 10-15 mg/L and AUC/KAZ <500 mg*hr/L    Additional Antimicrobials: cefepime    Pertinent Laboratory Values: Wt Readings from Last 1 Encounters:   10/28/21 127 kg (280 lb)     Temp Readings from Last 1 Encounters:   10/28/21 100 °F (37.8 °C)     No components found for: PROCAL  Recent Labs     10/29/21  0000 10/28/21  2218   BUN  --  13   CREA  --  0.85   WBC  --  11.7*   PCT <0.05  --    LAC  --  0.9     Estimated Creatinine Clearance: 135.8 mL/min (based on SCr of 0.85 mg/dL). Lab Results   Component Value Date/Time    Vancomycin,trough 16.5 06/18/2020 07:59 AM       MRSA Nasal Swab: N/A. Non-respiratory infection. .    Plan:  1. Dosing recommendations based on Bayesian software  2. Start vancomycin 2000 mg q12h.  a. Historical dosing chosen  3. Renal labs as indicated   4. Vancomycin concentration not currently ordered.   5. Pharmacy will continue to monitor patient and adjust therapy as indicated    Thank you for the consult,  TOYIN Jose

## 2021-10-29 NOTE — PROGRESS NOTES
Patient arrived AOx4 from ED in stable condition. PIV is saline locked. Patient is stable on room air. RLE is larry, blistered, swollen and open to air. LLE is larry and dry. Patient focused on food at this time. Will release and review orders and plan of care. Bed is low and call light is within reach.

## 2021-10-29 NOTE — H&P
Hospitalist History and Physical   Admit Date:  10/28/2021 10:17 PM   Name:  Denis Law   Age:  61 y.o. Sex:  male  :  1965   MRN:  [id number]     Presenting Complaint: Leg Pain    Reason(s) for Admission: Cellulitis of right lower extremity [J49.168]  Nonhealing nonsurgical wound [T14. 8XXA]  Hypertension [I10]  Chronic atrial fibrillation ([address] [I48.20]     History of Present Illness:   Denis Law is a 61 y.o. male with medical history of hypertension, chronic atrial fibrillation presented to emergency room with worsening of redness, swelling over right lower extremity. Patient reports he had been on healing wound for more than a year, seeing wound care as outpatient with some results but recently he did not follow, for past few weeks getting more red, draining wound over right lower extremity just below medial malleolus. Redness is extending right foot to below ankle. Patient reports severe pain with ambulation, lately with urine rest.  History of subjective fevers, chills. Denies any respiratory symptoms. Evaluated in emergency room, lab work did not show any elevation of white count    Review of Systems:  10 systems reviewed and negative except as noted in HPI. Assessment & Plan:   Principal Problem:    Cellulitis of right lower extremity (10/29/2021)  Ordered blood cultures, will initiate antibiotics, will request wound care evaluation. Follow-up blood cultures. Active Problems:    Nonhealing nonsurgical wound (10/29/2021)  We will request wound care evaluation. Hypertension (10/29/2021)  Continue on home medications. Chronic atrial fibrillation ([facility] [address] (10/29/2021)  Continue on Cardizem, Xarelto. Disposition/Discharge Planning: Home with family. Diet: ADULT DIET Regular  VTE ppx: On anticoagulation.     Code status: Full Code      Past Medical History:   Diagnosis Date    Anxiety     Arrhythmia     a-fib    Cellulitis of leg 2019    Erectile dysfunction 1/20/2014    DIANA (generalized anxiety disorder) 5/7/2014    GERD (gastroesophageal reflux disease)     Gout 1/20/2014    History of peptic ulcer     Hypertension     Nausea & vomiting     Obesity, morbid ([address] 2/26/2018    Osteoarthritis of hand, primary localized 1/20/2014    Personal history of urinary calculi     x2    Venous (peripheral) insufficiency 12/3/2019     Past Surgical History:   Procedure Laterality Date    HX WISDOM TEETH EXTRACTION      VASCULAR SURGERY PROCEDURE UNLIST Bilateral 07/01/2020    Bilateral iliac venogram, intravascular ultrasound x2. Allergies   Allergen Reactions    Codeine Nausea Only    Sulfa (Sulfonamide Antibiotics) Rash      Social History     Tobacco Use    Smoking status: Former Smoker     Years: 5.00     Quit date: 7/28/2011     Years since quitting: 10.2    Smokeless tobacco: Never Used    Tobacco comment: quit in the 80's    Substance Use Topics    Alcohol use: No      History reviewed. No pertinent family history. Family history reviewed and noncontributory to patient's acute condition; no relevant family history unless otherwise noted above.   Immunization History   Administered Date(s) Administered    TB Skin Test (PPD) Intradermal 01/15/2020    Tdap 02/26/2018     PTA Medications:  Current Outpatient Medications   Medication Instructions    acetaminophen (TYLENOL) 500 mg, Oral, EVERY 6 HOURS AS NEEDED    allopurinoL (ZYLOPRIM) 300 mg, Oral, DAILY    amoxicillin-clavulanate (Augmentin) 875-125 mg per tablet Oral, EVERY 12 HOURS    cefadroxil (DURICEF) 500 mg, Oral, 2 TIMES DAILY    colchicine 0.6 mg, Oral, DAILY    cyclobenzaprine (FLEXERIL) 5 mg tablet TAKE 1 TABLET BY MOUTH EVERY DAY AT NIGHT    dilTIAZem ER (CARDIZEM CD) 240 mg, Oral, DAILY    famotidine (PEPCID) 40 mg, Oral, 2 TIMES DAILY    furosemide (LASIX) 40 mg, Oral, DAILY    lisinopriL (PRINIVIL, ZESTRIL) 40 mg, Oral, DAILY    meloxicam (MOBIC) 15 mg, Oral, DAILY  ondansetron (ZOFRAN ODT) 4 mg, Oral, EVERY 8 HOURS AS NEEDED    ondansetron hcl (ZOFRAN) 4 mg, Oral, EVERY 6 HOURS AS NEEDED    sertraline (ZOLOFT) 75 mg, Oral, DAILY    tamsulosin (FLOMAX) 0.4 mg, Oral, DAILY    Xarelto 20 mg tab tablet TAKE 1 TAB BY MOUTH DAILY (WITH DINNER). Objective:     Patient Vitals for the past 24 hrs:   Temp Pulse Resp BP SpO2   10/28/21 2214 100 °F (37.8 °C) 91 15 (!) 158/100 98 %     Oxygen Therapy  O2 Sat (%): 98 % (10/28/21 2214)  O2 Device: None (Room air) (10/29/21 0014)    Estimated body mass index is 35.95 kg/m² as calculated from the following:    Height as of this encounter: 6' 2\" (1.88 m). Weight as of this encounter: 127 kg (280 lb). No intake or output data in the 24 hours ending 10/29/21 0302      Physical Exam:    General:    Well nourished. No overt distress  Head:  Normocephalic, atraumatic  Eyes:  Sclerae appear normal.  Pupils equally round. HENT:  Nares appear normal, no drainage. Moist mucous membranes  Neck:  No restricted ROM. Trachea midline  CV:   RRR. No m/r/g. No JVD  Lungs:   CTAB. No wheezing, rhonchi, or rales. Appears even, unlabored  Abdomen: Bowel sounds present. Soft, nontender, nondistended. Extremities: Redness involving right lower extremity as shown in the picture. Neuro:  Cranial nerves II-XII grossly intact. Sensation intact  Psych:  Normal mood and affect.   Alert and oriented x3    Data Ordered and Personally Reviewed:    Last 24hr Labs:  Recent Results (from the past 24 hour(s))   METABOLIC PANEL, COMPREHENSIVE    Collection Time: 10/28/21 10:18 PM   Result Value Ref Range    Sodium 140 136 - 145 mmol/L    Potassium 4.5 3.5 - 5.1 mmol/L    Chloride 108 (H) 98 - 107 mmol/L    CO2 27 21 - 32 mmol/L    Anion gap 5 (L) 7 - 16 mmol/L    Glucose 100 65 - 100 mg/dL    BUN 13 6 - 23 MG/DL    Creatinine 0.85 0.8 - 1.5 MG/DL    GFR est AA >60 >60 ml/min/1.73m2    GFR est non-AA >60 >60 ml/min/1.73m2    Calcium 9.1 8.3 - 10.4 MG/DL    Bilirubin, total 0.5 0.2 - 1.1 MG/DL    ALT (SGPT) 19 12 - 65 U/L    AST (SGOT) 16 15 - 37 U/L    Alk. phosphatase 68 50 - 136 U/L    Protein, total 7.0 6.3 - 8.2 g/dL    Albumin 3.2 (L) 3.5 - 5.0 g/dL    Globulin 3.8 (H) 2.3 - 3.5 g/dL    A-G Ratio 0.8 (L) 1.2 - 3.5     CBC WITH AUTOMATED DIFF    Collection Time: 10/28/21 10:18 PM   Result Value Ref Range    WBC 11.7 (H) 4.3 - 11.1 K/uL    RBC 4.18 (L) 4.23 - 5.6 M/uL    HGB 14.1 13.6 - 17.2 g/dL    HCT 42.6 41.1 - 50.3 %    .9 (H) 79.6 - 97.8 FL    MCH 33.7 (H) 26.1 - 32.9 PG    MCHC 33.1 31.4 - 35.0 g/dL    RDW 12.4 11.9 - 14.6 %    PLATELET 328 006 - 928 K/uL    MPV 11.5 9.4 - 12.3 FL    ABSOLUTE NRBC 0.00 0.0 - 0.2 K/uL    DF AUTOMATED      NEUTROPHILS 74 43 - 78 %    LYMPHOCYTES 15 13 - 44 %    MONOCYTES 9 4.0 - 12.0 %    EOSINOPHILS 1 0.5 - 7.8 %    BASOPHILS 1 0.0 - 2.0 %    IMMATURE GRANULOCYTES 1 0.0 - 5.0 %    ABS. NEUTROPHILS 8.6 (H) 1.7 - 8.2 K/UL    ABS. LYMPHOCYTES 1.8 0.5 - 4.6 K/UL    ABS. MONOCYTES 1.1 0.1 - 1.3 K/UL    ABS. EOSINOPHILS 0.2 0.0 - 0.8 K/UL    ABS. BASOPHILS 0.1 0.0 - 0.2 K/UL    ABS. IMM.  GRANS. 0.1 0.0 - 0.5 K/UL   LACTIC ACID    Collection Time: 10/28/21 10:18 PM   Result Value Ref Range    Lactic acid 0.9 0.4 - 2.0 MMOL/L   PROCALCITONIN    Collection Time: 10/29/21 12:00 AM   Result Value Ref Range    Procalcitonin <0.05 ng/mL   SED RATE, AUTOMATED    Collection Time: 10/29/21 12:00 AM   Result Value Ref Range    Sed rate, automated 32 (H) 0 - 20 mm/hr   URINE MICROSCOPIC    Collection Time: 10/29/21  1:10 AM   Result Value Ref Range    WBC 0-3 0 /hpf    RBC 0-3 0 /hpf    Epithelial cells 0 0 /hpf    Bacteria 0 0 /hpf    Casts 0 0 /lpf       All Micro Results     Procedure Component Value Units Date/Time    CULTURE, BLOOD [[id number]]     Order Status: Sent Specimen: Blood     CULTURE, BLOOD [[id number]]     Order Status: Sent Specimen: Blood           Other Studies:  XR TIB/FIB RT    Result Date: 10/29/2021  Clinical history: Cellulitis, chronic wound, evaluate for subcutaneous gas TECHNIQUE: AP and lateral views of the right tibia/fibula. FINDINGS: There is subcutaneous soft tissue swelling. No definite soft tissue gas. There is no acute fracture or dislocation. No erosive or destructive bone lesion. Alignment is maintained. 1. Soft tissue swelling/edema. No definite soft tissue gas. 2. No acute bone abnormality or radiographic evidence of osteomyelitis. Medications Administered     HYDROcodone-acetaminophen (NORCO) 5-325 mg per tablet 1 Tablet     Admin Date  10/29/2021 Action  Given Dose  1 Tablet Route  Oral Administered By  Tam Mireles RN          morphine injection 2 mg     Admin Date  10/29/2021 Action  Given Dose  2 mg Route  IntraVENous Administered By  Wen Domínguez RN                  Signed:  Carrol Casas MD    Part of this note may have been written by using a voice dictation software. The note has been proof read but may still contain some grammatical/other typographical errors.

## 2021-10-29 NOTE — ED TRIAGE NOTES
Pt to er c/o having rt leg pain for several weeks, sts he sees a wound doctor and is on 2 antibiotics, pt c/o chills also, works at [employer] and was working tonight.

## 2021-10-29 NOTE — PROGRESS NOTES
Problem: Pressure Injury - Risk of  Goal: *Prevention of pressure injury  Description: Document Missael Scale and appropriate interventions in the flowsheet.   Outcome: Progressing Towards Goal  Note: Pressure Injury Interventions:                                            Problem: Patient Education: Go to Patient Education Activity  Goal: Patient/Family Education  Outcome: Progressing Towards Goal

## 2021-10-29 NOTE — ED PROVIDER NOTES
79-year-old male presents to the emergency department complaining of worsening pain and redness to his right lower extremity. Has history of peripheral artery disease with venous insufficiency bilaterally. Patient reports history of cellulitis to the right lower extremity. States worsening redness. States he is on Augmentin and a cephalosporin. Patient requesting pain medication. Reports intermittent severe sharp pains in the leg. Is followed by wound care but reviewing multiple prior notes, he has had to reschedule multiple visits. Again he is compliant on his antibiotics again requesting medications for his pain. Reports objective fever and chills at home last night. Past Medical History:   Diagnosis Date    Anxiety     Arrhythmia     a-fib    Cellulitis of leg 11/17/2019    Erectile dysfunction 1/20/2014    DIANA (generalized anxiety disorder) 5/7/2014    GERD (gastroesophageal reflux disease)     Gout 1/20/2014    History of peptic ulcer     Hypertension     Nausea & vomiting     Obesity, morbid ([address] 2/26/2018    Osteoarthritis of hand, primary localized 1/20/2014    Personal history of urinary calculi     x2    Venous (peripheral) insufficiency 12/3/2019       Past Surgical History:   Procedure Laterality Date    HX WISDOM TEETH EXTRACTION      VASCULAR SURGERY PROCEDURE UNLIST Bilateral 07/01/2020    Bilateral iliac venogram, intravascular ultrasound x2. History reviewed. No pertinent family history.     Social History     Socioeconomic History    Marital status:      Spouse name: Not on file    Number of children: Not on file    Years of education: Not on file    Highest education level: Not on file   Occupational History    Not on file   Tobacco Use    Smoking status: Former Smoker     Years: 5.00     Quit date: 7/28/2011     Years since quitting: 10.2    Smokeless tobacco: Never Used    Tobacco comment: quit in the 80's    Substance and Sexual Activity  Alcohol use: No    Drug use: No    Sexual activity: Yes     Partners: Female     Birth control/protection: None   Other Topics Concern    Not on file   Social History Narrative    1/15/20:  PATIENT IS  TO [facility], THEY HAVE A 13YEAR OLD SON. 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 Social Determinants of Health     Financial Resource Strain:     Difficulty of Paying Living Expenses:    Food Insecurity:     Worried About Running Out of Food in the Last Year:     [address] in the Last Year:    Transportation Needs:     Lack of Transportation (Medical):  Lack of Transportation (Non-Medical):    Physical Activity:     Days of Exercise per Week:     Minutes of Exercise per Session:    Stress:     Feeling of Stress :    Social Connections:     Frequency of Communication with Friends and Family:     Frequency of Social Gatherings with Friends and Family:     Attends [employer] Services:     Active Member of Clubs or Organizations:     Attends Club or Organization Meetings:     Marital Status:    Intimate Partner Violence:     Fear of Current or Ex-Partner:     Emotionally Abused:     Physically Abused:     Sexually Abused: ALLERGIES: Codeine and Sulfa (sulfonamide antibiotics)    Review of Systems   Constitutional: Positive for chills. HENT: Negative for congestion and sore throat. Eyes: Negative for visual disturbance. Respiratory: Negative for cough and shortness of breath. Cardiovascular: Negative for chest pain. Gastrointestinal: Negative for abdominal pain, diarrhea, nausea and vomiting. Endocrine: Negative for polyuria. Genitourinary: Negative for difficulty urinating and dysuria. Musculoskeletal: Positive for myalgias. Negative for neck pain and neck stiffness. Skin: Positive for wound. Negative for rash. Neurological: Negative for weakness and headaches. All other systems reviewed and are negative.           Vitals: 10/28/21 2214 10/28/21 2226   BP: (!) 158/100    Pulse: 91    Resp: 15    Temp: 100 °F (37.8 °C)    SpO2: 98%    Weight:  127 kg (280 lb)   Height:  6' 2\" (1.88 m)            Physical Exam  Vitals and nursing note reviewed. Constitutional:       Appearance: Normal appearance. HENT:      Head: Normocephalic and atraumatic. Nose: Nose normal.      Mouth/Throat:      Mouth: Mucous membranes are moist.   Eyes:      Extraocular Movements: Extraocular movements intact. Cardiovascular:      Rate and Rhythm: Normal rate and regular rhythm. Heart sounds: Normal heart sounds. Pulmonary:      Effort: Pulmonary effort is normal.      Breath sounds: Normal breath sounds. No wheezing, rhonchi or rales. Abdominal:      General: Abdomen is flat. Palpations: Abdomen is soft. Tenderness: There is no abdominal tenderness. There is no guarding. Musculoskeletal:         General: Normal range of motion. Cervical back: Normal range of motion. Skin:     General: Skin is warm and dry. Findings: Erythema present. Comments: Right lower extremity: Erythema diffusely to patient's right lower extremity with changes consistent with chronic venous stasis. Question secondary infection with tenderness and erythema noted. Retained range of motion of the right ankle and foot. No evidence of abscess on exam.  Erythema extends up to the mid calf region. Dopplerable PT and DP pulses    Neurological:      General: No focal deficit present. Mental Status: He is alert and oriented to person, place, and time. Psychiatric:         Mood and Affect: Mood normal.          MDM  Number of Diagnoses or Management Options  Wound cellulitis  Diagnosis management comments: 61 male presents to the emergency department with worsening pain and redness to his right lower extremity. Patient is currently on Augmentin and a cephalosporin. Will give 1 Norco tablet in the ER for pain. Will obtain basic labs. Labs show white count 11.7, stable H&H, normal lactic acid, grossly normal electrolytes normal kidney function, normal liver enzymes. ESR 32, procalcitonin normal.  X-ray without evidence of soft tissue gas or bone degradation. I then spoke with the hospitalist who agreed to admit patient for continued evaluation and treatment. Patient voiced understanding agreement this plan.              Amount and/or Complexity of Data Reviewed  Clinical lab tests: ordered and reviewed  Tests in the radiology section of CPT®: ordered and reviewed  Discussion of test results with the performing providers: yes  Decide to obtain previous medical records or to obtain history from someone other than the patient: yes  Review and summarize past medical records: yes  Discuss the patient with other providers: yes    Risk of Complications, Morbidity, and/or Mortality  Presenting problems: moderate  Diagnostic procedures: moderate  Management options: moderate           Procedures

## 2021-10-29 NOTE — ED NOTES
TRANSFER - OUT REPORT:    Verbal report given to Christina Hummel on Eric Nishwa  being transferred to 6th floor for routine progression of care       Report consisted of patients Situation, Background, Assessment and   Recommendations(SBAR). Information from the following report(s) ED Summary was reviewed with the receiving nurse. Lines:   Peripheral IV 10/28/21 Right Antecubital (Active)        Opportunity for questions and clarification was provided.       Patient transported with:   [facility]

## 2021-10-29 NOTE — ED NOTES
RN at bedside; pt medicated with Norco 5; pt alert and oriented x4; breathing even and unlabored; pt moaning in pain; MD at bedside to take pictures of wound; RN to continue to monitor and reassess pain in 30 minutes

## 2021-10-30 LAB
ALBUMIN SERPL-MCNC: 2.5 G/DL (ref 3.5–5)
ALBUMIN/GLOB SERPL: 0.7 {RATIO} (ref 1.2–3.5)
ALP SERPL-CCNC: 57 U/L (ref 50–136)
ALT SERPL-CCNC: 15 U/L (ref 12–65)
ANION GAP SERPL CALC-SCNC: 5 MMOL/L (ref 7–16)
AST SERPL-CCNC: 7 U/L (ref 15–37)
BASOPHILS # BLD: 0 K/UL (ref 0–0.2)
BASOPHILS NFR BLD: 0 % (ref 0–2)
BILIRUB SERPL-MCNC: 0.5 MG/DL (ref 0.2–1.1)
BUN SERPL-MCNC: 17 MG/DL (ref 6–23)
CALCIUM SERPL-MCNC: 8.9 MG/DL (ref 8.3–10.4)
CHLORIDE SERPL-SCNC: 109 MMOL/L (ref 98–107)
CO2 SERPL-SCNC: 27 MMOL/L (ref 21–32)
CREAT SERPL-MCNC: 0.83 MG/DL (ref 0.8–1.5)
DIFFERENTIAL METHOD BLD: ABNORMAL
EOSINOPHIL # BLD: 0.2 K/UL (ref 0–0.8)
EOSINOPHIL NFR BLD: 2 % (ref 0.5–7.8)
ERYTHROCYTE [DISTWIDTH] IN BLOOD BY AUTOMATED COUNT: 12.6 % (ref 11.9–14.6)
GLOBULIN SER CALC-MCNC: 3.4 G/DL (ref 2.3–3.5)
GLUCOSE SERPL-MCNC: 128 MG/DL (ref 65–100)
HCT VFR BLD AUTO: 39.2 % (ref 41.1–50.3)
HGB BLD-MCNC: 13 G/DL (ref 13.6–17.2)
IMM GRANULOCYTES # BLD AUTO: 0.1 K/UL (ref 0–0.5)
IMM GRANULOCYTES NFR BLD AUTO: 1 % (ref 0–5)
LYMPHOCYTES # BLD: 1.7 K/UL (ref 0.5–4.6)
LYMPHOCYTES NFR BLD: 17 % (ref 13–44)
MAGNESIUM SERPL-MCNC: 2 MG/DL (ref 1.8–2.4)
MCH RBC QN AUTO: 34.9 PG (ref 26.1–32.9)
MCHC RBC AUTO-ENTMCNC: 33.2 G/DL (ref 31.4–35)
MCV RBC AUTO: 105.4 FL (ref 79.6–97.8)
MONOCYTES # BLD: 0.8 K/UL (ref 0.1–1.3)
MONOCYTES NFR BLD: 8 % (ref 4–12)
NEUTS SEG # BLD: 7.4 K/UL (ref 1.7–8.2)
NEUTS SEG NFR BLD: 73 % (ref 43–78)
NRBC # BLD: 0 K/UL (ref 0–0.2)
PLATELET # BLD AUTO: 196 K/UL (ref 150–450)
PMV BLD AUTO: 10.4 FL (ref 9.4–12.3)
POTASSIUM SERPL-SCNC: 3.9 MMOL/L (ref 3.5–5.1)
PROT SERPL-MCNC: 5.9 G/DL (ref 6.3–8.2)
RBC # BLD AUTO: 3.72 M/UL (ref 4.23–5.6)
SODIUM SERPL-SCNC: 141 MMOL/L (ref 136–145)
VANCOMYCIN SERPL-MCNC: 30.2 UG/ML
WBC # BLD AUTO: 10.2 K/UL (ref 4.3–11.1)

## 2021-10-30 PROCEDURE — 65270000029 HC RM PRIVATE

## 2021-10-30 PROCEDURE — 83735 ASSAY OF MAGNESIUM: CPT

## 2021-10-30 PROCEDURE — 74011000258 HC RX REV CODE- 258: Performed by: HOSPITALIST

## 2021-10-30 PROCEDURE — 80202 ASSAY OF VANCOMYCIN: CPT

## 2021-10-30 PROCEDURE — 74011250637 HC RX REV CODE- 250/637: Performed by: NURSE PRACTITIONER

## 2021-10-30 PROCEDURE — 85025 COMPLETE CBC W/AUTO DIFF WBC: CPT

## 2021-10-30 PROCEDURE — 80053 COMPREHEN METABOLIC PANEL: CPT

## 2021-10-30 PROCEDURE — 2709999900 HC NON-CHARGEABLE SUPPLY

## 2021-10-30 PROCEDURE — 74011250637 HC RX REV CODE- 250/637: Performed by: HOSPITALIST

## 2021-10-30 PROCEDURE — 36415 COLL VENOUS BLD VENIPUNCTURE: CPT

## 2021-10-30 PROCEDURE — 74011250636 HC RX REV CODE- 250/636: Performed by: HOSPITALIST

## 2021-10-30 RX ORDER — MORPHINE SULFATE 2 MG/ML
2 INJECTION, SOLUTION INTRAMUSCULAR; INTRAVENOUS
Status: DISCONTINUED | OUTPATIENT
Start: 2021-10-30 | End: 2021-10-31 | Stop reason: HOSPADM

## 2021-10-30 RX ADMIN — OXYCODONE 10 MG: 5 TABLET ORAL at 10:09

## 2021-10-30 RX ADMIN — DILTIAZEM HYDROCHLORIDE 240 MG: 240 CAPSULE, COATED, EXTENDED RELEASE ORAL at 08:12

## 2021-10-30 RX ADMIN — ALLOPURINOL 300 MG: 300 TABLET ORAL at 08:11

## 2021-10-30 RX ADMIN — VANCOMYCIN HYDROCHLORIDE 2000 MG: 10 INJECTION, POWDER, LYOPHILIZED, FOR SOLUTION INTRAVENOUS at 15:17

## 2021-10-30 RX ADMIN — CEFEPIME HYDROCHLORIDE 1 G: 1 INJECTION, POWDER, FOR SOLUTION INTRAMUSCULAR; INTRAVENOUS at 12:08

## 2021-10-30 RX ADMIN — OXYCODONE 10 MG: 5 TABLET ORAL at 22:03

## 2021-10-30 RX ADMIN — OXYCODONE 5 MG: 5 TABLET ORAL at 17:02

## 2021-10-30 RX ADMIN — TAMSULOSIN HYDROCHLORIDE 0.4 MG: 0.4 CAPSULE ORAL at 08:12

## 2021-10-30 RX ADMIN — LACTOBACILLUS TAB 2 TABLET: TAB at 17:00

## 2021-10-30 RX ADMIN — FUROSEMIDE 40 MG: 40 TABLET ORAL at 08:12

## 2021-10-30 RX ADMIN — LACTOBACILLUS TAB 2 TABLET: TAB at 08:12

## 2021-10-30 RX ADMIN — LISINOPRIL 40 MG: 20 TABLET ORAL at 08:12

## 2021-10-30 RX ADMIN — SERTRALINE 75 MG: 50 TABLET, FILM COATED ORAL at 08:13

## 2021-10-30 RX ADMIN — CEFEPIME HYDROCHLORIDE 1 G: 1 INJECTION, POWDER, FOR SOLUTION INTRAMUSCULAR; INTRAVENOUS at 04:05

## 2021-10-30 RX ADMIN — CEFEPIME HYDROCHLORIDE 1 G: 1 INJECTION, POWDER, FOR SOLUTION INTRAMUSCULAR; INTRAVENOUS at 19:48

## 2021-10-30 RX ADMIN — VANCOMYCIN HYDROCHLORIDE 2000 MG: 10 INJECTION, POWDER, LYOPHILIZED, FOR SOLUTION INTRAVENOUS at 05:29

## 2021-10-30 RX ADMIN — CYCLOBENZAPRINE 5 MG: 10 TABLET, FILM COATED ORAL at 21:05

## 2021-10-30 RX ADMIN — Medication 10 ML: at 13:10

## 2021-10-30 RX ADMIN — Medication 10 ML: at 05:29

## 2021-10-30 RX ADMIN — RIVAROXABAN 20 MG: 20 TABLET, FILM COATED ORAL at 08:11

## 2021-10-30 RX ADMIN — Medication 10 ML: at 21:05

## 2021-10-30 NOTE — PROGRESS NOTES
VANCO DAILY FOLLOW UP NOTE  [address] [facility] Pharmacokinetic Monitoring Service - Vancomycin    Consulting Provider: marga   Indication: SSTI  Target Concentration: Goal trough of 10-15 mg/L and AUC/KAZ <500 mg*hr/L  Day of Therapy: 2  Additional Antimicrobials: cefpime    Pertinent Laboratory Values: Wt Readings from Last 1 Encounters:   10/29/21 135.1 kg (297 lb 12.8 oz)     Temp Readings from Last 1 Encounters:   10/30/21 98.4 °F (36.9 °C)     No components found for: PROCAL  Recent Labs     10/30/21  0849 10/29/21  0000 10/28/21  2218   BUN 17  --  13   CREA 0.83  --  0.85   WBC 10.2  --  11.7*   PCT  --  <0.05  --    LAC  --   --  0.9     Estimated Creatinine Clearance: 145.4 mL/min (based on SCr of 0.83 mg/dL). Lab Results   Component Value Date/Time    Vancomycin,trough 16.5 06/18/2020 07:59 AM    Vancomycin, random 30.2 10/30/2021 08:49 AM       MRSA Nasal Swab: N/A. Non-respiratory infection. .      Assessment:  Date/Time Dose Concentration AUC   10/30 0849 2000 mg q12h 30.2 707   Note: Serum concentrations collected for AUC dosing may appear elevated if collected in close proximity to the dose administered, this is not necessarily an indication of toxicity    Plan:  Current dosing regimen is supra-therapeutic  Decrease dose to 1250 mg q12 and delay next dose until 2200 tonight. Estimated AUC/Tr = 443/12.9  Repeat vancomycin concentration as clinically indicated.    Pharmacy will continue to monitor patient and adjust therapy as indicated    Thank you for the consult,  Chiquis Perez, PharmD, [address] [facility] Pharmacist  [phone]

## 2021-10-30 NOTE — PROGRESS NOTES
Hospitalist Progress Note   Admit Date:  10/28/2021 10:17 PM   Name:  Rex Baker   Age:  61 y.o. Sex:  male  :  1965   MRN:  [id number]   Room:  [facility]    Presenting Complaint: Leg Pain    Reason(s) for Admission: Cellulitis of right lower extremity [Q73.884]  Nonhealing nonsurgical wound [T14. 8XXA]  Hypertension [I10]  Chronic atrial fibrillation [facility]) [facility] Course & Interval History:   Mr. Marino Kehr a 61 y. o. male with PMH of hypertension, chronic atrial fibrillation who presented to the ER with worsening of redness, swelling over R lower extremity.  Patient reports seeing wound care as outpatient with some results but recently he did not follow, for past few weeks it was getting more red, draining wound over R lower extremity just below medial malleolus.  Redness is extending right foot to below ankle.  Patient reports severe pain with ambulation.  He endorses a few days of subjective fevers, chills.  He was admitted to the Hospitalist service for further evaluation and management. Subjective (10/30/21):  No AEO. Some improvement in leg appearance. Patient states pain is somewhat improved; no other complaints.     Assessment & Plan:     Principal Problem:  Cellulitis of right lower extremity (10/29/2021)  BCx negative x 1 day, continue cefepime and vancomycin  WOCN consulted  LA, PCT normal  XR tib/fib w/o acute finding  WBC wnl  PRN analgesia     Active Problems:  Non-healing non-surgical wound (10/29/2021)  We will request wound care evaluation.     Hypertension (10/29/2021)  Continue home medications.     Chronic atrial fibrillation ([address] (10/29/2021)  Continue home Cardizem, Xarelto.     Obesity Class 2  Lifestyle modification      Dispo/Discharge Plannin-3 midnights    Diet:  ADULT DIET Regular  DVT PPx: rivaroxaban  Code status: Full Code    Hospital Problems as of 10/30/2021 Date Reviewed: 2020        Codes Class Noted - Resolved POA    Hypertension ICD-10-CM: I10  ICD-9-CM: 401.9  10/29/2021 - Present Unknown        Nonhealing nonsurgical wound ICD-10-CM: T14. 8XXA  ICD-9-CM: 879.9  10/29/2021 - Present Unknown        Chronic atrial fibrillation (HCC) ICD-10-CM: I48.20  ICD-9-CM: 427.31  10/29/2021 - Present Unknown        * (Principal) Cellulitis of right lower extremity ICD-10-CM: L03.115  ICD-9-CM: 682.6  10/29/2021 - Present Unknown              Objective:     Patient Vitals for the past 24 hrs:   Temp Pulse Resp BP SpO2   10/30/21 0816 98.4 °F (36.9 °C) 73 20 113/72 93 %   10/30/21 0345 98.4 °F (36.9 °C) 69 18 (!) 110/59 93 %   10/29/21 2254 99.1 °F (37.3 °C) 80 18 119/73 95 %   10/29/21 1940 98.1 °F (36.7 °C) 72 18 116/68 97 %   10/29/21 1457 98.4 °F (36.9 °C) 70 20 99/70 94 %   10/29/21 1130  80  101/60 95 %   10/29/21 1101  77  (!) 117/57 96 %   10/29/21 1056  77   94 %     Oxygen Therapy  O2 Sat (%): 93 % (10/30/21 0816)  Pulse via Oximetry: 69 beats per minute (10/30/21 0345)  O2 Device: None (Room air) (10/30/21 0345)    Estimated body mass index is 37.22 kg/m² as calculated from the following:    Height as of this encounter: 6' 3\" (1.905 m). Weight as of this encounter: 135.1 kg (297 lb 12.8 oz). Intake/Output Summary (Last 24 hours) at 10/30/2021 1053  Last data filed at 10/30/2021 0345  Gross per 24 hour   Intake 180 ml   Output 1080 ml   Net -900 ml         Physical Exam:   Blood pressure 113/72, pulse 73, temperature 98.4 °F (36.9 °C), resp. rate 20, height 6' 3\" (1.905 m), weight 135.1 kg (297 lb 12.8 oz), SpO2 93 %. General:          No overt distress  Head:               Normocephalic, atraumatic  Eyes:               Sclerae appear normal.  Pupils equally round. ENT:                Nares appear normal, no drainage. Moist oral mucosa  Neck:               No restricted ROM. Trachea midline   CV:                  RRR. No m/r/g. Lungs: No wheezing. Respirations even, unlabored on room air.   Abdomen:        Soft, nontender, nondistended. Extremities:     R lower leg erythema, edema, wounds; L lower leg w/o erythema, w/o wounds  Skin:                No rashes and normal coloration except for above. Warm and dry. Neuro:             CN II-XII grossly intact. A&Ox3  Psych:             Normal mood and affect. I have reviewed ordered lab tests and independently visualized imaging below:    Recent Labs:  Recent Results (from the past 48 hour(s))   METABOLIC PANEL, COMPREHENSIVE    Collection Time: 10/28/21 10:18 PM   Result Value Ref Range    Sodium 140 136 - 145 mmol/L    Potassium 4.5 3.5 - 5.1 mmol/L    Chloride 108 (H) 98 - 107 mmol/L    CO2 27 21 - 32 mmol/L    Anion gap 5 (L) 7 - 16 mmol/L    Glucose 100 65 - 100 mg/dL    BUN 13 6 - 23 MG/DL    Creatinine 0.85 0.8 - 1.5 MG/DL    GFR est AA >60 >60 ml/min/1.73m2    GFR est non-AA >60 >60 ml/min/1.73m2    Calcium 9.1 8.3 - 10.4 MG/DL    Bilirubin, total 0.5 0.2 - 1.1 MG/DL    ALT (SGPT) 19 12 - 65 U/L    AST (SGOT) 16 15 - 37 U/L    Alk. phosphatase 68 50 - 136 U/L    Protein, total 7.0 6.3 - 8.2 g/dL    Albumin 3.2 (L) 3.5 - 5.0 g/dL    Globulin 3.8 (H) 2.3 - 3.5 g/dL    A-G Ratio 0.8 (L) 1.2 - 3.5     CBC WITH AUTOMATED DIFF    Collection Time: 10/28/21 10:18 PM   Result Value Ref Range    WBC 11.7 (H) 4.3 - 11.1 K/uL    RBC 4.18 (L) 4.23 - 5.6 M/uL    HGB 14.1 13.6 - 17.2 g/dL    HCT 42.6 41.1 - 50.3 %    .9 (H) 79.6 - 97.8 FL    MCH 33.7 (H) 26.1 - 32.9 PG    MCHC 33.1 31.4 - 35.0 g/dL    RDW 12.4 11.9 - 14.6 %    PLATELET 680 855 - 652 K/uL    MPV 11.5 9.4 - 12.3 FL    ABSOLUTE NRBC 0.00 0.0 - 0.2 K/uL    DF AUTOMATED      NEUTROPHILS 74 43 - 78 %    LYMPHOCYTES 15 13 - 44 %    MONOCYTES 9 4.0 - 12.0 %    EOSINOPHILS 1 0.5 - 7.8 %    BASOPHILS 1 0.0 - 2.0 %    IMMATURE GRANULOCYTES 1 0.0 - 5.0 %    ABS. NEUTROPHILS 8.6 (H) 1.7 - 8.2 K/UL    ABS. LYMPHOCYTES 1.8 0.5 - 4.6 K/UL    ABS. MONOCYTES 1.1 0.1 - 1.3 K/UL    ABS.  EOSINOPHILS 0.2 0.0 - 0.8 K/UL ABS. BASOPHILS 0.1 0.0 - 0.2 K/UL    ABS. IMM. GRANS. 0.1 0.0 - 0.5 K/UL   LACTIC ACID    Collection Time: 10/28/21 10:18 PM   Result Value Ref Range    Lactic acid 0.9 0.4 - 2.0 MMOL/L   PROCALCITONIN    Collection Time: 10/29/21 12:00 AM   Result Value Ref Range    Procalcitonin <0.05 ng/mL   SED RATE, AUTOMATED    Collection Time: 10/29/21 12:00 AM   Result Value Ref Range    Sed rate, automated 32 (H) 0 - 20 mm/hr   URINE MICROSCOPIC    Collection Time: 10/29/21  1:10 AM   Result Value Ref Range    WBC 0-3 0 /hpf    RBC 0-3 0 /hpf    Epithelial cells 0 0 /hpf    Bacteria 0 0 /hpf    Casts 0 0 /lpf   CBC WITH AUTOMATED DIFF    Collection Time: 10/30/21  8:49 AM   Result Value Ref Range    WBC 10.2 4.3 - 11.1 K/uL    RBC 3.72 (L) 4.23 - 5.6 M/uL    HGB 13.0 (L) 13.6 - 17.2 g/dL    HCT 39.2 (L) 41.1 - 50.3 %    .4 (H) 79.6 - 97.8 FL    MCH 34.9 (H) 26.1 - 32.9 PG    MCHC 33.2 31.4 - 35.0 g/dL    RDW 12.6 11.9 - 14.6 %    PLATELET 645 283 - 140 K/uL    MPV 10.4 9.4 - 12.3 FL    ABSOLUTE NRBC 0.00 0.0 - 0.2 K/uL    DF AUTOMATED      NEUTROPHILS 73 43 - 78 %    LYMPHOCYTES 17 13 - 44 %    MONOCYTES 8 4.0 - 12.0 %    EOSINOPHILS 2 0.5 - 7.8 %    BASOPHILS 0 0.0 - 2.0 %    IMMATURE GRANULOCYTES 1 0.0 - 5.0 %    ABS. NEUTROPHILS 7.4 1.7 - 8.2 K/UL    ABS. LYMPHOCYTES 1.7 0.5 - 4.6 K/UL    ABS. MONOCYTES 0.8 0.1 - 1.3 K/UL    ABS. EOSINOPHILS 0.2 0.0 - 0.8 K/UL    ABS. BASOPHILS 0.0 0.0 - 0.2 K/UL    ABS. IMM.  GRANS. 0.1 0.0 - 0.5 K/UL   MAGNESIUM    Collection Time: 10/30/21  8:49 AM   Result Value Ref Range    Magnesium 2.0 1.8 - 2.4 mg/dL   METABOLIC PANEL, COMPREHENSIVE    Collection Time: 10/30/21  8:49 AM   Result Value Ref Range    Sodium 141 136 - 145 mmol/L    Potassium 3.9 3.5 - 5.1 mmol/L    Chloride 109 (H) 98 - 107 mmol/L    CO2 27 21 - 32 mmol/L    Anion gap 5 (L) 7 - 16 mmol/L    Glucose 128 (H) 65 - 100 mg/dL    BUN 17 6 - 23 MG/DL    Creatinine 0.83 0.8 - 1.5 MG/DL    GFR est AA >60 >60 ml/min/1.73m2    GFR est non-AA >60 >60 ml/min/1.73m2    Calcium 8.9 8.3 - 10.4 MG/DL    Bilirubin, total 0.5 0.2 - 1.1 MG/DL    ALT (SGPT) 15 12 - 65 U/L    AST (SGOT) 7 (L) 15 - 37 U/L    Alk. phosphatase 57 50 - 136 U/L    Protein, total 5.9 (L) 6.3 - 8.2 g/dL    Albumin 2.5 (L) 3.5 - 5.0 g/dL    Globulin 3.4 2.3 - 3.5 g/dL    A-G Ratio 0.7 (L) 1.2 - 3.5     VANCOMYCIN, RANDOM    Collection Time: 10/30/21  8:49 AM   Result Value Ref Range    Vancomycin, random 30.2 UG/ML       All Micro Results     Procedure Component Value Units Date/Time    CULTURE, BLOOD [[id number]] Collected: 10/29/21 1637    Order Status: Completed Specimen: Blood Updated: 10/29/21 1708    CULTURE, BLOOD [[id number]] Collected: 10/29/21 0343    Order Status: Completed Updated: 10/29/21 0346    CULTURE, BLOOD [[id number]] Collected: 10/29/21 0320    Order Status: Canceled Specimen: Blood           Other Studies:  No results found.     Current Meds:  Current Facility-Administered Medications   Medication Dose Route Frequency    sodium chloride (NS) flush 5-40 mL  5-40 mL IntraVENous Q8H    sodium chloride (NS) flush 5-40 mL  5-40 mL IntraVENous PRN    acetaminophen (TYLENOL) tablet 650 mg  650 mg Oral Q6H PRN    Or    acetaminophen (TYLENOL) suppository 650 mg  650 mg Rectal Q6H PRN    polyethylene glycol (MIRALAX) packet 17 g  17 g Oral DAILY PRN    ondansetron (ZOFRAN ODT) tablet 4 mg  4 mg Oral Q8H PRN    Or    ondansetron (ZOFRAN) injection 4 mg  4 mg IntraVENous Q6H PRN    cefepime (MAXIPIME) 1 g in 0.9% sodium chloride (MBP/ADV) 50 mL MBP  1 g IntraVENous Q8H    lisinopriL (PRINIVIL, ZESTRIL) tablet 40 mg  40 mg Oral DAILY    tamsulosin (FLOMAX) capsule 0.4 mg  0.4 mg Oral DAILY    dilTIAZem ER (CARDIZEM CD) capsule 240 mg  240 mg Oral DAILY    rivaroxaban (XARELTO) tablet 20 mg  20 mg Oral DAILY WITH BREAKFAST    furosemide (LASIX) tablet 40 mg  40 mg Oral DAILY    allopurinoL (ZYLOPRIM) tablet 300 mg  300 mg Oral DAILY    sertraline (ZOLOFT) tablet 75 mg  75 mg Oral DAILY    cyclobenzaprine (FLEXERIL) tablet 5 mg  5 mg Oral QHS    morphine injection 4 mg  4 mg IntraVENous Q4H PRN    vancomycin (VANCOCIN) 2000 mg in  ml infusion  2,000 mg IntraVENous Q12H    oxyCODONE IR (ROXICODONE) tablet 5 mg  5 mg Oral Q4H PRN    oxyCODONE IR (ROXICODONE) tablet 10 mg  10 mg Oral Q6H PRN    Lactobacillus Acidoph & Bulgar (FLORANEX) tablet 2 Tablet  2 Tablet Oral BID       Signed:  Italia Cloud NP    Part of this note may have been written by using a voice dictation software. The note has been proof read but may still contain some grammatical/other typographical errors.

## 2021-10-30 NOTE — PROGRESS NOTES
Pt is a/o x 4 on RA. Pt ambulates without assistance to the bathroom. Dressing changed on LLE. Pt c/o pain to the extremity and prn pain meds administered with relief. IV antibiotics infused and tolerated without difficulty. Will continue to monitor and provide care.

## 2021-10-31 VITALS
WEIGHT: 297.8 LBS | DIASTOLIC BLOOD PRESSURE: 70 MMHG | TEMPERATURE: 98.1 F | RESPIRATION RATE: 16 BRPM | BODY MASS INDEX: 37.03 KG/M2 | OXYGEN SATURATION: 95 % | HEART RATE: 66 BPM | HEIGHT: 75 IN | SYSTOLIC BLOOD PRESSURE: 126 MMHG

## 2021-10-31 LAB
ERYTHROCYTE [DISTWIDTH] IN BLOOD BY AUTOMATED COUNT: 12.3 % (ref 11.9–14.6)
FOLATE SERPL-MCNC: 12.7 NG/ML (ref 3.1–17.5)
HCT VFR BLD AUTO: 40.5 % (ref 41.1–50.3)
HGB BLD-MCNC: 13.2 G/DL (ref 13.6–17.2)
MCH RBC QN AUTO: 34.2 PG (ref 26.1–32.9)
MCHC RBC AUTO-ENTMCNC: 32.6 G/DL (ref 31.4–35)
MCV RBC AUTO: 104.9 FL (ref 79.6–97.8)
NRBC # BLD: 0 K/UL (ref 0–0.2)
PLATELET # BLD AUTO: 136 K/UL (ref 150–450)
PMV BLD AUTO: 11.5 FL (ref 9.4–12.3)
RBC # BLD AUTO: 3.86 M/UL (ref 4.23–5.6)
VIT B12 SERPL-MCNC: 473 PG/ML (ref 193–986)
WBC # BLD AUTO: 8.8 K/UL (ref 4.3–11.1)

## 2021-10-31 PROCEDURE — 36415 COLL VENOUS BLD VENIPUNCTURE: CPT

## 2021-10-31 PROCEDURE — 74011250637 HC RX REV CODE- 250/637: Performed by: NURSE PRACTITIONER

## 2021-10-31 PROCEDURE — 82607 VITAMIN B-12: CPT

## 2021-10-31 PROCEDURE — 74011250637 HC RX REV CODE- 250/637: Performed by: HOSPITALIST

## 2021-10-31 PROCEDURE — 82746 ASSAY OF FOLIC ACID SERUM: CPT

## 2021-10-31 PROCEDURE — 74011250636 HC RX REV CODE- 250/636: Performed by: HOSPITALIST

## 2021-10-31 PROCEDURE — 85027 COMPLETE CBC AUTOMATED: CPT

## 2021-10-31 PROCEDURE — 74011000258 HC RX REV CODE- 258: Performed by: HOSPITALIST

## 2021-10-31 RX ORDER — NALOXONE HYDROCHLORIDE 4 MG/.1ML
SPRAY NASAL
Qty: 1 EACH | Refills: 0 | Status: SHIPPED | OUTPATIENT
Start: 2021-10-31

## 2021-10-31 RX ORDER — OXYCODONE HYDROCHLORIDE 5 MG/1
5 TABLET ORAL
Qty: 18 TABLET | Refills: 0 | Status: SHIPPED | OUTPATIENT
Start: 2021-10-31 | End: 2021-11-03

## 2021-10-31 RX ORDER — CLINDAMYCIN HYDROCHLORIDE 300 MG/1
300 CAPSULE ORAL 4 TIMES DAILY
Qty: 20 CAPSULE | Refills: 0 | Status: SHIPPED | OUTPATIENT
Start: 2021-11-01 | End: 2021-11-06

## 2021-10-31 RX ORDER — UREA 10 %
2 LOTION (ML) TOPICAL 2 TIMES DAILY
Qty: 28 TABLET | Refills: 0 | Status: SHIPPED | OUTPATIENT
Start: 2021-10-31 | End: 2021-11-07

## 2021-10-31 RX ORDER — CLINDAMYCIN HYDROCHLORIDE 150 MG/1
300 CAPSULE ORAL 4 TIMES DAILY
Status: DISCONTINUED | OUTPATIENT
Start: 2021-11-01 | End: 2021-10-31 | Stop reason: HOSPADM

## 2021-10-31 RX ADMIN — VANCOMYCIN HYDROCHLORIDE 2000 MG: 10 INJECTION, POWDER, LYOPHILIZED, FOR SOLUTION INTRAVENOUS at 04:00

## 2021-10-31 RX ADMIN — Medication 10 ML: at 05:21

## 2021-10-31 RX ADMIN — LISINOPRIL 40 MG: 20 TABLET ORAL at 09:24

## 2021-10-31 RX ADMIN — SERTRALINE 75 MG: 50 TABLET, FILM COATED ORAL at 09:25

## 2021-10-31 RX ADMIN — CEFEPIME HYDROCHLORIDE 1 G: 1 INJECTION, POWDER, FOR SOLUTION INTRAMUSCULAR; INTRAVENOUS at 03:14

## 2021-10-31 RX ADMIN — FUROSEMIDE 40 MG: 40 TABLET ORAL at 09:25

## 2021-10-31 RX ADMIN — LACTOBACILLUS TAB 2 TABLET: TAB at 09:24

## 2021-10-31 RX ADMIN — OXYCODONE 5 MG: 5 TABLET ORAL at 03:15

## 2021-10-31 RX ADMIN — OXYCODONE 5 MG: 5 TABLET ORAL at 09:25

## 2021-10-31 RX ADMIN — DILTIAZEM HYDROCHLORIDE 240 MG: 240 CAPSULE, COATED, EXTENDED RELEASE ORAL at 09:24

## 2021-10-31 RX ADMIN — TAMSULOSIN HYDROCHLORIDE 0.4 MG: 0.4 CAPSULE ORAL at 09:24

## 2021-10-31 RX ADMIN — OXYCODONE 5 MG: 5 TABLET ORAL at 14:48

## 2021-10-31 RX ADMIN — CEFEPIME HYDROCHLORIDE 1 G: 1 INJECTION, POWDER, FOR SOLUTION INTRAMUSCULAR; INTRAVENOUS at 12:29

## 2021-10-31 RX ADMIN — ALLOPURINOL 300 MG: 300 TABLET ORAL at 09:25

## 2021-10-31 RX ADMIN — RIVAROXABAN 20 MG: 20 TABLET, FILM COATED ORAL at 09:25

## 2021-10-31 NOTE — PROGRESS NOTES
VANCO DAILY FOLLOW UP NOTE  [address] [facility] Pharmacokinetic Monitoring Service - Vancomycin    Consulting Provider: marga   Indication: SSTI  Target Concentration: Goal trough of 10-15 mg/L and AUC/KAZ <500 mg*hr/L  Day of Therapy: 3  Additional Antimicrobials: cefpime    Pertinent Laboratory Values: Wt Readings from Last 1 Encounters:   10/29/21 135.1 kg (297 lb 12.8 oz)     Temp Readings from Last 1 Encounters:   10/31/21 98.8 °F (37.1 °C)     No components found for: PROCAL  Recent Labs     10/31/21  0741 10/30/21  0849 10/29/21  0000 10/28/21  2218   BUN  --  17  --  13   CREA  --  0.83  --  0.85   WBC 8.8 10.2  --  11.7*   PCT  --   --  <0.05  --    LAC  --   --   --  0.9     Estimated Creatinine Clearance: 145.4 mL/min (based on SCr of 0.83 mg/dL). Lab Results   Component Value Date/Time    Vancomycin,trough 16.5 06/18/2020 07:59 AM    Vancomycin, random 30.2 10/30/2021 08:49 AM       MRSA Nasal Swab: N/A. Non-respiratory infection. .      Assessment:  Date/Time Dose Concentration AUC   10/30 0849 2000 mg q12h 30.2 707   Note: Serum concentrations collected for AUC dosing may appear elevated if collected in close proximity to the dose administered, this is not necessarily an indication of toxicity    Plan:  Continue 1250 mg q12h currently. Repeat vancomycin concentration as clinically indicated.    Pharmacy will continue to monitor patient and adjust therapy as indicated    Thank you for the consult,  Cliff Kaplan, PharmD, [address] [facility] Pharmacist  [phone]

## 2021-10-31 NOTE — PROGRESS NOTES
CM met with patient to complete assessment. Patient alert and oriented. Demographic information verified by the patient. The patient lives with his spouse Jane Mendez [phone] in a one story home with 3 steps at the front entrance. Patient confirmed he is independent with completing ADL's and drives. Patient is employed with [employer]. DME: NONE    Patient obtains his prescription medications from [facility]  on [address] in [place]. Patient voiced no difficulty with obtaining medications in the community. Discharge planning: Patient confirmed a history of [facility] services for wound care. Patient denies any history of REHAB. Patient confirmed [facility] services are not needed to assist with wound care upon discharge. CM was receptive. Patient anticipates returning home when medically stable. CM remains available. Care Management Interventions  PCP Verified by CM: Yes  Mode of Transport at Discharge: Other (see comment) (Family. )  Transition of Care Consult (CM Consult): Discharge Planning  Discharge Durable Medical Equipment: No  Physical Therapy Consult: No  Occupational Therapy Consult: No  Speech Therapy Consult: No  Support Systems: Spouse/Significant Other  Confirm Follow Up Transport: Self  The Plan for Transition of Care is Related to the Following Treatment Goals : Return to Baseline.    The Patient and/or Patient Representative was Provided with a Choice of Provider and Agrees with the Discharge Plan?: Yes  Name of the Patient Representative Who was Provided with a Choice of Provider and Agrees with the Discharge Plan: Patient  [address] Provided?: No  Discharge Location  Discharge Placement: Home

## 2021-10-31 NOTE — DISCHARGE SUMMARY
Hospitalist Discharge Summary   Admit Date:  10/28/2021 10:17 PM   DC Note date: 10/31/2021  Name:  Renate Montes De Oca   Age:  61 y.o. Sex:  male  :  1965   MRN:  [id number]   Room:  [facility]  PCP:  Bianca Manrique MD    Presenting Complaint: Leg Pain    Initial Admission Diagnosis: Cellulitis of right lower extremity [D55.233]  Nonhealing nonsurgical wound [T14. 8XXA]  Hypertension [I10]  Chronic atrial fibrillation ([address] [I48.20]     Problem List for this Hospitalization:  Hospital Problems as of 10/31/2021 Date Reviewed: 2020        Codes Class Noted - Resolved POA    Hypertension ICD-10-CM: I10  ICD-9-CM: 401.9  10/29/2021 - Present Unknown        Nonhealing nonsurgical wound ICD-10-CM: T14. 8XXA  ICD-9-CM: 879.9  10/29/2021 - Present Unknown        Chronic atrial fibrillation (HCC) ICD-10-CM: I48.20  ICD-9-CM: 427.31  10/29/2021 - Present Unknown        * (Principal) Cellulitis of right lower extremity ICD-10-CM: L03.115  ICD-9-CM: 682.6  10/29/2021 - Present Unknown            Did Patient have Sepsis (YES OR NO): No    Hospital Course:  Mr. Alexandra is a 61 y. o. male with PMH of hypertension, chronic atrial fibrillation who presented to the ER with worsening of redness, swelling over R lower extremity.  Patient reported seeing wound care as outpatient with some results but recently he did not follow, for past few weeks the leg was having increased redness, draining over R lower extremity just below medial malleolus.  Patient reported severe pain with ambulation on presentation.  He endorses a few days of subjective fevers, chills. He was admitted to the Hospitalist service for further evaluation and management. Blood cultures are negative at 48 hours. He was on cefepime and vancomycin and will be changed to clindamycin at discharge. His leg appearance has somewhat improved since admission and the patient states it is less painful now.   He is encouraged to follow up with his PCP this week and also to return to the wound care clinic. He also needs to follow up with the Vascular physician who currently treats his PVD. He is appropriate for discharge home on Oct/31/2021 and prescriptions are provided. A&P  Principal Problem:  Cellulitis of right lower extremity (10/29/2021)  BCx negative x 2 days  WOCN consulted  LA, PCT normal  XR tib/fib w/o acute finding  WBC wnl  Clindamycin at discharge  PRN analgesia     Active Problems:  Non-healing non-surgical wound (10/29/2021)  PVD  Refer back to wound care center at discharge. Follow up with your PCP and Vascular team at discharge     Hypertension (10/29/2021)  Continue home medications.     Chronic atrial fibrillation ([facility] [address] (10/29/2021)  Continue home Cardizem, Xarelto.     Obesity Class 2  Lifestyle modification    Disposition: Home or Self Care  Diet: ADULT DIET Regular  Code Status: Full Code    Follow Up Orders: Follow-up Appointments   Procedures    FOLLOW UP VISIT Appointment in: Other (Specify)     Standing Status:   Standing     Number of Occurrences:   1     Order Specific Question:   Appointment in     Answer: Other (Specify)       Follow-up Information     Follow up With Specialties Details Why Albertina Huddleston MD Internal Medicine In 3 days post discharge check up, medication review [address] In 3 days resume wound care [place] [id number] [phone]          Time spent in patient discharge and coordination 37 minutes. Plan was discussed with Mr. Freda Huggins, attending physician, and case management. All questions answered. Patient was stable at time of discharge. Instructions given to call a physician or return if any concerns.     Discharge Info:   Current Discharge Medication List      START taking these medications    Details   Lactobacillus Acidoph & Bulgar (FLORANEX) 1 million cell tab tablet Take 2 Tablets by mouth two (2) times a day for 7 days. Qty: 28 Tablet, Refills: 0  Start date: 10/31/2021, End date: 11/7/2021      clindamycin (CLEOCIN) 300 mg capsule Take 1 Capsule by mouth four (4) times daily for 20 doses. Qty: 20 Capsule, Refills: 0  Start date: 11/1/2021, End date: 11/6/2021      oxyCODONE IR (ROXICODONE) 5 mg immediate release tablet Take 1 Tablet by mouth every four (4) hours as needed for Pain for up to 3 days. Max Daily Amount: 30 mg.  Qty: 18 Tablet, Refills: 0  Start date: 10/31/2021, End date: 11/3/2021    Associated Diagnoses: Cellulitis of right lower extremity      naloxone (Narcan) 4 mg/actuation nasal spray Use 1 spray intranasally, then discard. Repeat with new spray every 2 min as needed for opioid overdose symptoms, alternating nostrils. Qty: 1 Each, Refills: 0  Start date: 10/31/2021         CONTINUE these medications which have NOT CHANGED    Details   Xarelto 20 mg tab tablet TAKE 1 TAB BY MOUTH DAILY (WITH DINNER). Qty: 90 Tab, Refills: 0    Associated Diagnoses: Permanent atrial fibrillation (HCC)      cyclobenzaprine (FLEXERIL) 5 mg tablet TAKE 1 TABLET BY MOUTH EVERY DAY AT NIGHT  Qty: 90 Tab, Refills: 0      meloxicam (MOBIC) 15 mg tablet Take 15 mg by mouth daily. famotidine (PEPCID) 40 mg tablet Take 1 Tab by mouth two (2) times a day. Qty: 180 Tab, Refills: 3    Associated Diagnoses: Gastroesophageal reflux disease without esophagitis      dilTIAZem CD (CARDIZEM CD) 240 mg ER capsule Take 1 Cap by mouth daily. Qty: 90 Cap, Refills: 3      tamsulosin (FLOMAX) 0.4 mg capsule Take 1 Cap by mouth daily. Qty: 90 Cap, Refills: 3      sertraline (ZOLOFT) 50 mg tablet Take 1.5 Tabs by mouth daily. Qty: 135 Tab, Refills: 3      furosemide (LASIX) 40 mg tablet Take 1 Tab by mouth daily. Qty: 90 Tab, Refills: 3      allopurinoL (ZYLOPRIM) 300 mg tablet Take 1 Tab by mouth daily.   Qty: 90 Tab, Refills: 0    Associated Diagnoses: History of gout      acetaminophen (TYLENOL) 500 mg tablet Take 500 mg by mouth every six (6) hours as needed for Pain. lisinopril (PRINIVIL, ZESTRIL) 40 mg tablet Take 1 Tab by mouth daily. Qty: 90 Tab, Refills: 3         STOP taking these medications       amoxicillin-clavulanate (Augmentin) 875-125 mg per tablet Comments:   Reason for Stopping:         cefadroxil (DURICEF) 500 mg capsule Comments:   Reason for Stopping:         ondansetron hcl (ZOFRAN) 4 mg tablet Comments:   Reason for Stopping:         ondansetron (ZOFRAN ODT) 4 mg disintegrating tablet Comments:   Reason for Stopping:         colchicine 0.6 mg tablet Comments:   Reason for Stopping:               Procedures done this admission:  * No surgery found *    Consults this admission:  None    Echocardiogram/EKG results:  No results found for this or any previous visit. EKG Results     None          Diagnostic Imaging/Tests:   XR TIB/FIB RT    Result Date: 10/29/2021  Clinical history: Cellulitis, chronic wound, evaluate for subcutaneous gas TECHNIQUE: AP and lateral views of the right tibia/fibula. FINDINGS: There is subcutaneous soft tissue swelling. No definite soft tissue gas. There is no acute fracture or dislocation. No erosive or destructive bone lesion. Alignment is maintained. 1. Soft tissue swelling/edema. No definite soft tissue gas. 2. No acute bone abnormality or radiographic evidence of osteomyelitis.       All Micro Results     Procedure Component Value Units Date/Time    CULTURE, BLOOD [[id number]] Collected: 10/29/21 1637    Order Status: Completed Specimen: Blood Updated: 10/31/21 0641     Special Requests: --        LEFT  Antecubital       Culture result: NO GROWTH 2 DAYS       CULTURE, BLOOD [[id number]] Collected: 10/29/21 0343    Order Status: Completed Specimen: Blood Updated: 10/31/21 0641     Special Requests: --        NO SPECIAL REQUESTS  RIGHT  Antecubital       Culture result: NO GROWTH 2 DAYS       CULTURE, BLOOD [[id number]] Collected: 10/29/21 0320    Order Status: Canceled Specimen: Blood           Labs: Results:       BMP, Mg, Phos Recent Labs     10/30/21  0849 10/28/21  2218    140   K 3.9 4.5   * 108*   CO2 27 27   AGAP 5* 5*   BUN 17 13   CREA 0.83 0.85   CA 8.9 9.1   * 100   MG 2.0  --       CBC Recent Labs     10/31/21  0741 10/30/21  0849 10/28/21  2218   WBC 8.8 10.2 11.7*   RBC 3.86* 3.72* 4.18*   HGB 13.2* 13.0* 14.1   HCT 40.5* 39.2* 42.6   * 196 188   GRANS  --  73 74   LYMPH  --  17 15   EOS  --  2 1   MONOS  --  8 9   BASOS  --  0 1   IG  --  1 1   ANEU  --  7.4 8.6*   ABL  --  1.7 1.8   FLOWER  --  0.2 0.2   ABM  --  0.8 1.1   ABB  --  0.0 0.1   AIG  --  0.1 0.1      LFT Recent Labs     10/30/21  0849 10/28/21  2218   ALT 15 19   AP 57 68   TP 5.9* 7.0   ALB 2.5* 3.2*   GLOB 3.4 3.8*   AGRAT 0.7* 0.8*      Cardiac Testing Lab Results   Component Value Date/Time    B-type Natriuretic Peptide 11.8 03/17/2016 10:47 AM      Coagulation Tests Lab Results   Component Value Date/Time    aPTT 34.7 11/18/2019 09:37 AM    aPTT 63.2 (H) 11/18/2019 12:35 AM    aPTT 36.7 11/17/2019 05:59 PM      A1c Lab Results   Component Value Date/Time    Hemoglobin A1c 5.9 01/18/2020 03:15 AM      Lipid Panel Lab Results   Component Value Date/Time    Cholesterol, total 173 07/10/2019 11:20 AM    HDL Cholesterol 32 (L) 07/10/2019 11:20 AM    LDL, calculated 121 (H) 07/10/2019 11:20 AM    VLDL, calculated 20 07/10/2019 11:20 AM    Triglyceride 102 07/10/2019 11:20 AM    CHOL/HDL Ratio 5.4 05/23/2018 01:50 PM      Thyroid Panel Lab Results   Component Value Date/Time    TSH 2.240 11/16/2019 07:04 PM    TSH 1.080 07/10/2019 11:20 AM    T4, Total 7.1 08/24/2016 11:21 AM    T4, Free 1.1 11/16/2019 07:04 PM        Most Recent UA Lab Results   Component Value Date/Time    Color YELLOW 11/14/2019 11:17 PM    Appearance CLEAR 11/14/2019 11:17 PM    Specific gravity 1.027 (H) 11/14/2019 11:17 PM    pH (UA) 5.5 11/14/2019 11:17 PM    Protein NEGATIVE  11/14/2019 11:17 PM    Glucose NEGATIVE  11/14/2019 11:17 PM    Ketone NEGATIVE  11/14/2019 11:17 PM    Bilirubin NEGATIVE  11/14/2019 11:17 PM    Blood NEGATIVE  11/14/2019 11:17 PM    Urobilinogen 1.0 11/14/2019 11:17 PM    Nitrites NEGATIVE  11/14/2019 11:17 PM    Leukocyte Esterase NEGATIVE  11/14/2019 11:17 PM    WBC 0-3 10/29/2021 01:10 AM    RBC 0-3 10/29/2021 01:10 AM    Epithelial cells 0 10/29/2021 01:10 AM    Bacteria 0 10/29/2021 01:10 AM    Casts 0 10/29/2021 01:10 AM          All Labs from Last 24 Hrs:  Recent Results (from the past 24 hour(s))   CBC W/O DIFF    Collection Time: 10/31/21  7:41 AM   Result Value Ref Range    WBC 8.8 4.3 - 11.1 K/uL    RBC 3.86 (L) 4.23 - 5.6 M/uL    HGB 13.2 (L) 13.6 - 17.2 g/dL    HCT 40.5 (L) 41.1 - 50.3 %    .9 (H) 79.6 - 97.8 FL    MCH 34.2 (H) 26.1 - 32.9 PG    MCHC 32.6 31.4 - 35.0 g/dL    RDW 12.3 11.9 - 14.6 %    PLATELET 614 (L) 099 - 450 K/uL    MPV 11.5 9.4 - 12.3 FL    ABSOLUTE NRBC 0.00 0.0 - 0.2 K/uL   FOLATE    Collection Time: 10/31/21  7:41 AM   Result Value Ref Range    Folate 12.7 3.1 - 17.5 ng/mL   VITAMIN B12    Collection Time: 10/31/21  7:41 AM   Result Value Ref Range    Vitamin B12 473 193 - 986 pg/mL       Current Med List in Hospital:   Current Facility-Administered Medications   Medication Dose Route Frequency    Vancomycin Random Level Reminder   Other ONCE    [START ON 11/1/2021] clindamycin (CLEOCIN) capsule 300 mg  300 mg Oral QID    morphine injection 2 mg  2 mg IntraVENous Q4H PRN    sodium chloride (NS) flush 5-40 mL  5-40 mL IntraVENous Q8H    sodium chloride (NS) flush 5-40 mL  5-40 mL IntraVENous PRN    acetaminophen (TYLENOL) tablet 650 mg  650 mg Oral Q6H PRN    Or    acetaminophen (TYLENOL) suppository 650 mg  650 mg Rectal Q6H PRN    polyethylene glycol (MIRALAX) packet 17 g  17 g Oral DAILY PRN    ondansetron (ZOFRAN ODT) tablet 4 mg  4 mg Oral Q8H PRN    Or    ondansetron (ZOFRAN) injection 4 mg  4 mg IntraVENous Q6H PRN    lisinopriL (PRINIVIL, ZESTRIL) tablet 40 mg  40 mg Oral DAILY    tamsulosin (FLOMAX) capsule 0.4 mg  0.4 mg Oral DAILY    dilTIAZem ER (CARDIZEM CD) capsule 240 mg  240 mg Oral DAILY    rivaroxaban (XARELTO) tablet 20 mg  20 mg Oral DAILY WITH BREAKFAST    furosemide (LASIX) tablet 40 mg  40 mg Oral DAILY    allopurinoL (ZYLOPRIM) tablet 300 mg  300 mg Oral DAILY    sertraline (ZOLOFT) tablet 75 mg  75 mg Oral DAILY    cyclobenzaprine (FLEXERIL) tablet 5 mg  5 mg Oral QHS    oxyCODONE IR (ROXICODONE) tablet 5 mg  5 mg Oral Q4H PRN    oxyCODONE IR (ROXICODONE) tablet 10 mg  10 mg Oral Q6H PRN    Lactobacillus Acidoph & Bulgar (FLORANEX) tablet 2 Tablet  2 Tablet Oral BID       Allergies   Allergen Reactions    Codeine Nausea Only    Sulfa (Sulfonamide Antibiotics) Rash     Immunization History   Administered Date(s) Administered    TB Skin Test (PPD) Intradermal 01/15/2020    Tdap 02/26/2018       Recent Vital Data:  Patient Vitals for the past 24 hrs:   Temp Pulse Resp BP SpO2   10/31/21 1146 98.1 °F (36.7 °C) 66 16 126/70 95 %   10/31/21 0730 98.1 °F (36.7 °C) 60 16 128/68 96 %   10/31/21 0327 98.8 °F (37.1 °C) 73 16 119/65 95 %   10/31/21 0034 98 °F (36.7 °C) 74 18 132/74 96 %   10/30/21 1938 98.7 °F (37.1 °C) 71 21 127/64 94 %   10/30/21 1554 98.2 °F (36.8 °C) 69 20 115/67 93 %     Oxygen Therapy  O2 Sat (%): 95 % (10/31/21 1146)  Pulse via Oximetry: 69 beats per minute (10/30/21 0345)  O2 Device: None (Room air) (10/30/21 0345)    Estimated body mass index is 37.22 kg/m² as calculated from the following:    Height as of this encounter: 6' 3\" (1.905 m). Weight as of this encounter: 135.1 kg (297 lb 12.8 oz).     Intake/Output Summary (Last 24 hours) at 10/31/2021 1450  Last data filed at 10/31/2021 1401  Gross per 24 hour   Intake 1200 ml   Output 2800 ml   Net -1600 ml         Physical Exam:  General:    No acute distress  Head:  Normocephalic, atraumatic  Eyes:  Sclerae appear normal.  Pupils equally round. HENT:  Nares appear normal, no drainage. Moist mucous membranes  Neck:  No restricted ROM. Trachea midline  CV:   RRR. No m/r/g. Lungs: No wheezing. Even, unlabored on room air. Abdomen:   Soft, obese, nondistended. Extremities: Varicosities noted in R thigh, no erythema or edema to bilat UEs   Skin:     Erythema of R LE, petrolatum gauze and Kerlix wrapped  Neuro:  CN II-XII grossly intact. Psych:  Normal mood and affect. Signed:  Joshua Muñoz NP    Part of this note may have been written by using a voice dictation software. The note has been proof read but may still contain some grammatical/other typographical errors.

## 2021-10-31 NOTE — PROGRESS NOTES
Discharge teaching done with pt. Instructed on all care, medication, and appointments. Written instructions, medication teaching wit prescriptions, and follow up appointments given. Pt states\" I do not know if I am going to follow up with wound care or not. I think the are the ones that got me in this fix in the first place. \"  Encouraged pt to follow up with all appointments. Verbalizes understanding of teaching.  Drsg sup piles given

## 2021-10-31 NOTE — PROGRESS NOTES
Pt offered Home Health per case management to assist with wound care and to assist with help in getting supplies such as aqua Phor and drsg supplies. Pt refused Kandi Franklin, from case management also spoke with pt. Discharged home. Taken down in wheelchair by Janina and place in car with ride home.

## 2021-10-31 NOTE — PROGRESS NOTES
Patient for discharge today. No supportive care orders received at this time. Family to transport the patient home. All milestones/treatment goals met. CM remains available if any needs should arise. Care Management Interventions  PCP Verified by CM: Yes  Mode of Transport at Discharge: Other (see comment) (Family. )  Transition of Care Consult (CM Consult): Discharge Planning  Discharge Durable Medical Equipment: No  Physical Therapy Consult: No  Occupational Therapy Consult: No  Speech Therapy Consult: No  Support Systems: Spouse/Significant Other  Confirm Follow Up Transport: Self  The Plan for Transition of Care is Related to the Following Treatment Goals : Return to Baseline.    The Patient and/or Patient Representative was Provided with a Choice of Provider and Agrees with the Discharge Plan?: Yes  Name of the Patient Representative Who was Provided with a Choice of Provider and Agrees with the Discharge Plan: Patient  [address] Provided?: No  Discharge Location  Discharge Placement: Home

## 2021-12-29 ENCOUNTER — HOSPITAL ENCOUNTER (OUTPATIENT)
Dept: WOUND CARE | Age: 56
Discharge: HOME | End: 2021-12-29
Attending: SURGERY
Payer: COMMERCIAL

## 2021-12-29 VITALS
DIASTOLIC BLOOD PRESSURE: 81 MMHG | WEIGHT: 300 LBS | HEART RATE: 86 BPM | SYSTOLIC BLOOD PRESSURE: 142 MMHG | HEIGHT: 75 IN | RESPIRATION RATE: 20 BRPM | OXYGEN SATURATION: 97 % | TEMPERATURE: 98.6 F | BODY MASS INDEX: 37.3 KG/M2

## 2021-12-29 DIAGNOSIS — L97.311 VENOUS STASIS ULCER OF RIGHT ANKLE LIMITED TO BREAKDOWN OF SKIN WITH VARICOSE VEINS (HCC): Primary | ICD-10-CM

## 2021-12-29 DIAGNOSIS — I83.013 VENOUS STASIS ULCER OF RIGHT ANKLE LIMITED TO BREAKDOWN OF SKIN WITH VARICOSE VEINS (HCC): Primary | ICD-10-CM

## 2021-12-29 PROBLEM — I83.003 VENOUS STASIS ULCER OF ANKLE LIMITED TO BREAKDOWN OF SKIN WITH VARICOSE VEINS (HCC): Status: ACTIVE | Noted: 2021-12-29

## 2021-12-29 PROBLEM — L97.301 VENOUS STASIS ULCER OF ANKLE LIMITED TO BREAKDOWN OF SKIN WITH VARICOSE VEINS (HCC): Status: ACTIVE | Noted: 2021-12-29

## 2021-12-29 PROCEDURE — 99213 OFFICE O/P EST LOW 20 MIN: CPT

## 2021-12-29 PROCEDURE — 99203 OFFICE O/P NEW LOW 30 MIN: CPT | Performed by: SURGERY

## 2021-12-29 RX ORDER — LIDOCAINE HYDROCHLORIDE 20 MG/ML
JELLY TOPICAL ONCE
Status: CANCELLED | OUTPATIENT
Start: 2021-12-29 | End: 2021-12-29

## 2021-12-29 RX ORDER — TRIAMCINOLONE ACETONIDE 1 MG/G
OINTMENT TOPICAL ONCE
Status: CANCELLED | OUTPATIENT
Start: 2021-12-29 | End: 2021-12-29

## 2021-12-29 RX ORDER — BACITRACIN 500 [USP'U]/G
OINTMENT TOPICAL ONCE
Status: CANCELLED | OUTPATIENT
Start: 2021-12-29 | End: 2021-12-29

## 2021-12-29 RX ORDER — LIDOCAINE 50 MG/G
OINTMENT TOPICAL ONCE
Status: CANCELLED | OUTPATIENT
Start: 2021-12-29 | End: 2021-12-29

## 2021-12-29 RX ORDER — BACITRACIN ZINC AND POLYMYXIN B SULFATE 500; 1000 [USP'U]/G; [USP'U]/G
OINTMENT TOPICAL ONCE
Status: CANCELLED | OUTPATIENT
Start: 2021-12-29 | End: 2021-12-29

## 2021-12-29 RX ORDER — DOXYCYCLINE 100 MG/1
100 CAPSULE ORAL EVERY 12 HOURS
Status: DISCONTINUED | OUTPATIENT
Start: 2021-12-29 | End: 2021-12-29

## 2021-12-29 RX ORDER — CLOBETASOL PROPIONATE 0.5 MG/G
OINTMENT TOPICAL ONCE
Status: CANCELLED | OUTPATIENT
Start: 2021-12-29 | End: 2021-12-29

## 2021-12-29 RX ORDER — GENTAMICIN SULFATE 1 MG/G
OINTMENT TOPICAL ONCE
Status: CANCELLED | OUTPATIENT
Start: 2021-12-29 | End: 2021-12-29

## 2021-12-29 RX ORDER — ONDANSETRON 4 MG/1
4 TABLET, ORALLY DISINTEGRATING ORAL
COMMUNITY

## 2021-12-29 RX ORDER — MUPIROCIN 20 MG/G
OINTMENT TOPICAL ONCE
Status: CANCELLED | OUTPATIENT
Start: 2021-12-29 | End: 2021-12-29

## 2021-12-29 RX ORDER — DOXYCYCLINE 100 MG/1
100 CAPSULE ORAL 2 TIMES DAILY
Qty: 28 CAPSULE | Refills: 0 | Status: SHIPPED | OUTPATIENT
Start: 2021-12-29

## 2021-12-29 RX ORDER — LIDOCAINE 40 MG/G
CREAM TOPICAL ONCE
Status: CANCELLED | OUTPATIENT
Start: 2021-12-29 | End: 2021-12-29

## 2021-12-29 RX ORDER — BETAMETHASONE DIPROPIONATE 0.5 MG/G
OINTMENT TOPICAL ONCE
Status: CANCELLED | OUTPATIENT
Start: 2021-12-29 | End: 2021-12-29

## 2021-12-29 RX ORDER — LIDOCAINE HYDROCHLORIDE 20 MG/ML
5 SOLUTION OROPHARYNGEAL ONCE
Status: CANCELLED | OUTPATIENT
Start: 2021-12-29 | End: 2021-12-29

## 2021-12-29 RX ORDER — LIDOCAINE HYDROCHLORIDE 40 MG/ML
SOLUTION TOPICAL ONCE
Status: CANCELLED | OUTPATIENT
Start: 2021-12-29 | End: 2021-12-29

## 2021-12-29 RX ORDER — SILVER SULFADIAZINE 10 G/1000G
CREAM TOPICAL ONCE
Status: CANCELLED | OUTPATIENT
Start: 2021-12-29 | End: 2021-12-29

## 2021-12-29 RX ORDER — HYDROCODONE BITARTRATE AND ACETAMINOPHEN 5; 325 MG/1; MG/1
1 TABLET ORAL
COMMUNITY

## 2021-12-29 NOTE — DISCHARGE INSTRUCTIONS
[address]  [address], [address]  [place]   Phone: [phone]  Fax: [phone]    Patient: Clemencia Simpson MRN: [id number]  SSN: [id number]    YOB: 1965  Age: 61 y.o. Sex: male       Return Appointment: 2 weeks with Dr Pooja Dobson    Right Lower Leg  Cleanse with Normal Saline  Apply Xeroform to Reddened Areas  Cover with Exudry/ABD/Maxi Pads  Wrap with Rolled Gauze  Change Daily  Patient Refuses Compression    Elevate Lower Legs as Much as possible  DO NOT GET DRESSING WET-PURCHASE CAST COVER AT [facility] OR [facility]  Stay off Legs as Much as possible-If Possible Take a Week off from Work     Antibiotics at Pharmacy    Should you experience increased redness, swelling, pain, foul odor, size of wound(s), or have a temperature over 101 degrees please contact the [address] [place] [address] Road at [phone] or if after hours contact your primary care physician or go to the hospital emergency department.     Signed By: Jasen Meyers RN     December 29, 2021

## 2021-12-29 NOTE — PROGRESS NOTES
[address], [address], [facility] [address]  [phone]    Progress Notes   Juan Corrigan       MRN: [id number]     : 1965     Age: 61 y.o. Subjective/HPI:   This patient is a 61 y.o. male with hypertension, chronic venous insufficiency and chronic venous ulcers seen and evaluated at the wound clinic for initial evaluation of venous stasis ulcers of the right lower extremity. Ulcers have been present since . Patient was previously a patient at [facility]. He was admitted 2021 with cellulitis of the right lower extremity. Since then he reports that the redness and pain have worsened. Pain occurs on a daily basis and is worse with standing or walking. He also reports increased serous drainage from the ulcers. The patient has been treating the ulcers with Xeroform gauze and a nonelastic wrap. He has tried compression dressings in the past but has not been able to tolerate these. Past history is pertinent for previous radiofrequency ablation. Previous ankle-brachial index from 2019 on the left was 1.1. The patient works at [employer] and is ambulatory most of the day. He denies any recent fever or chills. Review of Systems  A comprehensive review of systems was negative except for that written in the HPI.     Past Medical History:   Diagnosis Date    Anxiety     Arrhythmia     a-fib    Cellulitis of leg 2019    Erectile dysfunction 2014    DIANA (generalized anxiety disorder) 2014    GERD (gastroesophageal reflux disease)     Gout 2014    History of peptic ulcer     Hypertension     Nausea & vomiting     Obesity, morbid ([address] 2018    Osteoarthritis of hand, primary localized 2014    Personal history of urinary calculi     x2    Venous (peripheral) insufficiency 12/3/2019      Past Surgical History:   Procedure Laterality Date    HX WISDOM TEETH EXTRACTION      VASCULAR SURGERY PROCEDURE UNLIST Bilateral 07/01/2020    Bilateral iliac venogram, intravascular ultrasound x2. Allergies   Allergen Reactions    Codeine Nausea Only    Sulfa (Sulfonamide Antibiotics) Rash      Social History     Tobacco Use    Smoking status: Former Smoker     Years: 5.00     Quit date: 7/28/2011     Years since quitting: 10.4    Smokeless tobacco: Never Used    Tobacco comment: quit in the 80's    Substance Use Topics    Alcohol use: No      Social History     Social History Narrative    1/15/20:  PATIENT IS  TO SHREE, THEY HAVE A 13YEAR OLD SON. PATIENT RECENTLY WENT BACK TO WORK AT THE [employer]. History reviewed. No pertinent family history. Cannot display prior to admission medications because the patient has not been admitted in this contact. Current Outpatient Medications   Medication Sig    HYDROcodone-acetaminophen (NORCO) 5-325 mg per tablet Take 1 Tablet by mouth every six (6) hours as needed for Pain.  ondansetron (ZOFRAN ODT) 4 mg disintegrating tablet Take 4 mg by mouth every eight (8) hours as needed for Nausea or Vomiting.  naloxone (Narcan) 4 mg/actuation nasal spray Use 1 spray intranasally, then discard. Repeat with new spray every 2 min as needed for opioid overdose symptoms, alternating nostrils.  Xarelto 20 mg tab tablet TAKE 1 TAB BY MOUTH DAILY (WITH DINNER).  cyclobenzaprine (FLEXERIL) 5 mg tablet TAKE 1 TABLET BY MOUTH EVERY DAY AT NIGHT (Patient taking differently: Take 5 mg by mouth nightly.)    meloxicam (MOBIC) 15 mg tablet Take 15 mg by mouth daily.  lisinopril (PRINIVIL, ZESTRIL) 40 mg tablet Take 1 Tab by mouth daily. (Patient not taking: Reported on 10/29/2021)    famotidine (PEPCID) 40 mg tablet Take 1 Tab by mouth two (2) times a day.  dilTIAZem CD (CARDIZEM CD) 240 mg ER capsule Take 1 Cap by mouth daily.  tamsulosin (FLOMAX) 0.4 mg capsule Take 1 Cap by mouth daily.     sertraline (ZOLOFT) 50 mg tablet Take 1.5 Tabs by mouth daily.  furosemide (LASIX) 40 mg tablet Take 1 Tab by mouth daily.  allopurinoL (ZYLOPRIM) 300 mg tablet Take 1 Tab by mouth daily.  acetaminophen (TYLENOL) 500 mg tablet Take 500 mg by mouth every six (6) hours as needed for Pain. No current facility-administered medications for this encounter. Objective:     Vitals:    12/29/21 0807   BP: (!) 142/81   Pulse: 86   Resp: 20   Temp: 98.6 °F (37 °C)   SpO2: 97%   Weight: 136.1 kg (300 lb)   Height: 6' 3\" (1.905 m)       Physical Exam:  Ambulation: Normal  Gen- the patient is well developed and in no acute distress  HEENT- no focal abnormalities of the scalp or face. Neck- no JVD or retractions  Lungs- normal respiratory effort. Cardiovascular:  Lower limb pulses: 1+. Abd- soft and no masses evident. Ext- warm without cyanosis. There is 1+ lower leg edema that appears bloody controlled. Skin- no jaundice or rashes. Please see the specific measurements and descriptions of the wound(s) below. There is no evidence of periwound induration or warmth. No purulence or odor. Neuro- alert and oriented x 3. No gross imotor deficits are present. Lower limb sensation is decreased. Psychological: Affect normal. Mood normal. Memory intact.      Wound Ankle Medial;Right (Active)   Number of days: 1029       Wound Pretibial Right (Active)   Number of days: 775       Wound Leg lower Right (Active)   Number of days: 561       Wound Leg Right (Active)   Number of days: 546       Wound Neck Right (Active)   Number of days: 546       Wound Leg lower Right 12/29/21 (Active)   Wound Image   12/29/21 0840   Wound Etiology Venous 12/29/21 0840   Dressing Status Breakthrough drainage noted 12/29/21 0840   Cleansed Soap and water 12/29/21 0840   Wound Length (cm) 42 cm 12/29/21 0840   Wound Width (cm) 49 cm 12/29/21 0840   Wound Depth (cm) 0.1 cm 12/29/21 0840   Wound Surface Area (cm^2) 2058 cm^2 12/29/21 0840   Wound Volume (cm^3) 205.8 cm^3 12/29/21 0840   Wound Assessment Erythema;Pink/red 12/29/21 0840   Drainage Amount Large 12/29/21 0840   Drainage Description Serosanguinous 12/29/21 0840   Wound Odor Mild 12/29/21 0840   Domenica-Wound/Incision Assessment Blanchable erythema; Hemosiderin staining (brown yellow); Fragile 12/29/21 0840   Wound Thickness Description Full thickness 12/29/21 0840   Number of days: 0        Assessment:   Venous stasis ulcers right lower extremity with gradually worsening erythema  Patient Active Problem List   Diagnosis Code    Osteoarthritis of hand, primary localized M19.049    History of gout Z87.39    Erectile dysfunction N52.9    Wart B07.9    Plantar warts B07.0    Hyperlipidemia E78.5    Generalized OA M15.9    DIANA (generalized anxiety disorder) F41.1    HTN (hypertension) I10    Arthritis of knee, right M17.11    Benign prostatic hyperplasia without lower urinary tract symptoms N40.0    Obesity, morbid ([facility]) E66.01    Chronic venous hypertension with ulcer and inflammation involving right side ([facility]) I87.331, L97.919    Non-pressure chronic ulcer of right ankle with necrosis of muscle ([facility]) L97.313    Sepsis ([facility]) A41.9    Ankle wound, right, initial encounter S91.001A    Atrial fibrillation with rapid ventricular response ([facility]) I48.91    Hypokalemia E87.6    Venous (peripheral) insufficiency I87.2    Varicose vein of leg I83.90    Cellulitis of right leg L03.115    Arterial insufficiency ([facility]) I77.1    Coagulopathy ([facility]) D68.9    Recurrent cellulitis of lower extremity L03.119    Hypertension I10    Nonhealing nonsurgical wound T14. 8XXA    Chronic atrial fibrillation ([facility]) I48.20    Cellulitis of right lower extremity L03.115     Plan:   Since the erythema has been recently worsening we will start him on a 2-week course of doxycycline. Explained the pathophysiology of the venous stasis ulcers and have strongly encouraged the patient to limit ambulation and prolonged standing.   I have also explained to him the importance of providing compression to help treat his ulcers. Recommended 2 layer compression dressing or Unna boot however the patient refused because he is afraid that it will cause more pain. He wants to see if the antibiotics will help and will return in 2 weeks for recheck and will consider compression therapy at that time if he is feeling better. Follow-up Information     Follow up With Specialties Details Why Contact Info    [address] In 2 weeks 3x/week with Clinician leonel Bruno [address] [place] [id number] [phone]            Thank you very much for the opportunity to participate in this patient's care. Signed By: Chente Thakur MD     December 29, 2021        TIME:  If total time for today's E/M service is used for level of service it is documented below. This time includes physician non-face-to-face service time visit on the date of service and includes    Preparing to see the patient (eg, review of tests)  Obtaining and/or reviewing separately obtained history  Performing a medically necessary appropriate examination and/or evaluation  Counseling and educating the patient/family/caregiver  Ordering medications, tests, or procedures  Referring and communicating with other health care professionals as needed  Documenting clinical information in the electronic or other health record  Independently interpreting results (not reported separately) and communicating results to the patient/family/caregiver  Care coordination (not reported separately)    E/M time =      45    minutes.

## 2021-12-29 NOTE — WOUND CARE
[address]  [address], [address]  Phone: [phone]  Fax: [phone]    Patient: Lyndon Martinez MRN: [id number]  SSN: [id number]    YOB: 1965  Age: 61 y.o. Sex: male       Return Appointment: 2 weeks with Dr Maral Moore    Instructions: Right Lower Leg  Right Lower Leg  Cleanse with Normal Saline  Apply Xeroform to Reddened Areas  Cover with Exudry/ABD/Maxi Pads  Wrap with Rolled Gauze  Change Daily  Patient Refuses Compression    Elevate Lower Legs as Much as possible  DO NOT GET DRESSING WET-PURCHASE CAST COVER AT [facility] OR [facility]  Stay off Legs as Much as possible-If Possible Take a Week off from Work     Antibiotics at Pharmacy      Should you experience increased redness, swelling, pain, foul odor, size of wound(s), or have a temperature over 101 degrees please contact the [address] [place] [address] Road at [phone] or if after hours contact your primary care physician or go to the hospital emergency department.     Signed By: Nina Lujan RN     December 29, 2021

## 2021-12-29 NOTE — WOUND CARE
12/29/21 0840   Wound Leg lower Right 12/29/21   Date First Assessed: 12/29/21   Wound Approximate Age at First Assessment (Weeks): 150 weeks  Primary Wound Type: Venous Ulcer  Location: Leg lower  Wound Location Orientation: Right  Date of First Observation: 12/29/21   Wound Image    Wound Etiology Venous   Dressing Status Breakthrough drainage noted   Cleansed Soap and water   Dressing/Treatment   (Xeroform, Maxi pads)   Wound Length (cm) 42 cm   Wound Width (cm) 49 cm   Wound Depth (cm) 0.1 cm   Wound Surface Area (cm^2) 2058 cm^2   Wound Volume (cm^3) 205.8 cm^3   Wound Assessment Erythema;Pink/red   Drainage Amount Large   Drainage Description Serosanguinous   Wound Odor Mild   Domenica-Wound/Incision Assessment Blanchable erythema; Hemosiderin staining (brown yellow); Fragile   Wound Thickness Description Full thickness   Takes Xarelto Daily

## 2022-01-10 ENCOUNTER — HOSPITAL ENCOUNTER (OUTPATIENT)
Dept: WOUND CARE | Age: 57
Discharge: HOME | End: 2022-01-10
Attending: SURGERY
Payer: COMMERCIAL

## 2022-01-10 VITALS
HEIGHT: 75 IN | RESPIRATION RATE: 18 BRPM | SYSTOLIC BLOOD PRESSURE: 146 MMHG | BODY MASS INDEX: 37.05 KG/M2 | WEIGHT: 298 LBS | TEMPERATURE: 98.6 F | DIASTOLIC BLOOD PRESSURE: 76 MMHG | OXYGEN SATURATION: 97 % | HEART RATE: 84 BPM

## 2022-01-10 DIAGNOSIS — L97.311 VENOUS STASIS ULCER OF RIGHT ANKLE LIMITED TO BREAKDOWN OF SKIN WITH VARICOSE VEINS (HCC): Primary | ICD-10-CM

## 2022-01-10 DIAGNOSIS — I83.013 VENOUS STASIS ULCER OF RIGHT ANKLE LIMITED TO BREAKDOWN OF SKIN WITH VARICOSE VEINS (HCC): Primary | ICD-10-CM

## 2022-01-10 PROCEDURE — 99213 OFFICE O/P EST LOW 20 MIN: CPT | Performed by: SURGERY

## 2022-01-10 PROCEDURE — 99213 OFFICE O/P EST LOW 20 MIN: CPT

## 2022-01-10 RX ORDER — BACITRACIN 500 [USP'U]/G
OINTMENT TOPICAL ONCE
OUTPATIENT
Start: 2022-01-10 | End: 2022-01-10

## 2022-01-10 RX ORDER — NYSTATIN 100000 [USP'U]/G
POWDER TOPICAL ONCE
Status: COMPLETED | OUTPATIENT
Start: 2022-01-10 | End: 2022-01-10

## 2022-01-10 RX ORDER — SILVER SULFADIAZINE 10 G/1000G
CREAM TOPICAL ONCE
OUTPATIENT
Start: 2022-01-10 | End: 2022-01-10

## 2022-01-10 RX ORDER — LIDOCAINE HYDROCHLORIDE 40 MG/ML
SOLUTION TOPICAL ONCE
OUTPATIENT
Start: 2022-01-10 | End: 2022-01-10

## 2022-01-10 RX ORDER — CLOBETASOL PROPIONATE 0.5 MG/G
OINTMENT TOPICAL ONCE
OUTPATIENT
Start: 2022-01-10 | End: 2022-01-10

## 2022-01-10 RX ORDER — BACITRACIN ZINC AND POLYMYXIN B SULFATE 500; 1000 [USP'U]/G; [USP'U]/G
OINTMENT TOPICAL ONCE
OUTPATIENT
Start: 2022-01-10 | End: 2022-01-10

## 2022-01-10 RX ORDER — LIDOCAINE HYDROCHLORIDE 20 MG/ML
5 SOLUTION OROPHARYNGEAL ONCE
OUTPATIENT
Start: 2022-01-10 | End: 2022-01-10

## 2022-01-10 RX ORDER — MUPIROCIN 20 MG/G
OINTMENT TOPICAL ONCE
OUTPATIENT
Start: 2022-01-10 | End: 2022-01-10

## 2022-01-10 RX ORDER — TRIAMCINOLONE ACETONIDE 1 MG/G
OINTMENT TOPICAL ONCE
OUTPATIENT
Start: 2022-01-10 | End: 2022-01-10

## 2022-01-10 RX ORDER — GENTAMICIN SULFATE 1 MG/G
OINTMENT TOPICAL ONCE
OUTPATIENT
Start: 2022-01-10 | End: 2022-01-10

## 2022-01-10 RX ORDER — LIDOCAINE 40 MG/G
CREAM TOPICAL ONCE
OUTPATIENT
Start: 2022-01-10 | End: 2022-01-10

## 2022-01-10 RX ORDER — LIDOCAINE HYDROCHLORIDE 20 MG/ML
JELLY TOPICAL ONCE
OUTPATIENT
Start: 2022-01-10 | End: 2022-01-10

## 2022-01-10 RX ORDER — BETAMETHASONE DIPROPIONATE 0.5 MG/G
OINTMENT TOPICAL ONCE
OUTPATIENT
Start: 2022-01-10 | End: 2022-01-10

## 2022-01-10 RX ORDER — LIDOCAINE 50 MG/G
OINTMENT TOPICAL ONCE
OUTPATIENT
Start: 2022-01-10 | End: 2022-01-10

## 2022-01-10 RX ADMIN — NYSTATIN: 100000 POWDER TOPICAL at 14:30

## 2022-01-10 NOTE — DISCHARGE INSTRUCTIONS
[address]  [address], [address]   Phone: [phone]  Fax: [phone]    Patient: Shree Ba MRN: [id number]  SSN: [id number]    YOB: 1965  Age: 61 y.o. Sex: male       Return Appointment: 2 weeks with Dr. Jose David Alcantara III    Instructions: Right Lower Leg/Ankle:  Cleanse wound and periwound with wound cleanser or normal saline. Nystatin Powder to right lower leg. Xeroform to right lower leg. Moisture Barrier to periwound   Hydrofera Blue to wound base   Cover with ABD. Wrap with Rolled Gauze. Follow with Tubigrip G, doubled. (May remove Tubigrip at bedtime). Dressing change 3x weekly of Hydrofera Blue. Dressing change of Nystatin and Xerform, DAILY. Elevate right lower leg. Increase protein intake. Should you experience increased redness, swelling, pain, foul odor, size of wound(s), or have a temperature over 101 degrees please contact the [address] [place] [address] Road at [phone] or if after hours contact your primary care physician or go to the hospital emergency department.     Signed By: Demetrio Hoskins RN     January 10, 2022

## 2022-01-10 NOTE — PROGRESS NOTES
[address], [address], [address] [place] [address] Rd  [phone]    Progress Notes   Eulalia Speaker       MRN: [id number]     : 1965     Age: 61 y.o. Subjective/HPI:   This patient is a 61 y.o. male seen and evaluated at the wound clinic for follow-up of venous stasis ulcers right lower extremity. Patient completed a course of doxycycline. He is now using a scooter at work and is not standing as much as he was before however he is still unable to tolerate any type of compression dressing. He has been applying Xeroform and maxipads over his leg. He denies any fever or chills. He is having some serous drainage from the ulcer around his ankle. The patient is insisting that he receive more antibiotics since he is concerned that he may have cellulitis. He is still refusing to try using any compression therapy. .    Review of Systems  A comprehensive review of systems was negative except for that written in the HPI. Past Medical History:   Diagnosis Date    Anxiety     Arrhythmia     a-fib    Cellulitis of leg 2019    Erectile dysfunction 2014    DIANA (generalized anxiety disorder) 2014    GERD (gastroesophageal reflux disease)     Gout 2014    History of peptic ulcer     Hypertension     Nausea & vomiting     Obesity, morbid ([address] 2018    Osteoarthritis of hand, primary localized 2014    Personal history of urinary calculi     x2    Venous (peripheral) insufficiency 12/3/2019      Past Surgical History:   Procedure Laterality Date    HX WISDOM TEETH EXTRACTION      VASCULAR SURGERY PROCEDURE UNLIST Bilateral 2020    Bilateral iliac venogram, intravascular ultrasound x2.       Allergies   Allergen Reactions    Codeine Nausea Only    Sulfa (Sulfonamide Antibiotics) Rash      Social History     Tobacco Use    Smoking status: Former Smoker     Years: 5.00     Quit date: 2011     Years since quitting: 10.4    Smokeless tobacco: Never Used    Tobacco comment: quit in the 80's ; maintains non-smoking status   Substance Use Topics    Alcohol use: No      Social History     Social History Narrative    1/15/20:  PATIENT IS  TO SHREE, THEY HAVE A 13YEAR OLD SON. 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 History reviewed. No pertinent family history. Cannot display prior to admission medications because the patient has not been admitted in this contact. Current Outpatient Medications   Medication Sig    HYDROcodone-acetaminophen (NORCO) 5-325 mg per tablet Take 1 Tablet by mouth every six (6) hours as needed for Pain.  ondansetron (ZOFRAN ODT) 4 mg disintegrating tablet Take 4 mg by mouth every eight (8) hours as needed for Nausea or Vomiting.  doxycycline (MONODOX) 100 mg capsule Take 1 Capsule by mouth two (2) times a day. Indications: an infection of the skin and the tissue below the skin    naloxone (Narcan) 4 mg/actuation nasal spray Use 1 spray intranasally, then discard. Repeat with new spray every 2 min as needed for opioid overdose symptoms, alternating nostrils.  Xarelto 20 mg tab tablet TAKE 1 TAB BY MOUTH DAILY (WITH DINNER).  cyclobenzaprine (FLEXERIL) 5 mg tablet TAKE 1 TABLET BY MOUTH EVERY DAY AT NIGHT (Patient taking differently: Take 5 mg by mouth nightly.)    meloxicam (MOBIC) 15 mg tablet Take 15 mg by mouth daily.  lisinopril (PRINIVIL, ZESTRIL) 40 mg tablet Take 1 Tab by mouth daily. (Patient not taking: Reported on 10/29/2021)    famotidine (PEPCID) 40 mg tablet Take 1 Tab by mouth two (2) times a day.  dilTIAZem CD (CARDIZEM CD) 240 mg ER capsule Take 1 Cap by mouth daily.  tamsulosin (FLOMAX) 0.4 mg capsule Take 1 Cap by mouth daily.  sertraline (ZOLOFT) 50 mg tablet Take 1.5 Tabs by mouth daily.  furosemide (LASIX) 40 mg tablet Take 1 Tab by mouth daily.     allopurinoL (ZYLOPRIM) 300 mg tablet Take 1 Tab by mouth daily.    acetaminophen (TYLENOL) 500 mg tablet Take 500 mg by mouth every six (6) hours as needed for Pain. No current facility-administered medications for this encounter. Objective:     Vitals:    01/10/22 1320   BP: (!) 146/76   Pulse: 84   Resp: 18   Temp: 98.6 °F (37 °C)   SpO2: 97%   Weight: 135.2 kg (298 lb)   Height: 6' 3\" (1.905 m)       Physical Exam:  Ambulation: Normal  Gen- the patient is well developed and in no acute distress  HEENT- no focal abnormalities of the scalp or face. Neck- no JVD or retractions  Lungs- normal respiratory effort. Cardiovascular:  Lower limb pulses: 2+. Abd- soft and no masses evident. Ext- warm without cyanosis. There is 2+ lower leg edema that appears well controlled. Swelling right leg much improved. Skin- no jaundice or rashes. Please see the specific measurements and descriptions of the wound(s) below. There is no evidence of periwound induration or warmth. No purulence or odor. Faint erythema noted on previous visit appears to have resolved. The redness of the skin is worrisome for fungal overgrowth. Neuro- alert and oriented x 3. No gross imotor deficits are present. Lower limb sensation is intact. Psychological: Affect normal. Mood normal. Memory intact. Wound Ankle Medial;Right (Active)   Number of days: 1041       Wound Pretibial Right (Active)   Number of days: 787       Wound Leg lower Right (Active)   Number of days: 573       Wound Leg Right (Active)   Number of days: 558       Wound Neck Right (Active)   Number of days: 558       Wound Leg lower Right 12/29/21 (Active)   Wound Image   01/10/22 1341   Wound Etiology Venous 01/10/22 1341   Dressing Status Breakthrough drainage noted 01/10/22 1341   Cleansed Cleansed with saline; Soap and water 01/10/22 1341   Wound Length (cm) 7.8 cm 01/10/22 1341   Wound Width (cm) 4 cm 01/10/22 1341   Wound Depth (cm) 0.1 cm 01/10/22 1341   Wound Surface Area (cm^2) 31.2 cm^2 01/10/22 1341   Change in Wound Size % 98.48 01/10/22 1341   Wound Volume (cm^3) 3.12 cm^3 01/10/22 1341   Wound Healing % 98 01/10/22 1341   Wound Assessment Erythema;Pink/red;Slough 01/10/22 1341   Drainage Amount Copious 01/10/22 1341   Drainage Description Serosanguinous 01/10/22 1341   Wound Odor Mild 01/10/22 1341   Domenica-Wound/Incision Assessment Blanchable erythema;Edematous 01/10/22 1341   Edges Attached edges 01/10/22 1341   Wound Thickness Description Full thickness 01/10/22 1341   Number of days: 12        Assessment:   Venous ulcer right lower extremity, swelling improved, clinically no evidence for cellulitis  Patient Active Problem List   Diagnosis Code    Osteoarthritis of hand, primary localized M19.049    History of gout Z87.39    Erectile dysfunction N52.9    Wart B07.9    Plantar warts B07.0    Hyperlipidemia E78.5    Generalized OA M15.9    DIANA (generalized anxiety disorder) F41.1    HTN (hypertension) I10    Arthritis of knee, right M17.11    Benign prostatic hyperplasia without lower urinary tract symptoms N40.0    Obesity, morbid ([facility]) E66.01    Chronic venous hypertension with ulcer and inflammation involving right side ([facility]) I87.331, L97.919    Non-pressure chronic ulcer of right ankle with necrosis of muscle ([facility]) L97.313    Sepsis ([facility]) A41.9    Ankle wound, right, initial encounter S91.001A    Atrial fibrillation with rapid ventricular response ([facility]) I48.91    Hypokalemia E87.6    Venous (peripheral) insufficiency I87.2    Varicose vein of leg I83.90    Cellulitis of right leg L03.115    Arterial insufficiency ([facility]) I77.1    Coagulopathy ([facility]) D68.9    Recurrent cellulitis of lower extremity L03.119    Hypertension I10    Nonhealing nonsurgical wound T14. 8XXA    Chronic atrial fibrillation ([facility]) I48.20    Cellulitis of right lower extremity L03.115    Venous stasis ulcer of ankle limited to breakdown of skin with varicose veins ([facility]) I83.003, L97.301     Plan:   Once again encouraged patient to try compression therapy however he refused. We will try nystatin powder over the intact skin of his right lower extremity and Hydrofera Blue dressing changes to the ulcer around his ankle. Patient was encouraged to try to keep his leg elevated and to avoid standing or walking for prolonged periods. Follow-up with me in 2 weeks for recheck. Follow-up Information     Follow up With Specialties Details Why Contact Info    [address]  For wound re-check [address]  [phone]            Thank you very much for the opportunity to participate in this patient's care. Signed By: Graciela Bynum MD     January 10, 2022        TIME:  If total time for today's E/M service is used for level of service it is documented below. This time includes physician non-face-to-face service time visit on the date of service and includes    Preparing to see the patient (eg, review of tests)  Obtaining and/or reviewing separately obtained history  Performing a medically necessary appropriate examination and/or evaluation  Counseling and educating the patient/family/caregiver  Ordering medications, tests, or procedures  Referring and communicating with other health care professionals as needed  Documenting clinical information in the electronic or other health record  Independently interpreting results (not reported separately) and communicating results to the patient/family/caregiver  Care coordination (not reported separately)    E/M time =        25  minutes.

## 2022-01-10 NOTE — WOUND CARE
[address]  [address], [address]  Phone: [phone]  Fax: [phone]    Patient: Augusto Fischer MRN: [id number]  SSN: [id number]    YOB: 1965  Age: 61 y.o. Sex: male       Return Appointment: 2 weeks with Dr. Sarah Ramirez, III    Instructions: Right Lower Leg/Ankle:  Cleanse wound and periwound with wound cleanser or normal saline. Nystatin Powder to right lower leg. Xeroform to right lower leg. Moisture Barrier to periwound   Hydrofera Blue to wound base   Cover with ABD. Wrap with Rolled Gauze. Follow with Tubigrip G, doubled. (May remove Tubigrip at bedtime). Dressing change 3x weekly of Hydrofera Blue. Dressing change of Nystatin and Xerform, DAILY. Elevate right lower leg. Increase protein intake. Should you experience increased redness, swelling, pain, foul odor, size of wound(s), or have a temperature over 101 degrees please contact the [address] [place] [address] Road at [phone] or if after hours contact your primary care physician or go to the hospital emergency department.     Signed By: Jamia Erazo RN     January 10, 2022

## 2022-01-14 RX ORDER — DOXYCYCLINE 100 MG/1
100 CAPSULE ORAL 2 TIMES DAILY
Qty: 20 CAPSULE | Refills: 0 | Status: SHIPPED | OUTPATIENT
Start: 2022-01-14 | End: 2022-01-24

## 2022-01-31 ENCOUNTER — TELEPHONE (OUTPATIENT)
Dept: WOUND CARE | Age: 57
End: 2022-01-31

## 2022-01-31 DIAGNOSIS — I83.013 VENOUS STASIS ULCER OF RIGHT ANKLE LIMITED TO BREAKDOWN OF SKIN WITH VARICOSE VEINS (HCC): ICD-10-CM

## 2022-01-31 DIAGNOSIS — L97.311 VENOUS STASIS ULCER OF RIGHT ANKLE LIMITED TO BREAKDOWN OF SKIN WITH VARICOSE VEINS (HCC): ICD-10-CM

## 2022-01-31 NOTE — TELEPHONE ENCOUNTER
Patient calls today requesting more wound care supplies. Most recent order was placed 1/19/22 so this RN called [facility] supplier to inquire about the order status. [employer] states that patient has not met his deductible but was given complimentary items. Called to discuss this with patient. Patient states he cannot afford to purchase. Patient also does not have an upcoming appt with wound center and does not want to schedule at this time.

## 2022-03-18 PROBLEM — I48.91 ATRIAL FIBRILLATION WITH RAPID VENTRICULAR RESPONSE (HCC): Status: ACTIVE | Noted: 2019-11-17

## 2022-03-18 PROBLEM — I87.331 CHRONIC VENOUS HYPERTENSION WITH ULCER AND INFLAMMATION INVOLVING RIGHT SIDE (HCC): Status: ACTIVE | Noted: 2019-03-06

## 2022-03-18 PROBLEM — A41.9 SEPSIS (HCC): Status: ACTIVE | Noted: 2019-11-14

## 2022-03-18 PROBLEM — E66.01 OBESITY, MORBID (HCC): Status: ACTIVE | Noted: 2018-02-26

## 2022-03-18 PROBLEM — L97.919 CHRONIC VENOUS HYPERTENSION WITH ULCER AND INFLAMMATION INVOLVING RIGHT SIDE (HCC): Status: ACTIVE | Noted: 2019-03-06

## 2022-03-18 PROBLEM — T14.8XXA NONHEALING NONSURGICAL WOUND: Status: ACTIVE | Noted: 2021-10-29

## 2022-03-19 PROBLEM — D68.9 COAGULOPATHY (HCC): Status: ACTIVE | Noted: 2020-01-15

## 2022-03-19 PROBLEM — N40.0 BENIGN PROSTATIC HYPERPLASIA WITHOUT LOWER URINARY TRACT SYMPTOMS: Status: ACTIVE | Noted: 2017-11-06

## 2022-03-19 PROBLEM — L03.115 CELLULITIS OF RIGHT LOWER EXTREMITY: Status: ACTIVE | Noted: 2021-10-29

## 2022-03-19 PROBLEM — I83.90 VARICOSE VEIN OF LEG: Status: ACTIVE | Noted: 2019-12-03

## 2022-03-19 PROBLEM — E87.6 HYPOKALEMIA: Status: ACTIVE | Noted: 2019-11-18

## 2022-03-19 PROBLEM — L97.313 NON-PRESSURE CHRONIC ULCER OF RIGHT ANKLE WITH NECROSIS OF MUSCLE (HCC): Status: ACTIVE | Noted: 2019-03-06

## 2022-03-19 PROBLEM — I77.1 ARTERIAL INSUFFICIENCY (HCC): Status: ACTIVE | Noted: 2020-01-15

## 2022-03-19 PROBLEM — I10 HYPERTENSION: Status: ACTIVE | Noted: 2021-10-29

## 2022-03-19 PROBLEM — I83.003 VENOUS STASIS ULCER OF ANKLE LIMITED TO BREAKDOWN OF SKIN WITH VARICOSE VEINS (HCC): Status: ACTIVE | Noted: 2021-12-29

## 2022-03-19 PROBLEM — I87.2 VENOUS (PERIPHERAL) INSUFFICIENCY: Status: ACTIVE | Noted: 2019-12-03

## 2022-03-19 PROBLEM — L03.115 CELLULITIS OF RIGHT LEG: Status: ACTIVE | Noted: 2020-01-15

## 2022-03-19 PROBLEM — L97.301 VENOUS STASIS ULCER OF ANKLE LIMITED TO BREAKDOWN OF SKIN WITH VARICOSE VEINS (HCC): Status: ACTIVE | Noted: 2021-12-29

## 2022-03-19 PROBLEM — S91.001A ANKLE WOUND, RIGHT, INITIAL ENCOUNTER: Status: ACTIVE | Noted: 2019-11-14

## 2022-03-20 PROBLEM — M17.11 ARTHRITIS OF KNEE, RIGHT: Status: ACTIVE | Noted: 2017-11-06

## 2022-03-20 PROBLEM — L03.119 RECURRENT CELLULITIS OF LOWER EXTREMITY: Status: ACTIVE | Noted: 2020-06-16

## 2022-03-20 PROBLEM — I48.20 CHRONIC ATRIAL FIBRILLATION (HCC): Status: ACTIVE | Noted: 2021-10-29

## 2022-09-07 ENCOUNTER — HOSPITAL ENCOUNTER (OUTPATIENT)
Dept: PHYSICAL THERAPY | Age: 57
Setting detail: RECURRING SERIES
Discharge: HOME | End: 2022-09-10
Payer: COMMERCIAL

## 2022-09-07 PROCEDURE — 97167 OT EVAL HIGH COMPLEX 60 MIN: CPT

## 2022-09-07 ASSESSMENT — PAIN SCALES - GENERAL: PAINLEVEL_OUTOF10: 4

## 2022-09-07 NOTE — THERAPY EVALUATION
Cristiane Almonte  : 1965  Primary: Miguel Raymond  Secondary:  [address] @ [facility] Therapy  [address] [facility] [place] [address]  Phone: [phone]  Fax: [phone] Plan Frequency: 4,6,5,0, TBD  Certification Period Expiration Date: 22    OT Visit Info: Total # of Visits Approved: 1    OUTPATIENT OCCUPATIONAL THERAPY:OP NOTE TYPE: Initial Assessment 2022  Appt Desk   Episode      Treatment Diagnosis:  I89.022 Lymphedema with obesity  I87.2 Venous insufficiency  M79.604 Pain in right leg  R53.83 Other fatigue    Medical/Referring Diagnosis:  Lymphedema, not elsewhere classified [I89.0]  Referring Physician:  Cabrera Cueva MD MD Orders:  OT Eval and Treat   Return MD Appt:  TBD  Date of Onset:  Onset Date: 19   Allergies:  Codeine and Sulfa antibiotics  Restrictions/Precautions:    No data recordedNo data recorded  Medications Last Reviewed:  2022     SUBJECTIVE   History of Injury/Illness (Reason for Referral):  Patient with wound and edema since 2019 injury. Patient Stated Goal(s):  \"reduce swelling and get back to exercising\"  Initial:     4/10 Post Session:     4/10  Past Medical History/Comorbidities:   Mr. Meredith Bryan  has a past medical history of Anxiety, Arrhythmia, Cellulitis of leg, Erectile dysfunction, EHSAN (generalized anxiety disorder), GERD (gastroesophageal reflux disease), Gout, History of peptic ulcer, Hypertension, Nausea & vomiting, Obesity, morbid ([address] Osteoarthritis of hand, primary localized, Personal history of urinary calculi, and Venous (peripheral) insufficiency. Mr. Meredith Bryan  has a past surgical history that includes [place] tooth extraction and vascular surgery (Bilateral, 2020).   Social History/Living Environment:   Lives With: Spouse   Prior Level of Function/Work/Activity:   Prior level of function: IndependentNo data recordedNo data recorded   Learning:   Does the patient/guardian have any barriers to learning?: No barriers   Fall Risk Scale  Total Score: 0  Norris Fall Risk: Low (0-24)         OBJECTIVE   Lymphedema:  Circumference Measurements (Lower Extremity): PRETREATMENT AFFECTED LIMB(s): lower extremity      Date:           Right / Left           Groin   []      []           8 inches   []      []           4 inches   []      []         PoplitealSpace   []      []           8 inches   []      []           4 inches   []      []           Ankle   []      []           Instep   []      []         Measurements are taken in centimeters:  2.54 cm = 1 inch  BODY WEIGHT  Date Taken:         Lbs. Initial measurements not taken due to wound bandage and patient tearful  and very emotional due to family stress. Skin Integumentary:  Location Description: RLE  Skin Integrity: Blister, Cracked  Pitting Scale Area 1: 2+  Skin Texture: Hyperkeratosis  Skin Condition/Temp: Warm  Hair Growth: Sparce  Conditions to nail beds: Thick, Discolored  Edema Rebound: Quick  Stemmer Sign: Positive  LLIS:  Total Score: 65      ASSESSMENT   Initial Assessment:  Patient has some major home and marriage barriers that have and will hinder recovery for his lymphedema and wound. Per pt report: spouse has some major COVID and germ phobias that hinder his ability to get care. Talked with patient in depth about high frequency needed to start treatment. He will call and schedule 5x a week for 2 weeks when he can start treatment due to his home/life barriers. Patient given a few ABD pads and gauze at request for bandage change but encouraged him to get medical wound care. Patient also notes numbness and urine incontinence that writer recommended talking to with his primary doctor as this is often a spinal cord problem. Problem List (Impacting functional limitations):  Performance deficits / Impairments: Decreased functional mobility ; Decreased ROM; Decreased ADL status;  Decreased strength; Decreased safe awareness; Decreased sensation; Decreased high-level IADLs; Decreased balance   Therapy Prognosis:   Prognosis: Fair   Assessment Complexity:   High Complexity  PLAN   Effective Dates: 2/6/5999    TO Certification Period Expiration Date: 12/06/22 . Frequency/Duration: Plan Frequency: 5,5,3,3, TBD   Interventions Planned: (Treatment may consist of any combination of the following)  Current Treatment Recommendations: ROM; Strengthening; Positioning; Safety education & training; Pain management; Functional mobility training; Manual Therapy:  MLD; Self-Care / ADL; Home management training; Equipment evaluation, education, & procurement; Patient/Caregiver education & training   Short-Term Functional Goals: Time Frame: 30 days  1. The patient/caregiver will verbalize and demonstrate understanding of lymphedema precautions. 2. Patient will be minimal assist with skin care regimen to decrease risk of cellulitis. 3. The patient/caregiver will be minimal assist at donning and doffing bilateral lower extremity compression bandages. 4. The patient/caregiver will be minimal assist with self-manual lymph drainage techniques and show decrease in limb volume. 5. Patient will be minimal assist in lymphatic exercises. Discharge Goals: Time Frame: 90 days  1. Patient's bilateral lower extremity circumferential measurements will decrease on volumetric graph to maximize functional use in ADL's.    2. The patient/caregiver will be independent with home management of lymphedema. 3. Patient/caregiver will be independent donning and doffing bilateral lower extremity compression garment. MEDICAL NECESSITY:   > Skilled intervention continues to be required due to Deficits listed above. REASON FOR SERVICES/OTHER COMMENTS:  > Patient continues to require skilled intervention due to dx above.   Total Duration:  Time In: 1515  Time Out: 1600    Regarding Mery Felix's therapy, I certify that the treatment plan above will be

## 2022-11-15 ENCOUNTER — APPOINTMENT (OUTPATIENT)
Dept: GENERAL RADIOLOGY | Age: 57
DRG: 603 | End: 2022-11-15
Payer: COMMERCIAL

## 2022-11-15 ENCOUNTER — HOSPITAL ENCOUNTER (INPATIENT)
Age: 57
LOS: 2 days | Discharge: HOME HEALTH | DRG: 603 | End: 2022-11-19
Attending: EMERGENCY MEDICINE | Admitting: INTERNAL MEDICINE
Payer: COMMERCIAL

## 2022-11-15 DIAGNOSIS — L03.115 CELLULITIS OF RIGHT LOWER LEG: Primary | ICD-10-CM

## 2022-11-15 DIAGNOSIS — L97.812 VENOUS STASIS ULCER OF OTHER PART OF RIGHT LOWER LEG WITH FAT LAYER EXPOSED, UNSPECIFIED WHETHER VARICOSE VEINS PRESENT (HCC): ICD-10-CM

## 2022-11-15 DIAGNOSIS — L03.115 CELLULITIS OF RIGHT LOWER EXTREMITY: ICD-10-CM

## 2022-11-15 DIAGNOSIS — I83.018 VENOUS STASIS ULCER OF OTHER PART OF RIGHT LOWER LEG WITH FAT LAYER EXPOSED, UNSPECIFIED WHETHER VARICOSE VEINS PRESENT (HCC): ICD-10-CM

## 2022-11-15 PROCEDURE — 83605 ASSAY OF LACTIC ACID: CPT

## 2022-11-15 PROCEDURE — 87040 BLOOD CULTURE FOR BACTERIA: CPT

## 2022-11-15 PROCEDURE — 99285 EMERGENCY DEPT VISIT HI MDM: CPT | Performed by: EMERGENCY MEDICINE

## 2022-11-15 PROCEDURE — 84145 PROCALCITONIN (PCT): CPT

## 2022-11-15 PROCEDURE — 96365 THER/PROPH/DIAG IV INF INIT: CPT | Performed by: EMERGENCY MEDICINE

## 2022-11-15 PROCEDURE — 85025 COMPLETE CBC W/AUTO DIFF WBC: CPT

## 2022-11-15 PROCEDURE — 96366 THER/PROPH/DIAG IV INF ADDON: CPT | Performed by: EMERGENCY MEDICINE

## 2022-11-15 PROCEDURE — 96375 TX/PRO/DX INJ NEW DRUG ADDON: CPT | Performed by: EMERGENCY MEDICINE

## 2022-11-15 PROCEDURE — 73590 X-RAY EXAM OF LOWER LEG: CPT

## 2022-11-15 PROCEDURE — 96376 TX/PRO/DX INJ SAME DRUG ADON: CPT | Performed by: EMERGENCY MEDICINE

## 2022-11-15 PROCEDURE — 80053 COMPREHEN METABOLIC PANEL: CPT

## 2022-11-15 PROCEDURE — 96367 TX/PROPH/DG ADDL SEQ IV INF: CPT | Performed by: EMERGENCY MEDICINE

## 2022-11-15 PROCEDURE — 71045 X-RAY EXAM CHEST 1 VIEW: CPT

## 2022-11-15 PROCEDURE — 93005 ELECTROCARDIOGRAM TRACING: CPT | Performed by: EMERGENCY MEDICINE

## 2022-11-15 RX ORDER — SODIUM CHLORIDE, SODIUM LACTATE, POTASSIUM CHLORIDE, AND CALCIUM CHLORIDE .6; .31; .03; .02 G/100ML; G/100ML; G/100ML; G/100ML
500 INJECTION, SOLUTION INTRAVENOUS
Status: COMPLETED | OUTPATIENT
Start: 2022-11-15 | End: 2022-11-16

## 2022-11-15 ASSESSMENT — ENCOUNTER SYMPTOMS
SHORTNESS OF BREATH: 0
NAUSEA: 0
VOMITING: 0
ABDOMINAL PAIN: 0
BACK PAIN: 0
COUGH: 1
COLOR CHANGE: 1
RHINORRHEA: 0
DIARRHEA: 0

## 2022-11-15 ASSESSMENT — PAIN SCALES - GENERAL: PAINLEVEL_OUTOF10: 10

## 2022-11-16 ENCOUNTER — APPOINTMENT (OUTPATIENT)
Dept: ULTRASOUND IMAGING | Age: 57
DRG: 603 | End: 2022-11-16
Payer: COMMERCIAL

## 2022-11-16 PROBLEM — L03.115 CELLULITIS OF RIGHT LOWER EXTREMITY: Status: ACTIVE | Noted: 2021-10-29

## 2022-11-16 PROBLEM — E66.01 OBESITY, MORBID (HCC): Status: ACTIVE | Noted: 2018-02-26

## 2022-11-16 PROBLEM — I48.20 CHRONIC ATRIAL FIBRILLATION (HCC): Status: ACTIVE | Noted: 2021-10-29

## 2022-11-16 PROBLEM — L97.919 CHRONIC VENOUS HYPERTENSION WITH ULCER AND INFLAMMATION INVOLVING RIGHT SIDE (HCC): Status: ACTIVE | Noted: 2019-03-06

## 2022-11-16 PROBLEM — I87.331 CHRONIC VENOUS HYPERTENSION WITH ULCER AND INFLAMMATION INVOLVING RIGHT SIDE (HCC): Status: ACTIVE | Noted: 2019-03-06

## 2022-11-16 PROBLEM — L03.90 CELLULITIS: Status: ACTIVE | Noted: 2022-11-16

## 2022-11-16 LAB
ALBUMIN SERPL-MCNC: 3.3 G/DL (ref 3.5–5)
ALBUMIN SERPL-MCNC: 3.6 G/DL (ref 3.5–5)
ALBUMIN/GLOB SERPL: 1 {RATIO} (ref 0.4–1.6)
ALBUMIN/GLOB SERPL: 1.1 {RATIO} (ref 0.4–1.6)
ALP SERPL-CCNC: 65 U/L (ref 50–136)
ALP SERPL-CCNC: 80 U/L (ref 50–136)
ALT SERPL-CCNC: 21 U/L (ref 12–65)
ALT SERPL-CCNC: 21 U/L (ref 12–65)
ANION GAP SERPL CALC-SCNC: 2 MMOL/L (ref 2–11)
ANION GAP SERPL CALC-SCNC: 5 MMOL/L (ref 2–11)
APPEARANCE UR: CLEAR
AST SERPL-CCNC: 11 U/L (ref 15–37)
AST SERPL-CCNC: 14 U/L (ref 15–37)
BACTERIA URNS QL MICRO: NEGATIVE /HPF
BASOPHILS # BLD: 0 K/UL (ref 0–0.2)
BASOPHILS # BLD: 0.1 K/UL (ref 0–0.2)
BASOPHILS NFR BLD: 0 % (ref 0–2)
BASOPHILS NFR BLD: 1 % (ref 0–2)
BILIRUB SERPL-MCNC: 0.5 MG/DL (ref 0.2–1.1)
BILIRUB SERPL-MCNC: 0.5 MG/DL (ref 0.2–1.1)
BILIRUB UR QL: NEGATIVE
BUN SERPL-MCNC: 16 MG/DL (ref 6–23)
BUN SERPL-MCNC: 18 MG/DL (ref 6–23)
CALCIUM SERPL-MCNC: 9 MG/DL (ref 8.3–10.4)
CALCIUM SERPL-MCNC: 9.6 MG/DL (ref 8.3–10.4)
CASTS URNS QL MICRO: ABNORMAL /LPF
CHLORIDE SERPL-SCNC: 108 MMOL/L (ref 101–110)
CHLORIDE SERPL-SCNC: 109 MMOL/L (ref 101–110)
CO2 SERPL-SCNC: 27 MMOL/L (ref 21–32)
CO2 SERPL-SCNC: 29 MMOL/L (ref 21–32)
COLOR UR: YELLOW
CREAT SERPL-MCNC: 0.9 MG/DL (ref 0.8–1.5)
CREAT SERPL-MCNC: 1 MG/DL (ref 0.8–1.5)
DIFFERENTIAL METHOD BLD: ABNORMAL
DIFFERENTIAL METHOD BLD: ABNORMAL
EKG ATRIAL RATE: 97 BPM
EKG DIAGNOSIS: NORMAL
EKG P AXIS: 48 DEGREES
EKG P-R INTERVAL: 178 MS
EKG Q-T INTERVAL: 334 MS
EKG QRS DURATION: 120 MS
EKG QTC CALCULATION (BAZETT): 424 MS
EKG R AXIS: 44 DEGREES
EKG T AXIS: 46 DEGREES
EKG VENTRICULAR RATE: 97 BPM
EOSINOPHIL # BLD: 0.2 K/UL (ref 0–0.8)
EOSINOPHIL # BLD: 0.2 K/UL (ref 0–0.8)
EOSINOPHIL NFR BLD: 2 % (ref 0.5–7.8)
EOSINOPHIL NFR BLD: 2 % (ref 0.5–7.8)
EPI CELLS #/AREA URNS HPF: ABNORMAL /HPF
ERYTHROCYTE [DISTWIDTH] IN BLOOD BY AUTOMATED COUNT: 12.8 % (ref 11.9–14.6)
ERYTHROCYTE [DISTWIDTH] IN BLOOD BY AUTOMATED COUNT: 12.9 % (ref 11.9–14.6)
GLOBULIN SER CALC-MCNC: 3 G/DL (ref 2.8–4.5)
GLOBULIN SER CALC-MCNC: 3.6 G/DL (ref 2.8–4.5)
GLUCOSE SERPL-MCNC: 94 MG/DL (ref 65–100)
GLUCOSE SERPL-MCNC: 99 MG/DL (ref 65–100)
GLUCOSE UR STRIP.AUTO-MCNC: NEGATIVE MG/DL
HCT VFR BLD AUTO: 41.7 % (ref 41.1–50.3)
HCT VFR BLD AUTO: 44.8 % (ref 41.1–50.3)
HGB BLD-MCNC: 14.1 G/DL (ref 13.6–17.2)
HGB BLD-MCNC: 15.1 G/DL (ref 13.6–17.2)
HGB UR QL STRIP: ABNORMAL
IMM GRANULOCYTES # BLD AUTO: 0 K/UL (ref 0–0.5)
IMM GRANULOCYTES # BLD AUTO: 0 K/UL (ref 0–0.5)
IMM GRANULOCYTES NFR BLD AUTO: 0 % (ref 0–5)
IMM GRANULOCYTES NFR BLD AUTO: 0 % (ref 0–5)
KETONES UR QL STRIP.AUTO: NEGATIVE MG/DL
LACTATE SERPL-SCNC: 0.8 MMOL/L (ref 0.4–2)
LACTATE SERPL-SCNC: 1 MMOL/L (ref 0.4–2)
LEUKOCYTE ESTERASE UR QL STRIP.AUTO: NEGATIVE
LYMPHOCYTES # BLD: 1.4 K/UL (ref 0.5–4.6)
LYMPHOCYTES # BLD: 1.8 K/UL (ref 0.5–4.6)
LYMPHOCYTES NFR BLD: 16 % (ref 13–44)
LYMPHOCYTES NFR BLD: 18 % (ref 13–44)
MCH RBC QN AUTO: 35 PG (ref 26.1–32.9)
MCH RBC QN AUTO: 35.8 PG (ref 26.1–32.9)
MCHC RBC AUTO-ENTMCNC: 33.7 G/DL (ref 31.4–35)
MCHC RBC AUTO-ENTMCNC: 33.8 G/DL (ref 31.4–35)
MCV RBC AUTO: 103.9 FL (ref 82–102)
MCV RBC AUTO: 105.8 FL (ref 82–102)
MONOCYTES # BLD: 0.7 K/UL (ref 0.1–1.3)
MONOCYTES # BLD: 0.9 K/UL (ref 0.1–1.3)
MONOCYTES NFR BLD: 8 % (ref 4–12)
MONOCYTES NFR BLD: 9 % (ref 4–12)
MUCOUS THREADS URNS QL MICRO: NORMAL /LPF
NEUTS SEG # BLD: 6.7 K/UL (ref 1.7–8.2)
NEUTS SEG # BLD: 7 K/UL (ref 1.7–8.2)
NEUTS SEG NFR BLD: 70 % (ref 43–78)
NEUTS SEG NFR BLD: 74 % (ref 43–78)
NITRITE UR QL STRIP.AUTO: NEGATIVE
NRBC # BLD: 0 K/UL (ref 0–0.2)
NRBC # BLD: 0 K/UL (ref 0–0.2)
PH UR STRIP: 6.5 [PH] (ref 5–9)
PLATELET # BLD AUTO: 177 K/UL (ref 150–450)
PLATELET # BLD AUTO: 178 K/UL (ref 150–450)
PMV BLD AUTO: 10.1 FL (ref 9.4–12.3)
PMV BLD AUTO: 10.5 FL (ref 9.4–12.3)
POTASSIUM SERPL-SCNC: 4.1 MMOL/L (ref 3.5–5.1)
POTASSIUM SERPL-SCNC: 4.2 MMOL/L (ref 3.5–5.1)
PROCALCITONIN SERPL-MCNC: <0.05 NG/ML (ref 0–0.49)
PROT SERPL-MCNC: 6.3 G/DL (ref 6.3–8.2)
PROT SERPL-MCNC: 7.2 G/DL (ref 6.3–8.2)
PROT UR STRIP-MCNC: NEGATIVE MG/DL
RBC # BLD AUTO: 3.94 M/UL (ref 4.23–5.6)
RBC # BLD AUTO: 4.31 M/UL (ref 4.23–5.6)
RBC #/AREA URNS HPF: ABNORMAL /HPF
SODIUM SERPL-SCNC: 139 MMOL/L (ref 133–143)
SODIUM SERPL-SCNC: 141 MMOL/L (ref 133–143)
SP GR UR REFRACTOMETRY: 1.02 (ref 1–1.02)
UROBILINOGEN UR QL STRIP.AUTO: 0.2 EU/DL (ref 0.2–1)
WBC # BLD AUTO: 10.1 K/UL (ref 4.3–11.1)
WBC # BLD AUTO: 9 K/UL (ref 4.3–11.1)
WBC URNS QL MICRO: ABNORMAL /HPF

## 2022-11-16 PROCEDURE — 96366 THER/PROPH/DIAG IV INF ADDON: CPT | Performed by: EMERGENCY MEDICINE

## 2022-11-16 PROCEDURE — 97162 PT EVAL MOD COMPLEX 30 MIN: CPT

## 2022-11-16 PROCEDURE — 96376 TX/PRO/DX INJ SAME DRUG ADON: CPT | Performed by: EMERGENCY MEDICINE

## 2022-11-16 PROCEDURE — 96366 THER/PROPH/DIAG IV INF ADDON: CPT

## 2022-11-16 PROCEDURE — G0378 HOSPITAL OBSERVATION PER HR: HCPCS

## 2022-11-16 PROCEDURE — 6370000000 HC RX 637 (ALT 250 FOR IP): Performed by: STUDENT IN AN ORGANIZED HEALTH CARE EDUCATION/TRAINING PROGRAM

## 2022-11-16 PROCEDURE — 97112 NEUROMUSCULAR REEDUCATION: CPT

## 2022-11-16 PROCEDURE — 97530 THERAPEUTIC ACTIVITIES: CPT

## 2022-11-16 PROCEDURE — 96367 TX/PROPH/DG ADDL SEQ IV INF: CPT

## 2022-11-16 PROCEDURE — 96365 THER/PROPH/DIAG IV INF INIT: CPT | Performed by: EMERGENCY MEDICINE

## 2022-11-16 PROCEDURE — 2580000003 HC RX 258: Performed by: STUDENT IN AN ORGANIZED HEALTH CARE EDUCATION/TRAINING PROGRAM

## 2022-11-16 PROCEDURE — 87040 BLOOD CULTURE FOR BACTERIA: CPT

## 2022-11-16 PROCEDURE — 85025 COMPLETE CBC W/AUTO DIFF WBC: CPT

## 2022-11-16 PROCEDURE — 81003 URINALYSIS AUTO W/O SCOPE: CPT

## 2022-11-16 PROCEDURE — 93971 EXTREMITY STUDY: CPT

## 2022-11-16 PROCEDURE — 96375 TX/PRO/DX INJ NEW DRUG ADDON: CPT | Performed by: EMERGENCY MEDICINE

## 2022-11-16 PROCEDURE — 2580000003 HC RX 258: Performed by: EMERGENCY MEDICINE

## 2022-11-16 PROCEDURE — 80053 COMPREHEN METABOLIC PANEL: CPT

## 2022-11-16 PROCEDURE — 6360000002 HC RX W HCPCS: Performed by: STUDENT IN AN ORGANIZED HEALTH CARE EDUCATION/TRAINING PROGRAM

## 2022-11-16 PROCEDURE — 97166 OT EVAL MOD COMPLEX 45 MIN: CPT

## 2022-11-16 PROCEDURE — 6360000002 HC RX W HCPCS: Performed by: EMERGENCY MEDICINE

## 2022-11-16 PROCEDURE — 6370000000 HC RX 637 (ALT 250 FOR IP): Performed by: INTERNAL MEDICINE

## 2022-11-16 PROCEDURE — 96367 TX/PROPH/DG ADDL SEQ IV INF: CPT | Performed by: EMERGENCY MEDICINE

## 2022-11-16 PROCEDURE — 97535 SELF CARE MNGMENT TRAINING: CPT

## 2022-11-16 PROCEDURE — 83605 ASSAY OF LACTIC ACID: CPT

## 2022-11-16 RX ORDER — TAMSULOSIN HYDROCHLORIDE 0.4 MG/1
0.4 CAPSULE ORAL DAILY
Status: DISCONTINUED | OUTPATIENT
Start: 2022-11-16 | End: 2022-11-19 | Stop reason: HOSPADM

## 2022-11-16 RX ORDER — HYDROMORPHONE HYDROCHLORIDE 1 MG/ML
1 INJECTION, SOLUTION INTRAMUSCULAR; INTRAVENOUS; SUBCUTANEOUS
Status: COMPLETED | OUTPATIENT
Start: 2022-11-16 | End: 2022-11-16

## 2022-11-16 RX ORDER — SENNA PLUS 8.6 MG/1
2 TABLET ORAL NIGHTLY PRN
Status: DISCONTINUED | OUTPATIENT
Start: 2022-11-16 | End: 2022-11-19 | Stop reason: HOSPADM

## 2022-11-16 RX ORDER — MAGNESIUM HYDROXIDE/ALUMINUM HYDROXICE/SIMETHICONE 120; 1200; 1200 MG/30ML; MG/30ML; MG/30ML
30 SUSPENSION ORAL EVERY 6 HOURS PRN
Status: DISCONTINUED | OUTPATIENT
Start: 2022-11-16 | End: 2022-11-19 | Stop reason: HOSPADM

## 2022-11-16 RX ORDER — HYDROCODONE BITARTRATE AND ACETAMINOPHEN 10; 325 MG/1; MG/1
1 TABLET ORAL EVERY 6 HOURS PRN
Status: DISCONTINUED | OUTPATIENT
Start: 2022-11-16 | End: 2022-11-19 | Stop reason: HOSPADM

## 2022-11-16 RX ORDER — LANOLIN ALCOHOL/MO/W.PET/CERES
1.5 CREAM (GRAM) TOPICAL NIGHTLY PRN
Status: DISCONTINUED | OUTPATIENT
Start: 2022-11-16 | End: 2022-11-19 | Stop reason: HOSPADM

## 2022-11-16 RX ORDER — ONDANSETRON 2 MG/ML
4 INJECTION INTRAMUSCULAR; INTRAVENOUS EVERY 6 HOURS PRN
Status: DISCONTINUED | OUTPATIENT
Start: 2022-11-16 | End: 2022-11-19 | Stop reason: HOSPADM

## 2022-11-16 RX ORDER — ACETAMINOPHEN 325 MG/1
650 TABLET ORAL EVERY 4 HOURS PRN
Status: DISCONTINUED | OUTPATIENT
Start: 2022-11-16 | End: 2022-11-19 | Stop reason: HOSPADM

## 2022-11-16 RX ORDER — ONDANSETRON 2 MG/ML
4 INJECTION INTRAMUSCULAR; INTRAVENOUS
Status: COMPLETED | OUTPATIENT
Start: 2022-11-16 | End: 2022-11-16

## 2022-11-16 RX ORDER — SERTRALINE HYDROCHLORIDE 100 MG/1
100 TABLET, FILM COATED ORAL DAILY
Status: DISCONTINUED | OUTPATIENT
Start: 2022-11-16 | End: 2022-11-19 | Stop reason: HOSPADM

## 2022-11-16 RX ORDER — DILTIAZEM HYDROCHLORIDE 240 MG/1
240 CAPSULE, COATED, EXTENDED RELEASE ORAL DAILY
Status: DISCONTINUED | OUTPATIENT
Start: 2022-11-16 | End: 2022-11-19 | Stop reason: HOSPADM

## 2022-11-16 RX ORDER — ALLOPURINOL 100 MG/1
300 TABLET ORAL DAILY
Status: DISCONTINUED | OUTPATIENT
Start: 2022-11-16 | End: 2022-11-19 | Stop reason: HOSPADM

## 2022-11-16 RX ORDER — FAMOTIDINE 20 MG/1
40 TABLET, FILM COATED ORAL 2 TIMES DAILY
Status: DISCONTINUED | OUTPATIENT
Start: 2022-11-16 | End: 2022-11-19 | Stop reason: HOSPADM

## 2022-11-16 RX ORDER — POLYETHYLENE GLYCOL 3350 17 G/17G
17 POWDER, FOR SOLUTION ORAL DAILY PRN
Status: DISCONTINUED | OUTPATIENT
Start: 2022-11-16 | End: 2022-11-19 | Stop reason: HOSPADM

## 2022-11-16 RX ADMIN — HYDROMORPHONE HYDROCHLORIDE 1 MG: 1 INJECTION, SOLUTION INTRAMUSCULAR; INTRAVENOUS; SUBCUTANEOUS at 01:30

## 2022-11-16 RX ADMIN — CEFEPIME 2000 MG: 2 INJECTION, POWDER, FOR SOLUTION INTRAVENOUS at 17:09

## 2022-11-16 RX ADMIN — ONDANSETRON 4 MG: 2 INJECTION INTRAMUSCULAR; INTRAVENOUS at 01:30

## 2022-11-16 RX ADMIN — SERTRALINE 100 MG: 100 TABLET, FILM COATED ORAL at 08:52

## 2022-11-16 RX ADMIN — VANCOMYCIN HYDROCHLORIDE 1250 MG: 10 INJECTION, POWDER, LYOPHILIZED, FOR SOLUTION INTRAVENOUS at 12:20

## 2022-11-16 RX ADMIN — HYDROCODONE BITARTRATE AND ACETAMINOPHEN 1 TABLET: 10; 325 TABLET ORAL at 17:09

## 2022-11-16 RX ADMIN — SODIUM CHLORIDE, POTASSIUM CHLORIDE, SODIUM LACTATE AND CALCIUM CHLORIDE 500 ML: 600; 310; 30; 20 INJECTION, SOLUTION INTRAVENOUS at 00:24

## 2022-11-16 RX ADMIN — Medication 2500 MG: at 00:59

## 2022-11-16 RX ADMIN — FAMOTIDINE 40 MG: 20 TABLET, FILM COATED ORAL at 21:04

## 2022-11-16 RX ADMIN — PIPERACILLIN AND TAZOBACTAM 4500 MG: 4; .5 INJECTION, POWDER, FOR SOLUTION INTRAVENOUS at 00:25

## 2022-11-16 RX ADMIN — ALLOPURINOL 300 MG: 100 TABLET ORAL at 08:52

## 2022-11-16 RX ADMIN — HYDROCODONE BITARTRATE AND ACETAMINOPHEN 1 TABLET: 10; 325 TABLET ORAL at 03:05

## 2022-11-16 RX ADMIN — FAMOTIDINE 40 MG: 20 TABLET, FILM COATED ORAL at 08:52

## 2022-11-16 RX ADMIN — TAMSULOSIN HYDROCHLORIDE 0.4 MG: 0.4 CAPSULE ORAL at 08:52

## 2022-11-16 RX ADMIN — RIVAROXABAN 20 MG: 20 TABLET, FILM COATED ORAL at 08:59

## 2022-11-16 RX ADMIN — CEFEPIME 2000 MG: 2 INJECTION, POWDER, FOR SOLUTION INTRAVENOUS at 05:26

## 2022-11-16 RX ADMIN — DILTIAZEM HYDROCHLORIDE 240 MG: 240 CAPSULE, COATED, EXTENDED RELEASE ORAL at 10:53

## 2022-11-16 RX ADMIN — HYDROCODONE BITARTRATE AND ACETAMINOPHEN 1 TABLET: 10; 325 TABLET ORAL at 08:59

## 2022-11-16 ASSESSMENT — PAIN DESCRIPTION - DESCRIPTORS
DESCRIPTORS: ACHING;SHARP;THROBBING
DESCRIPTORS: BURNING;ACHING

## 2022-11-16 ASSESSMENT — PAIN DESCRIPTION - ORIENTATION
ORIENTATION: RIGHT

## 2022-11-16 ASSESSMENT — PAIN DESCRIPTION - LOCATION
LOCATION: LEG

## 2022-11-16 ASSESSMENT — PAIN SCALES - GENERAL
PAINLEVEL_OUTOF10: 7
PAINLEVEL_OUTOF10: 7
PAINLEVEL_OUTOF10: 6
PAINLEVEL_OUTOF10: 10
PAINLEVEL_OUTOF10: 2

## 2022-11-16 NOTE — PROGRESS NOTES
TRANSFER - IN REPORT:    Verbal report received from [facility] BHARGAVI on [employer]  being received from ED for routine progression of patient care      Report consisted of patient's Situation, Background, Assessment and   Recommendations(SBAR). Information from the following report(s) Adult Overview, Intake/Output, MAR, and Recent Results was reviewed with the receiving nurse. Opportunity for questions and clarification was provided. Assessment completed upon patient's arrival to unit and care assumed.

## 2022-11-16 NOTE — ED TRIAGE NOTES
Pt presents from [facility], coming from home c/o of right lower leg lymphedema, weeping, severe swelling noted in triage, pt endorses increasing difficulty of mobility. Pt also states that he has venous ulcer on his right LL that is draining. Denies any CP.  EMS vitals 98.8, , 95% RA, /68,

## 2022-11-16 NOTE — PROGRESS NOTES
Patient off floor for ultrasound. Will check back for PT evaluation later today. Mario Guzmán, PT, DPT, OCS.

## 2022-11-16 NOTE — PROGRESS NOTES
Hospitalist Progress Note   Admit Date:  11/15/2022 11:38 PM   Name:  Reina Chapin   Age:  61 y.o. Sex:  male  :  1965   MRN:  [id number]   Room:  [place] [address]    Reason(s) for Admission: Cellulitis [L03.90]     Hospital Course & Interval History:   Mr. Louis Mcdowell is a 60 y/o WM with a h/o morbid obesity, gout, lymphedema, atrial fibrillation, MDD< BPH and GERD who was admitted to our service on  with RLE cellulitis. Has been non-compliant with outpatient wound care follow up for financial reasons. Has had a wound to medial RLE for several years. Noticed swelling and edema of RLE, says he took cephalexin or cefadroxil and it felt better but didn't look better? Afebrile, labs unremarkable, started on IV abx, wound care consulted. Subjective/24hr Events (22): In bed, has RLE pain. Afebrile overnight. No chest pain or SOB. Assessment & Plan:   # RLE cellulitis with venous stasis ulceration   - Con't IV abx, wound care consult, follow up cultures. De-escalate abx as able. Check RLE US. Needs better outpatient compliance with wound care follow up along with lifestyle changes. # Atrial fibrillation   - Con't Xarelto and PO Cardizem    # Gout   - Allopurinol    # MDD // anxiety   - Zoloft    # BPH   - Flomax     Discharge Planning: Home when able. Diet:  ADULT DIET;  Regular  DVT PPx: Xarelto  Code status: Full Code    Hospital Problems             Last Modified POA    * (Principal) Cellulitis of right lower extremity 2022 Yes    Obesity, morbid ([address] 2022 Yes    Chronic venous hypertension with ulcer and inflammation involving right side ([address] 2022 Yes    Hyperlipidemia 2022 Yes    HTN (hypertension) 2022 Yes    History of gout 2022 Yes    EHSAN (generalized anxiety disorder) 2022 Yes    Chronic atrial fibrillation ([address] 2022 Yes        Objective:   Patient Vitals for the past 24 hrs:   Temp Pulse Resp BP SpO2   22 0715 -- 88 --  95 %   11/16/22 0700 -- -- -- (!) 96/57 93 %   11/16/22 0614 -- -- -- -- 92 %   11/16/22 0604 -- -- -- -- 94 %   11/16/22 0544 -- -- -- -- 92 %   11/16/22 0534 -- -- -- -- 95 %   11/16/22 0524 -- -- -- -- 95 %   11/16/22 0514 -- -- -- -- 92 %   11/16/22 0500 -- -- -- (!) 132/92 90 %   11/16/22 0453 -- -- -- -- 92 %   11/16/22 0443 -- -- -- -- 96 %   11/16/22 0433 -- -- -- -- 90 %   11/16/22 0423 -- -- -- -- 93 %   11/16/22 0406 -- -- -- -- 94 %   11/16/22 0356 -- -- -- -- 95 %   11/16/22 0353 -- -- -- -- 97 %   11/16/22 0350 -- -- -- 131/73 95 %   11/16/22 0346 -- -- -- -- (!) 87 %   11/16/22 0340 -- -- -- -- 92 %   11/16/22 0336 -- -- -- -- 95 %   11/16/22 0330 -- -- -- (!) 141/82 95 %   11/16/22 0326 -- -- -- -- 94 %   11/16/22 0320 -- -- -- -- 96 %   11/16/22 0313 -- -- -- 133/77 93 %   11/16/22 0310 -- -- -- -- 96 %   11/16/22 0303 -- -- -- -- 98 %   11/16/22 0300 -- -- -- 135/85 93 %   11/16/22 0253 -- -- -- -- 96 %   11/16/22 0245 -- -- -- (!) 143/103 92 %   11/16/22 0243 -- -- -- -- 95 %   11/16/22 0233 -- -- -- (!) 150/91 97 %   11/16/22 0223 -- -- -- -- 94 %   11/16/22 0203 -- -- -- -- 97 %   11/16/22 0153 -- -- -- -- 93 %   11/16/22 0148 -- -- -- -- 97 %   11/16/22 0143 -- -- -- (!) 140/86 96 %   11/16/22 0138 -- -- -- -- 97 %   11/16/22 0133 -- -- -- (!) 145/92 97 %   11/16/22 0128 -- -- -- -- 99 %   11/16/22 0123 -- -- -- -- 97 %   11/16/22 0118 -- -- -- -- 99 %   11/16/22 0113 -- -- -- (!) 149/100 99 %   11/16/22 0103 -- -- -- 139/87 99 %   11/16/22 0053 -- -- -- -- 98 %   11/16/22 0043 -- -- -- (!) 152/135 97 %   11/15/22 2331 -- -- -- (!) 150/96 --   11/15/22 2330 98.6 °F (37 °C) 95 22 -- 97 %       Estimated body mass index is 40 kg/m² as calculated from the following:    Height as of this encounter: 6' 3\" (1.905 m). Weight as of this encounter: 320 lb (145.2 kg).   No intake or output data in the 24 hours ending 11/16/22 0836      Physical Exam:   Blood pressure 107/66, pulse 88, temperature 98.6 °F (37 °C), temperature source Oral, resp. rate 22, height 6' 3\" (1.905 m), weight (!) 320 lb (145.2 kg), SpO2 95 %. General:    Well nourished. No overt distress. Morbidly obese. Head:  Normocephalic, atraumatic  Eyes:  Sclerae appear normal. Pupils equally round. ENT:  Nares appear normal, no drainage. Moist oral mucosa  Neck:  No restricted ROM. Trachea midline. CV:   RRR. No m/r/g. No jugular venous distension. Lungs:   CTAB. No wheezing, rhonchi, or rales. Respirations even, unlabored. Abdomen: Bowel sounds present. Soft, nontender, nondistended. Extremities: No cyanosis or clubbing. Bilateral LE pitting edema, R>L. Venous stasis dermatitis LLE. Palm-sized venous ulceration to medial RLE, erythema of the limb distal to the knee, no purulence or crepitus noted. Skin:     No rashes and normal coloration. Warm and dry. Neuro:  CN II-XII grossly intact. Sensation intact. A&Ox3  Psych:  Normal mood and affect.       I have reviewed ordered lab tests and independently visualized imaging below:    Recent Labs:  Recent Results (from the past 48 hour(s))   EKG 12 Lead    Collection Time: 11/15/22 11:35 PM   Result Value Ref Range    Ventricular Rate 97 BPM    Atrial Rate 97 BPM    P-R Interval 178 ms    QRS Duration 120 ms    Q-T Interval 334 ms    QTc Calculation (Bazett) 424 ms    P Axis 48 degrees    R Axis 44 degrees    T Axis 46 degrees    Diagnosis Normal sinus rhythm    Comprehensive Metabolic Panel    Collection Time: 11/15/22 11:47 PM   Result Value Ref Range    Sodium 139 133 - 143 mmol/L    Potassium 4.2 3.5 - 5.1 mmol/L    Chloride 108 101 - 110 mmol/L    CO2 29 21 - 32 mmol/L    Anion Gap 2 2 - 11 mmol/L    Glucose 94 65 - 100 mg/dL    BUN 18 6 - 23 MG/DL    Creatinine 1.00 0.8 - 1.5 MG/DL    Est, Glom Filt Rate >60 >60 ml/min/1.73m2    Calcium 9.6 8.3 - 10.4 MG/DL    Total Bilirubin 0.5 0.2 - 1.1 MG/DL    ALT 21 12 - 65 U/L    AST 11 (L) 15 - 37 U/L    Alk Phosphatase 80 50 - 136 U/L Total Protein 7.2 6.3 - 8.2 g/dL    Albumin 3.6 3.5 - 5.0 g/dL    Globulin 3.6 2.8 - 4.5 g/dL    Albumin/Globulin Ratio 1.0 0.4 - 1.6     CBC with Auto Differential    Collection Time: 11/15/22 11:47 PM   Result Value Ref Range    WBC 9.0 4.3 - 11.1 K/uL    RBC 4.31 4.23 - 5.6 M/uL    Hemoglobin 15.1 13.6 - 17.2 g/dL    Hematocrit 44.8 41.1 - 50.3 %    .9 (H) 82 - 102 FL    MCH 35.0 (H) 26.1 - 32.9 PG    MCHC 33.7 31.4 - 35.0 g/dL    RDW 12.8 11.9 - 14.6 %    Platelets 723 816 - 847 K/uL    MPV 10.5 9.4 - 12.3 FL    nRBC 0.00 0.0 - 0.2 K/uL    Differential Type AUTOMATED      Seg Neutrophils 74 43 - 78 %    Lymphocytes 16 13 - 44 %    Monocytes 8 4.0 - 12.0 %    Eosinophils % 2 0.5 - 7.8 %    Basophils 0 0.0 - 2.0 %    Immature Granulocytes 0 0.0 - 5.0 %    Segs Absolute 6.7 1.7 - 8.2 K/UL    Absolute Lymph # 1.4 0.5 - 4.6 K/UL    Absolute Mono # 0.7 0.1 - 1.3 K/UL    Absolute Eos # 0.2 0.0 - 0.8 K/UL    Basophils Absolute 0.0 0.0 - 0.2 K/UL    Absolute Immature Granulocyte 0.0 0.0 - 0.5 K/UL   Lactate, Sepsis    Collection Time: 11/15/22 11:47 PM   Result Value Ref Range    Lactic Acid, Sepsis 1.0 0.4 - 2.0 MMOL/L   Procalcitonin    Collection Time: 11/15/22 11:47 PM   Result Value Ref Range    Procalcitonin <0.05 0.00 - 0.49 ng/mL   Lactate, Sepsis    Collection Time: 11/16/22  2:03 AM   Result Value Ref Range    Lactic Acid, Sepsis 0.8 0.4 - 2.0 MMOL/L   Urinalysis    Collection Time: 11/16/22  2:03 AM   Result Value Ref Range    Color, UA YELLOW      Appearance CLEAR      Specific Gravity, UA 1.020 1.001 - 1.023      pH, Urine 6.5 5.0 - 9.0      Protein, UA Negative NEG mg/dL    Glucose, UA Negative mg/dL    Ketones, Urine Negative NEG mg/dL    Bilirubin Urine Negative NEG      Blood, Urine TRACE (A) NEG      Urobilinogen, Urine 0.2 0.2 - 1.0 EU/dL    Nitrite, Urine Negative NEG      Leukocyte Esterase, Urine Negative NEG      WBC, UA 0-4 U4 /hpf    RBC, UA 0-5 U5 /hpf    Epithelial Cells UA 0-5 U5 /hpf BACTERIA, URINE Negative NEG /hpf    Casts 0-2 U2 /lpf   Urinalysis, Micro    Collection Time: 11/16/22  2:03 AM   Result Value Ref Range    Mucus, UA DUPLICATE REQUEST 0 /lpf   CBC with Auto Differential    Collection Time: 11/16/22  5:30 AM   Result Value Ref Range    WBC 10.1 4.3 - 11.1 K/uL    RBC 3.94 (L) 4.23 - 5.6 M/uL    Hemoglobin 14.1 13.6 - 17.2 g/dL    Hematocrit 41.7 41.1 - 50.3 %    .8 (H) 82 - 102 FL    MCH 35.8 (H) 26.1 - 32.9 PG    MCHC 33.8 31.4 - 35.0 g/dL    RDW 12.9 11.9 - 14.6 %    Platelets 177 700 - 200 K/uL    MPV 10.1 9.4 - 12.3 FL    nRBC 0.00 0.0 - 0.2 K/uL    Differential Type AUTOMATED      Seg Neutrophils 70 43 - 78 %    Lymphocytes 18 13 - 44 %    Monocytes 9 4.0 - 12.0 %    Eosinophils % 2 0.5 - 7.8 %    Basophils 1 0.0 - 2.0 %    Immature Granulocytes 0 0.0 - 5.0 %    Segs Absolute 7.0 1.7 - 8.2 K/UL    Absolute Lymph # 1.8 0.5 - 4.6 K/UL    Absolute Mono # 0.9 0.1 - 1.3 K/UL    Absolute Eos # 0.2 0.0 - 0.8 K/UL    Basophils Absolute 0.1 0.0 - 0.2 K/UL    Absolute Immature Granulocyte 0.0 0.0 - 0.5 K/UL   Comprehensive Metabolic Panel w/ Reflex to MG    Collection Time: 11/16/22  5:30 AM   Result Value Ref Range    Sodium 141 133 - 143 mmol/L    Potassium 4.1 3.5 - 5.1 mmol/L    Chloride 109 101 - 110 mmol/L    CO2 27 21 - 32 mmol/L    Anion Gap 5 2 - 11 mmol/L    Glucose 99 65 - 100 mg/dL    BUN 16 6 - 23 MG/DL    Creatinine 0.90 0.8 - 1.5 MG/DL    Est, Glom Filt Rate >60 >60 ml/min/1.73m2    Calcium 9.0 8.3 - 10.4 MG/DL    Total Bilirubin 0.5 0.2 - 1.1 MG/DL    ALT 21 12 - 65 U/L    AST 14 (L) 15 - 37 U/L    Alk Phosphatase 65 50 - 136 U/L    Total Protein 6.3 6.3 - 8.2 g/dL    Albumin 3.3 (L) 3.5 - 5.0 g/dL    Globulin 3.0 2.8 - 4.5 g/dL    Albumin/Globulin Ratio 1.1 0.4 - 1.6           Other Studies:  XR TIBIA FIBULA RIGHT (2 VIEWS)    Result Date: 11/16/2022  EXAMINATION: Right tibia and fibula HISTORY: cellulitis. gas? osteo? .  TECHNIQUE: Two views of the right tibia and fibula. COMPARISON: 10/29/2021 FINDINGS: There is no evidence of acute fracture or dislocation. Joint spaces are maintained. There does appear to be bilateral soft tissue swelling. There is no subcutaneous gas collection appreciated. No radiopaque foreign body. No evidence of acute fracture or dislocation within the right tibia and fibula. There does appear to be bilateral soft tissue swelling. There is no subcutaneous gas collection appreciated. XR CHEST PORTABLE    Result Date: 11/16/2022  EXAM: XR CHEST PORTABLE HISTORY: Sepsis. TECHNIQUE: Frontal chest. COMPARISON: 10/10/2020 FINDINGS: The cardiac silhouette, mediastinum, and pulmonary vasculature are within normal limits. There is no consolidation, pleural effusion, or pneumothorax. No significant osseous abnormalities are observed. Question dextroscoliosis of the thoracic spine. No evidence of an acute intrathoracic process.        Current Meds:  Current Facility-Administered Medications   Medication Dose Route Frequency    cefepime (MAXIPIME) 2,000 mg in sodium chloride 0.9 % 50 mL IVPB mini-bag  2,000 mg IntraVENous Q12H    allopurinol (ZYLOPRIM) tablet 300 mg  300 mg Oral Daily    dilTIAZem (CARDIZEM CD) extended release capsule 240 mg  240 mg Oral Daily    rivaroxaban (XARELTO) tablet 15 mg  15 mg Oral Daily with breakfast    sertraline (ZOLOFT) tablet 100 mg  100 mg Oral Daily    tamsulosin (FLOMAX) capsule 0.4 mg  0.4 mg Oral Daily    famotidine (PEPCID) tablet 40 mg  40 mg Oral BID    HYDROcodone-acetaminophen (NORCO)  MG per tablet 1 tablet  1 tablet Oral Q6H PRN    vancomycin (VANCOCIN) 1250 mg in sodium chloride 0.9% 250 mL IVPB  1,250 mg IntraVENous Q12H    acetaminophen (TYLENOL) tablet 650 mg  650 mg Oral Q4H PRN    ondansetron (ZOFRAN) injection 4 mg  4 mg IntraVENous Q6H PRN    polyethylene glycol (GLYCOLAX) packet 17 g  17 g Oral Daily PRN    senna (SENOKOT) tablet 17.2 mg  2 tablet Oral Nightly PRN    melatonin tablet 1.5 mg  1.5 mg Oral Nightly PRN    aluminum & magnesium hydroxide-simethicone (MAALOX) 200-200-20 MG/5ML suspension 30 mL  30 mL Oral Q6H PRN     Current Outpatient Medications   Medication Sig    acetaminophen (TYLENOL) 500 MG tablet Take 500 mg by mouth every 6 hours as needed    allopurinol (ZYLOPRIM) 300 MG tablet Take 300 mg by mouth daily    cyclobenzaprine (FLEXERIL) 5 MG tablet TAKE 1 TABLET BY MOUTH EVERY DAY AT NIGHT    dilTIAZem (TIAZAC) 240 MG extended release capsule Take 240 mg by mouth daily    doxycycline monohydrate (MONODOX) 100 MG capsule Take 100 mg by mouth 2 times daily    famotidine (PEPCID) 40 MG tablet Take 40 mg by mouth 2 times daily    furosemide (LASIX) 40 MG tablet Take 40 mg by mouth daily    HYDROcodone-acetaminophen (NORCO) 5-325 MG per tablet Take 1 tablet by mouth every 6 hours as needed. lisinopril (PRINIVIL;ZESTRIL) 40 MG tablet Take 40 mg by mouth daily    meloxicam (MOBIC) 15 MG tablet Take 15 mg by mouth daily    naloxone (NARCAN) 4 MG/0.1ML LIQD nasal spray Use 1 spray intranasally, then discard. Repeat with new spray every 2 min as needed for opioid overdose symptoms, alternating nostrils. ondansetron (ZOFRAN-ODT) 4 MG disintegrating tablet Take 4 mg by mouth every 8 hours as needed    rivaroxaban (XARELTO) 20 MG TABS tablet TAKE 1 TAB BY MOUTH DAILY (WITH DINNER). sertraline (ZOLOFT) 50 MG tablet Take 75 mg by mouth daily    tamsulosin (FLOMAX) 0.4 MG capsule Take 0.4 mg by mouth daily       Signed:  Neeraj Schwarz MD    Part of this note may have been written by using a voice dictation software. The note has been proof read but may still contain some grammatical/other typographical errors.

## 2022-11-16 NOTE — PROGRESS NOTES
[facility] Pharmacokinetic Monitoring Service - Vancomycin     Nikolas Suero is a 61 y.o. male starting on vancomycin therapy for Cellulitis. Pharmacy consulted by Dr. Romana Norton for monitoring and adjustment. Target Concentration: Goal trough of 10-15 mg/L and AUC/JIM <500 mg*hr/L    Additional Antimicrobials: Cefepime    Patient eligible for piperacillin-tazobactam to cefepime auto-substitution per P&T approved protocol? N/A    Pertinent Laboratory Values: Wt Readings from Last 1 Encounters:   11/15/22 (!) 320 lb (145.2 kg)     Temp Readings from Last 1 Encounters:   11/15/22 98.6 °F (37 °C) (Oral)     Recent Labs     11/15/22  2347   BUN 18   CREATININE 1.00   WBC 9.0   PROCAL <0.05     Estimated Creatinine Clearance: 124 mL/min (based on SCr of 1 mg/dL). Lab Results   Component Value Date/Time    VANCOTROUGH 16.5 06/18/2020 07:59 AM    VANCORANDOM 30.2 10/30/2021 08:49 AM       MRSA Nasal Swab: N/A. Non-respiratory infection. .    Plan:  Dosing recommendations based on Bayesian software  Start vancomycin Vancomycin 2500mg IV x 1 followed by Vancomycin 1250 mg IV q12h.   Anticipated AUC of 540 and trough concentration of 14.9 at steady state  Renal labs as indicated   Vancomycin concentrations will be ordered as clinically appropriate   Pharmacy will continue to monitor patient and adjust therapy as indicated    Thank you for the consult,  eSra Colby, [facility]

## 2022-11-16 NOTE — THERAPY EVALUATION
ACUTE PHYSICAL THERAPY GOALS:   (Developed with and agreed upon by patient and/or caregiver.)  STG:  (1.)Mr. Easton Posada will move from supine to sit and sit to supine  with CONTACT GUARD ASSIST within 4 treatment day(s). (2.)Mr. Easton Posada will transfer from bed to chair and chair to bed with MINIMAL ASSIST using the least restrictive device within 4 treatment day(s). (3.)Mr. Easton Posada will ambulate with MINIMAL ASSIST for 50 feet with the least restrictive device within 4 treatment day(s). LTG:  (1.)Mr. Easton Posada will move from supine to sit and sit to supine , scoot up and down, and roll side to side in bed with INDEPENDENT within 7 treatment day(s). (2.)Mr. Easton Posada will transfer from bed to chair and chair to bed with SUPERVISION using the least restrictive device within 7 treatment day(s). (3.)Mr. Easton Posada will ambulate with SUPERVISION for 150 feet with the least restrictive device within 7 treatment day(s).   ________________________________________________________________________________________________      PHYSICAL THERAPY Initial Assessment  (Link to Caseload Tracking: PT Visit Days : 1  Acknowledge Orders  Time In/Out  PT Charge Capture  Rehab Caseload Tracker    Precious Henry is a 61 y.o. male   PRIMARY DIAGNOSIS: Cellulitis of right lower extremity  Cellulitis [L03.90]  Cellulitis of right lower extremity [L03.115]  Cellulitis of right lower leg [L03.115]  Venous stasis ulcer of other part of right lower leg with fat layer exposed, unspecified whether varicose veins present ([facility] [I83.018, L97.812]       Reason for Referral: Generalized Muscle Weakness (M62.81)  Difficulty in walking, Not elsewhere classified (R26.2)  History of falling (Z91.81)  Abnormal posture (R29.3)  Observation: Payor: [employer] / Plan[place] [employer] [place] / Product Type: *No Product type* /     ASSESSMENT:     REHAB RECOMMENDATIONS:   Recommendation to date pending progress:  Setting:  Short-term Rehab    Equipment:    William Berkowitz ASSESSMENT:  Mr. Kelvin Page is 60 yo male admitted to [facility] on 11/16/22 w/ increased pain from chronic venous stasis ulcer of the distal RLE which has been gradually increasing over the last few weeks, as well as low grade fevers w/ increased fatigue and malaise causing concern for possible infection; Pt also complains of intermittent bowel and bladder incontinence. Pt w/ PMH relevant for chronic Afib and lymphedema as well as gout, GERD, and morbid obesity. Pt presents today w/ significant somnolence after sleeping very poorly last night, although is able to consistently respond to commands and questions throughout session. Pt w/ significantly impaired balance and strength for any standing activities w/ ability to only tolerate a couple of side steps at bedside before needing to sit down despite haivng both RW and ModAx2 from therapists to preserve balance. Of note pt also demonstrates some onset of dizziness w/ brief standing, although no abnormal vitals were recorded. Of note pt reports that he is able to perform all ADLs and ambulation for himself at baseline as his wife is not able to assist him w/ activities, but reports that this is increasingly difficulty lately and that he has had repeated falls over the past few months. Pt is functioning significantly below his baseline at present; Short term rehab is indicated for this pt in order to improve RLE Wbing tolerance and maximize functional balance and LE strength for ambulatory activities in order to maximize functional independence, minimize caregiver burden, and improve overall safe function and QOL.       [address] [facility] [address] [address] AM-PAC 6 Clicks Basic Mobility Inpatient Short Form  AM-PAC Mobility Inpatient   How much difficulty turning over in bed?: A Little  How much difficulty sitting down on / standing up from a chair with arms?: A Lot  How much difficulty moving from lying on back to sitting on side of bed?: A Little  How much help from another person moving to and from a bed to a chair?: A Lot  How much help from another person needed to walk in hospital room?: Total  How much help from another person for climbing 3-5 steps with a railing?: Total  AM-PAC Inpatient Mobility Raw Score : 12  AM-PAC Inpatient T-Scale Score : 35.33  Mobility Inpatient CMS 0-100% Score: 68.66  Mobility Inpatient CMS G-Code Modifier : CL    SUBJECTIVE:   Mr. Nathaly Hargrove states he is feeling poorly and slept very badly last night so is very tired during today's session. Reports that he has significant trouble walking today as he has severe RLE pain w/ any WB due to his wound. Social/Functional Lives With: Spouse  Type of Home: House  Home Layout: One level  Home Access: Stairs to enter with rails  Entrance Stairs - Number of Steps: 4  Bathroom Toilet: Standard  Home Equipment:  (none)  ADL Assistance: Independent  Homemaking Assistance: Independent  Ambulation Assistance: Independent  Transfer Assistance: Independent  Active : Yes  Mode of Transportation: Car  Occupation: Full time employment    OBJECTIVE:     PAIN: VITALS / O2: PRECAUTION / Rodolph Anton / DRAINS:   Pre Treatment:          Post Treatment: Meredeth Navy 6/10 Vitals        Oxygen   WNL   IV    RESTRICTIONS/PRECAUTIONS:  Restrictions/Precautions: Fall Risk                 GROSS EVALUATION: Intact Impaired (Comments):   AROM [x]     PROM [x]    Strength []  Generally reduced LE strength bilaterally R>L requiring ModAx2 to keep standing balance even w/ RW   Balance []  Poor standing balance even w/ RW requiring ModAx2 to keep balance, due in part to severe R foot pain w Wbing.     Posture [] Forward Head  Rounded Shoulders  Thoracic Kyphosis   Sensation [x]     Coordination [x]      Tone [x]     Edema [x]    Activity Tolerance []  Significantly reduced tolerance to ambulatory activities due to severe pain w/ RLE WB.     []      COGNITION/  PERCEPTION: Intact Impaired (Comments):   Orientation []     Vision [x]     Hearing [x] Cognition  []  Pt significantly somnolent and lethargic today due to lack of sleep last night, but does respond to questions and commands appropriately throughout session.       MOBILITY: I Mod I S SBA CGA Min Mod Max Total  NT x2 Comments:   Bed Mobility    Rolling [] [] [] [] [] [x] [] [] [] [] []    Supine to Sit [] [] [] [] [] [] [x] [] [] [] []    Scooting [] [] [] [] [] [x] [] [] [] [] []    Sit to Supine [] [] [] [] [] [] [x] [] [] [] []    Transfers    Sit to Stand [] [] [] [] [] [] [x] [] [] [] [x]    Bed to Chair [] [] [] [] [] [] [] [] [] [x] []    Stand to Sit [] [] [] [] [] [] [x] [] [] [] []     [] [] [] [] [] [] [] [] [] [] []    I=Independent, Mod I=Modified Independent, S=Supervision, SBA=Standby Assistance, CGA=Contact Guard Assistance,   Min=Minimal Assistance, Mod=Moderate Assistance, Max=Maximal Assistance, Total=Total Assistance, NT=Not Tested    GAIT: I Mod I S SBA CGA Min Mod Max Total  NT x2 Comments:   Level of Assistance [] [] [] [] [] [] [x] [] [] [] [x] Side stepping at bedside   Distance 2 feet    DME Rolling Walker    Gait Quality Antalgic, Decreased ismael , Decreased step clearance, Decreased step length, Shuffling , and Wide base of support    Weightbearing Status      Stairs      I=Independent, Mod I=Modified Independent, S=Supervision, SBA=Standby Assistance, CGA=Contact Guard Assistance,   Min=Minimal Assistance, Mod=Moderate Assistance, Max=Maximal Assistance, Total=Total Assistance, NT=Not Tested    PLAN:   FREQUENCY AND DURATION: 3 times/week for duration of hospital stay or until stated goals are met, whichever comes first.    THERAPY PROGNOSIS: Fair    PROBLEM LIST:   (Skilled intervention is medically necessary to address:)  Decreased ADL/Functional Activities  Decreased Activity Tolerance  Decreased Balance  Decreased Gait Ability  Decreased Safety Awareness  Decreased Strength  Decreased Transfer Abilities  Increased Pain INTERVENTIONS PLANNED:   (Benefits and precautions of physical therapy have been discussed with the patient.)  Self Care Training  Therapeutic Activity  Therapeutic Exercise/HEP  Gait Training  Education       TREATMENT:   EVALUATION: LOW COMPLEXITY: (Untimed Charge)    TREATMENT:   Therapeutic Activity (24 Minutes): Therapeutic activity included Rolling, Supine to Sit, Sit to Supine, Scooting, Transfer Training, Sitting balance , and Standing balance to improve functional Activity tolerance, Balance, Mobility, Strength, and ROM.     TREATMENT GRID:   Date:  11/16/22 Date:   Date:     Activity/Exercise Parameters Parameters Parameters   NT NT                                             AFTER TREATMENT PRECAUTIONS: All yeni prominences off-loaded, Alarm Activated, Bed, Bed/Chair Locked, Call light within reach, Needs within reach, RN at bedside, RN notified, and Side rails x2    INTERDISCIPLINARY COLLABORATION:  RN/ PCT and OT/ CAMPBELL    EDUCATION:      TIME IN/OUT:  Time In: 1154  Time Out: 1224  Minutes: 30Time In: 1154  Time Out: [address] [facility]  Minutes: 30 minutes    Megha Ca, PT, SPT

## 2022-11-16 NOTE — CARE COORDINATION
Chart review complete, CM met with pt at bedside, pt was found laying on stretcher, pt agreeable to speak with CM but then falls back to sleep, pt states he lives with spouse but she is not able to assist him in the home. 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 Per OT staff pt had concerns over being able to pay his power bill d/t he is the sole provider in the home. CM did not attempt to speak with pt about community assistance d/t being so sleepy. Demographics, insurance and PCP confirmed. CM staff will remain available to assist with dc needs. Per therapy staff they are recommending STR for pt at DC when CM asked pt about STR he states he is not sure at this time. 11/16/22 2936   Service Assessment   Patient Orientation Alert and Oriented   Cognition Alert   History Provided By Patient   Primary Caregiver Self   Accompanied By/Relationship none   Support Systems Spouse/Significant Other   Patient's Healthcare Decision Maker is: Legal Next of Kin   PCP Verified by CM Yes  (Dr Mariette Libman last visit 1mth ago per pt)   Last Visit to PCP Within last 3 months   Prior Functional Level Independent in ADLs/IADLs   Current Functional Level Independent in ADLs/IADLs   Can patient return to prior living arrangement Yes   Ability to make needs known: Good   Family able to assist with home care needs: Yes   Would you like for me to discuss the discharge plan with any other family members/significant others, and if so, who?  No   Financial Resources Other (Comment)  (FA application provided to pt)   Social/Functional History   Lives With Spouse   Type of [address] One level   [address] to enter with rails   Entrance Stairs - Number of Steps [facility]   (none)   ADL Assistance Independent   Homemaking Assistance Independent   Ambulation Assistance Independent   Transfer Assistance Independent   Active  Yes   Mode of Transportation Car   Occupation Full time employment   Discharge [id number] Prior To Admission None   Potential [address]   DME Ordered? No   Potential Assistance Purchasing Medications No   Type of Home Care Services None   Patient expects to be discharged to:  Unknown   One/Two Story Residence One story   History of falls? 0

## 2022-11-16 NOTE — ED PROVIDER NOTES
Emergency Department Provider Note                   PCP:                Paradise Bangura MD               Age: 61 y.o. Sex: male       ICD-10-CM    1. Cellulitis of right lower leg  L03.115       2. Venous stasis ulcer of other part of right lower leg with fat layer exposed, unspecified whether varicose veins present ([facility]  I83.018     L97.812           DISPOSITION Decision To Admit 11/16/2022 01:20:32 AM       MDM  Number of Diagnoses or Management Options  Cellulitis of right lower leg  Venous stasis ulcer of other part of right lower leg with fat layer exposed, unspecified whether varicose veins present ([facility].)  Diagnosis management comments: Septic work-up initiated due to heart rate of 95 and history of low-grade fevers with concern for right lower extremity cellulitis and infection of chronic wound from lymphedema. X-ray to evaluate for gas or osteo-.  IV antibiotics and cultures ordered. 1:24 AM  His labs look okay and white blood cell count is upper limits of normal at 10, however patient had a heart rate of 95 and history of fever at home and recently has been taking leftover antibiotics and is therefore failing outpatient treatment to release partial treatment. The wound and lower leg look bad and smelled bad and I believe patient is more appropriate for inpatient treatment at this point. Hospitalist consulted for admission.        Amount and/or Complexity of Data Reviewed  Clinical lab tests: ordered and reviewed  Tests in the radiology section of CPT®: ordered and reviewed    Risk of Complications, Morbidity, and/or Mortality  Presenting problems: moderate  Diagnostic procedures: low  Management options: moderate    Patient Progress  Patient progress: stable             Orders Placed This Encounter   Procedures    Blood Culture 1    Blood Culture 2    XR CHEST PORTABLE    XR TIBIA FIBULA RIGHT (2 VIEWS)    Comprehensive Metabolic Panel    CBC with Auto Differential    Lactate, Sepsis    Procalcitonin    Urinalysis    Neurologic Status Assessment    Strict intake and output    Vital Signs Per Unit Routine    [facility]    EKG 12 Lead    Saline lock IV        Medications   vancomycin (VANCOCIN) 2500 mg in sodium chloride 0.9 % 500 mL IVPB (2,500 mg IntraVENous New Bag 11/16/22 0059)   lactated ringers bolus (500 mLs IntraVENous New Bag 11/16/22 0024)   HYDROmorphone HCl PF (DILAUDID) injection 1 mg (has no administration in time range)   ondansetron (ZOFRAN) injection 4 mg (has no administration in time range)   piperacillin-tazobactam (ZOSYN) 4,500 mg in sodium chloride 0.9 % 100 mL IVPB (mini-bag) (0 mg IntraVENous Stopped 11/16/22 0058)       New Prescriptions    No medications on file        Patricia Sanchez is a 61 y.o. male who presents to the Emergency Department with chief complaint of    Chief Complaint   Patient presents with    Leg Swelling      69-year-old male with history of lymphedema presents with increasing right lower extremity leg pain over the last couple of weeks. He reports no improvement with taking some leftover antibiotics 1 of which he calls Keflex. He reports low-grade fevers of 99 over the last couple of days and increased in fatigue and malaise. Patient's PCP is Dr. Flor Gibson and he has been seen in the wound care clinic both at [place] [id number] in the past but is unable to explain exactly when. Patient reports severe pain but then admits he has Norco at home but has not been taking it. He also complains of chronic intermittent bladder and bowel incontinence. Patient is a difficult and poor historian. The history is provided by the patient. All other systems reviewed and are negative unless otherwise stated in the history of present illness section. Review of Systems   Constitutional:  Positive for fatigue and fever. Negative for chills. HENT:  Positive for congestion. Negative for rhinorrhea.     Respiratory:  Positive for cough. Negative for shortness of breath. Cardiovascular:  Negative for chest pain and leg swelling. Gastrointestinal:  Negative for abdominal pain, diarrhea, nausea and vomiting. Endocrine: Negative for polydipsia and polyuria. Genitourinary:  Negative for dysuria, frequency and hematuria. Bladder and bowel incontinence intermittent   Musculoskeletal:  Negative for back pain and myalgias. Skin:  Positive for color change, rash and wound. Neurological:  Negative for weakness and numbness. All other systems reviewed and are negative. Past Medical History:   Diagnosis Date    Anxiety     Arrhythmia     a-fib    Cellulitis of leg 2019    Erectile dysfunction 2014    EHSAN (generalized anxiety disorder) 2014    GERD (gastroesophageal reflux disease)     Gout 2014    History of peptic ulcer     Hypertension     Nausea & vomiting     Obesity, morbid ([address] 2018    Osteoarthritis of hand, primary localized 2014    Personal history of urinary calculi     x2    Venous (peripheral) insufficiency 12/3/2019        Past Surgical History:   Procedure Laterality Date    VASCULAR SURGERY Bilateral 2020    Bilateral iliac venogram, intravascular ultrasound x2. WISDOM TOOTH EXTRACTION          History reviewed. No pertinent family history. Social History     Socioeconomic History    Marital status:      Spouse name: None    Number of children: None    Years of education: None    Highest education level: None   Tobacco Use    Smoking status: Former     Types: Cigarettes     Quit date: 2011     Years since quittin.3    Smokeless tobacco: Never    Tobacco comments:     Quit smoking: quit in the 80's ; maintains non-smoking status   Substance and Sexual Activity    Alcohol use: No    Drug use: No   Social History Narrative    1/15/20:  PATIENT IS  TO [place], THEY HAVE A 13YEAR OLD SON. PATIENT RECENTLY WENT BACK TO WORK AT THE [employer]. Allergies: Codeine and Sulfa antibiotics    Previous Medications    ACETAMINOPHEN (TYLENOL) 500 MG TABLET    Take 500 mg by mouth every 6 hours as needed    ALLOPURINOL (ZYLOPRIM) 300 MG TABLET    Take 300 mg by mouth daily    CYCLOBENZAPRINE (FLEXERIL) 5 MG TABLET    TAKE 1 TABLET BY MOUTH EVERY DAY AT NIGHT    DILTIAZEM (TIAZAC) 240 MG EXTENDED RELEASE CAPSULE    Take 240 mg by mouth daily    DOXYCYCLINE MONOHYDRATE (MONODOX) 100 MG CAPSULE    Take 100 mg by mouth 2 times daily    FAMOTIDINE (PEPCID) 40 MG TABLET    Take 40 mg by mouth 2 times daily    FUROSEMIDE (LASIX) 40 MG TABLET    Take 40 mg by mouth daily    HYDROCODONE-ACETAMINOPHEN (NORCO) 5-325 MG PER TABLET    Take 1 tablet by mouth every 6 hours as needed. LISINOPRIL (PRINIVIL;ZESTRIL) 40 MG TABLET    Take 40 mg by mouth daily    MELOXICAM (MOBIC) 15 MG TABLET    Take 15 mg by mouth daily    NALOXONE (NARCAN) 4 MG/0.1ML LIQD NASAL SPRAY    Use 1 spray intranasally, then discard. Repeat with new spray every 2 min as needed for opioid overdose symptoms, alternating nostrils. ONDANSETRON (ZOFRAN-ODT) 4 MG DISINTEGRATING TABLET    Take 4 mg by mouth every 8 hours as needed    RIVAROXABAN (XARELTO) 20 MG TABS TABLET    TAKE 1 TAB BY MOUTH DAILY (WITH DINNER). SERTRALINE (ZOLOFT) 50 MG TABLET    Take 75 mg by mouth daily    TAMSULOSIN (FLOMAX) 0.4 MG CAPSULE    Take 0.4 mg by mouth daily        Vitals signs and nursing note reviewed. Patient Vitals for the past 4 hrs:   Temp Pulse Resp BP SpO2   11/16/22 0043 -- -- -- (!) 152/135 97 %   11/15/22 2331 -- -- -- (!) 150/96 --   11/15/22 2330 98.6 °F (37 °C) 95 22 -- 97 %          Physical Exam  Vitals and nursing note reviewed. Constitutional:       General: He is not in acute distress. Appearance: He is ill-appearing. He is not toxic-appearing or diaphoretic.    HENT:      Nose: Nose normal.      Mouth/Throat:      Mouth: Mucous membranes are moist.   Eyes:      Conjunctiva/sclera: Conjunctivae normal.      Pupils: Pupils are equal, round, and reactive to light. Cardiovascular:      Rate and Rhythm: Normal rate and regular rhythm. Heart sounds: Normal heart sounds. Pulmonary:      Effort: Pulmonary effort is normal.      Breath sounds: Normal breath sounds. Abdominal:      General: There is no distension. Palpations: Abdomen is soft. Tenderness: There is no abdominal tenderness. Musculoskeletal:         General: Swelling and tenderness present. Right lower leg: Edema present. Left lower leg: Edema present. Comments: Right greater than left lower extremity swelling, right lower extremity is wrapped with very thick gauze and there is yellow drainage soaking through the gauze in the upper and mid shin area. Unable to visualize this in triage. 1:22 AM  Diffuse erythema of right lower leg with a palm sized venous stasis ulcer of the medial portion of the right lower leg. Fat layer is exposed and there is purulent drainage and very foul odor. This area is tender to palpation more significantly than the rest of the lower extremity. Cap refill is less than 2 seconds. Edema is so thick I cannot palpate any pulses but he has good cap refill. Lymphadenopathy:      Cervical: No cervical adenopathy. Neurological:      Mental Status: He is alert. Procedures    ED EKG Interpretation  EKG was interpreted in the absence of a cardiologist.    Rate: 97  EKG Interpretation: EKG Interpretation: sinus rhythm  ST Segments: Normal ST segments - NO STEMI, NSIVCD    Results for orders placed or performed during the hospital encounter of 11/15/22   XR CHEST PORTABLE    Narrative    EXAM: XR CHEST PORTABLE    HISTORY: Sepsis. TECHNIQUE: Frontal chest.    COMPARISON: 10/10/2020     FINDINGS:   The cardiac silhouette, mediastinum, and pulmonary vasculature are within normal  limits. There is no consolidation, pleural effusion, or pneumothorax.     No significant osseous abnormalities are observed. Question dextroscoliosis of  the thoracic spine. Impression    No evidence of an acute intrathoracic process. XR TIBIA FIBULA RIGHT (2 VIEWS)    Narrative    EXAMINATION: Right tibia and fibula    HISTORY: cellulitis. gas? osteo? .      TECHNIQUE: Two views of the right tibia and fibula. COMPARISON: 10/29/2021    FINDINGS:   There is no evidence of acute fracture or dislocation. Joint spaces are maintained. There does appear to be bilateral soft tissue swelling. There is no subcutaneous  gas collection appreciated. No radiopaque foreign body. Impression    No evidence of acute fracture or dislocation within the right tibia and fibula. There does appear to be bilateral soft tissue swelling. There is no subcutaneous  gas collection appreciated.        Comprehensive Metabolic Panel   Result Value Ref Range    Sodium 139 133 - 143 mmol/L    Potassium 4.2 3.5 - 5.1 mmol/L    Chloride 108 101 - 110 mmol/L    CO2 29 21 - 32 mmol/L    Anion Gap 2 2 - 11 mmol/L    Glucose 94 65 - 100 mg/dL    BUN 18 6 - 23 MG/DL    Creatinine 1.00 0.8 - 1.5 MG/DL    Est, Glom Filt Rate >60 >60 ml/min/1.73m2    Calcium 9.6 8.3 - 10.4 MG/DL    Total Bilirubin 0.5 0.2 - 1.1 MG/DL    ALT 21 12 - 65 U/L    AST 11 (L) 15 - 37 U/L    Alk Phosphatase 80 50 - 136 U/L    Total Protein 7.2 6.3 - 8.2 g/dL    Albumin 3.6 3.5 - 5.0 g/dL    Globulin 3.6 2.8 - 4.5 g/dL    Albumin/Globulin Ratio 1.0 0.4 - 1.6     CBC with Auto Differential   Result Value Ref Range    WBC 9.0 4.3 - 11.1 K/uL    RBC 4.31 4.23 - 5.6 M/uL    Hemoglobin 15.1 13.6 - 17.2 g/dL    Hematocrit 44.8 41.1 - 50.3 %    .9 (H) 82 - 102 FL    MCH 35.0 (H) 26.1 - 32.9 PG    MCHC 33.7 31.4 - 35.0 g/dL    RDW 12.8 11.9 - 14.6 %    Platelets 117 199 - 805 K/uL    MPV 10.5 9.4 - 12.3 FL    nRBC 0.00 0.0 - 0.2 K/uL    Differential Type AUTOMATED      Seg Neutrophils 74 43 - 78 %    Lymphocytes 16 13 - 44 %    Monocytes 8 4.0 - 12.0 %    Eosinophils % 2 0.5 - 7.8 %    Basophils 0 0.0 - 2.0 %    Immature Granulocytes 0 0.0 - 5.0 %    Segs Absolute 6.7 1.7 - 8.2 K/UL    Absolute Lymph # 1.4 0.5 - 4.6 K/UL    Absolute Mono # 0.7 0.1 - 1.3 K/UL    Absolute Eos # 0.2 0.0 - 0.8 K/UL    Basophils Absolute 0.0 0.0 - 0.2 K/UL    Absolute Immature Granulocyte 0.0 0.0 - 0.5 K/UL   Lactate, Sepsis   Result Value Ref Range    Lactic Acid, Sepsis 1.0 0.4 - 2.0 MMOL/L   Procalcitonin   Result Value Ref Range    Procalcitonin <0.05 0.00 - 0.49 ng/mL   EKG 12 Lead   Result Value Ref Range    Ventricular Rate 97 BPM    Atrial Rate 97 BPM    P-R Interval 178 ms    QRS Duration 120 ms    Q-T Interval 334 ms    QTc Calculation (Bazett) 424 ms    P Axis 48 degrees    R Axis 44 degrees    T Axis 46 degrees    Diagnosis Normal sinus rhythm         XR CHEST PORTABLE   Final Result   No evidence of an acute intrathoracic process. XR TIBIA FIBULA RIGHT (2 VIEWS)   Final Result   No evidence of acute fracture or dislocation within the right tibia and fibula. There does appear to be bilateral soft tissue swelling. There is no subcutaneous   gas collection appreciated. Voice dictation software was used during the making of this note. This software is not perfect and grammatical and other typographical errors may be present. This note has not been completely proofread for errors.      Vernon Mcnally MD  11/16/22 [id number]

## 2022-11-16 NOTE — ED NOTES
TRANSFER - OUT REPORT:    Verbal report given to Edel Mathis RN on [employer]  being transferred to [facility] [place] for routine progression of patient care       Report consisted of patient's Situation, Background, Assessment and   Recommendations(SBAR). Information from the following report(s) ED SBAR was reviewed with the receiving nurse. [place] Assessment: Presents to emergency department  because of falls (Syncope, seizure, or loss of consciousness): No, Age > 79: No, Altered Mental Status, Intoxication with alcohol or substance confusion (Disorientation, impaired judgment, poor safety awaremess, or inability to follow instructions): No, Impaired Mobility: Ambulates or transfers with assistive devices or assistance; Unable to ambulate or transer.: No  Lines:   Peripheral IV 11/15/22 Left Antecubital (Active)   Site Assessment Clean, dry & intact 11/15/22 2342   Line Status Blood return noted 11/15/22 2342   Phlebitis Assessment No symptoms 11/15/22 2342   Infiltration Assessment 0 11/15/22 2342   Alcohol Cap Used No 11/15/22 2342   Dressing Status New dressing applied 11/15/22 2342   Dressing Type Transparent 11/15/22 2342   Dressing Intervention New 11/15/22 2342        Opportunity for questions and clarification was provided.       Patient transported with:  Grace Thorpe RN  11/16/22 4414

## 2022-11-16 NOTE — PROGRESS NOTES
VANCO DAILY FOLLOW UP NOTE  [facility] Pharmacokinetic Monitoring Service - Vancomycin    Consulting Provider: Dr. Mark Kirk   Indication: SSTI  Target Concentration: Goal trough of 10-15 mg/L and AUC/JIM <500 mg*hr/L  Day of Therapy: 1 of 5  Additional Antimicrobials: cefepime    Patient eligible for piperacillin-tazobactam to cefepime auto-substitution per P&T approved protocol? YES    Pertinent Laboratory Values: Wt Readings from Last 1 Encounters:   11/15/22 (!) 320 lb (145.2 kg)     Temp Readings from Last 1 Encounters:   11/15/22 98.6 °F (37 °C) (Oral)     Recent Labs     11/15/22  2347 11/16/22  0530   BUN 18 16   CREATININE 1.00 0.90   WBC 9.0 10.1   PROCAL <0.05  --      Estimated Creatinine Clearance: 138 mL/min (based on SCr of 0.9 mg/dL). Lab Results   Component Value Date/Time    VANCOTROUGH 16.5 06/18/2020 07:59 AM    VANCORANDOM 30.2 10/30/2021 08:49 AM     MRSA Nasal Swab: N/A. Non-respiratory infection.     Assessment:  Date/Time Dose Concentration AUC         Note: Serum concentrations collected for AUC dosing may appear elevated if collected in close proximity to the dose administered, this is not necessarily an indication of toxicity    Plan:  Dosing recommendations based on Bayesian software  Start vancomycin 2500 mg x 1 followed by 1250 mg q12h  Anticipated AUC of 488 and trough concentration of 12.9 at steady state  Renal labs as indicated   Vancomycin concentration ordered for 11/17 @ 0600    Pharmacy will continue to monitor patient and adjust therapy as indicated    Thank you for the consult,  SEAN Mann [facility]

## 2022-11-16 NOTE — H&P
Hospitalist History and Physical   Admit Date:  11/15/2022 11:38 PM   Name:  Ajay Milligan   Age:  61 y.o. Sex:  male  :  1965   MRN:  [id number]   Room:  [place] [address]    Presenting Complaint: Leg Swelling     Reason(s) for Admission: No admission diagnoses are documented for this encounter. History of Present Illness:   58-year-old male with chronic A. fib and history of lymphedema complicated by cellulitis presents with right lower extremity swelling pain and edema and cellulitis that failed outpatient treatment. Patient is a poor historian. Been worsening over the last couple weeks. He has tried taking some leftover antibiotics (unclear which ones those were). He was discharged in 2021 for similar. At that time he had been treated briefly with cefepime and Vanco and discharged on clindamycin. He has followed up in the interim with the wound clinic both here and at [facility] but has not had good follow-up. He does have venous congestion and disease for him he seen a vascular surgeon for a venogram in the past.  Reportedly offered opportunity for intervention in the past but they were reluctant. Emergency room he had diffuse erythema of the right lower extremity with a venous stasis ulcer on the medial part of the right leg. There is purulent drainage and a foul odor. Tender to palpation. Pulses are normal and an x-ray tib-fib did not show any acute fracture dislocation. Patient was given a fluid bolus vancomycin and Zosyn while in the emergency room and admitted due to outpatient antibiotic failure. Extensive chart review and summary of medical records was performed today, see summary and problem list above. Review of Systems:  ROS: per subj above, all other systems negative  Assessment & Plan:     Cellulitis of the right lower extremity, chronic venous stasis ulcers: Continue with the vancomycin and switch Zosyn to cefepime. Add Flagyl. Follow-up blood cultures. Consulting wound care. Will defer lower extremity venous ultrasound and arterial ultrasounds to the day team versus possibly completing those as an outpatient and following up with vascular surgery. Gout: resume home prophylaxis  GERD:Home famotidine  Chronic lymphedema: Hold home Lasix for now. Chronic A. Fib: Resume home Xarelto, and diltiazem  MDD: Resume home sertraline  BPH: Resume home Flomax    DVT ppx: Xarelto  Dispo: Home likely  PT/OT: Consulted    Code status: No Order        Past History:  Past Medical History:   Diagnosis Date    Anxiety     Arrhythmia     a-fib    Cellulitis of leg 2019    Erectile dysfunction 2014    EHSAN (generalized anxiety disorder) 2014    GERD (gastroesophageal reflux disease)     Gout 2014    History of peptic ulcer     Hypertension     Nausea & vomiting     Obesity, morbid ([address] 2018    Osteoarthritis of hand, primary localized 2014    Personal history of urinary calculi     x2    Venous (peripheral) insufficiency 12/3/2019     Past Surgical History:   Procedure Laterality Date    VASCULAR SURGERY Bilateral 2020    Bilateral iliac venogram, intravascular ultrasound x2. WISDOM TOOTH EXTRACTION        Allergies   Allergen Reactions    Codeine Nausea Only    Sulfa Antibiotics Rash      Social History     Tobacco Use    Smoking status: Former     Types: Cigarettes     Quit date: 2011     Years since quittin.3    Smokeless tobacco: Never    Tobacco comments:     Quit smoking: quit in the 80's ; maintains non-smoking status   Substance Use Topics    Alcohol use: No      History reviewed. No pertinent family history. Family history reviewed and negative except as noted above.     Immunization History   Administered Date(s) Administered    PPD Test 01/15/2020    Tdap (Boostrix, Adacel) 2018     Prior to Admit Medications:  Current Outpatient Medications   Medication Instructions    acetaminophen (TYLENOL) 500 mg, Oral, EVERY 6 HOURS PRN    allopurinol (ZYLOPRIM) 300 mg, Oral, DAILY    cyclobenzaprine (FLEXERIL) 5 MG tablet TAKE 1 TABLET BY MOUTH EVERY DAY AT NIGHT    dilTIAZem (TIAZAC) 240 mg, Oral, DAILY    doxycycline monohydrate (MONODOX) 100 mg, Oral, 2 TIMES DAILY    famotidine (PEPCID) 40 mg, Oral, 2 TIMES DAILY    furosemide (LASIX) 40 mg, Oral, DAILY    HYDROcodone-acetaminophen (NORCO) 5-325 MG per tablet 1 tablet, Oral, EVERY 6 HOURS PRN    lisinopril (PRINIVIL;ZESTRIL) 40 mg, Oral, DAILY    meloxicam (MOBIC) 15 mg, Oral, DAILY    naloxone (NARCAN) 4 MG/0.1ML LIQD nasal spray Use 1 spray intranasally, then discard. Repeat with new spray every 2 min as needed for opioid overdose symptoms, alternating nostrils. ondansetron (ZOFRAN-ODT) 4 mg, Oral, EVERY 8 HOURS PRN    rivaroxaban (XARELTO) 20 MG TABS tablet TAKE 1 TAB BY MOUTH DAILY (WITH DINNER). sertraline (ZOLOFT) 75 mg, Oral, DAILY    tamsulosin (FLOMAX) 0.4 mg, Oral, DAILY         Objective:   Patient Vitals for the past 24 hrs:   Temp Pulse Resp BP SpO2   11/16/22 0153 -- -- -- -- 93 %   11/16/22 0148 -- -- -- -- 97 %   11/16/22 0143 -- -- -- (!) 140/86 96 %   11/16/22 0138 -- -- -- -- 97 %   11/16/22 0133 -- -- -- (!) 145/92 97 %   11/16/22 0128 -- -- -- -- 99 %   11/16/22 0123 -- -- -- -- 97 %   11/16/22 0118 -- -- -- -- 99 %   11/16/22 0113 -- -- -- (!) 149/100 99 %   11/16/22 0103 -- -- -- 139/87 99 %   11/16/22 0053 -- -- -- -- 98 %   11/16/22 0043 -- -- -- (!) 152/135 97 %   11/15/22 2331 -- -- -- (!) 150/96 --   11/15/22 2330 98.6 °F (37 °C) 95 22 -- 97 %       Estimated body mass index is 40 kg/m² as calculated from the following:    Height as of this encounter: 6' 3\" (1.905 m). Weight as of this encounter: 320 lb (145.2 kg). No intake or output data in the 24 hours ending 11/16/22 0210      Physical Exam  Constitutional:       Appearance: Normal appearance. He is obese. HENT:      Head: Normocephalic. Mouth/Throat:      Pharynx: Oropharynx is clear. Eyes:      Extraocular Movements: Extraocular movements intact. Pupils: Pupils are equal, round, and reactive to light. Cardiovascular:      Rate and Rhythm: Normal rate. Heart sounds: No murmur heard. Pulmonary:      Effort: Pulmonary effort is normal.      Breath sounds: Normal breath sounds. Abdominal:      General: Abdomen is flat. Palpations: Abdomen is soft. Musculoskeletal:         General: No tenderness. Normal range of motion. Cervical back: Normal range of motion. No tenderness. Right lower leg: Edema present. Left lower leg: Edema present. Skin:     General: Skin is warm and dry. Findings: Lesion (severe ulcer on right medial calf; liposclerodermatitis on LLE) present. Neurological:      General: No focal deficit present. Mental Status: He is oriented to person, place, and time.    Psychiatric:         Mood and Affect: Mood normal.         Behavior: Behavior normal.                 I have reviewed ordered lab tests and data below:    Last 24hr Labs:  Recent Results (from the past 24 hour(s))   EKG 12 Lead    Collection Time: 11/15/22 11:35 PM   Result Value Ref Range    Ventricular Rate 97 BPM    Atrial Rate 97 BPM    P-R Interval 178 ms    QRS Duration 120 ms    Q-T Interval 334 ms    QTc Calculation (Bazett) 424 ms    P Axis 48 degrees    R Axis 44 degrees    T Axis 46 degrees    Diagnosis Normal sinus rhythm    Comprehensive Metabolic Panel    Collection Time: 11/15/22 11:47 PM   Result Value Ref Range    Sodium 139 133 - 143 mmol/L    Potassium 4.2 3.5 - 5.1 mmol/L    Chloride 108 101 - 110 mmol/L    CO2 29 21 - 32 mmol/L    Anion Gap 2 2 - 11 mmol/L    Glucose 94 65 - 100 mg/dL    BUN 18 6 - 23 MG/DL    Creatinine 1.00 0.8 - 1.5 MG/DL    Est, Glom Filt Rate >60 >60 ml/min/1.73m2    Calcium 9.6 8.3 - 10.4 MG/DL    Total Bilirubin 0.5 0.2 - 1.1 MG/DL    ALT 21 12 - 65 U/L    AST 11 (L) 15 - 37 U/L    Alk Phosphatase 80 50 - 136 U/L    Total Protein 7.2 6.3 - 8.2 g/dL    Albumin 3.6 3.5 - 5.0 g/dL    Globulin 3.6 2.8 - 4.5 g/dL    Albumin/Globulin Ratio 1.0 0.4 - 1.6     CBC with Auto Differential    Collection Time: 11/15/22 11:47 PM   Result Value Ref Range    WBC 9.0 4.3 - 11.1 K/uL    RBC 4.31 4.23 - 5.6 M/uL    Hemoglobin 15.1 13.6 - 17.2 g/dL    Hematocrit 44.8 41.1 - 50.3 %    .9 (H) 82 - 102 FL    MCH 35.0 (H) 26.1 - 32.9 PG    MCHC 33.7 31.4 - 35.0 g/dL    RDW 12.8 11.9 - 14.6 %    Platelets 579 448 - 962 K/uL    MPV 10.5 9.4 - 12.3 FL    nRBC 0.00 0.0 - 0.2 K/uL    Differential Type AUTOMATED      Seg Neutrophils 74 43 - 78 %    Lymphocytes 16 13 - 44 %    Monocytes 8 4.0 - 12.0 %    Eosinophils % 2 0.5 - 7.8 %    Basophils 0 0.0 - 2.0 %    Immature Granulocytes 0 0.0 - 5.0 %    Segs Absolute 6.7 1.7 - 8.2 K/UL    Absolute Lymph # 1.4 0.5 - 4.6 K/UL    Absolute Mono # 0.7 0.1 - 1.3 K/UL    Absolute Eos # 0.2 0.0 - 0.8 K/UL    Basophils Absolute 0.0 0.0 - 0.2 K/UL    Absolute Immature Granulocyte 0.0 0.0 - 0.5 K/UL   Lactate, Sepsis    Collection Time: 11/15/22 11:47 PM   Result Value Ref Range    Lactic Acid, Sepsis 1.0 0.4 - 2.0 MMOL/L   Procalcitonin    Collection Time: 11/15/22 11:47 PM   Result Value Ref Range    Procalcitonin <0.05 0.00 - 0.49 ng/mL         Other Studies:  XR TIBIA FIBULA RIGHT (2 VIEWS)    Result Date: 11/16/2022  EXAMINATION: Right tibia and fibula HISTORY: cellulitis. gas? osteo? .  TECHNIQUE: Two views of the right tibia and fibula. COMPARISON: 10/29/2021 FINDINGS: There is no evidence of acute fracture or dislocation. Joint spaces are maintained. There does appear to be bilateral soft tissue swelling. There is no subcutaneous gas collection appreciated. No radiopaque foreign body. No evidence of acute fracture or dislocation within the right tibia and fibula. There does appear to be bilateral soft tissue swelling. There is no subcutaneous gas collection appreciated.      XR CHEST PORTABLE    Result Date: 11/16/2022  EXAM: XR CHEST PORTABLE HISTORY: Sepsis. TECHNIQUE: Frontal chest. COMPARISON: 10/10/2020 FINDINGS: The cardiac silhouette, mediastinum, and pulmonary vasculature are within normal limits. There is no consolidation, pleural effusion, or pneumothorax. No significant osseous abnormalities are observed. Question dextroscoliosis of the thoracic spine. No evidence of an acute intrathoracic process. No results found for this or any previous visit.       vancomycin  25 mg/kg (Adjusted) IntraVENous Once       Signed:  Noelle Duverney, MD    Part of this note may have been written by using a voice dictation software. The note has been proof read but may still contain some grammatical/other typographical errors.

## 2022-11-17 PROBLEM — I48.91 ATRIAL FIBRILLATION WITH RAPID VENTRICULAR RESPONSE (HCC): Status: ACTIVE | Noted: 2019-11-17

## 2022-11-17 LAB
CREAT SERPL-MCNC: 0.9 MG/DL (ref 0.8–1.5)
VANCOMYCIN SERPL-MCNC: 13.1 UG/ML

## 2022-11-17 PROCEDURE — 82565 ASSAY OF CREATININE: CPT

## 2022-11-17 PROCEDURE — 36415 COLL VENOUS BLD VENIPUNCTURE: CPT

## 2022-11-17 PROCEDURE — 2500000003 HC RX 250 WO HCPCS: Performed by: INTERNAL MEDICINE

## 2022-11-17 PROCEDURE — 6370000000 HC RX 637 (ALT 250 FOR IP): Performed by: INTERNAL MEDICINE

## 2022-11-17 PROCEDURE — 2580000003 HC RX 258: Performed by: STUDENT IN AN ORGANIZED HEALTH CARE EDUCATION/TRAINING PROGRAM

## 2022-11-17 PROCEDURE — 6360000002 HC RX W HCPCS: Performed by: INTERNAL MEDICINE

## 2022-11-17 PROCEDURE — 1100000003 HC PRIVATE W/ TELEMETRY

## 2022-11-17 PROCEDURE — 96366 THER/PROPH/DIAG IV INF ADDON: CPT

## 2022-11-17 PROCEDURE — 2580000003 HC RX 258: Performed by: INTERNAL MEDICINE

## 2022-11-17 PROCEDURE — 6360000002 HC RX W HCPCS: Performed by: STUDENT IN AN ORGANIZED HEALTH CARE EDUCATION/TRAINING PROGRAM

## 2022-11-17 PROCEDURE — 80202 ASSAY OF VANCOMYCIN: CPT

## 2022-11-17 PROCEDURE — 6370000000 HC RX 637 (ALT 250 FOR IP): Performed by: STUDENT IN AN ORGANIZED HEALTH CARE EDUCATION/TRAINING PROGRAM

## 2022-11-17 RX ORDER — ALPRAZOLAM 0.5 MG/1
0.5 TABLET ORAL ONCE
Status: COMPLETED | OUTPATIENT
Start: 2022-11-17 | End: 2022-11-17

## 2022-11-17 RX ADMIN — ALLOPURINOL 300 MG: 100 TABLET ORAL at 09:42

## 2022-11-17 RX ADMIN — VANCOMYCIN HYDROCHLORIDE 1500 MG: 100 INJECTION, POWDER, LYOPHILIZED, FOR SOLUTION INTRAVENOUS at 11:16

## 2022-11-17 RX ADMIN — ANTI-FUNGAL POWDER MICONAZOLE NITRATE TALC FREE: 1.42 POWDER TOPICAL at 05:31

## 2022-11-17 RX ADMIN — FAMOTIDINE 40 MG: 20 TABLET, FILM COATED ORAL at 21:20

## 2022-11-17 RX ADMIN — VANCOMYCIN HYDROCHLORIDE 1250 MG: 10 INJECTION, POWDER, LYOPHILIZED, FOR SOLUTION INTRAVENOUS at 00:25

## 2022-11-17 RX ADMIN — HYDROCODONE BITARTRATE AND ACETAMINOPHEN 1 TABLET: 10; 325 TABLET ORAL at 08:37

## 2022-11-17 RX ADMIN — TAMSULOSIN HYDROCHLORIDE 0.4 MG: 0.4 CAPSULE ORAL at 08:37

## 2022-11-17 RX ADMIN — RIVAROXABAN 20 MG: 20 TABLET, FILM COATED ORAL at 08:37

## 2022-11-17 RX ADMIN — VANCOMYCIN HYDROCHLORIDE 1500 MG: 100 INJECTION, POWDER, LYOPHILIZED, FOR SOLUTION INTRAVENOUS at 22:46

## 2022-11-17 RX ADMIN — DILTIAZEM HYDROCHLORIDE 240 MG: 240 CAPSULE, COATED, EXTENDED RELEASE ORAL at 08:37

## 2022-11-17 RX ADMIN — FAMOTIDINE 40 MG: 20 TABLET, FILM COATED ORAL at 08:37

## 2022-11-17 RX ADMIN — CEFEPIME 2000 MG: 2 INJECTION, POWDER, FOR SOLUTION INTRAVENOUS at 05:31

## 2022-11-17 RX ADMIN — HYDROCODONE BITARTRATE AND ACETAMINOPHEN 1 TABLET: 10; 325 TABLET ORAL at 16:06

## 2022-11-17 RX ADMIN — CEFEPIME 2000 MG: 2 INJECTION, POWDER, FOR SOLUTION INTRAVENOUS at 17:40

## 2022-11-17 RX ADMIN — ALPRAZOLAM 0.5 MG: 0.5 TABLET ORAL at 14:14

## 2022-11-17 RX ADMIN — SERTRALINE 100 MG: 100 TABLET, FILM COATED ORAL at 08:37

## 2022-11-17 RX ADMIN — TUBERCULIN PURIFIED PROTEIN DERIVATIVE 5 UNITS: 5 INJECTION, SOLUTION INTRADERMAL at 08:41

## 2022-11-17 ASSESSMENT — PAIN DESCRIPTION - ORIENTATION
ORIENTATION: RIGHT;LOWER
ORIENTATION: LEFT;LOWER

## 2022-11-17 ASSESSMENT — PAIN SCALES - GENERAL
PAINLEVEL_OUTOF10: 7
PAINLEVEL_OUTOF10: 7
PAINLEVEL_OUTOF10: 2
PAINLEVEL_OUTOF10: 2

## 2022-11-17 ASSESSMENT — PAIN DESCRIPTION - DESCRIPTORS
DESCRIPTORS: ACHING
DESCRIPTORS: ACHING;SHARP;THROBBING

## 2022-11-17 ASSESSMENT — PAIN DESCRIPTION - LOCATION
LOCATION: LEG
LOCATION: LEG

## 2022-11-17 NOTE — PROGRESS NOTES
Hospitalist Progress Note   Admit Date:  11/15/2022 11:38 PM   Name:  Georgia March   Age:  61 y.o. Sex:  male  :  1965   MRN:  [id number]   Room:  [facility]    Reason(s) for Admission: Cellulitis [L03.90]  Cellulitis of right lower extremity [L03.115]  Cellulitis of right lower leg [L03.115]  Venous stasis ulcer of other part of right lower leg with fat layer exposed, unspecified whether varicose veins present ([address] [I83.018, L97.812]     Hospital Course & Interval History:   Mr. Sis Pisano is a 62 y/o WM with a h/o morbid obesity, gout, lymphedema, atrial fibrillation, MDD< BPH and GERD who was admitted to our service on  with RLE cellulitis. Has been non-compliant with outpatient wound care follow up for financial reasons. Has had a wound to medial RLE for several years. Noticed swelling and edema of RLE, says he took cephalexin or cefadroxil and it felt better but didn't look better? Afebrile, labs unremarkable, started on IV abx, wound care consulted. Seen by therapy and recommended for placement. Subjective/24hr Events (22):  C/o RLE pain. Foot redness and edema have improved from yesterday. Continues to worry about his stress at home. Afebrile. No chest pain or SOB. Assessment & Plan:   # RLE cellulitis with venous stasis ulceration   - Will plan to con't IV abx through today and transition to orals tomorrow, . Wound care. Cultures negative to date. RLE US neg for DVT. Encouraged outpatient wound care compliance. # Atrial fibrillation              - Con't Xarelto and PO Cardizem     # Gout              - Allopurinol     # MDD // anxiety              - Zoloft     # BPH              - Flomax     Discharge Planning: STR recommended. Diet:  ADULT DIET;  Regular  DVT PPx: Xarelto  Code status: Full Code    Hospital Problems             Last Modified POA    * (Principal) Cellulitis of right lower extremity 2022 Yes    Obesity, morbid ([address] 2022 Yes    Chronic venous hypertension with ulcer and inflammation involving right side ([facility] 11/16/2022 Yes    Hyperlipidemia 11/16/2022 Yes    HTN (hypertension) 11/16/2022 Yes    History of gout 11/16/2022 Yes    EHSAN (generalized anxiety disorder) 11/16/2022 Yes    Chronic atrial fibrillation ([facility] 11/16/2022 Yes        Objective:   Patient Vitals for the past 24 hrs:   Temp Pulse Resp BP SpO2   11/17/22 0439 98.4 °F (36.9 °C) 89 25 133/66 94 %   11/17/22 0026 98.4 °F (36.9 °C) 79 21 (!) 159/85 92 %   11/16/22 2024 98.4 °F (36.9 °C) 82 21 138/81 94 %   11/16/22 1709 -- -- 16 -- --   11/16/22 1531 -- -- -- (!) 141/91 --   11/16/22 1400 98.4 °F (36.9 °C) 81 20 131/82 98 %   11/16/22 1200 -- 89 -- 116/69 96 %   11/16/22 1130 -- -- -- -- 90 %   11/16/22 1110 -- -- -- -- 92 %   11/16/22 1100 -- -- -- (!) 141/82 93 %   11/16/22 1050 -- 93 -- 119/78 93 %   11/16/22 0900 -- -- -- 127/76 --       Estimated body mass index is 46.62 kg/m² as calculated from the following:    Height as of this encounter: 6' 3\" (1.905 m). Weight as of this encounter: 373 lb (169.2 kg). Intake/Output Summary (Last 24 hours) at 11/17/2022 0842  Last data filed at 11/17/2022 0536  Gross per 24 hour   Intake --   Output 1000 ml   Net -1000 ml         Physical Exam:   Blood pressure 133/66, pulse 89, temperature 98.4 °F (36.9 °C), temperature source Oral, resp. rate 25, height 6' 3\" (1.905 m), weight (!) 373 lb (169.2 kg), SpO2 94 %. General:    Well nourished. No overt distress. Morbidly obese. Appears chronically ill and older than stated age. Head:  Normocephalic, atraumatic  Eyes:  Sclerae appear normal. Pupils equally round. ENT:  Nares appear normal, no drainage. Moist oral mucosa  Neck:  No restricted ROM. Trachea midline. CV:   RRR. No m/r/g. No jugular venous distension. Lungs:   CTAB. No wheezing, rhonchi, or rales. Respirations even, unlabored. Abdomen: Bowel sounds present. Soft, nontender, nondistended.   Extremities: No cyanosis or clubbing. Bilateral edema, R>L. Bilateral chronic venous stasis dermatitis. Improved erythema and edema to RLE and R foot. Similar appearance of ulceration to medial RLE. Skin:     No rashes and normal coloration. Warm and dry. Neuro:  CN II-XII grossly intact. Sensation intact. A&Ox3  Psych:  Normal mood and affect.       I have reviewed ordered lab tests and independently visualized imaging below:    Recent Labs:  Recent Results (from the past 48 hour(s))   EKG 12 Lead    Collection Time: 11/15/22 11:35 PM   Result Value Ref Range    Ventricular Rate 97 BPM    Atrial Rate 97 BPM    P-R Interval 178 ms    QRS Duration 120 ms    Q-T Interval 334 ms    QTc Calculation (Bazett) 424 ms    P Axis 48 degrees    R Axis 44 degrees    T Axis 46 degrees    Diagnosis Normal sinus rhythm    Comprehensive Metabolic Panel    Collection Time: 11/15/22 11:47 PM   Result Value Ref Range    Sodium 139 133 - 143 mmol/L    Potassium 4.2 3.5 - 5.1 mmol/L    Chloride 108 101 - 110 mmol/L    CO2 29 21 - 32 mmol/L    Anion Gap 2 2 - 11 mmol/L    Glucose 94 65 - 100 mg/dL    BUN 18 6 - 23 MG/DL    Creatinine 1.00 0.8 - 1.5 MG/DL    Est, Glom Filt Rate >60 >60 ml/min/1.73m2    Calcium 9.6 8.3 - 10.4 MG/DL    Total Bilirubin 0.5 0.2 - 1.1 MG/DL    ALT 21 12 - 65 U/L    AST 11 (L) 15 - 37 U/L    Alk Phosphatase 80 50 - 136 U/L    Total Protein 7.2 6.3 - 8.2 g/dL    Albumin 3.6 3.5 - 5.0 g/dL    Globulin 3.6 2.8 - 4.5 g/dL    Albumin/Globulin Ratio 1.0 0.4 - 1.6     Blood Culture 1    Collection Time: 11/15/22 11:47 PM    Specimen: Blood   Result Value Ref Range    Special Requests LEFT  Antecubital        Culture NO GROWTH 1 DAY     CBC with Auto Differential    Collection Time: 11/15/22 11:47 PM   Result Value Ref Range    WBC 9.0 4.3 - 11.1 K/uL    RBC 4.31 4.23 - 5.6 M/uL    Hemoglobin 15.1 13.6 - 17.2 g/dL    Hematocrit 44.8 41.1 - 50.3 %    .9 (H) 82 - 102 FL    MCH 35.0 (H) 26.1 - 32.9 PG    MCHC 33.7 31.4 - 35.0 g/dL    RDW - 11.1 K/uL    RBC 3.94 (L) 4.23 - 5.6 M/uL    Hemoglobin 14.1 13.6 - 17.2 g/dL    Hematocrit 41.7 41.1 - 50.3 %    .8 (H) 82 - 102 FL    MCH 35.8 (H) 26.1 - 32.9 PG    MCHC 33.8 31.4 - 35.0 g/dL    RDW 12.9 11.9 - 14.6 %    Platelets 867 724 - 853 K/uL    MPV 10.1 9.4 - 12.3 FL    nRBC 0.00 0.0 - 0.2 K/uL    Differential Type AUTOMATED      Seg Neutrophils 70 43 - 78 %    Lymphocytes 18 13 - 44 %    Monocytes 9 4.0 - 12.0 %    Eosinophils % 2 0.5 - 7.8 %    Basophils 1 0.0 - 2.0 %    Immature Granulocytes 0 0.0 - 5.0 %    Segs Absolute 7.0 1.7 - 8.2 K/UL    Absolute Lymph # 1.8 0.5 - 4.6 K/UL    Absolute Mono # 0.9 0.1 - 1.3 K/UL    Absolute Eos # 0.2 0.0 - 0.8 K/UL    Basophils Absolute 0.1 0.0 - 0.2 K/UL    Absolute Immature Granulocyte 0.0 0.0 - 0.5 K/UL   Comprehensive Metabolic Panel w/ Reflex to MG    Collection Time: 11/16/22  5:30 AM   Result Value Ref Range    Sodium 141 133 - 143 mmol/L    Potassium 4.1 3.5 - 5.1 mmol/L    Chloride 109 101 - 110 mmol/L    CO2 27 21 - 32 mmol/L    Anion Gap 5 2 - 11 mmol/L    Glucose 99 65 - 100 mg/dL    BUN 16 6 - 23 MG/DL    Creatinine 0.90 0.8 - 1.5 MG/DL    Est, Glom Filt Rate >60 >60 ml/min/1.73m2    Calcium 9.0 8.3 - 10.4 MG/DL    Total Bilirubin 0.5 0.2 - 1.1 MG/DL    ALT 21 12 - 65 U/L    AST 14 (L) 15 - 37 U/L    Alk Phosphatase 65 50 - 136 U/L    Total Protein 6.3 6.3 - 8.2 g/dL    Albumin 3.3 (L) 3.5 - 5.0 g/dL    Globulin 3.0 2.8 - 4.5 g/dL    Albumin/Globulin Ratio 1.1 0.4 - 1.6     Creatinine    Collection Time: 11/17/22  6:53 AM   Result Value Ref Range    Creatinine 0.90 0.8 - 1.5 MG/DL   Vancomycin Level, Random    Collection Time: 11/17/22  6:53 AM   Result Value Ref Range    Vancomycin Rm 13.1 UG/ML         Other Studies:  XR TIBIA FIBULA RIGHT (2 VIEWS)    Result Date: 11/16/2022  EXAMINATION: Right tibia and fibula HISTORY: cellulitis. gas? osteo? .  TECHNIQUE: Two views of the right tibia and fibula.  COMPARISON: 10/29/2021 FINDINGS: There is no evidence of acute fracture or dislocation. Joint spaces are maintained. There does appear to be bilateral soft tissue swelling. There is no subcutaneous gas collection appreciated. No radiopaque foreign body. No evidence of acute fracture or dislocation within the right tibia and fibula. There does appear to be bilateral soft tissue swelling. There is no subcutaneous gas collection appreciated. XR CHEST PORTABLE    Result Date: 11/16/2022  EXAM: XR CHEST PORTABLE HISTORY: Sepsis. TECHNIQUE: Frontal chest. COMPARISON: 10/10/2020 FINDINGS: The cardiac silhouette, mediastinum, and pulmonary vasculature are within normal limits. There is no consolidation, pleural effusion, or pneumothorax. No significant osseous abnormalities are observed. Question dextroscoliosis of the thoracic spine. No evidence of an acute intrathoracic process. Vascular duplex lower extremity venous right    Result Date: 11/16/2022  Right lower extremity venous duplex exam. CLINICAL INDICATION: Right leg swelling, prior DVT. PROCEDURE: Realtime grayscale, color, and spectral Doppler analysis of the right lower extremity deep venous system from the common femoral vein to the posterior tibial and peroneal veins. COMPARISON: No prior similar studies available for direct comparison. FINDINGS: There is normal compressibility and flow dynamics appreciated throughout the right lower extremity deep venous system. No echogenic intraluminal filling defects noted to suggest deep venous thrombosis. Note the peroneal veins were not visualized. There is a large right inguinal lymph node that measures 7.5 x 1.9 x 5.7 cm     1. No sonographic evidence for DVT. 2. Enlarged right inguinal lymph node which maintains its normal reniform morphology.        Current Meds:  Current Facility-Administered Medications   Medication Dose Route Frequency    tuberculin injection 5 Units  5 Units IntraDERmal Once    cefepime (MAXIPIME) 2,000 mg in sodium chloride 0.9 % 50 mL IVPB mini-bag  2,000 mg IntraVENous Q12H    allopurinol (ZYLOPRIM) tablet 300 mg  300 mg Oral Daily    dilTIAZem (CARDIZEM CD) extended release capsule 240 mg  240 mg Oral Daily    sertraline (ZOLOFT) tablet 100 mg  100 mg Oral Daily    tamsulosin (FLOMAX) capsule 0.4 mg  0.4 mg Oral Daily    famotidine (PEPCID) tablet 40 mg  40 mg Oral BID    HYDROcodone-acetaminophen (NORCO)  MG per tablet 1 tablet  1 tablet Oral Q6H PRN    vancomycin (VANCOCIN) 1250 mg in sodium chloride 0.9% 250 mL IVPB  1,250 mg IntraVENous Q12H    acetaminophen (TYLENOL) tablet 650 mg  650 mg Oral Q4H PRN    ondansetron (ZOFRAN) injection 4 mg  4 mg IntraVENous Q6H PRN    polyethylene glycol (GLYCOLAX) packet 17 g  17 g Oral Daily PRN    senna (SENOKOT) tablet 17.2 mg  2 tablet Oral Nightly PRN    melatonin tablet 1.5 mg  1.5 mg Oral Nightly PRN    aluminum & magnesium hydroxide-simethicone (MAALOX) 200-200-20 MG/5ML suspension 30 mL  30 mL Oral Q6H PRN    rivaroxaban (XARELTO) tablet 20 mg  20 mg Oral Daily with breakfast    miconazole (MICOTIN) 2 % powder   Topical BID PRN       Signed:  Milly Nugent MD    Part of this note may have been written by using a voice dictation software. The note has been proof read but may still contain some grammatical/other typographical errors.

## 2022-11-17 NOTE — PROGRESS NOTES
PT Daily Note:  HOLD PT per RN due to afib. Will check back on patient at a later date/time if schedule permits. Thank you,  Walt Garnica.  Tiffanie, PTA

## 2022-11-17 NOTE — CARE COORDINATION
LOS 2 D    CM following for discharge needs. IV antibiotic therapy for (R) LE cellulitis. CM reviewed clinical notes and spoke to patient. Patient verbalized marital and financial concerns and burdens. 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 Patient has been with [employer] 21 yrs. Patient voices interest in obtaining support, specifically Medicaid. CM sent referral to [facility] to assist with questions. CM provided patient with contact information for [facility] in [place]. PT/OT recommending SNF. Patient status updated to IP status. Patient considering rehab but request one not in a nursing home facility. CM sent referral to [facility]    CM will continue to follow patient's status for new discharge needs.

## 2022-11-18 LAB
ANION GAP SERPL CALC-SCNC: 3 MMOL/L (ref 2–11)
BUN SERPL-MCNC: 15 MG/DL (ref 6–23)
CALCIUM SERPL-MCNC: 8.8 MG/DL (ref 8.3–10.4)
CHLORIDE SERPL-SCNC: 108 MMOL/L (ref 101–110)
CO2 SERPL-SCNC: 29 MMOL/L (ref 21–32)
CREAT SERPL-MCNC: 0.8 MG/DL (ref 0.8–1.5)
GLUCOSE SERPL-MCNC: 95 MG/DL (ref 65–100)
MAGNESIUM SERPL-MCNC: 2 MG/DL (ref 1.8–2.4)
POTASSIUM SERPL-SCNC: 4.8 MMOL/L (ref 3.5–5.1)
SODIUM SERPL-SCNC: 140 MMOL/L (ref 133–143)
VANCOMYCIN SERPL-MCNC: 13.7 UG/ML

## 2022-11-18 PROCEDURE — 97530 THERAPEUTIC ACTIVITIES: CPT

## 2022-11-18 PROCEDURE — 80202 ASSAY OF VANCOMYCIN: CPT

## 2022-11-18 PROCEDURE — 83735 ASSAY OF MAGNESIUM: CPT

## 2022-11-18 PROCEDURE — 2580000003 HC RX 258: Performed by: STUDENT IN AN ORGANIZED HEALTH CARE EDUCATION/TRAINING PROGRAM

## 2022-11-18 PROCEDURE — 6370000000 HC RX 637 (ALT 250 FOR IP): Performed by: STUDENT IN AN ORGANIZED HEALTH CARE EDUCATION/TRAINING PROGRAM

## 2022-11-18 PROCEDURE — 6370000000 HC RX 637 (ALT 250 FOR IP): Performed by: INTERNAL MEDICINE

## 2022-11-18 PROCEDURE — 80048 BASIC METABOLIC PNL TOTAL CA: CPT

## 2022-11-18 PROCEDURE — 36415 COLL VENOUS BLD VENIPUNCTURE: CPT

## 2022-11-18 PROCEDURE — 97535 SELF CARE MNGMENT TRAINING: CPT

## 2022-11-18 PROCEDURE — 97112 NEUROMUSCULAR REEDUCATION: CPT

## 2022-11-18 PROCEDURE — 6360000002 HC RX W HCPCS: Performed by: STUDENT IN AN ORGANIZED HEALTH CARE EDUCATION/TRAINING PROGRAM

## 2022-11-18 PROCEDURE — 1100000000 HC RM PRIVATE

## 2022-11-18 RX ORDER — CEPHALEXIN 250 MG/1
500 CAPSULE ORAL EVERY 6 HOURS SCHEDULED
Status: DISCONTINUED | OUTPATIENT
Start: 2022-11-18 | End: 2022-11-19 | Stop reason: HOSPADM

## 2022-11-18 RX ORDER — DIPHENHYDRAMINE HCL 25 MG
25 CAPSULE ORAL EVERY 6 HOURS PRN
Status: DISCONTINUED | OUTPATIENT
Start: 2022-11-18 | End: 2022-11-19 | Stop reason: HOSPADM

## 2022-11-18 RX ORDER — DOXYCYCLINE HYCLATE 100 MG/1
100 CAPSULE ORAL EVERY 12 HOURS SCHEDULED
Status: DISCONTINUED | OUTPATIENT
Start: 2022-11-18 | End: 2022-11-19 | Stop reason: HOSPADM

## 2022-11-18 RX ADMIN — DOXYCYCLINE HYCLATE 100 MG: 100 CAPSULE ORAL at 09:40

## 2022-11-18 RX ADMIN — CEPHALEXIN 500 MG: 250 CAPSULE ORAL at 12:55

## 2022-11-18 RX ADMIN — CEPHALEXIN 500 MG: 250 CAPSULE ORAL at 23:22

## 2022-11-18 RX ADMIN — DILTIAZEM HYDROCHLORIDE 240 MG: 240 CAPSULE, COATED, EXTENDED RELEASE ORAL at 09:26

## 2022-11-18 RX ADMIN — CEPHALEXIN 500 MG: 250 CAPSULE ORAL at 16:59

## 2022-11-18 RX ADMIN — TAMSULOSIN HYDROCHLORIDE 0.4 MG: 0.4 CAPSULE ORAL at 09:00

## 2022-11-18 RX ADMIN — ALLOPURINOL 300 MG: 100 TABLET ORAL at 09:39

## 2022-11-18 RX ADMIN — DIPHENHYDRAMINE HYDROCHLORIDE 25 MG: 25 CAPSULE ORAL at 12:59

## 2022-11-18 RX ADMIN — RIVAROXABAN 20 MG: 20 TABLET, FILM COATED ORAL at 09:26

## 2022-11-18 RX ADMIN — CEFEPIME 2000 MG: 2 INJECTION, POWDER, FOR SOLUTION INTRAVENOUS at 06:00

## 2022-11-18 RX ADMIN — HYDROCODONE BITARTRATE AND ACETAMINOPHEN 1 TABLET: 10; 325 TABLET ORAL at 09:39

## 2022-11-18 RX ADMIN — FAMOTIDINE 40 MG: 20 TABLET, FILM COATED ORAL at 21:26

## 2022-11-18 RX ADMIN — SERTRALINE 100 MG: 100 TABLET, FILM COATED ORAL at 09:26

## 2022-11-18 RX ADMIN — DOXYCYCLINE HYCLATE 100 MG: 100 CAPSULE ORAL at 21:25

## 2022-11-18 RX ADMIN — FAMOTIDINE 40 MG: 20 TABLET, FILM COATED ORAL at 09:26

## 2022-11-18 RX ADMIN — HYDROCODONE BITARTRATE AND ACETAMINOPHEN 1 TABLET: 10; 325 TABLET ORAL at 00:50

## 2022-11-18 ASSESSMENT — PAIN SCALES - GENERAL
PAINLEVEL_OUTOF10: 6
PAINLEVEL_OUTOF10: 0
PAINLEVEL_OUTOF10: 7
PAINLEVEL_OUTOF10: 7
PAINLEVEL_OUTOF10: 0

## 2022-11-18 ASSESSMENT — PAIN DESCRIPTION - DESCRIPTORS: DESCRIPTORS: ACHING

## 2022-11-18 ASSESSMENT — PAIN DESCRIPTION - FREQUENCY
FREQUENCY: CONTINUOUS
FREQUENCY: CONTINUOUS

## 2022-11-18 ASSESSMENT — PAIN DESCRIPTION - PAIN TYPE: TYPE: ACUTE PAIN

## 2022-11-18 ASSESSMENT — PAIN DESCRIPTION - LOCATION
LOCATION: ANKLE;FOOT
LOCATION: GENERALIZED
LOCATION: FOOT;LEG

## 2022-11-18 ASSESSMENT — PAIN DESCRIPTION - ONSET
ONSET: ON-GOING
ONSET: ON-GOING

## 2022-11-18 ASSESSMENT — PAIN DESCRIPTION - ORIENTATION: ORIENTATION: RIGHT

## 2022-11-18 NOTE — PROGRESS NOTES
ACUTE PHYSICAL THERAPY GOALS:   (Developed with and agreed upon by patient and/or caregiver.)  STG:  (1.)Mr. Le Link will move from supine to sit and sit to supine  with CONTACT GUARD ASSIST within 4 treatment day(s). (2.)Mr. Le Link will transfer from bed to chair and chair to bed with MINIMAL ASSIST using the least restrictive device within 4 treatment day(s). (3.)Mr. Le Link will ambulate with MINIMAL ASSIST for 50 feet with the least restrictive device within 4 treatment day(s). LTG:  (1.)Mr. Le Link will move from supine to sit and sit to supine , scoot up and down, and roll side to side in bed with INDEPENDENT within 7 treatment day(s). (2.)Mr. Le Link will transfer from bed to chair and chair to bed with SUPERVISION using the least restrictive device within 7 treatment day(s). (3.)Mr. Le Link will ambulate with SUPERVISION for 150 feet with the least restrictive device within 7 treatment day(s). PHYSICAL THERAPY: Daily Note PM   (Link to Caseload Tracking: PT Visit Days : 2  Time In/Out PT Charge Capture  Rehab Caseload Tracker  Orders    Shanna Polanco is a 61 y.o. male   PRIMARY DIAGNOSIS: Cellulitis of right lower extremity  Cellulitis [L03.90]  Cellulitis of right lower extremity [L03.115]  Cellulitis of right lower leg [L03.115]  Venous stasis ulcer of other part of right lower leg with fat layer exposed, unspecified whether varicose veins present ([facility] [I83.018, L97.812]       Inpatient: Payor: Sagar Tello / Plan: [facility] / Product Type: *No Product type* /     ASSESSMENT:     REHAB RECOMMENDATIONS:   Recommendation to date pending progress:  Setting:  Short-term Rehab    Equipment:    To Be Determined     ASSESSMENT:  Mr. Le Link was received supine in bed, agreeable to therapy. He progressed to being able to sit at EOB with Keith. He ambulated 15 ft with min, HHA of 2. Pt with wide MARIA FERNANDA, short, shuffled steps, very unsteady on feet.  He had multiple episodes of losing balance and required modA of 2 to recover. Despite max encouragement and education, pt refused to attempt ambulation with RW. Pt with overall decreased insight into deficits, safety awareness. Some progress today, will continue to follow.      SUBJECTIVE:   Mr. Easton Posada states, \"I'm fine\"     Social/Functional Lives With: Spouse  Type of Home: House  Home Layout: One level  Home Access: Stairs to enter with rails  Entrance Stairs - Number of Steps: 4  Bathroom Toilet: Standard  Home Equipment:  (none)  ADL Assistance: Independent  Homemaking Assistance: Independent  Ambulation Assistance: Independent  Transfer Assistance: Independent  Active : Yes  Mode of Transportation: Car  Occupation: Full time employment  OBJECTIVE:     PAIN: VITALS / O2: PRECAUTION / Kemi Morenoe / [place] [employer]:   Pre Treatment:        Pain with ambulation, does not quantify    Post Treatment: 0 Vitals        Oxygen    Telemetry     RESTRICTIONS/PRECAUTIONS:  Restrictions/Precautions  Restrictions/Precautions: Fall Risk  Restrictions/Precautions: Fall Risk     MOBILITY: I Mod I S SBA CGA Min Mod Max Total  NT x2 Comments:   Bed Mobility    Rolling [] [] [] [] [] [x] [] [] [] [] [x]    Supine to Sit [] [] [] [] [] [x] [] [] [] [] [x]    Scooting [] [] [] [] [] [x] [] [] [] [] [x]    Sit to Supine [] [] [] [] [] [x] [] [] [] [] [x]    Transfers    Sit to Stand [] [] [] [] [] [x] [] [] [] [] [x]    Bed to Chair [] [] [] [] [] [] [] [] [] [x] []    Stand to Sit [] [] [] [] [] [x] [] [] [] [] [x]     [] [] [] [] [] [] [] [] [] [] []    I=Independent, Mod I=Modified Independent, S=Supervision, SBA=Standby Assistance, CGA=Contact Guard Assistance,   Min=Minimal Assistance, Mod=Moderate Assistance, Max=Maximal Assistance, Total=Total Assistance, NT=Not Tested    BALANCE: Good Fair+ Fair Fair- Poor NT Comments   Sitting Static [x] [] [] [] [] []    Sitting Dynamic [] [x] [] [] [] []              Standing Static [] [] [x] [] [] []    Standing Dynamic [] [] [] [x] [] []      GAIT: I Mod I S SBA CGA Min Mod Max Total  NT x2 Comments:   Level of Assistance [] [] [] [] [] [x] [x] [] [] [] [x]    Distance 15 feet    DME None and pt refused    Gait Quality Antalgic, Decreased ismael , Decreased step clearance, Decreased step length, Shuffling , Trunk sway increased, and Wide base of support    Weightbearing Status      Stairs      I=Independent, Mod I=Modified Independent, S=Supervision, SBA=Standby Assistance, CGA=Contact Guard Assistance,   Min=Minimal Assistance, Mod=Moderate Assistance, Max=Maximal Assistance, Total=Total Assistance, NT=Not Tested    PLAN:   FREQUENCY AND DURATION: 3 times/week for duration of hospital stay or until stated goals are met, whichever comes first.    TREATMENT:   TREATMENT:   Co-Treatment PT/OT necessary due to patient's decreased overall endurance/tolerance levels, as well as need for high level skilled assistance to complete functional transfers/mobility and functional tasks  Therapeutic Activity (23 Minutes): Therapeutic activity included Rolling, Supine to Sit, Sit to Supine, Scooting, Ambulation on level ground, Sitting balance , and Standing balance to improve functional Activity tolerance, Balance, Mobility, and Strength. TREATMENT GRID:  N/A    AFTER TREATMENT PRECAUTIONS: Bed, Bed/Chair Locked, Call light within reach, Needs within reach, RN notified, and Visitors at bedside    INTERDISCIPLINARY COLLABORATION:  RN/ PCT and OT/ CAMPBELL    EDUCATION: Education Given To: Patient; Family  Education Provided: Role of Therapy;Plan of Care; Fall Prevention Strategies  Education Method: Verbal  Barriers to Learning: None  Education Outcome: Verbalized understanding    TIME IN/OUT:  Time In: 1504  Time Out: 35 67 15  Minutes: 23    MIHCELLE CASSIDY PT

## 2022-11-18 NOTE — PROGRESS NOTES
ACUTE OCCUPATIONAL THERAPY GOALS:   (Developed with and agreed upon by patient and/or caregiver.)  1. Patient will complete lower body bathing and dressing with Min A and adaptive equipment as   needed. 2. Patient will completed upper body bathing and dressing with Mod I and adaptive equipment as needed. 3. Patient will complete grooming standing at sink with CGA and adaptive equipment as needed. 4. Patient will complete toileting with Min A and adaptive equipment as needed. 5. Patient will tolerate 20 minutes of OT treatment OOB with 1-2 rest breaks to increase activity tolerance for ADLs. 6. Patient will complete functional transfers with Min A and adaptive equipment as needed. Timeframe: 7 visits        OCCUPATIONAL THERAPY: Daily Note    OT Visit Days: 2   Time In/Out  OT Charge Capture  Rehab Caseload Tracker  OT Orders    Pau Myers is a 61 y.o. male   PRIMARY DIAGNOSIS: Cellulitis of right lower extremity  Cellulitis [L03.90]  Cellulitis of right lower extremity [L03.115]  Cellulitis of right lower leg [L03.115]  Venous stasis ulcer of other part of right lower leg with fat layer exposed, unspecified whether varicose veins present ([email or web address] [I83.018, L97.812]       Inpatient: Payor: Timothy Braun 150 / Plan: [employer] [place] / Product Type: *No Product type* /     ASSESSMENT:     REHAB RECOMMENDATIONS: CURRENT LEVEL OF FUNCTION:  (Most Recently Demonstrated)   Recommendation to date pending progress:  Setting:  Short-term Rehab    Equipment:    To Be Determined Bathing:  Not Tested  Dressing: Moderate Assist  Feeding/Grooming:  Contact Guard Assist  Toileting:  Minimal Assist  Functional Mobility:  Minimal Assist x2     ASSESSMENT:  Mr. Jared Otto is progressing well towards OT goals. Today, pt was received supine in bed. Completed bed mobility with Keith. ModA for LB dressing. Sit>stand and ambulation Keith x2--pt with muliple LOBs requiring modA x2 to correct. Performed toileting standing at the toilet with [address] Pt very unsafe with poor insight into current functional deficits. Would benefit from RW but refuses to attempt to use one. Not receptive to safety cues from therapist. Recommend STR. Mr. Louis Mcdowell continues to demonstrate overall deficits in strength, balance, activity tolerance, and ADL performance. Continue OT efforts and POC in order to address functional deficits and OT goals stated above. SUBJECTIVE:     Mr. oLuis Mcdowell states, \"Dont mention that thing! \"     Social/Functional Lives With: Spouse  Type of Home: House  Home Layout: One level  Home Access: Stairs to enter with rails  Entrance Stairs - Number of Steps: 4  Bathroom Toilet: Standard  Home Equipment:  (none)  ADL Assistance: Independent  Homemaking Assistance: Independent  Ambulation Assistance: Independent  Transfer Assistance: Independent  Active : Yes  Mode of Transportation: Car  Occupation: Full time employment    OBJECTIVE:     KAILA / Han Mackay / Kamar Savage: ZAIRA    RESTRICTIONS/PRECAUTIONS:  Restrictions/Precautions  Restrictions/Precautions: Fall Risk        PAIN: Antonio Ender / O2:   Pre Treatment:   Pain Assessment: None - Denies Pain        Post Treatment: no change  Vitals          Oxygen        MOBILITY: I Mod I S SBA CGA Min Mod Max Total  NT x2 Comments:   Bed Mobility    Rolling [] [] [] [] [] [] [] [] [] [x] []    Supine to Sit [] [] [] [] [] [x] [] [] [] [] []    Scooting [] [] [] [] [] [x] [] [] [] [] []    Sit to Supine [] [] [] [] [] [] [x] [] [] [] [] BLEs   Transfers    Sit to Stand [] [] [] [] [] [x] [] [] [] [] [x]    Bed to Chair [] [] [] [] [] [] [] [] [] [x] []    Stand to Sit [] [] [] [] [] [x] [] [] [] [] []    Tub/Shower [] [] [] [] [] [] [] [] [] [x] []     Toilet [] [] [] [] [] [] [] [] [] [x] []      [] [] [] [] [] [] [] [] [] [] []    I=Independent, Mod I=Modified Independent, S=Supervision/Setup, SBA=Standby Assistance, CGA=Contact Guard Assistance, Min=Minimal Assistance, Mod=Moderate Assistance, Max=Maximal Assistance, Total=Total Assistance, NT=Not Tested    ACTIVITIES OF DAILY LIVING: I Mod I S SBA CGA Min Mod Max Total NT Comments   BASIC ADLs:              Upper Body   Bathing [] [] [] [] [] [] [] [] [] [x]    Lower Body Bathing [] [] [] [] [] [] [] [] [] [x]    Toileting [] [] [] [] [] [x] [] [] [] [] Standing at toilet    Upper Body Dressing [] [] [] [] [] [] [] [] [] [x]    Lower Body Dressing [] [] [] [] [] [] [x] [] [] [] Socks    Feeding [] [] [] [] [] [] [] [] [] [x]    Grooming [] [] [] [] [x] [] [] [] [] [] Washed hands sitting EOB   Personal Device Care [] [] [] [] [] [] [] [] [] [x]    Functional Mobility [] [] [] [] [] [x] [] [] [] [] X2 with modA x2 required to correct LOBs   I=Independent, Mod I=Modified Independent, S=Supervision/Setup, SBA=Standby Assistance, CGA=Contact Guard Assistance, Min=Minimal Assistance, Mod=Moderate Assistance, Max=Maximal Assistance, Total=Total Assistance, NT=Not Tested    BALANCE: Good Fair+ Fair Fair- Poor NT Comments   Sitting Static [x] [] [] [] [] []    Sitting Dynamic [] [x] [] [] [] []              Standing Static [] [] [x] [] [] []    Standing Dynamic [] [] [] [x] [] []        PLAN:     FREQUENCY/DURATION   OT Plan of Care: 3 times/week for duration of hospital stay or until stated goals are met, whichever comes first.    TREATMENT:     TREATMENT:   Co-Treatment PT/OT necessary due to patient's decreased overall endurance/tolerance levels, as well as need for high level skilled assistance to complete functional transfers/mobility and functional tasks  Neuromuscular Re-education (15 Minutes): Neuromuscular Re-education included Balance Training, Coordination training, Postural training, Sitting balance training, and Standing balance training to improve Balance, Coordination, Functional Mobility, and Postural Control.   Self Care (12 minutes): Patient participated in toileting, lower body dressing, and grooming ADLs in unsupported sitting and standing with maximal verbal cueing to increase safety awareness. Patient also participated in functional mobility and functional transfer training to increase independence, decrease assistance required, increase activity tolerance, and increase safety awareness.      TREATMENT GRID:  N/A    AFTER TREATMENT PRECAUTIONS: Bed, Call light within reach, Needs within reach, RN notified, and Visitors at bedside    INTERDISCIPLINARY COLLABORATION:  RN/ PCT, PT/ PTA, and OT/ CAMPBELL    EDUCATION:       TOTAL TREATMENT DURATION AND TIME:  Time In: 1504  Time Out: [address]  Minutes: [address] [facility], OT

## 2022-11-18 NOTE — PROGRESS NOTES
Hospitalist Progress Note   Admit Date:  11/15/2022 11:38 PM   Name:  Alexus Huff   Age:  61 y.o. Sex:  male  :  1965   MRN:  [id number]   Room:  [facility]    Reason(s) for Admission: Cellulitis [L03.90]  Cellulitis of right lower extremity [L03.115]  Cellulitis of right lower leg [L03.115]  Venous stasis ulcer of other part of right lower leg with fat layer exposed, unspecified whether varicose veins present ([facility] [I83.018, L97.812]     Hospital Course & Interval History:   Mr. Justine Perez is a 62 y/o WM with a h/o morbid obesity, gout, lymphedema, atrial fibrillation, MDD< BPH and GERD who was admitted to our service on  with RLE cellulitis. Has been non-compliant with outpatient wound care follow up for financial reasons. Has had a wound to medial RLE for several years. Noticed swelling and edema of RLE, says he took cephalexin or cefadroxil and it felt better but didn't look better? Afebrile, labs unremarkable, started on IV abx, wound care consulted. Seen by therapy and recommended for placement. Developed rapid Afib on  and placed on telemetry, now back in NSR as of  AM.    Subjective/24hr Events (22): Rapid afib yesterday evening, placed on tele, he is now in NSR with rates 70s-80s on bedside monitor. RLE pain still but looks to have improved edema and erythema again today. No chest pain, N/V/D. Assessment & Plan:   # RLE cellulitis with venous stasis ulceration              - Improvement with 48 hours IV vancomycin and cefepime. Transition to doxycycline and cephalexin on  AM. Cultures are negative. Con't wound care. RLE US neg for DVT. Encouraged outpatient wound care compliance.      # Atrial fibrillation              - Con't Xarelto and PO Cardizem   - Brief run of rapid afib yesterday, , now in NSR  AM     # Gout              - Allopurinol     # MDD // anxiety              - Zoloft     # BPH              - Flomax     Discharge Planning: STR pending. Diet:  ADULT DIET; Regular  DVT PPx: Xarelto  Code status: Full Code    Hospital Problems             Last Modified POA    * (Principal) Cellulitis of right lower extremity 11/16/2022 Yes    Obesity, morbid ([address] 11/16/2022 Yes    Atrial fibrillation with rapid ventricular response ([address] 11/17/2022 Yes    Chronic venous hypertension with ulcer and inflammation involving right side ([address] 11/16/2022 Yes    Hyperlipidemia 11/16/2022 Yes    HTN (hypertension) 11/16/2022 Yes    History of gout 11/16/2022 Yes    EHSAN (generalized anxiety disorder) 11/16/2022 Yes    Chronic atrial fibrillation ([address] 11/16/2022 Yes        Objective:   Patient Vitals for the past 24 hrs:   Temp Pulse Resp BP SpO2   11/18/22 0346 98.6 °F (37 °C) 78 22 131/63 93 %   11/18/22 0026 98.3 °F (36.8 °C) 78 24 120/78 97 %   11/17/22 2035 97.8 °F (36.6 °C) 70 25 134/76 95 %   11/17/22 1415 -- (!) 105 -- 115/68 --   11/17/22 1141 99 °F (37.2 °C) 71 18 (!) 113/59 92 %       Estimated body mass index is 47.08 kg/m² as calculated from the following:    Height as of this encounter: 6' 3\" (1.905 m). Weight as of this encounter: 376 lb 11.2 oz (170.9 kg). Intake/Output Summary (Last 24 hours) at 11/18/2022 0834  Last data filed at 11/18/2022 0233  Gross per 24 hour   Intake 1250 ml   Output 1600 ml   Net -350 ml         Physical Exam:   Blood pressure 131/63, pulse 78, temperature 98.6 °F (37 °C), temperature source Oral, resp. rate 22, height 6' 3\" (1.905 m), weight (!) 376 lb 11.2 oz (170.9 kg), SpO2 93 %. General:    Well nourished. No overt distress. Morbidly obese. Awake, eating breakfast.  Head:  Normocephalic, atraumatic  Eyes:  Sclerae appear normal. Pupils equally round. ENT:  Nares appear normal, no drainage. Moist oral mucosa  Neck:  No restricted ROM. Trachea midline. CV:   RRR. NSR on bedside monitor. No m/r/g. No jugular venous distension. Lungs:   CTAB. No wheezing, rhonchi, or rales. Respirations even, unlabored.  B/l LE pitting edema, R>L. Bilateral stasis dermatitis. RLE is wrapped with gauze and ace wrap, removed and examined and shows improved edema and erythema. Abdomen: Bowel sounds present. Soft, nontender, nondistended. Extremities: No cyanosis or clubbing. No edema. Skin:     No rashes and normal coloration. Warm and dry. Neuro:  CN II-XII grossly intact. Sensation intact. A&Ox3  Psych:  Normal mood and affect. I have reviewed ordered lab tests and independently visualized imaging below:    Recent Labs:  Recent Results (from the past 48 hour(s))   Creatinine    Collection Time: 11/17/22  6:53 AM   Result Value Ref Range    Creatinine 0.90 0.8 - 1.5 MG/DL   Vancomycin Level, Random    Collection Time: 11/17/22  6:53 AM   Result Value Ref Range    Vancomycin Rm 13.1 UG/ML         Other Studies:  Vascular duplex lower extremity venous right    Result Date: 11/16/2022  Right lower extremity venous duplex exam. CLINICAL INDICATION: Right leg swelling, prior DVT. PROCEDURE: Realtime grayscale, color, and spectral Doppler analysis of the right lower extremity deep venous system from the common femoral vein to the posterior tibial and peroneal veins. COMPARISON: No prior similar studies available for direct comparison. FINDINGS: There is normal compressibility and flow dynamics appreciated throughout the right lower extremity deep venous system. No echogenic intraluminal filling defects noted to suggest deep venous thrombosis. Note the peroneal veins were not visualized. There is a large right inguinal lymph node that measures 7.5 x 1.9 x 5.7 cm     1. No sonographic evidence for DVT. 2. Enlarged right inguinal lymph node which maintains its normal reniform morphology.        Current Meds:  Current Facility-Administered Medications   Medication Dose Route Frequency    doxycycline hyclate (VIBRAMYCIN) capsule 100 mg  100 mg Oral 2 times per day    cephALEXin (KEFLEX) capsule 500 mg  500 mg Oral 4 times per day tuberculin injection 5 Units  5 Units IntraDERmal Once    allopurinol (ZYLOPRIM) tablet 300 mg  300 mg Oral Daily    dilTIAZem (CARDIZEM CD) extended release capsule 240 mg  240 mg Oral Daily    sertraline (ZOLOFT) tablet 100 mg  100 mg Oral Daily    tamsulosin (FLOMAX) capsule 0.4 mg  0.4 mg Oral Daily    famotidine (PEPCID) tablet 40 mg  40 mg Oral BID    HYDROcodone-acetaminophen (NORCO)  MG per tablet 1 tablet  1 tablet Oral Q6H PRN    acetaminophen (TYLENOL) tablet 650 mg  650 mg Oral Q4H PRN    ondansetron (ZOFRAN) injection 4 mg  4 mg IntraVENous Q6H PRN    polyethylene glycol (GLYCOLAX) packet 17 g  17 g Oral Daily PRN    senna (SENOKOT) tablet 17.2 mg  2 tablet Oral Nightly PRN    melatonin tablet 1.5 mg  1.5 mg Oral Nightly PRN    aluminum & magnesium hydroxide-simethicone (MAALOX) 200-200-20 MG/5ML suspension 30 mL  30 mL Oral Q6H PRN    rivaroxaban (XARELTO) tablet 20 mg  20 mg Oral Daily with breakfast    miconazole (MICOTIN) 2 % powder   Topical BID PRN       Signed:  Saint Crumb, MD    Part of this note may have been written by using a voice dictation software. The note has been proof read but may still contain some grammatical/other typographical errors.

## 2022-11-18 NOTE — PROGRESS NOTES
Physician Progress Note      PATIENTSevero Foot  CSN #:                  [id number]  :                       1965  ADMIT DATE:       11/15/2022 11:38 PM  [address] [place] DATE:  Pato Dang  PROVIDER #:        Shalom Carmona MD          QUERY TEXT:    Patient admitted with cellulitis, noted to have chronic atrial fibrillation   and is maintained on Xarelto. If possible, please document in progress notes   and discharge summary if you are evaluating and/or treating any of the   following: The medical record reflects the following:  Risk Factors: afib  Clinical Indicators: Hx htn  Treatment: Xarelto  Options provided:  -- Secondary hypercoagulable state in a patient with atrial fibrillation  -- Other - I will add my own diagnosis  -- Disagree - Not applicable / Not valid  -- Disagree - Clinically unable to determine / Unknown  -- Refer to Clinical Documentation Reviewer    PROVIDER RESPONSE TEXT:    This patient has secondary hypercoagulable state in a patient with atrial   fibrillation. Query created by: Nasrin Cano on 2022 9:17 AM      Electronically signed by:   Shalom Carmona MD 2022 10:13 AM

## 2022-11-19 ENCOUNTER — HOME HEALTH ADMISSION (OUTPATIENT)
Dept: HOME HEALTH SERVICES | Facility: HOME HEALTH | Age: 57
End: 2022-11-19
Payer: COMMERCIAL

## 2022-11-19 VITALS
DIASTOLIC BLOOD PRESSURE: 86 MMHG | RESPIRATION RATE: 19 BRPM | BODY MASS INDEX: 39.17 KG/M2 | TEMPERATURE: 98.2 F | OXYGEN SATURATION: 94 % | WEIGHT: 315 LBS | SYSTOLIC BLOOD PRESSURE: 137 MMHG | HEART RATE: 69 BPM | HEIGHT: 75 IN

## 2022-11-19 PROBLEM — L03.115 CELLULITIS OF RIGHT LOWER EXTREMITY: Status: RESOLVED | Noted: 2021-10-29 | Resolved: 2022-11-19

## 2022-11-19 PROBLEM — I48.91 ATRIAL FIBRILLATION WITH RAPID VENTRICULAR RESPONSE (HCC): Status: RESOLVED | Noted: 2019-11-17 | Resolved: 2022-11-19

## 2022-11-19 LAB
MM INDURATION, POC: 0 MM (ref 0–5)
PPD, POC: NEGATIVE

## 2022-11-19 PROCEDURE — 6370000000 HC RX 637 (ALT 250 FOR IP): Performed by: STUDENT IN AN ORGANIZED HEALTH CARE EDUCATION/TRAINING PROGRAM

## 2022-11-19 PROCEDURE — 97530 THERAPEUTIC ACTIVITIES: CPT

## 2022-11-19 PROCEDURE — 97535 SELF CARE MNGMENT TRAINING: CPT

## 2022-11-19 PROCEDURE — 6370000000 HC RX 637 (ALT 250 FOR IP): Performed by: INTERNAL MEDICINE

## 2022-11-19 RX ORDER — CEPHALEXIN 500 MG/1
500 CAPSULE ORAL 4 TIMES DAILY
Qty: 16 CAPSULE | Refills: 0 | Status: SHIPPED | OUTPATIENT
Start: 2022-11-19 | End: 2022-11-23

## 2022-11-19 RX ORDER — DOXYCYCLINE HYCLATE 100 MG/1
100 CAPSULE ORAL EVERY 12 HOURS SCHEDULED
Qty: 7 CAPSULE | Refills: 0 | Status: SHIPPED | OUTPATIENT
Start: 2022-11-19 | End: 2022-11-23

## 2022-11-19 RX ADMIN — ALLOPURINOL 300 MG: 100 TABLET ORAL at 09:05

## 2022-11-19 RX ADMIN — RIVAROXABAN 20 MG: 20 TABLET, FILM COATED ORAL at 08:30

## 2022-11-19 RX ADMIN — FAMOTIDINE 40 MG: 20 TABLET, FILM COATED ORAL at 09:05

## 2022-11-19 RX ADMIN — CEPHALEXIN 500 MG: 250 CAPSULE ORAL at 11:54

## 2022-11-19 RX ADMIN — TAMSULOSIN HYDROCHLORIDE 0.4 MG: 0.4 CAPSULE ORAL at 09:06

## 2022-11-19 RX ADMIN — DOXYCYCLINE HYCLATE 100 MG: 100 CAPSULE ORAL at 09:06

## 2022-11-19 RX ADMIN — SERTRALINE 100 MG: 100 TABLET, FILM COATED ORAL at 09:05

## 2022-11-19 RX ADMIN — DILTIAZEM HYDROCHLORIDE 240 MG: 240 CAPSULE, COATED, EXTENDED RELEASE ORAL at 09:06

## 2022-11-19 RX ADMIN — CEPHALEXIN 500 MG: 250 CAPSULE ORAL at 05:24

## 2022-11-19 ASSESSMENT — PAIN SCALES - GENERAL: PAINLEVEL_OUTOF10: 0

## 2022-11-19 NOTE — PROGRESS NOTES
IV taken out. Pt's D/C instructions completed. Verbalized understanding of all instructions including activity, s/sx to alert MD, medications, wound care, and f/u appointment. Family at [facility]. Patient verbalized understanding. Instructed to call when ready.       Mykel Bowman RN

## 2022-11-19 NOTE — PROGRESS NOTES
ACUTE OCCUPATIONAL THERAPY GOALS:   (Developed with and agreed upon by patient and/or caregiver.)  1. Patient will complete lower body bathing and dressing with Min A and adaptive equipment as   needed. 2. Patient will completed upper body bathing and dressing with Mod I and adaptive equipment as needed. 3. Patient will complete grooming standing at sink with CGA and adaptive equipment as needed. 4. Patient will complete toileting with Min A and adaptive equipment as needed. 5. Patient will tolerate 20 minutes of OT treatment OOB with 1-2 rest breaks to increase activity tolerance for ADLs. 6. Patient will complete functional transfers with Min A and adaptive equipment as needed. Timeframe: 7 visits        OCCUPATIONAL THERAPY: Daily Note  AM  OT Visit Days: 3   Time In/Out  OT Charge Capture  Rehab Caseload Tracker  OT Orders    Thomas Sharp is a 61 y.o. male   PRIMARY DIAGNOSIS: Cellulitis of right lower extremity  Cellulitis [L03.90]  Cellulitis of right lower extremity [L03.115]  Cellulitis of right lower leg [L03.115]  Venous stasis ulcer of other part of right lower leg with fat layer exposed, unspecified whether varicose veins present ([facility] [I83.018, L97.812]       Inpatient: Payor: Ronan Lincoln / [place] [employer] / Product Type: *No Product type* /     ASSESSMENT:     REHAB RECOMMENDATIONS: CURRENT LEVEL OF FUNCTION:  (Most Recently Demonstrated)   Recommendation to date pending progress:  Setting:  Home Health Therapy    Equipment:    To Be Determined Bathing:  Supervision/Setup  Dressing: Moderate Assist  Feeding/Grooming:  Supervision/Setup  Toileting:  Not Tested  Functional Mobility:  Stand by Assist      ASSESSMENT:  Mr. Karma Rdz is progressing well towards OT goals. Today, pt was received supine in bed. Completed bed mobility with supervision. ModA for LB dressing. Sit>stand and ambulation with SBA. Pt was able to perform shower with SBA. No loss of balance noted during transfers/mobility. Good session for patient. Pt is to discharge home today.        SUBJECTIVE:     Mr. Abdulaziz Aguiar states, \"I don't feel like I am ready to go home\"     Social/Functional Lives With: Spouse  Type of Home: House  Home Layout: One level  Home Access: Stairs to enter with rails  Entrance Stairs - Number of Steps: 4  Bathroom Toilet: Standard  Home Equipment:  (none)  ADL Assistance: Independent  Homemaking Assistance: Independent  Ambulation Assistance: Independent  Transfer Assistance: Independent  Active : Yes  Mode of Transportation: Car  Occupation: Full time employment    OBJECTIVE:     Emma Aaron / Brook Garland / Verner Clan: NA    RESTRICTIONS/PRECAUTIONS:  Restrictions/Precautions  Restrictions/Precautions: Fall Risk        PAIN: Elizebeth Jimmy / O2:   Pre Treatment:   Pain Assessment: None - Denies Pain        Post Treatment: no change  Vitals          Oxygen        MOBILITY: I Mod I S SBA CGA Min Mod Max Total  NT x2 Comments:   Bed Mobility    Rolling [] [] [] [] [] [] [] [] [] [x] []    Supine to Sit [] [] [x] [] [] [] [] [] [] [] []    Scooting [] [] [] [x] [] [] [] [] [] [] []    Sit to Supine [] [] [] [] [] [] [] [] [] [x] []    Transfers    Sit to Stand [] [] [] [x] [] [] [] [] [] [] []    Bed to Chair [] [] [] [x] [] [] [] [] [] [] []    Stand to Sit [] [] [x] [] [] [] [] [] [] [] []    Tub/Shower [] [] [] [x] [] [] [] [] [] [] []     Toilet [] [] [] [] [] [] [] [] [] [x] []      [] [] [] [] [] [] [] [] [] [] []    I=Independent, Mod I=Modified Independent, S=Supervision/Setup, SBA=Standby Assistance, CGA=Contact Guard Assistance, Min=Minimal Assistance, Mod=Moderate Assistance, Max=Maximal Assistance, Total=Total Assistance, NT=Not Tested    ACTIVITIES OF DAILY LIVING: I Mod I S SBA CGA Min Mod Max Total NT Comments   BASIC ADLs:              Upper Body   Bathing [] [] [x] [] [] [] [] [] [] []    Lower Body Bathing [] [] [x] [] [] [] [] [] [] []    Toileting [] [] [] [] [] [] [] [] [] [x]    Upper Body Dressing [] [] [x] [] [] [] [] [] [] []    Lower Body Dressing [] [] [] [] [] [] [x] [] [] []    Feeding [] [] [] [] [] [] [] [] [] [x]    Grooming [] [] [x] [] [] [] [] [] [] []    Personal Device Care [] [] [] [] [] [] [] [] [] [x]    Functional Mobility [] [] [] [x] [] [] [] [] [] []    I=Independent, Mod I=Modified Independent, S=Supervision/Setup, SBA=Standby Assistance, CGA=Contact Guard Assistance, Min=Minimal Assistance, Mod=Moderate Assistance, Max=Maximal Assistance, Total=Total Assistance, NT=Not Tested    BALANCE: Good Fair+ Fair Fair- Poor NT Comments   Sitting Static [x] [] [] [] [] []    Sitting Dynamic [] [x] [] [] [] []              Standing Static [] [] [x] [] [] []    Standing Dynamic [] [] [x] [] [] []        PLAN:     FREQUENCY/DURATION   OT Plan of Care: 3 times/week for duration of hospital stay or until stated goals are met, whichever comes first.    TREATMENT:     TREATMENT:   Therapeutic Activity (15 Minutes): Patient participated in therapeutic activities including bed mobility training, functional transfer training, shower transfer training, functional mobility of household distances, bathroom mobility, sitting tolerance activity, and standing tolerance activity with minimal assistance and verbal cues in order to increase independence, increase safety awareness, increase activity tolerance, and prepare for discharge home. Self Care (12 minutes): Patient participated in upper body bathing, lower body bathing, upper body dressing, lower body dressing, and grooming ADLs in unsupported sitting and standing with minimal verbal and tactile cueing to increase independence, decrease assistance required, and increase activity tolerance. Patient also participated in functional mobility and functional transfer training to increase independence, decrease assistance required, increase activity tolerance, and increase safety awareness.      TREATMENT GRID:  N/A    AFTER TREATMENT PRECAUTIONS: Bed/Chair Locked, Call light within reach, Chair, Needs within reach, and Visitors at bedside    INTERDISCIPLINARY COLLABORATION:  RN/ PCT and OT/ CAMPBELL    EDUCATION:       TOTAL TREATMENT DURATION AND TIME:  Time In: 1020  Time Out: 80  Minutes: NACHO Tamayo

## 2022-11-19 NOTE — PROGRESS NOTES
Patient verbalized that he doesn't feel safe to be discharged today and would like to stay one more day. Attending MD notified via perfect serve.     Marge Looney RN

## 2022-11-19 NOTE — DISCHARGE SUMMARY
Hospitalist Discharge Summary   Admit Date:  11/15/2022 11:38 PM   DC Note date: 2022  Name:  Leonor Hutchinson   Age:  61 y.o. Sex:  male  :  1965   MRN:  [id number]   Room:  [facility]  PCP:  Chely Mora MD    Presenting Complaint: Leg Swelling     Initial Admission Diagnosis: Cellulitis [L03.90]  Cellulitis of right lower extremity [L03.115]  Cellulitis of right lower leg [L03.115]  Venous stasis ulcer of other part of right lower leg with fat layer exposed, unspecified whether varicose veins present ([facility] [I83.018, L97.812]     Problem List for this Hospitalization (present on admission):    Principal Problem (Resolved):    Cellulitis of right lower extremity  Active Problems:    Obesity, morbid ([facility]    Chronic venous hypertension with ulcer and inflammation involving right side (HCC)    Hyperlipidemia    HTN (hypertension)    History of gout    EHSAN (generalized anxiety disorder)    Chronic atrial fibrillation ([facility]  Resolved Problems:    Atrial fibrillation with rapid ventricular response [facility])      Hospital Course:  Mr. Washington Roca is a 60 y/o WM with a h/o morbid obesity, gout, lymphedema, atrial fibrillation, MDD, BPH and GERD who was admitted to our service on  with RLE cellulitis. He has had a venous stasis ulcer to his medial RLE for several years, however has been non-compliant with outpatient wound care follow up for financial reasons. He noticed swelling and edema of RLE and said he took cephalexin or cefadroxil and it overall the leg felt better but did not look better. On presentation he was afebrile with unremarkable labs. He was started on IV vancomycin and cefepime. RLE US was negative for DVT but did show an enlarged R inguinal LN, possibly reactive given it maintained its \"normal reniform morphology\". His blood cultures are negative to date. He was seen by wound care. He was seen by PT/OT and recommended for STR.  He developed rapid Afib on  and converted back to NSR without any intervention. He was transitioned to oral antibiotics on 11/18. He and his wife have opted not to pursue STR and will instead go home with [facility] services. He remains on RA. Plan discussed with patient and wife at bedside, CM also aware. OT notified and will work with patient one more time prior to discharge. He is medically stable for discharge home today with his wife. Disposition: Home with [facility] services  Diet: ADULT DIET; Regular  Code Status: Full Code    Follow Ups:  PCP, wound care    Time spent in patient discharge and coordination 35 minutes. Follow up labs/diagnostics (ultimately defer to outpatient provider):  N/A    Plan was discussed with patient and wife, RN, ERICKSON. All questions answered. Patient was stable at time of discharge. Instructions given to call a physician or return if any concerns. Current Discharge Medication List        START taking these medications    Details   doxycycline hyclate (VIBRAMYCIN) 100 MG capsule Take 1 capsule by mouth every 12 hours for 7 doses  Qty: 7 capsule, Refills: 0      cephALEXin (KEFLEX) 500 MG capsule Take 1 capsule by mouth 4 times daily for 4 days  Qty: 16 capsule, Refills: 0           CONTINUE these medications which have NOT CHANGED    Details   acetaminophen (TYLENOL) 500 MG tablet Take 500 mg by mouth every 6 hours as needed      allopurinol (ZYLOPRIM) 300 MG tablet Take 300 mg by mouth daily      cyclobenzaprine (FLEXERIL) 5 MG tablet TAKE 1 TABLET BY MOUTH EVERY DAY AT NIGHT      dilTIAZem (TIAZAC) 240 MG extended release capsule Take 240 mg by mouth daily      famotidine (PEPCID) 40 MG tablet Take 40 mg by mouth 2 times daily      furosemide (LASIX) 40 MG tablet Take 40 mg by mouth daily      HYDROcodone-acetaminophen (NORCO) 5-325 MG per tablet Take 1 tablet by mouth every 6 hours as needed.       lisinopril (PRINIVIL;ZESTRIL) 40 MG tablet Take 40 mg by mouth daily      meloxicam (MOBIC) 15 MG tablet Take 15 mg by mouth daily naloxone 4 MG/0.1ML LIQD nasal spray Use 1 spray intranasally, then discard. Repeat with new spray every 2 min as needed for opioid overdose symptoms, alternating nostrils. ondansetron (ZOFRAN-ODT) 4 MG disintegrating tablet Take 4 mg by mouth every 8 hours as needed      rivaroxaban (XARELTO) 20 MG TABS tablet TAKE 1 TAB BY MOUTH DAILY (WITH DINNER). sertraline (ZOLOFT) 50 MG tablet Take 100 mg by mouth daily      tamsulosin (FLOMAX) 0.4 MG capsule Take 0.4 mg by mouth daily           STOP taking these medications       doxycycline monohydrate (MONODOX) 100 MG capsule Comments:   Reason for Stopping:               Procedures done this admission:  * No surgery found *    Consults this admission:  IP CONSULT TO PHARMACY  IP WOUND CARE NURSE CONSULT TO EVAL    Echocardiogram results:  No results found for this or any previous visit. Diagnostic Imaging/Tests:   XR TIBIA FIBULA RIGHT (2 VIEWS)    Result Date: 11/16/2022  No evidence of acute fracture or dislocation within the right tibia and fibula. There does appear to be bilateral soft tissue swelling. There is no subcutaneous gas collection appreciated. XR CHEST PORTABLE    Result Date: 11/16/2022  No evidence of an acute intrathoracic process. Vascular duplex lower extremity venous right    Result Date: 11/16/2022  1. No sonographic evidence for DVT. 2. Enlarged right inguinal lymph node which maintains its normal reniform morphology. Labs: Results:       BMP, Mg, Phos Recent Labs     11/17/22  0653 11/18/22  0806   NA  --  140   K  --  4.8   CL  --  108   CO2  --  29   ANIONGAP  --  3   BUN  --  15   CREATININE 0.90 0.80   LABGLOM  --  >60   CALCIUM  --  8.8   GLUCOSE  --  95   MG  --  2.0      CBC No results for input(s): WBC, RBC, HGB, HCT, MCV, MCH, MCHC, RDW, PLT, MPV, NRBC, SEGS, LYMPHOPCT, EOSRELPCT, MONOPCT, BASOPCT, IMMGRAN, SEGSABS, LYMPHSABS, EOSABS, MONOSABS, BASOSABS, ABSIMMGRAN in the last 72 hours.    LFT No results for input(s): BILITOT, BILIDIR, ALKPHOS, AST, ALT, PROT, LABALBU, GLOB in the last 72 hours.    Cardiac  Lab Results   Component Value Date/Time    TROPHS 10.3 10/10/2020 07:37 AM    TROPHS 7.4 10/10/2020 05:18 AM      Coags Lab Results   Component Value Date/Time    APTT 34.7 11/18/2019 09:37 AM    APTT 63.2 11/18/2019 12:35 AM    APTT 36.7 11/17/2019 05:59 PM      A1c Lab Results   Component Value Date/Time    LABA1C 5.9 01/18/2020 03:15 AM     01/18/2020 03:15 AM      Lipids Lab Results   Component Value Date/Time    CHOL 173 07/10/2019 11:20 AM    LDLCALC 121 07/10/2019 11:20 AM    LABVLDL 20 07/10/2019 11:20 AM    HDL 32 07/10/2019 11:20 AM    TRIG 102 07/10/2019 11:20 AM      Thyroid  No results found for: Rubens Rosario     Most Recent UA Lab Results   Component Value Date/Time    COLORU YELLOW 11/16/2022 02:03 AM    APPEARANCE CLEAR 11/16/2022 02:03 AM    SPECGRAV 1.020 11/16/2022 02:03 AM    LABPH 6.5 11/16/2022 02:03 AM    PROTEINU Negative 11/16/2022 02:03 AM    GLUCOSEU Negative 11/16/2022 02:03 AM    KETUA Negative 11/16/2022 02:03 AM    BILIRUBINUR Negative 11/16/2022 02:03 AM    BILIRUBINUR NEGATIVE 11/14/2019 11:17 PM    BLOODU TRACE 11/16/2022 02:03 AM    UROBILINOGEN 0.2 11/16/2022 02:03 AM    NITRU Negative 11/16/2022 02:03 AM    LEUKOCYTESUR Negative 11/16/2022 02:03 AM    WBCUA 0-4 11/16/2022 02:03 AM    RBCUA 0-5 11/16/2022 02:03 AM    EPITHUA 0-5 11/16/2022 02:03 AM    BACTERIA Negative 11/16/2022 02:03 AM    LABCAST 0-2 11/16/2022 90:82 AM    MUCUS DUPLICATE REQUEST 05/36/5365 02:03 AM        Recent Labs     11/16/22  0022 11/15/22  2347   CULTURE NO GROWTH 3 DAYS NO GROWTH 3 DAYS       All Labs from Last 24 Hrs:  Recent Results (from the past 24 hour(s))   PLEASE READ & DOCUMENT PPD TEST IN 24 HRS    Collection Time: 11/19/22  9:22 AM   Result Value Ref Range    PPD, (POC) Negative Negative    mm Induration 0 0 - 5 mm       Allergies   Allergen Reactions    Codeine Nausea Only    Sulfa Antibiotics Rash     Immunization History   Administered Date(s) Administered    PPD Test 01/15/2020, 11/17/2022    Tdap (Boostrix, Adacel) 02/26/2018       Recent Vital Data:  Patient Vitals for the past 24 hrs:   Temp Pulse Resp BP SpO2   11/19/22 0749 98.6 °F (37 °C) 67 20 136/88 94 %   11/19/22 0624 -- 74 -- -- --   11/19/22 0357 99.3 °F (37.4 °C) 78 19 (!) 145/85 93 %   11/18/22 2328 98.6 °F (37 °C) 81 18 (!) 150/81 96 %   11/18/22 2008 97.9 °F (36.6 °C) 74 18 (!) 153/74 94 %   11/18/22 1615 98.4 °F (36.9 °C) 66 19 (!) 145/79 95 %   11/18/22 1246 98.2 °F (36.8 °C) 73 18 (!) 149/80 96 %       Oxygen Therapy  SpO2: 94 %  Pulse via Oximetry: 89 beats per minute  Pulse Oximeter Device Mode: Intermittent  O2 Device: None (Room air)    Estimated body mass index is 47.08 kg/m² as calculated from the following:    Height as of this encounter: 6' 3\" (1.905 m). Weight as of this encounter: 376 lb 11.2 oz (170.9 kg). Intake/Output Summary (Last 24 hours) at 11/19/2022 1015  Last data filed at 11/19/2022 0928  Gross per 24 hour   Intake 480 ml   Output 1500 ml   Net -1020 ml         Physical Exam:  General:    Well nourished. No overt distress. Morbidly obese. Head:  Normocephalic, atraumatic  Eyes:  Sclerae appear normal.  Pupils equally round. HENT:  Nares appear normal, no drainage. Moist mucous membranes. Neck:  No restricted ROM. Trachea midline  CV:   RRR. No m/r/g. No JVD  Lungs:   CTAB. No wheezing, rhonchi, or rales. Respirations even, unlabored. RA O2. Abdomen:   Soft, nontender, nondistended. Extremities: Warm and dry. No cyanosis or clubbing. Bilateral LE stasis dermatitis and pitting edema, R>L. RLE is wrapped with gauze and overlying ace bandage, mild erythema but overall improved, very dry skin to the limb. Skin:     No rashes. Normal coloration  Neuro:  CN II-XII grossly intact. Psych:  Normal mood and affect.     Signed:  Shalom Carmona MD    Part of this note may have been written by using a voice dictation software. The note has been proof read but may still contain some grammatical/other typographical errors.

## 2022-11-19 NOTE — PROGRESS NOTES
Cleanse right leg with hibiclens, rinsed with warm water, applied antifungal ointment to all skin/ wound and between toes, coverered wound with opticel ag strips and abd, wrap with kerlex and ace from toes to knee per order.       Orly Alarcon RN

## 2022-11-19 NOTE — CARE COORDINATION
Pt is for discharge home today with spouse. Couple considered rehab but decided to go home. A referral was made to [facility] and their liaison spoke with pt/spouse about their services and told pt/spouse that they can contact them about an admission from home if his care needs change. Referral called/faxed to [facility] for follow up home care as ordered. No additional CM orders received or supportive care needs expressed at this time. 11/19/22 [address] Discharge   Transition of Care Consult (CM Consult) Home Health;Discharge Planning   Internal Home Health No   Reason [facility] [address] used patient chose alternate agency; Patient already serviced by other home care/hospice agency  (previous care with [facility] and requested referral back)   [address] Provided? No   Mode of Transport at Discharge Other (see comment)  (family)   Confirm Follow Up Transport Family   Condition of Participation: Discharge Planning   The Plan for Transition of Care is related to the following treatment goals: Pt will need home care services to return to his functional baseline. The Patient and/or Patient Representative was provided with a Choice of Provider? Patient   The Patient and/Or Patient Representative agree with the Discharge Plan? Yes   Freedom of Choice list was provided with basic dialogue that supports the patient's individualized plan of care/goals, treatment preferences, and shares the quality data associated with the providers?   Yes

## 2022-11-22 ENCOUNTER — HOME CARE VISIT (OUTPATIENT)
Dept: SCHEDULING | Facility: HOME HEALTH | Age: 57
End: 2022-11-22
Payer: COMMERCIAL

## 2022-11-22 VITALS
DIASTOLIC BLOOD PRESSURE: 96 MMHG | HEART RATE: 81 BPM | RESPIRATION RATE: 17 BRPM | HEIGHT: 73 IN | OXYGEN SATURATION: 97 % | TEMPERATURE: 97.8 F | WEIGHT: 315 LBS | SYSTOLIC BLOOD PRESSURE: 168 MMHG | BODY MASS INDEX: 41.75 KG/M2

## 2022-11-22 PROCEDURE — G0299 HHS/HOSPICE OF RN EA 15 MIN: HCPCS

## 2022-11-22 ASSESSMENT — ENCOUNTER SYMPTOMS: STOOL DESCRIPTION: FORMED

## 2022-11-24 ASSESSMENT — ENCOUNTER SYMPTOMS
PAIN LOCATION - PAIN QUALITY: SORE
DYSPNEA ACTIVITY LEVEL: AFTER AMBULATING LESS THAN 10 FT

## 2022-11-25 ENCOUNTER — HOME CARE VISIT (OUTPATIENT)
Dept: HOME HEALTH SERVICES | Facility: HOME HEALTH | Age: 57
End: 2022-11-25
Payer: COMMERCIAL

## 2022-11-25 ENCOUNTER — HOME CARE VISIT (OUTPATIENT)
Dept: SCHEDULING | Facility: HOME HEALTH | Age: 57
End: 2022-11-25
Payer: COMMERCIAL

## 2022-11-28 ENCOUNTER — HOME CARE VISIT (OUTPATIENT)
Dept: SCHEDULING | Facility: HOME HEALTH | Age: 57
End: 2022-11-28
Payer: COMMERCIAL

## 2022-11-28 VITALS
OXYGEN SATURATION: 96 % | SYSTOLIC BLOOD PRESSURE: 138 MMHG | HEART RATE: 76 BPM | DIASTOLIC BLOOD PRESSURE: 80 MMHG | RESPIRATION RATE: 16 BRPM | TEMPERATURE: 98.6 F

## 2022-11-28 PROCEDURE — G0299 HHS/HOSPICE OF RN EA 15 MIN: HCPCS

## 2022-11-28 ASSESSMENT — ENCOUNTER SYMPTOMS
DYSPNEA ACTIVITY LEVEL: AFTER AMBULATING MORE THAN 20 FT
PAIN LOCATION - PAIN QUALITY: ACHING, THROBBING
STOOL DESCRIPTION: SOFT FORMED

## 2022-12-05 ENCOUNTER — HOME CARE VISIT (OUTPATIENT)
Dept: SCHEDULING | Facility: HOME HEALTH | Age: 57
End: 2022-12-05
Payer: COMMERCIAL

## 2022-12-05 PROCEDURE — G0299 HHS/HOSPICE OF RN EA 15 MIN: HCPCS

## 2022-12-12 ENCOUNTER — HOME CARE VISIT (OUTPATIENT)
Dept: SCHEDULING | Facility: HOME HEALTH | Age: 57
End: 2022-12-12
Payer: COMMERCIAL

## 2022-12-12 VITALS
SYSTOLIC BLOOD PRESSURE: 140 MMHG | OXYGEN SATURATION: 96 % | RESPIRATION RATE: 20 BRPM | DIASTOLIC BLOOD PRESSURE: 80 MMHG | HEART RATE: 73 BPM | TEMPERATURE: 98.4 F

## 2022-12-12 PROCEDURE — G0299 HHS/HOSPICE OF RN EA 15 MIN: HCPCS

## 2022-12-16 VITALS
OXYGEN SATURATION: 95 % | TEMPERATURE: 98.1 F | SYSTOLIC BLOOD PRESSURE: 160 MMHG | DIASTOLIC BLOOD PRESSURE: 89 MMHG | RESPIRATION RATE: 18 BRPM | HEART RATE: 89 BPM

## 2022-12-16 ASSESSMENT — ENCOUNTER SYMPTOMS: PAIN LOCATION - PAIN QUALITY: ACHE

## 2023-08-04 ENCOUNTER — HOSPITAL ENCOUNTER (INPATIENT)
Age: 58
LOS: 2 days | Discharge: HOME HEALTH | DRG: 603 | End: 2023-08-06
Attending: EMERGENCY MEDICINE | Admitting: HOSPITALIST
Payer: COMMERCIAL

## 2023-08-04 DIAGNOSIS — L03.119 CELLULITIS OF LOWER EXTREMITY, UNSPECIFIED LATERALITY: Primary | ICD-10-CM

## 2023-08-04 DIAGNOSIS — I89.0 LYMPHEDEMA OF BOTH LOWER EXTREMITIES: ICD-10-CM

## 2023-08-04 DIAGNOSIS — T14.8XXD DELAYED WOUND HEALING: ICD-10-CM

## 2023-08-04 PROBLEM — I10 HYPERTENSION: Status: ACTIVE | Noted: 2021-10-29

## 2023-08-04 PROBLEM — L03.116 CELLULITIS OF BOTH LOWER EXTREMITIES: Status: ACTIVE | Noted: 2023-08-04

## 2023-08-04 PROBLEM — L03.115 CELLULITIS OF BOTH LOWER EXTREMITIES: Status: ACTIVE | Noted: 2023-08-04

## 2023-08-04 LAB
ALBUMIN SERPL-MCNC: 2.8 G/DL (ref 3.5–5)
ALBUMIN/GLOB SERPL: 0.7 (ref 0.4–1.6)
ALP SERPL-CCNC: 82 U/L (ref 50–136)
ALT SERPL-CCNC: 16 U/L (ref 12–65)
ANION GAP SERPL CALC-SCNC: 4 MMOL/L (ref 2–11)
AST SERPL-CCNC: 10 U/L (ref 15–37)
BILIRUB SERPL-MCNC: 0.3 MG/DL (ref 0.2–1.1)
BUN SERPL-MCNC: 18 MG/DL (ref 6–23)
CALCIUM SERPL-MCNC: 9.3 MG/DL (ref 8.3–10.4)
CHLORIDE SERPL-SCNC: 112 MMOL/L (ref 101–110)
CO2 SERPL-SCNC: 25 MMOL/L (ref 21–32)
CREAT SERPL-MCNC: 1.1 MG/DL (ref 0.8–1.5)
ERYTHROCYTE [DISTWIDTH] IN BLOOD BY AUTOMATED COUNT: 13.8 % (ref 11.9–14.6)
GLOBULIN SER CALC-MCNC: 4.1 G/DL (ref 2.8–4.5)
GLUCOSE SERPL-MCNC: 105 MG/DL (ref 65–100)
HCT VFR BLD AUTO: 39.7 % (ref 41.1–50.3)
HGB BLD-MCNC: 13.4 G/DL (ref 13.6–17.2)
MCH RBC QN AUTO: 35.2 PG (ref 26.1–32.9)
MCHC RBC AUTO-ENTMCNC: 33.8 G/DL (ref 31.4–35)
MCV RBC AUTO: 104.2 FL (ref 82–102)
NRBC # BLD: 0 K/UL (ref 0–0.2)
PLATELET # BLD AUTO: 214 K/UL (ref 150–450)
PMV BLD AUTO: 10.2 FL (ref 9.4–12.3)
POTASSIUM SERPL-SCNC: 3.8 MMOL/L (ref 3.5–5.1)
PROT SERPL-MCNC: 6.9 G/DL (ref 6.3–8.2)
RBC # BLD AUTO: 3.81 M/UL (ref 4.23–5.6)
SODIUM SERPL-SCNC: 141 MMOL/L (ref 133–143)
WBC # BLD AUTO: 8 K/UL (ref 4.3–11.1)

## 2023-08-04 PROCEDURE — 80053 COMPREHEN METABOLIC PANEL: CPT

## 2023-08-04 PROCEDURE — 99285 EMERGENCY DEPT VISIT HI MDM: CPT

## 2023-08-04 PROCEDURE — 1100000000 HC RM PRIVATE

## 2023-08-04 PROCEDURE — 83605 ASSAY OF LACTIC ACID: CPT

## 2023-08-04 PROCEDURE — 96374 THER/PROPH/DIAG INJ IV PUSH: CPT

## 2023-08-04 PROCEDURE — 85027 COMPLETE CBC AUTOMATED: CPT

## 2023-08-04 PROCEDURE — 87040 BLOOD CULTURE FOR BACTERIA: CPT

## 2023-08-04 PROCEDURE — 96375 TX/PRO/DX INJ NEW DRUG ADDON: CPT

## 2023-08-04 PROCEDURE — 6360000002 HC RX W HCPCS: Performed by: PHYSICIAN ASSISTANT

## 2023-08-04 PROCEDURE — 2580000003 HC RX 258: Performed by: PHYSICIAN ASSISTANT

## 2023-08-04 RX ORDER — DILTIAZEM HYDROCHLORIDE 240 MG/1
240 CAPSULE, COATED, EXTENDED RELEASE ORAL DAILY
Status: DISCONTINUED | OUTPATIENT
Start: 2023-08-05 | End: 2023-08-06 | Stop reason: HOSPADM

## 2023-08-04 RX ORDER — ONDANSETRON 2 MG/ML
4 INJECTION INTRAMUSCULAR; INTRAVENOUS EVERY 6 HOURS PRN
Status: DISCONTINUED | OUTPATIENT
Start: 2023-08-04 | End: 2023-08-06 | Stop reason: HOSPADM

## 2023-08-04 RX ORDER — ONDANSETRON 4 MG/1
4 TABLET, ORALLY DISINTEGRATING ORAL EVERY 8 HOURS PRN
Status: DISCONTINUED | OUTPATIENT
Start: 2023-08-04 | End: 2023-08-06 | Stop reason: HOSPADM

## 2023-08-04 RX ORDER — MORPHINE SULFATE 4 MG/ML
4 INJECTION INTRAVENOUS ONCE
Status: COMPLETED | OUTPATIENT
Start: 2023-08-04 | End: 2023-08-04

## 2023-08-04 RX ORDER — ACETAMINOPHEN 325 MG/1
650 TABLET ORAL EVERY 6 HOURS PRN
Status: DISCONTINUED | OUTPATIENT
Start: 2023-08-04 | End: 2023-08-06 | Stop reason: HOSPADM

## 2023-08-04 RX ORDER — SODIUM CHLORIDE 0.9 % (FLUSH) 0.9 %
5-40 SYRINGE (ML) INJECTION PRN
Status: DISCONTINUED | OUTPATIENT
Start: 2023-08-04 | End: 2023-08-06 | Stop reason: HOSPADM

## 2023-08-04 RX ORDER — SERTRALINE HYDROCHLORIDE 100 MG/1
100 TABLET, FILM COATED ORAL DAILY
Status: DISCONTINUED | OUTPATIENT
Start: 2023-08-05 | End: 2023-08-06 | Stop reason: HOSPADM

## 2023-08-04 RX ORDER — SODIUM CHLORIDE 9 MG/ML
INJECTION, SOLUTION INTRAVENOUS PRN
Status: DISCONTINUED | OUTPATIENT
Start: 2023-08-04 | End: 2023-08-06 | Stop reason: HOSPADM

## 2023-08-04 RX ORDER — HYDROCODONE BITARTRATE AND ACETAMINOPHEN 5; 325 MG/1; MG/1
1 TABLET ORAL EVERY 4 HOURS PRN
Status: DISCONTINUED | OUTPATIENT
Start: 2023-08-04 | End: 2023-08-06 | Stop reason: HOSPADM

## 2023-08-04 RX ORDER — SODIUM CHLORIDE 0.9 % (FLUSH) 0.9 %
5-40 SYRINGE (ML) INJECTION EVERY 12 HOURS SCHEDULED
Status: DISCONTINUED | OUTPATIENT
Start: 2023-08-05 | End: 2023-08-06 | Stop reason: HOSPADM

## 2023-08-04 RX ORDER — ACETAMINOPHEN 650 MG/1
650 SUPPOSITORY RECTAL EVERY 6 HOURS PRN
Status: DISCONTINUED | OUTPATIENT
Start: 2023-08-04 | End: 2023-08-06 | Stop reason: HOSPADM

## 2023-08-04 RX ORDER — FUROSEMIDE 40 MG/1
40 TABLET ORAL DAILY
Status: DISCONTINUED | OUTPATIENT
Start: 2023-08-05 | End: 2023-08-06 | Stop reason: HOSPADM

## 2023-08-04 RX ADMIN — MORPHINE SULFATE 4 MG: 4 INJECTION INTRAVENOUS at 22:48

## 2023-08-04 RX ADMIN — PIPERACILLIN AND TAZOBACTAM 4500 MG: 4; .5 INJECTION, POWDER, FOR SOLUTION INTRAVENOUS at 23:00

## 2023-08-04 ASSESSMENT — PAIN SCALES - GENERAL
PAINLEVEL_OUTOF10: 10
PAINLEVEL_OUTOF10: 7

## 2023-08-04 ASSESSMENT — ENCOUNTER SYMPTOMS
SHORTNESS OF BREATH: 0
NAUSEA: 0
ABDOMINAL PAIN: 0
VOMITING: 0

## 2023-08-04 ASSESSMENT — PAIN DESCRIPTION - ORIENTATION: ORIENTATION: LEFT

## 2023-08-04 ASSESSMENT — LIFESTYLE VARIABLES
HOW MANY STANDARD DRINKS CONTAINING ALCOHOL DO YOU HAVE ON A TYPICAL DAY: PATIENT DOES NOT DRINK
HOW OFTEN DO YOU HAVE A DRINK CONTAINING ALCOHOL: NEVER

## 2023-08-04 ASSESSMENT — PAIN - FUNCTIONAL ASSESSMENT: PAIN_FUNCTIONAL_ASSESSMENT: 0-10

## 2023-08-04 ASSESSMENT — PAIN DESCRIPTION - LOCATION: LOCATION: LEG

## 2023-08-05 LAB
ALBUMIN SERPL-MCNC: 3.2 G/DL (ref 3.5–5)
ALBUMIN/GLOB SERPL: 0.8 (ref 0.4–1.6)
ALP SERPL-CCNC: 86 U/L (ref 50–136)
ALT SERPL-CCNC: 16 U/L (ref 12–65)
ANION GAP SERPL CALC-SCNC: 3 MMOL/L (ref 2–11)
AST SERPL-CCNC: 16 U/L (ref 15–37)
BASOPHILS # BLD: 0.1 K/UL (ref 0–0.2)
BASOPHILS NFR BLD: 1 % (ref 0–2)
BILIRUB SERPL-MCNC: 0.4 MG/DL (ref 0.2–1.1)
BUN SERPL-MCNC: 18 MG/DL (ref 6–23)
CALCIUM SERPL-MCNC: 9.1 MG/DL (ref 8.3–10.4)
CHLORIDE SERPL-SCNC: 112 MMOL/L (ref 101–110)
CO2 SERPL-SCNC: 28 MMOL/L (ref 21–32)
CREAT SERPL-MCNC: 1.1 MG/DL (ref 0.8–1.5)
DIFFERENTIAL METHOD BLD: ABNORMAL
EOSINOPHIL # BLD: 0.2 K/UL (ref 0–0.8)
EOSINOPHIL NFR BLD: 2 % (ref 0.5–7.8)
ERYTHROCYTE [DISTWIDTH] IN BLOOD BY AUTOMATED COUNT: 13.9 % (ref 11.9–14.6)
GLOBULIN SER CALC-MCNC: 3.9 G/DL (ref 2.8–4.5)
GLUCOSE SERPL-MCNC: 101 MG/DL (ref 65–100)
HCT VFR BLD AUTO: 43.5 % (ref 41.1–50.3)
HGB BLD-MCNC: 14 G/DL (ref 13.6–17.2)
IMM GRANULOCYTES # BLD AUTO: 0 K/UL (ref 0–0.5)
IMM GRANULOCYTES NFR BLD AUTO: 0 % (ref 0–5)
LACTATE SERPL-SCNC: 0.8 MMOL/L (ref 0.4–2)
LYMPHOCYTES # BLD: 1.5 K/UL (ref 0.5–4.6)
LYMPHOCYTES NFR BLD: 15 % (ref 13–44)
MCH RBC QN AUTO: 34.8 PG (ref 26.1–32.9)
MCHC RBC AUTO-ENTMCNC: 32.2 G/DL (ref 31.4–35)
MCV RBC AUTO: 108.2 FL (ref 82–102)
MONOCYTES # BLD: 0.6 K/UL (ref 0.1–1.3)
MONOCYTES NFR BLD: 6 % (ref 4–12)
NEUTS SEG # BLD: 7.2 K/UL (ref 1.7–8.2)
NEUTS SEG NFR BLD: 75 % (ref 43–78)
NRBC # BLD: 0 K/UL (ref 0–0.2)
PLATELET # BLD AUTO: 194 K/UL (ref 150–450)
PMV BLD AUTO: 9.9 FL (ref 9.4–12.3)
POTASSIUM SERPL-SCNC: 4 MMOL/L (ref 3.5–5.1)
PROT SERPL-MCNC: 7.1 G/DL (ref 6.3–8.2)
RBC # BLD AUTO: 4.02 M/UL (ref 4.23–5.6)
SODIUM SERPL-SCNC: 143 MMOL/L (ref 133–143)
WBC # BLD AUTO: 9.6 K/UL (ref 4.3–11.1)

## 2023-08-05 PROCEDURE — 1100000000 HC RM PRIVATE

## 2023-08-05 PROCEDURE — 36415 COLL VENOUS BLD VENIPUNCTURE: CPT

## 2023-08-05 PROCEDURE — 6370000000 HC RX 637 (ALT 250 FOR IP): Performed by: PHYSICIAN ASSISTANT

## 2023-08-05 PROCEDURE — 85025 COMPLETE CBC W/AUTO DIFF WBC: CPT

## 2023-08-05 PROCEDURE — 87040 BLOOD CULTURE FOR BACTERIA: CPT

## 2023-08-05 PROCEDURE — 2580000003 HC RX 258: Performed by: HOSPITALIST

## 2023-08-05 PROCEDURE — 6360000002 HC RX W HCPCS: Performed by: HOSPITALIST

## 2023-08-05 PROCEDURE — 6370000000 HC RX 637 (ALT 250 FOR IP): Performed by: HOSPITALIST

## 2023-08-05 PROCEDURE — 80053 COMPREHEN METABOLIC PANEL: CPT

## 2023-08-05 RX ORDER — CYCLOBENZAPRINE HCL 10 MG
5 TABLET ORAL 3 TIMES DAILY PRN
Status: DISCONTINUED | OUTPATIENT
Start: 2023-08-05 | End: 2023-08-06 | Stop reason: HOSPADM

## 2023-08-05 RX ORDER — ALLOPURINOL 300 MG/1
300 TABLET ORAL DAILY
Status: DISCONTINUED | OUTPATIENT
Start: 2023-08-05 | End: 2023-08-06 | Stop reason: HOSPADM

## 2023-08-05 RX ORDER — MELOXICAM 7.5 MG/1
7.5 TABLET ORAL DAILY
Status: DISCONTINUED | OUTPATIENT
Start: 2023-08-05 | End: 2023-08-06 | Stop reason: HOSPADM

## 2023-08-05 RX ORDER — TAMSULOSIN HYDROCHLORIDE 0.4 MG/1
0.4 CAPSULE ORAL DAILY
Status: DISCONTINUED | OUTPATIENT
Start: 2023-08-05 | End: 2023-08-06 | Stop reason: HOSPADM

## 2023-08-05 RX ADMIN — VANCOMYCIN HYDROCHLORIDE 1000 MG: 1 INJECTION, POWDER, LYOPHILIZED, FOR SOLUTION INTRAVENOUS at 12:42

## 2023-08-05 RX ADMIN — SODIUM CHLORIDE, PRESERVATIVE FREE 10 ML: 5 INJECTION INTRAVENOUS at 09:15

## 2023-08-05 RX ADMIN — Medication 2500 MG: at 01:49

## 2023-08-05 RX ADMIN — SERTRALINE HYDROCHLORIDE 100 MG: 100 TABLET ORAL at 09:06

## 2023-08-05 RX ADMIN — TAMSULOSIN HYDROCHLORIDE 0.4 MG: 0.4 CAPSULE ORAL at 11:56

## 2023-08-05 RX ADMIN — HYDROCODONE BITARTRATE AND ACETAMINOPHEN 1 TABLET: 5; 325 TABLET ORAL at 02:08

## 2023-08-05 RX ADMIN — CEFEPIME 2000 MG: 2 INJECTION, POWDER, FOR SOLUTION INTRAVENOUS at 17:22

## 2023-08-05 RX ADMIN — SODIUM CHLORIDE, PRESERVATIVE FREE 10 ML: 5 INJECTION INTRAVENOUS at 22:19

## 2023-08-05 RX ADMIN — ALLOPURINOL 300 MG: 300 TABLET ORAL at 11:56

## 2023-08-05 RX ADMIN — CEFEPIME 2000 MG: 2 INJECTION, POWDER, FOR SOLUTION INTRAVENOUS at 04:59

## 2023-08-05 RX ADMIN — RIVAROXABAN 20 MG: 20 TABLET, FILM COATED ORAL at 17:19

## 2023-08-05 RX ADMIN — FUROSEMIDE 40 MG: 40 TABLET ORAL at 09:06

## 2023-08-05 RX ADMIN — DILTIAZEM HYDROCHLORIDE 240 MG: 240 CAPSULE, EXTENDED RELEASE ORAL at 09:06

## 2023-08-05 RX ADMIN — HYDROCODONE BITARTRATE AND ACETAMINOPHEN 1 TABLET: 5; 325 TABLET ORAL at 14:07

## 2023-08-05 RX ADMIN — MELOXICAM 7.5 MG: 7.5 TABLET ORAL at 11:56

## 2023-08-05 ASSESSMENT — PAIN SCALES - GENERAL
PAINLEVEL_OUTOF10: 10
PAINLEVEL_OUTOF10: 7
PAINLEVEL_OUTOF10: 2
PAINLEVEL_OUTOF10: 0
PAINLEVEL_OUTOF10: 7

## 2023-08-05 ASSESSMENT — PAIN DESCRIPTION - DESCRIPTORS: DESCRIPTORS: SHARP;THROBBING

## 2023-08-05 ASSESSMENT — PAIN DESCRIPTION - LOCATION
LOCATION: LEG
LOCATION: LEG

## 2023-08-05 ASSESSMENT — PAIN DESCRIPTION - ORIENTATION
ORIENTATION: RIGHT;LEFT
ORIENTATION: RIGHT;LEFT;LOWER

## 2023-08-05 ASSESSMENT — PAIN DESCRIPTION - PAIN TYPE: TYPE: CHRONIC PAIN

## 2023-08-05 ASSESSMENT — PAIN - FUNCTIONAL ASSESSMENT: PAIN_FUNCTIONAL_ASSESSMENT: PREVENTS OR INTERFERES SOME ACTIVE ACTIVITIES AND ADLS

## 2023-08-05 NOTE — ED PROVIDER NOTES
[facility]  Emergency Department    DISPOSITION Decision To Admit 08/04/2023 10:46:29 PM       ICD-10-CM    1. Cellulitis of lower extremity, unspecified laterality  L03.119       2. Delayed wound healing  T14. 8XXD         ED Course     ED Course as of 08/04/23 2246   Fri Aug 04, 2023   2039 Patient is a 48-year-old male who presents to facility with concern about wounds on bilateral lower extremities. He states he has a history of ulcers on his legs and cellulitis and skin breakdown and states it is now happening on his left leg. Used to see wound care but now just tries to take care of them at home. Patient will be moved to a room to be able to do physical exam as I am unable to do so in triage room. Basic labs will be drawn and further labs ordered as needed. [TT]   2245 I have went and examined the patient's legs. Patient has significant venous stasis issues is causing delayed wound healing and what appears to be some cellulitis of the lower extremities. Do not suspect necrotizing fasciitis given the absence of fever, leukocytosis, and hyponatremia. I have ordered cultures and broad-spectrum antibiotics. Will speak to the hospitalist regarding admission of patient for further management at this time. Patient would likely benefit from some antibiotics and monitoring as well as possible debridement of an area of eschar on the right lower leg and being set up with wound care. [TT]      ED Course User Index  [TT] Eder Gutierrez PA-C     Complexity of Problems Addressed:  1 acute on chronic illness    Data Reviewed and Analyzed:  Category 1:   I reviewed external records: provider visit note from PCP. I ordered each unique test.  I interpreted the results of each unique test.      Category 2:   I interpreted the labs. Category 3: Discussion of management or test interpretation.   See ED Course above    Is this patient to be included in the SEP-1 core measure due to severe sepsis or

## 2023-08-05 NOTE — ED TRIAGE NOTES
Patient to the ER from home with c/o of right leg ulcer with 6 years of issues, left leg is a new issue that is infected on back of calf that is draining that has been draining 6 weeks or so, has not ester to his PCP for it he has been dong his own wound care at this time with no results.

## 2023-08-05 NOTE — H&P
Hospitalist History and Physical   Admit Date:  2023  8:37 PM   Name:  Janey Wilson   Age:  61 y.o. Sex:  male  :  1965   MRN:  [id number]   Room:  ER38/    Presenting/Chief Complaint: Leg Pain     Reason(s) for Admission: Cellulitis of both lower extremities [L03.115, L03.116]     History of Present Illness:     61years old male with past medical history of hypertension, GERD, history of bilateral lower extremity venous stasis/lymphedema, history off right lower extremity ulcers presented to emergency room with chief complaint of worsening of redness, swelling in lower extremity for past few weeks. Reports worsening of pain. Denies any fever, chills but having very difficulty in mobilizing himself and taking care of himself. Reports still working at [employer] using wheelchair. Evaluated in emergency room, lab work shows no evidence of elevated white count. Patient's bilateral lower extremity redness noted over the lymphedema, ER PA requested admission of this patient for further evaluation and management. Assessment & Plan:     Cellulitis of bilateral lower extremity involving lymphedema: Patient also have a eschar involving the medial part of right lower extremity, will request consultation with general surgery to see if any debridement needed. Initiated on vancomycin, cefepime. Chronic atrial fibrillation: Patient reports on anticoagulation, continue on Cardizem, if surgery team decides to hold on anticoagulation will hold. Morbid obesity: Weight loss recommended. Hypertension: We will continue on home medications. Diet: ADULT DIET;  Regular  VTE prophylaxis: Already on anticoagulation  Code status: Full Code    Hospital Problems:  Principal Problem:    Cellulitis of both lower extremities  Active Problems:    HTN (hypertension)    Hypertension    Chronic atrial fibrillation (HCC)    Lymphedema of both lower extremities  Resolved Problems:    * No resolved

## 2023-08-05 NOTE — PLAN OF CARE
Problem: Discharge Planning  Goal: Discharge to home or other facility with appropriate resources  Outcome: Progressing  Flowsheets (Taken 8/5/2023 0114)  Discharge to home or other facility with appropriate resources:   Identify barriers to discharge with patient and caregiver   Arrange for needed discharge resources and transportation as appropriate   Identify discharge learning needs (meds, wound care, etc)   Refer to discharge planning if patient needs post-hospital services based on physician order or complex needs related to functional status, cognitive ability or social support system     Problem: Pain  Goal: Verbalizes/displays adequate comfort level or baseline comfort level  Outcome: Progressing     Problem: Safety - Adult  Goal: Free from fall injury  Outcome: Progressing

## 2023-08-05 NOTE — PROGRESS NOTES
Hospitalist Progress Note   Admit Date:  2023  8:37 PM   Name:  Alcides Martinez   Age:  61 y.o. Sex:  male  :  1965   MRN:  [id number]   Room:  [facility]/    Presenting/Chief Complaint: Leg Pain     Reason(s) for Admission: Delayed wound healing [T14. 8XXD]  Cellulitis of both lower extremities [L03.115, L03.116]  Cellulitis of lower extremity, unspecified laterality [T43.791]     Hospital Course:   Alcides Martinez is a 61 y.o. male with medical history of hypertension, GERD, history of bilateral lower extremity venous stasis/lymphedema, history off right lower extremity ulcers who presented with complaints of LE erythema and swelling over several weeks PTA and associated pain. Found to have cellulitis and lymphedema of bilateral LE and was started on vancomycin and cefepime. Surgery consulted for consideration of debridement. Subjective & 24hr Events:   Seen at bedside. States that his legs are significantly worsening his quality of life as he works and often is on his feet as he works at [employer]  Assessment & Plan:     Principal Problem:    Cellulitis of both lower extremities    Lymphedema of both lower extremities  - general surgery consult pending, appreciate assistance  - wound care consult  - continue vancomycin and cefepime    Active Problems:      Hypertension    Chronic atrial fibrillation ([address])  - continue cardizem, lasix, xarelto (consider holding pending surgery recommendations)    BPH  - restart flomax    Gout  - restart allopurinol    Obesity  - complicating care    PT/OT evals and PPD needed/ordered? Yes  Diet:  ADULT DIET;  Regular  VTE prophylaxis: Already on anticoagulation  Code status: Full Code        Non-peripheral Lines and Tubes (if present):             Hospital Problems:  Principal Problem:    Cellulitis of both lower extremities  Active Problems:    HTN (hypertension)    Hypertension    Chronic atrial fibrillation (HCC)    Lymphedema of both lower

## 2023-08-05 NOTE — CONSULTS
H&P/Consult Note/Progress Note/Office Note:   Francine Angel  MRN: [id number]  WYM:[birth year]  Age:57 y.o. General Surgery Consult Ordered by: Dr. Sameer Morales  Reason for General Surgery Consult: RLE tomer    HPI: Francine Angel is a 61 y.o.  male with a past medical history of Anxiety, A-fib (on Xarelto), GERD, Gout, PUD, HTN, obesity, and venous insufficiency who presented to the ED 8/4/23 with concerns r/t wounds on BLE. Pt with Hx of venous stasis ulcers to BLE. He states he has been dealing with a chronic ulcer on his right leg for the past 6 years. He states now he is having issues on the left leg. He states he is concerned his legs are infected. Pt admitted by hospitalist for cellulitis. General Surgery asked to evaluate SAM mcgovern. Pt currently taking Xarelto for Afib. Past Medical History:   Diagnosis Date    Anxiety     Arrhythmia     a-fib    Cellulitis of leg 11/17/2019    Erectile dysfunction 1/20/2014    EHSAN (generalized anxiety disorder) 5/7/2014    GERD (gastroesophageal reflux disease)     Gout 1/20/2014    History of peptic ulcer     Hypertension     Nausea & vomiting     Obesity, morbid ([address]) 2/26/2018    Osteoarthritis of hand, primary localized 1/20/2014    Personal history of urinary calculi     x2    Venous (peripheral) insufficiency 12/3/2019     Past Surgical History:   Procedure Laterality Date    VASCULAR SURGERY Bilateral 07/01/2020    Bilateral iliac venogram, intravascular ultrasound x2.     WISDOM TOOTH EXTRACTION       Current Facility-Administered Medications   Medication Dose Route Frequency    vancomycin (VANCOCIN) 1,000 mg in sodium chloride 0.9 % 250 mL IVPB ([id number])  1,000 mg IntraVENous Q12H    allopurinol (ZYLOPRIM) tablet 300 mg  300 mg Oral Daily    cyclobenzaprine (FLEXERIL) tablet 5 mg  5 mg Oral TID PRN    tamsulosin (FLOMAX) capsule 0.4 mg  0.4 mg Oral Daily    meloxicam (MOBIC) tablet 7.5 mg  7.5 mg Oral Daily    sodium chloride flush 0.9 %

## 2023-08-05 NOTE — ED NOTES
TRANSFER - OUT REPORT:    Verbal report given to [facility] [employer]  being transferred to room 623 for routine progression of patient care       Report consisted of patient's Situation, Background, Assessment and   Recommendations(SBAR). Information from the following report(s) ED SBAR was reviewed with the receiving nurse. Taylors Fall Assessment:    Presents to emergency department  because of falls (Syncope, seizure, or loss of consciousness): No  Age > 70: No  Altered Mental Status, Intoxication with alcohol or substance confusion (Disorientation, impaired judgment, poor safety awaremess, or inability to follow instructions): No  Impaired Mobility: Ambulates or transfers with assistive devices or assistance; Unable to ambulate or transer.: No  Nursing Judgement: No          Lines:   Peripheral IV 08/04/23 Left Forearm (Active)        Opportunity for questions and clarification was provided.       Patient transported with:  Elda Pinzon RN  08/05/23 0747

## 2023-08-05 NOTE — CARE COORDINATION
Pt chart reviewed for discharge planning. CM met with pt at bedside, verified demographic information/ health insurance. Pt lives with spouse in one level home, states independent with ADLs, ambulates with no DME, and does not drive. PCP was confirmed, has not seen in a long time and receives no outside services in the home at this time. CM will follow pt plan of care and assist with supportive care referrals pending pt clinical progress. Please consult case management if specific needs arise. 08/05/23 1217   Service Assessment   Patient Orientation Alert and Oriented   Cognition Alert   Primary Caregiver Self   Support Systems Family Members   Patient's Healthcare Decision Maker is: Legal Next of [facility]   PCP Verified by CM Yes   Last Visit to PCP Within last year   Prior Functional Level Independent in ADLs/IADLs   Current Functional Level Independent in ADLs/IADLs   Can patient return to prior living arrangement Yes   Ability to make needs known: Good   Family able to assist with home care needs: Yes   Would you like for me to discuss the discharge plan with any other family members/significant others, and if so, who? No   Financial Resources Other (Comment)  (BCBS)   Community Resources None   Social/Functional History   Type of [address] [place] [address] [address] One level   Receives Help From Family   Active  No   Patient's  Info Family   Occupation Retired   Discharge Planning   Type of [address] [employer] [address] Prior To Admission None   Potential Assistance Needed N/A   DME Ordered? No   Potential Assistance Purchasing Medications No   Type of Home Care Services None   Patient expects to be discharged to: House   One/Two Story Residence One story   Condition of Participation: Discharge Planning   The Plan for Transition of Care is related to the following treatment goals: Pt will return home at discharge.

## 2023-08-05 NOTE — PROGRESS NOTES
It is with great shyam we pray for your family today: \"Because you dared to believe,    Your balbir has healed you. Go with peace in your heart,    And be free from your suffering! \"    Vanesa Ivory,    I believe that if you would touch my body I shall be healed. Give me the balbir to come boldly into your presence.     [facility]   [phone]

## 2023-08-06 VITALS
OXYGEN SATURATION: 94 % | SYSTOLIC BLOOD PRESSURE: 147 MMHG | HEIGHT: 73 IN | TEMPERATURE: 97.7 F | WEIGHT: 300 LBS | BODY MASS INDEX: 39.76 KG/M2 | RESPIRATION RATE: 18 BRPM | HEART RATE: 92 BPM | DIASTOLIC BLOOD PRESSURE: 84 MMHG

## 2023-08-06 LAB
ANION GAP SERPL CALC-SCNC: 3 MMOL/L (ref 2–11)
BASOPHILS # BLD: 0 K/UL (ref 0–0.2)
BASOPHILS NFR BLD: 1 % (ref 0–2)
BUN SERPL-MCNC: 16 MG/DL (ref 6–23)
CALCIUM SERPL-MCNC: 8.5 MG/DL (ref 8.3–10.4)
CHLORIDE SERPL-SCNC: 111 MMOL/L (ref 101–110)
CO2 SERPL-SCNC: 27 MMOL/L (ref 21–32)
CREAT SERPL-MCNC: 0.9 MG/DL (ref 0.8–1.5)
DIFFERENTIAL METHOD BLD: ABNORMAL
EOSINOPHIL # BLD: 0.1 K/UL (ref 0–0.8)
EOSINOPHIL NFR BLD: 2 % (ref 0.5–7.8)
ERYTHROCYTE [DISTWIDTH] IN BLOOD BY AUTOMATED COUNT: 14 % (ref 11.9–14.6)
GLUCOSE SERPL-MCNC: 116 MG/DL (ref 65–100)
HCT VFR BLD AUTO: 37.2 % (ref 41.1–50.3)
HGB BLD-MCNC: 11.9 G/DL (ref 13.6–17.2)
IMM GRANULOCYTES # BLD AUTO: 0 K/UL (ref 0–0.5)
IMM GRANULOCYTES NFR BLD AUTO: 0 % (ref 0–5)
LYMPHOCYTES # BLD: 1 K/UL (ref 0.5–4.6)
LYMPHOCYTES NFR BLD: 15 % (ref 13–44)
MCH RBC QN AUTO: 34.9 PG (ref 26.1–32.9)
MCHC RBC AUTO-ENTMCNC: 32 G/DL (ref 31.4–35)
MCV RBC AUTO: 109.1 FL (ref 82–102)
MONOCYTES # BLD: 0.6 K/UL (ref 0.1–1.3)
MONOCYTES NFR BLD: 9 % (ref 4–12)
NEUTS SEG # BLD: 4.6 K/UL (ref 1.7–8.2)
NEUTS SEG NFR BLD: 72 % (ref 43–78)
NRBC # BLD: 0 K/UL (ref 0–0.2)
PLATELET # BLD AUTO: 172 K/UL (ref 150–450)
PMV BLD AUTO: 10 FL (ref 9.4–12.3)
POTASSIUM SERPL-SCNC: 4.2 MMOL/L (ref 3.5–5.1)
RBC # BLD AUTO: 3.41 M/UL (ref 4.23–5.6)
SODIUM SERPL-SCNC: 141 MMOL/L (ref 133–143)
VANCOMYCIN SERPL-MCNC: 10.9 UG/ML
WBC # BLD AUTO: 6.3 K/UL (ref 4.3–11.1)

## 2023-08-06 PROCEDURE — 97530 THERAPEUTIC ACTIVITIES: CPT

## 2023-08-06 PROCEDURE — 80048 BASIC METABOLIC PNL TOTAL CA: CPT

## 2023-08-06 PROCEDURE — 85025 COMPLETE CBC W/AUTO DIFF WBC: CPT

## 2023-08-06 PROCEDURE — 36415 COLL VENOUS BLD VENIPUNCTURE: CPT

## 2023-08-06 PROCEDURE — 80202 ASSAY OF VANCOMYCIN: CPT

## 2023-08-06 PROCEDURE — 6370000000 HC RX 637 (ALT 250 FOR IP): Performed by: PHYSICIAN ASSISTANT

## 2023-08-06 PROCEDURE — 2580000003 HC RX 258: Performed by: HOSPITALIST

## 2023-08-06 PROCEDURE — 6360000002 HC RX W HCPCS: Performed by: HOSPITALIST

## 2023-08-06 PROCEDURE — 97161 PT EVAL LOW COMPLEX 20 MIN: CPT

## 2023-08-06 PROCEDURE — 6370000000 HC RX 637 (ALT 250 FOR IP): Performed by: HOSPITALIST

## 2023-08-06 RX ORDER — CEPHALEXIN 500 MG/1
500 CAPSULE ORAL 4 TIMES DAILY
Qty: 28 CAPSULE | Refills: 0 | Status: SHIPPED | OUTPATIENT
Start: 2023-08-06 | End: 2023-08-13

## 2023-08-06 RX ADMIN — MAGNESIUM HYDROXIDE 30 ML: 400 SUSPENSION ORAL at 08:32

## 2023-08-06 RX ADMIN — DILTIAZEM HYDROCHLORIDE 240 MG: 240 CAPSULE, EXTENDED RELEASE ORAL at 08:26

## 2023-08-06 RX ADMIN — VANCOMYCIN HYDROCHLORIDE 1000 MG: 1 INJECTION, POWDER, LYOPHILIZED, FOR SOLUTION INTRAVENOUS at 00:51

## 2023-08-06 RX ADMIN — FUROSEMIDE 40 MG: 40 TABLET ORAL at 08:26

## 2023-08-06 RX ADMIN — SERTRALINE HYDROCHLORIDE 100 MG: 100 TABLET ORAL at 08:26

## 2023-08-06 RX ADMIN — MELOXICAM 7.5 MG: 7.5 TABLET ORAL at 08:26

## 2023-08-06 RX ADMIN — ALLOPURINOL 300 MG: 300 TABLET ORAL at 08:27

## 2023-08-06 RX ADMIN — CEFEPIME 2000 MG: 2 INJECTION, POWDER, FOR SOLUTION INTRAVENOUS at 05:52

## 2023-08-06 RX ADMIN — TAMSULOSIN HYDROCHLORIDE 0.4 MG: 0.4 CAPSULE ORAL at 08:26

## 2023-08-06 RX ADMIN — SODIUM CHLORIDE, PRESERVATIVE FREE 10 ML: 5 INJECTION INTRAVENOUS at 08:30

## 2023-08-06 NOTE — CARE COORDINATION
Pt is for discharge home today with [facility]. Referral called/faxed to [facility] for follow up home care as ordered. No additional CM orders received or supportive care needs expressed at this time. 08/06/23 1052   Service Assessment   Patient's Healthcare Decision Maker is: Legal Next of Kin   Social/Functional History   Type of [facility] Center  One level   Receives Help From Family   Active  No   Patient's  Info Family   Occupation Retired   Services At/After Discharge   Transition of [address] [place] [address] Center  ([facility]) Discharge [facility] No   Reason Outside [address] [place] [address] to staff case   [address] [place] [address]  ([facility])   [address] Provided? No   Mode of Transport at Discharge Other (see comment)  (Spouse)   Confirm Follow Up Transport Family   Condition of Participation: Discharge Planning   The Plan for Transition of Care is related to the following treatment goals: Pt will return home at discharge with [facility]. The Patient and/or Patient Representative was provided with a Choice of Provider? Patient   The Patient and/Or Patient Representative agree with the Discharge Plan? Yes   Freedom of Choice list was provided with basic dialogue that supports the patient's individualized plan of care/goals, treatment preferences, and shares the quality data associated with the providers?   Yes

## 2023-08-06 NOTE — PROGRESS NOTES
ACUTE PHYSICAL THERAPY GOALS:   (Developed with and agreed upon by patient and/or caregiver.)    (1.) Mary Waters  will move from supine to sit and sit to supine , scoot up and down, and roll side to side with INDEPENDENT within 7 treatment day(s). (2.) Mary Waters will transfer from bed to chair and chair to bed with INDEPENDENT using the least restrictive device within 7 treatment day(s). (3.) Mary Waters will ambulate with INDEPENDENT for 250 feet with the least restrictive device within 7 treatment day(s). (4.) Mary Waters will perform standing static and dynamic balance activities x 25 minutes with INDEPENDENT to improve safety within 7 treatment day(s). (5.) Mary Waters will ascend and descend 2 stairs using 2 hand rail(s) with SUPERVISION to improve functional mobility and safety within 7 treatment day(s). (6.) aMry Waters will perform therapeutic exercises x 20 min for HEP with SUPERVISION to improve strength, endurance, and functional mobility within 7 treatment day(s). PHYSICAL THERAPY Initial Assessment, Daily Note, and AM  (Link to Caseload Tracking: PT Visit Days : 1  Acknowledge Orders  Time In/Out  PT Charge Capture  Rehab Caseload Tracker    Mary Waters is a 61 y.o. male   PRIMARY DIAGNOSIS: Cellulitis of both lower extremities  Delayed wound healing [T14. 8XXD]  Cellulitis of both lower extremities [L03.115, L03.116]  Cellulitis of lower extremity, unspecified laterality [L03.119]       Reason for Referral: Generalized Muscle Weakness (M62.81)  Inpatient: Payor: Tessy Course / Plan: Raheem Dunbar / Product Type: *No Product type* /     ASSESSMENT:     REHAB RECOMMENDATIONS:   Recommendation to date pending progress:  Setting:  No further skilled physical therapy after discharge from hospital    Equipment:    None     ASSESSMENT:   Mr. Florinda Sims is a 61year old male who presents to hospital 2/2 Cellulitis of BLEs.  Pt reports that he lives with

## 2023-08-06 NOTE — PROGRESS NOTES
Discharge instructions given. Education provided. All questions answered and verbally voiced understanding. Medication changes and follow up appointments discussed. Script provided. AVS reviewed, signed, and placed in chart. Copy provided for pt.

## 2023-08-06 NOTE — DISCHARGE INSTRUCTIONS
Please see your family doctor early this week. I have written the prescription for the walking boots    Wound clinic consult placed, orders for dressing changes placed. Home health services will see you to include nursing to assist with dressing changes    Take to Keflex as ordered.

## 2023-08-07 NOTE — PROGRESS NOTES
Physician Progress Note      Leticia Mina  [facility] #:                  [id number]  :                       1965  ADMIT DATE:       2023 8:37 PM  [facility] DATE:        2023 3:00 PM  RESPONDING  PROVIDER #:        Jess Tinajero NP          QUERY TEXT:    Patient admitted with Cellulitis of both lower extremities. Pt noted to have   BPH and was treated with Flomax. ?If possible, please document in progress   notes and discharge summary if you are evaluating and/or treating any of the   following: The medical record reflects the following:  Risk Factors: Age, Male, BPH  Clinical Indicators: Progress note from  notes BPH and restarted on Flomax. Treatment: Flomax  Options provided:  -- BPH with partial/complete urinary obstruction  -- BPH with urinary retention without obstruction  -- Other - I will add my own diagnosis  -- Disagree - Not applicable / Not valid  -- Disagree - Clinically unable to determine / Unknown  -- Refer to Clinical Documentation Reviewer    PROVIDER RESPONSE TEXT:    This patient has BPH with partial/complete urinary obstruction. Query created by:  Sergio Barger on 2023 10:28 AM      Electronically signed by:  Jess Tinajero NP 2023 7:11 PM

## 2023-09-21 NOTE — PROGRESS NOTES
Cycle 12 bosutinib ordered.  All communication goes through mother.   Dressing to right leg changed per order. Hourly rounds performed. All needs meet. Bed low/locked. Call light within reach. Patient denies needs at this time.   Will continue to monitor and report to oncoming RN 03-Aug-2022

## 2023-10-05 ENCOUNTER — HOSPITAL ENCOUNTER (EMERGENCY)
Age: 58
Discharge: HOME | End: 2023-10-05
Payer: COMMERCIAL

## 2023-10-05 ENCOUNTER — APPOINTMENT (OUTPATIENT)
Dept: CT IMAGING | Age: 58
End: 2023-10-05
Payer: COMMERCIAL

## 2023-10-05 VITALS
BODY MASS INDEX: 41.75 KG/M2 | OXYGEN SATURATION: 95 % | TEMPERATURE: 98.4 F | SYSTOLIC BLOOD PRESSURE: 144 MMHG | WEIGHT: 315 LBS | DIASTOLIC BLOOD PRESSURE: 85 MMHG | RESPIRATION RATE: 30 BRPM | HEART RATE: 87 BPM | HEIGHT: 73 IN

## 2023-10-05 DIAGNOSIS — N20.0 KIDNEY STONE: Primary | ICD-10-CM

## 2023-10-05 LAB
ALBUMIN SERPL-MCNC: 3.1 G/DL (ref 3.5–5)
ALBUMIN/GLOB SERPL: 0.7 (ref 0.4–1.6)
ALP SERPL-CCNC: 85 U/L (ref 50–136)
ALT SERPL-CCNC: 15 U/L (ref 12–65)
ANION GAP SERPL CALC-SCNC: 3 MMOL/L (ref 2–11)
AST SERPL-CCNC: 15 U/L (ref 15–37)
BASOPHILS # BLD: 0 K/UL (ref 0–0.2)
BASOPHILS NFR BLD: 0 % (ref 0–2)
BILIRUB SERPL-MCNC: 0.3 MG/DL (ref 0.2–1.1)
BILIRUB UR QL: NEGATIVE
BUN SERPL-MCNC: 13 MG/DL (ref 6–23)
CALCIUM SERPL-MCNC: 10 MG/DL (ref 8.3–10.4)
CHLORIDE SERPL-SCNC: 106 MMOL/L (ref 101–110)
CO2 SERPL-SCNC: 29 MMOL/L (ref 21–32)
CREAT SERPL-MCNC: 1.2 MG/DL (ref 0.8–1.5)
DIFFERENTIAL METHOD BLD: ABNORMAL
EOSINOPHIL # BLD: 0 K/UL (ref 0–0.8)
EOSINOPHIL NFR BLD: 0 % (ref 0.5–7.8)
ERYTHROCYTE [DISTWIDTH] IN BLOOD BY AUTOMATED COUNT: 13.9 % (ref 11.9–14.6)
GLOBULIN SER CALC-MCNC: 4.2 G/DL (ref 2.8–4.5)
GLUCOSE SERPL-MCNC: 104 MG/DL (ref 65–100)
GLUCOSE UR QL STRIP.AUTO: NEGATIVE MG/DL
HCT VFR BLD AUTO: 41.1 % (ref 41.1–50.3)
HGB BLD-MCNC: 13.5 G/DL (ref 13.6–17.2)
IMM GRANULOCYTES # BLD AUTO: 0 K/UL (ref 0–0.5)
IMM GRANULOCYTES NFR BLD AUTO: 0 % (ref 0–5)
KETONES UR-MCNC: NEGATIVE MG/DL
LEUKOCYTE ESTERASE UR QL STRIP: NEGATIVE
LYMPHOCYTES # BLD: 0.8 K/UL (ref 0.5–4.6)
LYMPHOCYTES NFR BLD: 7 % (ref 13–44)
MCH RBC QN AUTO: 35.2 PG (ref 26.1–32.9)
MCHC RBC AUTO-ENTMCNC: 32.8 G/DL (ref 31.4–35)
MCV RBC AUTO: 107.3 FL (ref 82–102)
MONOCYTES # BLD: 0.8 K/UL (ref 0.1–1.3)
MONOCYTES NFR BLD: 7 % (ref 4–12)
NEUTS SEG # BLD: 9.8 K/UL (ref 1.7–8.2)
NEUTS SEG NFR BLD: 86 % (ref 43–78)
NITRITE UR QL: NEGATIVE
NRBC # BLD: 0 K/UL (ref 0–0.2)
PH UR: 5.5 (ref 5–9)
PLATELET # BLD AUTO: 228 K/UL (ref 150–450)
PMV BLD AUTO: 10.1 FL (ref 9.4–12.3)
POTASSIUM SERPL-SCNC: 4.6 MMOL/L (ref 3.5–5.1)
PROT SERPL-MCNC: 7.3 G/DL (ref 6.3–8.2)
PROT UR QL: 30 MG/DL
RBC # BLD AUTO: 3.83 M/UL (ref 4.23–5.6)
RBC # UR STRIP: ABNORMAL
SERVICE CMNT-IMP: ABNORMAL
SODIUM SERPL-SCNC: 138 MMOL/L (ref 133–143)
SP GR UR: >1.03 (ref 1–1.02)
UROBILINOGEN UR QL: 0.2 EU/DL (ref 0.2–1)
WBC # BLD AUTO: 11.5 K/UL (ref 4.3–11.1)

## 2023-10-05 PROCEDURE — 96375 TX/PRO/DX INJ NEW DRUG ADDON: CPT

## 2023-10-05 PROCEDURE — 81003 URINALYSIS AUTO W/O SCOPE: CPT

## 2023-10-05 PROCEDURE — 80053 COMPREHEN METABOLIC PANEL: CPT

## 2023-10-05 PROCEDURE — 74176 CT ABD & PELVIS W/O CONTRAST: CPT

## 2023-10-05 PROCEDURE — 6370000000 HC RX 637 (ALT 250 FOR IP): Performed by: NURSE PRACTITIONER

## 2023-10-05 PROCEDURE — 6360000002 HC RX W HCPCS: Performed by: NURSE PRACTITIONER

## 2023-10-05 PROCEDURE — 96374 THER/PROPH/DIAG INJ IV PUSH: CPT

## 2023-10-05 PROCEDURE — 99284 EMERGENCY DEPT VISIT MOD MDM: CPT

## 2023-10-05 PROCEDURE — 85025 COMPLETE CBC W/AUTO DIFF WBC: CPT

## 2023-10-05 RX ORDER — ONDANSETRON 2 MG/ML
4 INJECTION INTRAMUSCULAR; INTRAVENOUS
Status: COMPLETED | OUTPATIENT
Start: 2023-10-05 | End: 2023-10-05

## 2023-10-05 RX ORDER — NALOXONE HYDROCHLORIDE 4 MG/.1ML
1 SPRAY NASAL PRN
Qty: 1 EACH | Refills: 0 | Status: SHIPPED | OUTPATIENT
Start: 2023-10-05

## 2023-10-05 RX ORDER — MORPHINE SULFATE 4 MG/ML
4 INJECTION INTRAVENOUS ONCE
Status: COMPLETED | OUTPATIENT
Start: 2023-10-05 | End: 2023-10-05

## 2023-10-05 RX ORDER — HYDROCODONE BITARTRATE AND ACETAMINOPHEN 5; 325 MG/1; MG/1
1 TABLET ORAL EVERY 6 HOURS PRN
Qty: 10 TABLET | Refills: 0 | Status: SHIPPED | OUTPATIENT
Start: 2023-10-05 | End: 2023-10-08

## 2023-10-05 RX ORDER — ONDANSETRON 4 MG/1
4 TABLET, ORALLY DISINTEGRATING ORAL 3 TIMES DAILY PRN
Qty: 21 TABLET | Refills: 0 | Status: SHIPPED | OUTPATIENT
Start: 2023-10-05

## 2023-10-05 RX ORDER — LEVOFLOXACIN 500 MG/1
500 TABLET, FILM COATED ORAL DAILY
Qty: 5 TABLET | Refills: 0 | Status: SHIPPED | OUTPATIENT
Start: 2023-10-05 | End: 2023-10-10

## 2023-10-05 RX ORDER — HYDROCODONE BITARTRATE AND ACETAMINOPHEN 5; 325 MG/1; MG/1
1 TABLET ORAL
Status: COMPLETED | OUTPATIENT
Start: 2023-10-05 | End: 2023-10-05

## 2023-10-05 RX ADMIN — ONDANSETRON 4 MG: 2 INJECTION INTRAMUSCULAR; INTRAVENOUS at 18:35

## 2023-10-05 RX ADMIN — HYDROCODONE BITARTRATE AND ACETAMINOPHEN 1 TABLET: 5; 325 TABLET ORAL at 18:35

## 2023-10-05 RX ADMIN — MORPHINE SULFATE 4 MG: 4 INJECTION INTRAVENOUS at 19:58

## 2023-10-05 ASSESSMENT — PAIN DESCRIPTION - LOCATION
LOCATION: FLANK
LOCATION: FLANK

## 2023-10-05 ASSESSMENT — PAIN SCALES - GENERAL
PAINLEVEL_OUTOF10: 2
PAINLEVEL_OUTOF10: 8

## 2023-10-05 ASSESSMENT — ENCOUNTER SYMPTOMS
SHORTNESS OF BREATH: 0
ABDOMINAL PAIN: 0

## 2023-10-05 ASSESSMENT — LIFESTYLE VARIABLES
HOW OFTEN DO YOU HAVE A DRINK CONTAINING ALCOHOL: NEVER
HOW MANY STANDARD DRINKS CONTAINING ALCOHOL DO YOU HAVE ON A TYPICAL DAY: PATIENT DOES NOT DRINK

## 2023-10-05 ASSESSMENT — PAIN - FUNCTIONAL ASSESSMENT: PAIN_FUNCTIONAL_ASSESSMENT: 0-10

## 2023-10-05 NOTE — ED TRIAGE NOTES
Patient to triage via [facility] with CO hematuria and right flank pain. Reports treated for UTI and completed course of Levaquin, reports urine cleared up then became cloudy again and started taking bactrim that was left over at home. Seen at PCP and unable to urinate at that time sent to ED for further evaluation.

## 2023-10-05 NOTE — DISCHARGE INSTRUCTIONS
Take medication as prescribed. As we discussed, there is no indication for antibiotics but I have provided prescription at your request.  Follow-up with urology. Return to the emergency department for any new, worsening, or concerning symptoms.

## 2023-10-09 ENCOUNTER — HOSPITAL ENCOUNTER (OUTPATIENT)
Dept: WOUND CARE | Age: 58
Discharge: HOME | End: 2023-10-09
Payer: COMMERCIAL

## 2023-10-09 VITALS
SYSTOLIC BLOOD PRESSURE: 117 MMHG | HEART RATE: 94 BPM | DIASTOLIC BLOOD PRESSURE: 69 MMHG | WEIGHT: 315 LBS | HEIGHT: 73 IN | BODY MASS INDEX: 41.75 KG/M2

## 2023-10-09 PROCEDURE — 99204 OFFICE O/P NEW MOD 45 MIN: CPT

## 2023-10-09 ASSESSMENT — PAIN DESCRIPTION - FREQUENCY: FREQUENCY: CONTINUOUS

## 2023-10-09 ASSESSMENT — PAIN SCALES - GENERAL: PAINLEVEL_OUTOF10: 5

## 2023-10-09 ASSESSMENT — PAIN DESCRIPTION - LOCATION: LOCATION: LEG

## 2023-10-09 ASSESSMENT — PAIN DESCRIPTION - DESCRIPTORS: DESCRIPTORS: BURNING;ACHING;SORE

## 2023-10-09 ASSESSMENT — PAIN DESCRIPTION - PAIN TYPE: TYPE: CHRONIC PAIN

## 2023-10-09 NOTE — FLOWSHEET NOTE
Dressing Status New dressing applied   Wound Cleansed Cleansed with saline; Soap and water;Vashe   Wound Length (cm) 29 cm   Wound Width (cm) 33 cm   Wound Depth (cm) 0.1 cm   Wound Surface Area (cm^2) 957 cm^2   Wound Volume (cm^3) 95.7 cm^3   Wound Assessment Denuded;Slough   Drainage Amount Large (50-75% saturated)   Drainage Description Serous   Odor Moderate   Ariadne-wound Assessment Edematous; Denuded   Margins Attached edges   Wound Thickness Description not for Pressure Injury Full thickness   Wound 10/09/23 Pretibial Left; Lower #3   Date First Assessed/Time First Assessed: 10/09/23 1528   Present on Original Admission: Yes  Wound Approximate Age at First Assessment (Weeks): (c)   Primary Wound Type: Venous Ulcer  Location: Pretibial  Wound Location Orientation: Left; Lower  Wound . .. Wound Image      Wound Etiology Venous   Dressing Status New dressing applied   Wound Cleansed Cleansed with saline; Soap and water;Vashe   Wound Length (cm) 43 cm   Wound Width (cm) 30 cm   Wound Depth (cm) 0.1 cm   Wound Surface Area (cm^2) 1290 cm^2   Wound Volume (cm^3) 129 cm^3   Wound Assessment Denuded;Slough   Drainage Amount Large (50-75% saturated)   Drainage Description Serous   Odor Moderate   Ariadne-wound Assessment Denuded;Edematous   Margins Attached edges   Wound Thickness Description not for Pressure Injury Full thickness

## 2023-10-09 NOTE — WOUND CARE
[facility]  [facility]  [address], [address] [place]   Phone [phone]   Fax [phone]      NAME:  Zonia Jin OF BIRTH:  1965  MEDICAL RECORD NUMBER:  [id number]  DATE:  10/9/2023    Return Appointment:   1 week with Jose Borjas DO    Instructions: Bilateral Lower Legs:  Cleanse intact skin with soap (Padmaja dish soap) & water. Rinse well. Vashe Wound Solution (hypochlorous acid) soak placed on wound bed for a minimum of 60 seconds prior to dressing application. This solution removes pathogens, bioburden, and biofilms from wounds, but is not cytotoxic. Phytoplex antifungal ointment by Medline on skin. Use pieces of Xeroform to cover wound. May double if needed to prevent from adhering to wound bed when changing. Cover with Exudry or ABD Pads & rolled gauze. Change daily. Please wear Tubigrip stockinet size \"G\" from toes to knees- applies light compression to leg to reduce swelling. May double as tolerated for increased compression. Goal is to reduce circulatory congestion, discomfort,  & resistance to wound closure. May wear 24 hours per day. Wash, dry, & reuse stockinet. Please increase dietary protein to at least 60 grams per day to support cell rejuvenation. May use supplements such as Ensure Max, Blayne, & Glucerna (samples & coupons provided at first visit). Be sure to spread intake throughout the day for improved absorption. Long-term goal is to order lymphedema pumps to be used one hour twice per day. Wound care supplies ordered using [employer]. Should you experience increased redness, swelling, pain, foul odor, size of wound(s), or have a temperature over 101 degrees please contact the [address] at [phone] or if after hours contact your primary care physician or go to the hospital emergency department.     PLEASE NOTE: IF YOU ARE UNABLE TO OBTAIN WOUND SUPPLIES, CONTINUE TO

## 2023-10-17 ENCOUNTER — HOSPITAL ENCOUNTER (OUTPATIENT)
Dept: WOUND CARE | Age: 58
Discharge: HOME | End: 2023-10-17
Payer: COMMERCIAL

## 2023-10-17 VITALS
HEIGHT: 73 IN | SYSTOLIC BLOOD PRESSURE: 142 MMHG | BODY MASS INDEX: 41.75 KG/M2 | WEIGHT: 315 LBS | DIASTOLIC BLOOD PRESSURE: 80 MMHG | HEART RATE: 98 BPM

## 2023-10-17 DIAGNOSIS — L97.301 VENOUS STASIS ULCER OF ANKLE LIMITED TO BREAKDOWN OF SKIN WITH VARICOSE VEINS, UNSPECIFIED LATERALITY (HCC): Primary | ICD-10-CM

## 2023-10-17 DIAGNOSIS — M79.605 PAIN OF LEFT LOWER EXTREMITY: ICD-10-CM

## 2023-10-17 DIAGNOSIS — I83.003 VENOUS STASIS ULCER OF ANKLE LIMITED TO BREAKDOWN OF SKIN WITH VARICOSE VEINS, UNSPECIFIED LATERALITY (HCC): Primary | ICD-10-CM

## 2023-10-17 PROBLEM — S91.001A ANKLE WOUND, RIGHT, INITIAL ENCOUNTER: Chronic | Status: ACTIVE | Noted: 2019-11-14

## 2023-10-17 PROBLEM — I87.331 CHRONIC VENOUS HYPERTENSION WITH ULCER AND INFLAMMATION INVOLVING RIGHT SIDE (HCC): Chronic | Status: ACTIVE | Noted: 2019-03-06

## 2023-10-17 PROBLEM — L97.313 NON-PRESSURE CHRONIC ULCER OF RIGHT ANKLE WITH NECROSIS OF MUSCLE (HCC): Chronic | Status: ACTIVE | Noted: 2019-03-06

## 2023-10-17 PROBLEM — I89.0 LYMPHEDEMA OF BOTH LOWER EXTREMITIES: Chronic | Status: ACTIVE | Noted: 2023-08-04

## 2023-10-17 PROCEDURE — 99213 OFFICE O/P EST LOW 20 MIN: CPT

## 2023-10-17 PROCEDURE — 99214 OFFICE O/P EST MOD 30 MIN: CPT | Performed by: FAMILY MEDICINE

## 2023-10-17 RX ORDER — BACITRACIN ZINC AND POLYMYXIN B SULFATE 500; 1000 [USP'U]/G; [USP'U]/G
OINTMENT TOPICAL ONCE
OUTPATIENT
Start: 2023-10-17 | End: 2023-10-17

## 2023-10-17 RX ORDER — LIDOCAINE HYDROCHLORIDE 20 MG/ML
JELLY TOPICAL ONCE
OUTPATIENT
Start: 2023-10-17 | End: 2023-10-17

## 2023-10-17 RX ORDER — TRAMADOL HYDROCHLORIDE 50 MG/1
50 TABLET ORAL EVERY 6 HOURS PRN
Qty: 28 TABLET | Refills: 0 | Status: SHIPPED | OUTPATIENT
Start: 2023-10-17 | End: 2023-10-24

## 2023-10-17 RX ORDER — LIDOCAINE HYDROCHLORIDE 40 MG/ML
SOLUTION TOPICAL ONCE
OUTPATIENT
Start: 2023-10-17 | End: 2023-10-17

## 2023-10-17 RX ORDER — BETAMETHASONE DIPROPIONATE 0.05 %
OINTMENT (GRAM) TOPICAL ONCE
OUTPATIENT
Start: 2023-10-17 | End: 2023-10-17

## 2023-10-17 RX ORDER — CLOBETASOL PROPIONATE 0.5 MG/G
OINTMENT TOPICAL ONCE
OUTPATIENT
Start: 2023-10-17 | End: 2023-10-17

## 2023-10-17 RX ORDER — LIDOCAINE 40 MG/G
CREAM TOPICAL ONCE
OUTPATIENT
Start: 2023-10-17 | End: 2023-10-17

## 2023-10-17 RX ORDER — GABAPENTIN 300 MG/1
300 CAPSULE ORAL NIGHTLY
Qty: 30 CAPSULE | Refills: 0 | Status: SHIPPED | OUTPATIENT
Start: 2023-10-17 | End: 2023-11-16

## 2023-10-17 RX ORDER — LIDOCAINE 50 MG/G
OINTMENT TOPICAL ONCE
OUTPATIENT
Start: 2023-10-17 | End: 2023-10-17

## 2023-10-17 RX ORDER — SODIUM CHLOR/HYPOCHLOROUS ACID 0.033 %
SOLUTION, IRRIGATION IRRIGATION ONCE
OUTPATIENT
Start: 2023-10-17 | End: 2023-10-17

## 2023-10-17 RX ORDER — IBUPROFEN 200 MG
TABLET ORAL ONCE
OUTPATIENT
Start: 2023-10-17 | End: 2023-10-17

## 2023-10-17 RX ORDER — GENTAMICIN SULFATE 1 MG/G
OINTMENT TOPICAL ONCE
OUTPATIENT
Start: 2023-10-17 | End: 2023-10-17

## 2023-10-17 RX ORDER — TRIAMCINOLONE ACETONIDE 1 MG/G
OINTMENT TOPICAL ONCE
OUTPATIENT
Start: 2023-10-17 | End: 2023-10-17

## 2023-10-17 RX ORDER — GINSENG 100 MG
CAPSULE ORAL ONCE
OUTPATIENT
Start: 2023-10-17 | End: 2023-10-17

## 2023-10-17 ASSESSMENT — PAIN DESCRIPTION - PAIN TYPE: TYPE: CHRONIC PAIN

## 2023-10-17 ASSESSMENT — PAIN DESCRIPTION - LOCATION: LOCATION: LEG

## 2023-10-17 ASSESSMENT — PAIN SCALES - GENERAL: PAINLEVEL_OUTOF10: 4

## 2023-10-17 ASSESSMENT — PAIN DESCRIPTION - ORIENTATION: ORIENTATION: RIGHT;LEFT

## 2023-10-17 ASSESSMENT — PAIN DESCRIPTION - DESCRIPTORS: DESCRIPTORS: BURNING;ACHING;SORE

## 2023-10-17 NOTE — WOUND CARE
Discharge Instructions for  [address], [address] [facility]  Phone [phone]   Fax [phone]      NAME:  Gibson Lopez OF BIRTH:  1965  MEDICAL RECORD NUMBER:  [id number]  DATE:  10/17/2023    Return Appointment:   1 week with Adarsh Powell DO    Instructions: Bilateral Lower Legs:  Cleanse intact skin with soap (Padmaja dish soap) & water. Rinse well. Vashe Wound Solution (hypochlorous acid) soak placed on wound bed for a minimum of 60 seconds prior to dressing application. This solution removes pathogens, bioburden, and biofilms from wounds, but is not cytotoxic. Phytoplex antifungal ointment by Medline on skin. Use pieces of Xeroform to cover wounds. May double if needed to prevent from adhering to wound bed when changing. Cover with Exudry or ABD Pads & rolled gauze. Coban wrap with lite tension used to seal against moisture saturating Tubigrips. Change daily. Right medial ankle- may use alginate with silver. Change 3 times per week or more often to control drainage. Please wear Tubigrip stockinet size \"G\" from toes to knees- applies light compression to leg to reduce swelling. May double as tolerated for increased compression. Goal is to reduce circulatory congestion, discomfort,  & resistance to wound closure. May wear 24 hours per day. Wash, dry, & reuse stockinet. Hand wash in bucket. Rinse well. Please increase dietary protein to at least 60 grams per day to support cell rejuvenation. May use supplements such as Ensure Max, Blayne, & Glucerna (samples & coupons provided at first visit). Be sure to spread intake throughout the day for improved absorption. Long-term goal is to order lymphedema pumps to be used one hour twice per day. Wound care supplies, silver Alginate, & fungal ointment ordered using [employer]. Neurontin & Tramadol prescriptions sent to [address] [place] [address].   Please  & take as

## 2023-10-17 NOTE — PROGRESS NOTES
given to patient and signed by patient or POA.          Electronically signed by Elie Maciel DO on 10/17/2023 at 4:22 PM

## 2023-10-17 NOTE — WOUND CARE
Discharge Instructions for  [address] [facility]  [address], [address]  Phone [phone]   Fax [phone]      NAME:  Sujey Quintero OF BIRTH:  1965  MEDICAL RECORD NUMBER:  [id number]  DATE:  10/17/2023    Mr. Maria E Matias needs work accomodation to break uniform code due to medical condition. [facility] is recommended for ordering pants that have ample leg room for dressing changes & comfort. Please permit the use of these trousers. Thank you!     Electronically signed Noel Markham RN on 10/17/2023 at 3:08 PM

## 2023-10-17 NOTE — DISCHARGE INSTRUCTIONS
Sean Gonzalez RN  Registered Nurse  Wound Care     Addendum  Date of Service:  10/17/2023  1:20 PM                                                                                                                       Discharge Instructions for  Mark Gonzales [address], [address] [place] [address]  Phone [phone]   Fax [phone]        NAME:  Fredi Roque OF BIRTH:  1965  MEDICAL RECORD NUMBER:  [id number]  DATE:  10/17/2023     Return Appointment:   1 week with Sallie Iqbal DO     Instructions: Bilateral Lower Legs:  Cleanse intact skin with soap (Padmaja dish soap) & water. Rinse well. Vashe Wound Solution (hypochlorous acid) soak placed on wound bed for a minimum of 60 seconds prior to dressing application. This solution removes pathogens, bioburden, and biofilms from wounds, but is not cytotoxic. Phytoplex antifungal ointment by Medline on skin. Use pieces of Xeroform to cover wounds. May double if needed to prevent from adhering to wound bed when changing. Cover with Exudry or ABD Pads & rolled gauze. Coban wrap with lite tension used to seal against moisture saturating Tubigrips. Change daily. Right medial ankle- may use alginate with silver. Change 3 times per week or more often to control drainage. Please wear Tubigrip stockinet size \"G\" from toes to knees- applies light compression to leg to reduce swelling. May double as tolerated for increased compression. Goal is to reduce circulatory congestion, discomfort,  & resistance to wound closure. May wear 24 hours per day. Wash, dry, & reuse stockinet. Hand wash in bucket. Rinse well. Please increase dietary protein to at least 60 grams per day to support cell rejuvenation. May use supplements such as Ensure Max, Blayne, & Glucerna (samples & coupons provided at first visit). Be sure to spread intake throughout the day for improved absorption.       Long-term goal is to

## 2023-10-23 ENCOUNTER — HOSPITAL ENCOUNTER (OUTPATIENT)
Dept: WOUND CARE | Age: 58
Discharge: HOME | End: 2023-10-23
Payer: COMMERCIAL

## 2023-10-23 VITALS
RESPIRATION RATE: 18 BRPM | WEIGHT: 315 LBS | TEMPERATURE: 97.7 F | DIASTOLIC BLOOD PRESSURE: 76 MMHG | BODY MASS INDEX: 41.75 KG/M2 | HEIGHT: 73 IN | SYSTOLIC BLOOD PRESSURE: 135 MMHG | HEART RATE: 86 BPM

## 2023-10-23 PROCEDURE — 29581 APPL MULTLAYER CMPRN SYS LEG: CPT

## 2023-10-23 NOTE — FLOWSHEET NOTE
Odor Moderate   Ariadne-wound Assessment Denuded   Wound Thickness Description not for Pressure Injury Full thickness   Wound 10/09/23 Pretibial Left; Lower #3   Date First Assessed/Time First Assessed: 10/09/23 1528   Present on Original Admission: Yes  Wound Approximate Age at First Assessment (Weeks): (c)   Primary Wound Type: Venous Ulcer  Location: Pretibial  Wound Location Orientation: Left; Lower  Wound . .. Wound Image     Wound Etiology Venous   Dressing Status Old drainage noted   Wound Cleansed Soap and water   Dressing/Treatment Xeroform   Wound Length (cm) 41 cm   Wound Width (cm) 48 cm   Wound Depth (cm) 0.1 cm   Wound Surface Area (cm^2) 1968 cm^2   Change in Wound Size % (l*w) -52.56   Wound Volume (cm^3) 196.8 cm^3   Wound Healing % -53   Wound Assessment Denuded;Pink/red   Drainage Amount Large (50-75% saturated)   Drainage Description Serous   Odor Moderate   Ariadne-wound Assessment Edematous; Denuded   Wound Thickness Description not for Pressure Injury Full thickness   Pain Assessment   Pain Assessment None - Denies Pain

## 2023-10-23 NOTE — WOUND CARE
Multilayer Compression Wrap   (Not Unna) Below the Knee    NAME:  Marcelo Sero  YOB: 1965  MEDICAL RECORD NUMBER:  [id number]  DATE:  10/23/2023    Multilayer compression wrap: Removed old Multilayer wrap if indicated and wash leg with mild soap/water. Applied moisturizing agent to dry skin as needed. Applied primary and secondary dressing as ordered. Applied multilayered dressing below the knee to right lower leg. Applied multilayered dressing below the knee to left lower leg. Instructed patient/caregiver not to remove dressing and to keep it clean and dry. Instructed patient/caregiver on complications to report to provider, such as pain, numbness in toes, heavy drainage, and slippage of dressing. Instructed patient on purpose of compression dressing and on activity and exercise recommendations.       Electronically signed by Sheree Boston RN on 10/23/2023 at 2:10 PM

## 2023-10-23 NOTE — WOUND CARE
Discharge Instructions for  [address] [facility]  [address], [address] [place]   Phone [phone]   Fax [phone]      NAME:  Rosales Gonzalez OF BIRTH:  1965  MEDICAL RECORD NUMBER:  [id number]  DATE:  10/23/2023    Return Appointment:   1 week with Lisa , DO    Instructions: Bilateral Lower Legs:  Cleanse intact skin with soap (Padmaja dish soap) & water. Rinse well. Vashe Wound Solution (hypochlorous acid) soak placed on wound bed for a minimum of 60 seconds prior to dressing application. This solution removes pathogens, bioburden, and biofilms from wounds, but is not cytotoxic. Phytoplex antifungal ointment by Medline on skin. Use pieces of Xeroform to cover wounds. May double if needed to prevent from adhering to wound bed when changing. Cover with Exudry or ABD Pads & rolled gauze. Coban wrap with lite tension used to seal against moisture saturating Tubigrips. Change daily. Right medial ankle- may use alginate with silver. Change 3 times per week or more often to control drainage. Please wear Tubigrip stockinet size \"G\" from toes to knees- applies light compression to leg to reduce swelling. May double as tolerated for increased compression. Goal is to reduce circulatory congestion, discomfort,  & resistance to wound closure. May wear 24 hours per day. Wash, dry, & reuse stockinet. Hand wash in bucket. Rinse well. Please increase dietary protein to at least 60 grams per day to support cell rejuvenation. May use supplements such as Ensure Max, Blayne, & Glucerna (samples & coupons provided at first visit). Be sure to spread intake throughout the day for improved absorption. Long-term goal is to order lymphedema pumps to be used one hour twice per day. Wound care supplies, silver Alginate, & fungal ointment ordered using [email or web address]. Neurontin & Tramadol prescriptions sent to [address] [place] [address].   Please  & take as

## 2023-10-30 ENCOUNTER — HOSPITAL ENCOUNTER (OUTPATIENT)
Dept: WOUND CARE | Age: 58
Discharge: HOME | End: 2023-10-30
Payer: COMMERCIAL

## 2023-10-30 VITALS
WEIGHT: 315 LBS | SYSTOLIC BLOOD PRESSURE: 143 MMHG | HEIGHT: 73 IN | HEART RATE: 88 BPM | DIASTOLIC BLOOD PRESSURE: 79 MMHG | BODY MASS INDEX: 41.75 KG/M2

## 2023-10-30 PROCEDURE — 29581 APPL MULTLAYER CMPRN SYS LEG: CPT

## 2023-10-30 ASSESSMENT — PAIN SCALES - GENERAL: PAINLEVEL_OUTOF10: 6

## 2023-10-30 NOTE — FLOWSHEET NOTE
10/30/23 1456   Right Leg Edema Point of Measurement   Leg circumference 60 cm   Ankle circumference 34 cm   Foot circumference 32 cm   Compression Therapy Compression ordered   Left Leg Edema Point of Measurement   Leg circumference 58 cm   Ankle circumference 34 cm   Foot circumference 33 cm   Compression Therapy Compression ordered   Wound 10/09/23 Ankle Right;Medial #1   Date First Assessed/Time First Assessed: 10/09/23 1527   Present on Original Admission: Yes  Wound Approximate Age at First Assessment (Weeks): (c)   Location: Ankle  Wound Location Orientation: Right;Medial  Wound Description (Comments): #1   Wound Image    Wound Etiology Venous   Dressing Status Old drainage noted   Wound Cleansed Soap and water   Dressing/Treatment Alginate with Ag   Wound Length (cm) 7 cm   Wound Width (cm) 5 cm   Wound Depth (cm) 0.1 cm   Wound Surface Area (cm^2) 35 cm^2   Change in Wound Size % (l*w) 2.78   Wound Volume (cm^3) 3.5 cm^3   Wound Healing % 51   Wound Assessment Pink/red   Drainage Amount Large (50-75% saturated)   Drainage Description Serous   Odor Moderate   Ariadne-wound Assessment Denuded   Wound Thickness Description not for Pressure Injury Full thickness   Wound 10/09/23 Leg Right; Lower #2   Date First Assessed/Time First Assessed: 10/09/23 1528   Present on Original Admission: Yes  Wound Approximate Age at First Assessment (Weeks): (c)   Primary Wound Type: Venous Ulcer  Location: Leg  Wound Location Orientation: Right; Lower  Wound Descr. ..    Wound Image    Wound Etiology Venous   Dressing Status Old drainage noted   Wound Cleansed Soap and water   Dressing/Treatment ABD   Wound Length (cm) 22 cm   Wound Width (cm) 30 cm   Wound Depth (cm) 0.1 cm   Wound Surface Area (cm^2) 660 cm^2   Change in Wound Size % (l*w) 31.03   Wound Volume (cm^3) 66 cm^3   Wound Healing % 31   Wound Assessment Pink/red;Denuded   Drainage Amount Large (50-75% saturated)   Drainage Description Serous   Odor Moderate

## 2023-10-30 NOTE — WOUND CARE
Discharge Instructions for  [address] [place] [address], [address] [place]   Phone [phone]   Fax [phone]      NAME:  Karen Rivera OF BIRTH:  1965  MEDICAL RECORD NUMBER:  [id number]  DATE:  10/30/2023    Return Appointment:   1 week with Zelalem Logan DO    Instructions: Bilateral Lower Legs:  Cleanse intact skin with soap (Padmaja dish soap) & water. Rinse well. Vashe Wound Solution (hypochlorous acid) soak placed on wound bed for a minimum of 60 seconds prior to dressing application. This solution removes pathogens, bioburden, and biofilms from wounds, but is not cytotoxic. Phytoplex antifungal ointment by Medline on skin. Use pieces of Xeroform to cover wounds. May double if needed to prevent from adhering to wound bed when changing. Cover with Exudry or ABD Pads & rolled gauze. Coban wrap with lite tension used to seal against moisture saturating Tubigrips. Change daily. Right medial ankle- may use alginate with silver. Change 3 times per week or more often to control drainage. Please wear Tubigrip stockinet size \"G\" from toes to knees- applies light compression to leg to reduce swelling. May double as tolerated for increased compression. Goal is to reduce circulatory congestion, discomfort,  & resistance to wound closure. May wear 24 hours per day. Wash, dry, & reuse stockinet. Hand wash in bucket. Rinse well. Please increase dietary protein to at least 60 grams per day to support cell rejuvenation. May use supplements such as Ensure Max, Blayne, & Glucerna (samples & coupons provided at first visit). Be sure to spread intake throughout the day for improved absorption. Long-term goal is to order lymphedema pumps to be used one hour twice per day. Wound care supplies, silver Alginate, & fungal ointment ordered using [employer]. Neurontin & Tramadol prescriptions sent to [address] [place] [address].   Please  & take as

## 2023-10-30 NOTE — DISCHARGE INSTRUCTIONS
Discharge Instructions for  [address] [facility]  [address], [address] [facility]  Phone [phone]   Fax [phone]      NAME:  Isac Perales  YOB: 1965  MEDICAL RECORD NUMBER:  [id number]  DATE:  @ED@    Return Appointment:   1 week with Briseida Garibay DO      Instructions: Bilateral Lower Legs:  Cleanse intact skin with soap (Padmaja dish soap) & water. Rinse well. Vashe Wound Solution (hypochlorous acid) soak placed on wound bed for a minimum of 60 seconds prior to dressing application. This solution removes pathogens, bioburden, and biofilms from wounds, but is not cytotoxic. Phytoplex antifungal ointment by Medline on skin. Use pieces of Xeroform to cover wounds. May double if needed to prevent from adhering to wound bed when changing. Cover with Exudry or ABD Pads & rolled gauze. Coban wrap with lite tension used to seal against moisture saturating Tubigrips. Change daily. Right medial ankle- may use alginate with silver. Change 3 times per week or more often to control drainage. Please wear Tubigrip stockinet size \"G\" from toes to knees- applies light compression to leg to reduce swelling. May double as tolerated for increased compression. Goal is to reduce circulatory congestion, discomfort,  & resistance to wound closure. May wear 24 hours per day. Wash, dry, & reuse stockinet. Hand wash in bucket. Rinse well. Please increase dietary protein to at least 60 grams per day to support cell rejuvenation. May use supplements such as Ensure Max, Blayne, & Glucerna (samples & coupons provided at first visit). Be sure to spread intake throughout the day for improved absorption. Long-term goal is to order lymphedema pumps to be used one hour twice per day. Wound care supplies, silver Alginate, & fungal ointment ordered using [employer]. Neurontin & Tramadol prescriptions sent to [address] [place] [address].   Please  & take as

## 2023-10-30 NOTE — WOUND CARE
Multilayer Compression Wrap   (Not Unna) Below the Knee    NAME:  Francine Angel  YOB: 1965  MEDICAL RECORD NUMBER:  [id number]  DATE:  10/30/2023    Multilayer compression wrap: Removed old Multilayer wrap if indicated and wash leg with mild soap/water. Applied moisturizing agent to dry skin as needed. Applied primary and secondary dressing as ordered. Applied multilayered dressing below the knee to right lower leg. Applied multilayered dressing below the knee to left lower leg. Instructed patient/caregiver not to remove dressing and to keep it clean and dry. Instructed patient/caregiver on complications to report to provider, such as pain, numbness in toes, heavy drainage, and slippage of dressing. Instructed patient on purpose of compression dressing and on activity and exercise recommendations.       Electronically signed by Romero Garrett RN on 10/30/2023 at 3:32 PM

## 2023-11-06 ENCOUNTER — HOSPITAL ENCOUNTER (OUTPATIENT)
Dept: WOUND CARE | Age: 58
Discharge: HOME | End: 2023-11-06
Payer: COMMERCIAL

## 2023-11-06 VITALS
SYSTOLIC BLOOD PRESSURE: 123 MMHG | RESPIRATION RATE: 18 BRPM | BODY MASS INDEX: 41.75 KG/M2 | TEMPERATURE: 98.5 F | HEIGHT: 73 IN | WEIGHT: 315 LBS | HEART RATE: 82 BPM | DIASTOLIC BLOOD PRESSURE: 63 MMHG

## 2023-11-06 DIAGNOSIS — M79.605 PAIN OF LEFT LOWER EXTREMITY: Primary | ICD-10-CM

## 2023-11-06 DIAGNOSIS — I83.003 VENOUS STASIS ULCER OF ANKLE LIMITED TO BREAKDOWN OF SKIN WITH VARICOSE VEINS, UNSPECIFIED LATERALITY (HCC): ICD-10-CM

## 2023-11-06 DIAGNOSIS — L97.301 VENOUS STASIS ULCER OF ANKLE LIMITED TO BREAKDOWN OF SKIN WITH VARICOSE VEINS, UNSPECIFIED LATERALITY (HCC): ICD-10-CM

## 2023-11-06 PROCEDURE — 29581 APPL MULTLAYER CMPRN SYS LEG: CPT

## 2023-11-06 RX ORDER — CLOBETASOL PROPIONATE 0.5 MG/G
OINTMENT TOPICAL ONCE
OUTPATIENT
Start: 2023-11-06 | End: 2023-11-06

## 2023-11-06 RX ORDER — LIDOCAINE HYDROCHLORIDE 20 MG/ML
JELLY TOPICAL ONCE
OUTPATIENT
Start: 2023-11-06 | End: 2023-11-06

## 2023-11-06 RX ORDER — BACITRACIN ZINC AND POLYMYXIN B SULFATE 500; 1000 [USP'U]/G; [USP'U]/G
OINTMENT TOPICAL ONCE
OUTPATIENT
Start: 2023-11-06 | End: 2023-11-06

## 2023-11-06 RX ORDER — BETAMETHASONE DIPROPIONATE 0.05 %
OINTMENT (GRAM) TOPICAL ONCE
OUTPATIENT
Start: 2023-11-06 | End: 2023-11-06

## 2023-11-06 RX ORDER — LIDOCAINE 40 MG/G
CREAM TOPICAL ONCE
OUTPATIENT
Start: 2023-11-06 | End: 2023-11-06

## 2023-11-06 RX ORDER — LIDOCAINE HYDROCHLORIDE 40 MG/ML
SOLUTION TOPICAL ONCE
OUTPATIENT
Start: 2023-11-06 | End: 2023-11-06

## 2023-11-06 RX ORDER — GINSENG 100 MG
CAPSULE ORAL ONCE
OUTPATIENT
Start: 2023-11-06 | End: 2023-11-06

## 2023-11-06 RX ORDER — LIDOCAINE 50 MG/G
OINTMENT TOPICAL ONCE
OUTPATIENT
Start: 2023-11-06 | End: 2023-11-06

## 2023-11-06 RX ORDER — GENTAMICIN SULFATE 1 MG/G
OINTMENT TOPICAL ONCE
OUTPATIENT
Start: 2023-11-06 | End: 2023-11-06

## 2023-11-06 RX ORDER — SODIUM CHLOR/HYPOCHLOROUS ACID 0.033 %
SOLUTION, IRRIGATION IRRIGATION ONCE
OUTPATIENT
Start: 2023-11-06 | End: 2023-11-06

## 2023-11-06 RX ORDER — TRIAMCINOLONE ACETONIDE 1 MG/G
OINTMENT TOPICAL ONCE
OUTPATIENT
Start: 2023-11-06 | End: 2023-11-06

## 2023-11-06 RX ORDER — IBUPROFEN 200 MG
TABLET ORAL ONCE
OUTPATIENT
Start: 2023-11-06 | End: 2023-11-06

## 2023-11-06 ASSESSMENT — PAIN SCALES - GENERAL: PAINLEVEL_OUTOF10: 3

## 2023-11-06 ASSESSMENT — PAIN DESCRIPTION - LOCATION: LOCATION: LEG

## 2023-11-06 ASSESSMENT — PAIN DESCRIPTION - ORIENTATION: ORIENTATION: LEFT;RIGHT

## 2023-11-06 NOTE — DISCHARGE INSTRUCTIONS
Bilateral Lower Legs:  Cleanse intact skin with soap (Padmaja dish soap) & water. Rinse well. Vashe Wound Solution (hypochlorous acid) soak placed on wound bed for a minimum of 60 seconds prior to dressing application. This solution removes pathogens, bioburden, and biofilms from wounds, but is not cytotoxic. Phytoplex antifungal ointment by Medline on skin. Use pieces of Xeroform to cover wounds. May double if needed to prevent from adhering to wound bed when changing. Cover with Exudry or ABD Pads & rolled gauze. Coban wrap with lite tension used to seal against moisture saturating Tubigrips. Change daily. Right medial ankle- may use alginate with silver. Change 3 times per week or more often to control drainage. Please wear Tubigrip stockinet size \"G\" from toes to knees- applies light compression to leg to reduce swelling. May double as tolerated for increased compression. Goal is to reduce circulatory congestion, discomfort,  & resistance to wound closure. May wear 24 hours per day. Wash, dry, & reuse stockinet. Hand wash in bucket. Rinse well. Please increase dietary protein to at least 60 grams per day to support cell rejuvenation. May use supplements such as Ensure Max, Blayne, & Glucerna (samples & coupons provided at first visit). Be sure to spread intake throughout the day for improved absorption. Long-term goal is to order lymphedema pumps to be used one hour twice per day. Wound care supplies, silver Alginate, & fungal ointment ordered using [employer].

## 2023-11-06 NOTE — FLOWSHEET NOTE
Healing % 31   Wound Assessment Slough;Pink/red   Drainage Amount Copious (>75 % saturated)   Drainage Description Serosanguinous   Odor None   Ariadne-wound Assessment Blanchable erythema;Edematous; Maceration   Margins Attached edges   Wound Thickness Description not for Pressure Injury Full thickness   Wound 10/09/23 Pretibial Left; Lower #3   Date First Assessed/Time First Assessed: 10/09/23 1528   Present on Original Admission: Yes  Wound Approximate Age at First Assessment (Weeks): (c)   Primary Wound Type: Venous Ulcer  Location: Pretibial  Wound Location Orientation: Left; Lower  Wound . .. Wound Image    Wound Etiology Venous   Dressing Status Old drainage noted   Wound Cleansed Cleansed with saline; Soap and water;Vashe   Dressing/Treatment Xeroform   Wound Length (cm) 42 cm   Wound Width (cm) 48 cm   Wound Depth (cm) 0.1 cm   Wound Surface Area (cm^2) 2016 cm^2   Change in Wound Size % (l*w) -56.28   Wound Volume (cm^3) 201.6 cm^3   Wound Healing % -56   Wound Assessment Slough;Pink/red   Drainage Amount Copious (>75 % saturated)   Drainage Description Serosanguinous   Odor Moderate   Ariadne-wound Assessment Blanchable erythema;Edematous; Maceration   Margins Attached edges   Wound Thickness Description not for Pressure Injury Full thickness   Pain Assessment   Pain Assessment 0-10   Pain Level 3   Patient's Stated Pain Goal 0 - No pain   Pain Location Leg   Pain Orientation Left;Right       Wounds mechanically debrided with gauze and normal saline.

## 2023-11-06 NOTE — WOUND CARE
Discharge Instructions for  [address], [address]  Phone [phone]   Fax [phone]      NAME:  Talha Love OF BIRTH:  1965  MEDICAL RECORD NUMBER:  [id number]  DATE:  11/6/2023    Return Appointment:   2 weeks with Miguelangel Villalba DO      Instructions:   Bilateral Lower Legs:  Cleanse intact skin with soap (Padmaja dish soap) & water. Rinse well. Vashe Wound Solution (hypochlorous acid) soak placed on wound bed for a minimum of 60 seconds prior to dressing application. This solution removes pathogens, bioburden, and biofilms from wounds, but is not cytotoxic. Phytoplex antifungal ointment by Medline on skin. Use pieces of Xeroform to cover wounds. May double if needed to prevent from adhering to wound bed when changing. Cover with Exudry or ABD Pads & rolled gauze. Coban wrap with lite tension used to seal against moisture saturating Tubigrips. Change daily. Right medial ankle- may use alginate with silver. Change 3 times per week or more often to control drainage. Please wear Tubigrip stockinet size \"G\" from toes to knees- applies light compression to leg to reduce swelling. May double as tolerated for increased compression. Goal is to reduce circulatory congestion, discomfort,  & resistance to wound closure. May wear 24 hours per day. Wash, dry, & reuse stockinet. Hand wash in bucket. Rinse well. Please increase dietary protein to at least 60 grams per day to support cell rejuvenation. May use supplements such as Ensure Max, Blayne, & Glucerna (samples & coupons provided at first visit). Be sure to spread intake throughout the day for improved absorption. Long-term goal is to order lymphedema pumps to be used one hour twice per day. Wound care supplies, silver Alginate, & fungal ointment ordered using [employer].         Should you experience increased redness, swelling, pain, foul odor, size of

## 2023-11-06 NOTE — WOUND CARE
Multilayer Compression Wrap   (Not Unna) Below the Knee    NAME:  Michelle Contreras  YOB: 1965  MEDICAL RECORD NUMBER:  [id number]  DATE:  11/6/2023    Multilayer compression wrap: Removed old Multilayer wrap if indicated and wash leg with mild soap/water. Applied moisturizing agent to dry skin as needed. Applied primary and secondary dressing as ordered. Applied multilayered dressing below the knee to right lower leg. Applied multilayered dressing below the knee to left lower leg. Instructed patient/caregiver not to remove dressing and to keep it clean and dry. Instructed patient/caregiver on complications to report to provider, such as pain, numbness in toes, heavy drainage, and slippage of dressing. Instructed patient on purpose of compression dressing and on activity and exercise recommendations. Electronically signed by Janet Castrejon.  Ricky Arellano on 11/6/2023 at 3:19 PM

## 2023-11-28 ENCOUNTER — HOSPITAL ENCOUNTER (OUTPATIENT)
Dept: WOUND CARE | Age: 58
Discharge: HOME | End: 2023-11-28
Payer: COMMERCIAL

## 2023-11-28 VITALS
TEMPERATURE: 97.9 F | DIASTOLIC BLOOD PRESSURE: 61 MMHG | RESPIRATION RATE: 18 BRPM | SYSTOLIC BLOOD PRESSURE: 125 MMHG | WEIGHT: 315 LBS | BODY MASS INDEX: 41.75 KG/M2 | HEART RATE: 74 BPM | HEIGHT: 73 IN

## 2023-11-28 PROCEDURE — 99214 OFFICE O/P EST MOD 30 MIN: CPT

## 2023-11-28 NOTE — FLOWSHEET NOTE
Description Serous   Odor Moderate   Ariadne-wound Assessment Maceration   Wound Thickness Description not for Pressure Injury Full thickness   Wound 10/09/23 Ankle Right;Medial #1   Date First Assessed/Time First Assessed: 10/09/23 1527   Present on Original Admission: Yes  Wound Approximate Age at First Assessment (Weeks): (c)   Location: Ankle  Wound Location Orientation: Right;Medial  Wound Description (Comments): #1   Wound Etiology Venous   Dressing Status Old drainage noted   Wound Cleansed Cleansed with saline   Dressing/Treatment Alginate with Ag   Wound Length (cm) 7 cm   Wound Width (cm) 5 cm   Wound Depth (cm) 0.1 cm   Wound Surface Area (cm^2) 35 cm^2   Change in Wound Size % (l*w) 2.78   Wound Volume (cm^3) 3.5 cm^3   Wound Healing % 51   Wound Assessment Bentley/red;Slough   Drainage Amount Copious (>75 % saturated)   Drainage Description Serous   Odor Moderate   Ariadne-wound Assessment Maceration   Wound Thickness Description not for Pressure Injury Full thickness   Pain Assessment   Pain Assessment None - Denies Pain

## 2023-11-29 NOTE — WOUND CARE
Discharge Instructions for  [address]  [address], [address] [place] [address]  Phone [phone]   Fax [phone]      NAME:  Derrick Taylor OF BIRTH:  1965  MEDICAL RECORD NUMBER:  [id number]  DATE:  11/28/2023    Return Appointment:   1 week with Raymond Nissen, DO      Instructions: Bilateral lower legs:  Wash legs with blue fausto dish soap. Apply silver alginate to right medial ankle. Cover rest of legs with absorbent pads. Wrap with kerlix and secure with snug fit ACE wrap from toes to knees. Change daily or as needed if draining through wrap. Elevate legs when sitting. Avoid prolonged standing or sitting with legs in dependent position. Be cautious of increased salt intake as this promotes fluid retention and swelling. Increase protein intake to promote wound healing. Blayne and Ensure are examples of protein supplements. Should you experience increased redness, swelling, pain, foul odor, size of wound(s), or have a temperature over 101 degrees please contact the [address] at [phone] or if after hours contact your primary care physician or go to the hospital emergency department. PLEASE NOTE: IF YOU ARE UNABLE TO OBTAIN WOUND SUPPLIES, CONTINUE TO USE THE SUPPLIES YOU HAVE AVAILABLE UNTIL YOU ARE ABLE TO REACH US. IT IS MOST IMPORTANT TO KEEP THE WOUND COVERED AT ALL TIMES.     Electronically signed Alban Gomez RN on 11/29/2023 at 8:10 AM

## 2023-12-05 RX ORDER — AMOXICILLIN AND CLAVULANATE POTASSIUM 875; 125 MG/1; MG/1
1 TABLET, FILM COATED ORAL 2 TIMES DAILY
Qty: 20 TABLET | Refills: 0 | Status: SHIPPED | OUTPATIENT
Start: 2023-12-05 | End: 2023-12-15

## 2023-12-11 ENCOUNTER — HOSPITAL ENCOUNTER (OUTPATIENT)
Dept: WOUND CARE | Age: 58
Discharge: HOME | End: 2023-12-11
Payer: COMMERCIAL

## 2023-12-11 VITALS
WEIGHT: 315 LBS | TEMPERATURE: 97.9 F | SYSTOLIC BLOOD PRESSURE: 136 MMHG | DIASTOLIC BLOOD PRESSURE: 80 MMHG | BODY MASS INDEX: 41.75 KG/M2 | HEART RATE: 85 BPM | HEIGHT: 73 IN

## 2023-12-11 PROCEDURE — 99213 OFFICE O/P EST LOW 20 MIN: CPT | Performed by: FAMILY MEDICINE

## 2023-12-11 PROCEDURE — 29581 APPL MULTLAYER CMPRN SYS LEG: CPT

## 2023-12-11 RX ORDER — AMOXICILLIN AND CLAVULANATE POTASSIUM 875; 125 MG/1; MG/1
1 TABLET, FILM COATED ORAL 2 TIMES DAILY
Qty: 20 TABLET | Refills: 0 | Status: SHIPPED | OUTPATIENT
Start: 2023-12-11 | End: 2023-12-21

## 2023-12-11 NOTE — FLOWSHEET NOTE
Serous   Odor None   Ariadne-wound Assessment Maceration   Wound Thickness Description not for Pressure Injury Full thickness   Wound 10/09/23 Pretibial Left; Lower #3   Date First Assessed/Time First Assessed: 10/09/23 1528   Present on Original Admission: Yes  Wound Approximate Age at First Assessment (Weeks): (c)   Primary Wound Type: Venous Ulcer  Location: Pretibial  Wound Location Orientation: Left; Lower  Wound . ..    Wound Image    Wound Etiology Venous   Dressing Status Old drainage noted   Wound Cleansed Soap and water   Dressing/Treatment Xeroform   Wound Length (cm) 42 cm   Wound Width (cm) 48.5 cm   Wound Depth (cm) 0.1 cm   Wound Surface Area (cm^2) 2037 cm^2   Change in Wound Size % (l*w) -57.91   Wound Volume (cm^3) 203.7 cm^3   Wound Healing % -58   Wound Assessment Pink/red   Drainage Amount Large (50-75% saturated)   Drainage Description Serous   Odor Moderate   Ariadne-wound Assessment Maceration   Wound Thickness Description not for Pressure Injury Full thickness

## 2023-12-11 NOTE — WOUND CARE
Multilayer Compression Wrap   (Not Unna) Below the Knee    NAME:  Samaria Martines  YOB: 1965  MEDICAL RECORD NUMBER:  [id number]  DATE:  12/11/2023    Multilayer compression wrap: Removed old Multilayer wrap if indicated and wash leg with mild soap/water. Applied moisturizing agent to dry skin as needed. Applied primary and secondary dressing as ordered. Applied multilayered dressing below the knee to right lower leg. Applied multilayered dressing below the knee to left lower leg. Instructed patient/caregiver not to remove dressing and to keep it clean and dry. Instructed patient/caregiver on complications to report to provider, such as pain, numbness in toes, heavy drainage, and slippage of dressing. Instructed patient on purpose of compression dressing and on activity and exercise recommendations.       Electronically signed by Sandy Palmer RN on 12/11/2023 at 2:21 PM

## 2023-12-11 NOTE — PROGRESS NOTES
[facility]   Medical Staff Progress Note     [address] [facility] RECORD NUMBER:  [id number]  AGE: 60 y.o. GENDER: male  : 1965  EPISODE DATE:  2023    Chief complaint and reason for visit:     Bilateral leg ulcers and edema with weeping     Patient presenting for follow up evaluation of wound(s) per chief complaint. Subjective and ROS: Symptoms, wound related issues, or other pertinent wound history since last visit: no new wound care issues. Tolerating current treatment. No systemic complaints above baseline. History of Wound Context: Per original history and physical on this patient. Changes in history since last evaluation: none    Medical Decision Making:     Problem List Items Addressed This Visit    None      Wounds and Treatment Plan:         Return Appointment:   1 week with Rachid Salgado DO        Instructions: Bilateral lower legs:  Wash legs with blue fausto dish soap. Apply silver alginate to right medial ankle. Cover rest of legs with absorbent pads. Wrap with kerlix and secure with snug fit ACE wrap from toes to knees. Change daily or as needed if draining through wrap. Elevate legs when sitting. Avoid prolonged standing or sitting with legs in dependent position. Be cautious of increased salt intake as this promotes fluid retention and swelling. Increase protein intake to promote wound healing. Blayne and Ensure are examples of protein supplements. Refill on antibiotics sent  Other associated diagnoses or problems addressed:  Lower extremity edema    Pertinent imaging reviewed including independent interpretation include:  None    Pertinent labs reviewed. Medical records and review of external note (s) from other providers done as well.     New lab or imaging orders placed:  None     Prescription drug management: N/A     Discussion of management or test interpretation with

## 2023-12-11 NOTE — WOUND CARE
Discharge Instructions for  [facility]  [address], [address] [place] [address]  Phone [phone]   Fax [phone]      NAME:  Rocky Garcia OF BIRTH:  1965  MEDICAL RECORD NUMBER:  [id number]  DATE:  12/11/2023    Return Appointment:   1 week with Jose Alberto Barber DO      Instructions: Bilateral lower legs:  Wash legs with blue fausto dish soap. Apply silver alginate to right medial ankle. Cover rest of legs with absorbent pads. Wrap with kerlix and secure with snug fit ACE wrap from toes to knees. Change daily or as needed if draining through wrap. Elevate legs when sitting. Avoid prolonged standing or sitting with legs in dependent position. Be cautious of increased salt intake as this promotes fluid retention and swelling. Increase protein intake to promote wound healing. Blayne and Ensure are examples of protein supplements. Refill on antibiotics sent    Should you experience increased redness, swelling, pain, foul odor, size of wound(s), or have a temperature over 101 degrees please contact the [facility] [address] at [phone] or if after hours contact your primary care physician or go to the hospital emergency department. PLEASE NOTE: IF YOU ARE UNABLE TO OBTAIN WOUND SUPPLIES, CONTINUE TO USE THE SUPPLIES YOU HAVE AVAILABLE UNTIL YOU ARE ABLE TO REACH US. IT IS MOST IMPORTANT TO KEEP THE WOUND COVERED AT ALL TIMES.     Electronically signed Lucero Love RN on 12/11/2023 at 2:21 PM

## 2024-01-08 ENCOUNTER — HOSPITAL ENCOUNTER (OUTPATIENT)
Dept: WOUND CARE | Age: 59
Discharge: HOME | End: 2024-01-08
Payer: COMMERCIAL

## 2024-01-08 VITALS
HEIGHT: 73 IN | DIASTOLIC BLOOD PRESSURE: 81 MMHG | SYSTOLIC BLOOD PRESSURE: 170 MMHG | BODY MASS INDEX: 41.75 KG/M2 | HEART RATE: 85 BPM | RESPIRATION RATE: 20 BRPM | TEMPERATURE: 98.9 F | WEIGHT: 315 LBS

## 2024-01-08 DIAGNOSIS — I83.003 VENOUS STASIS ULCER OF ANKLE LIMITED TO BREAKDOWN OF SKIN WITH VARICOSE VEINS, UNSPECIFIED LATERALITY (HCC): Primary | ICD-10-CM

## 2024-01-08 DIAGNOSIS — L97.301 VENOUS STASIS ULCER OF ANKLE LIMITED TO BREAKDOWN OF SKIN WITH VARICOSE VEINS, UNSPECIFIED LATERALITY (HCC): Primary | ICD-10-CM

## 2024-01-08 DIAGNOSIS — M79.605 PAIN OF LEFT LOWER EXTREMITY: ICD-10-CM

## 2024-01-08 PROCEDURE — 29581 APPL MULTLAYER CMPRN SYS LEG: CPT

## 2024-01-08 PROCEDURE — 99213 OFFICE O/P EST LOW 20 MIN: CPT | Performed by: FAMILY MEDICINE

## 2024-01-08 RX ORDER — LIDOCAINE 40 MG/G
CREAM TOPICAL ONCE
OUTPATIENT
Start: 2024-01-08 | End: 2024-01-08

## 2024-01-08 RX ORDER — LIDOCAINE HYDROCHLORIDE 20 MG/ML
JELLY TOPICAL ONCE
OUTPATIENT
Start: 2024-01-08 | End: 2024-01-08

## 2024-01-08 RX ORDER — GINSENG 100 MG
CAPSULE ORAL ONCE
OUTPATIENT
Start: 2024-01-08 | End: 2024-01-08

## 2024-01-08 RX ORDER — BACITRACIN ZINC AND POLYMYXIN B SULFATE 500; 1000 [USP'U]/G; [USP'U]/G
OINTMENT TOPICAL ONCE
OUTPATIENT
Start: 2024-01-08 | End: 2024-01-08

## 2024-01-08 RX ORDER — BETAMETHASONE DIPROPIONATE 0.05 %
OINTMENT (GRAM) TOPICAL ONCE
OUTPATIENT
Start: 2024-01-08 | End: 2024-01-08

## 2024-01-08 RX ORDER — IBUPROFEN 200 MG
TABLET ORAL ONCE
OUTPATIENT
Start: 2024-01-08 | End: 2024-01-08

## 2024-01-08 RX ORDER — SODIUM CHLOR/HYPOCHLOROUS ACID 0.033 %
SOLUTION, IRRIGATION IRRIGATION ONCE
OUTPATIENT
Start: 2024-01-08 | End: 2024-01-08

## 2024-01-08 RX ORDER — TRIAMCINOLONE ACETONIDE 1 MG/G
OINTMENT TOPICAL ONCE
OUTPATIENT
Start: 2024-01-08 | End: 2024-01-08

## 2024-01-08 RX ORDER — CLOBETASOL PROPIONATE 0.5 MG/G
OINTMENT TOPICAL ONCE
OUTPATIENT
Start: 2024-01-08 | End: 2024-01-08

## 2024-01-08 RX ORDER — GENTAMICIN SULFATE 1 MG/G
OINTMENT TOPICAL ONCE
OUTPATIENT
Start: 2024-01-08 | End: 2024-01-08

## 2024-01-08 RX ORDER — LIDOCAINE HYDROCHLORIDE 40 MG/ML
SOLUTION TOPICAL ONCE
OUTPATIENT
Start: 2024-01-08 | End: 2024-01-08

## 2024-01-08 RX ORDER — LIDOCAINE 50 MG/G
OINTMENT TOPICAL ONCE
OUTPATIENT
Start: 2024-01-08 | End: 2024-01-08

## 2024-01-08 RX ORDER — METRONIDAZOLE 500 MG/1
500 TABLET ORAL 3 TIMES DAILY
Qty: 42 TABLET | Refills: 0 | Status: SHIPPED | OUTPATIENT
Start: 2024-01-08 | End: 2024-01-22

## 2024-01-08 RX ORDER — LIDOCAINE HYDROCHLORIDE 20 MG/ML
JELLY TOPICAL ONCE
Status: DISCONTINUED | OUTPATIENT
Start: 2024-01-08 | End: 2024-01-09 | Stop reason: HOSPADM

## 2024-01-08 ASSESSMENT — PAIN DESCRIPTION - ORIENTATION: ORIENTATION: RIGHT

## 2024-01-08 ASSESSMENT — PAIN SCALES - GENERAL: PAINLEVEL_OUTOF10: 3

## 2024-01-08 ASSESSMENT — PAIN DESCRIPTION - LOCATION: LOCATION: ANKLE

## 2024-01-08 ASSESSMENT — PAIN DESCRIPTION - DESCRIPTORS: DESCRIPTORS: THROBBING

## 2024-01-08 NOTE — WOUND CARE
Discharge Instructions for  [facility]  [address] [place] [address]  [address] [place] [address]  Phone [phone]   Fax [phone]      NAME:  Jozef Felix  YOB: 1965  MEDICAL RECORD NUMBER:  [id number]  DATE:  1/8/2024    Return Appointment:   2 weeks with Alfa Edouard DO      Instructions: Bilateral lower legs:  Wash legs with blue fausto dish soap.  Apply silver alginate to right medial ankle.  Cover rest of legs with absorbent pads.  Wrap with kerlix and secure with snug fit ACE wrap from toes to knees.  Change daily or as needed if draining through wrap.    Elevate legs when sitting.  Avoid prolonged standing or sitting with legs in dependent position.  Be cautious of increased salt intake as this promotes fluid retention and swelling.  Increase protein intake to promote wound healing. Blayne and Ensure are examples of protein supplements.    Prescription for Antibiotic (Flagyl) sent today.   at Pharmacy and start  Taking.    Should you experience increased redness, swelling, pain, foul odor, size of wound(s), or have a temperature over 101 degrees please contact the [facility] at [phone] or if after hours contact your primary care physician or go to the hospital emergency department.    PLEASE NOTE: IF YOU ARE UNABLE TO OBTAIN WOUND SUPPLIES, CONTINUE TO USE THE SUPPLIES YOU HAVE AVAILABLE UNTIL YOU ARE ABLE TO REACH US. IT IS MOST IMPORTANT TO KEEP THE WOUND COVERED AT ALL TIMES.    Electronically signed Namita Pal RN on 1/8/2024 at 3:18 PM

## 2024-01-08 NOTE — FLOWSHEET NOTE
01/08/24 1431   Right Leg Edema Point of Measurement   Leg circumference 56 cm   Ankle circumference 31 cm   Foot circumference 31 cm   Compression Therapy Ace wrap   Left Leg Edema Point of Measurement   Leg circumference 56 cm   Ankle circumference 28 cm   Foot circumference 31 cm   Compression Therapy Ace wrap   Wound 10/09/23 Ankle Right;Medial #1   Date First Assessed/Time First Assessed: 10/09/23 1527   Present on Original Admission: Yes  Wound Approximate Age at First Assessment (Weeks): (c)   Location: Ankle  Wound Location Orientation: Right;Medial  Wound Description (Comments): #1   Wound Image    Wound Etiology Venous   Dressing Status Old drainage noted   Wound Cleansed Cleansed with saline;Soap and water;Vashe   Dressing/Treatment Alginate with Ag   Wound Length (cm) 9 cm   Wound Width (cm) 10 cm   Wound Depth (cm) 0.1 cm   Wound Surface Area (cm^2) 90 cm^2   Change in Wound Size % (l*w) -150   Wound Volume (cm^3) 9 cm^3   Wound Healing % -25   Wound Assessment Eschar moist   Drainage Amount Copious (>75 % saturated)   Drainage Description Serosanguinous   Odor Malodorous/putrid   Ariadne-wound Assessment Maceration   Wound Thickness Description not for Pressure Injury Full thickness   Wound 10/09/23 Leg Right;Lower #2   Date First Assessed/Time First Assessed: 10/09/23 1528   Present on Original Admission: Yes  Wound Approximate Age at First Assessment (Weeks): (c)   Primary Wound Type: Venous Ulcer  Location: Leg  Wound Location Orientation: Right;Lower  Wound Descr...   Wound Image    Wound Etiology Venous   Dressing Status Old drainage noted   Wound Cleansed Soap and water;Vashe   Dressing/Treatment Xeroform   Wound Length (cm) 31 cm   Wound Width (cm) 52 cm   Wound Depth (cm) 0.1 cm   Wound Surface Area (cm^2) 1612 cm^2   Change in Wound Size % (l*w) -68.44   Wound Volume (cm^3) 161.2 cm^3   Wound Healing % -68   Wound Assessment Pink/red   Drainage Amount Large (50-75% saturated)   Drainage

## 2024-01-08 NOTE — PROGRESS NOTES
lower extremity edema and drainage.  Wounds to right ankle.  Areas are slightly better.  Still massive.  See photos attached.  Head: Normocephalic and atraumatic  Eyes: Extraocular eye movements intact, conjunctivae normal, and sclera anicteric  ENT: Hearing grossly normal bilaterally. Normal appearance  Respiratory: no chest wall tenderness. no respiratory distress  GI: Abdomen non-tender and benign  Musculoskeletal: Baseline range of motion in joints. Nontender calves. No cyanosis. Edema 3+.  Neurologic: Speech normal. At baseline without new focal deficits. Mental status normal or at baseline    PAST MEDICAL HISTORY        Diagnosis Date    Anxiety     Arrhythmia     a-fib    Cellulitis of leg 2019    Erectile dysfunction 2014    EHSAN (generalized anxiety disorder) 2014    GERD (gastroesophageal reflux disease)     Gout 2014    History of peptic ulcer     Hypertension     Nausea & vomiting     Obesity, morbid (HCC) 2018    Osteoarthritis of hand, primary localized 2014    Personal history of urinary calculi     x2    Venous (peripheral) insufficiency 12/3/2019       PAST SURGICAL HISTORY    Past Surgical History:   Procedure Laterality Date    VASCULAR SURGERY Bilateral 2020    Bilateral iliac venogram, intravascular ultrasound x2.    WISDOM TOOTH EXTRACTION         FAMILY HISTORY    History reviewed. No pertinent family history.    SOCIAL HISTORY    Social History     Tobacco Use    Smoking status: Former     Current packs/day: 0.00     Types: Cigarettes     Quit date: 2011     Years since quittin.4    Smokeless tobacco: Never    Tobacco comments:     Quit smoking: quit in the 80's ; maintains non-smoking status   Substance Use Topics    Alcohol use: No    Drug use: No       ALLERGIES    Allergies   Allergen Reactions    Codeine Nausea Only    Sulfa Antibiotics Rash       MEDICATIONS    Current Outpatient Medications on File Prior to Encounter   Medication Sig

## 2024-01-08 NOTE — WOUND CARE
Multilayer Compression Wrap   (Not Unna) Below the Knee    NAME:  Jozef Felix  YOB: 1965  MEDICAL RECORD NUMBER:  [id number]  DATE:  1/8/2024    Multilayer compression wrap: Removed old Multilayer wrap if indicated and wash leg with mild soap/water.  Applied moisturizing agent to dry skin as needed.   Applied primary and secondary dressing as ordered.  Applied multilayered dressing below the knee to right lower leg.  Applied multilayered dressing below the knee to left lower leg.  Instructed patient/caregiver not to remove dressing and to keep it clean and dry.   Instructed patient/caregiver on complications to report to provider, such as pain, numbness in toes, heavy drainage, and slippage of dressing.  Instructed patient on purpose of compression dressing and on activity and exercise recommendations.      Electronically signed by Namita Pal RN on 1/8/2024 at 3:52 PM

## 2024-01-22 ENCOUNTER — HOSPITAL ENCOUNTER (OUTPATIENT)
Dept: WOUND CARE | Age: 59
Discharge: HOME | End: 2024-01-22
Payer: COMMERCIAL

## 2024-01-22 VITALS — HEIGHT: 73 IN | WEIGHT: 315 LBS | BODY MASS INDEX: 41.75 KG/M2

## 2024-01-22 DIAGNOSIS — L97.301 VENOUS STASIS ULCER OF ANKLE LIMITED TO BREAKDOWN OF SKIN WITH VARICOSE VEINS, UNSPECIFIED LATERALITY (HCC): Primary | ICD-10-CM

## 2024-01-22 DIAGNOSIS — I83.003 VENOUS STASIS ULCER OF ANKLE LIMITED TO BREAKDOWN OF SKIN WITH VARICOSE VEINS, UNSPECIFIED LATERALITY (HCC): Primary | ICD-10-CM

## 2024-01-22 DIAGNOSIS — M79.605 PAIN OF LEFT LOWER EXTREMITY: ICD-10-CM

## 2024-01-22 PROCEDURE — 29581 APPL MULTLAYER CMPRN SYS LEG: CPT

## 2024-01-22 RX ORDER — LIDOCAINE HYDROCHLORIDE 40 MG/ML
SOLUTION TOPICAL ONCE
OUTPATIENT
Start: 2024-01-22 | End: 2024-01-22

## 2024-01-22 RX ORDER — LIDOCAINE 50 MG/G
OINTMENT TOPICAL ONCE
OUTPATIENT
Start: 2024-01-22 | End: 2024-01-22

## 2024-01-22 RX ORDER — SODIUM CHLOR/HYPOCHLOROUS ACID 0.033 %
SOLUTION, IRRIGATION IRRIGATION ONCE
OUTPATIENT
Start: 2024-01-22 | End: 2024-01-22

## 2024-01-22 RX ORDER — BETAMETHASONE DIPROPIONATE 0.05 %
OINTMENT (GRAM) TOPICAL ONCE
OUTPATIENT
Start: 2024-01-22 | End: 2024-01-22

## 2024-01-22 RX ORDER — GENTAMICIN SULFATE 1 MG/G
OINTMENT TOPICAL ONCE
OUTPATIENT
Start: 2024-01-22 | End: 2024-01-22

## 2024-01-22 RX ORDER — LIDOCAINE 40 MG/G
CREAM TOPICAL ONCE
OUTPATIENT
Start: 2024-01-22 | End: 2024-01-22

## 2024-01-22 RX ORDER — GINSENG 100 MG
CAPSULE ORAL ONCE
OUTPATIENT
Start: 2024-01-22 | End: 2024-01-22

## 2024-01-22 RX ORDER — LIDOCAINE HYDROCHLORIDE 20 MG/ML
JELLY TOPICAL ONCE
OUTPATIENT
Start: 2024-01-22 | End: 2024-01-22

## 2024-01-22 RX ORDER — CLOBETASOL PROPIONATE 0.5 MG/G
OINTMENT TOPICAL ONCE
OUTPATIENT
Start: 2024-01-22 | End: 2024-01-22

## 2024-01-22 RX ORDER — TRIAMCINOLONE ACETONIDE 1 MG/G
OINTMENT TOPICAL ONCE
OUTPATIENT
Start: 2024-01-22 | End: 2024-01-22

## 2024-01-22 RX ORDER — BACITRACIN ZINC AND POLYMYXIN B SULFATE 500; 1000 [USP'U]/G; [USP'U]/G
OINTMENT TOPICAL ONCE
OUTPATIENT
Start: 2024-01-22 | End: 2024-01-22

## 2024-01-22 RX ORDER — IBUPROFEN 200 MG
TABLET ORAL ONCE
OUTPATIENT
Start: 2024-01-22 | End: 2024-01-22

## 2024-01-22 NOTE — WOUND CARE
Discharge Instructions for  [facility]  [address] [place] [address]  [address] [place] [address]  Phone [phone]   Fax [phone]      NAME:  Jozef Felix  YOB: 1965  MEDICAL RECORD NUMBER:  [id number]  DATE:  1/22/2024    Return Appointment:   1 week with Alfa Edouard DO      Instructions: Bilateral lower legs:  Wash legs with blue fausto dish soap.  Apply silver alginate to right medial ankle.  Cover rest of legs with absorbent pads.  Wrap with kerlix and secure with snug fit ACE wrap from toes to knees.  Change daily or as needed if draining through wrap.    Elevate legs when sitting.  Avoid prolonged standing or sitting with legs in dependent position.  Be cautious of increased salt intake as this promotes fluid retention and swelling.  Increase protein intake to promote wound healing. Blayne and Ensure are examples of protein supplements.    Continue antibiotic as prescribed.     Should you experience increased redness, swelling, pain, foul odor, size of wound(s), or have a temperature over 101 degrees please contact the [facility] Center at [phone] or if after hours contact your primary care physician or go to the hospital emergency department.    PLEASE NOTE: IF YOU ARE UNABLE TO OBTAIN WOUND SUPPLIES, CONTINUE TO USE THE SUPPLIES YOU HAVE AVAILABLE UNTIL YOU ARE ABLE TO REACH US. IT IS MOST IMPORTANT TO KEEP THE WOUND COVERED AT ALL TIMES.    Electronically signed Sadie Bedolla RN on 1/22/2024 at 3:25 PM

## 2024-01-22 NOTE — WOUND CARE
Multilayer Compression Wrap   (Not Unna) Below the Knee    NAME:  Jozef Felix  YOB: 1965  MEDICAL RECORD NUMBER:  [id number]  DATE:  1/22/2024    Multilayer compression wrap: Removed old Multilayer wrap if indicated and wash leg with mild soap/water.  Applied primary and secondary dressing as ordered.  Applied multilayered dressing below the knee to right lower leg.  Applied multilayered dressing below the knee to left lower leg.  Instructed patient/caregiver not to remove dressing and to keep it clean and dry.   Instructed patient/caregiver on complications to report to provider, such as pain, numbness in toes, heavy drainage, and slippage of dressing.  Instructed patient on purpose of compression dressing and on activity and exercise recommendations.      Electronically signed by Sadie Bedolla RN on 1/22/2024 at 3:53 PM

## 2024-01-22 NOTE — FLOWSHEET NOTE
01/22/24 1501   Right Leg Edema Point of Measurement   Leg circumference 56 cm   Ankle circumference 30.5 cm   Foot circumference 31.5 cm   Compression Therapy Ace wrap   Left Leg Edema Point of Measurement   Leg circumference 53 cm   Ankle circumference 29 cm   Foot circumference 30.5 cm   Compression Therapy Ace wrap   Wound 10/09/23 Ankle Right;Medial #1   Date First Assessed/Time First Assessed: 10/09/23 1527   Present on Original Admission: Yes  Wound Approximate Age at First Assessment (Weeks): (c)   Location: Ankle  Wound Location Orientation: Right;Medial  Wound Description (Comments): #1   Wound Image    Wound Etiology Venous   Dressing Status Old drainage noted   Wound Cleansed Vashe;Soap and water   Dressing/Treatment Xeroform   Wound Length (cm) 5.5 cm   Wound Width (cm) 4.5 cm   Wound Depth (cm) 0.1 cm   Wound Surface Area (cm^2) 24.75 cm^2   Change in Wound Size % (l*w) 31.25   Wound Volume (cm^3) 2.475 cm^3   Wound Healing % 66   Wound Assessment Slough;Pink/red   Drainage Amount Copious (>75 % saturated)   Drainage Description Serosanguinous   Ariadne-wound Assessment Maceration   Wound Thickness Description not for Pressure Injury Full thickness   Wound 10/09/23 Leg Right;Lower #2   Date First Assessed/Time First Assessed: 10/09/23 1528   Present on Original Admission: Yes  Wound Approximate Age at First Assessment (Weeks): (c)   Primary Wound Type: Venous Ulcer  Location: Leg  Wound Location Orientation: Right;Lower  Wound Descr...   Wound Image    Wound Etiology Venous   Dressing Status Old drainage noted   Wound Cleansed Vashe;Soap and water   Dressing/Treatment Xeroform   Wound Length (cm) 34 cm   Wound Width (cm) 48 cm   Wound Depth (cm) 0.1 cm   Wound Surface Area (cm^2) 1632 cm^2   Change in Wound Size % (l*w) -70.53   Wound Volume (cm^3) 163.2 cm^3   Wound Healing % -71   Wound Assessment Pink/red   Drainage Amount Copious (>75 % saturated)   Drainage Description Serous   Ariadne-wound

## 2024-02-12 ENCOUNTER — HOSPITAL ENCOUNTER (OUTPATIENT)
Dept: WOUND CARE | Age: 59
Discharge: HOME | End: 2024-02-12
Payer: COMMERCIAL

## 2024-02-12 VITALS
BODY MASS INDEX: 41.75 KG/M2 | DIASTOLIC BLOOD PRESSURE: 67 MMHG | HEIGHT: 73 IN | SYSTOLIC BLOOD PRESSURE: 119 MMHG | RESPIRATION RATE: 18 BRPM | WEIGHT: 315 LBS | HEART RATE: 74 BPM | TEMPERATURE: 98.5 F

## 2024-02-12 DIAGNOSIS — L97.301 VENOUS STASIS ULCER OF ANKLE LIMITED TO BREAKDOWN OF SKIN WITH VARICOSE VEINS, UNSPECIFIED LATERALITY (HCC): Primary | ICD-10-CM

## 2024-02-12 DIAGNOSIS — M79.605 PAIN OF LEFT LOWER EXTREMITY: ICD-10-CM

## 2024-02-12 DIAGNOSIS — I83.003 VENOUS STASIS ULCER OF ANKLE LIMITED TO BREAKDOWN OF SKIN WITH VARICOSE VEINS, UNSPECIFIED LATERALITY (HCC): Primary | ICD-10-CM

## 2024-02-12 PROCEDURE — 99213 OFFICE O/P EST LOW 20 MIN: CPT

## 2024-02-12 RX ORDER — LIDOCAINE HYDROCHLORIDE 20 MG/ML
JELLY TOPICAL ONCE
OUTPATIENT
Start: 2024-02-12 | End: 2024-02-12

## 2024-02-12 RX ORDER — GENTAMICIN SULFATE 1 MG/G
OINTMENT TOPICAL ONCE
OUTPATIENT
Start: 2024-02-12 | End: 2024-02-12

## 2024-02-12 RX ORDER — TRIAMCINOLONE ACETONIDE 1 MG/G
OINTMENT TOPICAL ONCE
OUTPATIENT
Start: 2024-02-12 | End: 2024-02-12

## 2024-02-12 RX ORDER — LIDOCAINE HYDROCHLORIDE 20 MG/ML
JELLY TOPICAL ONCE
Status: DISCONTINUED | OUTPATIENT
Start: 2024-02-12 | End: 2024-02-13 | Stop reason: HOSPADM

## 2024-02-12 RX ORDER — SODIUM CHLOR/HYPOCHLOROUS ACID 0.033 %
SOLUTION, IRRIGATION IRRIGATION ONCE
OUTPATIENT
Start: 2024-02-12 | End: 2024-02-12

## 2024-02-12 RX ORDER — IBUPROFEN 200 MG
TABLET ORAL ONCE
OUTPATIENT
Start: 2024-02-12 | End: 2024-02-12

## 2024-02-12 RX ORDER — LIDOCAINE HYDROCHLORIDE 40 MG/ML
SOLUTION TOPICAL ONCE
OUTPATIENT
Start: 2024-02-12 | End: 2024-02-12

## 2024-02-12 RX ORDER — LIDOCAINE 40 MG/G
CREAM TOPICAL ONCE
OUTPATIENT
Start: 2024-02-12 | End: 2024-02-12

## 2024-02-12 RX ORDER — METRONIDAZOLE 500 MG/1
500 TABLET ORAL 2 TIMES DAILY
Qty: 60 TABLET | Refills: 1 | Status: SHIPPED | OUTPATIENT
Start: 2024-02-12 | End: 2024-04-12

## 2024-02-12 RX ORDER — BACITRACIN ZINC AND POLYMYXIN B SULFATE 500; 1000 [USP'U]/G; [USP'U]/G
OINTMENT TOPICAL ONCE
OUTPATIENT
Start: 2024-02-12 | End: 2024-02-12

## 2024-02-12 RX ORDER — GINSENG 100 MG
CAPSULE ORAL ONCE
OUTPATIENT
Start: 2024-02-12 | End: 2024-02-12

## 2024-02-12 RX ORDER — CLOBETASOL PROPIONATE 0.5 MG/G
OINTMENT TOPICAL ONCE
OUTPATIENT
Start: 2024-02-12 | End: 2024-02-12

## 2024-02-12 RX ORDER — BETAMETHASONE DIPROPIONATE 0.05 %
OINTMENT (GRAM) TOPICAL ONCE
OUTPATIENT
Start: 2024-02-12 | End: 2024-02-12

## 2024-02-12 RX ORDER — LIDOCAINE 50 MG/G
OINTMENT TOPICAL ONCE
OUTPATIENT
Start: 2024-02-12 | End: 2024-02-12

## 2024-02-12 NOTE — FLOWSHEET NOTE
02/12/24 1508   Right Leg Edema Point of Measurement   Leg circumference 60 cm   Ankle circumference 30 cm   Foot circumference 32 cm   Compression Therapy Ace wrap   Left Leg Edema Point of Measurement   Leg circumference 55 cm   Ankle circumference 29 cm   Foot circumference 30 cm   Compression Therapy Ace wrap   Wound 10/09/23 Ankle Right;Medial #1   Date First Assessed/Time First Assessed: 10/09/23 1527   Present on Original Admission: Yes  Wound Approximate Age at First Assessment (Weeks): (c)   Location: Ankle  Wound Location Orientation: Right;Medial  Wound Description (Comments): #1   Wound Image    Wound Etiology Venous   Dressing Status Old drainage noted   Wound Cleansed Vashe;Soap and water   Dressing/Treatment Xeroform   Wound Length (cm) 5.5 cm   Wound Width (cm) 4.5 cm   Wound Depth (cm) 0.1 cm   Wound Surface Area (cm^2) 24.75 cm^2   Change in Wound Size % (l*w) 31.25   Wound Volume (cm^3) 2.475 cm^3   Wound Healing % 66   Wound Assessment Slough;Pink/red   Drainage Amount Copious (>75 % saturated)   Drainage Description Serosanguinous   Odor Moderate   Ariadne-wound Assessment Maceration   Wound Thickness Description not for Pressure Injury Full thickness   Wound 10/09/23 Leg Right;Lower #2   Date First Assessed/Time First Assessed: 10/09/23 1528   Present on Original Admission: Yes  Wound Approximate Age at First Assessment (Weeks): (c)   Primary Wound Type: Venous Ulcer  Location: Leg  Wound Location Orientation: Right;Lower  Wound Descr...   Wound Image    Wound Etiology Venous   Dressing Status Old drainage noted   Wound Cleansed Vashe;Soap and water   Dressing/Treatment Xeroform   Wound Length (cm) 34 cm   Wound Width (cm) 45 cm   Wound Depth (cm) 0.1 cm   Wound Surface Area (cm^2) 1530 cm^2   Change in Wound Size % (l*w) -59.87   Wound Volume (cm^3) 153 cm^3   Wound Healing % -60   Wound Assessment Pink/red   Drainage Amount Copious (>75 % saturated)   Drainage Description Serous   Odor

## 2024-02-12 NOTE — WOUND CARE
Discharge Instructions for  [facility]  [address] [place] [address]  [address] [place] [address]  Phone [phone]   Fax [phone]      NAME:  Jozef Felix  YOB: 1965  MEDICAL RECORD NUMBER:  [id number]  DATE:  2/12/2024    Return Appointment:   1 week with Alfa Edouard DO      Instructions: Bilateral lower legs:  Wash legs with blue fausto dish soap.  Apply silver alginate to right medial ankle.  Cover rest of legs with absorbent pads.  Wrap with kerlix and secure with snug fit ACE wrap from toes to knees.  Change daily or as needed if draining through wrap.    Elevate legs when sitting.  Avoid prolonged standing or sitting with legs in dependent position.  Be cautious of increased salt intake as this promotes fluid retention and swelling.  Increase protein intake to promote wound healing. Blayne and Ensure are examples of protein supplements.    Continue antibiotic as prescribed.     Should you experience increased redness, swelling, pain, foul odor, size of wound(s), or have a temperature over 101 degrees please contact the [facility] at [phone] or if after hours contact your primary care physician or go to the hospital emergency department.    PLEASE NOTE: IF YOU ARE UNABLE TO OBTAIN WOUND SUPPLIES, CONTINUE TO USE THE SUPPLIES YOU HAVE AVAILABLE UNTIL YOU ARE ABLE TO REACH US. IT IS MOST IMPORTANT TO KEEP THE WOUND COVERED AT ALL TIMES.    Electronically signed Namita Pal RN on 2/12/2024 at 3:26 PM

## 2024-02-19 ENCOUNTER — HOSPITAL ENCOUNTER (OUTPATIENT)
Dept: WOUND CARE | Age: 59
Discharge: HOME | End: 2024-02-19
Payer: COMMERCIAL

## 2024-02-19 VITALS
HEIGHT: 73 IN | RESPIRATION RATE: 18 BRPM | SYSTOLIC BLOOD PRESSURE: 166 MMHG | DIASTOLIC BLOOD PRESSURE: 88 MMHG | HEART RATE: 82 BPM | WEIGHT: 315 LBS | BODY MASS INDEX: 41.75 KG/M2 | TEMPERATURE: 99.1 F

## 2024-02-19 DIAGNOSIS — I83.003 VENOUS STASIS ULCER OF ANKLE LIMITED TO BREAKDOWN OF SKIN WITH VARICOSE VEINS, UNSPECIFIED LATERALITY (HCC): Primary | ICD-10-CM

## 2024-02-19 DIAGNOSIS — L97.301 VENOUS STASIS ULCER OF ANKLE LIMITED TO BREAKDOWN OF SKIN WITH VARICOSE VEINS, UNSPECIFIED LATERALITY (HCC): Primary | ICD-10-CM

## 2024-02-19 DIAGNOSIS — M79.605 PAIN OF LEFT LOWER EXTREMITY: ICD-10-CM

## 2024-02-19 PROCEDURE — 29581 APPL MULTLAYER CMPRN SYS LEG: CPT

## 2024-02-19 RX ORDER — BETAMETHASONE DIPROPIONATE 0.05 %
OINTMENT (GRAM) TOPICAL ONCE
OUTPATIENT
Start: 2024-02-19 | End: 2024-02-19

## 2024-02-19 RX ORDER — GINSENG 100 MG
CAPSULE ORAL ONCE
OUTPATIENT
Start: 2024-02-19 | End: 2024-02-19

## 2024-02-19 RX ORDER — IBUPROFEN 200 MG
TABLET ORAL ONCE
OUTPATIENT
Start: 2024-02-19 | End: 2024-02-19

## 2024-02-19 RX ORDER — GENTAMICIN SULFATE 1 MG/G
OINTMENT TOPICAL ONCE
OUTPATIENT
Start: 2024-02-19 | End: 2024-02-19

## 2024-02-19 RX ORDER — CLOBETASOL PROPIONATE 0.5 MG/G
OINTMENT TOPICAL ONCE
OUTPATIENT
Start: 2024-02-19 | End: 2024-02-19

## 2024-02-19 RX ORDER — TRIAMCINOLONE ACETONIDE 1 MG/G
OINTMENT TOPICAL ONCE
OUTPATIENT
Start: 2024-02-19 | End: 2024-02-19

## 2024-02-19 RX ORDER — LIDOCAINE HYDROCHLORIDE 20 MG/ML
JELLY TOPICAL ONCE
OUTPATIENT
Start: 2024-02-19 | End: 2024-02-19

## 2024-02-19 RX ORDER — LIDOCAINE HYDROCHLORIDE 40 MG/ML
SOLUTION TOPICAL ONCE
OUTPATIENT
Start: 2024-02-19 | End: 2024-02-19

## 2024-02-19 RX ORDER — LIDOCAINE 50 MG/G
OINTMENT TOPICAL ONCE
OUTPATIENT
Start: 2024-02-19 | End: 2024-02-19

## 2024-02-19 RX ORDER — BACITRACIN ZINC AND POLYMYXIN B SULFATE 500; 1000 [USP'U]/G; [USP'U]/G
OINTMENT TOPICAL ONCE
OUTPATIENT
Start: 2024-02-19 | End: 2024-02-19

## 2024-02-19 RX ORDER — SODIUM CHLOR/HYPOCHLOROUS ACID 0.033 %
SOLUTION, IRRIGATION IRRIGATION ONCE
OUTPATIENT
Start: 2024-02-19 | End: 2024-02-19

## 2024-02-19 RX ORDER — LIDOCAINE 40 MG/G
CREAM TOPICAL ONCE
OUTPATIENT
Start: 2024-02-19 | End: 2024-02-19

## 2024-02-19 NOTE — DISCHARGE INSTRUCTIONS
Return Appointment:   1 week with Alfa Edouard DO        Instructions: Bilateral lower legs:  Wash legs with blue fausto dish soap.  Apply silver alginate to right medial ankle.  Cover rest of legs with absorbent pads.  In clinic today wrap with kerlix and 2 layer compression wrap.   Wrap with kerlix and secure with snug fit ACE wrap from toes to knees.  Change daily or as needed if draining through wrap.     Elevate legs when sitting.  Avoid prolonged standing or sitting with legs in dependent position.  Be cautious of increased salt intake as this promotes fluid retention and swelling.  Increase protein intake to promote wound healing. Blayne and Ensure are examples of protein supplements.     Continue antibiotic as prescribed.      Should you experience increased redness, swelling, pain, foul odor, size of wound(s), or have a temperature over 101 degrees please contact the [facility] at [phone] or if after hours contact your primary care physician or go to the hospital emergency department.     PLEASE NOTE: IF YOU ARE UNABLE TO OBTAIN WOUND SUPPLIES, CONTINUE TO USE THE SUPPLIES YOU HAVE AVAILABLE UNTIL YOU ARE ABLE TO REACH US. IT IS MOST IMPORTANT TO KEEP THE WOUND COVERED AT ALL TIMES.

## 2024-02-19 NOTE — WOUND CARE
Multilayer Compression Wrap   (Not Unna) Below the Knee    NAME:  Jozef Felix  YOB: 1965  MEDICAL RECORD NUMBER:  [id number]  DATE:  2/19/2024    Multilayer compression wrap: Removed old Multilayer wrap if indicated and wash leg with mild soap/water.  Applied moisturizing agent to dry skin as needed.   Applied primary and secondary dressing as ordered.  Applied multilayered dressing below the knee to right lower leg.  Applied multilayered dressing below the knee to left lower leg.  Instructed patient/caregiver not to remove dressing and to keep it clean and dry.   Instructed patient/caregiver on complications to report to provider, such as pain, numbness in toes, heavy drainage, and slippage of dressing.  Instructed patient on purpose of compression dressing and on activity and exercise recommendations.      Electronically signed by KATHERIN GIBBS RN on 2/19/2024 at 4:22 PM

## 2024-02-19 NOTE — WOUND CARE
Discharge Instructions for  [facility]  [address] [place] [address]  [address] [place] [address]  Phone [phone]   Fax [phone]      NAME:  Jozef Felix  YOB: 1965  MEDICAL RECORD NUMBER:  [id number]  DATE:  2/19/2024    Return Appointment:   1 week with Alfa Edouard DO      Instructions: Bilateral lower legs:  Wash legs with blue fausto dish soap.  Apply silver alginate to right medial ankle.  Cover rest of legs with absorbent pads.  In clinic today wrap with kerlix and 2 layer compression wrap.   Wrap with kerlix and secure with snug fit ACE wrap from toes to knees.  Change daily or as needed if draining through wrap.    Elevate legs when sitting.  Avoid prolonged standing or sitting with legs in dependent position.  Be cautious of increased salt intake as this promotes fluid retention and swelling.  Increase protein intake to promote wound healing. Blayne and Ensure are examples of protein supplements.    Continue antibiotic as prescribed.     Should you experience increased redness, swelling, pain, foul odor, size of wound(s), or have a temperature over 101 degrees please contact the [facility]aling Center at [phone] or if after hours contact your primary care physician or go to the hospital emergency department.    PLEASE NOTE: IF YOU ARE UNABLE TO OBTAIN WOUND SUPPLIES, CONTINUE TO USE THE SUPPLIES YOU HAVE AVAILABLE UNTIL YOU ARE ABLE TO REACH US. IT IS MOST IMPORTANT TO KEEP THE WOUND COVERED AT ALL TIMES.    Electronically signed KATHERIN GIBBS RN on 2/19/2024 at 3:52 PM

## 2024-02-19 NOTE — FLOWSHEET NOTE
02/19/24 1526   Right Leg Edema Point of Measurement   Leg circumference 57 cm   Ankle circumference 31.5 cm   Foot circumference 32 cm   Compression Therapy 2 layer compression wrap   Left Leg Edema Point of Measurement   Leg circumference 54.5 cm   Ankle circumference 29 cm   Foot circumference 32 cm   Compression Therapy 2 layer compression wrap   Wound 10/09/23 Ankle Right;Medial #1   Date First Assessed/Time First Assessed: 10/09/23 1527   Present on Original Admission: Yes  Wound Approximate Age at First Assessment (Weeks): (c)   Location: Ankle  Wound Location Orientation: Right;Medial  Wound Description (Comments): #1   Wound Image    Wound Etiology Venous   Dressing Status Old drainage noted   Wound Cleansed Soap and water   Wound Length (cm) 5 cm   Wound Width (cm) 5 cm   Wound Depth (cm) 0.1 cm   Wound Surface Area (cm^2) 25 cm^2   Change in Wound Size % (l*w) 30.56   Wound Volume (cm^3) 2.5 cm^3   Wound Healing % 65   Wound 10/09/23 Leg Right;Lower #2   Date First Assessed/Time First Assessed: 10/09/23 1528   Present on Original Admission: Yes  Wound Approximate Age at First Assessment (Weeks): (c)   Primary Wound Type: Venous Ulcer  Location: Leg  Wound Location Orientation: Right;Lower  Wound Descr...   Wound Image     Wound Etiology Venous   Dressing Status Old drainage noted   Wound Cleansed Soap and water   Wound Length (cm) 35 cm   Wound Width (cm) 57 cm   Wound Depth (cm) 0.1 cm   Wound Surface Area (cm^2) 1995 cm^2   Change in Wound Size % (l*w) -108.46   Wound Volume (cm^3) 199.5 cm^3   Wound Healing % -108   Wound 10/09/23 Pretibial Left;Lower #3   Date First Assessed/Time First Assessed: 10/09/23 1528   Present on Original Admission: Yes  Wound Approximate Age at First Assessment (Weeks): (c)   Primary Wound Type: Venous Ulcer  Location: Pretibial  Wound Location Orientation: Left;Lower  Wound ...   Wound Image    Wound Etiology Venous   Dressing Status Old drainage noted   Wound Cleansed

## 2024-02-26 ENCOUNTER — HOSPITAL ENCOUNTER (OUTPATIENT)
Dept: WOUND CARE | Age: 59
Discharge: HOME | End: 2024-02-26
Payer: COMMERCIAL

## 2024-02-26 VITALS
TEMPERATURE: 97.9 F | OXYGEN SATURATION: 93 % | WEIGHT: 315 LBS | DIASTOLIC BLOOD PRESSURE: 84 MMHG | SYSTOLIC BLOOD PRESSURE: 131 MMHG | HEIGHT: 73 IN | BODY MASS INDEX: 41.75 KG/M2 | HEART RATE: 86 BPM

## 2024-02-26 DIAGNOSIS — I83.003 VENOUS STASIS ULCER OF ANKLE LIMITED TO BREAKDOWN OF SKIN WITH VARICOSE VEINS, UNSPECIFIED LATERALITY (HCC): Primary | ICD-10-CM

## 2024-02-26 DIAGNOSIS — M79.605 PAIN OF LEFT LOWER EXTREMITY: ICD-10-CM

## 2024-02-26 DIAGNOSIS — L97.301 VENOUS STASIS ULCER OF ANKLE LIMITED TO BREAKDOWN OF SKIN WITH VARICOSE VEINS, UNSPECIFIED LATERALITY (HCC): Primary | ICD-10-CM

## 2024-02-26 PROCEDURE — 29581 APPL MULTLAYER CMPRN SYS LEG: CPT

## 2024-02-26 PROCEDURE — 99213 OFFICE O/P EST LOW 20 MIN: CPT | Performed by: FAMILY MEDICINE

## 2024-02-26 RX ORDER — LIDOCAINE 50 MG/G
OINTMENT TOPICAL ONCE
OUTPATIENT
Start: 2024-02-26 | End: 2024-02-26

## 2024-02-26 RX ORDER — GENTAMICIN SULFATE 1 MG/G
OINTMENT TOPICAL ONCE
OUTPATIENT
Start: 2024-02-26 | End: 2024-02-26

## 2024-02-26 RX ORDER — SODIUM CHLOR/HYPOCHLOROUS ACID 0.033 %
SOLUTION, IRRIGATION IRRIGATION ONCE
OUTPATIENT
Start: 2024-02-26 | End: 2024-02-26

## 2024-02-26 RX ORDER — BETAMETHASONE DIPROPIONATE 0.05 %
OINTMENT (GRAM) TOPICAL ONCE
OUTPATIENT
Start: 2024-02-26 | End: 2024-02-26

## 2024-02-26 RX ORDER — LIDOCAINE HYDROCHLORIDE 40 MG/ML
SOLUTION TOPICAL ONCE
OUTPATIENT
Start: 2024-02-26 | End: 2024-02-26

## 2024-02-26 RX ORDER — CLOBETASOL PROPIONATE 0.5 MG/G
OINTMENT TOPICAL ONCE
OUTPATIENT
Start: 2024-02-26 | End: 2024-02-26

## 2024-02-26 RX ORDER — LIDOCAINE HYDROCHLORIDE 20 MG/ML
JELLY TOPICAL ONCE
OUTPATIENT
Start: 2024-02-26 | End: 2024-02-26

## 2024-02-26 RX ORDER — GINSENG 100 MG
CAPSULE ORAL ONCE
OUTPATIENT
Start: 2024-02-26 | End: 2024-02-26

## 2024-02-26 RX ORDER — LIDOCAINE 40 MG/G
CREAM TOPICAL ONCE
OUTPATIENT
Start: 2024-02-26 | End: 2024-02-26

## 2024-02-26 RX ORDER — TRIAMCINOLONE ACETONIDE 1 MG/G
OINTMENT TOPICAL ONCE
OUTPATIENT
Start: 2024-02-26 | End: 2024-02-26

## 2024-02-26 RX ORDER — BACITRACIN ZINC AND POLYMYXIN B SULFATE 500; 1000 [USP'U]/G; [USP'U]/G
OINTMENT TOPICAL ONCE
OUTPATIENT
Start: 2024-02-26 | End: 2024-02-26

## 2024-02-26 RX ORDER — IBUPROFEN 200 MG
TABLET ORAL ONCE
OUTPATIENT
Start: 2024-02-26 | End: 2024-02-26

## 2024-02-26 NOTE — FLOWSHEET NOTE
02/26/24 1550   Right Leg Edema Point of Measurement   Leg circumference 57 cm   Ankle circumference 30 cm   Foot circumference 30 cm   Compression Therapy 2 layer compression wrap   Left Leg Edema Point of Measurement   Leg circumference 50.5 cm   Ankle circumference 21 cm   Foot circumference 28.5 cm   Compression Therapy 2 layer compression wrap   Wound 10/09/23 Ankle Right;Medial #1   Date First Assessed/Time First Assessed: 10/09/23 1527   Present on Original Admission: Yes  Wound Approximate Age at First Assessment (Weeks): (c)   Location: Ankle  Wound Location Orientation: Right;Medial  Wound Description (Comments): #1   Wound Image    Wound Etiology Venous   Dressing Status Old drainage noted   Wound Cleansed Soap and water;Cleansed with saline;Vashe   Dressing/Treatment Alginate with Ag   Wound Length (cm) 6 cm   Wound Width (cm) 4.5 cm   Wound Depth (cm) 0.1 cm   Wound Surface Area (cm^2) 27 cm^2   Change in Wound Size % (l*w) 25   Wound Volume (cm^3) 2.7 cm^3   Wound Healing % 63   Wound Assessment Slough;Pink/red;Granulation tissue   Drainage Amount Moderate (25-50%)   Drainage Description Serosanguinous   Odor None   Ariadne-wound Assessment Edematous   Margins Attached edges   Wound Thickness Description not for Pressure Injury Full thickness   Wound 10/09/23 Leg Right;Lower #2   Date First Assessed/Time First Assessed: 10/09/23 1528   Present on Original Admission: Yes  Wound Approximate Age at First Assessment (Weeks): (c)   Primary Wound Type: Venous Ulcer  Location: Leg  Wound Location Orientation: Right;Lower  Wound Descr...   Wound Image    Wound Etiology Venous   Dressing Status Old drainage noted   Wound Cleansed Cleansed with saline;Soap and water;Vashe   Dressing/Treatment Alginate with Ag   Wound Length (cm) 15 cm   Wound Width (cm) 45 cm   Wound Depth (cm) 0.1 cm   Wound Surface Area (cm^2) 675 cm^2   Change in Wound Size % (l*w) 29.47   Wound Volume (cm^3) 67.5 cm^3   Wound Healing % 29

## 2024-02-26 NOTE — WOUND CARE
Discharge Instructions for  [facility]  [address] [place] [address]  [address] [place] [address]  Phone [phone]   Fax [phone]      NAME:  Jozef Felix  YOB: 1965  MEDICAL RECORD NUMBER:  [id number]  DATE:  2/26/2024    Return Appointment:   1 week with Alfa Edouard DO      Instructions:   Bilateral lower legs:  Wash legs with blue fausto dish soap.  Apply silver alginate to right medial ankle.  Cover rest of legs with absorbent pads.  In clinic today wrap with kerlix and 2 layer compression wrap.   Wrap with kerlix and secure with snug fit ACE wrap from toes to knees.  Change daily or as needed if draining through wrap.     Elevate legs when sitting.  Avoid prolonged standing or sitting with legs in dependent position.  Be cautious of increased salt intake as this promotes fluid retention and swelling.  Increase protein intake to promote wound healing. Blayne and Ensure are examples of protein supplements.     Continue antibiotic as prescribed.         Should you experience increased redness, swelling, pain, foul odor, size of wound(s), or have a temperature over 101 degrees please contact the [facility]aling Center at [phone] or if after hours contact your primary care physician or go to the hospital emergency department.    PLEASE NOTE: IF YOU ARE UNABLE TO OBTAIN WOUND SUPPLIES, CONTINUE TO USE THE SUPPLIES YOU HAVE AVAILABLE UNTIL YOU ARE ABLE TO REACH US. IT IS MOST IMPORTANT TO KEEP THE WOUND COVERED AT ALL TIMES.    Electronically signed Maria Elena Sequeira RN on 2/26/2024 at 4:56 PM

## 2024-02-26 NOTE — DISCHARGE INSTRUCTIONS
Bilateral lower legs:  Wash legs with blue fausto dish soap.  Apply silver alginate to right medial ankle.  Cover rest of legs with absorbent pads.  In clinic today wrap with kerlix and 2 layer compression wrap.   Wrap with kerlix and secure with snug fit ACE wrap from toes to knees.  Change daily or as needed if draining through wrap.     Elevate legs when sitting.  Avoid prolonged standing or sitting with legs in dependent position.  Be cautious of increased salt intake as this promotes fluid retention and swelling.  Increase protein intake to promote wound healing. Blayne and Ensure are examples of protein supplements.     Continue antibiotic as prescribed.

## 2024-02-28 NOTE — PROGRESS NOTES
days 30 capsule 0    ondansetron (ZOFRAN-ODT) 4 MG disintegrating tablet Take 1 tablet by mouth 3 times daily as needed for Nausea or Vomiting 21 tablet 0    naloxone 4 MG/0.1ML LIQD nasal spray 1 spray by Nasal route as needed for Opioid Reversal Use 1 spray intranasally, then discard. Repeat with new spray every 2 min as needed for opioid overdose symptoms, alternating nostrils. 1 each 0    acetaminophen (TYLENOL) 500 MG tablet Take 1 tablet by mouth every 6 hours as needed for Fever or Pain for fever or breakthrough pain      allopurinol (ZYLOPRIM) 300 MG tablet Take 1 tablet by mouth daily      cyclobenzaprine (FLEXERIL) 5 MG tablet TAKE 1 TABLET BY MOUTH EVERY DAY AT NIGHT      dilTIAZem (TIAZAC) 240 MG extended release capsule Take 1 capsule by mouth daily      famotidine (PEPCID) 40 MG tablet Take 1 tablet by mouth 2 times daily      furosemide (LASIX) 40 MG tablet Take 1 tablet by mouth daily      meloxicam (MOBIC) 15 MG tablet Take 1 tablet by mouth daily      rivaroxaban (XARELTO) 20 MG TABS tablet TAKE 1 TAB BY MOUTH DAILY (WITH DINNER).      sertraline (ZOLOFT) 50 MG tablet Take 2 tablets by mouth daily      tamsulosin (FLOMAX) 0.4 MG capsule Take 1 capsule by mouth daily       No current facility-administered medications on file prior to encounter.         Written patient dismissal instructions given to patient and signed by patient or POA.         Electronically signed by Alfa Edouard DO on 2/28/2024 at 8:07 AM

## 2024-03-11 ENCOUNTER — HOSPITAL ENCOUNTER (OUTPATIENT)
Dept: WOUND CARE | Age: 59
Discharge: HOME | End: 2024-03-11
Payer: COMMERCIAL

## 2024-03-11 VITALS
HEIGHT: 73 IN | WEIGHT: 315 LBS | SYSTOLIC BLOOD PRESSURE: 124 MMHG | DIASTOLIC BLOOD PRESSURE: 85 MMHG | BODY MASS INDEX: 41.75 KG/M2 | HEART RATE: 85 BPM

## 2024-03-11 PROCEDURE — 29581 APPL MULTLAYER CMPRN SYS LEG: CPT

## 2024-03-11 ASSESSMENT — PAIN SCALES - GENERAL: PAINLEVEL_OUTOF10: 2

## 2024-03-11 NOTE — WOUND CARE
Multilayer Compression Wrap   (Not Unna) Below the Knee    NAME:  Jozef Felix  YOB: 1965  MEDICAL RECORD NUMBER:  [id number]  DATE:  3/11/2024    Multilayer compression wrap: Removed old Multilayer wrap if indicated and wash leg with mild soap/water.  Applied moisturizing agent to dry skin as needed.   Applied primary and secondary dressing as ordered.  Applied multilayered dressing below the knee to right lower leg.  Applied multilayered dressing below the knee to left lower leg.  Instructed patient/caregiver not to remove dressing and to keep it clean and dry.   Instructed patient/caregiver on complications to report to provider, such as pain, numbness in toes, heavy drainage, and slippage of dressing.  Instructed patient on purpose of compression dressing and on activity and exercise recommendations.      Electronically signed by Arlette Verma RN on 3/11/2024 at 3:43 PM

## 2024-03-11 NOTE — WOUND CARE
Discharge Instructions for  [facility]  [address] [place] [address]  [address] [place] [address]  Phone [phone]   Fax [phone]      NAME:  Jozef Felix  YOB: 1965  MEDICAL RECORD NUMBER:  [id number]  DATE:  3/11/2024    Return Appointment:    1 month with Alfa Edouard DO      Instructions:   Bilateral lower legs:  Wash legs with blue fausto dish soap.  Apply silver alginate to right medial ankle.  Cover rest of legs with absorbent pads.  In clinic today wrap with kerlix and 2 layer compression wrap.   Wrap with kerlix and secure with snug fit ACE wrap from toes to knees.  Change daily or as needed if draining through wrap.     Elevate legs when sitting.  Avoid prolonged standing or sitting with legs in dependent position.  Be cautious of increased salt intake as this promotes fluid retention and swelling.  Increase protein intake to promote wound healing. Blayne and Ensure are examples of protein supplements.     Continue antibiotic as prescribed.         Should you experience increased redness, swelling, pain, foul odor, size of wound(s), or have a temperature over 101 degrees please contact the [facility]aling Center at [phone] or if after hours contact your primary care physician or go to the hospital emergency department.    PLEASE NOTE: IF YOU ARE UNABLE TO OBTAIN WOUND SUPPLIES, CONTINUE TO USE THE SUPPLIES YOU HAVE AVAILABLE UNTIL YOU ARE ABLE TO REACH US. IT IS MOST IMPORTANT TO KEEP THE WOUND COVERED AT ALL TIMES.    Electronically signed Arlette Verma RN on 3/11/2024 at 3:42 PM

## 2024-03-11 NOTE — DISCHARGE INSTRUCTIONS
Discharge Instructions for  [facility]  [address] [place] [address]  [address]  [place] [address]  Phone [phone]   Fax [phone]      NAME:  Jozef Felix  YOB: 1965  MEDICAL RECORD NUMBER:  [id number]  DATE:  @ED@    Return Appointment:   1 month with Alfa Edouard DO      Instructions: Bilateral lower legs:  Wash legs with blue fausto dish soap.  Apply silver alginate to right medial ankle.  Cover rest of legs with absorbent pads.  In clinic today wrap with kerlix and 2 layer compression wrap.   Wrap with kerlix and secure with snug fit ACE wrap from toes to knees.  Change daily or as needed if draining through wrap.     Elevate legs when sitting.  Avoid prolonged standing or sitting with legs in dependent position.  Be cautious of increased salt intake as this promotes fluid retention and swelling.  Increase protein intake to promote wound healing. Blayne and Ensure are examples of protein supplements.     Continue antibiotic as prescribed.            Should you experience increased redness, swelling, pain, foul odor, size of wound(s), or have a temperature over 101 degrees please contact the [facility]aling Center at [phone] or if after hours contact your primary care physician or go to the hospital emergency department.    PLEASE NOTE: IF YOU ARE UNABLE TO OBTAIN WOUND SUPPLIES, CONTINUE TO USE THE SUPPLIES YOU HAVE AVAILABLE UNTIL YOU ARE ABLE TO REACH US. IT IS MOST IMPORTANT TO KEEP THE WOUND COVERED AT ALL TIMES.    Electronically signed Arlette Verma RN on 3/11/2024 at 3:43 PM

## 2024-03-11 NOTE — FLOWSHEET NOTE
Description not for Pressure Injury Full thickness   Wound 10/09/23 Pretibial Left;Lower #3   Date First Assessed/Time First Assessed: 10/09/23 1528   Present on Original Admission: Yes  Wound Approximate Age at First Assessment (Weeks): (c)   Primary Wound Type: Venous Ulcer  Location: Pretibial  Wound Location Orientation: Left;Lower  Wound ...   Wound Image    Wound Etiology Venous   Dressing Status Old drainage noted   Wound Cleansed Soap and water   Dressing/Treatment ABD   Wound Length (cm) 40 cm   Wound Width (cm) 38 cm   Wound Depth (cm) 0.1 cm   Wound Surface Area (cm^2) 1520 cm^2   Change in Wound Size % (l*w) -17.83   Wound Volume (cm^3) 152 cm^3   Wound Healing % -18   Wound Assessment Pink/red   Drainage Amount Moderate (25-50%)   Drainage Description Serosanguinous   Odor None   Ariadne-wound Assessment Edematous   Wound Thickness Description not for Pressure Injury Full thickness

## 2024-04-01 ENCOUNTER — HOSPITAL ENCOUNTER (OUTPATIENT)
Dept: WOUND CARE | Age: 59
Discharge: HOME | End: 2024-04-01
Payer: COMMERCIAL

## 2024-04-01 VITALS
SYSTOLIC BLOOD PRESSURE: 122 MMHG | BODY MASS INDEX: 41.75 KG/M2 | OXYGEN SATURATION: 96 % | TEMPERATURE: 97.5 F | HEIGHT: 73 IN | WEIGHT: 315 LBS | RESPIRATION RATE: 12 BRPM | DIASTOLIC BLOOD PRESSURE: 77 MMHG | HEART RATE: 77 BPM

## 2024-04-01 DIAGNOSIS — I83.003 VENOUS STASIS ULCER OF ANKLE LIMITED TO BREAKDOWN OF SKIN WITH VARICOSE VEINS, UNSPECIFIED LATERALITY (HCC): Primary | ICD-10-CM

## 2024-04-01 DIAGNOSIS — M79.605 PAIN OF LEFT LOWER EXTREMITY: ICD-10-CM

## 2024-04-01 DIAGNOSIS — L97.301 VENOUS STASIS ULCER OF ANKLE LIMITED TO BREAKDOWN OF SKIN WITH VARICOSE VEINS, UNSPECIFIED LATERALITY (HCC): Primary | ICD-10-CM

## 2024-04-01 PROCEDURE — 29581 APPL MULTLAYER CMPRN SYS LEG: CPT

## 2024-04-01 PROCEDURE — 99213 OFFICE O/P EST LOW 20 MIN: CPT | Performed by: FAMILY MEDICINE

## 2024-04-01 PROCEDURE — 99213 OFFICE O/P EST LOW 20 MIN: CPT

## 2024-04-01 RX ORDER — SODIUM CHLOR/HYPOCHLOROUS ACID 0.033 %
SOLUTION, IRRIGATION IRRIGATION ONCE
OUTPATIENT
Start: 2024-04-01 | End: 2024-04-01

## 2024-04-01 RX ORDER — CLOBETASOL PROPIONATE 0.5 MG/G
OINTMENT TOPICAL ONCE
OUTPATIENT
Start: 2024-04-01 | End: 2024-04-01

## 2024-04-01 RX ORDER — BACITRACIN ZINC AND POLYMYXIN B SULFATE 500; 1000 [USP'U]/G; [USP'U]/G
OINTMENT TOPICAL ONCE
OUTPATIENT
Start: 2024-04-01 | End: 2024-04-01

## 2024-04-01 RX ORDER — LIDOCAINE HYDROCHLORIDE 20 MG/ML
JELLY TOPICAL ONCE
OUTPATIENT
Start: 2024-04-01 | End: 2024-04-01

## 2024-04-01 RX ORDER — BETAMETHASONE DIPROPIONATE 0.05 %
OINTMENT (GRAM) TOPICAL ONCE
OUTPATIENT
Start: 2024-04-01 | End: 2024-04-01

## 2024-04-01 RX ORDER — SODIUM CHLOR/HYPOCHLOROUS ACID 0.033 %
SOLUTION, IRRIGATION IRRIGATION ONCE
Status: COMPLETED | OUTPATIENT
Start: 2024-04-01 | End: 2024-04-01

## 2024-04-01 RX ORDER — GENTAMICIN SULFATE 1 MG/G
OINTMENT TOPICAL ONCE
OUTPATIENT
Start: 2024-04-01 | End: 2024-04-01

## 2024-04-01 RX ORDER — LIDOCAINE HYDROCHLORIDE 40 MG/ML
SOLUTION TOPICAL ONCE
OUTPATIENT
Start: 2024-04-01 | End: 2024-04-01

## 2024-04-01 RX ORDER — IBUPROFEN 200 MG
TABLET ORAL ONCE
OUTPATIENT
Start: 2024-04-01 | End: 2024-04-01

## 2024-04-01 RX ORDER — GINSENG 100 MG
CAPSULE ORAL ONCE
OUTPATIENT
Start: 2024-04-01 | End: 2024-04-01

## 2024-04-01 RX ORDER — TRIAMCINOLONE ACETONIDE 1 MG/G
OINTMENT TOPICAL ONCE
OUTPATIENT
Start: 2024-04-01 | End: 2024-04-01

## 2024-04-01 RX ORDER — LIDOCAINE 50 MG/G
OINTMENT TOPICAL ONCE
OUTPATIENT
Start: 2024-04-01 | End: 2024-04-01

## 2024-04-01 RX ORDER — LIDOCAINE 40 MG/G
CREAM TOPICAL ONCE
OUTPATIENT
Start: 2024-04-01 | End: 2024-04-01

## 2024-04-01 RX ADMIN — Medication: at 15:25

## 2024-04-01 NOTE — WOUND CARE
Multilayer Compression Wrap   (Not Unna) Below the Knee    NAME:  Jozef Felix  YOB: 1965  MEDICAL RECORD NUMBER:  [id number]  DATE:  4/1/2024    Multilayer compression wrap: Removed old Multilayer wrap if indicated and wash leg with mild soap/water.  Applied moisturizing agent to dry skin as needed.   Applied primary and secondary dressing as ordered.  Applied multilayered dressing below the knee to right lower leg.  Applied multilayered dressing below the knee to left lower leg.  Instructed patient/caregiver not to remove dressing and to keep it clean and dry.   Instructed patient/caregiver on complications to report to provider, such as pain, numbness in toes, heavy drainage, and slippage of dressing.  Instructed patient on purpose of compression dressing and on activity and exercise recommendations.      Electronically signed by Maria Elena Sequeira RN on 4/1/2024 at 5:28 PM

## 2024-04-01 NOTE — FLOWSHEET NOTE
04/01/24 1705   Right Leg Edema Point of Measurement   Leg circumference 57.5 cm   Ankle circumference 30 cm   Foot circumference 32 cm   Compression Therapy 2 layer compression wrap   Left Leg Edema Point of Measurement   Leg circumference 55.5 cm   Ankle circumference 27 cm   Foot circumference 31 cm   Compression Therapy 2 layer compression wrap   Wound 10/09/23 Pretibial Left;Lower #3   Date First Assessed/Time First Assessed: 10/09/23 1528   Present on Original Admission: Yes  Wound Approximate Age at First Assessment (Weeks): (c)   Primary Wound Type: Venous Ulcer  Location: Pretibial  Wound Location Orientation: Left;Lower  Wound ...   Wound Image    Wound Etiology Venous   Dressing Status Old drainage noted   Wound Cleansed Cleansed with saline;Soap and water;Vashe   Dressing/Treatment Alginate with Ag   Wound Length (cm) 21 cm   Wound Width (cm) 27.5 cm   Wound Depth (cm) 0.1 cm   Wound Surface Area (cm^2) 577.5 cm^2   Change in Wound Size % (l*w) 55.23   Wound Volume (cm^3) 57.75 cm^3   Wound Healing % 55   Wound Assessment Pink/red   Drainage Amount Moderate (25-50%)   Drainage Description Serosanguinous   Odor None   Ariadne-wound Assessment Edematous   Margins Attached edges   Wound Thickness Description not for Pressure Injury Full thickness   Wound 10/09/23 Leg Right;Lower #2   Date First Assessed/Time First Assessed: 10/09/23 1528   Present on Original Admission: Yes  Wound Approximate Age at First Assessment (Weeks): (c)   Primary Wound Type: Venous Ulcer  Location: Leg  Wound Location Orientation: Right;Lower  Wound Descr...   Wound Image    Wound Etiology Venous   Dressing Status Old drainage noted   Wound Cleansed Soap and water;Cleansed with saline;Vashe   Dressing/Treatment Alginate with Ag   Wound Length (cm) 25 cm   Wound Width (cm) 39 cm   Wound Depth (cm) 0.1 cm   Wound Surface Area (cm^2) 975 cm^2   Change in Wound Size % (l*w) -1.88   Wound Volume (cm^3) 97.5 cm^3   Wound Healing % -2

## 2024-04-01 NOTE — WOUND CARE
Discharge Instructions for  [facility]  [address] [place] [address]  [address] [place] [address]  Phone [phone]   Fax [phone]      NAME:  Jozef Felix  YOB: 1965  MEDICAL RECORD NUMBER:  [id number]  DATE:  4/1/2024    Return Appointment:  3 weeks with Alfa Edouard DO      Instructions:   Bilateral lower legs:  Wash legs with blue fausto dish soap.  Apply silver alginate to right medial ankle.  Cover rest of legs with absorbent pads.  In clinic today wrap with kerlix and 2 layer compression wrap.   Wrap with kerlix and secure with snug fit ACE wrap from toes to knees.  Change daily or as needed if draining through wrap.     Elevate legs when sitting.  Avoid prolonged standing or sitting with legs in dependent position.  Be cautious of increased salt intake as this promotes fluid retention and swelling.  Increase protein intake to promote wound healing. Blayne and Ensure are examples of protein supplements.     Continue Flagyl as prescribed.     Should you experience increased redness, swelling, pain, foul odor, size of wound(s), or have a temperature over 101 degrees please contact the W[facility] Healing Center at [phone] or if after hours contact your primary care physician or go to the hospital emergency department.    PLEASE NOTE: IF YOU ARE UNABLE TO OBTAIN WOUND SUPPLIES, CONTINUE TO USE THE SUPPLIES YOU HAVE AVAILABLE UNTIL YOU ARE ABLE TO REACH US. IT IS MOST IMPORTANT TO KEEP THE WOUND COVERED AT ALL TIMES.    Electronically signed Maria Elena Sequeira RN on 4/1/2024 at 3:27 PM

## 2024-04-01 NOTE — DISCHARGE INSTRUCTIONS
Bilateral lower legs:  Wash legs with blue fausto dish soap.  Apply silver alginate to right medial ankle.  Cover rest of legs with absorbent pads.  In clinic today wrap with kerlix and 2 layer compression wrap.   Wrap with kerlix and secure with snug fit ACE wrap from toes to knees.  Change daily or as needed if draining through wrap.     Elevate legs when sitting.  Avoid prolonged standing or sitting with legs in dependent position.  Be cautious of increased salt intake as this promotes fluid retention and swelling.  Increase protein intake to promote wound healing. Blayne and Ensure are examples of protein supplements.     Continue Flagyl as prescribed.

## 2024-04-02 RX ORDER — METRONIDAZOLE 500 MG/1
500 TABLET ORAL 2 TIMES DAILY
Qty: 60 TABLET | Refills: 0 | Status: SHIPPED | OUTPATIENT
Start: 2024-04-02 | End: 2024-05-02

## 2024-04-02 NOTE — PROGRESS NOTES
[facility]   W[facility] Care and [facility]   Medical Staff Progress Note     Jozef Felix  MEDICAL RECORD NUMBER:  [id number]  AGE: 58 y.o.   GENDER: male  : 1965  EPISODE DATE:  2024    Chief complaint and reason for visit:     Chief Complaint   Patient presents with    Wound Check     RLE      Patient presenting for follow up evaluation of wound(s) per chief complaint.  Still waiting on patient to be able to come in twice a week to initiate 4-layer compression wraps.    Subjective and ROS: Symptoms, wound related issues, or other pertinent wound history since last visit: no new wound care issues. Tolerating current treatment. No systemic complaints above baseline.    History of Wound Context: Per original history and physical on this patient. Changes in history since last evaluation: none    Medical Decision Making:     Problem List Items Addressed This Visit          Circulatory    Venous stasis ulcer of ankle limited to breakdown of skin with varicose veins (HCC) - Primary (Chronic)       Other    Pain of left lower extremity       Wounds and Treatment Plan:         Return Appointment:  3 weeks with Alfa Edouard DO        Instructions:   Bilateral lower legs:  Wash legs with blue fausto dish soap.  Apply silver alginate to right medial ankle.  Cover rest of legs with absorbent pads.  In clinic today wrap with kerlix and 2 layer compression wrap.   Wrap with kerlix and secure with snug fit ACE wrap from toes to knees.  Change daily or as needed if draining through wrap.     Elevate legs when sitting.  Avoid prolonged standing or sitting with legs in dependent position.  Be cautious of increased salt intake as this promotes fluid retention and swelling.  Increase protein intake to promote wound healing. Blayne and Ensure are examples of protein supplements.     Continue Flagyl as prescribed.   Other associated diagnoses or problems

## 2024-04-22 ENCOUNTER — HOSPITAL ENCOUNTER (OUTPATIENT)
Dept: WOUND CARE | Age: 59
Discharge: HOME | End: 2024-04-22
Payer: COMMERCIAL

## 2024-04-22 VITALS
BODY MASS INDEX: 41.75 KG/M2 | HEART RATE: 81 BPM | TEMPERATURE: 97.7 F | WEIGHT: 315 LBS | RESPIRATION RATE: 20 BRPM | DIASTOLIC BLOOD PRESSURE: 97 MMHG | HEIGHT: 73 IN | SYSTOLIC BLOOD PRESSURE: 165 MMHG

## 2024-04-22 PROCEDURE — 29581 APPL MULTLAYER CMPRN SYS LEG: CPT

## 2024-04-22 NOTE — WOUND CARE
Discharge Instructions for  [facility]  [address] [place] [address]  [address] [place] [address]  Phone [phone]   Fax [phone]      NAME:  Jozef Felix  YOB: 1965  MEDICAL RECORD NUMBER:  [id number]  DATE:  4/22/2024    Return Appointment: 4 weekswith Alfa Edouard DO      Instructions:   Bilateral lower legs:  Wash legs with blue fausto dish soap.  Apply silver alginate to right medial ankle.  Cover rest of legs with absorbent pads.  In clinic today wrap with kerlix and 2 layer compression wrap.   Wrap with kerlix and secure with snug fit ACE wrap from toes to knees.  Change daily or as needed if draining through wrap.     Elevate legs when sitting.  Avoid prolonged standing or sitting with legs in dependent position.  Be cautious of increased salt intake as this promotes fluid retention and swelling.  Increase protein intake to promote wound healing. Blayne and Ensure are examples of protein supplements.     Continue Flagyl as prescribed.     Should you experience increased redness, swelling, pain, foul odor, size of wound(s), or have a temperature over 101 degrees please contact the Wound Healing Center at [phone] or if after hours contact your primary care physician or go to the hospital emergency department.    PLEASE NOTE: IF YOU ARE UNABLE TO OBTAIN WOUND SUPPLIES, CONTINUE TO USE THE SUPPLIES YOU HAVE AVAILABLE UNTIL YOU ARE ABLE TO REACH US. IT IS MOST IMPORTANT TO KEEP THE WOUND COVERED AT ALL TIMES.    Electronically signed Sadie Bedolla RN on 4/22/2024 at 3:46 PM

## 2024-04-22 NOTE — WOUND CARE
Multilayer Compression Wrap   (Not Unna) Below the Knee    NAME:  Jozef Felix  YOB: 1965  MEDICAL RECORD NUMBER:  [id number]  DATE:  4/22/2024    Multilayer compression wrap: Removed old Multilayer wrap if indicated and wash leg with mild soap/water.  Applied moisturizing agent to dry skin as needed.   Applied primary and secondary dressing as ordered.  Applied multilayered dressing below the knee to right lower leg.  Applied multilayered dressing below the knee to left lower leg.  Instructed patient/caregiver not to remove dressing and to keep it clean and dry.   Instructed patient/caregiver on complications to report to provider, such as pain, numbness in toes, heavy drainage, and slippage of dressing.  Instructed patient on purpose of compression dressing and on activity and exercise recommendations.      Electronically signed by Sadie Bedolla RN on 4/22/2024 at 4:26 PM

## 2024-04-22 NOTE — FLOWSHEET NOTE
04/22/24 1500   Right Leg Edema Point of Measurement   Leg circumference 54 cm   Ankle circumference 28 cm   Foot circumference 32 cm   Compression Therapy 2 layer compression wrap   Left Leg Edema Point of Measurement   Leg circumference 54 cm   Ankle circumference 27 cm   Foot circumference 30.5 cm   Compression Therapy 2 layer compression wrap   Wound 10/09/23 Ankle Right;Medial #1   Date First Assessed/Time First Assessed: 10/09/23 1527   Present on Original Admission: Yes  Wound Approximate Age at First Assessment (Weeks): (c)   Location: Ankle  Wound Location Orientation: Right;Medial  Wound Description (Comments): #1   Wound Image    Wound Etiology Venous   Dressing Status Old drainage noted   Wound Cleansed Soap and water;Vashe   Dressing/Treatment Alginate with Ag   Wound Length (cm) 4 cm   Wound Width (cm) 4 cm   Wound Depth (cm) 0.1 cm   Wound Surface Area (cm^2) 16 cm^2   Change in Wound Size % (l*w) 55.56   Wound Volume (cm^3) 1.6 cm^3   Wound Healing % 78   Wound Assessment Pink/red   Drainage Amount Moderate (25-50%)   Drainage Description Serosanguinous   Odor None   Ariadne-wound Assessment Edematous   Wound Thickness Description not for Pressure Injury Full thickness   Wound 10/09/23 Leg Right;Lower #2   Date First Assessed/Time First Assessed: 10/09/23 1528   Present on Original Admission: Yes  Wound Approximate Age at First Assessment (Weeks): (c)   Primary Wound Type: Venous Ulcer  Location: Leg  Wound Location Orientation: Right;Lower  Wound Descr...   Wound Image    Wound Etiology Venous   Dressing Status Old drainage noted   Wound Cleansed Soap and water;Vashe   Dressing/Treatment Alginate with Ag   Wound Length (cm) 35 cm   Wound Width (cm) 39 cm   Wound Depth (cm) 0.1 cm   Wound Surface Area (cm^2) 1365 cm^2   Change in Wound Size % (l*w) -42.63   Wound Volume (cm^3) 136.5 cm^3   Wound Healing % -43   Wound Assessment Slough;Pink/red   Drainage Amount Moderate (25-50%)   Drainage

## 2024-04-22 NOTE — DISCHARGE INSTRUCTIONS
Instructions:   Bilateral lower legs:  Wash legs with blue fausto dish soap.  Apply silver alginate to right medial ankle.  Cover rest of legs with absorbent pads.  In clinic today wrap with kerlix and 2 layer compression wrap.   Wrap with kerlix and secure with snug fit ACE wrap from toes to knees.  Change daily or as needed if draining through wrap.     Elevate legs when sitting.  Avoid prolonged standing or sitting with legs in dependent position.  Be cautious of increased salt intake as this promotes fluid retention and swelling.  Increase protein intake to promote wound healing. Blayne and Ensure are examples of protein supplements.     Continue Flagyl as prescribed

## 2024-05-13 ENCOUNTER — HOSPITAL ENCOUNTER (OUTPATIENT)
Dept: WOUND CARE | Age: 59
Discharge: HOME | End: 2024-05-13
Payer: COMMERCIAL

## 2024-05-13 VITALS
BODY MASS INDEX: 41.75 KG/M2 | HEIGHT: 73 IN | TEMPERATURE: 97.7 F | DIASTOLIC BLOOD PRESSURE: 93 MMHG | RESPIRATION RATE: 18 BRPM | WEIGHT: 315 LBS | SYSTOLIC BLOOD PRESSURE: 139 MMHG | HEART RATE: 82 BPM | OXYGEN SATURATION: 92 %

## 2024-05-13 DIAGNOSIS — L97.301 VENOUS STASIS ULCER OF ANKLE LIMITED TO BREAKDOWN OF SKIN WITH VARICOSE VEINS, UNSPECIFIED LATERALITY (HCC): Primary | ICD-10-CM

## 2024-05-13 DIAGNOSIS — I83.003 VENOUS STASIS ULCER OF ANKLE LIMITED TO BREAKDOWN OF SKIN WITH VARICOSE VEINS, UNSPECIFIED LATERALITY (HCC): Primary | ICD-10-CM

## 2024-05-13 DIAGNOSIS — M79.605 PAIN OF LEFT LOWER EXTREMITY: ICD-10-CM

## 2024-05-13 PROCEDURE — 29581 APPL MULTLAYER CMPRN SYS LEG: CPT

## 2024-05-13 PROCEDURE — 99213 OFFICE O/P EST LOW 20 MIN: CPT | Performed by: FAMILY MEDICINE

## 2024-05-13 RX ORDER — LIDOCAINE 50 MG/G
OINTMENT TOPICAL ONCE
OUTPATIENT
Start: 2024-05-13 | End: 2024-05-13

## 2024-05-13 RX ORDER — GENTAMICIN SULFATE 1 MG/G
OINTMENT TOPICAL ONCE
OUTPATIENT
Start: 2024-05-13 | End: 2024-05-13

## 2024-05-13 RX ORDER — IBUPROFEN 200 MG
TABLET ORAL ONCE
OUTPATIENT
Start: 2024-05-13 | End: 2024-05-13

## 2024-05-13 RX ORDER — METRONIDAZOLE 500 MG/1
500 TABLET ORAL 2 TIMES DAILY
Qty: 60 TABLET | Refills: 0 | Status: SHIPPED | OUTPATIENT
Start: 2024-05-13 | End: 2024-06-12

## 2024-05-13 RX ORDER — TRIAMCINOLONE ACETONIDE 1 MG/G
OINTMENT TOPICAL ONCE
OUTPATIENT
Start: 2024-05-13 | End: 2024-05-13

## 2024-05-13 RX ORDER — CLOBETASOL PROPIONATE 0.5 MG/G
OINTMENT TOPICAL ONCE
OUTPATIENT
Start: 2024-05-13 | End: 2024-05-13

## 2024-05-13 RX ORDER — SODIUM CHLOR/HYPOCHLOROUS ACID 0.033 %
SOLUTION, IRRIGATION IRRIGATION ONCE
OUTPATIENT
Start: 2024-05-13 | End: 2024-05-13

## 2024-05-13 RX ORDER — LIDOCAINE HYDROCHLORIDE 20 MG/ML
JELLY TOPICAL ONCE
OUTPATIENT
Start: 2024-05-13 | End: 2024-05-13

## 2024-05-13 RX ORDER — BACITRACIN ZINC AND POLYMYXIN B SULFATE 500; 1000 [USP'U]/G; [USP'U]/G
OINTMENT TOPICAL ONCE
OUTPATIENT
Start: 2024-05-13 | End: 2024-05-13

## 2024-05-13 RX ORDER — DILTIAZEM HYDROCHLORIDE 240 MG/1
CAPSULE, COATED, EXTENDED RELEASE ORAL DAILY
COMMUNITY
Start: 2024-02-11

## 2024-05-13 RX ORDER — LIDOCAINE 40 MG/G
CREAM TOPICAL ONCE
OUTPATIENT
Start: 2024-05-13 | End: 2024-05-13

## 2024-05-13 RX ORDER — BETAMETHASONE DIPROPIONATE 0.05 %
OINTMENT (GRAM) TOPICAL ONCE
OUTPATIENT
Start: 2024-05-13 | End: 2024-05-13

## 2024-05-13 RX ORDER — LIDOCAINE HYDROCHLORIDE 40 MG/ML
SOLUTION TOPICAL ONCE
OUTPATIENT
Start: 2024-05-13 | End: 2024-05-13

## 2024-05-13 RX ORDER — GINSENG 100 MG
CAPSULE ORAL ONCE
OUTPATIENT
Start: 2024-05-13 | End: 2024-05-13

## 2024-05-13 ASSESSMENT — PAIN DESCRIPTION - LOCATION: LOCATION: LEG

## 2024-05-13 ASSESSMENT — PAIN DESCRIPTION - ORIENTATION: ORIENTATION: RIGHT;LEFT

## 2024-05-13 ASSESSMENT — PAIN SCALES - GENERAL: PAINLEVEL_OUTOF10: 5

## 2024-05-13 NOTE — WOUND CARE
Discharge Instructions for  [facility]  [address] [place] [address]  [address] [place], [place] [id number]  Phone [phone]   Fax [phone]      NAME:  Jozef Felix  YOB: 1965  MEDICAL RECORD NUMBER:  [id number]  DATE:  5/13/2024    Return Appointment: 4 weeks with Alfa Edouard DO      Instructions:   Bilateral lower legs:  Wash legs with blue fausto dish soap.  Cover surface of lower legs with absorbent pads.  Wrap with kerlix and secure with snug fit ACE wrap from toes to knees.  Change daily or as needed if draining through wrap.     Elevate legs when sitting.  Avoid prolonged standing or sitting with legs in dependent position.  Be cautious of increased salt intake as this promotes fluid retention and swelling.  Increase protein intake to promote wound healing. Blayne and Ensure are examples of protein supplements.     Continue Flagyl as prescribed; refill sent to pharmacy today        Should you experience increased redness, swelling, pain, foul odor, size of wound(s), or have a temperature over 101 degrees please contact the Wound Healing Center at [phone] or if after hours contact your primary care physician or go to the hospital emergency department.    PLEASE NOTE: IF YOU ARE UNABLE TO OBTAIN WOUND SUPPLIES, CONTINUE TO USE THE SUPPLIES YOU HAVE AVAILABLE UNTIL YOU ARE ABLE TO REACH US. IT IS MOST IMPORTANT TO KEEP THE WOUND COVERED AT ALL TIMES.    Electronically signed Ana Maria Rider PT, WCC on 5/13/2024 at 2:59 PM

## 2024-05-13 NOTE — PROGRESS NOTES
[facility]   Wound Care and [facility] Therapy Center   Medical Staff Progress Note     Jozef Felix  MEDICAL RECORD NUMBER:  [id number]  AGE: 58 y.o.   GENDER: male  : 1965  EPISODE DATE:  2024    Chief complaint and reason for visit:     Chief Complaint   Patient presents with    Wound Check     Bilateral lower leg wounds      Patient presenting for follow up evaluation of wound(s) per chief complaint.  Legs are about the same.  Patient still not able to get here twice a week as we have requested for many months.  Once he can do that we will do the compression wraps.  He says he will work on this and get this done quickly.    Subjective and ROS: Symptoms, wound related issues, or other pertinent wound history since last visit: no new wound care issues. Tolerating current treatment. No systemic complaints above baseline.    History of Wound Context: Per original history and physical on this patient. Changes in history since last evaluation: none    Medical Decision Making:     Problem List Items Addressed This Visit          Circulatory    Venous stasis ulcer of ankle limited to breakdown of skin with varicose veins (HCC) - Primary (Chronic)    Relevant Orders    Initiate Outpatient Wound Care Protocol       Other    Pain of left lower extremity    Relevant Orders    Initiate Outpatient Wound Care Protocol       Wounds and Treatment Plan:  Still waiting on patient to be able to get here twice a week so we can do the lower extremity 4-layer wraps.  This is discussed again in detail today.  We will not get these better until patient is able to do this.       Return Appointment: 4 weeks with Alfa Edouard DO        Instructions:   Bilateral lower legs:  Wash legs with blue fausto dish soap.  Cover surface of lower legs with absorbent pads.  Wrap with kerlix and secure with snug fit ACE wrap from toes to knees.  Change daily or as needed if draining through wrap.

## 2024-05-13 NOTE — DISCHARGE INSTRUCTIONS
Return Appointment: 4 weeks with Alfa Edouard DO        Instructions:   Bilateral lower legs:  Wash legs with blue fausto dish soap.  Cover surface of lower legs with absorbent pads.  Wrap with kerlix and secure with snug fit ACE wrap from toes to knees.  Change daily or as needed if draining through wrap.     Elevate legs when sitting.  Avoid prolonged standing or sitting with legs in dependent position.  Be cautious of increased salt intake as this promotes fluid retention and swelling.  Increase protein intake to promote wound healing. Blayne and Ensure are examples of protein supplements.     Continue Flagyl as prescribed; refill sent to pharmacy today

## 2024-05-13 NOTE — FLOWSHEET NOTE
05/13/24 1411   Right Leg Edema Point of Measurement   Leg circumference 50.5 cm   Ankle circumference 29.5 cm   Foot circumference 29.5 cm   Compression Therapy 2 layer compression wrap   Left Leg Edema Point of Measurement   Leg circumference 52 cm   Ankle circumference 28 cm   Foot circumference 30 cm   Compression Therapy 2 layer compression wrap   Wound 10/09/23 Leg Right;Lower #2   Date First Assessed/Time First Assessed: 10/09/23 1528   Present on Original Admission: Yes  Wound Approximate Age at First Assessment (Weeks): (c)   Primary Wound Type: Venous Ulcer  Location: Leg  Wound Location Orientation: Right;Lower  Wound Descr...   Wound Image     Wound Etiology Venous   Dressing Status Old drainage noted   Wound Cleansed Soap and water;Vashe   Dressing/Treatment ABD;Ace wrap   Wound Length (cm) 30 cm   Wound Width (cm) 48 cm   Wound Depth (cm) 0.1 cm   Wound Surface Area (cm^2) 1440 cm^2   Change in Wound Size % (l*w) -50.47   Wound Volume (cm^3) 144 cm^3   Wound Healing % -50   Wound Assessment Pink/red   Drainage Amount Copious (>75 % saturated)   Drainage Description Serosanguinous   Odor Moderate   Ariadne-wound Assessment Edematous;Maceration   Wound Thickness Description not for Pressure Injury Full thickness   Wound 10/09/23 Pretibial Left;Lower #3   Date First Assessed/Time First Assessed: 10/09/23 1528   Present on Original Admission: Yes  Wound Approximate Age at First Assessment (Weeks): (c)   Primary Wound Type: Venous Ulcer  Location: Pretibial  Wound Location Orientation: Left;Lower  Wound ...   Wound Image    Wound Etiology Venous   Dressing Status Old drainage noted   Wound Cleansed Soap and water;Vashe   Dressing/Treatment ABD;Ace wrap   Wound Length (cm) 35 cm   Wound Width (cm) 41 cm   Wound Depth (cm) 0.1 cm   Wound Surface Area (cm^2) 1435 cm^2   Change in Wound Size % (l*w) -11.24   Wound Volume (cm^3) 143.5 cm^3   Wound Healing % -11   Wound Assessment Pink/red   Drainage Amount

## 2024-05-13 NOTE — WOUND CARE
Multilayer Compression Wrap   (Not Unna) Below the Knee    NAME:  Jozef Felix  YOB: 1965  MEDICAL RECORD NUMBER:  [id number]  DATE:  5/13/2024    Multilayer compression wrap: Removed old Multilayer wrap if indicated and wash leg with mild soap/water.  Applied primary and secondary dressing as ordered.  Applied multilayered dressing below the knee to right lower leg.  Applied multilayered dressing below the knee to left lower leg.  Instructed patient/caregiver not to remove dressing and to keep it clean and dry.   Instructed patient/caregiver on complications to report to provider, such as pain, numbness in toes, heavy drainage, and slippage of dressing.  Instructed patient on purpose of compression dressing and on activity and exercise recommendations.      Electronically signed by Ana Maria Rider PT, WCC on 5/13/2024 at 3:44 PM

## 2024-06-03 ENCOUNTER — TELEPHONE (OUTPATIENT)
Dept: WOUND CARE | Age: 59
End: 2024-06-03

## 2024-06-03 ENCOUNTER — HOSPITAL ENCOUNTER (EMERGENCY)
Age: 59
Discharge: HOME | End: 2024-06-03
Attending: EMERGENCY MEDICINE
Payer: COMMERCIAL

## 2024-06-03 ENCOUNTER — APPOINTMENT (OUTPATIENT)
Dept: GENERAL RADIOLOGY | Age: 59
End: 2024-06-03
Payer: COMMERCIAL

## 2024-06-03 ENCOUNTER — HOSPITAL ENCOUNTER (EMERGENCY)
Dept: ULTRASOUND IMAGING | Age: 59
Discharge: HOME | End: 2024-06-06
Payer: COMMERCIAL

## 2024-06-03 VITALS
RESPIRATION RATE: 18 BRPM | SYSTOLIC BLOOD PRESSURE: 143 MMHG | DIASTOLIC BLOOD PRESSURE: 82 MMHG | OXYGEN SATURATION: 94 % | HEIGHT: 73 IN | TEMPERATURE: 98.1 F | WEIGHT: 315 LBS | BODY MASS INDEX: 41.75 KG/M2 | HEART RATE: 84 BPM

## 2024-06-03 DIAGNOSIS — I87.2 STASIS DERMATITIS OF BOTH LEGS: ICD-10-CM

## 2024-06-03 DIAGNOSIS — T14.8XXA HEMATOMA: ICD-10-CM

## 2024-06-03 DIAGNOSIS — I80.01 THROMBOPHLEBITIS OF SUPERFICIAL VEINS OF RIGHT LOWER EXTREMITY: ICD-10-CM

## 2024-06-03 DIAGNOSIS — R60.0 LEG EDEMA: Primary | ICD-10-CM

## 2024-06-03 LAB
ALBUMIN SERPL-MCNC: 3.2 G/DL (ref 3.5–5)
ALBUMIN/GLOB SERPL: 0.8 (ref 1–1.9)
ALP SERPL-CCNC: 68 U/L (ref 40–129)
ALT SERPL-CCNC: 8 U/L (ref 12–65)
ANION GAP SERPL CALC-SCNC: 10 MMOL/L (ref 9–18)
AST SERPL-CCNC: 17 U/L (ref 15–37)
BASOPHILS # BLD: 0 K/UL (ref 0–0.2)
BASOPHILS NFR BLD: 0 % (ref 0–2)
BILIRUB SERPL-MCNC: 0.3 MG/DL (ref 0–1.2)
BUN SERPL-MCNC: 16 MG/DL (ref 6–23)
CALCIUM SERPL-MCNC: 9 MG/DL (ref 8.8–10.2)
CHLORIDE SERPL-SCNC: 103 MMOL/L (ref 98–107)
CO2 SERPL-SCNC: 24 MMOL/L (ref 20–28)
CREAT SERPL-MCNC: 0.83 MG/DL (ref 0.8–1.3)
DIFFERENTIAL METHOD BLD: ABNORMAL
EKG ATRIAL RATE: 86 BPM
EKG DIAGNOSIS: NORMAL
EKG P AXIS: 42 DEGREES
EKG P-R INTERVAL: 183 MS
EKG Q-T INTERVAL: 364 MS
EKG QRS DURATION: 136 MS
EKG QTC CALCULATION (BAZETT): 433 MS
EKG R AXIS: 42 DEGREES
EKG T AXIS: 42 DEGREES
EKG VENTRICULAR RATE: 85 BPM
EOSINOPHIL # BLD: 0.2 K/UL (ref 0–0.8)
EOSINOPHIL NFR BLD: 2 % (ref 0.5–7.8)
ERYTHROCYTE [DISTWIDTH] IN BLOOD BY AUTOMATED COUNT: 13.3 % (ref 11.9–14.6)
GLOBULIN SER CALC-MCNC: 4.2 G/DL (ref 2.3–3.5)
GLUCOSE SERPL-MCNC: 106 MG/DL (ref 70–99)
HCT VFR BLD AUTO: 41.3 % (ref 41.1–50.3)
HGB BLD-MCNC: 14.1 G/DL (ref 13.6–17.2)
IMM GRANULOCYTES # BLD AUTO: 0 K/UL (ref 0–0.5)
IMM GRANULOCYTES NFR BLD AUTO: 0 % (ref 0–5)
LACTATE SERPL-SCNC: 1.1 MMOL/L (ref 0.5–2)
LYMPHOCYTES # BLD: 1.3 K/UL (ref 0.5–4.6)
LYMPHOCYTES NFR BLD: 17 % (ref 13–44)
MCH RBC QN AUTO: 35.9 PG (ref 26.1–32.9)
MCHC RBC AUTO-ENTMCNC: 34.1 G/DL (ref 31.4–35)
MCV RBC AUTO: 105.1 FL (ref 82–102)
MONOCYTES # BLD: 0.7 K/UL (ref 0.1–1.3)
MONOCYTES NFR BLD: 8 % (ref 4–12)
NEUTS SEG # BLD: 5.9 K/UL (ref 1.7–8.2)
NEUTS SEG NFR BLD: 73 % (ref 43–78)
NRBC # BLD: 0 K/UL (ref 0–0.2)
PLATELET # BLD AUTO: 172 K/UL (ref 150–450)
PMV BLD AUTO: 10.1 FL (ref 9.4–12.3)
POTASSIUM SERPL-SCNC: 3.9 MMOL/L (ref 3.5–5.1)
PROCALCITONIN SERPL-MCNC: 0.06 NG/ML (ref 0–0.1)
PROT SERPL-MCNC: 7.3 G/DL (ref 6.3–8.2)
RBC # BLD AUTO: 3.93 M/UL (ref 4.23–5.6)
SODIUM SERPL-SCNC: 137 MMOL/L (ref 136–145)
WBC # BLD AUTO: 8.1 K/UL (ref 4.3–11.1)

## 2024-06-03 PROCEDURE — 99285 EMERGENCY DEPT VISIT HI MDM: CPT

## 2024-06-03 PROCEDURE — 84145 PROCALCITONIN (PCT): CPT

## 2024-06-03 PROCEDURE — 93005 ELECTROCARDIOGRAM TRACING: CPT | Performed by: EMERGENCY MEDICINE

## 2024-06-03 PROCEDURE — 96374 THER/PROPH/DIAG INJ IV PUSH: CPT

## 2024-06-03 PROCEDURE — 6360000002 HC RX W HCPCS: Performed by: EMERGENCY MEDICINE

## 2024-06-03 PROCEDURE — 80053 COMPREHEN METABOLIC PANEL: CPT

## 2024-06-03 PROCEDURE — 6370000000 HC RX 637 (ALT 250 FOR IP): Performed by: EMERGENCY MEDICINE

## 2024-06-03 PROCEDURE — 71046 X-RAY EXAM CHEST 2 VIEWS: CPT

## 2024-06-03 PROCEDURE — 87040 BLOOD CULTURE FOR BACTERIA: CPT

## 2024-06-03 PROCEDURE — 85025 COMPLETE CBC W/AUTO DIFF WBC: CPT

## 2024-06-03 PROCEDURE — 83605 ASSAY OF LACTIC ACID: CPT

## 2024-06-03 PROCEDURE — 93970 EXTREMITY STUDY: CPT

## 2024-06-03 RX ORDER — HYDROCODONE BITARTRATE AND ACETAMINOPHEN 5; 325 MG/1; MG/1
1 TABLET ORAL
Status: COMPLETED | OUTPATIENT
Start: 2024-06-03 | End: 2024-06-03

## 2024-06-03 RX ORDER — MORPHINE SULFATE 10 MG/ML
6 INJECTION, SOLUTION INTRAMUSCULAR; INTRAVENOUS
Status: COMPLETED | OUTPATIENT
Start: 2024-06-03 | End: 2024-06-03

## 2024-06-03 RX ORDER — HYDROCODONE BITARTRATE AND ACETAMINOPHEN 7.5; 325 MG/1; MG/1
1 TABLET ORAL EVERY 6 HOURS PRN
Qty: 12 TABLET | Refills: 0 | Status: SHIPPED | OUTPATIENT
Start: 2024-06-03 | End: 2024-06-06

## 2024-06-03 RX ADMIN — HYDROCODONE BITARTRATE AND ACETAMINOPHEN 1 TABLET: 5; 325 TABLET ORAL at 06:50

## 2024-06-03 RX ADMIN — MORPHINE SULFATE 6 MG: 10 INJECTION INTRAVENOUS at 04:20

## 2024-06-03 ASSESSMENT — ENCOUNTER SYMPTOMS
SORE THROAT: 0
BACK PAIN: 0
SHORTNESS OF BREATH: 0
VOMITING: 0
NAUSEA: 0
ABDOMINAL PAIN: 0
COUGH: 0

## 2024-06-03 ASSESSMENT — PAIN DESCRIPTION - ORIENTATION: ORIENTATION: RIGHT

## 2024-06-03 ASSESSMENT — PAIN - FUNCTIONAL ASSESSMENT: PAIN_FUNCTIONAL_ASSESSMENT: 0-10

## 2024-06-03 ASSESSMENT — PAIN DESCRIPTION - DESCRIPTORS: DESCRIPTORS: THROBBING

## 2024-06-03 ASSESSMENT — PAIN DESCRIPTION - LOCATION
LOCATION: LEG
LOCATION: LEG

## 2024-06-03 ASSESSMENT — PAIN SCALES - GENERAL: PAINLEVEL_OUTOF10: 10

## 2024-06-03 NOTE — ED TRIAGE NOTES
Pt c/o lower leg swelling/weeping, states he has lymphedema and has not changed his dressings in a week.  Pt c/o pain/burning to right upper thigh.  Pt ran out of xarelto three days ago, has a hx of afib

## 2024-06-03 NOTE — TELEPHONE ENCOUNTER
06/03/2024 Per patient request, this RN faxed FMLA / Short Term Disability Claim Form with Physician's Statement to [facility] to fax # [phone].

## 2024-06-03 NOTE — ED PROVIDER NOTES
bilateral venous ultrasound.  He does have clot in his superficial greater saphenous vein on the right which patient states he has chronically.  There is no evidence of DVT.  There is a right thigh hematoma which radiologist read is large however it is 3 x 2 cm which is not too large.  Patient is supposed to be on Xarelto, Lasix, gabapentin, and Mobic but has run out of his medications.  He states that he does have refills and just needs to go by the pharmacy to pick them up.  Patient does have wound care follow-up already scheduled.  Patient was given a prescription for few days of [place].  Patient was discharged home with primary care follow-up as well    Based on patient's symptoms, exam, and a thorough evaluation, I do not suspect DVT, vascular ischemia, necrotizing fasciitis, deep space abscess, bone fracture, joint dislocation, septic arthritis, compartment syndrome, rhabdomyolysis, or any other acute, emergent extremity process.                   Jozef Felix is a 58 y.o. male who presents to the Emergency Department with chief complaint of    Chief Complaint   Patient presents with    Leg Swelling      Patient has had chronic bilateral leg lymphedema for years.  Patient has been going to wound care but they want him to come in 3 times a week for compression treatment but he cannot make it there due to transportation.  He states that he has been on gabapentin for his leg pain.  He states he ran out of all of his medication about a week ago and has been having increased pain to his legs. Patient has not had his dressings changed in over a week. He states 3 days ago he developed a burning sensation to his right thigh and feels that the swelling has moved up to there.  He states that for the past few days he has been having some sweats, chills, muscle aches.      The history is provided by the patient.       All other systems reviewed and are negative.    Review of Systems   Constitutional:  Positive for chills,

## 2024-06-03 NOTE — ED NOTES
Left and right leg cleaned with wound ,  12 sheets of xeroform then 10 large abd pads placed on legs, 4 rolls of kerlex 2 per leg to hold the pads in place then 4  5\" ace wraps and 2 4\" ace wraps used to hold everyhing in place      Gianna Wright, RN  06/03/24 3840

## 2024-06-03 NOTE — ED NOTES
Patient mobility status  with difficulty, can't walk further than 300 feet. Provider aware     I have reviewed discharge instructions with the patient and spouse.  The patient and spouse verbalized understanding.    Patient left ED via Discharge Method: wheelchair to Home with Spouse.    Opportunity for questions and clarification provided.     Patient given 1 scripts.           Ruperto Morrison RN  06/03/24 4597

## 2024-06-04 ENCOUNTER — TELEPHONE (OUTPATIENT)
Dept: WOUND CARE | Age: 59
End: 2024-06-04

## 2024-06-04 NOTE — TELEPHONE ENCOUNTER
06/04/2024 This RN LVM for patient that copies of faxed FMLA paperwork is available at [facility] Clinic. Also LVM reminder regarding 06/10/2024 [facility] appointment.

## 2024-06-10 ENCOUNTER — HOSPITAL ENCOUNTER (OUTPATIENT)
Dept: WOUND CARE | Age: 59
Discharge: HOME | End: 2024-06-10
Payer: COMMERCIAL

## 2024-06-10 PROCEDURE — 29581 APPL MULTLAYER CMPRN SYS LEG: CPT

## 2024-06-10 RX ORDER — LIDOCAINE 50 MG/G
OINTMENT TOPICAL ONCE
OUTPATIENT
Start: 2024-06-10 | End: 2024-06-10

## 2024-06-10 RX ORDER — BETAMETHASONE DIPROPIONATE 0.05 %
OINTMENT (GRAM) TOPICAL ONCE
OUTPATIENT
Start: 2024-06-10 | End: 2024-06-10

## 2024-06-10 RX ORDER — GENTAMICIN SULFATE 1 MG/G
OINTMENT TOPICAL ONCE
OUTPATIENT
Start: 2024-06-10 | End: 2024-06-10

## 2024-06-10 RX ORDER — CLOBETASOL PROPIONATE 0.5 MG/G
OINTMENT TOPICAL ONCE
OUTPATIENT
Start: 2024-06-10 | End: 2024-06-10

## 2024-06-10 RX ORDER — GINSENG 100 MG
CAPSULE ORAL ONCE
OUTPATIENT
Start: 2024-06-10 | End: 2024-06-10

## 2024-06-10 RX ORDER — LIDOCAINE 40 MG/G
CREAM TOPICAL ONCE
OUTPATIENT
Start: 2024-06-10 | End: 2024-06-10

## 2024-06-10 RX ORDER — SODIUM CHLOR/HYPOCHLOROUS ACID 0.033 %
SOLUTION, IRRIGATION IRRIGATION ONCE
OUTPATIENT
Start: 2024-06-10 | End: 2024-06-10

## 2024-06-10 RX ORDER — BACITRACIN ZINC AND POLYMYXIN B SULFATE 500; 1000 [USP'U]/G; [USP'U]/G
OINTMENT TOPICAL ONCE
OUTPATIENT
Start: 2024-06-10 | End: 2024-06-10

## 2024-06-10 RX ORDER — IBUPROFEN 200 MG
TABLET ORAL ONCE
OUTPATIENT
Start: 2024-06-10 | End: 2024-06-10

## 2024-06-10 RX ORDER — LIDOCAINE HYDROCHLORIDE 40 MG/ML
SOLUTION TOPICAL ONCE
OUTPATIENT
Start: 2024-06-10 | End: 2024-06-10

## 2024-06-10 RX ORDER — TRIAMCINOLONE ACETONIDE 1 MG/G
OINTMENT TOPICAL ONCE
OUTPATIENT
Start: 2024-06-10 | End: 2024-06-10

## 2024-06-10 RX ORDER — LIDOCAINE HYDROCHLORIDE 20 MG/ML
JELLY TOPICAL ONCE
OUTPATIENT
Start: 2024-06-10 | End: 2024-06-10

## 2024-06-10 NOTE — FLOWSHEET NOTE
06/10/24 1348   Right Leg Edema Point of Measurement   Leg circumference 51 cm   Ankle circumference 31 cm   Foot circumference 30 cm   Compression Therapy 2 layer compression wrap   Left Leg Edema Point of Measurement   Leg circumference 51 cm   Ankle circumference 31 cm   Foot circumference 29 cm   Compression Therapy 2 layer compression wrap   Wound 10/09/23 Leg Right;Lower #2   Date First Assessed/Time First Assessed: 10/09/23 1528   Present on Original Admission: Yes  Wound Approximate Age at First Assessment (Weeks): (c)   Primary Wound Type: Venous Ulcer  Location: Leg  Wound Location Orientation: Right;Lower  Wound Descr...   Wound Image    Wound Etiology Venous   Dressing Status Old drainage noted  ([place])   Wound Cleansed Soap and water;Vashe   Dressing/Treatment ABD;Ace wrap   Wound Length (cm) 35 cm   Wound Width (cm) 40 cm   Wound Depth (cm) 0.1 cm   Wound Surface Area (cm^2) 1400 cm^2   Change in Wound Size % (l*w) -46.29   Wound Volume (cm^3) 140 cm^3   Wound Healing % -46   Wound Assessment Pink/red;Eschar moist   Drainage Amount Copious (>75 % saturated)   Drainage Description Serosanguinous   Odor Malodorous/putrid   Ariadne-wound Assessment Edematous;Maceration   Wound Thickness Description not for Pressure Injury Full thickness   Wound 10/09/23 Pretibial Left;Lower #3   Date First Assessed/Time First Assessed: 10/09/23 1528   Present on Original Admission: Yes  Wound Approximate Age at First Assessment (Weeks): (c)   Primary Wound Type: Venous Ulcer  Location: Pretibial  Wound Location Orientation: Left;Lower  Wound ...   Wound Image    Wound Etiology Venous   Dressing Status Old drainage noted   Wound Cleansed Soap and water;Vashe   Dressing/Treatment ABD;Ace wrap   Wound Length (cm) 35 cm   Wound Width (cm) 40 cm   Wound Depth (cm) 0.1 cm   Wound Surface Area (cm^2) 1400 cm^2   Change in Wound Size % (l*w) -8.53   Wound Volume (cm^3) 140 cm^3   Wound Healing % -9   Wound Assessment Pink/red

## 2024-06-10 NOTE — WOUND CARE
Multilayer Compression Wrap   (Not Unna) Below the Knee    NAME:  Jozef Felix  YOB: 1965  MEDICAL RECORD NUMBER:  [id number]  DATE:  6/10/2024    Multilayer compression wrap: Removed old Multilayer wrap if indicated and wash leg with mild soap/water.  Applied moisturizing agent to dry skin as needed.   Applied primary and secondary dressing as ordered.  Applied multilayered dressing below the knee to right lower leg.  Applied multilayered dressing below the knee to left lower leg.  Instructed patient/caregiver not to remove dressing and to keep it clean and dry.   Instructed patient/caregiver on complications to report to provider, such as pain, numbness in toes, heavy drainage, and slippage of dressing.  Instructed patient on purpose of compression dressing and on activity and exercise recommendations.      Electronically signed by Namita Pal RN on 6/10/2024 at 2:34 PM

## 2024-06-10 NOTE — WOUND CARE
Discharge Instructions for  [facility]  [address] [place] [address]  [address] [place], [place] [address]  Phone [phone]   Fax [phone]      NAME:  Jozef Felix  YOB: 1965  MEDICAL RECORD NUMBER:  [id number]  DATE:  6/10/2024    Return Appointment: 4 weeks with Alfa Edouard DO      Instructions:   Bilateral lower legs:  Wash legs with blue fausto dish soap.  Cover surface of lower legs with absorbent pads.  Wrap with kerlix and secure with snug fit ACE wrap from toes to knees.  Change daily or as needed if draining through wrap.     Elevate legs when sitting.  Avoid prolonged standing or sitting with legs in dependent position.  Be cautious of increased salt intake as this promotes fluid retention and swelling.  Increase protein intake to promote wound healing. Blayne and Ensure are examples of protein supplements.     Continue Flagyl as prescribed; refill sent to pharmacy today        Should you experience increased redness, swelling, pain, foul odor, size of wound(s), or have a temperature over 101 degrees please contact the Wound Healing Center at [phone] or if after hours contact your primary care physician or go to the hospital emergency department.    PLEASE NOTE: IF YOU ARE UNABLE TO OBTAIN WOUND SUPPLIES, CONTINUE TO USE THE SUPPLIES YOU HAVE AVAILABLE UNTIL YOU ARE ABLE TO REACH US. IT IS MOST IMPORTANT TO KEEP THE WOUND COVERED AT ALL TIMES.    Electronically signed Namita Pal RN, [facility] on 6/10/2024 at 1:54 PM

## 2024-06-21 ENCOUNTER — TELEPHONE (OUTPATIENT)
Dept: WOUND CARE | Age: 59
End: 2024-06-21

## 2024-06-21 NOTE — TELEPHONE ENCOUNTER
06/21/2024 [facility] received call from JOSEPH Mitchell at [facility] (phone [phone]; fax [phone]) regarding mutual patient's FMLA/Short Term Disability. This RN discussed with Paula the patient's wound care treatment/discharge instructions and limitations. Upon their request, [facility] faxed Wound Care Physician's Statement to [facility] in order to assist in a singular FMLA/Disability statement for the benefit of the patient's well being/wound healing.

## 2024-07-01 ENCOUNTER — HOSPITAL ENCOUNTER (OUTPATIENT)
Dept: WOUND CARE | Age: 59
Discharge: HOME | End: 2024-07-01
Payer: COMMERCIAL

## 2024-07-01 VITALS
BODY MASS INDEX: 41.75 KG/M2 | HEART RATE: 87 BPM | TEMPERATURE: 99 F | RESPIRATION RATE: 18 BRPM | HEIGHT: 73 IN | DIASTOLIC BLOOD PRESSURE: 92 MMHG | SYSTOLIC BLOOD PRESSURE: 170 MMHG | WEIGHT: 315 LBS

## 2024-07-01 PROCEDURE — 29581 APPL MULTLAYER CMPRN SYS LEG: CPT

## 2024-07-01 NOTE — WOUND CARE
Discharge Instructions for  [facility]  [address] [place] [address]  [address] [place] [address]  Phone [phone]   Fax [phone]      NAME:  Jozef Felix  YOB: 1965  MEDICAL RECORD NUMBER:  [id number]  DATE:  7/1/2024    Return Appointment: 4 weeks with Alfa Edouard DO      Instructions:   Bilateral lower legs:  Wash legs with blue fausto dish soap.  Cover surface of lower legs with absorbent pads.  Wrap with kerlix and secure with snug fit ACE wrap from toes to knees.  Change daily or as needed if draining through wrap.     Elevate legs when sitting.  Avoid prolonged standing or sitting with legs in dependent position.  Be cautious of increased salt intake as this promotes fluid retention and swelling.  Increase protein intake to promote wound healing. Blayne and Ensure are examples of protein supplements.     Use your lymphedema pumps daily.      Should you experience increased redness, swelling, pain, foul odor, size of wound(s), or have a temperature over 101 degrees please contact the Wound Healing Center at [phone] or if after hours contact your primary care physician or go to the hospital emergency department.    PLEASE NOTE: IF YOU ARE UNABLE TO OBTAIN WOUND SUPPLIES, CONTINUE TO USE THE SUPPLIES YOU HAVE AVAILABLE UNTIL YOU ARE ABLE TO REACH US. IT IS MOST IMPORTANT TO KEEP THE WOUND COVERED AT ALL TIMES.    Electronically signed Sadie Bedolla RN, [facility] on 7/1/2024 at 2:39 PM

## 2024-07-01 NOTE — FLOWSHEET NOTE
07/01/24 1340   Right Leg Edema Point of Measurement   Leg circumference 50.5 cm   Ankle circumference 29 cm   Foot circumference 30.5 cm   Compression Therapy 2 layer compression wrap   Left Leg Edema Point of Measurement   Leg circumference 49.5 cm   Ankle circumference 28.5 cm   Foot circumference 31 cm   Compression Therapy 2 layer compression wrap   Wound 10/09/23 Leg Right;Lower #2   Date First Assessed/Time First Assessed: 10/09/23 1528   Present on Original Admission: Yes  Wound Approximate Age at First Assessment (Weeks): (c)   Primary Wound Type: Venous Ulcer  Location: Leg  Wound Location Orientation: Right;Lower  Wound Descr...   Wound Image     Wound Etiology Venous   Dressing Status Old drainage noted   Wound Cleansed Soap and water;Vashe   Dressing/Treatment Xeroform;ABD;Ace wrap   Wound Length (cm) 24 cm   Wound Width (cm) 44 cm   Wound Depth (cm) 0.1 cm   Wound Surface Area (cm^2) 1056 cm^2   Change in Wound Size % (l*w) -10.34   Wound Volume (cm^3) 105.6 cm^3   Wound Healing % -10   Wound Assessment Pink/red;Eschar moist   Drainage Amount Large (50-75% saturated)   Drainage Description Serosanguinous   Odor Moderate   Ariadne-wound Assessment Edematous   Wound Thickness Description not for Pressure Injury Full thickness   Wound 10/09/23 Pretibial Left;Lower #3   Date First Assessed/Time First Assessed: 10/09/23 1528   Present on Original Admission: Yes  Wound Approximate Age at First Assessment (Weeks): (c)   Primary Wound Type: Venous Ulcer  Location: Pretibial  Wound Location Orientation: Left;Lower  Wound ...   Wound Image     Wound Etiology Venous   Dressing Status Old drainage noted   Wound Cleansed Soap and water;Vashe   Dressing/Treatment Xeroform;ABD;Ace wrap   Wound Length (cm) 31 cm   Wound Width (cm) 31 cm   Wound Depth (cm) 0.1 cm   Wound Surface Area (cm^2) 961 cm^2   Change in Wound Size % (l*w) 25.5   Wound Volume (cm^3) 96.1 cm^3   Wound Healing % 26   Wound Assessment Pink/red

## 2024-07-01 NOTE — WOUND CARE
Multilayer Compression Wrap   (Not Unna) Below the Knee    NAME:  Jozef Felix  YOB: 1965  MEDICAL RECORD NUMBER:  [id number]  DATE:  7/1/2024    Multilayer compression wrap: Removed old Multilayer wrap if indicated and wash leg with mild soap/water.  Applied primary and secondary dressing as ordered.  Applied multilayered dressing below the knee to right lower leg.  Applied multilayered dressing below the knee to left lower leg.  Instructed patient/caregiver not to remove dressing and to keep it clean and dry.   Instructed patient/caregiver on complications to report to provider, such as pain, numbness in toes, heavy drainage, and slippage of dressing.  Instructed patient on purpose of compression dressing and on activity and exercise recommendations.      Electronically signed by Sadie Bedolla RN on 7/1/2024 at 2:44 PM

## 2024-07-01 NOTE — DISCHARGE INSTRUCTIONS
Discharge Instructions for  [facility]  [address] [place] [address]  Phone [phone]   Fax [phone]        NAME:  Jozef Felix  YOB: 1965  MEDICAL RECORD NUMBER:  [id number]  DATE:  7/1/2024     Return Appointment: 4 weeks with Alfa Edouard DO        Instructions:   Bilateral lower legs:  Wash legs with blue fausto dish soap.  Cover surface of lower legs with absorbent pads.  Wrap with kerlix and secure with snug fit ACE wrap from toes to knees.  Change daily or as needed if draining through wrap.     Elevate legs when sitting.  Avoid prolonged standing or sitting with legs in dependent position.  Be cautious of increased salt intake as this promotes fluid retention and swelling.  Increase protein intake to promote wound healing. Blayne and Ensure are examples of protein supplements.     Use your lymphedema pumps daily.

## 2024-07-08 RX ORDER — METRONIDAZOLE 250 MG/1
250 TABLET ORAL 2 TIMES DAILY
Qty: 60 TABLET | Refills: 0 | Status: SHIPPED | OUTPATIENT
Start: 2024-07-08 | End: 2024-08-07

## 2024-07-29 ENCOUNTER — HOSPITAL ENCOUNTER (OUTPATIENT)
Dept: WOUND CARE | Age: 59
Discharge: HOME | End: 2024-07-29
Payer: COMMERCIAL

## 2024-07-29 VITALS
TEMPERATURE: 99.3 F | HEART RATE: 100 BPM | BODY MASS INDEX: 41.75 KG/M2 | SYSTOLIC BLOOD PRESSURE: 163 MMHG | WEIGHT: 315 LBS | DIASTOLIC BLOOD PRESSURE: 96 MMHG | RESPIRATION RATE: 16 BRPM | HEIGHT: 73 IN

## 2024-07-29 DIAGNOSIS — L97.301 VENOUS STASIS ULCER OF ANKLE LIMITED TO BREAKDOWN OF SKIN WITH VARICOSE VEINS, UNSPECIFIED LATERALITY (HCC): Primary | ICD-10-CM

## 2024-07-29 DIAGNOSIS — M79.605 PAIN OF LEFT LOWER EXTREMITY: ICD-10-CM

## 2024-07-29 DIAGNOSIS — I83.003 VENOUS STASIS ULCER OF ANKLE LIMITED TO BREAKDOWN OF SKIN WITH VARICOSE VEINS, UNSPECIFIED LATERALITY (HCC): Primary | ICD-10-CM

## 2024-07-29 PROCEDURE — 29581 APPL MULTLAYER CMPRN SYS LEG: CPT

## 2024-07-29 RX ORDER — SODIUM CHLOR/HYPOCHLOROUS ACID 0.033 %
SOLUTION, IRRIGATION IRRIGATION ONCE
OUTPATIENT
Start: 2024-07-29 | End: 2024-07-29

## 2024-07-29 RX ORDER — BACITRACIN ZINC AND POLYMYXIN B SULFATE 500; 1000 [USP'U]/G; [USP'U]/G
OINTMENT TOPICAL ONCE
OUTPATIENT
Start: 2024-07-29 | End: 2024-07-29

## 2024-07-29 RX ORDER — LIDOCAINE 50 MG/G
OINTMENT TOPICAL ONCE
OUTPATIENT
Start: 2024-07-29 | End: 2024-07-29

## 2024-07-29 RX ORDER — GENTAMICIN SULFATE 1 MG/G
OINTMENT TOPICAL ONCE
OUTPATIENT
Start: 2024-07-29 | End: 2024-07-29

## 2024-07-29 RX ORDER — METRONIDAZOLE 500 MG/1
500 TABLET ORAL 2 TIMES DAILY
Qty: 60 TABLET | Refills: 0 | Status: SHIPPED | OUTPATIENT
Start: 2024-07-29 | End: 2024-08-28

## 2024-07-29 RX ORDER — BETAMETHASONE DIPROPIONATE 0.05 %
OINTMENT (GRAM) TOPICAL ONCE
OUTPATIENT
Start: 2024-07-29 | End: 2024-07-29

## 2024-07-29 RX ORDER — TRIAMCINOLONE ACETONIDE 1 MG/G
OINTMENT TOPICAL ONCE
OUTPATIENT
Start: 2024-07-29 | End: 2024-07-29

## 2024-07-29 RX ORDER — LIDOCAINE HYDROCHLORIDE 20 MG/ML
JELLY TOPICAL ONCE
OUTPATIENT
Start: 2024-07-29 | End: 2024-07-29

## 2024-07-29 RX ORDER — LIDOCAINE HYDROCHLORIDE 40 MG/ML
SOLUTION TOPICAL ONCE
OUTPATIENT
Start: 2024-07-29 | End: 2024-07-29

## 2024-07-29 RX ORDER — LIDOCAINE 40 MG/G
CREAM TOPICAL ONCE
OUTPATIENT
Start: 2024-07-29 | End: 2024-07-29

## 2024-07-29 RX ORDER — IBUPROFEN 200 MG
TABLET ORAL ONCE
OUTPATIENT
Start: 2024-07-29 | End: 2024-07-29

## 2024-07-29 RX ORDER — CLOBETASOL PROPIONATE 0.5 MG/G
OINTMENT TOPICAL ONCE
OUTPATIENT
Start: 2024-07-29 | End: 2024-07-29

## 2024-07-29 RX ORDER — GINSENG 100 MG
CAPSULE ORAL ONCE
OUTPATIENT
Start: 2024-07-29 | End: 2024-07-29

## 2024-07-29 NOTE — FLOWSHEET NOTE
07/29/24 1444   Right Leg Edema Point of Measurement   Leg circumference 57 cm   Ankle circumference 28 cm   Foot circumference 30.5 cm   Compression Therapy 2 layer compression wrap   Left Leg Edema Point of Measurement   Leg circumference 56 cm   Ankle circumference 29.5 cm   Foot circumference 31.5 cm   Compression Therapy 2 layer compression wrap   Wound 10/09/23 Pretibial Left;Lower #3   Date First Assessed/Time First Assessed: 10/09/23 1528   Present on Original Admission: Yes  Wound Approximate Age at First Assessment (Weeks): (c)   Primary Wound Type: Venous Ulcer  Location: Pretibial  Wound Location Orientation: Left;Lower  Wound ...   Wound Image    Wound Etiology Venous   Dressing Status Old drainage noted   Wound Cleansed Soap and water   Dressing/Treatment Xeroform;ABD   Wound Length (cm) 36 cm   Wound Width (cm) 40.5 cm   Wound Depth (cm) 0.1 cm   Wound Surface Area (cm^2) 1458 cm^2   Change in Wound Size % (l*w) -13.02   Wound Volume (cm^3) 145.8 cm^3   Wound Healing % -13   Wound Assessment Pink/red   Drainage Amount Copious (>75 % saturated)   Drainage Description Serosanguinous   Odor Moderate   Ariadne-wound Assessment Edematous;Maceration   Wound Thickness Description not for Pressure Injury Full thickness   Wound 10/09/23 Leg Right;Lower #2   Date First Assessed/Time First Assessed: 10/09/23 1528   Present on Original Admission: Yes  Wound Approximate Age at First Assessment (Weeks): (c)   Primary Wound Type: Venous Ulcer  Location: Leg  Wound Location Orientation: Right;Lower  Wound Descr...   Wound Image    Wound Etiology Venous   Dressing Status Old drainage noted   Wound Cleansed Soap and water   Dressing/Treatment Xeroform   Wound Length (cm) 40 cm   Wound Width (cm) 28 cm   Wound Depth (cm) 0.1 cm   Wound Surface Area (cm^2) 1120 cm^2   Change in Wound Size % (l*w) -17.03   Wound Volume (cm^3) 112 cm^3   Wound Healing % -17   Wound Assessment Pink/red;Eschar moist   Drainage Amount Copious

## 2024-07-29 NOTE — WOUND CARE
Discharge Instructions for  [facility]  [address] [place] [address]  [address] [place] [address]  Phone [phone]   Fax [phone]      NAME:  Jozef Felix  YOB: 1965  MEDICAL RECORD NUMBER:  [id number]  DATE:  7/29/2024    Return Appointment: 1 weeks with Alfa Edouard DO      Instructions:   Bilateral lower legs:  4 layer wrap at visit today    Patient to:  Wash legs with blue fausto dish soap.  Cover surface of lower legs with absorbent pads.  Wrap with kerlix and secure with snug fit ACE wrap from toes to knees.  Change daily or as needed if draining through wrap.     Elevate legs when sitting.  Avoid prolonged standing or sitting with legs in dependent position.  Be cautious of increased salt intake as this promotes fluid retention and swelling.  Increase protein intake to promote wound healing. Blayne and Ensure are examples of protein supplements.     Use your lymphedema pumps daily.    OBTAIN ORDERED ANTIBIOTICS AND TAKE AS PRESCRIBED    Should you experience increased redness, swelling, pain, foul odor, size of wound(s), or have a temperature over 101 degrees please contact the Wound Healing Center at [phone] or if after hours contact your primary care physician or go to the hospital emergency department.    PLEASE NOTE: IF YOU ARE UNABLE TO OBTAIN WOUND SUPPLIES, CONTINUE TO USE THE SUPPLIES YOU HAVE AVAILABLE UNTIL YOU ARE ABLE TO REACH US. IT IS MOST IMPORTANT TO KEEP THE WOUND COVERED AT ALL TIMES.    Electronically signed Namita Barraza RN, [facility] on 7/29/2024 at 3:41 PM

## 2024-07-29 NOTE — WOUND CARE
Multilayer Compression Wrap   (Not Unna) Below the Knee    NAME:  Jozef Felix  YOB: 1965  MEDICAL RECORD NUMBER:  [id number]  DATE:  7/29/2024    Multilayer compression wrap: Removed old Multilayer wrap if indicated and wash leg with mild soap/water.  Applied moisturizing agent to dry skin as needed.   Applied primary and secondary dressing as ordered.  Applied multilayered dressing below the knee to right lower leg.  Applied multilayered dressing below the knee to left lower leg.  Instructed patient/caregiver not to remove dressing and to keep it clean and dry.   Instructed patient/caregiver on complications to report to provider, such as pain, numbness in toes, heavy drainage, and slippage of dressing.  Instructed patient on purpose of compression dressing and on activity and exercise recommendations.      Electronically signed by Namita Barraza RN on 7/29/2024 at 3:57 PM

## 2024-08-19 ENCOUNTER — HOSPITAL ENCOUNTER (OUTPATIENT)
Dept: WOUND CARE | Age: 59
Discharge: HOME | End: 2024-08-19
Payer: COMMERCIAL

## 2024-08-19 VITALS
WEIGHT: 315 LBS | BODY MASS INDEX: 41.75 KG/M2 | TEMPERATURE: 97.7 F | DIASTOLIC BLOOD PRESSURE: 107 MMHG | HEART RATE: 86 BPM | SYSTOLIC BLOOD PRESSURE: 176 MMHG | RESPIRATION RATE: 18 BRPM | HEIGHT: 73 IN

## 2024-08-19 DIAGNOSIS — M79.605 PAIN OF LEFT LOWER EXTREMITY: ICD-10-CM

## 2024-08-19 DIAGNOSIS — I83.003 VENOUS STASIS ULCER OF ANKLE LIMITED TO BREAKDOWN OF SKIN WITH VARICOSE VEINS, UNSPECIFIED LATERALITY (HCC): Primary | ICD-10-CM

## 2024-08-19 DIAGNOSIS — L97.301 VENOUS STASIS ULCER OF ANKLE LIMITED TO BREAKDOWN OF SKIN WITH VARICOSE VEINS, UNSPECIFIED LATERALITY (HCC): Primary | ICD-10-CM

## 2024-08-19 PROCEDURE — 29581 APPL MULTLAYER CMPRN SYS LEG: CPT

## 2024-08-19 RX ORDER — CLOBETASOL PROPIONATE 0.5 MG/G
OINTMENT TOPICAL ONCE
OUTPATIENT
Start: 2024-08-19 | End: 2024-08-19

## 2024-08-19 RX ORDER — GENTAMICIN SULFATE 1 MG/G
OINTMENT TOPICAL ONCE
OUTPATIENT
Start: 2024-08-19 | End: 2024-08-19

## 2024-08-19 RX ORDER — LIDOCAINE 50 MG/G
OINTMENT TOPICAL ONCE
OUTPATIENT
Start: 2024-08-19 | End: 2024-08-19

## 2024-08-19 RX ORDER — SODIUM CHLOR/HYPOCHLOROUS ACID 0.033 %
SOLUTION, IRRIGATION IRRIGATION ONCE
OUTPATIENT
Start: 2024-08-19 | End: 2024-08-19

## 2024-08-19 RX ORDER — LIDOCAINE HYDROCHLORIDE 20 MG/ML
JELLY TOPICAL ONCE
OUTPATIENT
Start: 2024-08-19 | End: 2024-08-19

## 2024-08-19 RX ORDER — IBUPROFEN 200 MG
TABLET ORAL ONCE
OUTPATIENT
Start: 2024-08-19 | End: 2024-08-19

## 2024-08-19 RX ORDER — BACITRACIN ZINC AND POLYMYXIN B SULFATE 500; 1000 [USP'U]/G; [USP'U]/G
OINTMENT TOPICAL ONCE
OUTPATIENT
Start: 2024-08-19 | End: 2024-08-19

## 2024-08-19 RX ORDER — LIDOCAINE 40 MG/G
CREAM TOPICAL ONCE
OUTPATIENT
Start: 2024-08-19 | End: 2024-08-19

## 2024-08-19 RX ORDER — BETAMETHASONE DIPROPIONATE 0.05 %
OINTMENT (GRAM) TOPICAL ONCE
OUTPATIENT
Start: 2024-08-19 | End: 2024-08-19

## 2024-08-19 RX ORDER — GINSENG 100 MG
CAPSULE ORAL ONCE
OUTPATIENT
Start: 2024-08-19 | End: 2024-08-19

## 2024-08-19 RX ORDER — LIDOCAINE HYDROCHLORIDE 40 MG/ML
SOLUTION TOPICAL ONCE
OUTPATIENT
Start: 2024-08-19 | End: 2024-08-19

## 2024-08-19 RX ORDER — TRIAMCINOLONE ACETONIDE 1 MG/G
OINTMENT TOPICAL ONCE
OUTPATIENT
Start: 2024-08-19 | End: 2024-08-19

## 2024-08-19 ASSESSMENT — PAIN DESCRIPTION - DESCRIPTORS: DESCRIPTORS: ACHING

## 2024-08-19 ASSESSMENT — PAIN DESCRIPTION - ORIENTATION: ORIENTATION: RIGHT;LEFT

## 2024-08-19 ASSESSMENT — PAIN DESCRIPTION - LOCATION: LOCATION: BACK;HIP

## 2024-08-19 ASSESSMENT — PAIN SCALES - GENERAL: PAINLEVEL_OUTOF10: 6

## 2024-08-19 NOTE — WOUND CARE
Discharge Instructions for  [facility]  [address] [place] [address]  [address] [place] [address]  Phone [phone]   Fax [phone]      NAME:  Jozef Felix  YOB: 1965  MEDICAL RECORD NUMBER:  [id number]  DATE:  8/19/2024    Return Appointment: 2 weeks with Alfa Eduoard, DO  Clinician change next Monday      Instructions:   Bilateral lower legs:  4 layer wrap at visit today    Patient to:  Wash legs with blue fausto dish soap.  Cover surface of lower legs with absorbent pads.  Wrap with kerlix and secure with snug fit ACE wrap from toes to knees.  Change daily or as needed if draining through wrap.     Elevate legs when sitting.  Avoid prolonged standing or sitting with legs in dependent position.  Be cautious of increased salt intake as this promotes fluid retention and swelling.  Increase protein intake to promote wound healing. Blayne and Ensure are examples of protein supplements.     Use your lymphedema pumps daily.    OBTAIN ORDERED ANTIBIOTICS AND TAKE AS PRESCRIBED    Should you experience increased redness, swelling, pain, foul odor, size of wound(s), or have a temperature over 101 degrees please contact the Wound Healing Center at [phone] or if after hours contact your primary care physician or go to the hospital emergency department.    PLEASE NOTE: IF YOU ARE UNABLE TO OBTAIN WOUND SUPPLIES, CONTINUE TO USE THE SUPPLIES YOU HAVE AVAILABLE UNTIL YOU ARE ABLE TO REACH US. IT IS MOST IMPORTANT TO KEEP THE WOUND COVERED AT ALL TIMES.    Electronically signed Namita Pal RN, [facility] on 8/19/2024 at 3:54 PM

## 2024-08-19 NOTE — FLOWSHEET NOTE
Copious (>75 % saturated)   Drainage Description Serosanguinous   Odor Moderate   Ariadne-wound Assessment Edematous;Maceration   Margins Attached edges   Wound Thickness Description not for Pressure Injury Full thickness   Pain Assessment   Pain Assessment 0-10   Pain Level 6   Pain Location Back;Hip   Pain Orientation Right;Left   Pain Descriptors Aching

## 2024-08-19 NOTE — WOUND CARE
Multilayer Compression Wrap   (Not Unna) Below the Knee    NAME:  Jozef Felix  YOB: 1965  MEDICAL RECORD NUMBER:  [id number]  DATE:  8/19/2024    Multilayer compression wrap: Removed old Multilayer wrap if indicated and wash leg with mild soap/water.  Applied moisturizing agent to dry skin as needed.   Applied primary and secondary dressing as ordered.  Applied multilayered dressing below the knee to right lower leg.  Applied multilayered dressing below the knee to left lower leg.  Instructed patient/caregiver not to remove dressing and to keep it clean and dry.   Instructed patient/caregiver on complications to report to provider, such as pain, numbness in toes, heavy drainage, and slippage of dressing.  Instructed patient on purpose of compression dressing and on activity and exercise recommendations.      Electronically signed by Namita Pal RN on 8/19/2024 at 3:29 PM

## 2024-08-26 ENCOUNTER — HOSPITAL ENCOUNTER (OUTPATIENT)
Dept: WOUND CARE | Age: 59
Discharge: HOME | End: 2024-08-26
Payer: COMMERCIAL

## 2024-08-26 VITALS
RESPIRATION RATE: 16 BRPM | TEMPERATURE: 98.1 F | SYSTOLIC BLOOD PRESSURE: 164 MMHG | DIASTOLIC BLOOD PRESSURE: 99 MMHG | HEART RATE: 75 BPM

## 2024-08-26 PROCEDURE — 29581 APPL MULTLAYER CMPRN SYS LEG: CPT

## 2024-08-26 NOTE — WOUND CARE
Multilayer Compression Wrap   (Not Unna) Below the Knee    NAME:  Jozef Felix  YOB: 1965  MEDICAL RECORD NUMBER:  [id number]  DATE:  8/26/2024    Multilayer compression wrap: Removed old Multilayer wrap if indicated and wash leg with mild soap/water.  Applied moisturizing agent to dry skin as needed.   Applied primary and secondary dressing as ordered.  Applied multilayered dressing below the knee to right lower leg.  Applied multilayered dressing below the knee to left lower leg.  Instructed patient/caregiver not to remove dressing and to keep it clean and dry.   Instructed patient/caregiver on complications to report to provider, such as pain, numbness in toes, heavy drainage, and slippage of dressing.  Instructed patient on purpose of compression dressing and on activity and exercise recommendations.      Electronically signed by Namita Barraza RN on 8/26/2024 at 3:15 PM

## 2024-08-26 NOTE — FLOWSHEET NOTE
08/26/24 1443   Right Leg Edema Point of Measurement   Leg circumference 45 cm   Ankle circumference 27 cm   Foot circumference 28.5 cm   Compression Therapy 4 layer compression wrap   Left Leg Edema Point of Measurement   Leg circumference 56.5 cm   Ankle circumference 24 cm   Foot circumference 28.5 cm   Compression Therapy 4 layer compression wrap   Wound 10/09/23 Leg Right;Lower #2   Date First Assessed/Time First Assessed: 10/09/23 1528   Present on Original Admission: Yes  Wound Approximate Age at First Assessment (Weeks): (c)   Primary Wound Type: Venous Ulcer  Location: Leg  Wound Location Orientation: Right;Lower  Wound Descr...   Wound Image      Wound Etiology Venous   Dressing Status Old drainage noted   Wound Cleansed Soap and water   Dressing/Treatment ABD   Wound Length (cm) 5.5 cm   Wound Width (cm) 3.5 cm   Wound Depth (cm) 0.1 cm   Wound Surface Area (cm^2) 19.25 cm^2   Change in Wound Size % (l*w) 97.99   Wound Volume (cm^3) 1.925 cm^3   Wound Healing % 98   Wound Assessment Pink/red   Drainage Amount Moderate (25-50%)   Drainage Description Serosanguinous   Odor None   Ariadne-wound Assessment Hyperkeratosis (callous);Dry/flaky   Wound Thickness Description not for Pressure Injury Full thickness   Wound 10/09/23 Pretibial Left;Lower #3   Date First Assessed/Time First Assessed: 10/09/23 1528   Present on Original Admission: Yes  Wound Approximate Age at First Assessment (Weeks): (c)   Primary Wound Type: Venous Ulcer  Location: Pretibial  Wound Location Orientation: Left;Lower  Wound ...   Wound Image     Wound Etiology Venous   Dressing Status Old drainage noted   Wound Cleansed Soap and water   Dressing/Treatment ABD   Wound Length (cm) 0 cm   Wound Width (cm) 0 cm   Wound Depth (cm) 0 cm   Wound Surface Area (cm^2) 0 cm^2   Change in Wound Size % (l*w) 100   Wound Volume (cm^3) 0 cm^3   Wound Healing % 100   Wound Assessment Epithelialization   Drainage Amount None (dry)   Odor None

## 2024-09-03 ENCOUNTER — HOSPITAL ENCOUNTER (OUTPATIENT)
Dept: WOUND CARE | Age: 59
Discharge: HOME | End: 2024-09-03
Attending: FAMILY MEDICINE
Payer: COMMERCIAL

## 2024-09-03 VITALS
HEART RATE: 75 BPM | DIASTOLIC BLOOD PRESSURE: 105 MMHG | WEIGHT: 315 LBS | SYSTOLIC BLOOD PRESSURE: 159 MMHG | RESPIRATION RATE: 16 BRPM | BODY MASS INDEX: 46.89 KG/M2 | TEMPERATURE: 97.5 F

## 2024-09-03 PROCEDURE — 29581 APPL MULTLAYER CMPRN SYS LEG: CPT

## 2024-09-03 ASSESSMENT — PAIN DESCRIPTION - DESCRIPTORS: DESCRIPTORS: BURNING;ITCHING

## 2024-09-03 ASSESSMENT — PAIN DESCRIPTION - LOCATION: LOCATION: LEG

## 2024-09-03 ASSESSMENT — PAIN DESCRIPTION - ORIENTATION: ORIENTATION: LEFT

## 2024-09-03 ASSESSMENT — PAIN SCALES - GENERAL: PAINLEVEL_OUTOF10: 6

## 2024-09-03 NOTE — WOUND CARE
Discharge Instructions for  [facility]  [address] [place] [address]  [address]  [place] [address]  Phone [phone]   Fax [phone]      NAME:  Jozef Felix  YOB: 1965  MEDICAL RECORD NUMBER:  [id number]  DATE:  9/3/2024    Return Appointment:   1 week with Alfa Edouard DO    Instructions:   Bilateral lower legs:  Cleanse with soap and warm water  -- include between toes   Wrap with Vashe moistened Kerlix, include weaving between toes. Leave for full minute.  Remove Kerlix  Apply Alginate Ag to wound beds  Cover with ABD  Wrap with 4 Layer Compression Wrap.  Dressing change 1 x weekly. IDEALLY 2x WEEKLY.  (If breakthrough drainage occurs, please call Friday for rewrap).    Elevate legs when sitting.  Avoid prolonged standing or sitting with legs in dependent position.  Be cautious of increased salt intake as this promotes fluid retention and swelling.  Increase protein intake to promote wound healing. Blayne and Ensure are examples of protein supplements.    Continue weight loss  Contineu adequate protein (100 gms daily)     Use your lymphedema pumps 1 hour DAILY.       Should you experience increased redness, swelling, pain, foul odor, size of wound(s), or have a temperature over 101 degrees please contact the [facility]aling Center at [phone] or if after hours contact your primary care physician or go to the hospital emergency department.    PLEASE NOTE: IF YOU ARE UNABLE TO OBTAIN WOUND SUPPLIES, CONTINUE TO USE THE SUPPLIES YOU HAVE AVAILABLE UNTIL YOU ARE ABLE TO REACH US. IT IS MOST IMPORTANT TO KEEP THE WOUND COVERED AT ALL TIMES.    Electronically signed Maria Elena Sequeira RN on 9/3/2024 at 6:40 PM

## 2024-09-03 NOTE — FLOWSHEET NOTE
Wound Etiology Venous   Dressing Status Old drainage noted   Wound Cleansed Cleansed with saline;Soap and water;Vashe   Dressing/Treatment Alginate with Ag   Wound Length (cm) 1 cm   Wound Width (cm) 1 cm   Wound Depth (cm) 0.1 cm   Wound Surface Area (cm^2) 1 cm^2   Change in Wound Size % (l*w) 99.92   Wound Volume (cm^3) 0.1 cm^3   Wound Healing % 100   Wound Assessment Pink/red   Drainage Amount Small (< 25%)   Drainage Description Serosanguinous   Odor None   Ariadne-wound Assessment Dry/flaky   Margins Attached edges   Wound Thickness Description not for Pressure Injury Partial thickness   Pain Assessment   Pain Assessment 0-10   Pain Level 6   Patient's Stated Pain Goal 0 - No pain   Pain Location Leg   Pain Orientation Left   Pain Descriptors Burning;Itching

## 2024-09-03 NOTE — WOUND CARE
Multilayer Compression Wrap   (Not Unna) Below the Knee    NAME:  Jozef Felix  YOB: 1965  MEDICAL RECORD NUMBER:  [id number]  DATE:  9/3/2024    Multilayer compression wrap: Removed old Multilayer wrap if indicated and wash leg with mild soap/water.  Applied moisturizing agent to dry skin as needed.   Applied primary and secondary dressing as ordered.  Applied multilayered dressing below the knee to right lower leg.  Applied multilayered dressing below the knee to left lower leg.  Instructed patient/caregiver not to remove dressing and to keep it clean and dry.   Instructed patient/caregiver on complications to report to provider, such as pain, numbness in toes, heavy drainage, and slippage of dressing.  Instructed patient on purpose of compression dressing and on activity and exercise recommendations.      Electronically signed by Maria Elena Sequeira RN on 9/3/2024 at 6:31 PM

## 2024-09-10 RX ORDER — METRONIDAZOLE 500 MG/1
500 TABLET ORAL 2 TIMES DAILY
Qty: 60 TABLET | Refills: 0 | Status: SHIPPED | OUTPATIENT
Start: 2024-09-10 | End: 2024-10-10

## 2024-09-16 ENCOUNTER — HOSPITAL ENCOUNTER (OUTPATIENT)
Dept: WOUND CARE | Age: 59
Discharge: HOME | End: 2024-09-16
Attending: FAMILY MEDICINE
Payer: COMMERCIAL

## 2024-09-16 VITALS
DIASTOLIC BLOOD PRESSURE: 109 MMHG | RESPIRATION RATE: 16 BRPM | HEIGHT: 73 IN | TEMPERATURE: 98.1 F | WEIGHT: 315 LBS | BODY MASS INDEX: 41.75 KG/M2 | SYSTOLIC BLOOD PRESSURE: 166 MMHG | HEART RATE: 81 BPM

## 2024-09-16 DIAGNOSIS — M79.605 PAIN OF LEFT LOWER EXTREMITY: ICD-10-CM

## 2024-09-16 DIAGNOSIS — L97.301 VENOUS STASIS ULCER OF ANKLE LIMITED TO BREAKDOWN OF SKIN WITH VARICOSE VEINS, UNSPECIFIED LATERALITY (HCC): Primary | ICD-10-CM

## 2024-09-16 DIAGNOSIS — I83.003 VENOUS STASIS ULCER OF ANKLE LIMITED TO BREAKDOWN OF SKIN WITH VARICOSE VEINS, UNSPECIFIED LATERALITY (HCC): Primary | ICD-10-CM

## 2024-09-16 PROCEDURE — 29581 APPL MULTLAYER CMPRN SYS LEG: CPT

## 2024-09-16 PROCEDURE — 99213 OFFICE O/P EST LOW 20 MIN: CPT | Performed by: FAMILY MEDICINE

## 2024-09-16 RX ORDER — SODIUM CHLOR/HYPOCHLOROUS ACID 0.033 %
SOLUTION, IRRIGATION IRRIGATION ONCE
Status: DISCONTINUED | OUTPATIENT
Start: 2024-09-16 | End: 2024-09-17 | Stop reason: HOSPADM

## 2024-09-16 RX ORDER — BACITRACIN ZINC AND POLYMYXIN B SULFATE 500; 1000 [USP'U]/G; [USP'U]/G
OINTMENT TOPICAL ONCE
OUTPATIENT
Start: 2024-09-16 | End: 2024-09-16

## 2024-09-16 RX ORDER — NEOMYCIN/BACITRACIN/POLYMYXINB 3.5-400-5K
OINTMENT (GRAM) TOPICAL ONCE
OUTPATIENT
Start: 2024-09-16 | End: 2024-09-16

## 2024-09-16 RX ORDER — TRIAMCINOLONE ACETONIDE 1 MG/G
OINTMENT TOPICAL ONCE
OUTPATIENT
Start: 2024-09-16 | End: 2024-09-16

## 2024-09-16 RX ORDER — GINSENG 100 MG
CAPSULE ORAL ONCE
OUTPATIENT
Start: 2024-09-16 | End: 2024-09-16

## 2024-09-16 RX ORDER — LIDOCAINE HYDROCHLORIDE 20 MG/ML
JELLY TOPICAL ONCE
OUTPATIENT
Start: 2024-09-16 | End: 2024-09-16

## 2024-09-16 RX ORDER — LIDOCAINE HYDROCHLORIDE 40 MG/ML
SOLUTION TOPICAL ONCE
OUTPATIENT
Start: 2024-09-16 | End: 2024-09-16

## 2024-09-16 RX ORDER — LIDOCAINE 50 MG/G
OINTMENT TOPICAL ONCE
OUTPATIENT
Start: 2024-09-16 | End: 2024-09-16

## 2024-09-16 RX ORDER — CLOBETASOL PROPIONATE 0.5 MG/G
OINTMENT TOPICAL ONCE
OUTPATIENT
Start: 2024-09-16 | End: 2024-09-16

## 2024-09-16 RX ORDER — CEPHALEXIN 500 MG/1
500 CAPSULE ORAL 4 TIMES DAILY
Qty: 40 CAPSULE | Refills: 0 | Status: SHIPPED | OUTPATIENT
Start: 2024-09-16 | End: 2024-09-26

## 2024-09-16 RX ORDER — SODIUM CHLOR/HYPOCHLOROUS ACID 0.033 %
SOLUTION, IRRIGATION IRRIGATION ONCE
OUTPATIENT
Start: 2024-09-16 | End: 2024-09-16

## 2024-09-16 RX ORDER — MUPIROCIN 20 MG/G
OINTMENT TOPICAL ONCE
OUTPATIENT
Start: 2024-09-16 | End: 2024-09-16

## 2024-09-16 RX ORDER — BETAMETHASONE DIPROPIONATE 0.05 %
OINTMENT (GRAM) TOPICAL ONCE
OUTPATIENT
Start: 2024-09-16 | End: 2024-09-16

## 2024-09-16 RX ORDER — LIDOCAINE 40 MG/G
CREAM TOPICAL ONCE
OUTPATIENT
Start: 2024-09-16 | End: 2024-09-16

## 2024-09-16 RX ORDER — GENTAMICIN SULFATE 1 MG/G
OINTMENT TOPICAL ONCE
OUTPATIENT
Start: 2024-09-16 | End: 2024-09-16

## 2024-09-16 ASSESSMENT — PAIN SCALES - GENERAL: PAINLEVEL_OUTOF10: 4

## 2024-09-16 ASSESSMENT — PAIN DESCRIPTION - ORIENTATION: ORIENTATION: LEFT;RIGHT

## 2024-09-16 ASSESSMENT — PAIN DESCRIPTION - DESCRIPTORS: DESCRIPTORS: SHOOTING;STABBING

## 2024-09-24 ENCOUNTER — HOSPITAL ENCOUNTER (OUTPATIENT)
Dept: WOUND CARE | Age: 59
Discharge: HOME | End: 2024-09-24
Attending: FAMILY MEDICINE
Payer: COMMERCIAL

## 2024-09-24 VITALS
BODY MASS INDEX: 41.75 KG/M2 | HEART RATE: 80 BPM | WEIGHT: 315 LBS | HEIGHT: 73 IN | SYSTOLIC BLOOD PRESSURE: 168 MMHG | DIASTOLIC BLOOD PRESSURE: 101 MMHG | TEMPERATURE: 98.4 F | OXYGEN SATURATION: 97 % | RESPIRATION RATE: 20 BRPM

## 2024-09-24 DIAGNOSIS — L97.301 VENOUS STASIS ULCER OF ANKLE LIMITED TO BREAKDOWN OF SKIN WITH VARICOSE VEINS, UNSPECIFIED LATERALITY (HCC): Primary | ICD-10-CM

## 2024-09-24 DIAGNOSIS — I83.003 VENOUS STASIS ULCER OF ANKLE LIMITED TO BREAKDOWN OF SKIN WITH VARICOSE VEINS, UNSPECIFIED LATERALITY (HCC): Primary | ICD-10-CM

## 2024-09-24 DIAGNOSIS — M79.605 PAIN OF LEFT LOWER EXTREMITY: ICD-10-CM

## 2024-09-24 PROCEDURE — 29581 APPL MULTLAYER CMPRN SYS LEG: CPT

## 2024-09-24 RX ORDER — LIDOCAINE HYDROCHLORIDE 40 MG/ML
SOLUTION TOPICAL ONCE
OUTPATIENT
Start: 2024-09-24 | End: 2024-09-24

## 2024-09-24 RX ORDER — LIDOCAINE HYDROCHLORIDE 20 MG/ML
JELLY TOPICAL ONCE
OUTPATIENT
Start: 2024-09-24 | End: 2024-09-24

## 2024-09-24 RX ORDER — LIDOCAINE 50 MG/G
OINTMENT TOPICAL ONCE
OUTPATIENT
Start: 2024-09-24 | End: 2024-09-24

## 2024-09-24 RX ORDER — NEOMYCIN/BACITRACIN/POLYMYXINB 3.5-400-5K
OINTMENT (GRAM) TOPICAL ONCE
OUTPATIENT
Start: 2024-09-24 | End: 2024-09-24

## 2024-09-24 RX ORDER — GENTAMICIN SULFATE 1 MG/G
OINTMENT TOPICAL ONCE
OUTPATIENT
Start: 2024-09-24 | End: 2024-09-24

## 2024-09-24 RX ORDER — MUPIROCIN 20 MG/G
OINTMENT TOPICAL ONCE
OUTPATIENT
Start: 2024-09-24 | End: 2024-09-24

## 2024-09-24 RX ORDER — LIDOCAINE 40 MG/G
CREAM TOPICAL ONCE
OUTPATIENT
Start: 2024-09-24 | End: 2024-09-24

## 2024-09-24 RX ORDER — BACITRACIN ZINC AND POLYMYXIN B SULFATE 500; 1000 [USP'U]/G; [USP'U]/G
OINTMENT TOPICAL ONCE
OUTPATIENT
Start: 2024-09-24 | End: 2024-09-24

## 2024-09-24 RX ORDER — BETAMETHASONE DIPROPIONATE 0.05 %
OINTMENT (GRAM) TOPICAL ONCE
OUTPATIENT
Start: 2024-09-24 | End: 2024-09-24

## 2024-09-24 RX ORDER — GINSENG 100 MG
CAPSULE ORAL ONCE
OUTPATIENT
Start: 2024-09-24 | End: 2024-09-24

## 2024-09-24 RX ORDER — SODIUM CHLOR/HYPOCHLOROUS ACID 0.033 %
SOLUTION, IRRIGATION IRRIGATION ONCE
OUTPATIENT
Start: 2024-09-24 | End: 2024-09-24

## 2024-09-24 RX ORDER — CLOBETASOL PROPIONATE 0.5 MG/G
OINTMENT TOPICAL ONCE
OUTPATIENT
Start: 2024-09-24 | End: 2024-09-24

## 2024-09-24 RX ORDER — TRIAMCINOLONE ACETONIDE 1 MG/G
OINTMENT TOPICAL ONCE
OUTPATIENT
Start: 2024-09-24 | End: 2024-09-24

## 2024-10-01 ENCOUNTER — HOSPITAL ENCOUNTER (OUTPATIENT)
Dept: WOUND CARE | Age: 59
Discharge: HOME | End: 2024-10-01
Attending: FAMILY MEDICINE
Payer: COMMERCIAL

## 2024-10-01 VITALS — SYSTOLIC BLOOD PRESSURE: 171 MMHG | DIASTOLIC BLOOD PRESSURE: 115 MMHG | RESPIRATION RATE: 20 BRPM | TEMPERATURE: 98 F

## 2024-10-01 DIAGNOSIS — L97.301 VENOUS STASIS ULCER OF ANKLE LIMITED TO BREAKDOWN OF SKIN WITH VARICOSE VEINS, UNSPECIFIED LATERALITY (HCC): Primary | Chronic | ICD-10-CM

## 2024-10-01 DIAGNOSIS — M79.605 PAIN OF LEFT LOWER EXTREMITY: ICD-10-CM

## 2024-10-01 DIAGNOSIS — I83.003 VENOUS STASIS ULCER OF ANKLE LIMITED TO BREAKDOWN OF SKIN WITH VARICOSE VEINS, UNSPECIFIED LATERALITY (HCC): Primary | Chronic | ICD-10-CM

## 2024-10-01 PROCEDURE — 29581 APPL MULTLAYER CMPRN SYS LEG: CPT

## 2024-10-01 NOTE — FLOWSHEET NOTE
10/01/24 1453   Right Leg Edema Point of Measurement   Leg circumference 45 cm   Ankle circumference 25.5 cm   Foot circumference 28.5 cm   Compression Therapy 4 layer compression wrap   Left Leg Edema Point of Measurement   Leg circumference 56 cm   Ankle circumference 24 cm   Foot circumference 29 cm   Compression Therapy 4 layer compression wrap   Wound 10/09/23 Leg Right;Lower #2   Date First Assessed/Time First Assessed: 10/09/23 1528   Present on Original Admission: Yes  Wound Approximate Age at First Assessment (Weeks): (c)   Primary Wound Type: Venous Ulcer  Location: Leg  Wound Location Orientation: Right;Lower  Wound Descr...   Wound Image    Wound Etiology Venous   Dressing Status Old drainage noted   Wound Cleansed Soap and water   Dressing/Treatment Alginate with Ag   Wound Length (cm) 10.5 cm   Wound Width (cm) 9.5 cm   Wound Depth (cm) 0.1 cm   Wound Surface Area (cm^2) 99.75 cm^2   Change in Wound Size % (l*w) 89.58   Wound Volume (cm^3) 9.975 cm^3   Wound Healing % 90   Wound Assessment Pink/red   Drainage Amount Moderate (25-50%)   Drainage Description Serosanguinous   Odor None   Wound Thickness Description not for Pressure Injury Full thickness   Wound 10/09/23 Pretibial Left;Lower #3   Date First Assessed/Time First Assessed: 10/09/23 1528   Present on Original Admission: Yes  Wound Approximate Age at First Assessment (Weeks): (c)   Primary Wound Type: Venous Ulcer  Location: Pretibial  Wound Location Orientation: Left;Lower  Wound ...   Wound Image    Wound Etiology Venous   Dressing Status Old drainage noted   Wound Cleansed Soap and water   Dressing/Treatment Alginate with Ag   Wound Length (cm) 23.5 cm   Wound Width (cm) 7.5 cm   Wound Depth (cm) 0.1 cm   Wound Surface Area (cm^2) 176.25 cm^2   Change in Wound Size % (l*w) 86.34   Wound Volume (cm^3) 17.625 cm^3   Wound Healing % 86   Wound Assessment Pink/red   Drainage Amount Moderate (25-50%)   Drainage Description Serosanguinous

## 2024-10-01 NOTE — WOUND CARE
Discharge Instructions for  [facility]  [address] [place] [address]  [address]  [place] [address]  Phone [phone]   Fax [phone]      NAME:  Jozef Felix  YOB: 1965  MEDICAL RECORD NUMBER:  [id number]  DATE:  10/1/2024    Return Appointment:   2 week with Alfa Edouard, DO                                         1 week with clinician for dressing change    Instructions:   Bilateral lower legs:  Cleanse with soap and warm water  -- include between toes   Wrap with Vashe moistened Kerlix, include weaving between toes. Leave for full minute.  Remove Kerlix  Apply Alginate Ag to wound beds  Cover with ABD  Wrap with 4 Layer Compression Wrap.  Dressing change 1 x weekly. IDEALLY 2x WEEKLY.  (If breakthrough drainage occurs, please call Friday for rewrap).    Elevate legs when sitting.  Avoid prolonged standing or sitting with legs in dependent position.  Be cautious of increased salt intake as this promotes fluid retention and swelling.  Increase protein intake to promote wound healing. Blayne and Ensure are examples of protein supplements.    Continue weight loss  Contineu adequate protein (100 gms daily)     Use your lymphedema pumps 1 hour DAILY.      ANTIBIOTIC PRESCRIPTION SENT TO PHARMACY TODAY     Should you experience increased redness, swelling, pain, foul odor, size of wound(s), or have a temperature over 101 degrees please contact the [facility] Center at [phone] or if after hours contact your primary care physician or go to the hospital emergency department.    PLEASE NOTE: IF YOU ARE UNABLE TO OBTAIN WOUND SUPPLIES, CONTINUE TO USE THE SUPPLIES YOU HAVE AVAILABLE UNTIL YOU ARE ABLE TO REACH US. IT IS MOST IMPORTANT TO KEEP THE WOUND COVERED AT ALL TIMES.    Electronically signed Sadie Bedolla RN on 10/1/2024 at 3:20 PM

## 2024-10-01 NOTE — WOUND CARE
Multilayer Compression Wrap   (Not Unna) Below the Knee    NAME:  Jozef Felix  YOB: 1965  MEDICAL RECORD NUMBER:  [id number]  DATE:  10/1/2024    Multilayer compression wrap: Removed old Multilayer wrap if indicated and wash leg with mild soap/water.  Applied primary and secondary dressing as ordered.  Applied multilayered dressing below the knee to right lower leg.  Applied multilayered dressing below the knee to left lower leg.  Instructed patient/caregiver not to remove dressing and to keep it clean and dry.   Instructed patient/caregiver on complications to report to provider, such as pain, numbness in toes, heavy drainage, and slippage of dressing.  Instructed patient on purpose of compression dressing and on activity and exercise recommendations.      Electronically signed by Sadie Bedolla RN on 10/1/2024 at 3:42 PM

## 2024-10-14 ENCOUNTER — HOSPITAL ENCOUNTER (OUTPATIENT)
Dept: WOUND CARE | Age: 59
Discharge: HOME | End: 2024-10-14
Payer: COMMERCIAL

## 2024-10-14 VITALS
HEIGHT: 73 IN | RESPIRATION RATE: 18 BRPM | TEMPERATURE: 99.1 F | SYSTOLIC BLOOD PRESSURE: 159 MMHG | BODY MASS INDEX: 41.75 KG/M2 | HEART RATE: 71 BPM | WEIGHT: 315 LBS | DIASTOLIC BLOOD PRESSURE: 100 MMHG

## 2024-10-14 DIAGNOSIS — L97.301 VENOUS STASIS ULCER OF ANKLE LIMITED TO BREAKDOWN OF SKIN WITH VARICOSE VEINS, UNSPECIFIED LATERALITY (HCC): Primary | ICD-10-CM

## 2024-10-14 DIAGNOSIS — I83.003 VENOUS STASIS ULCER OF ANKLE LIMITED TO BREAKDOWN OF SKIN WITH VARICOSE VEINS, UNSPECIFIED LATERALITY (HCC): Primary | ICD-10-CM

## 2024-10-14 DIAGNOSIS — M79.605 PAIN OF LEFT LOWER EXTREMITY: ICD-10-CM

## 2024-10-14 PROCEDURE — 99213 OFFICE O/P EST LOW 20 MIN: CPT

## 2024-10-14 RX ORDER — BACITRACIN ZINC AND POLYMYXIN B SULFATE 500; 1000 [USP'U]/G; [USP'U]/G
OINTMENT TOPICAL ONCE
OUTPATIENT
Start: 2024-10-14 | End: 2024-10-14

## 2024-10-14 RX ORDER — LIDOCAINE HYDROCHLORIDE 40 MG/ML
SOLUTION TOPICAL ONCE
OUTPATIENT
Start: 2024-10-14 | End: 2024-10-14

## 2024-10-14 RX ORDER — GENTAMICIN SULFATE 1 MG/G
OINTMENT TOPICAL ONCE
OUTPATIENT
Start: 2024-10-14 | End: 2024-10-14

## 2024-10-14 RX ORDER — LIDOCAINE 40 MG/G
CREAM TOPICAL ONCE
OUTPATIENT
Start: 2024-10-14 | End: 2024-10-14

## 2024-10-14 RX ORDER — TRIAMCINOLONE ACETONIDE 1 MG/G
OINTMENT TOPICAL ONCE
OUTPATIENT
Start: 2024-10-14 | End: 2024-10-14

## 2024-10-14 RX ORDER — LIDOCAINE HYDROCHLORIDE 20 MG/ML
JELLY TOPICAL ONCE
OUTPATIENT
Start: 2024-10-14 | End: 2024-10-14

## 2024-10-14 RX ORDER — CLOBETASOL PROPIONATE 0.5 MG/G
OINTMENT TOPICAL ONCE
OUTPATIENT
Start: 2024-10-14 | End: 2024-10-14

## 2024-10-14 RX ORDER — MUPIROCIN 20 MG/G
OINTMENT TOPICAL ONCE
OUTPATIENT
Start: 2024-10-14 | End: 2024-10-14

## 2024-10-14 RX ORDER — GINSENG 100 MG
CAPSULE ORAL ONCE
OUTPATIENT
Start: 2024-10-14 | End: 2024-10-14

## 2024-10-14 RX ORDER — LIDOCAINE 50 MG/G
OINTMENT TOPICAL ONCE
OUTPATIENT
Start: 2024-10-14 | End: 2024-10-14

## 2024-10-14 RX ORDER — BETAMETHASONE DIPROPIONATE 0.05 %
OINTMENT (GRAM) TOPICAL ONCE
OUTPATIENT
Start: 2024-10-14 | End: 2024-10-14

## 2024-10-14 RX ORDER — NEOMYCIN/BACITRACIN/POLYMYXINB 3.5-400-5K
OINTMENT (GRAM) TOPICAL ONCE
OUTPATIENT
Start: 2024-10-14 | End: 2024-10-14

## 2024-10-14 RX ORDER — SODIUM CHLOR/HYPOCHLOROUS ACID 0.033 %
SOLUTION, IRRIGATION IRRIGATION ONCE
OUTPATIENT
Start: 2024-10-14 | End: 2024-10-14

## 2024-10-14 ASSESSMENT — PAIN SCALES - GENERAL: PAINLEVEL_OUTOF10: 0

## 2024-10-14 NOTE — WOUND CARE
Discharge Instructions for  [facility]  [address] [place] [address]  [address]  [place] [address]  Phone [phone]   Fax [phone]      NAME:  Jozef Felix  YOB: 1965  MEDICAL RECORD NUMBER:  [id number]  DATE:  10/14/2024    Return Appointment:   2 week with Alfa Edouard, DO                                         1 week with clinician for dressing change    Instructions:   Bilateral lower legs:  Cleanse with soap and warm water  -- include between toes   Apply Vaseline to dry areas on bilateral legs  Wrap with Vashe moistened Kerlix, include weaving between toes. Leave for full minute.  Remove Kerlix  Apply Alginate Ag to wound beds  Cover with ABD  Wrap with 4 Layer Compression Wrap.  Dressing change 2x weekly.   (If breakthrough drainage occurs, please call Friday for rewrap).    Apply Xeroform with vaseline to left lateral foot.      Elevate legs when sitting.  Avoid prolonged standing or sitting with legs in dependent position.  Be cautious of increased salt intake as this promotes fluid retention and swelling.  Increase protein intake to promote wound healing. Blayne and Ensure are examples of protein supplements.    Continue weight loss  Contineu adequate protein (100 gms daily)     Use your lymphedema pumps 1 hour DAILY.      ANTIBIOTIC PRESCRIPTION SENT TO PHARMACY TODAY     Should you experience increased redness, swelling, pain, foul odor, size of wound(s), or have a temperature over 101 degrees please contact the [facility] Center at [phone] or if after hours contact your primary care physician or go to the hospital emergency department.    PLEASE NOTE: IF YOU ARE UNABLE TO OBTAIN WOUND SUPPLIES, CONTINUE TO USE THE SUPPLIES YOU HAVE AVAILABLE UNTIL YOU ARE ABLE TO REACH US. IT IS MOST IMPORTANT TO KEEP THE WOUND COVERED AT ALL TIMES.    Electronically signed NOE KRAUSE RN on 10/14/2024 at 3:43 PM

## 2024-10-14 NOTE — WOUND CARE
Electronically signed by NOE KRAUSE RN on 10/14/2024 at 4:01 PMMultilayer Compression Wrap   (Not Unna) Below the Knee    NAME:  Jozef Felix  YOB: 1965  MEDICAL RECORD NUMBER:  [id number]  DATE:  10/14/2024    Multilayer compression wrap: Removed old Multilayer wrap if indicated and wash leg with mild soap/water.  Applied moisturizing agent to dry skin as needed.   Applied primary and secondary dressing as ordered.  Applied multilayered dressing below the knee to right lower leg.  Applied multilayered dressing below the knee to left lower leg.  Instructed patient/caregiver not to remove dressing and to keep it clean and dry.   Instructed patient/caregiver on complications to report to provider, such as pain, numbness in toes, heavy drainage, and slippage of dressing.  Instructed patient on purpose of compression dressing and on activity and exercise recommendations.      Electronically signed by NOE KRAUSE RN on 10/14/2024 at 4:01 PM

## 2024-10-14 NOTE — FLOWSHEET NOTE
10/14/24 1528   Right Leg Edema Point of Measurement   Leg circumference 45 cm   Ankle circumference 27.5 cm   Foot circumference 29 cm   Left Leg Edema Point of Measurement   Leg circumference 47 cm   Ankle circumference 25 cm   Foot circumference 27 cm   Compression Therapy 4 layer compression wrap   LLE Neurovascular Assessment   Capillary Refill Less than/Equal to 3 seconds   Color Yellow-Brown/Hemosiderin Staining   Temperature Warm   LLE Sensation  Full sensation   Wound 10/09/23 Leg Right;Lower #2   Date First Assessed/Time First Assessed: 10/09/23 1528   Present on Original Admission: Yes  Wound Approximate Age at First Assessment (Weeks): (c)   Primary Wound Type: Venous Ulcer  Location: Leg  Wound Location Orientation: Right;Lower  Wound Descr...   Wound Image    Wound Etiology Venous   Dressing Status Old drainage noted   Wound Cleansed Soap and water   Dressing/Treatment Alginate with Ag   Wound Length (cm) 4 cm   Wound Width (cm) 7 cm   Wound Depth (cm) 0.1 cm   Wound Surface Area (cm^2) 28 cm^2   Change in Wound Size % (l*w) 97.07   Wound Volume (cm^3) 2.8 cm^3   Wound Healing % 97   Wound Assessment Pink/red   Drainage Amount Moderate (25-50%)   Drainage Description Serosanguinous   Odor Moderate   Ariadne-wound Assessment Dry/flaky   Wound Thickness Description not for Pressure Injury Full thickness   Wound 10/09/23 Pretibial Left;Lower #3   Date First Assessed/Time First Assessed: 10/09/23 1528   Present on Original Admission: Yes  Wound Approximate Age at First Assessment (Weeks): (c)   Primary Wound Type: Venous Ulcer  Location: Pretibial  Wound Location Orientation: Left;Lower  Wound ...   Wound Etiology Venous   Dressing Status Old drainage noted   Wound Cleansed Soap and water   Dressing/Treatment Alginate with Ag   Wound Length (cm) 8 cm   Wound Width (cm) 11 cm   Wound Depth (cm) 0.1 cm   Wound Surface Area (cm^2) 88 cm^2   Change in Wound Size % (l*w) 93.18   Wound Volume (cm^3) 8.8 cm^3

## 2024-10-21 ENCOUNTER — HOSPITAL ENCOUNTER (OUTPATIENT)
Dept: WOUND CARE | Age: 59
Discharge: HOME | End: 2024-10-21
Payer: COMMERCIAL

## 2024-10-21 VITALS
HEART RATE: 74 BPM | DIASTOLIC BLOOD PRESSURE: 95 MMHG | RESPIRATION RATE: 18 BRPM | WEIGHT: 315 LBS | TEMPERATURE: 97.4 F | SYSTOLIC BLOOD PRESSURE: 157 MMHG | OXYGEN SATURATION: 96 % | HEIGHT: 73 IN | BODY MASS INDEX: 41.75 KG/M2

## 2024-10-21 PROCEDURE — 29581 APPL MULTLAYER CMPRN SYS LEG: CPT

## 2024-10-21 ASSESSMENT — PAIN SCALES - GENERAL: PAINLEVEL_OUTOF10: 0

## 2024-10-21 NOTE — WOUND CARE
Multilayer Compression Wrap   (Not Unna) Below the Knee    NAME:  Jozef Felix  YOB: 1965  MEDICAL RECORD NUMBER:  [id number]  DATE:  10/21/2024    Multilayer compression wrap: Removed old Multilayer wrap if indicated and wash leg with mild soap/water.  Applied moisturizing agent to dry skin as needed.   Applied primary and secondary dressing as ordered.  Applied multilayered dressing below the knee to right lower leg.  Applied multilayered dressing below the knee to left lower leg.  Instructed patient/caregiver not to remove dressing and to keep it clean and dry.   Instructed patient/caregiver on complications to report to provider, such as pain, numbness in toes, heavy drainage, and slippage of dressing.  Instructed patient on purpose of compression dressing and on activity and exercise recommendations.      Electronically signed by NOE KRAUSE RN on 10/21/2024 at 3:52 PM

## 2024-10-21 NOTE — FLOWSHEET NOTE
10/21/24 1458   Right Leg Edema Point of Measurement   Leg circumference 44 cm   Ankle circumference 27 cm   Foot circumference 29 cm   Compression Therapy 4 layer compression wrap   Left Leg Edema Point of Measurement   Leg circumference 47 cm   Ankle circumference 25 cm   Foot circumference 26 cm   Compression Therapy 4 layer compression wrap   Wound 09/16/24 Head Left;Upper #3 left head- Sebatious cyst   Date First Assessed/Time First Assessed: 09/16/24 1609   Wound Approximate Age at First Assessment (Weeks): 1 weeks  Primary Wound Type: (c) Other (comment)  Location: Head  Wound Location Orientation: Left;Upper  Wound Description (Comments): #3 left...   Wound Length (cm) 0.5 cm   Wound Width (cm) 0.3 cm   Wound Depth (cm) 0.1 cm   Wound Surface Area (cm^2) 0.15 cm^2   Change in Wound Size % (l*w) 90   Wound Volume (cm^3) 0.015 cm^3   Wound Healing % 98   Wound Assessment Pink/red   Drainage Amount Small (< 25%)   Drainage Description Serosanguinous   Wound Thickness Description not for Pressure Injury Full thickness   Wound 10/09/23 Leg Right;Lower #2   Date First Assessed/Time First Assessed: 10/09/23 1528   Present on Original Admission: Yes  Wound Approximate Age at First Assessment (Weeks): (c)   Primary Wound Type: Venous Ulcer  Location: Leg  Wound Location Orientation: Right;Lower  Wound Descr...   Wound Etiology Venous   Dressing Status Old drainage noted   Wound Cleansed Soap and water   Dressing/Treatment Alginate with Ag   Wound Length (cm) 4 cm   Wound Width (cm) 7 cm   Wound Depth (cm) 0.1 cm   Wound Surface Area (cm^2) 28 cm^2   Change in Wound Size % (l*w) 97.07   Wound Volume (cm^3) 2.8 cm^3   Wound Healing % 97   Wound Assessment Pink/red   Drainage Amount Moderate (25-50%)   Drainage Description Serosanguinous   Odor Moderate   Ariadne-wound Assessment Dry/flaky   Wound Thickness Description not for Pressure Injury Full thickness   Wound 10/09/23 Pretibial Left;Lower #3   Date First

## 2024-10-28 ENCOUNTER — HOSPITAL ENCOUNTER (OUTPATIENT)
Dept: WOUND CARE | Age: 59
Discharge: HOME | End: 2024-10-28
Payer: COMMERCIAL

## 2024-10-28 VITALS
HEIGHT: 73 IN | TEMPERATURE: 98.1 F | RESPIRATION RATE: 12 BRPM | WEIGHT: 315 LBS | HEART RATE: 75 BPM | DIASTOLIC BLOOD PRESSURE: 102 MMHG | SYSTOLIC BLOOD PRESSURE: 161 MMHG | BODY MASS INDEX: 41.75 KG/M2

## 2024-10-28 DIAGNOSIS — I83.003 VENOUS STASIS ULCER OF ANKLE LIMITED TO BREAKDOWN OF SKIN WITH VARICOSE VEINS, UNSPECIFIED LATERALITY (HCC): ICD-10-CM

## 2024-10-28 DIAGNOSIS — M79.605 PAIN OF LEFT LOWER EXTREMITY: Primary | ICD-10-CM

## 2024-10-28 DIAGNOSIS — L97.301 VENOUS STASIS ULCER OF ANKLE LIMITED TO BREAKDOWN OF SKIN WITH VARICOSE VEINS, UNSPECIFIED LATERALITY (HCC): ICD-10-CM

## 2024-10-28 PROCEDURE — 29581 APPL MULTLAYER CMPRN SYS LEG: CPT

## 2024-10-28 RX ORDER — LIDOCAINE HYDROCHLORIDE 20 MG/ML
JELLY TOPICAL ONCE
Status: COMPLETED | OUTPATIENT
Start: 2024-10-28 | End: 2024-10-28

## 2024-10-28 RX ORDER — METRONIDAZOLE 250 MG/1
250 TABLET ORAL 2 TIMES DAILY
Qty: 60 TABLET | Refills: 1 | Status: SHIPPED | OUTPATIENT
Start: 2024-10-28 | End: 2024-12-27

## 2024-10-28 RX ORDER — SODIUM CHLOR/HYPOCHLOROUS ACID 0.033 %
SOLUTION, IRRIGATION IRRIGATION ONCE
Status: COMPLETED | OUTPATIENT
Start: 2024-10-28 | End: 2024-10-28

## 2024-10-28 RX ADMIN — LIDOCAINE HYDROCHLORIDE: 20 JELLY TOPICAL at 15:54

## 2024-10-28 RX ADMIN — Medication: at 15:55

## 2024-10-28 ASSESSMENT — PAIN DESCRIPTION - LOCATION: LOCATION: LEG

## 2024-10-28 ASSESSMENT — PAIN SCALES - GENERAL: PAINLEVEL_OUTOF10: 3

## 2024-10-28 ASSESSMENT — PAIN DESCRIPTION - FREQUENCY: FREQUENCY: OTHER (COMMENT)

## 2024-10-28 ASSESSMENT — PAIN DESCRIPTION - DESCRIPTORS: DESCRIPTORS: BURNING;ITCHING

## 2024-10-28 ASSESSMENT — PAIN DESCRIPTION - ORIENTATION: ORIENTATION: RIGHT;LEFT

## 2024-10-28 NOTE — DISCHARGE INSTRUCTIONS
Bilateral lower legs:  Cleanse with soap and warm water  -- include between toes   Apply Vaseline to dry areas on bilateral legs  Wrap with Vashe moistened Kerlix, include weaving between toes. Leave for full minute.  Remove Kerlix  Apply Alginate Ag to wound beds  Cover with ABD  Wrap with 4 Layer Compression Wrap.  Dressing change 2x weekly.   (If breakthrough drainage occurs, please call Friday for rewrap).     Apply Xeroform with vaseline to left lateral foot.     Elevate legs when sitting.  Avoid prolonged standing or sitting with legs in dependent position.  Be cautious of increased salt intake as this promotes fluid retention and swelling.  Increase protein intake to promote wound healing. Blayne and Ensure are examples of protein supplements.     Continue weight loss and adequate fluid intake.  Continue adequate protein (100 gms daily)     Use your lymphedema pumps 1 hour DAILY.     Flagyl -  [facility] [place], [place]

## 2024-10-28 NOTE — WOUND CARE
Multilayer Compression Wrap   (Not Unna) Below the Knee    NAME:  Jozef Felix  YOB: 1965  MEDICAL RECORD NUMBER:  [id number]  DATE:  10/28/2024    Multilayer compression wrap: Removed old Multilayer wrap if indicated and wash leg with mild soap/water.  Applied moisturizing agent to dry skin as needed.   Applied primary and secondary dressing as ordered.  Applied multilayered dressing below the knee to right lower leg.  Applied multilayered dressing below the knee to left lower leg.  Instructed patient/caregiver not to remove dressing and to keep it clean and dry.   Instructed patient/caregiver on complications to report to provider, such as pain, numbness in toes, heavy drainage, and slippage of dressing.  Instructed patient on purpose of compression dressing and on activity and exercise recommendations.      Electronically signed by Maria Elena Sequeira RN on 10/28/2024 at 4:40 PM

## 2024-10-28 NOTE — WOUND CARE
Discharge Instructions for  [facility]  [address]  [address]  [place] [address]  Phone [phone]   Fax [phone]      NAME:  oJzef Felix  YOB: 1965  MEDICAL RECORD NUMBER:  [id number]  DATE:  10/28/2024    Return Appointment:   2 weeks with Alfa Edouard DO  Return Appointment: 1 week with Clinician     Instructions:   Bilateral lower legs:  Cleanse with soap and warm water  -- include between toes   Apply Vaseline to dry areas on bilateral legs  Wrap with Vashe moistened Kerlix, include weaving between toes. Leave for full minute.  Remove Kerlix  Apply Alginate Ag to wound beds  Cover with ABD  Wrap with 4 Layer Compression Wrap.  Dressing change 2x weekly.   (If breakthrough drainage occurs, please call Friday for rewrap).     Apply Xeroform with vaseline to left lateral foot.     Elevate legs when sitting.  Avoid prolonged standing or sitting with legs in dependent position.  Be cautious of increased salt intake as this promotes fluid retention and swelling.  Increase protein intake to promote wound healing. Blayne and Ensure are examples of protein supplements.     Continue weight loss and adequate fluid intake.  Continue adequate protein (100 gms daily)     Use your lymphedema pumps 1 hour DAILY.     Flagyl -  [facility] [place], [place]       Should you experience increased redness, swelling, pain, foul odor, size of wound(s), or have a temperature over 101 degrees please contact the [facility]aling Center at [phone] or if after hours contact your primary care physician or go to the hospital emergency department.    PLEASE NOTE: IF YOU ARE UNABLE TO OBTAIN WOUND SUPPLIES, CONTINUE TO USE THE SUPPLIES YOU HAVE AVAILABLE UNTIL YOU ARE ABLE TO REACH US. IT IS MOST IMPORTANT TO KEEP THE WOUND COVERED AT ALL TIMES.    Electronically signed Maria Elena Sequeira RN on 10/28/2024 at 3:39 PM

## 2024-10-28 NOTE — FLOWSHEET NOTE
Pre and  Post Debridement Note     10/28/24 1458   Right Leg Edema Point of Measurement   Leg circumference 52 cm   Ankle circumference 25 cm   Foot circumference 29 cm   Compression Therapy 4 layer compression wrap   Left Leg Edema Point of Measurement   Leg circumference 51 cm   Ankle circumference 23 cm   Foot circumference 29 cm   Compression Therapy 4 layer compression wrap   Wound 10/09/23 Leg Right;Lower #2   Date First Assessed/Time First Assessed: 10/09/23 1528   Present on Original Admission: Yes  Wound Approximate Age at First Assessment (Weeks): (c)   Primary Wound Type: Venous Ulcer  Location: Leg  Wound Location Orientation: Right;Lower  Wound Descr...   Wound Image     Wound Etiology Venous   Dressing Status Old drainage noted   Wound Cleansed Soap and water;Cleansed with saline;Vashe   Dressing/Treatment Alginate with Ag   Wound Length (cm) 9 cm   Wound Width (cm) 18 cm   Wound Depth (cm) 0.1 cm   Wound Surface Area (cm^2) 162 cm^2   Change in Wound Size % (l*w) 83.07   Wound Volume (cm^3) 16.2 cm^3   Wound Healing % 83   Wound Assessment [place]/red;Bleeding   Drainage Amount Large (50-75% saturated)   Drainage Description Serosanguinous   Odor Malodorous/putrid   Ariadne-wound Assessment Edematous;Hemosiderin staining (brown yellow)   Margins Attached edges   Wound Thickness Description not for Pressure Injury Full thickness   Wound 10/09/23 Pretibial Left;Lower #3   Date First Assessed/Time First Assessed: 10/09/23 1528   Present on Original Admission: Yes  Wound Approximate Age at First Assessment (Weeks): (c)   Primary Wound Type: Venous Ulcer  Location: Pretibial  Wound Location Orientation: Left;Lower  Wound ...   Wound Image    Wound Etiology Venous   Dressing Status Old drainage noted   Wound Cleansed Cleansed with saline;Soap and water;Vashe   Dressing/Treatment Alginate with Ag   Wound Length (cm) 2 cm   Wound Width (cm) 16 cm   Wound Depth (cm) 0.2 cm   Wound Surface Area (cm^2) 32 cm^2

## 2024-11-05 ENCOUNTER — HOSPITAL ENCOUNTER (OUTPATIENT)
Dept: WOUND CARE | Age: 59
Discharge: HOME | End: 2024-11-05
Payer: COMMERCIAL

## 2024-11-05 VITALS
TEMPERATURE: 98.3 F | DIASTOLIC BLOOD PRESSURE: 102 MMHG | RESPIRATION RATE: 16 BRPM | SYSTOLIC BLOOD PRESSURE: 162 MMHG | HEART RATE: 75 BPM

## 2024-11-05 PROCEDURE — 29581 APPL MULTLAYER CMPRN SYS LEG: CPT

## 2024-11-05 ASSESSMENT — PAIN DESCRIPTION - LOCATION: LOCATION: FOOT

## 2024-11-05 ASSESSMENT — PAIN SCALES - GENERAL: PAINLEVEL_OUTOF10: 10

## 2024-11-05 ASSESSMENT — PAIN DESCRIPTION - DESCRIPTORS: DESCRIPTORS: ACHING

## 2024-11-05 ASSESSMENT — PAIN DESCRIPTION - ORIENTATION: ORIENTATION: LEFT

## 2024-11-05 NOTE — WOUND CARE
Multilayer Compression Wrap   (Not Unna) Below the Knee    NAME:  Jozef Felix  YOB: 1965  MEDICAL RECORD NUMBER:  [id number]  DATE:  11/5/2024    Multilayer compression wrap: Removed old Multilayer wrap if indicated and wash leg with mild soap/water.  Applied moisturizing agent to dry skin as needed.   Applied primary and secondary dressing as ordered.  Applied multilayered dressing below the knee to right lower leg.  Applied multilayered dressing below the knee to left lower leg.  Instructed patient/caregiver not to remove dressing and to keep it clean and dry.   Instructed patient/caregiver on complications to report to provider, such as pain, numbness in toes, heavy drainage, and slippage of dressing.  Instructed patient on purpose of compression dressing and on activity and exercise recommendations.      Electronically signed by Namita Pal RN on 11/5/2024 at 3:42 PM

## 2024-11-05 NOTE — FLOWSHEET NOTE
11/05/24 1459   Right Leg Edema Point of Measurement   Leg circumference 51 cm   Ankle circumference 26 cm   Foot circumference 29 cm   Compression Therapy 4 layer compression wrap   Left Leg Edema Point of Measurement   Leg circumference 50 cm   Ankle circumference 23 cm   Foot circumference 29 cm   Compression Therapy 4 layer compression wrap   Wound 10/09/23 Leg Right;Lower #2   Date First Assessed/Time First Assessed: 10/09/23 1528   Present on Original Admission: Yes  Wound Approximate Age at First Assessment (Weeks): (c)   Primary Wound Type: Venous Ulcer  Location: Leg  Wound Location Orientation: Right;Lower  Wound Descr...   Wound Image    Wound Etiology Venous   Dressing Status Old drainage noted   Wound Cleansed Soap and water;Cleansed with saline;Vashe   Dressing/Treatment Alginate with Ag   Wound Length (cm) 9 cm   Wound Width (cm) 18 cm   Wound Depth (cm) 0.1 cm   Wound Surface Area (cm^2) 162 cm^2   Change in Wound Size % (l*w) 83.07   Wound Volume (cm^3) 16.2 cm^3   Wound Healing % 83   Wound Assessment [place]/red;Bleeding   Drainage Amount Large (50-75% saturated)   Drainage Description Serosanguinous   Odor Moderate   Ariadne-wound Assessment Edematous;Hemosiderin staining (brown yellow)   Margins Attached edges   Wound Thickness Description not for Pressure Injury Full thickness   Wound 10/09/23 Pretibial Left;Lower #3   Date First Assessed/Time First Assessed: 10/09/23 1528   Present on Original Admission: Yes  Wound Approximate Age at First Assessment (Weeks): (c)   Primary Wound Type: Venous Ulcer  Location: Pretibial  Wound Location Orientation: Left;Lower  Wound ...   Wound Image    Wound Etiology Venous   Dressing Status Old drainage noted   Wound Cleansed Cleansed with saline;Soap and water;Vashe   Dressing/Treatment Alginate with Ag   Wound Length (cm) 2 cm   Wound Width (cm) 16 cm   Wound Depth (cm) 0.2 cm   Wound Surface Area (cm^2) 32 cm^2   Change in Wound Size % (l*w) 97.52   Wound

## 2024-11-11 ENCOUNTER — HOSPITAL ENCOUNTER (OUTPATIENT)
Dept: WOUND CARE | Age: 59
Discharge: HOME | End: 2024-11-11
Payer: COMMERCIAL

## 2024-11-11 VITALS
SYSTOLIC BLOOD PRESSURE: 152 MMHG | WEIGHT: 315 LBS | HEART RATE: 71 BPM | RESPIRATION RATE: 12 BRPM | TEMPERATURE: 97.7 F | BODY MASS INDEX: 41.75 KG/M2 | OXYGEN SATURATION: 92 % | DIASTOLIC BLOOD PRESSURE: 86 MMHG | HEIGHT: 73 IN

## 2024-11-11 DIAGNOSIS — I83.003 VENOUS STASIS ULCER OF ANKLE LIMITED TO BREAKDOWN OF SKIN WITH VARICOSE VEINS, UNSPECIFIED LATERALITY (HCC): ICD-10-CM

## 2024-11-11 DIAGNOSIS — M79.605 PAIN OF LEFT LOWER EXTREMITY: Primary | ICD-10-CM

## 2024-11-11 DIAGNOSIS — L97.301 VENOUS STASIS ULCER OF ANKLE LIMITED TO BREAKDOWN OF SKIN WITH VARICOSE VEINS, UNSPECIFIED LATERALITY (HCC): ICD-10-CM

## 2024-11-11 PROCEDURE — 99213 OFFICE O/P EST LOW 20 MIN: CPT | Performed by: FAMILY MEDICINE

## 2024-11-11 PROCEDURE — 29581 APPL MULTLAYER CMPRN SYS LEG: CPT

## 2024-11-11 RX ORDER — LIDOCAINE HYDROCHLORIDE 20 MG/ML
JELLY TOPICAL ONCE
Status: COMPLETED | OUTPATIENT
Start: 2024-11-11 | End: 2024-11-11

## 2024-11-11 RX ORDER — SODIUM CHLOR/HYPOCHLOROUS ACID 0.033 %
SOLUTION, IRRIGATION IRRIGATION ONCE
Status: COMPLETED | OUTPATIENT
Start: 2024-11-11 | End: 2024-11-11

## 2024-11-11 RX ADMIN — LIDOCAINE HYDROCHLORIDE: 20 JELLY TOPICAL at 15:58

## 2024-11-11 RX ADMIN — Medication: at 15:59

## 2024-11-11 NOTE — DISCHARGE INSTRUCTIONS
Bilateral lower legs:  Cleanse with soap and warm water  -- include between toes    Apply Vaseline to dry areas on bilateral legs  Wrap with DAKINS moistened Kerlix, include weaving between toes. Leave for full minute.  Remove Kerlix  Apply Alginate Ag to wound beds  Cover with ABD  Wrap with 4 Layer Compression Wrap.  Dressing change 2x weekly.   (If breakthrough drainage occurs, please call Friday for rewrap).     Apply Xeroform with vaseline to left lateral foot.     Elevate legs when sitting.  Avoid prolonged standing or sitting with legs in dependent position.  Be cautious of increased salt intake as this promotes fluid retention and swelling.  Increase protein intake to promote wound healing. Blayne and Ensure are examples of protein supplements.     Continue weight loss and adequate fluid intake.  Continue adequate protein (100 gms daily)     Use your lymphedema pumps 1 hour DAILY.     Flagyl

## 2024-11-11 NOTE — WOUND CARE
Discharge Instructions for  [facility]  [address] [place] [address]  [address]  [place] [address]  Phone [phone]   Fax [phone]      NAME:  Jozef Felix  YOB: 1965  MEDICAL RECORD NUMBER:  [id number]  DATE:  11/11/2024    Return Appointment:   2 weeks with Alfa Edouard DO  Return Appointment: 1 week with Clinician      Instructions:   Bilateral lower legs:  Cleanse with soap and warm water  -- include between toes    Apply Vaseline to dry areas on bilateral legs  Wrap with DAKINS moistened Kerlix, include weaving between toes. Leave for full minute.  Remove Kerlix  Apply Alginate Ag to wound beds  Cover with ABD  Wrap with 4 Layer Compression Wrap.  Dressing change 2x weekly.   (If breakthrough drainage occurs, please call Friday for rewrap).     Apply Xeroform with vaseline to left lateral foot.     Elevate legs when sitting.  Avoid prolonged standing or sitting with legs in dependent position.  Be cautious of increased salt intake as this promotes fluid retention and swelling.  Increase protein intake to promote wound healing. Blayne and Ensure are examples of protein supplements.     Continue weight loss and adequate fluid intake.  Continue adequate protein (100 gms daily)     Use your lymphedema pumps 1 hour DAILY.     Flagyl          Should you experience increased redness, swelling, pain, foul odor, size of wound(s), or have a temperature over 101 degrees please contact the [facility] Center at [phone] or if after hours contact your primary care physician or go to the hospital emergency department.    PLEASE NOTE: IF YOU ARE UNABLE TO OBTAIN WOUND SUPPLIES, CONTINUE TO USE THE SUPPLIES YOU HAVE AVAILABLE UNTIL YOU ARE ABLE TO REACH US. IT IS MOST IMPORTANT TO KEEP THE WOUND COVERED AT ALL TIMES.    Electronically signed Maria Elena Sequeira RN on 11/11/2024 at 4:21 PM

## 2024-11-11 NOTE — WOUND CARE
Multilayer Compression Wrap   (Not Unna) Below the Knee    NAME:  Jozef Felix  YOB: 1965  MEDICAL RECORD NUMBER:  [id number]  DATE:  11/11/2024    Multilayer compression wrap: Removed old Multilayer wrap if indicated and wash leg with mild soap/water.  Applied moisturizing agent to dry skin as needed.   Applied primary and secondary dressing as ordered.  Applied multilayered dressing below the knee to right lower leg.  Applied multilayered dressing below the knee to left lower leg.  Instructed patient/caregiver not to remove dressing and to keep it clean and dry.   Instructed patient/caregiver on complications to report to provider, such as pain, numbness in toes, heavy drainage, and slippage of dressing.  Instructed patient on purpose of compression dressing and on activity and exercise recommendations.      Electronically signed by Maria Elena Sequeira RN on 11/11/2024 at 4:24 PM

## 2024-11-11 NOTE — FLOWSHEET NOTE
11/11/24 1741   Right Leg Edema Point of Measurement   Leg circumference 50 cm   Ankle circumference 26 cm   Foot circumference 28 cm   Compression Therapy 4 layer compression wrap   Left Leg Edema Point of Measurement   Leg circumference 52 cm   Ankle circumference 24 cm   Foot circumference 29 cm   Compression Therapy 4 layer compression wrap   Wound 10/09/23 Leg Right;Lower #2   Date First Assessed/Time First Assessed: 10/09/23 1528   Present on Original Admission: Yes  Wound Approximate Age at First Assessment (Weeks): (c)   Primary Wound Type: Venous Ulcer  Location: Leg  Wound Location Orientation: Right;Lower  Wound Descr...   Wound Image     Wound Etiology Venous  (Lymphedema Component)   Dressing Status Old drainage noted   Wound Cleansed Soap and water;Cleansed with saline;Vashe   Dressing/Treatment Alginate with Ag   Wound Length (cm) 12.5 cm   Wound Width (cm) 23 cm   Wound Depth (cm) 0.2 cm   Wound Surface Area (cm^2) 287.5 cm^2   Change in Wound Size % (l*w) 69.96   Wound Volume (cm^3) 57.5 cm^3   Wound Healing % 40   Wound Assessment [place]/red;Bleeding   Drainage Amount Large (50-75% saturated)   Drainage Description Serosanguinous   Odor Moderate   Ariadne-wound Assessment Edematous;Erosion;Hemosiderin staining (brown yellow)   Margins Attached edges   Wound Thickness Description not for Pressure Injury Full thickness   Wound 10/09/23 Pretibial Left;Lower #3   Date First Assessed/Time First Assessed: 10/09/23 1528   Present on Original Admission: Yes  Wound Approximate Age at First Assessment (Weeks): (c)   Primary Wound Type: Venous Ulcer  Location: Pretibial  Wound Location Orientation: Left;Lower  Wound ...   Wound Image     Wound Etiology Venous  (Lymphedema Component)   Dressing Status Old drainage noted   Wound Cleansed Cleansed with saline;Soap and water;Vashe   Dressing/Treatment Alginate with Ag   Wound Length (cm) 8 cm   Wound Width (cm) 30 cm   Wound Depth (cm) 0.2 cm   Wound Surface Area

## 2024-11-12 RX ORDER — SODIUM CHLOR/HYPOCHLOROUS ACID 0.033 %
SOLUTION, IRRIGATION IRRIGATION ONCE
OUTPATIENT
Start: 2024-11-12 | End: 2024-11-12

## 2024-11-12 RX ORDER — NEOMYCIN/BACITRACIN/POLYMYXINB 3.5-400-5K
OINTMENT (GRAM) TOPICAL ONCE
Status: CANCELLED | OUTPATIENT
Start: 2024-11-12 | End: 2024-11-12

## 2024-11-12 RX ORDER — LIDOCAINE HYDROCHLORIDE 20 MG/ML
JELLY TOPICAL ONCE
OUTPATIENT
Start: 2024-11-12 | End: 2024-11-12

## 2024-11-12 RX ORDER — BACITRACIN ZINC AND POLYMYXIN B SULFATE 500; 1000 [USP'U]/G; [USP'U]/G
OINTMENT TOPICAL ONCE
OUTPATIENT
Start: 2024-11-12 | End: 2024-11-12

## 2024-11-12 RX ORDER — TRIAMCINOLONE ACETONIDE 1 MG/G
OINTMENT TOPICAL ONCE
Status: CANCELLED | OUTPATIENT
Start: 2024-11-12 | End: 2024-11-12

## 2024-11-12 RX ORDER — LIDOCAINE HYDROCHLORIDE 40 MG/ML
SOLUTION TOPICAL ONCE
OUTPATIENT
Start: 2024-11-12 | End: 2024-11-12

## 2024-11-12 RX ORDER — BETAMETHASONE DIPROPIONATE 0.5 MG/G
CREAM TOPICAL ONCE
OUTPATIENT
Start: 2024-11-12 | End: 2024-11-12

## 2024-11-12 RX ORDER — SODIUM CHLOR/HYPOCHLOROUS ACID 0.033 %
SOLUTION, IRRIGATION IRRIGATION ONCE
Status: CANCELLED | OUTPATIENT
Start: 2024-11-12 | End: 2024-11-12

## 2024-11-12 RX ORDER — MUPIROCIN 20 MG/G
OINTMENT TOPICAL ONCE
OUTPATIENT
Start: 2024-11-12 | End: 2024-11-12

## 2024-11-12 RX ORDER — LIDOCAINE 50 MG/G
OINTMENT TOPICAL ONCE
Status: CANCELLED | OUTPATIENT
Start: 2024-11-12 | End: 2024-11-12

## 2024-11-12 RX ORDER — MUPIROCIN 20 MG/G
OINTMENT TOPICAL ONCE
Status: CANCELLED | OUTPATIENT
Start: 2024-11-12 | End: 2024-11-12

## 2024-11-12 RX ORDER — LIDOCAINE 40 MG/G
CREAM TOPICAL ONCE
Status: CANCELLED | OUTPATIENT
Start: 2024-11-12 | End: 2024-11-12

## 2024-11-12 RX ORDER — GINSENG 100 MG
CAPSULE ORAL ONCE
OUTPATIENT
Start: 2024-11-12 | End: 2024-11-12

## 2024-11-12 RX ORDER — CLOBETASOL PROPIONATE 0.5 MG/G
OINTMENT TOPICAL ONCE
Status: CANCELLED | OUTPATIENT
Start: 2024-11-12 | End: 2024-11-12

## 2024-11-12 RX ORDER — NEOMYCIN/BACITRACIN/POLYMYXINB 3.5-400-5K
OINTMENT (GRAM) TOPICAL ONCE
OUTPATIENT
Start: 2024-11-12 | End: 2024-11-12

## 2024-11-12 RX ORDER — GENTAMICIN SULFATE 1 MG/G
OINTMENT TOPICAL ONCE
Status: CANCELLED | OUTPATIENT
Start: 2024-11-12 | End: 2024-11-12

## 2024-11-12 RX ORDER — GENTAMICIN SULFATE 1 MG/G
OINTMENT TOPICAL ONCE
OUTPATIENT
Start: 2024-11-12 | End: 2024-11-12

## 2024-11-12 RX ORDER — LIDOCAINE 50 MG/G
OINTMENT TOPICAL ONCE
OUTPATIENT
Start: 2024-11-12 | End: 2024-11-12

## 2024-11-12 RX ORDER — LIDOCAINE HYDROCHLORIDE 20 MG/ML
JELLY TOPICAL ONCE
Status: CANCELLED | OUTPATIENT
Start: 2024-11-12 | End: 2024-11-12

## 2024-11-12 RX ORDER — TRIAMCINOLONE ACETONIDE 1 MG/G
OINTMENT TOPICAL ONCE
OUTPATIENT
Start: 2024-11-12 | End: 2024-11-12

## 2024-11-12 RX ORDER — LIDOCAINE HYDROCHLORIDE 40 MG/ML
SOLUTION TOPICAL ONCE
Status: CANCELLED | OUTPATIENT
Start: 2024-11-12 | End: 2024-11-12

## 2024-11-12 RX ORDER — BACITRACIN ZINC AND POLYMYXIN B SULFATE 500; 1000 [USP'U]/G; [USP'U]/G
OINTMENT TOPICAL ONCE
Status: CANCELLED | OUTPATIENT
Start: 2024-11-12 | End: 2024-11-12

## 2024-11-12 RX ORDER — SILVER SULFADIAZINE 10 MG/G
CREAM TOPICAL ONCE
Status: CANCELLED | OUTPATIENT
Start: 2024-11-12 | End: 2024-11-12

## 2024-11-12 RX ORDER — LIDOCAINE 40 MG/G
CREAM TOPICAL ONCE
OUTPATIENT
Start: 2024-11-12 | End: 2024-11-12

## 2024-11-12 RX ORDER — GINSENG 100 MG
CAPSULE ORAL ONCE
Status: CANCELLED | OUTPATIENT
Start: 2024-11-12 | End: 2024-11-12

## 2024-11-12 RX ORDER — CLOBETASOL PROPIONATE 0.5 MG/G
OINTMENT TOPICAL ONCE
OUTPATIENT
Start: 2024-11-12 | End: 2024-11-12

## 2024-11-12 NOTE — PROGRESS NOTES
and see photos and measurements.  Odor is noted.  Head: Normocephalic and atraumatic  Eyes: Extraocular eye movements intact, conjunctivae normal, and sclera anicteric  ENT: Hearing grossly normal bilaterally. Normal appearance  Respiratory: no chest wall tenderness. no respiratory distress  GI: Abdomen non-tender and benign  Musculoskeletal: Baseline range of motion in joints. Nontender calves. No cyanosis. Edema 2+.  Neurologic: Speech normal. At baseline without new focal deficits. Mental status normal or at baseline    PAST MEDICAL HISTORY        Diagnosis Date    Anxiety     Arrhythmia     a-fib    Cellulitis of leg 2019    Erectile dysfunction 2014    EHSAN (generalized anxiety disorder) 2014    GERD (gastroesophageal reflux disease)     Gout 2014    History of peptic ulcer     Hypertension     Nausea & vomiting     Obesity, morbid 2018    Osteoarthritis of hand, primary localized 2014    Personal history of urinary calculi     x2    Venous (peripheral) insufficiency 12/3/2019       PAST SURGICAL HISTORY    Past Surgical History:   Procedure Laterality Date    VASCULAR SURGERY Bilateral 2020    Bilateral iliac venogram, intravascular ultrasound x2.    WISDOM TOOTH EXTRACTION         FAMILY HISTORY    History reviewed. No pertinent family history.    SOCIAL HISTORY    Social History     Tobacco Use    Smoking status: Former     Current packs/day: 0.00     Types: Cigarettes     Quit date: 2011     Years since quittin.3    Smokeless tobacco: Never    Tobacco comments:     Quit smoking: quit in the 80's ; maintains non-smoking status   Substance Use Topics    Alcohol use: No    Drug use: No       ALLERGIES    Allergies   Allergen Reactions    Codeine Nausea Only    Sulfa Antibiotics Rash       MEDICATIONS    Current Outpatient Medications on File Prior to Encounter   Medication Sig Dispense Refill    metroNIDAZOLE (FLAGYL) 250 MG tablet Take 1 tablet by mouth in the

## 2024-11-18 ENCOUNTER — HOSPITAL ENCOUNTER (OUTPATIENT)
Dept: WOUND CARE | Age: 59
Discharge: HOME | End: 2024-11-18
Payer: COMMERCIAL

## 2024-11-18 VITALS
TEMPERATURE: 97.5 F | HEART RATE: 75 BPM | WEIGHT: 315 LBS | RESPIRATION RATE: 16 BRPM | SYSTOLIC BLOOD PRESSURE: 168 MMHG | BODY MASS INDEX: 41.75 KG/M2 | DIASTOLIC BLOOD PRESSURE: 97 MMHG | HEIGHT: 73 IN | OXYGEN SATURATION: 99 %

## 2024-11-18 DIAGNOSIS — I83.003 VENOUS STASIS ULCER OF ANKLE LIMITED TO BREAKDOWN OF SKIN WITH VARICOSE VEINS, UNSPECIFIED LATERALITY (HCC): Primary | ICD-10-CM

## 2024-11-18 DIAGNOSIS — L97.301 VENOUS STASIS ULCER OF ANKLE LIMITED TO BREAKDOWN OF SKIN WITH VARICOSE VEINS, UNSPECIFIED LATERALITY (HCC): Primary | ICD-10-CM

## 2024-11-18 PROCEDURE — 29581 APPL MULTLAYER CMPRN SYS LEG: CPT

## 2024-11-18 RX ORDER — MUPIROCIN 20 MG/G
OINTMENT TOPICAL ONCE
OUTPATIENT
Start: 2024-11-18 | End: 2024-11-18

## 2024-11-18 RX ORDER — CLOBETASOL PROPIONATE 0.5 MG/G
OINTMENT TOPICAL ONCE
OUTPATIENT
Start: 2024-11-18 | End: 2024-11-18

## 2024-11-18 RX ORDER — LIDOCAINE HYDROCHLORIDE 20 MG/ML
JELLY TOPICAL ONCE
OUTPATIENT
Start: 2024-11-18 | End: 2024-11-18

## 2024-11-18 RX ORDER — BACITRACIN ZINC AND POLYMYXIN B SULFATE 500; 1000 [USP'U]/G; [USP'U]/G
OINTMENT TOPICAL ONCE
OUTPATIENT
Start: 2024-11-18 | End: 2024-11-18

## 2024-11-18 RX ORDER — LIDOCAINE 40 MG/G
CREAM TOPICAL ONCE
OUTPATIENT
Start: 2024-11-18 | End: 2024-11-18

## 2024-11-18 RX ORDER — TRIAMCINOLONE ACETONIDE 1 MG/G
OINTMENT TOPICAL ONCE
OUTPATIENT
Start: 2024-11-18 | End: 2024-11-18

## 2024-11-18 RX ORDER — NEOMYCIN/BACITRACIN/POLYMYXINB 3.5-400-5K
OINTMENT (GRAM) TOPICAL ONCE
OUTPATIENT
Start: 2024-11-18 | End: 2024-11-18

## 2024-11-18 RX ORDER — LIDOCAINE 50 MG/G
OINTMENT TOPICAL ONCE
OUTPATIENT
Start: 2024-11-18 | End: 2024-11-18

## 2024-11-18 RX ORDER — BETAMETHASONE DIPROPIONATE 0.5 MG/G
CREAM TOPICAL ONCE
OUTPATIENT
Start: 2024-11-18 | End: 2024-11-18

## 2024-11-18 RX ORDER — GENTAMICIN SULFATE 1 MG/G
OINTMENT TOPICAL ONCE
OUTPATIENT
Start: 2024-11-18 | End: 2024-11-18

## 2024-11-18 RX ORDER — SODIUM CHLOR/HYPOCHLOROUS ACID 0.033 %
SOLUTION, IRRIGATION IRRIGATION ONCE
OUTPATIENT
Start: 2024-11-18 | End: 2024-11-18

## 2024-11-18 RX ORDER — LIDOCAINE HYDROCHLORIDE 40 MG/ML
SOLUTION TOPICAL ONCE
OUTPATIENT
Start: 2024-11-18 | End: 2024-11-18

## 2024-11-18 RX ORDER — GINSENG 100 MG
CAPSULE ORAL ONCE
OUTPATIENT
Start: 2024-11-18 | End: 2024-11-18

## 2024-11-18 NOTE — FLOWSHEET NOTE
11/18/24 1624   Right Leg Edema Point of Measurement   Leg circumference 50 cm   Ankle circumference 26 cm   Foot circumference 28 cm   Compression Therapy 4 layer compression wrap   Left Leg Edema Point of Measurement   Leg circumference 52 cm   Ankle circumference 24 cm   Foot circumference 29 cm   Compression Therapy 4 layer compression wrap   Wound 10/09/23 Leg Right;Lower #2   Date First Assessed/Time First Assessed: 10/09/23 1528   Present on Original Admission: Yes  Wound Approximate Age at First Assessment (Weeks): (c)   Primary Wound Type: Venous Ulcer  Location: Leg  Wound Location Orientation: Right;Lower  Wound Descr...   Wound Image    Wound Etiology Venous   Dressing Status Old drainage noted   Wound Cleansed Soap and water   Dressing/Treatment Alginate with Ag;ABD   Wound Length (cm) 10 cm   Wound Width (cm) 20 cm   Wound Depth (cm) 0.1 cm   Wound Surface Area (cm^2) 200 cm^2   Change in Wound Size % (l*w) 79.1   Wound Volume (cm^3) 20 cm^3   Wound Healing % 79   Wound Assessment Pink/red   Drainage Amount Large (50-75% saturated)   Drainage Description Serosanguinous   Odor Malodorous/putrid   Ariadne-wound Assessment Edematous   Wound Thickness Description not for Pressure Injury Full thickness   Wound 10/09/23 Pretibial Left;Lower #3   Date First Assessed/Time First Assessed: 10/09/23 1528   Present on Original Admission: Yes  Wound Approximate Age at First Assessment (Weeks): (c)   Primary Wound Type: Venous Ulcer  Location: Pretibial  Wound Location Orientation: Left;Lower  Wound ...   Wound Image     Wound Etiology Venous   Dressing Status Old drainage noted   Wound Cleansed Soap and water   Dressing/Treatment Alginate with Ag   Wound Length (cm) 12 cm   Wound Width (cm) 25.5 cm   Wound Depth (cm) 0.1 cm   Wound Surface Area (cm^2) 306 cm^2   Change in Wound Size % (l*w) 76.28   Wound Volume (cm^3) 30.6 cm^3   Wound Healing % 76   Wound Assessment Pink/red   Drainage Amount Large (50-75%

## 2024-11-18 NOTE — WOUND CARE
Multilayer Compression Wrap   (Not Unna) Below the Knee    NAME:  Jozef Felix  YOB: 1965  MEDICAL RECORD NUMBER:  [id number]  DATE:  11/18/2024    Multilayer compression wrap: Removed old Multilayer wrap if indicated and wash leg with mild soap/water.  Applied primary and secondary dressing as ordered.  Applied multilayered dressing below the knee to right lower leg.  Applied multilayered dressing below the knee to left lower leg.  Instructed patient/caregiver not to remove dressing and to keep it clean and dry.   Instructed patient/caregiver on complications to report to provider, such as pain, numbness in toes, heavy drainage, and slippage of dressing.  Instructed patient on purpose of compression dressing and on activity and exercise recommendations.      Electronically signed by Ana Maria Rider PT, WCC on 11/18/2024 at 4:33 PM

## 2024-12-02 ENCOUNTER — HOSPITAL ENCOUNTER (OUTPATIENT)
Dept: WOUND CARE | Age: 59
Discharge: HOME | End: 2024-12-02
Payer: COMMERCIAL

## 2024-12-02 VITALS
HEART RATE: 88 BPM | RESPIRATION RATE: 18 BRPM | TEMPERATURE: 98.2 F | WEIGHT: 315 LBS | SYSTOLIC BLOOD PRESSURE: 169 MMHG | DIASTOLIC BLOOD PRESSURE: 97 MMHG | BODY MASS INDEX: 41.75 KG/M2 | HEIGHT: 73 IN | OXYGEN SATURATION: 95 %

## 2024-12-02 DIAGNOSIS — L97.919 CHRONIC VENOUS HYPERTENSION (IDIOPATHIC) WITH ULCER OF BILATERAL LOWER EXTREMITY (HCC): Chronic | ICD-10-CM

## 2024-12-02 DIAGNOSIS — L03.116 CELLULITIS OF BOTH LOWER EXTREMITIES: Primary | Chronic | ICD-10-CM

## 2024-12-02 DIAGNOSIS — L97.929 CHRONIC VENOUS HYPERTENSION (IDIOPATHIC) WITH ULCER OF BILATERAL LOWER EXTREMITY (HCC): Chronic | ICD-10-CM

## 2024-12-02 DIAGNOSIS — I87.313 CHRONIC VENOUS HYPERTENSION (IDIOPATHIC) WITH ULCER OF BILATERAL LOWER EXTREMITY (HCC): Chronic | ICD-10-CM

## 2024-12-02 DIAGNOSIS — I89.0 LYMPHEDEMA: Chronic | ICD-10-CM

## 2024-12-02 DIAGNOSIS — L97.922 NON-PRESSURE CHRONIC ULCER OF LEFT LOWER LEG WITH FAT LAYER EXPOSED (HCC): Chronic | ICD-10-CM

## 2024-12-02 DIAGNOSIS — L03.115 CELLULITIS OF BOTH LOWER EXTREMITIES: Primary | Chronic | ICD-10-CM

## 2024-12-02 DIAGNOSIS — L97.912 NON-PRESSURE CHRONIC ULCER OF LOWER LEG WITH FAT LAYER EXPOSED, RIGHT (HCC): Chronic | ICD-10-CM

## 2024-12-02 PROCEDURE — 99205 OFFICE O/P NEW HI 60 MIN: CPT

## 2024-12-02 PROCEDURE — 87075 CULTR BACTERIA EXCEPT BLOOD: CPT

## 2024-12-02 PROCEDURE — 99213 OFFICE O/P EST LOW 20 MIN: CPT

## 2024-12-02 PROCEDURE — 87176 TISSUE HOMOGENIZATION CULTR: CPT

## 2024-12-02 PROCEDURE — 87186 SC STD MICRODIL/AGAR DIL: CPT

## 2024-12-02 PROCEDURE — 11042 DBRDMT SUBQ TIS 1ST 20SQCM/<: CPT

## 2024-12-02 PROCEDURE — 87070 CULTURE OTHR SPECIMN AEROBIC: CPT

## 2024-12-02 PROCEDURE — 87205 SMEAR GRAM STAIN: CPT

## 2024-12-02 RX ORDER — TRIAMCINOLONE ACETONIDE 1 MG/G
OINTMENT TOPICAL ONCE
Status: CANCELLED | OUTPATIENT
Start: 2024-12-02 | End: 2024-12-02

## 2024-12-02 RX ORDER — GENTAMICIN SULFATE 1 MG/G
OINTMENT TOPICAL ONCE
Status: CANCELLED | OUTPATIENT
Start: 2024-12-02 | End: 2024-12-02

## 2024-12-02 RX ORDER — MUPIROCIN 20 MG/G
OINTMENT TOPICAL ONCE
Status: CANCELLED | OUTPATIENT
Start: 2024-12-02 | End: 2024-12-02

## 2024-12-02 RX ORDER — BETAMETHASONE DIPROPIONATE 0.5 MG/G
CREAM TOPICAL ONCE
Status: CANCELLED | OUTPATIENT
Start: 2024-12-02 | End: 2024-12-02

## 2024-12-02 RX ORDER — SODIUM CHLOR/HYPOCHLOROUS ACID 0.033 %
SOLUTION, IRRIGATION IRRIGATION ONCE
Status: CANCELLED | OUTPATIENT
Start: 2024-12-02 | End: 2024-12-02

## 2024-12-02 RX ORDER — LIDOCAINE HYDROCHLORIDE 40 MG/ML
SOLUTION TOPICAL ONCE
Status: CANCELLED | OUTPATIENT
Start: 2024-12-02 | End: 2024-12-02

## 2024-12-02 RX ORDER — DOXYCYCLINE HYCLATE 100 MG
100 TABLET ORAL 2 TIMES DAILY
Qty: 20 TABLET | Refills: 0 | Status: ON HOLD | OUTPATIENT
Start: 2024-12-02 | End: 2024-12-12

## 2024-12-02 RX ORDER — LIDOCAINE HYDROCHLORIDE 20 MG/ML
JELLY TOPICAL ONCE
Status: COMPLETED | OUTPATIENT
Start: 2024-12-02 | End: 2024-12-02

## 2024-12-02 RX ORDER — LIDOCAINE 40 MG/G
CREAM TOPICAL ONCE
Status: CANCELLED | OUTPATIENT
Start: 2024-12-02 | End: 2024-12-02

## 2024-12-02 RX ORDER — CLOBETASOL PROPIONATE 0.5 MG/G
OINTMENT TOPICAL ONCE
Status: CANCELLED | OUTPATIENT
Start: 2024-12-02 | End: 2024-12-02

## 2024-12-02 RX ORDER — GINSENG 100 MG
CAPSULE ORAL ONCE
Status: CANCELLED | OUTPATIENT
Start: 2024-12-02 | End: 2024-12-02

## 2024-12-02 RX ORDER — LIDOCAINE HYDROCHLORIDE 20 MG/ML
JELLY TOPICAL ONCE
Status: CANCELLED | OUTPATIENT
Start: 2024-12-02 | End: 2024-12-02

## 2024-12-02 RX ORDER — BACITRACIN ZINC AND POLYMYXIN B SULFATE 500; 1000 [USP'U]/G; [USP'U]/G
OINTMENT TOPICAL ONCE
Status: CANCELLED | OUTPATIENT
Start: 2024-12-02 | End: 2024-12-02

## 2024-12-02 RX ORDER — SODIUM CHLOR/HYPOCHLOROUS ACID 0.033 %
SOLUTION, IRRIGATION IRRIGATION ONCE
Status: COMPLETED | OUTPATIENT
Start: 2024-12-02 | End: 2024-12-02

## 2024-12-02 RX ORDER — LIDOCAINE 50 MG/G
OINTMENT TOPICAL ONCE
Status: CANCELLED | OUTPATIENT
Start: 2024-12-02 | End: 2024-12-02

## 2024-12-02 RX ORDER — NEOMYCIN/BACITRACIN/POLYMYXINB 3.5-400-5K
OINTMENT (GRAM) TOPICAL ONCE
Status: CANCELLED | OUTPATIENT
Start: 2024-12-02 | End: 2024-12-02

## 2024-12-02 RX ADMIN — LIDOCAINE HYDROCHLORIDE: 20 JELLY TOPICAL at 15:55

## 2024-12-02 RX ADMIN — Medication: at 15:56

## 2024-12-02 ASSESSMENT — PAIN DESCRIPTION - LOCATION: LOCATION: LEG

## 2024-12-02 ASSESSMENT — PAIN DESCRIPTION - PAIN TYPE: TYPE: ACUTE PAIN

## 2024-12-02 ASSESSMENT — PAIN DESCRIPTION - DESCRIPTORS: DESCRIPTORS: ACHING

## 2024-12-02 ASSESSMENT — ENCOUNTER SYMPTOMS
NAUSEA: 0
VOMITING: 0

## 2024-12-02 ASSESSMENT — PAIN DESCRIPTION - FREQUENCY: FREQUENCY: INTERMITTENT

## 2024-12-02 ASSESSMENT — PAIN DESCRIPTION - ORIENTATION: ORIENTATION: LEFT;RIGHT

## 2024-12-02 NOTE — WOUND CARE
Discharge Instructions for  [facility]  [address]  [address]  [place] [address]  Phone [phone]   Fax [phone]      NAME:  Jozef Felix  YOB: 1965  MEDICAL RECORD NUMBER:  [id number]  DATE:  12/2/2024    Return Appointment:   1 weeks with Alfa Edouard DO  Return Appointment: Thursday with Clinician      Instructions:   Bilateral lower legs:  Cleanse with soap and warm water  -- include between toes    Apply Vaseline to dry areas on bilateral legs  Wrap with DAKINS moistened Kerlix, include weaving between toes. Leave for full minute.  Remove Kerlix  Apply Alginate Ag to wound beds  Cover with ABD  Wrap with 4 Layer Compression Wrap.  Dressing change 2x weekly.   (If breakthrough drainage occurs, please call Friday for rewrap).     Apply Xeroform with vaseline to left lateral foot.     Elevate legs when sitting.  Avoid prolonged standing or sitting with legs in dependent position.  Be cautious of increased salt intake as this promotes fluid retention and swelling.  Increase protein intake to promote wound healing. Blayne and Ensure are examples of protein supplements.     Continue weight loss and adequate fluid intake.  Continue adequate protein (100 gms daily)     Use your lymphedema pumps 1 hour DAILY.     Wound Culture obtained at todays visit  Doxycycline ordered today  CBC, CRP, ESR and CMP ordered today         Should you experience increased redness, swelling, pain, foul odor, size of wound(s), or have a temperature over 101 degrees please contact the [facility]aling Center at [phone] or if after hours contact your primary care physician or go to the hospital emergency department.    PLEASE NOTE: IF YOU ARE UNABLE TO OBTAIN WOUND SUPPLIES, CONTINUE TO USE THE SUPPLIES YOU HAVE AVAILABLE UNTIL YOU ARE ABLE TO REACH US. IT IS MOST IMPORTANT TO KEEP THE WOUND COVERED AT ALL TIMES.    Electronically signed NOE KRAUSE RN on 12/2/2024

## 2024-12-02 NOTE — PROGRESS NOTES
Constitutional:  Positive for chills, fatigue and fever. Negative for appetite change.   Cardiovascular:  Positive for leg swelling.   Gastrointestinal:  Negative for nausea and vomiting.   Musculoskeletal:  Positive for gait problem.   Skin:  Positive for wound.   Allergic/Immunologic: Negative for immunocompromised state.   Neurological:  Positive for numbness.   Hematological:  Bruises/bleeds easily (Xarelto).   Psychiatric/Behavioral:  The patient is nervous/anxious.       Objective:    BP (!) 169/97   Pulse 88   Temp 98.2 °F (36.8 °C) (Oral)   Resp 18   Ht 1.854 m (6' 1\")   Wt (!) 164.8 kg (363 lb 6.4 oz)   SpO2 95%   BMI 47.94 kg/m²   Wt Readings from Last 3 Encounters:   12/02/24 (!) 164.8 kg (363 lb 6.4 oz)   11/18/24 (!) 163.3 kg (360 lb)   11/11/24 (!) 165.1 kg (364 lb)     Physical Exam  Vitals and nursing note reviewed.   Constitutional:       General: He is in acute distress.      Appearance: Normal appearance. He is ill-appearing.   Cardiovascular:      Rate and Rhythm: Normal rate.      Pulses: Normal pulses.   Pulmonary:      Effort: Pulmonary effort is normal.   Musculoskeletal:      Right lower leg: Edema present.      Left lower leg: Edema present.   Skin:     General: Skin is warm and dry.      Findings: Erythema and wound (circumferential wounds to BLE) present.      Comments: Lymphedema to bilateral lower extremities.   Neurological:      Mental Status: He is alert. Mental status is at baseline.   Psychiatric:         Mood and Affect: Mood is anxious. Affect is tearful.         Behavior: Behavior normal.       Wound 10/09/23 Leg Right;Lower #2 (Active)   Wound Image   12/02/24 1437   Wound Etiology Venous 11/18/24 1624   Dressing Status Old drainage noted 11/18/24 1624   Wound Cleansed Soap and water 11/18/24 1624   Dressing/Treatment Alginate with Ag;ABD 11/18/24 1624   Wound Length (cm) 10 cm 11/18/24 1624   Wound Width (cm) 20 cm 11/18/24 1624   Wound Depth (cm) 0.1 cm 11/18/24 1624

## 2024-12-02 NOTE — WOUND CARE
Discharge Instructions for  [facility]  [address]  [address]  [place] [address]  Phone [phone]   Fax [phone]      NAME:  Jozef Felix  YOB: 1965  MEDICAL RECORD NUMBER:  [id number]  DATE:  12/2/2024    Return Appointment:   1 week  with Alfa Edouard DO  Return Appointment: Thursday with clinician for dressing change     Instructions:   Bilateral lower legs:  Cleanse with soap and warm water  -- include between toes    Apply Vaseline to dry areas on bilateral legs  Wrap with DAKINS moistened Kerlix, include weaving between toes. Leave for full minute.  Remove Kerlix  Apply Alginate Ag to wound beds  Cover with ABD  Wrap with 4 Layer Compression Wrap.  Dressing change 2x weekly.   (If breakthrough drainage occurs, please call Friday for rewrap).     Apply Xeroform with vaseline to left lateral foot.     Elevate legs when sitting.  Avoid prolonged standing or sitting with legs in dependent position.  Be cautious of increased salt intake as this promotes fluid retention and swelling.  Increase protein intake to promote wound healing. Blayne and Ensure are examples of protein supplements.     Continue weight loss and adequate fluid intake.  Continue adequate protein (100 gms daily)     Use your lymphedema pumps 1 hour DAILY.    Wound Culture obtained at todays visit  Doxycycline antibiotic ordered at todays visit for 10 days  CBC, CRP, ESR, and CMP to be drawn today         Should you experience increased redness, swelling, pain, foul odor, size of wound(s), or have a temperature over 101 degrees please contact the [facility]aling Center at [phone] or if after hours contact your primary care physician or go to the hospital emergency department.    PLEASE NOTE: IF YOU ARE UNABLE TO OBTAIN WOUND SUPPLIES, CONTINUE TO USE THE SUPPLIES YOU HAVE AVAILABLE UNTIL YOU ARE ABLE TO REACH US. IT IS MOST IMPORTANT TO KEEP THE WOUND COVERED AT ALL

## 2024-12-02 NOTE — ASSESSMENT & PLAN NOTE
Assessment  Patient has not been seen nor dressings changed for 2 weeks; presents to day with makeshift dressings that are saturated  BLE wounds have deteriorated with areas of dark gray/necrotic tissue and areas of erythematous, denuded skin, L>R; there is a foul odor noted even after legs are cleaned; no deya purulence noted  Patient reports fever and chills and just not feeling well for several days; BP is elevated but at patient's baseline, HR is 88, and temp is 98.2  Plan  My recommendation is for patient to be seen in the ED, but he declines; after a lengthy discussion with patient and his wife (by telephone), including discussion about risks, the plan is for patient to have blood drawn today and start an oral antibiotic, however if he continues to have fevers after starting the oral antibiotic, or if he gets worse, he will seek treatment in the ED; he also agrees to return to the clinic on Thursday for wound check  Tissue culture taken  CBC, CRP, ESR, CMP  Doxycycline 100 mg BID x 10 days  RTC on Thursday for wound check  Infection precautions given; urged patient to have low threshold for seeking treatment in the ED

## 2024-12-02 NOTE — WOUND CARE
Multilayer Compression Wrap   (Not Unna) Below the Knee    NAME:  Jozef Felix  YOB: 1965  MEDICAL RECORD NUMBER:  [id number]  DATE:  12/2/2024    Multilayer compression wrap: Removed old Multilayer wrap if indicated and wash leg with mild soap/water.  Applied moisturizing agent to dry skin as needed.   Applied primary and secondary dressing as ordered.  Applied multilayered dressing below the knee to right lower leg.  Applied multilayered dressing below the knee to left lower leg.  Instructed patient/caregiver not to remove dressing and to keep it clean and dry.   Instructed patient/caregiver on complications to report to provider, such as pain, numbness in toes, heavy drainage, and slippage of dressing.  Instructed patient on purpose of compression dressing and on activity and exercise recommendations.      Electronically signed by NOE KRAUSE RN on 12/2/2024 at 4:07 PM

## 2024-12-02 NOTE — WOUND CARE
Discharge Instructions for  [facility]  [address] [place] [address]  [address]  [place] [address]  Phone [phone]   Fax [phone]      NAME:  Jozef Felix  YOB: 1965  MEDICAL RECORD NUMBER:  [id number]  DATE:  12/2/2024    Return Appointment:   2 weeks with Alfa Edouard DO  Return Appointment: 1 week with Clinician      Instructions:   Bilateral lower legs:  Cleanse with soap and warm water  -- include between toes    Apply Vaseline to dry areas on bilateral legs  Wrap with DAKINS moistened Kerlix, include weaving between toes. Leave for full minute.  Remove Kerlix  Apply Alginate Ag to wound beds  Cover with ABD  Wrap with 4 Layer Compression Wrap.  Dressing change 2x weekly.   (If breakthrough drainage occurs, please call Friday for rewrap).     Apply Xeroform with vaseline to left lateral foot.     Elevate legs when sitting.  Avoid prolonged standing or sitting with legs in dependent position.  Be cautious of increased salt intake as this promotes fluid retention and swelling.  Increase protein intake to promote wound healing. Blayne and Ensure are examples of protein supplements.     Continue weight loss and adequate fluid intake.  Continue adequate protein (100 gms daily)     Use your lymphedema pumps 1 hour DAILY.     Flagyl          Should you experience increased redness, swelling, pain, foul odor, size of wound(s), or have a temperature over 101 degrees please contact the Wound Healing Center at [phone] or if after hours contact your primary care physician or go to the hospital emergency department.    PLEASE NOTE: IF YOU ARE UNABLE TO OBTAIN WOUND SUPPLIES, CONTINUE TO USE THE SUPPLIES YOU HAVE AVAILABLE UNTIL YOU ARE ABLE TO REACH US. IT IS MOST IMPORTANT TO KEEP THE WOUND COVERED AT ALL TIMES.    Electronically signed NOE KRAUSE RN on 12/2/2024 at 3:09 PM

## 2024-12-03 DIAGNOSIS — L03.116 CELLULITIS OF BOTH LOWER EXTREMITIES: Chronic | ICD-10-CM

## 2024-12-03 DIAGNOSIS — L03.115 CELLULITIS OF BOTH LOWER EXTREMITIES: Chronic | ICD-10-CM

## 2024-12-03 LAB
ALBUMIN SERPL-MCNC: 3.2 G/DL (ref 3.2–4.6)
ALBUMIN/GLOB SERPL: 1 (ref 1–1.9)
ALP SERPL-CCNC: 72 U/L (ref 40–129)
ALT SERPL-CCNC: 11 U/L (ref 8–55)
ANION GAP SERPL CALC-SCNC: 9 MMOL/L (ref 7–16)
AST SERPL-CCNC: 14 U/L (ref 15–37)
BILIRUB SERPL-MCNC: 0.5 MG/DL (ref 0–1.2)
BUN SERPL-MCNC: 17 MG/DL (ref 8–23)
CALCIUM SERPL-MCNC: 9.3 MG/DL (ref 8.8–10.2)
CHLORIDE SERPL-SCNC: 103 MMOL/L (ref 98–107)
CO2 SERPL-SCNC: 28 MMOL/L (ref 20–29)
CREAT SERPL-MCNC: 0.82 MG/DL (ref 0.8–1.3)
CRP SERPL-MCNC: 6.8 MG/DL (ref 0–0.4)
ERYTHROCYTE [DISTWIDTH] IN BLOOD BY AUTOMATED COUNT: 13 % (ref 11.9–14.6)
ERYTHROCYTE [SEDIMENTATION RATE] IN BLOOD: 6 MM/HR
GLOBULIN SER CALC-MCNC: 3.4 G/DL (ref 2.3–3.5)
GLUCOSE SERPL-MCNC: 94 MG/DL (ref 70–99)
HCT VFR BLD AUTO: 44.8 % (ref 41.1–50.3)
HGB BLD-MCNC: 14.6 G/DL (ref 13.6–17.2)
MCH RBC QN AUTO: 35.9 PG (ref 26.1–32.9)
MCHC RBC AUTO-ENTMCNC: 32.6 G/DL (ref 31.4–35)
MCV RBC AUTO: 110.1 FL (ref 82–102)
NRBC # BLD: 0 K/UL (ref 0–0.2)
PLATELET # BLD AUTO: 148 K/UL (ref 150–450)
PMV BLD AUTO: 11.7 FL (ref 9.4–12.3)
POTASSIUM SERPL-SCNC: 4.4 MMOL/L (ref 3.5–5.1)
PROT SERPL-MCNC: 6.6 G/DL (ref 6.3–8.2)
RBC # BLD AUTO: 4.07 M/UL (ref 4.23–5.6)
SODIUM SERPL-SCNC: 139 MMOL/L (ref 136–145)
WBC # BLD AUTO: 6.3 K/UL (ref 4.3–11.1)

## 2024-12-06 ENCOUNTER — APPOINTMENT (OUTPATIENT)
Dept: GENERAL RADIOLOGY | Age: 59
DRG: 603 | End: 2024-12-06
Payer: COMMERCIAL

## 2024-12-06 ENCOUNTER — HOSPITAL ENCOUNTER (OUTPATIENT)
Dept: WOUND CARE | Age: 59
Discharge: HOME | End: 2024-12-06
Payer: COMMERCIAL

## 2024-12-06 ENCOUNTER — HOSPITAL ENCOUNTER (INPATIENT)
Age: 59
LOS: 1 days | Discharge: HOME | DRG: 603 | End: 2024-12-11
Attending: EMERGENCY MEDICINE
Payer: COMMERCIAL

## 2024-12-06 VITALS
HEIGHT: 73 IN | BODY MASS INDEX: 41.75 KG/M2 | WEIGHT: 315 LBS | HEART RATE: 97 BPM | RESPIRATION RATE: 18 BRPM | DIASTOLIC BLOOD PRESSURE: 98 MMHG | SYSTOLIC BLOOD PRESSURE: 172 MMHG | TEMPERATURE: 98.3 F

## 2024-12-06 DIAGNOSIS — R06.02 SHORTNESS OF BREATH: ICD-10-CM

## 2024-12-06 DIAGNOSIS — L97.922 NON-PRESSURE CHRONIC ULCER OF LEFT LOWER LEG WITH FAT LAYER EXPOSED (HCC): Primary | Chronic | ICD-10-CM

## 2024-12-06 DIAGNOSIS — L03.116 CELLULITIS OF LEFT LEG: ICD-10-CM

## 2024-12-06 DIAGNOSIS — L03.116 BILATERAL CELLULITIS OF LOWER LEG: Primary | ICD-10-CM

## 2024-12-06 DIAGNOSIS — L97.301 VENOUS STASIS ULCER OF ANKLE LIMITED TO BREAKDOWN OF SKIN WITH VARICOSE VEINS, UNSPECIFIED LATERALITY (HCC): ICD-10-CM

## 2024-12-06 DIAGNOSIS — I83.003 VENOUS STASIS ULCER OF ANKLE LIMITED TO BREAKDOWN OF SKIN WITH VARICOSE VEINS, UNSPECIFIED LATERALITY (HCC): ICD-10-CM

## 2024-12-06 DIAGNOSIS — L03.115 BILATERAL CELLULITIS OF LOWER LEG: Primary | ICD-10-CM

## 2024-12-06 LAB
ALBUMIN SERPL-MCNC: 3.2 G/DL (ref 3.2–4.6)
ALBUMIN/GLOB SERPL: 0.8 (ref 1–1.9)
ALP SERPL-CCNC: 72 U/L (ref 40–129)
ALT SERPL-CCNC: 11 U/L (ref 8–55)
ANION GAP SERPL CALC-SCNC: 10 MMOL/L (ref 7–16)
APPEARANCE UR: CLEAR
AST SERPL-CCNC: 15 U/L (ref 15–37)
BACTERIA SPEC CULT: NORMAL
BACTERIA URNS QL MICRO: NEGATIVE /HPF
BASOPHILS # BLD: 0 K/UL (ref 0–0.2)
BASOPHILS NFR BLD: 0 % (ref 0–2)
BILIRUB SERPL-MCNC: 0.4 MG/DL (ref 0–1.2)
BILIRUB UR QL: NEGATIVE
BUN SERPL-MCNC: 17 MG/DL (ref 8–23)
CALCIUM SERPL-MCNC: 9.2 MG/DL (ref 8.8–10.2)
CHLORIDE SERPL-SCNC: 104 MMOL/L (ref 98–107)
CO2 SERPL-SCNC: 26 MMOL/L (ref 20–29)
COLOR UR: ABNORMAL
CREAT SERPL-MCNC: 0.82 MG/DL (ref 0.8–1.3)
DIFFERENTIAL METHOD BLD: ABNORMAL
EOSINOPHIL # BLD: 0.1 K/UL (ref 0–0.8)
EOSINOPHIL NFR BLD: 1 % (ref 0.5–7.8)
EPI CELLS #/AREA URNS HPF: ABNORMAL /HPF
ERYTHROCYTE [DISTWIDTH] IN BLOOD BY AUTOMATED COUNT: 12.8 % (ref 11.9–14.6)
GLOBULIN SER CALC-MCNC: 4 G/DL (ref 2.3–3.5)
GLUCOSE SERPL-MCNC: 90 MG/DL (ref 70–99)
GLUCOSE UR STRIP.AUTO-MCNC: NEGATIVE MG/DL
HCT VFR BLD AUTO: 45 % (ref 41.1–50.3)
HGB BLD-MCNC: 15.2 G/DL (ref 13.6–17.2)
HGB UR QL STRIP: NEGATIVE
HYALINE CASTS URNS QL MICRO: ABNORMAL /LPF
IMM GRANULOCYTES # BLD AUTO: 0 K/UL (ref 0–0.5)
IMM GRANULOCYTES NFR BLD AUTO: 0 % (ref 0–5)
KETONES UR QL STRIP.AUTO: ABNORMAL MG/DL
LACTATE SERPL-SCNC: 1.3 MMOL/L (ref 0.5–2)
LEUKOCYTE ESTERASE UR QL STRIP.AUTO: ABNORMAL
LYMPHOCYTES # BLD: 1.4 K/UL (ref 0.5–4.6)
LYMPHOCYTES NFR BLD: 15 % (ref 13–44)
MCH RBC QN AUTO: 35.5 PG (ref 26.1–32.9)
MCHC RBC AUTO-ENTMCNC: 33.8 G/DL (ref 31.4–35)
MCV RBC AUTO: 105.1 FL (ref 82–102)
MONOCYTES # BLD: 0.8 K/UL (ref 0.1–1.3)
MONOCYTES NFR BLD: 8 % (ref 4–12)
NEUTS SEG # BLD: 6.7 K/UL (ref 1.7–8.2)
NEUTS SEG NFR BLD: 75 % (ref 43–78)
NITRITE UR QL STRIP.AUTO: NEGATIVE
NRBC # BLD: 0 K/UL (ref 0–0.2)
PH UR STRIP: 5.5 (ref 5–9)
PLATELET # BLD AUTO: 233 K/UL (ref 150–450)
PMV BLD AUTO: 10 FL (ref 9.4–12.3)
POTASSIUM SERPL-SCNC: 4.4 MMOL/L (ref 3.5–5.1)
PROCALCITONIN SERPL-MCNC: 0.08 NG/ML (ref 0–0.1)
PROT SERPL-MCNC: 7.2 G/DL (ref 6.3–8.2)
PROT UR STRIP-MCNC: 30 MG/DL
RBC # BLD AUTO: 4.28 M/UL (ref 4.23–5.6)
RBC #/AREA URNS HPF: ABNORMAL /HPF
SERVICE CMNT-IMP: NORMAL
SODIUM SERPL-SCNC: 140 MMOL/L (ref 136–145)
SP GR UR REFRACTOMETRY: 1.03 (ref 1–1.02)
UROBILINOGEN UR QL STRIP.AUTO: 1 EU/DL (ref 0.2–1)
WBC # BLD AUTO: 8.9 K/UL (ref 4.3–11.1)
WBC URNS QL MICRO: ABNORMAL /HPF

## 2024-12-06 PROCEDURE — 2580000003 HC RX 258: Performed by: HOSPITALIST

## 2024-12-06 PROCEDURE — 96374 THER/PROPH/DIAG INJ IV PUSH: CPT

## 2024-12-06 PROCEDURE — 85025 COMPLETE CBC W/AUTO DIFF WBC: CPT

## 2024-12-06 PROCEDURE — 99213 OFFICE O/P EST LOW 20 MIN: CPT

## 2024-12-06 PROCEDURE — 96365 THER/PROPH/DIAG IV INF INIT: CPT

## 2024-12-06 PROCEDURE — G0378 HOSPITAL OBSERVATION PER HR: HCPCS

## 2024-12-06 PROCEDURE — 6360000002 HC RX W HCPCS: Performed by: EMERGENCY MEDICINE

## 2024-12-06 PROCEDURE — 84145 PROCALCITONIN (PCT): CPT

## 2024-12-06 PROCEDURE — 73590 X-RAY EXAM OF LOWER LEG: CPT

## 2024-12-06 PROCEDURE — 81001 URINALYSIS AUTO W/SCOPE: CPT

## 2024-12-06 PROCEDURE — 94760 N-INVAS EAR/PLS OXIMETRY 1: CPT

## 2024-12-06 PROCEDURE — 87040 BLOOD CULTURE FOR BACTERIA: CPT

## 2024-12-06 PROCEDURE — 80053 COMPREHEN METABOLIC PANEL: CPT

## 2024-12-06 PROCEDURE — 6360000002 HC RX W HCPCS: Performed by: HOSPITALIST

## 2024-12-06 PROCEDURE — 83605 ASSAY OF LACTIC ACID: CPT

## 2024-12-06 PROCEDURE — 99215 OFFICE O/P EST HI 40 MIN: CPT

## 2024-12-06 PROCEDURE — 96375 TX/PRO/DX INJ NEW DRUG ADDON: CPT

## 2024-12-06 PROCEDURE — 71045 X-RAY EXAM CHEST 1 VIEW: CPT

## 2024-12-06 PROCEDURE — 99285 EMERGENCY DEPT VISIT HI MDM: CPT

## 2024-12-06 PROCEDURE — 6370000000 HC RX 637 (ALT 250 FOR IP): Performed by: HOSPITALIST

## 2024-12-06 PROCEDURE — 2580000003 HC RX 258: Performed by: EMERGENCY MEDICINE

## 2024-12-06 RX ORDER — GENTAMICIN SULFATE 1 MG/G
OINTMENT TOPICAL ONCE
OUTPATIENT
Start: 2024-12-06 | End: 2024-12-06

## 2024-12-06 RX ORDER — FAMOTIDINE 20 MG/1
20 TABLET, FILM COATED ORAL DAILY
Status: DISCONTINUED | OUTPATIENT
Start: 2024-12-06 | End: 2024-12-11 | Stop reason: HOSPADM

## 2024-12-06 RX ORDER — ALLOPURINOL 300 MG/1
300 TABLET ORAL DAILY
Status: DISCONTINUED | OUTPATIENT
Start: 2024-12-07 | End: 2024-12-11 | Stop reason: HOSPADM

## 2024-12-06 RX ORDER — ACETAMINOPHEN 650 MG/1
650 SUPPOSITORY RECTAL EVERY 6 HOURS PRN
Status: DISCONTINUED | OUTPATIENT
Start: 2024-12-06 | End: 2024-12-11 | Stop reason: HOSPADM

## 2024-12-06 RX ORDER — BACITRACIN ZINC AND POLYMYXIN B SULFATE 500; 1000 [USP'U]/G; [USP'U]/G
OINTMENT TOPICAL ONCE
OUTPATIENT
Start: 2024-12-06 | End: 2024-12-06

## 2024-12-06 RX ORDER — SODIUM CHLORIDE 0.9 % (FLUSH) 0.9 %
5-40 SYRINGE (ML) INJECTION EVERY 12 HOURS SCHEDULED
Status: DISCONTINUED | OUTPATIENT
Start: 2024-12-06 | End: 2024-12-11 | Stop reason: HOSPADM

## 2024-12-06 RX ORDER — LINEZOLID 2 MG/ML
600 INJECTION, SOLUTION INTRAVENOUS ONCE
Status: COMPLETED | OUTPATIENT
Start: 2024-12-06 | End: 2024-12-06

## 2024-12-06 RX ORDER — HYDRALAZINE HYDROCHLORIDE 20 MG/ML
10 INJECTION INTRAMUSCULAR; INTRAVENOUS EVERY 6 HOURS PRN
Status: DISCONTINUED | OUTPATIENT
Start: 2024-12-06 | End: 2024-12-11 | Stop reason: HOSPADM

## 2024-12-06 RX ORDER — SODIUM CHLOR/HYPOCHLOROUS ACID 0.033 %
SOLUTION, IRRIGATION IRRIGATION ONCE
OUTPATIENT
Start: 2024-12-06 | End: 2024-12-06

## 2024-12-06 RX ORDER — GINSENG 100 MG
CAPSULE ORAL ONCE
OUTPATIENT
Start: 2024-12-06 | End: 2024-12-06

## 2024-12-06 RX ORDER — TAMSULOSIN HYDROCHLORIDE 0.4 MG/1
0.4 CAPSULE ORAL DAILY
Status: DISCONTINUED | OUTPATIENT
Start: 2024-12-07 | End: 2024-12-11 | Stop reason: HOSPADM

## 2024-12-06 RX ORDER — LIDOCAINE HYDROCHLORIDE 20 MG/ML
JELLY TOPICAL ONCE
OUTPATIENT
Start: 2024-12-06 | End: 2024-12-06

## 2024-12-06 RX ORDER — HYDROMORPHONE HYDROCHLORIDE 1 MG/ML
0.5 INJECTION, SOLUTION INTRAMUSCULAR; INTRAVENOUS; SUBCUTANEOUS ONCE
Status: COMPLETED | OUTPATIENT
Start: 2024-12-06 | End: 2024-12-06

## 2024-12-06 RX ORDER — LIDOCAINE HYDROCHLORIDE 40 MG/ML
SOLUTION TOPICAL ONCE
OUTPATIENT
Start: 2024-12-06 | End: 2024-12-06

## 2024-12-06 RX ORDER — POTASSIUM CHLORIDE 7.45 MG/ML
10 INJECTION INTRAVENOUS PRN
Status: DISCONTINUED | OUTPATIENT
Start: 2024-12-06 | End: 2024-12-11 | Stop reason: HOSPADM

## 2024-12-06 RX ORDER — DILTIAZEM HYDROCHLORIDE 240 MG/1
240 CAPSULE, COATED, EXTENDED RELEASE ORAL DAILY
Status: DISCONTINUED | OUTPATIENT
Start: 2024-12-07 | End: 2024-12-11 | Stop reason: HOSPADM

## 2024-12-06 RX ORDER — FUROSEMIDE 10 MG/ML
20 INJECTION INTRAMUSCULAR; INTRAVENOUS DAILY
Status: DISCONTINUED | OUTPATIENT
Start: 2024-12-07 | End: 2024-12-07

## 2024-12-06 RX ORDER — PROMETHAZINE HYDROCHLORIDE 25 MG/1
25 TABLET ORAL EVERY 6 HOURS PRN
Status: DISCONTINUED | OUTPATIENT
Start: 2024-12-06 | End: 2024-12-11 | Stop reason: HOSPADM

## 2024-12-06 RX ORDER — POTASSIUM CHLORIDE 1500 MG/1
40 TABLET, EXTENDED RELEASE ORAL PRN
Status: DISCONTINUED | OUTPATIENT
Start: 2024-12-06 | End: 2024-12-11 | Stop reason: HOSPADM

## 2024-12-06 RX ORDER — BETAMETHASONE DIPROPIONATE 0.5 MG/G
CREAM TOPICAL ONCE
OUTPATIENT
Start: 2024-12-06 | End: 2024-12-06

## 2024-12-06 RX ORDER — MAGNESIUM SULFATE IN WATER 40 MG/ML
2000 INJECTION, SOLUTION INTRAVENOUS PRN
Status: DISCONTINUED | OUTPATIENT
Start: 2024-12-06 | End: 2024-12-11 | Stop reason: HOSPADM

## 2024-12-06 RX ORDER — ONDANSETRON 2 MG/ML
4 INJECTION INTRAMUSCULAR; INTRAVENOUS EVERY 4 HOURS PRN
Status: DISCONTINUED | OUTPATIENT
Start: 2024-12-06 | End: 2024-12-11 | Stop reason: HOSPADM

## 2024-12-06 RX ORDER — NEOMYCIN/BACITRACIN/POLYMYXINB 3.5-400-5K
OINTMENT (GRAM) TOPICAL ONCE
OUTPATIENT
Start: 2024-12-06 | End: 2024-12-06

## 2024-12-06 RX ORDER — OXYCODONE HYDROCHLORIDE 15 MG/1
7.5 TABLET ORAL EVERY 4 HOURS PRN
Status: DISCONTINUED | OUTPATIENT
Start: 2024-12-06 | End: 2024-12-11 | Stop reason: HOSPADM

## 2024-12-06 RX ORDER — POLYETHYLENE GLYCOL 3350 17 G/17G
17 POWDER, FOR SOLUTION ORAL DAILY PRN
Status: DISCONTINUED | OUTPATIENT
Start: 2024-12-06 | End: 2024-12-09 | Stop reason: SDUPTHER

## 2024-12-06 RX ORDER — HYDROMORPHONE HYDROCHLORIDE 1 MG/ML
1 INJECTION, SOLUTION INTRAMUSCULAR; INTRAVENOUS; SUBCUTANEOUS EVERY 4 HOURS PRN
Status: DISCONTINUED | OUTPATIENT
Start: 2024-12-06 | End: 2024-12-11 | Stop reason: HOSPADM

## 2024-12-06 RX ORDER — ONDANSETRON 2 MG/ML
4 INJECTION INTRAMUSCULAR; INTRAVENOUS
Status: COMPLETED | OUTPATIENT
Start: 2024-12-06 | End: 2024-12-06

## 2024-12-06 RX ORDER — CLOBETASOL PROPIONATE 0.5 MG/G
OINTMENT TOPICAL ONCE
OUTPATIENT
Start: 2024-12-06 | End: 2024-12-06

## 2024-12-06 RX ORDER — GABAPENTIN 100 MG/1
100 CAPSULE ORAL 2 TIMES DAILY
Status: DISCONTINUED | OUTPATIENT
Start: 2024-12-06 | End: 2024-12-07

## 2024-12-06 RX ORDER — SENNA AND DOCUSATE SODIUM 50; 8.6 MG/1; MG/1
1 TABLET, FILM COATED ORAL 2 TIMES DAILY
Status: DISCONTINUED | OUTPATIENT
Start: 2024-12-06 | End: 2024-12-11 | Stop reason: HOSPADM

## 2024-12-06 RX ORDER — MELOXICAM 7.5 MG/1
15 TABLET ORAL DAILY
Status: DISCONTINUED | OUTPATIENT
Start: 2024-12-07 | End: 2024-12-09

## 2024-12-06 RX ORDER — SODIUM CHLORIDE 9 MG/ML
INJECTION, SOLUTION INTRAVENOUS PRN
Status: DISCONTINUED | OUTPATIENT
Start: 2024-12-06 | End: 2024-12-11 | Stop reason: HOSPADM

## 2024-12-06 RX ORDER — GABAPENTIN 100 MG/1
100 CAPSULE ORAL 2 TIMES DAILY
Status: DISCONTINUED | OUTPATIENT
Start: 2024-12-06 | End: 2024-12-06 | Stop reason: SDUPTHER

## 2024-12-06 RX ORDER — SERTRALINE HYDROCHLORIDE 100 MG/1
100 TABLET, FILM COATED ORAL DAILY
Status: DISCONTINUED | OUTPATIENT
Start: 2024-12-06 | End: 2024-12-11 | Stop reason: HOSPADM

## 2024-12-06 RX ORDER — MUPIROCIN 20 MG/G
OINTMENT TOPICAL ONCE
OUTPATIENT
Start: 2024-12-06 | End: 2024-12-06

## 2024-12-06 RX ORDER — TRIAMCINOLONE ACETONIDE 1 MG/G
OINTMENT TOPICAL ONCE
OUTPATIENT
Start: 2024-12-06 | End: 2024-12-06

## 2024-12-06 RX ORDER — ACETAMINOPHEN 325 MG/1
650 TABLET ORAL EVERY 4 HOURS PRN
Status: DISCONTINUED | OUTPATIENT
Start: 2024-12-06 | End: 2024-12-11 | Stop reason: HOSPADM

## 2024-12-06 RX ORDER — LIDOCAINE 40 MG/G
CREAM TOPICAL ONCE
OUTPATIENT
Start: 2024-12-06 | End: 2024-12-06

## 2024-12-06 RX ORDER — SODIUM CHLORIDE 0.9 % (FLUSH) 0.9 %
5-40 SYRINGE (ML) INJECTION PRN
Status: DISCONTINUED | OUTPATIENT
Start: 2024-12-06 | End: 2024-12-11 | Stop reason: HOSPADM

## 2024-12-06 RX ORDER — CYCLOBENZAPRINE HCL 10 MG
10 TABLET ORAL 3 TIMES DAILY PRN
Status: DISCONTINUED | OUTPATIENT
Start: 2024-12-06 | End: 2024-12-11 | Stop reason: HOSPADM

## 2024-12-06 RX ORDER — LIDOCAINE 50 MG/G
OINTMENT TOPICAL ONCE
OUTPATIENT
Start: 2024-12-06 | End: 2024-12-06

## 2024-12-06 RX ADMIN — LINEZOLID 600 MG: 600 INJECTION, SOLUTION INTRAVENOUS at 17:25

## 2024-12-06 RX ADMIN — PIPERACILLIN AND TAZOBACTAM 4500 MG: 4; .5 INJECTION, POWDER, FOR SOLUTION INTRAVENOUS at 16:56

## 2024-12-06 RX ADMIN — SODIUM CHLORIDE, PRESERVATIVE FREE 10 ML: 5 INJECTION INTRAVENOUS at 21:09

## 2024-12-06 RX ADMIN — HYDROMORPHONE HYDROCHLORIDE 0.5 MG: 1 INJECTION, SOLUTION INTRAMUSCULAR; INTRAVENOUS; SUBCUTANEOUS at 18:13

## 2024-12-06 RX ADMIN — HYDROMORPHONE HYDROCHLORIDE 0.5 MG: 1 INJECTION, SOLUTION INTRAMUSCULAR; INTRAVENOUS; SUBCUTANEOUS at 16:54

## 2024-12-06 RX ADMIN — HYDROMORPHONE HYDROCHLORIDE 1 MG: 1 INJECTION, SOLUTION INTRAMUSCULAR; INTRAVENOUS; SUBCUTANEOUS at 21:14

## 2024-12-06 RX ADMIN — Medication 3 MG: at 21:09

## 2024-12-06 RX ADMIN — ONDANSETRON 4 MG: 2 INJECTION, SOLUTION INTRAMUSCULAR; INTRAVENOUS at 17:24

## 2024-12-06 RX ADMIN — GABAPENTIN 100 MG: 100 CAPSULE ORAL at 21:14

## 2024-12-06 RX ADMIN — PIPERACILLIN AND TAZOBACTAM 4500 MG: 4; .5 INJECTION, POWDER, LYOPHILIZED, FOR SOLUTION INTRAVENOUS at 23:53

## 2024-12-06 ASSESSMENT — PAIN - FUNCTIONAL ASSESSMENT: PAIN_FUNCTIONAL_ASSESSMENT: 0-10

## 2024-12-06 ASSESSMENT — ENCOUNTER SYMPTOMS
NAUSEA: 0
VOMITING: 0

## 2024-12-06 ASSESSMENT — PAIN SCALES - GENERAL
PAINLEVEL_OUTOF10: 9
PAINLEVEL_OUTOF10: 9
PAINLEVEL_OUTOF10: 8

## 2024-12-06 ASSESSMENT — PAIN DESCRIPTION - ORIENTATION
ORIENTATION: LEFT
ORIENTATION: LEFT;RIGHT

## 2024-12-06 ASSESSMENT — PAIN DESCRIPTION - LOCATION
LOCATION: LEG
LOCATION: LEG

## 2024-12-06 NOTE — ED TRIAGE NOTES
Patient is coming from the wound center. Patient is believes that he has an infection on his left leg. It has been going on for over a week. Patient has been on antibiotics but not sure it is working. Patient has odor coming from his left leg.     Patient states that it feels like his leg is getting stung by \"Jellyfish\".     Patient states that he is not in a healthy relationship at home. Wife makes him move around when he is no supposed to. She does not let him take showers at home.

## 2024-12-06 NOTE — ED NOTES
TRANSFER - OUT REPORT:    Verbal report given to JOSEPH DE LA PAZ on Jozef Felix  being transferred to [facility] for routine progression of patient care       Report consisted of patient's Situation, Background, Assessment and   Recommendations(SBAR).     Information from the following report(s) ED SBAR was reviewed with the receiving nurse.    Lines:   Peripheral IV 12/06/24 Distal;Left Forearm (Active)   Site Assessment Clean, dry & intact 12/06/24 1606   Line Status Blood return noted;Specimen collected;Flushed;Normal saline locked 12/06/24 1606   Phlebitis Assessment No symptoms 12/06/24 1606   Infiltration Assessment 0 12/06/24 1606       Peripheral IV 12/06/24 Right Antecubital (Active)   Site Assessment Clean, dry & intact 12/06/24 1615   Line Status Blood return noted;Specimen collected;Flushed;Normal saline locked 12/06/24 1615   Phlebitis Assessment No symptoms 12/06/24 1615   Infiltration Assessment 0 12/06/24 1615        Opportunity for questions and clarification was provided.      Patient transported with:  Tech

## 2024-12-06 NOTE — FLOWSHEET NOTE
Pink/red   Drainage Amount Large (50-75% saturated)   Drainage Description Serosanguinous   Odor Malodorous/putrid   Ariadne-wound Assessment Edematous   Margins Attached edges   Wound Thickness Description not for Pressure Injury Full thickness   Pain Assessment   Pain Assessment 0-10   Pain Level 8

## 2024-12-06 NOTE — PROGRESS NOTES
[facility]  Medical Staff Progress Note     Jozef Felix  MEDICAL RECORD NUMBER: [id number]  AGE: 59 y.o.     GENDER: male    : 1965  EPISODE DATE: 2024    Assessment and Plan:     Cellulitis of left leg  Assessment  Left lower extremity is more erythematous, more edematous, more painful; patient continues to report low-grade fever, chills, body aches and now also complains of chest pain  Temp is 98.3, heart rate is 97, BP is 172/98  Preliminary culture growing PROTEUS PENNERI; WBC's were 6, ESR and CRP were mildly elevated  Plan  See note from 24  Patient has failed oral antibiotics (day 4 of Doxycycline)  Recommend patient be evaluated in the emergency department for possible IV antibiotics, admission; patient is agreeable     Medical decision making:  Assessment required other independent historian(s): no. Additional historian: patient .   Comorbid conditions affecting wound healing: as noted in PMH and PSH, was reviewed for this visit.   Review of medical records and external note(s) from other providers was done for this visit.  Pertinent diagnostics were reviewed for this visit. Discussion of management or test interpretation with N/A.  Prescription drug management: N/A.     Risk of complications and/or mortality of treatment plan:  The patient has a high risk of morbidity and mortality from additional diagnostic testing or treatment. This is due to conditions that affect wound healing as well as patient and procedure risk factors. Patient was educated and given the opportunity to ask questions. He is agreeable to treatment plan. Pertinent decisions include: hospitalization.   The patient's diagnosis and/or treatment is significantly limited by the following social determinants of health: less than ideal living conditions.    Discussed with patient/caregiver factors that negatively affect wound healing: swelling infection. Discussed

## 2024-12-06 NOTE — ED PROVIDER NOTES
Emergency Department Provider Note       PCP: Anthony Aleman MD   Age: 59 y.o.   Sex: male     DISPOSITION Decision To Admit 12/06/2024 05:59:43 PM    ICD-10-CM    1. Bilateral cellulitis of lower leg  L03.116     L03.115           Medical Decision Making     Patient with bilateral lower extremity wounds that are weeping very red and foul-smelling.  He was given broad-spectrum antibiotics and septic workup done.  Will need admission to the hospital for wound care and IV antibiotics.     1 or more acute illnesses that pose a threat to life or bodily function.   Prescription drug management performed.  Parental controlled substances given in the ED.  Discussion with external consultants.  Shared medical decision making was utilized in creating the patients health plan today.  I independently ordered and reviewed each unique test.    I reviewed external records: provider visit note from outside specialist. Wound care    ED cardiac monitoring rhythm strip was ordered and interpreted:  sinus rhythm, no evidence of an arrhythmia  ST Segments:Normal ST segments - NO STEMI   Rate: 87  I interpreted the X-rays no fx/subQ air.  ED provider's independent EKG interpretation NSR no ST elevation nnl QRS  The patient was admitted and I have discussed patient management with the admitting provider.    Exclusion criteria - the patient is NOT to be included for SEP-1 Core Measure due to: May have criteria for sepsis, but does not meet criteria for severe sepsis or septic shock       History     Presents with complaint of bilateral lower extremity leg pains.  Patient was seen at wound care and told to come to the hospital.  He states he was told to come to the hospital last week but did not want to and took doxycycline and Flagyl without any improvement.  Reports pain in bilateral lower extremities.  He states he is having fluid leaking and is foul-smelling.  He reports a temperature of 100 with chills.  Has had several

## 2024-12-06 NOTE — ASSESSMENT & PLAN NOTE
Assessment  Left lower extremity is more erythematous, more edematous, more painful; patient continues to report low-grade fever, chills, body aches and now also complains of chest pain  Temp is 98.3, heart rate is 97, BP is 172/98  Plan  Patient has failed oral antibiotics  Recommend patient be evaluated in the emergency department for possible IV antibiotics, admission; patient is agreeable

## 2024-12-06 NOTE — WOUND CARE
Discharge Instructions for  [facility]  [address] [place] [address]  [address] [place] [address]  Phone [phone]   Fax [phone]      NAME:  Jozef Felix  YOB: 1965  MEDICAL RECORD NUMBER:  [id number]  DATE:  12/6/2024    Return Appointment:   with Meena Porter NP after ER evaluation/hospital admission      Legs red, swollen, and draining. Had NP look at patient before rewrapping. Patient c/o chest pain, night sweats, fever, chills, increased left leg pain. NP states patient needs to go to ER for further evaluation. Wrapped legs with silver alginate,  ABD's and kerlix to prevent leakage. Patient states he is willing to go to the ER. Placed patient in wheelchair and taken to ER via wheelchair. Left patient in care of ER staff.     Should you experience increased redness, swelling, pain, foul odor, size of wound(s), or have a temperature over 101 degrees please contact the Wound Healing Center at [phone] or if after hours contact your primary care physician or go to the hospital emergency department.    PLEASE NOTE: IF YOU ARE UNABLE TO OBTAIN WOUND SUPPLIES, CONTINUE TO USE THE SUPPLIES YOU HAVE AVAILABLE UNTIL YOU ARE ABLE TO REACH US. IT IS MOST IMPORTANT TO KEEP THE WOUND COVERED AT ALL TIMES.    Electronically signed Rm Alejandra RN on 12/6/2024 at 3:26 PM

## 2024-12-06 NOTE — H&P
Absolute 0.0 0.0 - 0.2 K/UL    Immature Granulocytes Absolute 0.0 0.0 - 0.5 K/UL   Lactate, Sepsis    Collection Time: 12/06/24  4:05 PM   Result Value Ref Range    Lactic Acid, Sepsis 1.3 0.5 - 2.0 MMOL/L   Procalcitonin    Collection Time: 12/06/24  4:05 PM   Result Value Ref Range    Procalcitonin 0.08 0.00 - 0.10 ng/mL   Urinalysis w/Reflex to Micro    Collection Time: 12/06/24  5:29 PM   Result Value Ref Range    Color, UA YELLOW/STRAW      Appearance CLEAR      Specific Gravity, UA 1.031 (H) 1.001 - 1.023      pH, Urine 5.5 5.0 - 9.0      Protein, UA 30 (A) NEG mg/dL    Glucose, Ur Negative NEG mg/dL    Ketones, Urine TRACE (A) NEG mg/dL    Bilirubin, Urine Negative NEG      Blood, Urine Negative NEG      Urobilinogen, Urine 1.0 0.2 - 1.0 EU/dL    Nitrite, Urine Negative NEG      Leukocyte Esterase, Urine TRACE (A) NEG      WBC, UA 0-4 U4 /hpf    RBC, UA 0-5 U5 /hpf    Epithelial Cells, UA 0-5 U5 /hpf    BACTERIA, URINE Negative NEG /hpf    Hyaline Casts, UA 0-2 /lpf       No results for input(s): \"COVID19\" in the last 72 hours.    XR TIBIA FIBULA 2 VIEWS BILATERAL    Result Date: 12/6/2024  CLINICAL HISTORY: Cellulitis. TECHNIQUE: Multiple views of each tibia and fibula are obtained. FINDINGS: Right: Mild degenerative changes of the lateral tibiofemoral compartment with chondrocalcinosis. 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 Large calcaneal spur. Left: Mild patellofemoral arthropathy of the visualized portion of the patellofemoral joint. No fracture or dislocation. Moderate narrowing of the lateral tibiofemoral compartment with osteophyte formation.     As above. Electronically signed by Abdias Nicole    XR CHEST PORTABLE    Result Date: 12/6/2024  XR CHEST PORTABLE Indication: 59 years Male Sepsis Comparison: Chest x-ray 6/3/2024. Technique: Frontal view of the chest was obtained. FINDINGS: Cardiac silhouette is within normal limits in size. Mediastinal contours are otherwise

## 2024-12-07 LAB
ANION GAP SERPL CALC-SCNC: 9 MMOL/L (ref 7–16)
BASOPHILS # BLD: 0.1 K/UL (ref 0–0.2)
BASOPHILS NFR BLD: 1 % (ref 0–2)
BUN SERPL-MCNC: 18 MG/DL (ref 8–23)
CALCIUM SERPL-MCNC: 9.1 MG/DL (ref 8.8–10.2)
CHLORIDE SERPL-SCNC: 101 MMOL/L (ref 98–107)
CO2 SERPL-SCNC: 28 MMOL/L (ref 20–29)
CREAT SERPL-MCNC: 0.95 MG/DL (ref 0.8–1.3)
DIFFERENTIAL METHOD BLD: ABNORMAL
EOSINOPHIL # BLD: 0.1 K/UL (ref 0–0.8)
EOSINOPHIL NFR BLD: 1 % (ref 0.5–7.8)
ERYTHROCYTE [DISTWIDTH] IN BLOOD BY AUTOMATED COUNT: 12.9 % (ref 11.9–14.6)
GLUCOSE SERPL-MCNC: 90 MG/DL (ref 70–99)
HCT VFR BLD AUTO: 43.8 % (ref 41.1–50.3)
HGB BLD-MCNC: 14.4 G/DL (ref 13.6–17.2)
IMM GRANULOCYTES # BLD AUTO: 0 K/UL (ref 0–0.5)
IMM GRANULOCYTES NFR BLD AUTO: 0 % (ref 0–5)
LYMPHOCYTES # BLD: 1.4 K/UL (ref 0.5–4.6)
LYMPHOCYTES NFR BLD: 18 % (ref 13–44)
MCH RBC QN AUTO: 35.3 PG (ref 26.1–32.9)
MCHC RBC AUTO-ENTMCNC: 32.9 G/DL (ref 31.4–35)
MCV RBC AUTO: 107.4 FL (ref 82–102)
MONOCYTES # BLD: 0.9 K/UL (ref 0.1–1.3)
MONOCYTES NFR BLD: 11 % (ref 4–12)
NEUTS SEG # BLD: 5.4 K/UL (ref 1.7–8.2)
NEUTS SEG NFR BLD: 69 % (ref 43–78)
NRBC # BLD: 0 K/UL (ref 0–0.2)
PLATELET # BLD AUTO: 206 K/UL (ref 150–450)
PMV BLD AUTO: 10.3 FL (ref 9.4–12.3)
POTASSIUM SERPL-SCNC: 4.6 MMOL/L (ref 3.5–5.1)
RBC # BLD AUTO: 4.08 M/UL (ref 4.23–5.6)
SODIUM SERPL-SCNC: 138 MMOL/L (ref 136–145)
WBC # BLD AUTO: 7.9 K/UL (ref 4.3–11.1)

## 2024-12-07 PROCEDURE — 6360000002 HC RX W HCPCS: Performed by: HOSPITALIST

## 2024-12-07 PROCEDURE — G0378 HOSPITAL OBSERVATION PER HR: HCPCS

## 2024-12-07 PROCEDURE — 2580000003 HC RX 258: Performed by: HOSPITALIST

## 2024-12-07 PROCEDURE — 36415 COLL VENOUS BLD VENIPUNCTURE: CPT

## 2024-12-07 PROCEDURE — 6370000000 HC RX 637 (ALT 250 FOR IP): Performed by: HOSPITALIST

## 2024-12-07 PROCEDURE — 80048 BASIC METABOLIC PNL TOTAL CA: CPT

## 2024-12-07 PROCEDURE — 97162 PT EVAL MOD COMPLEX 30 MIN: CPT

## 2024-12-07 PROCEDURE — 97535 SELF CARE MNGMENT TRAINING: CPT

## 2024-12-07 PROCEDURE — 97530 THERAPEUTIC ACTIVITIES: CPT

## 2024-12-07 PROCEDURE — 85025 COMPLETE CBC W/AUTO DIFF WBC: CPT

## 2024-12-07 PROCEDURE — 97165 OT EVAL LOW COMPLEX 30 MIN: CPT

## 2024-12-07 RX ORDER — HYDRALAZINE HYDROCHLORIDE 50 MG/1
25 TABLET, FILM COATED ORAL EVERY 12 HOURS SCHEDULED
Status: DISCONTINUED | OUTPATIENT
Start: 2024-12-07 | End: 2024-12-11 | Stop reason: HOSPADM

## 2024-12-07 RX ORDER — FUROSEMIDE 10 MG/ML
40 INJECTION INTRAMUSCULAR; INTRAVENOUS DAILY
Status: DISCONTINUED | OUTPATIENT
Start: 2024-12-08 | End: 2024-12-11 | Stop reason: HOSPADM

## 2024-12-07 RX ORDER — GABAPENTIN 300 MG/1
300 CAPSULE ORAL 3 TIMES DAILY
Status: DISCONTINUED | OUTPATIENT
Start: 2024-12-07 | End: 2024-12-11 | Stop reason: HOSPADM

## 2024-12-07 RX ADMIN — Medication 3 MG: at 20:27

## 2024-12-07 RX ADMIN — DILTIAZEM HYDROCHLORIDE 240 MG: 240 CAPSULE, EXTENDED RELEASE ORAL at 08:42

## 2024-12-07 RX ADMIN — HYDRALAZINE HYDROCHLORIDE 25 MG: 50 TABLET ORAL at 20:27

## 2024-12-07 RX ADMIN — ALLOPURINOL 300 MG: 300 TABLET ORAL at 08:43

## 2024-12-07 RX ADMIN — PIPERACILLIN AND TAZOBACTAM 4500 MG: 4; .5 INJECTION, POWDER, LYOPHILIZED, FOR SOLUTION INTRAVENOUS at 05:53

## 2024-12-07 RX ADMIN — PIPERACILLIN AND TAZOBACTAM 4500 MG: 4; .5 INJECTION, POWDER, LYOPHILIZED, FOR SOLUTION INTRAVENOUS at 23:01

## 2024-12-07 RX ADMIN — SENNOSIDES AND DOCUSATE SODIUM 1 TABLET: 8.6; 5 TABLET ORAL at 20:27

## 2024-12-07 RX ADMIN — MELOXICAM 15 MG: 7.5 TABLET ORAL at 08:43

## 2024-12-07 RX ADMIN — HYDROMORPHONE HYDROCHLORIDE 1 MG: 1 INJECTION, SOLUTION INTRAMUSCULAR; INTRAVENOUS; SUBCUTANEOUS at 01:27

## 2024-12-07 RX ADMIN — GABAPENTIN 300 MG: 300 CAPSULE ORAL at 20:27

## 2024-12-07 RX ADMIN — SODIUM CHLORIDE, PRESERVATIVE FREE 10 ML: 5 INJECTION INTRAVENOUS at 08:45

## 2024-12-07 RX ADMIN — GABAPENTIN 300 MG: 300 CAPSULE ORAL at 14:03

## 2024-12-07 RX ADMIN — OXYCODONE HYDROCHLORIDE 7.5 MG: 15 TABLET ORAL at 14:03

## 2024-12-07 RX ADMIN — HYDRALAZINE HYDROCHLORIDE 25 MG: 50 TABLET ORAL at 08:43

## 2024-12-07 RX ADMIN — HYDROMORPHONE HYDROCHLORIDE 1 MG: 1 INJECTION, SOLUTION INTRAMUSCULAR; INTRAVENOUS; SUBCUTANEOUS at 17:18

## 2024-12-07 RX ADMIN — PIPERACILLIN AND TAZOBACTAM 4500 MG: 4; .5 INJECTION, POWDER, LYOPHILIZED, FOR SOLUTION INTRAVENOUS at 15:30

## 2024-12-07 RX ADMIN — RIVAROXABAN 20 MG: 20 TABLET, FILM COATED ORAL at 17:09

## 2024-12-07 RX ADMIN — SENNOSIDES AND DOCUSATE SODIUM 1 TABLET: 8.6; 5 TABLET ORAL at 08:42

## 2024-12-07 RX ADMIN — GABAPENTIN 100 MG: 100 CAPSULE ORAL at 08:43

## 2024-12-07 RX ADMIN — HYDROMORPHONE HYDROCHLORIDE 1 MG: 1 INJECTION, SOLUTION INTRAMUSCULAR; INTRAVENOUS; SUBCUTANEOUS at 05:46

## 2024-12-07 RX ADMIN — TAMSULOSIN HYDROCHLORIDE 0.4 MG: 0.4 CAPSULE ORAL at 08:42

## 2024-12-07 RX ADMIN — FUROSEMIDE 20 MG: 10 INJECTION, SOLUTION INTRAVENOUS at 08:43

## 2024-12-07 RX ADMIN — FAMOTIDINE 20 MG: 20 TABLET, FILM COATED ORAL at 08:42

## 2024-12-07 ASSESSMENT — PAIN DESCRIPTION - LOCATION
LOCATION: FOOT
LOCATION: BACK
LOCATION: LEG
LOCATION: BACK
LOCATION: BACK
LOCATION: LEG;FOOT
LOCATION: LEG

## 2024-12-07 ASSESSMENT — PAIN DESCRIPTION - DESCRIPTORS
DESCRIPTORS: ACHING
DESCRIPTORS: ACHING
DESCRIPTORS: ACHING;SHARP
DESCRIPTORS: ACHING

## 2024-12-07 ASSESSMENT — PAIN SCALES - GENERAL
PAINLEVEL_OUTOF10: 10
PAINLEVEL_OUTOF10: 7
PAINLEVEL_OUTOF10: 5
PAINLEVEL_OUTOF10: 10
PAINLEVEL_OUTOF10: 7
PAINLEVEL_OUTOF10: 2
PAINLEVEL_OUTOF10: 6
PAINLEVEL_OUTOF10: 2

## 2024-12-07 ASSESSMENT — PAIN SCALES - WONG BAKER
WONGBAKER_NUMERICALRESPONSE: HURTS A LITTLE BIT
WONGBAKER_NUMERICALRESPONSE: HURTS A LITTLE BIT

## 2024-12-07 ASSESSMENT — PAIN DESCRIPTION - ORIENTATION
ORIENTATION: RIGHT;LEFT
ORIENTATION: LOWER
ORIENTATION: RIGHT;LEFT
ORIENTATION: LOWER
ORIENTATION: RIGHT;LEFT
ORIENTATION: RIGHT;LEFT

## 2024-12-07 NOTE — CARE COORDINATION
CM note:    Chart reviewed for updates. CM met with pt at bedside, introduced role, confirmed demographics and discussed d/c planning. Pt lives with spouse in a 1 level home. He is independent with ADL's at home and reports that he uses a cane as needed and a WC while at work. Pt is insured and has a PCP that he sees every 3 months and a Wound Care MD that he sees every week. Pt goes to [facility] for wound care. He is employed full time at [employer]. Pt is an active  in the community.  He has used home health services in the past. Pt denied history of going to rehab. Pt has a strong local family support system. Therapy is recommending STR. CM to meet with pt to discuss STR. Pt will need medical transport. CM will continue to follow up with d/c planning.     12/07/24 1505   Service Assessment   Patient Orientation Alert and Oriented   Cognition Alert   History Provided By Patient   Primary Caregiver Self   Accompanied By/Relationship N/A   Support Systems Friends/Neighbors   Patient's Healthcare Decision Maker is: Legal Next of Kin  (Spouse)   PCP Verified by CM Yes   Last Visit to PCP Within last 3 months   Prior Functional Level Independent in ADLs/IADLs   Current Functional Level Independent in ADLs/IADLs   Can patient return to prior living arrangement Yes   Ability to make needs known: Good   Family able to assist with home care needs: Yes   Would you like for me to discuss the discharge plan with any other family members/significant others, and if so, who? No   Financial Resources   (BCBS)   Community Resources None   Social/Functional History   Lives With Spouse   Type of Home House   Home Layout One level   Home Equipment Cane;Wheelchair - Manual   Prior Level of Assist for ADLs Independent   Ambulation Assistance Independent   Active  Yes   Occupation Full time employment   Discharge Planning   Type of Residence House   Living Arrangements Spouse/Significant Other   Current Services

## 2024-12-07 NOTE — ACP (ADVANCE CARE PLANNING)
Advance Care Planning   General Advance Care Planning (ACP) Conversation    Date of Conversation: 12/6/2024  Conducted with: Patient with Decision Making Capacity  Other persons present: None    Healthcare Decision Maker:    Primary Decision Maker: Isidro Basurto - Spouse - [phone]        Length of Voluntary ACP Conversation in minutes:  <16 minutes (Non-Billable)    Jenelle Schwarz

## 2024-12-07 NOTE — CARE COORDINATION
CM note:    Chart reviewed for updates. Therapy recommending STR. CM met with pt to discuss STR. Pt not sure if he wants to go to rehab reporting that he will wait to see how he progresses in terms of ADL's and ambulation while at the hospital. Reports that he will think about rehab but he is not sure for now. CM gave pt a STR list and requested for him to look over it and identify 3 choices if he decides he wants to go to rehab. Pt also notified that the other option would be going home with home health however he is aware that home health therapy comes twice a week to the home. CM will continue to follow up with d/c planning.    DENIA Henry

## 2024-12-08 ENCOUNTER — APPOINTMENT (OUTPATIENT)
Dept: GENERAL RADIOLOGY | Age: 59
DRG: 603 | End: 2024-12-08
Payer: COMMERCIAL

## 2024-12-08 LAB
ANION GAP SERPL CALC-SCNC: 10 MMOL/L (ref 7–16)
BACTERIA SPEC CULT: ABNORMAL
BASOPHILS # BLD: 0.1 K/UL (ref 0–0.2)
BASOPHILS NFR BLD: 1 % (ref 0–2)
BUN SERPL-MCNC: 18 MG/DL (ref 8–23)
CALCIUM SERPL-MCNC: 8.9 MG/DL (ref 8.8–10.2)
CHLORIDE SERPL-SCNC: 102 MMOL/L (ref 98–107)
CO2 SERPL-SCNC: 28 MMOL/L (ref 20–29)
CREAT SERPL-MCNC: 1.01 MG/DL (ref 0.8–1.3)
DIFFERENTIAL METHOD BLD: ABNORMAL
EOSINOPHIL # BLD: 0.1 K/UL (ref 0–0.8)
EOSINOPHIL NFR BLD: 1 % (ref 0.5–7.8)
ERYTHROCYTE [DISTWIDTH] IN BLOOD BY AUTOMATED COUNT: 13 % (ref 11.9–14.6)
GLUCOSE SERPL-MCNC: 132 MG/DL (ref 70–99)
GRAM STN SPEC: ABNORMAL
GRAM STN SPEC: ABNORMAL
HCT VFR BLD AUTO: 41 % (ref 41.1–50.3)
HGB BLD-MCNC: 13.5 G/DL (ref 13.6–17.2)
IMM GRANULOCYTES # BLD AUTO: 0 K/UL (ref 0–0.5)
IMM GRANULOCYTES NFR BLD AUTO: 0 % (ref 0–5)
LYMPHOCYTES # BLD: 1.2 K/UL (ref 0.5–4.6)
LYMPHOCYTES NFR BLD: 17 % (ref 13–44)
MCH RBC QN AUTO: 35.6 PG (ref 26.1–32.9)
MCHC RBC AUTO-ENTMCNC: 32.9 G/DL (ref 31.4–35)
MCV RBC AUTO: 108.2 FL (ref 82–102)
MONOCYTES # BLD: 0.6 K/UL (ref 0.1–1.3)
MONOCYTES NFR BLD: 9 % (ref 4–12)
NEUTS SEG # BLD: 4.9 K/UL (ref 1.7–8.2)
NEUTS SEG NFR BLD: 72 % (ref 43–78)
NRBC # BLD: 0 K/UL (ref 0–0.2)
PLATELET # BLD AUTO: 186 K/UL (ref 150–450)
PMV BLD AUTO: 9.9 FL (ref 9.4–12.3)
POTASSIUM SERPL-SCNC: 4.5 MMOL/L (ref 3.5–5.1)
RBC # BLD AUTO: 3.79 M/UL (ref 4.23–5.6)
SERVICE CMNT-IMP: ABNORMAL
SODIUM SERPL-SCNC: 140 MMOL/L (ref 136–145)
WBC # BLD AUTO: 6.9 K/UL (ref 4.3–11.1)

## 2024-12-08 PROCEDURE — 36415 COLL VENOUS BLD VENIPUNCTURE: CPT

## 2024-12-08 PROCEDURE — 2580000003 HC RX 258: Performed by: HOSPITALIST

## 2024-12-08 PROCEDURE — 6360000002 HC RX W HCPCS: Performed by: HOSPITALIST

## 2024-12-08 PROCEDURE — 71045 X-RAY EXAM CHEST 1 VIEW: CPT

## 2024-12-08 PROCEDURE — 2500000003 HC RX 250 WO HCPCS: Performed by: HOSPITALIST

## 2024-12-08 PROCEDURE — 80048 BASIC METABOLIC PNL TOTAL CA: CPT

## 2024-12-08 PROCEDURE — 2700000000 HC OXYGEN THERAPY PER DAY

## 2024-12-08 PROCEDURE — 6370000000 HC RX 637 (ALT 250 FOR IP): Performed by: HOSPITALIST

## 2024-12-08 PROCEDURE — 72080 X-RAY EXAM THORACOLMB 2/> VW: CPT

## 2024-12-08 PROCEDURE — G0378 HOSPITAL OBSERVATION PER HR: HCPCS

## 2024-12-08 PROCEDURE — 94761 N-INVAS EAR/PLS OXIMETRY MLT: CPT

## 2024-12-08 PROCEDURE — 85025 COMPLETE CBC W/AUTO DIFF WBC: CPT

## 2024-12-08 PROCEDURE — 94760 N-INVAS EAR/PLS OXIMETRY 1: CPT

## 2024-12-08 RX ADMIN — SODIUM CHLORIDE, PRESERVATIVE FREE 10 ML: 5 INJECTION INTRAVENOUS at 20:19

## 2024-12-08 RX ADMIN — RIVAROXABAN 20 MG: 20 TABLET, FILM COATED ORAL at 15:51

## 2024-12-08 RX ADMIN — SENNOSIDES AND DOCUSATE SODIUM 1 TABLET: 8.6; 5 TABLET ORAL at 09:32

## 2024-12-08 RX ADMIN — ALLOPURINOL 300 MG: 300 TABLET ORAL at 09:32

## 2024-12-08 RX ADMIN — SENNOSIDES AND DOCUSATE SODIUM 1 TABLET: 8.6; 5 TABLET ORAL at 20:19

## 2024-12-08 RX ADMIN — GABAPENTIN 300 MG: 300 CAPSULE ORAL at 20:19

## 2024-12-08 RX ADMIN — GABAPENTIN 300 MG: 300 CAPSULE ORAL at 12:15

## 2024-12-08 RX ADMIN — PIPERACILLIN AND TAZOBACTAM 4500 MG: 4; .5 INJECTION, POWDER, LYOPHILIZED, FOR SOLUTION INTRAVENOUS at 06:28

## 2024-12-08 RX ADMIN — PIPERACILLIN AND TAZOBACTAM 4500 MG: 4; .5 INJECTION, POWDER, LYOPHILIZED, FOR SOLUTION INTRAVENOUS at 15:55

## 2024-12-08 RX ADMIN — GABAPENTIN 300 MG: 300 CAPSULE ORAL at 09:32

## 2024-12-08 RX ADMIN — HYDROMORPHONE HYDROCHLORIDE 1 MG: 1 INJECTION, SOLUTION INTRAMUSCULAR; INTRAVENOUS; SUBCUTANEOUS at 15:52

## 2024-12-08 RX ADMIN — HYDRALAZINE HYDROCHLORIDE 25 MG: 50 TABLET ORAL at 09:32

## 2024-12-08 RX ADMIN — HYDRALAZINE HYDROCHLORIDE 25 MG: 50 TABLET ORAL at 20:19

## 2024-12-08 RX ADMIN — DILTIAZEM HYDROCHLORIDE 240 MG: 240 CAPSULE, EXTENDED RELEASE ORAL at 09:31

## 2024-12-08 RX ADMIN — HYDROMORPHONE HYDROCHLORIDE 1 MG: 1 INJECTION, SOLUTION INTRAMUSCULAR; INTRAVENOUS; SUBCUTANEOUS at 09:39

## 2024-12-08 RX ADMIN — FAMOTIDINE 20 MG: 20 TABLET, FILM COATED ORAL at 09:32

## 2024-12-08 RX ADMIN — FUROSEMIDE 40 MG: 10 INJECTION, SOLUTION INTRAMUSCULAR; INTRAVENOUS at 09:32

## 2024-12-08 RX ADMIN — ANTI-FUNGAL POWDER MICONAZOLE NITRATE TALC FREE: 1.42 POWDER TOPICAL at 22:20

## 2024-12-08 RX ADMIN — TAMSULOSIN HYDROCHLORIDE 0.4 MG: 0.4 CAPSULE ORAL at 09:32

## 2024-12-08 RX ADMIN — MELOXICAM 15 MG: 7.5 TABLET ORAL at 09:32

## 2024-12-08 RX ADMIN — PIPERACILLIN AND TAZOBACTAM 4500 MG: 4; .5 INJECTION, POWDER, LYOPHILIZED, FOR SOLUTION INTRAVENOUS at 22:56

## 2024-12-08 RX ADMIN — OXYCODONE HYDROCHLORIDE 7.5 MG: 15 TABLET ORAL at 12:15

## 2024-12-08 RX ADMIN — Medication 3 MG: at 20:19

## 2024-12-08 RX ADMIN — HYDROMORPHONE HYDROCHLORIDE 1 MG: 1 INJECTION, SOLUTION INTRAMUSCULAR; INTRAVENOUS; SUBCUTANEOUS at 20:21

## 2024-12-08 RX ADMIN — HYDROMORPHONE HYDROCHLORIDE 1 MG: 1 INJECTION, SOLUTION INTRAMUSCULAR; INTRAVENOUS; SUBCUTANEOUS at 06:34

## 2024-12-08 ASSESSMENT — PAIN DESCRIPTION - LOCATION
LOCATION: BACK
LOCATION: BACK;RIB CAGE

## 2024-12-08 ASSESSMENT — PAIN DESCRIPTION - DESCRIPTORS: DESCRIPTORS: ACHING

## 2024-12-08 ASSESSMENT — PAIN SCALES - GENERAL
PAINLEVEL_OUTOF10: 3
PAINLEVEL_OUTOF10: 8
PAINLEVEL_OUTOF10: 6
PAINLEVEL_OUTOF10: 7
PAINLEVEL_OUTOF10: 9

## 2024-12-08 ASSESSMENT — PAIN DESCRIPTION - ORIENTATION
ORIENTATION: MID
ORIENTATION: RIGHT

## 2024-12-08 NOTE — PLAN OF CARE
Problem: Discharge Planning  Goal: Discharge to home or other facility with appropriate resources  Outcome: Progressing  Flowsheets (Taken 12/7/2024 1926)  Discharge to home or other facility with appropriate resources:   Identify barriers to discharge with patient and caregiver   Identify discharge learning needs (meds, wound care, etc)     Problem: Pain  Goal: Verbalizes/displays adequate comfort level or baseline comfort level  Outcome: Progressing     Problem: Skin/Tissue Integrity  Goal: Absence of new skin breakdown  Description: 1.  Monitor for areas of redness and/or skin breakdown  2.  Assess vascular access sites hourly  3.  Every 4-6 hours minimum:  Change oxygen saturation probe site  4.  Every 4-6 hours:  If on nasal continuous positive airway pressure, respiratory therapy assess nares and determine need for appliance change or resting period.  Outcome: Progressing     Problem: Safety - Adult  Goal: Free from fall injury  Outcome: Progressing      Quality 130: Documentation Of Current Medications In The Medical Record: Current Medications Documented Quality 110: Preventive Care And Screening: Influenza Immunization: Influenza Immunization Administered during Influenza season Quality 226: Preventive Care And Screening: Tobacco Use: Screening And Cessation Intervention: Patient screened for tobacco use and is an ex/non-smoker Quality 431: Preventive Care And Screening: Unhealthy Alcohol Use - Screening: Patient not identified as an unhealthy alcohol user when screened for unhealthy alcohol use using a systematic screening method Quality 402: Tobacco Use And Help With Quitting Among Adolescents: Patient screened for tobacco and never smoked Detail Level: Detailed

## 2024-12-08 NOTE — CARE COORDINATION
CM note:    Chart reviewed for updates. CM met with pt at bedside to discuss what he decision was in regards to going to STR. Pt is now agreeable with going to rehab reporting that his wife will not let home health come to the home. Pt would like CM to send referral to:    Tasia Altman  [facility]    Referral sent pending review. Pt has [place] BCBS and will need insurance auth. CM will continue to follow up with d/c planning.    DENIA Henry

## 2024-12-09 ENCOUNTER — APPOINTMENT (OUTPATIENT)
Dept: NON INVASIVE DIAGNOSTICS | Age: 59
DRG: 603 | End: 2024-12-09
Attending: HOSPITALIST
Payer: COMMERCIAL

## 2024-12-09 PROBLEM — R09.89 PULMONARY VASCULAR CONGESTION: Status: ACTIVE | Noted: 2024-12-09

## 2024-12-09 LAB
ANION GAP SERPL CALC-SCNC: 9 MMOL/L (ref 7–16)
BACTERIA SPEC CULT: NORMAL
BASOPHILS # BLD: 0 K/UL (ref 0–0.2)
BASOPHILS NFR BLD: 1 % (ref 0–2)
BUN SERPL-MCNC: 18 MG/DL (ref 8–23)
CALCIUM SERPL-MCNC: 8.8 MG/DL (ref 8.8–10.2)
CHLORIDE SERPL-SCNC: 101 MMOL/L (ref 98–107)
CO2 SERPL-SCNC: 29 MMOL/L (ref 20–29)
CREAT SERPL-MCNC: 0.94 MG/DL (ref 0.8–1.3)
DIFFERENTIAL METHOD BLD: ABNORMAL
EOSINOPHIL # BLD: 0.1 K/UL (ref 0–0.8)
EOSINOPHIL NFR BLD: 1 % (ref 0.5–7.8)
ERYTHROCYTE [DISTWIDTH] IN BLOOD BY AUTOMATED COUNT: 12.8 % (ref 11.9–14.6)
GLUCOSE BLD STRIP.AUTO-MCNC: 124 MG/DL (ref 65–100)
GLUCOSE SERPL-MCNC: 131 MG/DL (ref 70–99)
HCT VFR BLD AUTO: 43.8 % (ref 41.1–50.3)
HGB BLD-MCNC: 14 G/DL (ref 13.6–17.2)
IMM GRANULOCYTES # BLD AUTO: 0 K/UL (ref 0–0.5)
IMM GRANULOCYTES NFR BLD AUTO: 0 % (ref 0–5)
LYMPHOCYTES # BLD: 0.8 K/UL (ref 0.5–4.6)
LYMPHOCYTES NFR BLD: 10 % (ref 13–44)
MCH RBC QN AUTO: 34.9 PG (ref 26.1–32.9)
MCHC RBC AUTO-ENTMCNC: 32 G/DL (ref 31.4–35)
MCV RBC AUTO: 109.2 FL (ref 82–102)
MONOCYTES # BLD: 0.6 K/UL (ref 0.1–1.3)
MONOCYTES NFR BLD: 8 % (ref 4–12)
NEUTS SEG # BLD: 6.3 K/UL (ref 1.7–8.2)
NEUTS SEG NFR BLD: 80 % (ref 43–78)
NRBC # BLD: 0 K/UL (ref 0–0.2)
NT PRO BNP: <36 PG/ML (ref 0–125)
PLATELET # BLD AUTO: 165 K/UL (ref 150–450)
PMV BLD AUTO: 10.1 FL (ref 9.4–12.3)
POTASSIUM SERPL-SCNC: 4.7 MMOL/L (ref 3.5–5.1)
RBC # BLD AUTO: 4.01 M/UL (ref 4.23–5.6)
SERVICE CMNT-IMP: ABNORMAL
SERVICE CMNT-IMP: NORMAL
SODIUM SERPL-SCNC: 139 MMOL/L (ref 136–145)
WBC # BLD AUTO: 7.9 K/UL (ref 4.3–11.1)

## 2024-12-09 PROCEDURE — 29581 APPL MULTLAYER CMPRN SYS LEG: CPT

## 2024-12-09 PROCEDURE — 6360000004 HC RX CONTRAST MEDICATION: Performed by: INTERNAL MEDICINE

## 2024-12-09 PROCEDURE — 83880 ASSAY OF NATRIURETIC PEPTIDE: CPT

## 2024-12-09 PROCEDURE — 2580000003 HC RX 258: Performed by: INTERNAL MEDICINE

## 2024-12-09 PROCEDURE — 6370000000 HC RX 637 (ALT 250 FOR IP): Performed by: INTERNAL MEDICINE

## 2024-12-09 PROCEDURE — G0378 HOSPITAL OBSERVATION PER HR: HCPCS

## 2024-12-09 PROCEDURE — 85025 COMPLETE CBC W/AUTO DIFF WBC: CPT

## 2024-12-09 PROCEDURE — 6370000000 HC RX 637 (ALT 250 FOR IP): Performed by: HOSPITALIST

## 2024-12-09 PROCEDURE — 6360000002 HC RX W HCPCS: Performed by: HOSPITALIST

## 2024-12-09 PROCEDURE — 97530 THERAPEUTIC ACTIVITIES: CPT

## 2024-12-09 PROCEDURE — 36415 COLL VENOUS BLD VENIPUNCTURE: CPT

## 2024-12-09 PROCEDURE — C8929 TTE W OR WO FOL WCON,DOPPLER: HCPCS

## 2024-12-09 PROCEDURE — 80048 BASIC METABOLIC PNL TOTAL CA: CPT

## 2024-12-09 PROCEDURE — 82962 GLUCOSE BLOOD TEST: CPT

## 2024-12-09 PROCEDURE — 2W2RX4Z DRESSING OF LEFT LOWER LEG USING BANDAGE: ICD-10-PCS | Performed by: HOSPITALIST

## 2024-12-09 PROCEDURE — 2580000003 HC RX 258: Performed by: HOSPITALIST

## 2024-12-09 RX ORDER — POLYETHYLENE GLYCOL 3350 17 G/17G
17 POWDER, FOR SOLUTION ORAL DAILY PRN
Status: DISCONTINUED | OUTPATIENT
Start: 2024-12-09 | End: 2024-12-11 | Stop reason: HOSPADM

## 2024-12-09 RX ORDER — CALCIUM CARBONATE 500 MG/1
750 TABLET, CHEWABLE ORAL 3 TIMES DAILY PRN
Status: DISCONTINUED | OUTPATIENT
Start: 2024-12-09 | End: 2024-12-11 | Stop reason: HOSPADM

## 2024-12-09 RX ORDER — LINEZOLID 2 MG/ML
600 INJECTION, SOLUTION INTRAVENOUS EVERY 12 HOURS
Status: DISCONTINUED | OUTPATIENT
Start: 2024-12-09 | End: 2024-12-10

## 2024-12-09 RX ORDER — MINERAL OIL/HYDROPHIL PETROLAT
OINTMENT (GRAM) TOPICAL
Status: DISCONTINUED | OUTPATIENT
Start: 2024-12-12 | End: 2024-12-11 | Stop reason: HOSPADM

## 2024-12-09 RX ADMIN — FUROSEMIDE 40 MG: 10 INJECTION, SOLUTION INTRAMUSCULAR; INTRAVENOUS at 09:39

## 2024-12-09 RX ADMIN — ALLOPURINOL 300 MG: 300 TABLET ORAL at 09:38

## 2024-12-09 RX ADMIN — PIPERACILLIN AND TAZOBACTAM 4500 MG: 4; .5 INJECTION, POWDER, LYOPHILIZED, FOR SOLUTION INTRAVENOUS at 22:50

## 2024-12-09 RX ADMIN — FAMOTIDINE 20 MG: 20 TABLET, FILM COATED ORAL at 09:38

## 2024-12-09 RX ADMIN — HYDRALAZINE HYDROCHLORIDE 25 MG: 50 TABLET ORAL at 09:37

## 2024-12-09 RX ADMIN — ACETAMINOPHEN 650 MG: 325 TABLET, FILM COATED ORAL at 09:49

## 2024-12-09 RX ADMIN — SENNOSIDES AND DOCUSATE SODIUM 1 TABLET: 8.6; 5 TABLET ORAL at 09:36

## 2024-12-09 RX ADMIN — PERFLUTREN 1 ML: 6.52 INJECTION, SUSPENSION INTRAVENOUS at 18:36

## 2024-12-09 RX ADMIN — POLYETHYLENE GLYCOL 3350 17 G: 17 POWDER, FOR SOLUTION ORAL at 14:37

## 2024-12-09 RX ADMIN — GABAPENTIN 300 MG: 300 CAPSULE ORAL at 09:38

## 2024-12-09 RX ADMIN — DICLOFENAC SODIUM 2 G: 10 GEL TOPICAL at 21:06

## 2024-12-09 RX ADMIN — LINEZOLID 600 MG: 600 INJECTION, SOLUTION INTRAVENOUS at 14:35

## 2024-12-09 RX ADMIN — ONDANSETRON 4 MG: 2 INJECTION, SOLUTION INTRAMUSCULAR; INTRAVENOUS at 00:00

## 2024-12-09 RX ADMIN — MELOXICAM 15 MG: 7.5 TABLET ORAL at 09:38

## 2024-12-09 RX ADMIN — SODIUM CHLORIDE, PRESERVATIVE FREE 10 ML: 5 INJECTION INTRAVENOUS at 21:07

## 2024-12-09 RX ADMIN — RIVAROXABAN 20 MG: 20 TABLET, FILM COATED ORAL at 17:02

## 2024-12-09 RX ADMIN — PIPERACILLIN AND TAZOBACTAM 4500 MG: 4; .5 INJECTION, POWDER, LYOPHILIZED, FOR SOLUTION INTRAVENOUS at 06:25

## 2024-12-09 RX ADMIN — ONDANSETRON 4 MG: 2 INJECTION, SOLUTION INTRAMUSCULAR; INTRAVENOUS at 22:47

## 2024-12-09 RX ADMIN — DILTIAZEM HYDROCHLORIDE 240 MG: 240 CAPSULE, EXTENDED RELEASE ORAL at 09:37

## 2024-12-09 RX ADMIN — GABAPENTIN 300 MG: 300 CAPSULE ORAL at 14:36

## 2024-12-09 RX ADMIN — OXYCODONE HYDROCHLORIDE 7.5 MG: 15 TABLET ORAL at 06:23

## 2024-12-09 RX ADMIN — OXYCODONE HYDROCHLORIDE 7.5 MG: 15 TABLET ORAL at 17:02

## 2024-12-09 RX ADMIN — HYDRALAZINE HYDROCHLORIDE 25 MG: 50 TABLET ORAL at 21:05

## 2024-12-09 RX ADMIN — PIPERACILLIN AND TAZOBACTAM 4500 MG: 4; .5 INJECTION, POWDER, LYOPHILIZED, FOR SOLUTION INTRAVENOUS at 15:41

## 2024-12-09 RX ADMIN — SODIUM CHLORIDE, PRESERVATIVE FREE 10 ML: 5 INJECTION INTRAVENOUS at 09:57

## 2024-12-09 RX ADMIN — TAMSULOSIN HYDROCHLORIDE 0.4 MG: 0.4 CAPSULE ORAL at 09:38

## 2024-12-09 ASSESSMENT — PAIN DESCRIPTION - DESCRIPTORS: DESCRIPTORS: ACHING

## 2024-12-09 ASSESSMENT — PAIN SCALES - GENERAL
PAINLEVEL_OUTOF10: 0
PAINLEVEL_OUTOF10: 8
PAINLEVEL_OUTOF10: 3
PAINLEVEL_OUTOF10: 2
PAINLEVEL_OUTOF10: 8

## 2024-12-09 ASSESSMENT — PAIN SCALES - WONG BAKER: WONGBAKER_NUMERICALRESPONSE: HURTS A LITTLE BIT

## 2024-12-09 ASSESSMENT — PAIN DESCRIPTION - ORIENTATION
ORIENTATION: RIGHT;LEFT
ORIENTATION: RIGHT;LEFT

## 2024-12-09 ASSESSMENT — PAIN DESCRIPTION - LOCATION
LOCATION: LEG;BACK
LOCATION: LEG

## 2024-12-09 NOTE — WOUND CARE
Consulted for Chronic BLE compression wraps. Follows [facility] last seen 12/6; dressing with Vasline to dry areas, Dakins moistened Kerlix including weaving between toes and leave on for full minute and remove, Alginate Ag to wound beds, xeroform to L lateral foot, ABD, wrap with 4 layer compression wrap x2/wk.    Wound team will plan to change BLE compression wraps every M&Th. Okay to shower with protective wrap taped around legs.    LLE with sporadic open areas, pink/red, dry/flaky, fungal columns, hyperkeratosis, small serosanguinous, mild odor.          RLE w/o open wounds, dry/flaky skin, fungal columns, hyperkeratosis, no drainage, mild odor.

## 2024-12-09 NOTE — CARE COORDINATION
CM note:    Update 1430 pm: CM met with pt at bedside to get additional STR options. Pt chose:    [facility]  [facility]  [facility]    Referral sent via epic pending review. CM will continue to follow up with d/c planning.    Initial note: Chart reviewed for updates. It was discussed in IDR's that pt is still pending medical clearance. Pt is pending wound cultures. MD reports that he will discharge on Oral antibiotics. STR was recommended over the weekend by therapy. Referral sent. Pt was declined at [facility] due to being OON. [facility] is pending disposition. CM met with pt at bedside and gave him updates. Pt aware that we have no bed offers. CM has requested for additional STR options. CM to meet with pt later to get additional STR options. CM will continue to follow up with d/c planning.    DENIA Henry

## 2024-12-10 PROBLEM — L03.90 CELLULITIS: Status: ACTIVE | Noted: 2024-12-10

## 2024-12-10 LAB
ECHO AO ROOT DIAM: 3.3 CM
ECHO AO ROOT INDEX: 1.23 CM/M2
ECHO AV PEAK GRADIENT: 12 MMHG
ECHO AV PEAK VELOCITY: 1.7 M/S
ECHO AV VELOCITY RATIO: 0.65
ECHO BSA: 2.77 M2
ECHO EST RA PRESSURE: 3 MMHG
ECHO LA AREA 2C: 25.4 CM2
ECHO LA AREA 4C: 25.7 CM2
ECHO LA DIAMETER INDEX: 1.64 CM/M2
ECHO LA DIAMETER: 4.4 CM
ECHO LA MAJOR AXIS: 6.7 CM
ECHO LA MINOR AXIS: 6.1 CM
ECHO LA TO AORTIC ROOT RATIO: 1.33
ECHO LA VOL BP: 87 ML (ref 18–58)
ECHO LA VOL MOD A2C: 87 ML (ref 18–58)
ECHO LA VOL MOD A4C: 80 ML (ref 18–58)
ECHO LA VOL/BSA BIPLANE: 32 ML/M2 (ref 16–34)
ECHO LA VOLUME INDEX MOD A2C: 32 ML/M2 (ref 16–34)
ECHO LA VOLUME INDEX MOD A4C: 30 ML/M2 (ref 16–34)
ECHO LV E' LATERAL VELOCITY: 5.03 CM/S
ECHO LV E' SEPTAL VELOCITY: 7.25 CM/S
ECHO LV EDV A2C: 127 ML
ECHO LV EDV A4C: 140 ML
ECHO LV EDV INDEX A4C: 52 ML/M2
ECHO LV EDV NDEX A2C: 47 ML/M2
ECHO LV EJECTION FRACTION A2C: 58 %
ECHO LV EJECTION FRACTION A4C: 52 %
ECHO LV EJECTION FRACTION BIPLANE: 56 % (ref 55–100)
ECHO LV ESV A2C: 54 ML
ECHO LV ESV A4C: 67 ML
ECHO LV ESV INDEX A2C: 20 ML/M2
ECHO LV ESV INDEX A4C: 25 ML/M2
ECHO LV FRACTIONAL SHORTENING: 20 % (ref 28–44)
ECHO LV INTERNAL DIMENSION DIASTOLE INDEX: 2.28 CM/M2
ECHO LV INTERNAL DIMENSION DIASTOLIC: 6.1 CM (ref 4.2–5.9)
ECHO LV INTERNAL DIMENSION SYSTOLIC INDEX: 1.83 CM/M2
ECHO LV INTERNAL DIMENSION SYSTOLIC: 4.9 CM
ECHO LV IVSD: 1.2 CM (ref 0.6–1)
ECHO LV MASS 2D: 359.6 G (ref 88–224)
ECHO LV MASS INDEX 2D: 134.2 G/M2 (ref 49–115)
ECHO LV POSTERIOR WALL DIASTOLIC: 1.4 CM (ref 0.6–1)
ECHO LV RELATIVE WALL THICKNESS RATIO: 0.46
ECHO LVOT PEAK GRADIENT: 5 MMHG
ECHO LVOT PEAK VELOCITY: 1.1 M/S
ECHO MV A VELOCITY: 0.86 M/S
ECHO MV E DECELERATION TIME (DT): 248 MS
ECHO MV E VELOCITY: 1.03 M/S
ECHO MV E/A RATIO: 1.2
ECHO MV E/E' LATERAL: 20.48
ECHO MV E/E' RATIO (AVERAGED): 17.34
ECHO MV E/E' SEPTAL: 14.21
ECHO RIGHT VENTRICULAR SYSTOLIC PRESSURE (RVSP): 9 MMHG
ECHO RV BASAL DIMENSION: 3.8 CM
ECHO RV MID DIMENSION: 2.8 CM
ECHO RV TAPSE: 2.4 CM (ref 1.7–?)
ECHO TV REGURGITANT MAX VELOCITY: 1.19 M/S
ECHO TV REGURGITANT PEAK GRADIENT: 6 MMHG

## 2024-12-10 PROCEDURE — 2580000003 HC RX 258: Performed by: HOSPITALIST

## 2024-12-10 PROCEDURE — 6370000000 HC RX 637 (ALT 250 FOR IP)

## 2024-12-10 PROCEDURE — G0378 HOSPITAL OBSERVATION PER HR: HCPCS

## 2024-12-10 PROCEDURE — 93306 TTE W/DOPPLER COMPLETE: CPT | Performed by: INTERNAL MEDICINE

## 2024-12-10 PROCEDURE — 6370000000 HC RX 637 (ALT 250 FOR IP): Performed by: HOSPITALIST

## 2024-12-10 PROCEDURE — 6360000002 HC RX W HCPCS: Performed by: HOSPITALIST

## 2024-12-10 RX ORDER — DOXYCYCLINE 100 MG/1
100 CAPSULE ORAL EVERY 12 HOURS SCHEDULED
Status: DISCONTINUED | OUTPATIENT
Start: 2024-12-10 | End: 2024-12-11 | Stop reason: HOSPADM

## 2024-12-10 RX ADMIN — AMOXICILLIN AND CLAVULANATE POTASSIUM 1 TABLET: 875; 125 TABLET, FILM COATED ORAL at 10:22

## 2024-12-10 RX ADMIN — SENNOSIDES AND DOCUSATE SODIUM 1 TABLET: 8.6; 5 TABLET ORAL at 08:49

## 2024-12-10 RX ADMIN — GABAPENTIN 300 MG: 300 CAPSULE ORAL at 21:41

## 2024-12-10 RX ADMIN — PROMETHAZINE HYDROCHLORIDE 25 MG: 25 TABLET ORAL at 03:05

## 2024-12-10 RX ADMIN — DILTIAZEM HYDROCHLORIDE 240 MG: 240 CAPSULE, EXTENDED RELEASE ORAL at 08:48

## 2024-12-10 RX ADMIN — AMOXICILLIN AND CLAVULANATE POTASSIUM 1 TABLET: 875; 125 TABLET, FILM COATED ORAL at 21:41

## 2024-12-10 RX ADMIN — SODIUM CHLORIDE, PRESERVATIVE FREE 10 ML: 5 INJECTION INTRAVENOUS at 08:52

## 2024-12-10 RX ADMIN — HYDRALAZINE HYDROCHLORIDE 25 MG: 50 TABLET ORAL at 08:45

## 2024-12-10 RX ADMIN — LINEZOLID 600 MG: 600 INJECTION, SOLUTION INTRAVENOUS at 03:09

## 2024-12-10 RX ADMIN — GABAPENTIN 300 MG: 300 CAPSULE ORAL at 08:48

## 2024-12-10 RX ADMIN — DICLOFENAC SODIUM 2 G: 10 GEL TOPICAL at 08:50

## 2024-12-10 RX ADMIN — ONDANSETRON 4 MG: 2 INJECTION, SOLUTION INTRAMUSCULAR; INTRAVENOUS at 23:23

## 2024-12-10 RX ADMIN — PIPERACILLIN AND TAZOBACTAM 4500 MG: 4; .5 INJECTION, POWDER, LYOPHILIZED, FOR SOLUTION INTRAVENOUS at 06:36

## 2024-12-10 RX ADMIN — DOXYCYCLINE HYCLATE 100 MG: 100 CAPSULE ORAL at 10:22

## 2024-12-10 RX ADMIN — RIVAROXABAN 20 MG: 20 TABLET, FILM COATED ORAL at 17:18

## 2024-12-10 RX ADMIN — ALLOPURINOL 300 MG: 300 TABLET ORAL at 08:45

## 2024-12-10 RX ADMIN — FAMOTIDINE 20 MG: 20 TABLET, FILM COATED ORAL at 08:49

## 2024-12-10 RX ADMIN — Medication 3 MG: at 21:41

## 2024-12-10 RX ADMIN — TAMSULOSIN HYDROCHLORIDE 0.4 MG: 0.4 CAPSULE ORAL at 08:45

## 2024-12-10 RX ADMIN — DOXYCYCLINE HYCLATE 100 MG: 100 CAPSULE ORAL at 21:41

## 2024-12-10 RX ADMIN — FUROSEMIDE 40 MG: 10 INJECTION, SOLUTION INTRAMUSCULAR; INTRAVENOUS at 08:41

## 2024-12-10 RX ADMIN — SENNOSIDES AND DOCUSATE SODIUM 1 TABLET: 8.6; 5 TABLET ORAL at 21:41

## 2024-12-10 NOTE — CARE COORDINATION
CM note:    Chart reviewed for updates. It was discussed in IDR's that pt is still presenting with nausea. MD reports CELESTE likely tomorrow. Night shift nurse note reports that pt is now refusing rehab reporting that his wife feels it would be best for him to be discharged home due to the upcoming holiday season.    CM met with pt  this morning at bedside to discuss d/c planning. Pt confirmed that he does not want to go to rehab anymore and feels he needs to be home due to the upcoming holiday season. CM discussed safety discharge with patient and notified him that he will need assistance at home 24/7. CM discussed setting up home health PT/OT/RN and Aide. Pt is refusing home health. He would like to go to outpatient PT. CM confirmed that outpatient PT can be arranged however he will need someone to drive him to OPPT as often as possible. Pt to discuss this with his wife and will left CM know of decision. CM will continue to follow up with d/c planning.    DENIA Henry

## 2024-12-11 VITALS
TEMPERATURE: 97.2 F | OXYGEN SATURATION: 88 % | SYSTOLIC BLOOD PRESSURE: 148 MMHG | DIASTOLIC BLOOD PRESSURE: 84 MMHG | HEIGHT: 75 IN | BODY MASS INDEX: 39.17 KG/M2 | WEIGHT: 315 LBS | HEART RATE: 81 BPM | RESPIRATION RATE: 14 BRPM

## 2024-12-11 PROCEDURE — 6370000000 HC RX 637 (ALT 250 FOR IP)

## 2024-12-11 PROCEDURE — 97530 THERAPEUTIC ACTIVITIES: CPT

## 2024-12-11 PROCEDURE — 2580000003 HC RX 258: Performed by: HOSPITALIST

## 2024-12-11 PROCEDURE — 6360000002 HC RX W HCPCS: Performed by: HOSPITALIST

## 2024-12-11 PROCEDURE — 6370000000 HC RX 637 (ALT 250 FOR IP): Performed by: HOSPITALIST

## 2024-12-11 PROCEDURE — 6370000000 HC RX 637 (ALT 250 FOR IP): Performed by: INTERNAL MEDICINE

## 2024-12-11 RX ORDER — ONDANSETRON 8 MG/1
4 TABLET, ORALLY DISINTEGRATING ORAL EVERY 8 HOURS PRN
Qty: 30 TABLET | Refills: 0 | Status: SHIPPED | OUTPATIENT
Start: 2024-12-11

## 2024-12-11 RX ORDER — GABAPENTIN 300 MG/1
300 CAPSULE ORAL 3 TIMES DAILY
Qty: 90 CAPSULE | Refills: 0
Start: 2024-12-11 | End: 2025-01-10

## 2024-12-11 RX ORDER — DOXYCYCLINE 100 MG/1
100 CAPSULE ORAL EVERY 12 HOURS SCHEDULED
Qty: 3 CAPSULE | Refills: 0 | Status: SHIPPED | OUTPATIENT
Start: 2024-12-11 | End: 2024-12-13

## 2024-12-11 RX ADMIN — FAMOTIDINE 20 MG: 20 TABLET, FILM COATED ORAL at 09:18

## 2024-12-11 RX ADMIN — DICLOFENAC SODIUM 2 G: 10 GEL TOPICAL at 09:19

## 2024-12-11 RX ADMIN — HYDRALAZINE HYDROCHLORIDE 25 MG: 50 TABLET ORAL at 09:18

## 2024-12-11 RX ADMIN — ALLOPURINOL 300 MG: 300 TABLET ORAL at 09:18

## 2024-12-11 RX ADMIN — AMOXICILLIN AND CLAVULANATE POTASSIUM 1 TABLET: 875; 125 TABLET, FILM COATED ORAL at 09:17

## 2024-12-11 RX ADMIN — DOXYCYCLINE HYCLATE 100 MG: 100 CAPSULE ORAL at 09:16

## 2024-12-11 RX ADMIN — FUROSEMIDE 40 MG: 10 INJECTION, SOLUTION INTRAMUSCULAR; INTRAVENOUS at 09:24

## 2024-12-11 RX ADMIN — DILTIAZEM HYDROCHLORIDE 240 MG: 240 CAPSULE, EXTENDED RELEASE ORAL at 09:18

## 2024-12-11 RX ADMIN — SERTRALINE 100 MG: 100 TABLET, FILM COATED ORAL at 09:17

## 2024-12-11 RX ADMIN — TAMSULOSIN HYDROCHLORIDE 0.4 MG: 0.4 CAPSULE ORAL at 09:18

## 2024-12-11 RX ADMIN — SODIUM CHLORIDE, PRESERVATIVE FREE 10 ML: 5 INJECTION INTRAVENOUS at 09:29

## 2024-12-11 RX ADMIN — GABAPENTIN 300 MG: 300 CAPSULE ORAL at 09:17

## 2024-12-11 RX ADMIN — SENNOSIDES AND DOCUSATE SODIUM 1 TABLET: 8.6; 5 TABLET ORAL at 09:17

## 2024-12-11 NOTE — PROGRESS NOTES
Hospitalist Progress Note   Admit Date:  2024  3:43 PM   Name:  Jozef Felix   Age:  59 y.o.  Sex:  male  :  1965   MRN:  [id number]   Room:  [facility]/    Presenting/Chief Complaint: Leg Pain and Wound Infection     Reason(s) for Admission: Bilateral cellulitis of lower leg [L03.116, L03.115]  Bilateral lower leg cellulitis [L03.116, L03.115]  Cellulitis [L03.90]     Hospital Course:   Jozef Felix is a 59 y.o. male with medical history of morbid obesity with a BMI of 40, chronic A-fib on Xarelto along with chronic bilateral lower extremity cellulitis with chronic lymphedema admitted on 24 with worsening bilateral lower extremity cellulitis.    Patient has chronic bilateral venous insufficiency with lower extremity and has been dealing with chronic ulcers and wound for a long time.  Patient was recently started on oral doxycycline via wound culture collected from  for Proteus penneri (pansensitive).  Patient reports of having more redness, increased swelling and more pain with some low-grade fever with associated chills and body aches.  VS on arrival: Tmax 98.2, RR 14-25, RI 96, /97, room air sats are 97%.  Blood work essentially unremarkable.  Bilateral lower extremity x-ray shows degenerative changes in bilateral knees.      Subjective & 24hr Events:   Currently declining going to inpatient rehab/skilled nursing facility.  States he wants to go home.  Denies any fevers in last 24 hours of endorse chills.  Endorses some nausea.    Assessment & Plan:     Bilateral lower leg cellulitis  Patient with extensive bilateral lower extremity lymphedema with chronic wounds and ulcers with ongoing acute infection concerning for cellulitis.  Initially started on broad-spectrum antibiotics were narrowed to amoxicillin and doxycycline. Blood cultures negative to date. Recent wound cultures from  positive for pansensitive Proteus.  -Wound care consulted to continue wound care 
       Hospitalist Progress Note   Admit Date:  2024  3:43 PM   Name:  Jozef Felix   Age:  59 y.o.  Sex:  male  :  1965   MRN:  [id number]   Room:  [facility]/    Presenting/Chief Complaint: Leg Pain and Wound Infection     Reason(s) for Admission: Bilateral cellulitis of lower leg [L03.116, L03.115]  Bilateral lower leg cellulitis [L03.116, L03.115]     Hospital Course:   Jozef Felix is a 59 y.o. male with medical history of morbid obesity with a BMI of 40, chronic A-fib on Xarelto along with chronic bilateral lower extremity cellulitis with chronic lymphedema admitted on 24 with worsening bilateral lower extremity cellulitis.  Patient has chronic bilateral venous insufficiency with lower extremity and has been dealing with chronic ulcers and wound for a long time.  Patient was recently started on oral doxycycline via wound culture collected from  for Proteus penneri (pansensitive).  Patient reports of having more redness, increased swelling and more pain with some low-grade fever with associated chills and body aches.  VS on arrival: Tmax 98.2, RR 14-25, VA 96, /97, room air sats are 97%.  Blood work essentially unremarkable.  Bilateral lower extremity x-ray shows degenerative changes in bilateral knees.      Subjective & 24hr Events:   Patient seen and examined at bedside.  He is alert and awake, AOx3, resting comfortably in bedside recliner.  Patient reports having lower back pain, had a fall while in the bathroom yesterday.  X-ray of lower back did not reveal any acute fracture.  This morning pain is optimally controlled.  Denies any chest pain, shortness of breath, fever chills, nausea vomiting or diarrhea.      ROS:  10 point review of system is negative except for what mentioned above.      Assessment & Plan:     Principal Problem:    Bilateral lower leg cellulitis    Chronic venous hypertension (idiopathic) with ulcer of bilateral lower extremity (HCC)  Plan: 
  Physician Progress Note      PATIENT:               JACQUELIN ROJAS  CSN #:                  [id number]  :                       1965  ADMIT DATE:       2024 3:43 PM  DISCH DATE:  RESPONDING  PROVIDER #:        Kiesha Dyson MD          QUERY TEXT:    Patient admitted with bilateral lower leg cellulitis , noted to have chronic   atrial fibrillation and is maintained on Xarelto.  If possible, please   document in progress notes and discharge summary if you are evaluating and/or   treating any of the following:?  ?  The medical record reflects the following:  Risk Factors: morbid obesity, Chronic A fib on Xarelto , HTN  Clinical Indicators: H&P: Chronic A fib- Heart rate is optimally controlled   with diltiazem  mg.  Treatment: Continue Xarelto    Thank you, Misty CDS  Options provided:  -- Secondary hypercoagulable state in a patient with atrial fibrillation  -- Other - I will add my own diagnosis  -- Disagree - Not applicable / Not valid  -- Disagree - Clinically unable to determine / Unknown  -- Refer to Clinical Documentation Reviewer    PROVIDER RESPONSE TEXT:    This patient has secondary hypercoagulable state in a patient with atrial   fibrillation.    Query created by: Namita Szymanski on 12/10/2024 12:30 PM      Electronically signed by:  Kiesha Dyson MD 12/10/2024 2:38 PM          
  [facility]  [address] [place] [address]  Phone: [phone]             December 11, 2024    Patient: Jozef Felix   YOB: 1965   Date of Visit: 12/6/2024       To Whom It May Concern:    Jozef Felix was seen and treated in our facility  beginning 12/6/2024 until 12/11. He may return to school on 12/223 .      Sincerely,       Nikhil Mayer MD         Signature:__________________________________         
  [facility]  [address] [place] [address] [address] [place] [address]  Phone: [phone]             December 11, 2024    Patient: Jozef Felix   YOB: 1965   Date of Visit: 12/6/2024       To Whom It May Concern:    Jozef Felix was seen and treated in our facility  beginning 12/6/2024 until 12/11/2024. He may return to work on 12/23/2024 per Dr. Nikhil Mayer.  Please feel free to call with any questions or concerns.      Sincerely,       Olga Lidia Thompson RN   3rd Ortho Supervisor  [phone]        Signature:__________________________________       
4 Eyes Skin Assessment     NAME:  Jozef Felix  YOB: 1965  MEDICAL RECORD NUMBER:  [id number]    The patient is being assessed for  Admission    I agree that at least one RN has performed a thorough Head to Toe Skin Assessment on the patient. ALL assessment sites listed below have been assessed.      Areas assessed by both nurses:    Head, Face, Ears, Shoulders, Back, Chest, Arms, Elbows, Hands, Sacrum. Buttock, Coccyx, Ischium, and Legs. Feet and Heels        Does the Patient have a Wound? No noted wound(s)       Augustine Prevention initiated by RN: Yes  Wound Care Orders initiated by RN: No    Pressure Injury (Stage 3,4, Unstageable, DTI, NWPT, and Complex wounds) if present, place Wound referral order by RN under : Yes    New Ostomies, if present place, Ostomy referral order under : No     Nurse 1 eSignature: Electronically signed by PUSHPA SOTO RN on 12/6/24 at 10:34 PM EST    **SHARE this note so that the co-signing nurse can place an eSignature**    Nurse 2 eSignature: {Esignature:[id number]}   
4 Eyes Skin Assessment     NAME:  Jozef Felix  YOB: 1965  MEDICAL RECORD NUMBER:  [id number]    The patient is being assessed for  Admission    I agree that at least one RN has performed a thorough Head to Toe Skin Assessment on the patient. ALL assessment sites listed below have been assessed.      Areas assessed by both nurses:    Head, Face, Ears, Shoulders, Back, Chest, Arms, Elbows, Hands, Sacrum. Buttock, Coccyx, Ischium, and Legs. Feet and Heels        Does the Patient have a Wound? No noted wound(s)       Augustine Prevention initiated by RN: Yes  Wound Care Orders initiated by RN: No    Pressure Injury (Stage 3,4, Unstageable, DTI, NWPT, and Complex wounds) if present, place Wound referral order by RN under : Yes    New Ostomies, if present place, Ostomy referral order under : No     Nurse 1 eSignature: Electronically signed by PUSHPA SOTO RN on 12/6/24 at 10:34 PM EST    **SHARE this note so that the co-signing nurse can place an eSignature**    Nurse 2 eSignature: Electronically signed by VALERY TAMAYO RN on 12/7/24 at 7:07 AM EST   
ACUTE OCCUPATIONAL THERAPY GOALS:   (Developed with and agreed upon by patient and/or caregiver.)  1. Patient will perform grooming with independence.  2. Patient will perform Upper body dressing with modified independence  3. Patient will perform lower body dressing with moderate assistance   4. Patient will perform upper and lower body bathing with moderate assistance .  5. Patient will perform functional transfers with contact guard assist.   7. Patient will participate in 30 + minutes of ADL/ therapeutic exercise/therapeutic activity with min rest breaks to increase activity tolerance for self care.  8. Patient will perform ADL functional mobility in room with stand by assist.    Goals to be achieved in 7 days.     OCCUPATIONAL THERAPY Daily Note and AM       OT Visit Days: 2  Acknowledge Orders  Time  OT Charge Capture  Rehab Caseload Tracker      Jozef Felix is a 59 y.o. male   PRIMARY DIAGNOSIS: Bilateral lower leg cellulitis  Bilateral cellulitis of lower leg [L03.116, L03.115]  Bilateral lower leg cellulitis [L03.116, L03.115]  Cellulitis [L03.90]       Reason for Referral: Generalized Muscle Weakness (M62.81)  Difficulty in walking, Not elsewhere classified (R26.2)  In[place]: [place] / Product Type: *No Product type* /     ASSESSMENT:     REHAB RECOMMENDATIONS:   Recommendation to date pending progress:  Setting:  Short-term Rehab    Equipment:    To Be Determined     ASSESSMENT:  Mr. Felix is a 59 year old man who is under observation for b/l cellulitis of lower leg. Pt at baseline requires some assistance for ADLs and IADLs, ambulates with walking stick. Pt reports he has not taken a shower in 2 months and is requesting a shower today. Shower cleared with nursing. Pt presents with decreased strength, balance and activity tolerance, increased pain in BLE impacting safety and independence in ADLs. Pt will benefit from skilled OT services while at hospital to maximize 
ACUTE PHYSICAL THERAPY GOALS:   (Developed with and agreed upon by patient and/or caregiver.)    (1.)Mr. Felix will move from supine to sit and sit to supine  in bed with MODIFIED INDEPENDENCE within 7 treatment day(s).    (2.)Mr. Felix will transfer from bed to chair and chair to bed with SUPERVISION using the least restrictive device within 7 treatment day(s).    (3.)Mr. Felix will ambulate with STAND BY ASSIST for 200 feet with the least restrictive device within 7 treatment day(s).  ________________________________________________________________________________________________    PHYSICAL THERAPY Daily Note and AM  (Link to Caseload Tracking: PT Visit Days : 2  Acknowledge Orders  Time In/Out  PT Charge Capture  Rehab Caseload Tracker    Jozef Felix is a 59 y.o. male   PRIMARY DIAGNOSIS: Bilateral lower leg cellulitis  Bilateral cellulitis of lower leg [L03.116, L03.115]  Bilateral lower leg cellulitis [L03.116, L03.115]       Reason for Referral: Generalized Muscle Weakness (M62.81)  Difficulty in walking, Not elsewhere classified (R26.2)  Other abnormalities of gait and mobility (R26.89)  Observation: Payor: DIONTE [employer]HBS / Plan: [place] [employer] / Product Type: *No Product type* /     ASSESSMENT:     REHAB RECOMMENDATIONS:   Recommendation to date pending progress:  Setting:  Short-term Rehab    Equipment:    Rolling Walker     ASSESSMENT:  Mr. Felix is a 59 year old male who presents with above diagnosis. This date pt performs mobility including bed mobility, transfers, and ambulation with use of rolling walker.  Pt slow and stiff with mobility due to B LE cellulitis with inflammation/edema. Pt reports increased difficulties at home with mobility and performing hygiene care and ADL's.  Pt reports his marriage is not going well and that his wife does not assist him at home.  Pt presents as functioning below his baseline, with deficits in mobility including transfers, gait, balance, and activity tolerance. Pt 
ACUTE PHYSICAL THERAPY GOALS:   (Developed with and agreed upon by patient and/or caregiver.)    (1.)Mr. Felix will move from supine to sit and sit to supine  in bed with MODIFIED INDEPENDENCE within 7 treatment day(s).    (2.)Mr. Felix will transfer from bed to chair and chair to bed with SUPERVISION using the least restrictive device within 7 treatment day(s).    (3.)Mr. Felix will ambulate with STAND BY ASSIST for 200 feet with the least restrictive device within 7 treatment day(s).  ________________________________________________________________________________________________    PHYSICAL THERAPY Daily Note and PM  (Link to Caseload Tracking: PT Visit Days : 3  Acknowledge Orders  Time In/Out  PT Charge Capture  Rehab Caseload Tracker    Jozef Felix is a 59 y.o. male   PRIMARY DIAGNOSIS: Bilateral lower leg cellulitis  Bilateral cellulitis of lower leg [L03.116, L03.115]  Bilateral lower leg cellulitis [L03.116, L03.115]  Cellulitis [L03.90]       Reason for Referral: Generalized Muscle Weakness (M62.81)  Difficulty in walking, Not elsewhere classified (R26.2)  Other abnormalities of gait and mobility (R26.89)  Inpatient: Payor: DAYRON [place]H / Plan: [place]BS [place] LOCAL / Product Type: *No Product type* /     ASSESSMENT:     REHAB RECOMMENDATIONS:   Recommendation to date pending progress:  Setting:  Short-term Rehab    Equipment:    Rolling Walker     ASSESSMENT:  Mr. Felix is a 59 year old male who presents with above diagnosis. This date pt performs mobility including bed mobility, transfers, and ambulation with use of rolling walker.  Pt slow and stiff with mobility due to B LE cellulitis with inflammation/edema. Pt reports increased difficulties at home with mobility and performing hygiene care and ADL's.  Pt reports his marriage is not going well and that his wife does not assist him at home.  Pt presents as functioning below his baseline, with deficits in mobility including transfers, gait, balance, and 
ACUTE PHYSICAL THERAPY GOALS:   (Developed with and agreed upon by patient and/or caregiver.)    (1.)Mr. Felix will move from supine to sit and sit to supine  in bed with MODIFIED INDEPENDENCE within 7 treatment day(s).    (2.)Mr. Felix will transfer from bed to chair and chair to bed with SUPERVISION using the least restrictive device within 7 treatment day(s).    (3.)Mr. Felix will ambulate with STAND BY ASSIST for 200 feet with the least restrictive device within 7 treatment day(s).  ________________________________________________________________________________________________    PHYSICAL THERAPY Initial Assessment and AM  (Link to Caseload Tracking: PT Visit Days : 1  Acknowledge Orders  Time In/Out  PT Charge Capture  Rehab Caseload Tracker    Jozef Felix is a 59 y.o. male   PRIMARY DIAGNOSIS: Bilateral lower leg cellulitis  Bilateral cellulitis of lower leg [L03.116, L03.115]  Bilateral lower leg cellulitis [L03.116, L03.115]       Reason for Referral: Generalized Muscle Weakness (M62.81)  Difficulty in walking, Not elsewhere classified (R26.2)  Other abnormalities of gait and mobility (R26.89)  Observation: Payor: DIONTE NASHBS / Plan: DIONTE NASHBS / Product Type: *No Product type* /     ASSESSMENT:     REHAB RECOMMENDATIONS:   Recommendation to date pending progress:  Setting:  Short-term Rehab    Equipment:    Rolling Walker     ASSESSMENT:  Mr. Felix is a 59 year old male who presents with above diagnosis. This date pt performs mobility including bed mobility, transfers, and ambulation with use of rolling walker.  Pt slow and stiff with mobility due to B LE cellulitis with inflammation/edema. Pt reports increased difficulties at home with mobility and performing hygiene care and ADL's.  Pt reports his marriage is not going well and that his wife does not assist him at home.  Pt presents as functioning below his baseline, with deficits in mobility including transfers, gait, balance, and activity 
Occupational Therapy  OT order received for lymphedema of LE's. Noted patient was being followed [facility] prior to admission and is currently being followed by wound care during inpatient stay for wound care and compression wraps. Recommend outpatient or home health lymphedema therapy with the goal of increasing patient's independence with home management of chronic lymphedema condition once wound care is completed.     Thank you,   ANNA TURNER, OT    
Occupational Therapy Note:    Attempted to see patient this AM for occupational therapy treatment  session. Patient sleeping soundly and snoring loudly.  Will follow and re-attempt as schedule permits/patient available. Thank you,    Kassandra Rock, OT    Rehab Caseload Tracker     
Patient states that he spoke with his wife and they feel it would be best for him to be discharged home instead of rehab due to the holidays coming up. Informed patient that a note would be made.   
Patient was nausea, administered Zofran per PRN order. Patient requested blood sugar to be checked, because he felt sweaty.   Bgl 124  Temp 98.4     
Pt had a fall in shower, complaining of left back pain. X rays ordered.   
Pt thinks he really should go to rehab.  He was just saying he would go home bc of his wife  
Pt was in the shower and reported to staff that he \"fell\" onto the chair arm of the shower chair. After speaking with the patient, it was determined the patient never came off of the shower chair and did not in face end up on the floor. Patient's bottom remained on shower chair during the duration of the shower. Patient was assisted back to chair after shower. Call light in reach. Physician notified.   
therapy, compensatory technique during ADL , proper use of assistive device, transfer training and safety, and energy conservation techniques during ADL/IADLS and patient verbalized understanding.     TREATMENT GRID:  N/A    AFTER TREATMENT PRECAUTIONS: Bed, Bed/Chair Locked, Call light within reach, Needs within reach, RN at bedside, and Side rails x2    INTERDISCIPLINARY COLLABORATION:  RN/ PCT and PT/ PTA    EDUCATION:  Education Given To: Patient  Education Provided: Role of Therapy;Plan of Care;Transfer Training  Education Method: Verbal  Barriers to Learning: None  Education Outcome: Verbalized understanding    TOTAL TREATMENT DURATION AND TIME:  Time In: 1120  Time Out: 1220  Minutes: 60    Kassandra Rock, OT             
- 0.2 K/UL    Immature Granulocytes Absolute 0.0 0.0 - 0.5 K/UL   Basic Metabolic Panel w/ Reflex to MG    Collection Time: 12/08/24  4:26 AM   Result Value Ref Range    Sodium 140 136 - 145 mmol/L    Potassium 4.5 3.5 - 5.1 mmol/L    Chloride 102 98 - 107 mmol/L    CO2 28 20 - 29 mmol/L    Anion Gap 10 7 - 16 mmol/L    Glucose 132 (H) 70 - 99 mg/dL    BUN 18 8 - 23 MG/DL    Creatinine 1.01 0.80 - 1.30 MG/DL    Est, Glom Filt Rate 85 >60 ml/min/1.73m2    Calcium 8.9 8.8 - 10.2 MG/DL   CBC with Auto Differential    Collection Time: 12/08/24  4:26 AM   Result Value Ref Range    WBC 6.9 4.3 - 11.1 K/uL    RBC 3.79 (L) 4.23 - 5.6 M/uL    Hemoglobin 13.5 (L) 13.6 - 17.2 g/dL    Hematocrit 41.0 (L) 41.1 - 50.3 %    .2 (H) 82.0 - 102.0 FL    MCH 35.6 (H) 26.1 - 32.9 PG    MCHC 32.9 31.4 - 35.0 g/dL    RDW 13.0 11.9 - 14.6 %    Platelets 186 150 - 450 K/uL    MPV 9.9 9.4 - 12.3 FL    nRBC 0.00 0.0 - 0.2 K/uL    Differential Type AUTOMATED      Neutrophils % 72 43 - 78 %    Lymphocytes % 17 13 - 44 %    Monocytes % 9 4.0 - 12.0 %    Eosinophils % 1 0.5 - 7.8 %    Basophils % 1 0.0 - 2.0 %    Immature Granulocytes % 0 0.0 - 5.0 %    Neutrophils Absolute 4.9 1.7 - 8.2 K/UL    Lymphocytes Absolute 1.2 0.5 - 4.6 K/UL    Monocytes Absolute 0.6 0.1 - 1.3 K/UL    Eosinophils Absolute 0.1 0.0 - 0.8 K/UL    Basophils Absolute 0.1 0.0 - 0.2 K/UL    Immature Granulocytes Absolute 0.0 0.0 - 0.5 K/UL       No results for input(s): \"COVID19\" in the last 72 hours.    Current Meds:  Current Facility-Administered Medications   Medication Dose Route Frequency    hydrALAZINE (APRESOLINE) tablet 25 mg  25 mg Oral 2 times per day    gabapentin (NEURONTIN) capsule 300 mg  300 mg Oral TID    furosemide (LASIX) injection 40 mg  40 mg IntraVENous Daily    sodium chloride flush 0.9 % injection 5-40 mL  5-40 mL IntraVENous 2 times per day    sodium chloride flush 0.9 % injection 5-40 mL  5-40 mL IntraVENous PRN    0.9 % sodium chloride infusion 
Immature Granulocytes Absolute 0.0 0.0 - 0.5 K/UL       No results for input(s): \"COVID19\" in the last 72 hours.    Current Meds:  Current Facility-Administered Medications   Medication Dose Route Frequency    hydrALAZINE (APRESOLINE) tablet 25 mg  25 mg Oral 2 times per day    sodium chloride flush 0.9 % injection 5-40 mL  5-40 mL IntraVENous 2 times per day    sodium chloride flush 0.9 % injection 5-40 mL  5-40 mL IntraVENous PRN    0.9 % sodium chloride infusion   IntraVENous PRN    potassium chloride (KLOR-CON M) extended release tablet 40 mEq  40 mEq Oral PRN    Or    potassium bicarb-citric acid (EFFER-K) effervescent tablet 40 mEq  40 mEq Oral PRN    Or    potassium chloride 10 mEq/100 mL IVPB (Peripheral Line)  10 mEq IntraVENous PRN    magnesium sulfate 2000 mg in 50 mL IVPB premix  2,000 mg IntraVENous PRN    polyethylene glycol (GLYCOLAX) packet 17 g  17 g Oral Daily PRN    acetaminophen (TYLENOL) tablet 650 mg  650 mg Oral Q4H PRN    Or    acetaminophen (TYLENOL) suppository 650 mg  650 mg Rectal Q6H PRN    dilTIAZem (CARDIZEM CD) extended release capsule 240 mg  240 mg Oral Daily    allopurinol (ZYLOPRIM) tablet 300 mg  300 mg Oral Daily    cyclobenzaprine (FLEXERIL) tablet 10 mg  10 mg Oral TID PRN    famotidine (PEPCID) tablet 20 mg  20 mg Oral Daily    furosemide (LASIX) injection 20 mg  20 mg IntraVENous Daily    rivaroxaban (XARELTO) tablet 20 mg  20 mg Oral Dinner    [Held by provider] sertraline (ZOLOFT) tablet 100 mg  100 mg Oral Daily    tamsulosin (FLOMAX) capsule 0.4 mg  0.4 mg Oral Daily    meloxicam (MOBIC) tablet 15 mg  15 mg Oral Daily    melatonin tablet 3 mg  3 mg Oral Nightly    sennosides-docusate sodium (SENOKOT-S) 8.6-50 MG tablet 1 tablet  1 tablet Oral BID    promethazine (PHENERGAN) tablet 25 mg  25 mg Oral Q6H PRN    Or    ondansetron (ZOFRAN) injection 4 mg  4 mg IntraVENous Q4H PRN    piperacillin-tazobactam (ZOSYN) 4,500 mg in sodium chloride 0.9 % 100 mL IVPB (mini-bag)  
- - -

## 2024-12-11 NOTE — DISCHARGE SUMMARY
Hospitalist Discharge Summary   Admit Date:  2024  3:43 PM   DC Note date: 2024  Name:  Jozef Felix   Age:  59 y.o.  Sex:  male  :  1965   MRN:  [id number]   Room:  [facility]  PCP:  Anthony Aleman MD    Presenting Complaint: Leg Pain and Wound Infection     Initial Admission Diagnosis: Bilateral cellulitis of lower leg [L03.116, L03.115]  Bilateral lower leg cellulitis [L03.116, L03.115]  Cellulitis [L03.90]     Problem List for this Hospitalization (present on admission):    Principal Problem:    Bilateral lower leg cellulitis  Active Problems:    Obesity, morbid    Chronic venous hypertension (idiopathic) with ulcer of bilateral lower extremity (HCC)    HTN (hypertension)    History of gout    Benign prostatic hyperplasia without lower urinary tract symptoms    EHSAN (generalized anxiety disorder)    Chronic atrial fibrillation (HCC)    Pulmonary vascular congestion    Cellulitis  Resolved Problems:    * No resolved hospital problems. *      Hospital Course:  Jozef Felix is a 59 y.o. male with medical history of morbid obesity with a BMI of 40, chronic A-fib on Xarelto along with chronic bilateral lower extremity cellulitis with chronic lymphedema admitted on 24 with worsening bilateral lower extremity cellulitis.     Patient has chronic bilateral venous insufficiency with lower extremity and has been dealing with chronic ulcers and wound for a long time.  Patient was recently started on oral doxycycline via wound culture collected from  for Proteus penneri (pansensitive).  Patient reports of having more redness, increased swelling and more pain with some low-grade fever with associated chills and body aches.  VS on arrival: Tmax 98.2, RR 14-25, VT 96, /97, room air sats are 97%.  Blood work essentially unremarkable.  Bilateral lower extremity x-ray shows degenerative changes in bilateral knees.    Patient with extensive bilateral lower extremity lymphedema

## 2024-12-11 NOTE — FLOWSHEET NOTE
12/11/24 6683   AVS Reviewed   AVS & discharge instructions reviewed with patient and/or representative? Yes   Reviewed instructions with Patient   Level of Understanding Questions answered;Verbalized understanding     Discharge instructions reviewed and copy provided for pt and wife.  Opportunity provided for questions.  Pt and wife verbalized understanding.  Iv was removed without difficulty.  Pt taken to car via wc

## 2024-12-11 NOTE — PLAN OF CARE
Problem: Discharge Planning  Goal: Discharge to home or other facility with appropriate resources  Outcome: Progressing  Flowsheets (Taken 12/10/2024 2120)  Discharge to home or other facility with appropriate resources:   Identify barriers to discharge with patient and caregiver   Identify discharge learning needs (meds, wound care, etc)     Problem: Pain  Goal: Verbalizes/displays adequate comfort level or baseline comfort level  Outcome: Progressing     Problem: Skin/Tissue Integrity  Goal: Absence of new skin breakdown  Description: 1.  Monitor for areas of redness and/or skin breakdown  2.  Assess vascular access sites hourly  3.  Every 4-6 hours minimum:  Change oxygen saturation probe site  4.  Every 4-6 hours:  If on nasal continuous positive airway pressure, respiratory therapy assess nares and determine need for appliance change or resting period.  Outcome: Progressing     Problem: Safety - Adult  Goal: Free from fall injury  Outcome: Progressing

## 2024-12-11 NOTE — CARE COORDINATION
Kartik reviewed chart and discussed in Md rounds this am. Md informed pt is medically stable and ready for discharge. Kartik discussed DME as PT rec a bariatric walker for home use. Pt had no vendor preference. Pt asked Sw for order for outpt PT because he says he wants to go to [facility]. Walker provided by [email or web address]. Discussion about correcting Ins in our system. Pt needs leg dressings twice a week per wound care Rn. She cannot come until later today. Checking to see if the Wound Center can see pt Thurs or Fri. Wife here and able to transport as well as take  him to his oupt wound center appointments. Pt informed he has worked at [employer] for 23 yrs and they have been very accommodating of his leg issues. Pt sits in a wheelchair at the self check out area. Md switched Lasix and IV antibiotics to oral.   Kathy Morley/ROMINA

## 2024-12-12 ENCOUNTER — HOSPITAL ENCOUNTER (OUTPATIENT)
Dept: WOUND CARE | Age: 59
Discharge: HOME | End: 2024-12-12
Payer: COMMERCIAL

## 2024-12-12 VITALS
SYSTOLIC BLOOD PRESSURE: 128 MMHG | RESPIRATION RATE: 12 BRPM | TEMPERATURE: 97 F | HEART RATE: 79 BPM | HEIGHT: 75 IN | WEIGHT: 315 LBS | BODY MASS INDEX: 39.17 KG/M2 | DIASTOLIC BLOOD PRESSURE: 83 MMHG

## 2024-12-12 DIAGNOSIS — L97.301 VENOUS STASIS ULCER OF ANKLE LIMITED TO BREAKDOWN OF SKIN WITH VARICOSE VEINS, UNSPECIFIED LATERALITY (HCC): Primary | ICD-10-CM

## 2024-12-12 DIAGNOSIS — I83.003 VENOUS STASIS ULCER OF ANKLE LIMITED TO BREAKDOWN OF SKIN WITH VARICOSE VEINS, UNSPECIFIED LATERALITY (HCC): Primary | ICD-10-CM

## 2024-12-12 PROCEDURE — 29581 APPL MULTLAYER CMPRN SYS LEG: CPT

## 2024-12-12 RX ORDER — CLOBETASOL PROPIONATE 0.5 MG/G
OINTMENT TOPICAL ONCE
OUTPATIENT
Start: 2024-12-12 | End: 2024-12-12

## 2024-12-12 RX ORDER — LIDOCAINE 40 MG/G
CREAM TOPICAL ONCE
OUTPATIENT
Start: 2024-12-12 | End: 2024-12-12

## 2024-12-12 RX ORDER — GENTAMICIN SULFATE 1 MG/G
OINTMENT TOPICAL ONCE
OUTPATIENT
Start: 2024-12-12 | End: 2024-12-12

## 2024-12-12 RX ORDER — SODIUM CHLOR/HYPOCHLOROUS ACID 0.033 %
SOLUTION, IRRIGATION IRRIGATION ONCE
OUTPATIENT
Start: 2024-12-12 | End: 2024-12-12

## 2024-12-12 RX ORDER — LIDOCAINE 50 MG/G
OINTMENT TOPICAL ONCE
OUTPATIENT
Start: 2024-12-12 | End: 2024-12-12

## 2024-12-12 RX ORDER — TRIAMCINOLONE ACETONIDE 1 MG/G
OINTMENT TOPICAL ONCE
OUTPATIENT
Start: 2024-12-12 | End: 2024-12-12

## 2024-12-12 RX ORDER — MUPIROCIN 20 MG/G
OINTMENT TOPICAL ONCE
OUTPATIENT
Start: 2024-12-12 | End: 2024-12-12

## 2024-12-12 RX ORDER — GINSENG 100 MG
CAPSULE ORAL ONCE
OUTPATIENT
Start: 2024-12-12 | End: 2024-12-12

## 2024-12-12 RX ORDER — LIDOCAINE HYDROCHLORIDE 20 MG/ML
JELLY TOPICAL ONCE
OUTPATIENT
Start: 2024-12-12 | End: 2024-12-12

## 2024-12-12 RX ORDER — LIDOCAINE HYDROCHLORIDE 40 MG/ML
SOLUTION TOPICAL ONCE
OUTPATIENT
Start: 2024-12-12 | End: 2024-12-12

## 2024-12-12 RX ORDER — NEOMYCIN/BACITRACIN/POLYMYXINB 3.5-400-5K
OINTMENT (GRAM) TOPICAL ONCE
OUTPATIENT
Start: 2024-12-12 | End: 2024-12-12

## 2024-12-12 RX ORDER — BACITRACIN ZINC AND POLYMYXIN B SULFATE 500; 1000 [USP'U]/G; [USP'U]/G
OINTMENT TOPICAL ONCE
OUTPATIENT
Start: 2024-12-12 | End: 2024-12-12

## 2024-12-12 RX ORDER — BETAMETHASONE DIPROPIONATE 0.5 MG/G
CREAM TOPICAL ONCE
OUTPATIENT
Start: 2024-12-12 | End: 2024-12-12

## 2024-12-12 NOTE — WOUND CARE
Discharge Instructions for  [facility]  [address] [place] [address]  [address]  [place] [address]  Phone [phone]   Fax [phone]      NAME:  Jozef Felix  YOB: 1965  MEDICAL RECORD NUMBER:  [id number]  DATE:  12/12/2024    Return Appointment:  1 week with Alfa Edouard,   Return appointment with clinician only on Monday, December 16, 2024    Instructions:   Bilateral Lower Legs:  Cleanse with warm water and mild soap   MUST SHOWER DAILY!  Moisturize lower legs  Xeroform to Left Lower Leg  Wrap with 2 Layer Wrap Compression  Dressing change 2x weekly    Transition to Velcro Compression Wraps when available    [employer] for Velcro Compression Wraps ordered today 12/12/2024.    Find Lymphedema Pumps  Call Rep related to this particular pump  Make an appointment for the repairs  Use Pumps daily for 1 hr.  Work up to Pumps 2x day for 1 hr each.    Work Note given.      Should you experience increased redness, swelling, pain, foul odor, size of wound(s), or have a temperature over 101 degrees please contact the Wound Healing Center at [phone] or if after hours contact your primary care physician or go to the hospital emergency department.    PLEASE NOTE: IF YOU ARE UNABLE TO OBTAIN WOUND SUPPLIES, CONTINUE TO USE THE SUPPLIES YOU HAVE AVAILABLE UNTIL YOU ARE ABLE TO REACH US. IT IS MOST IMPORTANT TO KEEP THE WOUND COVERED AT ALL TIMES.    Electronically signed Maria Elena Sequeira RN on 12/12/2024 at 11:31 AM

## 2024-12-12 NOTE — FLOWSHEET NOTE
12/12/24 1054   Right Leg Edema Point of Measurement   Leg circumference 46 cm   Ankle circumference 29 cm   Foot circumference 30 cm   Compression Therapy 2 layer compression wrap   Left Leg Edema Point of Measurement   Leg circumference 46 cm   Ankle circumference 26 cm   Foot circumference 29 cm   Compression Therapy 2 layer compression wrap   Wound 10/09/23 Leg Right;Lower #2   Date First Assessed/Time First Assessed: 10/09/23 1528   Present on Original Admission: Yes  Wound Approximate Age at First Assessment (Weeks): (c)   Primary Wound Type: Venous Ulcer  Location: Leg  Wound Location Orientation: Right;Lower  Wound Descr...   Wound Image    Wound Etiology Venous   Wound Assessment Epithelialization   Wound 10/09/23 Pretibial Left;Lower #3   Date First Assessed/Time First Assessed: 10/09/23 1528   Present on Original Admission: Yes  Wound Approximate Age at First Assessment (Weeks): (c)   Primary Wound Type: Venous Ulcer  Location: Pretibial  Wound Location Orientation: Left;Lower  Wound ...   Wound Image    Wound Etiology Venous   Dressing Status Dry;Intact   Wound Cleansed Vashe;Cleansed with saline   Dressing/Treatment Xeroform   Wound Length (cm) 0.8 cm   Wound Width (cm) 2.2 cm   Wound Depth (cm) 0.1 cm   Wound Surface Area (cm^2) 1.76 cm^2   Change in Wound Size % (l*w) 99.86   Wound Volume (cm^3) 0.176 cm^3   Wound Healing % 100   Wound Assessment Epithelialization;Granulation tissue;Pink/red   Drainage Amount Scant (moist but unmeasurable)   Drainage Description Serosanguinous   Odor None   Ariadne-wound Assessment Dry/flaky;Edematous;Hyperkeratosis (callous)   Margins Attached edges   Wound Thickness Description not for Pressure Injury Full thickness   Pain Assessment   Pain Assessment None - Denies Pain     Patient take Xarelto.

## 2024-12-12 NOTE — DISCHARGE INSTRUCTIONS
Bilateral Lower Legs:  Cleanse with warm water and mild soap   MUST SHOWER DAILY!  Moisturize lower legs  Xeroform to Left Lower Leg  Wrap with 2 Layer Wrap Compression  Dressing change 2x weekly    Transition to Velcro Compression Wraps when available    [employer] for Velcro Compression Wraps ordered today 12/12/2024.    Find Lymphedema Pumps  Call Rep related to this particular pump  Make an appointment for the repairs  Use Pumps daily for 1 hr.  Work up to Pumps 2x day for 1 hr each.    Work Note given.

## 2024-12-12 NOTE — WOUND CARE
Discharge Instructions for  [facility]  [address] [place] [address]  Phone [phone]   Fax [phone]      NAME:  Jozef Felix  YOB: 1965  MEDICAL RECORD NUMBER:  [id number]  DATE:  12/12/2024    To Whom It May Concern:    The above named patient was in our  office for his wound visit this morning. If you have any questions, please feel free to communicate with us.      Kind Regards,    Dr. Alfa Edouard      Electronically signed Maria Elena Sequeira RN on 12/12/2024 at 11:46 AM

## 2024-12-12 NOTE — WOUND CARE
Multilayer Compression Wrap   (Not Unna) Below the Knee    NAME:  Jozef Felix  YOB: 1965  MEDICAL RECORD NUMBER:  [id number]  DATE:  12/12/2024    Multilayer compression wrap: Removed old Multilayer wrap if indicated and wash leg with mild soap/water.  Applied moisturizing agent to dry skin as needed.   Applied primary and secondary dressing as ordered.  Applied multilayered dressing below the knee to right lower leg.  Applied multilayered dressing below the knee to left lower leg.  Instructed patient/caregiver not to remove dressing and to keep it clean and dry.   Instructed patient/caregiver on complications to report to provider, such as pain, numbness in toes, heavy drainage, and slippage of dressing.  Instructed patient on purpose of compression dressing and on activity and exercise recommendations.      Electronically signed by Maria Elena Sequeira RN on 12/12/2024 at 12:12 PM

## 2024-12-16 ENCOUNTER — HOSPITAL ENCOUNTER (OUTPATIENT)
Dept: WOUND CARE | Age: 59
Discharge: HOME | End: 2024-12-16
Payer: COMMERCIAL

## 2024-12-16 VITALS
SYSTOLIC BLOOD PRESSURE: 151 MMHG | HEIGHT: 75 IN | DIASTOLIC BLOOD PRESSURE: 89 MMHG | HEART RATE: 74 BPM | BODY MASS INDEX: 39.17 KG/M2 | RESPIRATION RATE: 16 BRPM | WEIGHT: 315 LBS | TEMPERATURE: 97.7 F

## 2024-12-16 DIAGNOSIS — L97.301 VENOUS STASIS ULCER OF ANKLE LIMITED TO BREAKDOWN OF SKIN WITH VARICOSE VEINS, UNSPECIFIED LATERALITY (HCC): Primary | ICD-10-CM

## 2024-12-16 DIAGNOSIS — I83.003 VENOUS STASIS ULCER OF ANKLE LIMITED TO BREAKDOWN OF SKIN WITH VARICOSE VEINS, UNSPECIFIED LATERALITY (HCC): Primary | ICD-10-CM

## 2024-12-16 PROCEDURE — 29581 APPL MULTLAYER CMPRN SYS LEG: CPT

## 2024-12-16 RX ORDER — LIDOCAINE HYDROCHLORIDE 40 MG/ML
SOLUTION TOPICAL ONCE
OUTPATIENT
Start: 2024-12-16 | End: 2024-12-16

## 2024-12-16 RX ORDER — GENTAMICIN SULFATE 1 MG/G
OINTMENT TOPICAL ONCE
OUTPATIENT
Start: 2024-12-16 | End: 2024-12-16

## 2024-12-16 RX ORDER — GINSENG 100 MG
CAPSULE ORAL ONCE
OUTPATIENT
Start: 2024-12-16 | End: 2024-12-16

## 2024-12-16 RX ORDER — MUPIROCIN 20 MG/G
OINTMENT TOPICAL ONCE
OUTPATIENT
Start: 2024-12-16 | End: 2024-12-16

## 2024-12-16 RX ORDER — TRIAMCINOLONE ACETONIDE 1 MG/G
OINTMENT TOPICAL ONCE
OUTPATIENT
Start: 2024-12-16 | End: 2024-12-16

## 2024-12-16 RX ORDER — BETAMETHASONE DIPROPIONATE 0.5 MG/G
CREAM TOPICAL ONCE
OUTPATIENT
Start: 2024-12-16 | End: 2024-12-16

## 2024-12-16 RX ORDER — NEOMYCIN/BACITRACIN/POLYMYXINB 3.5-400-5K
OINTMENT (GRAM) TOPICAL ONCE
OUTPATIENT
Start: 2024-12-16 | End: 2024-12-16

## 2024-12-16 RX ORDER — LIDOCAINE HYDROCHLORIDE 20 MG/ML
JELLY TOPICAL ONCE
OUTPATIENT
Start: 2024-12-16 | End: 2024-12-16

## 2024-12-16 RX ORDER — BACITRACIN ZINC AND POLYMYXIN B SULFATE 500; 1000 [USP'U]/G; [USP'U]/G
OINTMENT TOPICAL ONCE
OUTPATIENT
Start: 2024-12-16 | End: 2024-12-16

## 2024-12-16 RX ORDER — LIDOCAINE 40 MG/G
CREAM TOPICAL ONCE
OUTPATIENT
Start: 2024-12-16 | End: 2024-12-16

## 2024-12-16 RX ORDER — LIDOCAINE 50 MG/G
OINTMENT TOPICAL ONCE
OUTPATIENT
Start: 2024-12-16 | End: 2024-12-16

## 2024-12-16 RX ORDER — LIDOCAINE HYDROCHLORIDE 20 MG/ML
JELLY TOPICAL ONCE
Status: CANCELLED | OUTPATIENT
Start: 2024-12-16 | End: 2024-12-16

## 2024-12-16 RX ORDER — CLOBETASOL PROPIONATE 0.5 MG/G
OINTMENT TOPICAL ONCE
OUTPATIENT
Start: 2024-12-16 | End: 2024-12-16

## 2024-12-16 RX ORDER — SODIUM CHLOR/HYPOCHLOROUS ACID 0.033 %
SOLUTION, IRRIGATION IRRIGATION ONCE
Status: CANCELLED | OUTPATIENT
Start: 2024-12-16 | End: 2024-12-16

## 2024-12-16 ASSESSMENT — PAIN SCALES - GENERAL: PAINLEVEL_OUTOF10: 7

## 2024-12-16 ASSESSMENT — PAIN DESCRIPTION - DESCRIPTORS: DESCRIPTORS: ACHING;DISCOMFORT;DULL

## 2024-12-16 ASSESSMENT — PAIN DESCRIPTION - LOCATION: LOCATION: FLANK

## 2024-12-16 NOTE — FLOWSHEET NOTE
12/16/24 1547   Right Leg Edema Point of Measurement   Leg circumference 50 cm   Ankle circumference 27.5 cm   Foot circumference 32 cm   Compression Therapy 2 layer compression wrap   Left Leg Edema Point of Measurement   Leg circumference 51.5 cm   Ankle circumference 24.5 cm   Foot circumference 29 cm   Compression Therapy 2 layer compression wrap   Wound 10/09/23 Leg Right;Lower #2   Date First Assessed/Time First Assessed: 10/09/23 1528   Present on Original Admission: Yes  Wound Approximate Age at First Assessment (Weeks): (c)   Primary Wound Type: Venous Ulcer  Location: Leg  Wound Location Orientation: Right;Lower  Wound Descr...   Wound Image    Wound Etiology Venous   Wound Length (cm) 0 cm   Wound Width (cm) 0 cm   Wound Depth (cm) 0 cm   Wound Surface Area (cm^2) 0 cm^2   Change in Wound Size % (l*w) 100   Wound Volume (cm^3) 0 cm^3   Wound Healing % 100   Wound Assessment Epithelialization   Wound 10/09/23 Pretibial Left;Lower #3   Date First Assessed/Time First Assessed: 10/09/23 1528   Present on Original Admission: Yes  Wound Approximate Age at First Assessment (Weeks): (c)   Primary Wound Type: Venous Ulcer  Location: Pretibial  Wound Location Orientation: Left;Lower  Wound ...   Wound Image    Wound Etiology Venous   Dressing Status Dry;Intact   Wound Cleansed Soap and water   Dressing/Treatment Xeroform   Wound Length (cm) 0.1 cm   Wound Width (cm) 0.1 cm   Wound Depth (cm) 0.1 cm   Wound Surface Area (cm^2) 0.01 cm^2   Change in Wound Size % (l*w) 100   Wound Volume (cm^3) 0.001 cm^3   Wound Healing % 100   Wound Assessment Epithelialization   Drainage Amount None (dry)   Odor None   Ariadne-wound Assessment Dry/flaky;Edematous;Hyperkeratosis (callous)   Margins Attached edges   Wound Thickness Description not for Pressure Injury Full thickness   Pain Assessment   Pain Assessment 0-10   Pain Level 7   Pain Location Flank   Pain Descriptors Aching;Discomfort;Dull     Old compression wraps noted to

## 2024-12-16 NOTE — WOUND CARE
Discharge Instructions for  [facility]  [address] [place] [address]  [address]  [place] [address]  Phone [phone]   Fax [phone]      NAME:  Jozef Felix  YOB: 1965  DATE:  12/16/2024    Return Appointment:  4 days     Patient was in Clinic today for wound care.     Should you experience increased redness, swelling, pain, foul odor, size of wound(s), or have a temperature over 101 degrees please contact the Wound Healing Center at [phone] or if after hours contact your primary care physician or go to the hospital emergency department.    PLEASE NOTE: IF YOU ARE UNABLE TO OBTAIN WOUND SUPPLIES, CONTINUE TO USE THE SUPPLIES YOU HAVE AVAILABLE UNTIL YOU ARE ABLE TO REACH US. IT IS MOST IMPORTANT TO KEEP THE WOUND COVERED AT ALL TIMES.    Electronically signed KATHERIN GIBBS RN on 12/16/2024 at 4:17 PM

## 2024-12-20 ENCOUNTER — HOSPITAL ENCOUNTER (OUTPATIENT)
Dept: WOUND CARE | Age: 59
Discharge: HOME | End: 2024-12-20
Payer: COMMERCIAL

## 2024-12-20 VITALS
DIASTOLIC BLOOD PRESSURE: 108 MMHG | BODY MASS INDEX: 39.17 KG/M2 | RESPIRATION RATE: 16 BRPM | HEART RATE: 80 BPM | SYSTOLIC BLOOD PRESSURE: 148 MMHG | WEIGHT: 315 LBS | HEIGHT: 75 IN

## 2024-12-20 DIAGNOSIS — L97.919 CHRONIC VENOUS HYPERTENSION (IDIOPATHIC) WITH ULCER OF BILATERAL LOWER EXTREMITY (HCC): Primary | ICD-10-CM

## 2024-12-20 DIAGNOSIS — L03.115 CELLULITIS OF BOTH LOWER EXTREMITIES: ICD-10-CM

## 2024-12-20 DIAGNOSIS — L97.912 NON-PRESSURE CHRONIC ULCER OF LOWER LEG WITH FAT LAYER EXPOSED, RIGHT (HCC): ICD-10-CM

## 2024-12-20 DIAGNOSIS — I10 PRIMARY HYPERTENSION: ICD-10-CM

## 2024-12-20 DIAGNOSIS — L97.922 NON-PRESSURE CHRONIC ULCER OF LEFT LOWER LEG WITH FAT LAYER EXPOSED (HCC): ICD-10-CM

## 2024-12-20 DIAGNOSIS — L03.116 CELLULITIS OF BOTH LOWER EXTREMITIES: ICD-10-CM

## 2024-12-20 DIAGNOSIS — L97.929 CHRONIC VENOUS HYPERTENSION (IDIOPATHIC) WITH ULCER OF BILATERAL LOWER EXTREMITY (HCC): Primary | ICD-10-CM

## 2024-12-20 DIAGNOSIS — I87.313 CHRONIC VENOUS HYPERTENSION (IDIOPATHIC) WITH ULCER OF BILATERAL LOWER EXTREMITY (HCC): Primary | ICD-10-CM

## 2024-12-20 PROCEDURE — 99213 OFFICE O/P EST LOW 20 MIN: CPT

## 2024-12-20 PROCEDURE — 99214 OFFICE O/P EST MOD 30 MIN: CPT

## 2024-12-20 RX ORDER — LIDOCAINE HYDROCHLORIDE 20 MG/ML
JELLY TOPICAL ONCE
OUTPATIENT
Start: 2024-12-20 | End: 2024-12-20

## 2024-12-20 RX ORDER — NEOMYCIN/BACITRACIN/POLYMYXINB 3.5-400-5K
OINTMENT (GRAM) TOPICAL ONCE
OUTPATIENT
Start: 2024-12-20 | End: 2024-12-20

## 2024-12-20 RX ORDER — HYDRALAZINE HYDROCHLORIDE 25 MG/1
25 TABLET, FILM COATED ORAL DAILY
COMMUNITY

## 2024-12-20 RX ORDER — MUPIROCIN 20 MG/G
OINTMENT TOPICAL ONCE
OUTPATIENT
Start: 2024-12-20 | End: 2024-12-20

## 2024-12-20 RX ORDER — LIDOCAINE 50 MG/G
OINTMENT TOPICAL ONCE
OUTPATIENT
Start: 2024-12-20 | End: 2024-12-20

## 2024-12-20 RX ORDER — TRIAMCINOLONE ACETONIDE 1 MG/G
OINTMENT TOPICAL ONCE
OUTPATIENT
Start: 2024-12-20 | End: 2024-12-20

## 2024-12-20 RX ORDER — GENTAMICIN SULFATE 1 MG/G
OINTMENT TOPICAL ONCE
OUTPATIENT
Start: 2024-12-20 | End: 2024-12-20

## 2024-12-20 RX ORDER — LIDOCAINE HYDROCHLORIDE 20 MG/ML
JELLY TOPICAL ONCE
Status: DISCONTINUED | OUTPATIENT
Start: 2024-12-20 | End: 2024-12-21 | Stop reason: HOSPADM

## 2024-12-20 RX ORDER — BACITRACIN ZINC AND POLYMYXIN B SULFATE 500; 1000 [USP'U]/G; [USP'U]/G
OINTMENT TOPICAL ONCE
OUTPATIENT
Start: 2024-12-20 | End: 2024-12-20

## 2024-12-20 RX ORDER — LIDOCAINE HYDROCHLORIDE 40 MG/ML
SOLUTION TOPICAL ONCE
OUTPATIENT
Start: 2024-12-20 | End: 2024-12-20

## 2024-12-20 RX ORDER — BETAMETHASONE DIPROPIONATE 0.5 MG/G
CREAM TOPICAL ONCE
OUTPATIENT
Start: 2024-12-20 | End: 2024-12-20

## 2024-12-20 RX ORDER — SODIUM CHLOR/HYPOCHLOROUS ACID 0.033 %
SOLUTION, IRRIGATION IRRIGATION ONCE
Status: DISCONTINUED | OUTPATIENT
Start: 2024-12-20 | End: 2024-12-21 | Stop reason: HOSPADM

## 2024-12-20 RX ORDER — LIDOCAINE 40 MG/G
CREAM TOPICAL ONCE
OUTPATIENT
Start: 2024-12-20 | End: 2024-12-20

## 2024-12-20 RX ORDER — CLOBETASOL PROPIONATE 0.5 MG/G
OINTMENT TOPICAL ONCE
OUTPATIENT
Start: 2024-12-20 | End: 2024-12-20

## 2024-12-20 RX ORDER — SODIUM CHLOR/HYPOCHLOROUS ACID 0.033 %
SOLUTION, IRRIGATION IRRIGATION ONCE
OUTPATIENT
Start: 2024-12-20 | End: 2024-12-20

## 2024-12-20 RX ORDER — GINSENG 100 MG
CAPSULE ORAL ONCE
OUTPATIENT
Start: 2024-12-20 | End: 2024-12-20

## 2024-12-20 ASSESSMENT — ENCOUNTER SYMPTOMS
NAUSEA: 0
VOMITING: 0

## 2024-12-20 NOTE — ASSESSMENT & PLAN NOTE
Assessment  BP is 148/108; patient denies headache, dizziness, lightheadedness, chest pain, shortness of breath  He reports he was given a prescription for hydralazine when discharged from the hospital although that is not reflected in the medical record; regardless, patient has not been able to fill prescription due to lack of funds   He states he will be getting money from a friend soon   Plan  Reviewed medication list with patient; we discussed the most important medications to fill while he is out of work  Reinforced importance of managing blood pressure  Instructed to seek treatment in ED for headache, dizziness, lightheadedness, chest pain, shortness of breath  Encouraged to schedule appointment with PCP as soon as possible

## 2024-12-20 NOTE — WOUND CARE
Multilayer Compression Wrap   (Not Unna) Below the Knee    NAME:  Jozef Felix  YOB: 1965  MEDICAL RECORD NUMBER:  [id number]  DATE:  12/20/2024    Multilayer compression wrap: Removed old Multilayer wrap if indicated and wash leg with mild soap/water.  Applied moisturizing agent to dry skin as needed.   Applied primary and secondary dressing as ordered.  Applied multilayered dressing below the knee to right lower leg.  Applied multilayered dressing below the knee to left lower leg.  Instructed patient/caregiver not to remove dressing and to keep it clean and dry.   Instructed patient/caregiver on complications to report to provider, such as pain, numbness in toes, heavy drainage, and slippage of dressing.  Instructed patient on purpose of compression dressing and on activity and exercise recommendations.      Electronically signed by NOE KRAUSE RN on 12/20/2024 at 2:22 PM

## 2024-12-20 NOTE — ASSESSMENT & PLAN NOTE
Assessment  RLE wounds are much smaller; overall leg is much improved, and there is no evidence of acute infection  Plan  Continue current wound care plan  RTC for provider visit in 2 weeks

## 2024-12-20 NOTE — ASSESSMENT & PLAN NOTE
Assessment  LLE wounds are much smaller; overall leg is much improved, and there is no evidence of acute infection  Plan  Continue current wound care plan  RTC for provider visit in 2 weeks

## 2024-12-20 NOTE — WOUND CARE
Discharge Instructions for  [facility]  [address] [place] [address]  [address]  [place] [address]  Phone [phone]   Fax [phone]      NAME:  Jozef Felix  YOB: 1965  MEDICAL RECORD NUMBER:  [id number]  DATE:  12/20/2024    Return Appointment:  2 weeks with Alfa Edouard, DO  Return appointment with clinician only on Monday, December 23, 2024     BLOOD PRESSURE MEDICATION.     Instructions:   Bilateral Lower Legs:  Cleanse with warm water and mild soap   MUST SHOWER DAILY!  Moisturize lower legs  Xeroform to Left Lower Leg  Wrap with 2 Layer Wrap Compression  Dressing change 2x weekly    Transition to Velcro Compression Wraps when available    [employer] for Velcro Compression Wraps ordered today 12/12/2024.    Lymphedema Pumps  Call Rep related to this particular pump  Make an appointment for the repairs  Use Pumps daily for 1 hr.  Work up to Pumps 2x day for 1 hr each.        Should you experience increased redness, swelling, pain, foul odor, size of wound(s), or have a temperature over 101 degrees please contact the Wound Healing Center at [phone] or if after hours contact your primary care physician or go to the hospital emergency department.    PLEASE NOTE: IF YOU ARE UNABLE TO OBTAIN WOUND SUPPLIES, CONTINUE TO USE THE SUPPLIES YOU HAVE AVAILABLE UNTIL YOU ARE ABLE TO REACH US. IT IS MOST IMPORTANT TO KEEP THE WOUND COVERED AT ALL TIMES.    Electronically signed NOE KRAUSE RN on 12/20/2024 at 1:58 PM

## 2024-12-20 NOTE — PROGRESS NOTES
[facility]  Medical Staff Progress Note     Jozef Felix  MEDICAL RECORD NUMBER: [id number]  AGE: 59 y.o.     GENDER: male    : 1965  EPISODE DATE: 2024    Assessment and Plan:     Non-pressure chronic ulcer of lower leg with fat layer exposed, right (HCC)  Assessment  RLE wounds are much smaller; overall leg is much improved, and there is no evidence of acute infection  Plan  Continue current wound care plan  RTC for provider visit in 2 weeks    Non-pressure chronic ulcer of left lower leg with fat layer exposed (HCC)  Assessment  LLE wounds are much smaller; overall leg is much improved, and there is no evidence of acute infection  Plan  Continue current wound care plan  RTC for provider visit in 2 weeks    Hypertension  Assessment  BP is 148/108; patient denies headache, dizziness, lightheadedness, chest pain, shortness of breath  He reports he was given a prescription for hydralazine when discharged from the hospital although that is not reflected in the medical record; regardless, patient has not been able to fill prescription due to lack of funds   He states he will be getting money from a friend soon   Plan  Reviewed medication list with patient; we discussed the most important medications to fill while he is out of work  Reinforced importance of managing blood pressure  Instructed to seek treatment in ED for headache, dizziness, lightheadedness, chest pain, shortness of breath  Encouraged to schedule appointment with PCP as soon as possible     Medical decision making:  Assessment required other independent historian(s): no. Additional historian: patient .   Comorbid conditions affecting wound healing: as noted in PMH and PSH, was reviewed for this visit.   Review of medical records and external note(s) from other providers was done for this visit.  Pertinent diagnostics were reviewed for this visit. Discussion of management or test

## 2024-12-27 ENCOUNTER — HOSPITAL ENCOUNTER (OUTPATIENT)
Dept: WOUND CARE | Age: 59
Discharge: HOME | End: 2024-12-27
Payer: COMMERCIAL

## 2024-12-27 VITALS
BODY MASS INDEX: 39.17 KG/M2 | WEIGHT: 315 LBS | DIASTOLIC BLOOD PRESSURE: 93 MMHG | HEIGHT: 75 IN | SYSTOLIC BLOOD PRESSURE: 160 MMHG | RESPIRATION RATE: 18 BRPM | HEART RATE: 103 BPM | TEMPERATURE: 98 F

## 2024-12-27 DIAGNOSIS — I83.003 VENOUS STASIS ULCER OF ANKLE LIMITED TO BREAKDOWN OF SKIN WITH VARICOSE VEINS, UNSPECIFIED LATERALITY (HCC): Primary | Chronic | ICD-10-CM

## 2024-12-27 DIAGNOSIS — L97.301 VENOUS STASIS ULCER OF ANKLE LIMITED TO BREAKDOWN OF SKIN WITH VARICOSE VEINS, UNSPECIFIED LATERALITY (HCC): Primary | Chronic | ICD-10-CM

## 2024-12-27 PROCEDURE — 29581 APPL MULTLAYER CMPRN SYS LEG: CPT

## 2024-12-27 RX ORDER — LIDOCAINE 40 MG/G
CREAM TOPICAL ONCE
OUTPATIENT
Start: 2024-12-27 | End: 2024-12-27

## 2024-12-27 RX ORDER — LIDOCAINE HYDROCHLORIDE 20 MG/ML
JELLY TOPICAL ONCE
OUTPATIENT
Start: 2024-12-27 | End: 2024-12-27

## 2024-12-27 RX ORDER — SODIUM CHLOR/HYPOCHLOROUS ACID 0.033 %
SOLUTION, IRRIGATION IRRIGATION ONCE
OUTPATIENT
Start: 2024-12-27 | End: 2024-12-27

## 2024-12-27 RX ORDER — CLOBETASOL PROPIONATE 0.5 MG/G
OINTMENT TOPICAL ONCE
OUTPATIENT
Start: 2024-12-27 | End: 2024-12-27

## 2024-12-27 RX ORDER — BETAMETHASONE DIPROPIONATE 0.5 MG/G
CREAM TOPICAL ONCE
OUTPATIENT
Start: 2024-12-27 | End: 2024-12-27

## 2024-12-27 RX ORDER — BACITRACIN ZINC AND POLYMYXIN B SULFATE 500; 1000 [USP'U]/G; [USP'U]/G
OINTMENT TOPICAL ONCE
OUTPATIENT
Start: 2024-12-27 | End: 2024-12-27

## 2024-12-27 RX ORDER — LIDOCAINE 50 MG/G
OINTMENT TOPICAL ONCE
OUTPATIENT
Start: 2024-12-27 | End: 2024-12-27

## 2024-12-27 RX ORDER — MUPIROCIN 20 MG/G
OINTMENT TOPICAL ONCE
OUTPATIENT
Start: 2024-12-27 | End: 2024-12-27

## 2024-12-27 RX ORDER — TRIAMCINOLONE ACETONIDE 1 MG/G
OINTMENT TOPICAL ONCE
OUTPATIENT
Start: 2024-12-27 | End: 2024-12-27

## 2024-12-27 RX ORDER — NEOMYCIN/BACITRACIN/POLYMYXINB 3.5-400-5K
OINTMENT (GRAM) TOPICAL ONCE
OUTPATIENT
Start: 2024-12-27 | End: 2024-12-27

## 2024-12-27 RX ORDER — GENTAMICIN SULFATE 1 MG/G
OINTMENT TOPICAL ONCE
OUTPATIENT
Start: 2024-12-27 | End: 2024-12-27

## 2024-12-27 RX ORDER — LIDOCAINE HYDROCHLORIDE 40 MG/ML
SOLUTION TOPICAL ONCE
OUTPATIENT
Start: 2024-12-27 | End: 2024-12-27

## 2024-12-27 RX ORDER — GINSENG 100 MG
CAPSULE ORAL ONCE
OUTPATIENT
Start: 2024-12-27 | End: 2024-12-27

## 2024-12-27 NOTE — FLOWSHEET NOTE
Ariadne-wound Assessment Fragile   Margins Undefined edges   Wound Thickness Description not for Pressure Injury Full thickness   Pain Assessment   Pain Assessment None - Denies Pain     Taking xarelto

## 2024-12-27 NOTE — WOUND CARE
Multilayer Compression Wrap   (Not Unna) Below the Knee    NAME:  Jozef Felix  YOB: 1965  MEDICAL RECORD NUMBER:  [id number]  DATE:  12/27/2024    Multilayer compression wrap: Removed old Multilayer wrap if indicated and wash leg with mild soap/water.  Applied moisturizing agent to dry skin as needed.   Applied primary and secondary dressing as ordered.  Applied multilayered dressing below the knee to right lower leg.  Applied multilayered dressing below the knee to left lower leg.  Instructed patient/caregiver not to remove dressing and to keep it clean and dry.   Instructed patient/caregiver on complications to report to provider, such as pain, numbness in toes, heavy drainage, and slippage of dressing.  Instructed patient on purpose of compression dressing and on activity and exercise recommendations.      Electronically signed by Rm Alejandra RN on 12/27/2024 at 1:57 PM

## 2025-01-06 ENCOUNTER — HOSPITAL ENCOUNTER (OUTPATIENT)
Dept: WOUND CARE | Age: 60
Discharge: HOME | End: 2025-01-06
Payer: COMMERCIAL

## 2025-01-06 VITALS — BODY MASS INDEX: 39.17 KG/M2 | HEIGHT: 75 IN | HEART RATE: 90 BPM | RESPIRATION RATE: 18 BRPM | WEIGHT: 315 LBS

## 2025-01-06 DIAGNOSIS — I83.003 VENOUS STASIS ULCER OF ANKLE LIMITED TO BREAKDOWN OF SKIN WITH VARICOSE VEINS, UNSPECIFIED LATERALITY (HCC): Primary | ICD-10-CM

## 2025-01-06 DIAGNOSIS — L97.301 VENOUS STASIS ULCER OF ANKLE LIMITED TO BREAKDOWN OF SKIN WITH VARICOSE VEINS, UNSPECIFIED LATERALITY (HCC): Primary | ICD-10-CM

## 2025-01-06 PROCEDURE — 99213 OFFICE O/P EST LOW 20 MIN: CPT | Performed by: FAMILY MEDICINE

## 2025-01-06 PROCEDURE — 99213 OFFICE O/P EST LOW 20 MIN: CPT

## 2025-01-06 RX ORDER — NEOMYCIN/BACITRACIN/POLYMYXINB 3.5-400-5K
OINTMENT (GRAM) TOPICAL ONCE
Status: CANCELLED | OUTPATIENT
Start: 2025-01-06 | End: 2025-01-06

## 2025-01-06 RX ORDER — LIDOCAINE HYDROCHLORIDE 20 MG/ML
JELLY TOPICAL ONCE
Status: CANCELLED | OUTPATIENT
Start: 2025-01-06 | End: 2025-01-06

## 2025-01-06 RX ORDER — SODIUM CHLOR/HYPOCHLOROUS ACID 0.033 %
SOLUTION, IRRIGATION IRRIGATION ONCE
Status: COMPLETED | OUTPATIENT
Start: 2025-01-06 | End: 2025-01-06

## 2025-01-06 RX ORDER — CLOBETASOL PROPIONATE 0.5 MG/G
OINTMENT TOPICAL ONCE
Status: CANCELLED | OUTPATIENT
Start: 2025-01-06 | End: 2025-01-06

## 2025-01-06 RX ORDER — MUPIROCIN 20 MG/G
OINTMENT TOPICAL ONCE
Status: CANCELLED | OUTPATIENT
Start: 2025-01-06 | End: 2025-01-06

## 2025-01-06 RX ORDER — SODIUM CHLOR/HYPOCHLOROUS ACID 0.033 %
SOLUTION, IRRIGATION IRRIGATION ONCE
Status: CANCELLED | OUTPATIENT
Start: 2025-01-06 | End: 2025-01-06

## 2025-01-06 RX ORDER — GINSENG 100 MG
CAPSULE ORAL ONCE
Status: CANCELLED | OUTPATIENT
Start: 2025-01-06 | End: 2025-01-06

## 2025-01-06 RX ORDER — GENTAMICIN SULFATE 1 MG/G
OINTMENT TOPICAL ONCE
Status: CANCELLED | OUTPATIENT
Start: 2025-01-06 | End: 2025-01-06

## 2025-01-06 RX ORDER — LIDOCAINE 40 MG/G
CREAM TOPICAL ONCE
Status: CANCELLED | OUTPATIENT
Start: 2025-01-06 | End: 2025-01-06

## 2025-01-06 RX ORDER — LIDOCAINE HYDROCHLORIDE 20 MG/ML
JELLY TOPICAL ONCE
Status: COMPLETED | OUTPATIENT
Start: 2025-01-06 | End: 2025-01-06

## 2025-01-06 RX ORDER — BACITRACIN ZINC AND POLYMYXIN B SULFATE 500; 1000 [USP'U]/G; [USP'U]/G
OINTMENT TOPICAL ONCE
Status: CANCELLED | OUTPATIENT
Start: 2025-01-06 | End: 2025-01-06

## 2025-01-06 RX ORDER — TRIAMCINOLONE ACETONIDE 1 MG/G
OINTMENT TOPICAL ONCE
Status: CANCELLED | OUTPATIENT
Start: 2025-01-06 | End: 2025-01-06

## 2025-01-06 RX ORDER — LIDOCAINE HYDROCHLORIDE 40 MG/ML
SOLUTION TOPICAL ONCE
Status: CANCELLED | OUTPATIENT
Start: 2025-01-06 | End: 2025-01-06

## 2025-01-06 RX ORDER — BETAMETHASONE DIPROPIONATE 0.5 MG/G
CREAM TOPICAL ONCE
Status: CANCELLED | OUTPATIENT
Start: 2025-01-06 | End: 2025-01-06

## 2025-01-06 RX ORDER — LIDOCAINE 50 MG/G
OINTMENT TOPICAL ONCE
Status: CANCELLED | OUTPATIENT
Start: 2025-01-06 | End: 2025-01-06

## 2025-01-06 RX ADMIN — LIDOCAINE HYDROCHLORIDE: 20 JELLY TOPICAL at 15:29

## 2025-01-06 RX ADMIN — Medication: at 15:30

## 2025-01-06 ASSESSMENT — PAIN DESCRIPTION - LOCATION: LOCATION: ANKLE

## 2025-01-06 ASSESSMENT — PAIN DESCRIPTION - PAIN TYPE: TYPE: ACUTE PAIN

## 2025-01-06 ASSESSMENT — PAIN SCALES - GENERAL: PAINLEVEL_OUTOF10: 4

## 2025-01-06 ASSESSMENT — PAIN DESCRIPTION - ORIENTATION: ORIENTATION: LEFT

## 2025-01-06 ASSESSMENT — PAIN DESCRIPTION - FREQUENCY: FREQUENCY: INTERMITTENT

## 2025-01-06 NOTE — WOUND CARE
Discharge Instructions for  [facility]  [address]  [address]  [place] [address]  Phone [phone]   Fax [phone]      NAME:  Jozef Felix  YOB: 1965  MEDICAL RECORD NUMBER:  [id number]  DATE:  1/6/2025    Return Appointment:  2 weeks with Alfa Edouard,   Return appointment with clinician for dressing changes twice weekly     BLOOD PRESSURE MEDICATION.     Instructions:   Bilateral Lower Legs:  Cleanse with warm water and mild soap   MUST SHOWER DAILY!  Moisturize lower legs  Xeroform to Left Lower Leg  Cover with   Wrap with 4 Layer Wrap Compression  Dressing change 2x weekly    Augmentin ordered today  STOP taking Flagyl    Transition to Velcro Compression Wraps when available    [employer] for Velcro Compression Wraps     Lymphedema Pumps  Call Rep related to this particular pump  Make an appointment for the repairs  Use Pumps daily for 1 hr.  Work up to Pumps 2x day for 1 hr each.        Should you experience increased redness, swelling, pain, foul odor, size of wound(s), or have a temperature over 101 degrees please contact the Wound Healing Center at [phone] or if after hours contact your primary care physician or go to the hospital emergency department.    PLEASE NOTE: IF YOU ARE UNABLE TO OBTAIN WOUND SUPPLIES, CONTINUE TO USE THE SUPPLIES YOU HAVE AVAILABLE UNTIL YOU ARE ABLE TO REACH US. IT IS MOST IMPORTANT TO KEEP THE WOUND COVERED AT ALL TIMES.    Electronically signed NOE KRAUSE RN on 1/6/2025 at 2:49 PM

## 2025-01-06 NOTE — PROGRESS NOTES
as needed for Nausea or Vomiting 30 tablet 0    dilTIAZem (CARDIZEM CD) 240 MG extended release capsule Take by mouth daily      ondansetron (ZOFRAN-ODT) 4 MG disintegrating tablet Take 1 tablet by mouth 3 times daily as needed for Nausea or Vomiting 21 tablet 0    naloxone 4 MG/0.1ML LIQD nasal spray 1 spray by Nasal route as needed for Opioid Reversal Use 1 spray intranasally, then discard. Repeat with new spray every 2 min as needed for opioid overdose symptoms, alternating nostrils. (Patient not taking: Reported on 12/6/2024) 1 each 0    acetaminophen (TYLENOL) 500 MG tablet Take 1 tablet by mouth every 6 hours as needed for Fever or Pain for fever or breakthrough pain      allopurinol (ZYLOPRIM) 300 MG tablet Take 1 tablet by mouth daily      cyclobenzaprine (FLEXERIL) 5 MG tablet TAKE 1 TABLET BY MOUTH EVERY DAY AT NIGHT      famotidine (PEPCID) 40 MG tablet Take 1 tablet by mouth 2 times daily      furosemide (LASIX) 40 MG tablet Take 1 tablet by mouth daily      meloxicam (MOBIC) 15 MG tablet Take 1 tablet by mouth daily      rivaroxaban (XARELTO) 20 MG TABS tablet TAKE 1 TAB BY MOUTH DAILY (WITH DINNER).      tamsulosin (FLOMAX) 0.4 MG capsule Take 1 capsule by mouth daily       No current facility-administered medications on file prior to encounter.         Written patient dismissal instructions given to patient and signed by patient or POA.         Electronically signed by Alfa Edouard DO on 1/6/2025 at 4:13 PM

## 2025-01-06 NOTE — WOUND CARE
Multilayer Compression Wrap   (Not Unna) Below the Knee    NAME:  Jozef Felix  YOB: 1965  MEDICAL RECORD NUMBER:  [id number]  DATE:  1/6/2025    Multilayer compression wrap: Removed old Multilayer wrap if indicated and wash leg with mild soap/water.  Applied moisturizing agent to dry skin as needed.   Applied primary and secondary dressing as ordered.  Applied multilayered dressing below the knee to right lower leg.  Applied multilayered dressing below the knee to left lower leg.  Instructed patient/caregiver not to remove dressing and to keep it clean and dry.   Instructed patient/caregiver on complications to report to provider, such as pain, numbness in toes, heavy drainage, and slippage of dressing.  Instructed patient on purpose of compression dressing and on activity and exercise recommendations.      Electronically signed by NOE KRAUSE RN on 1/6/2025 at 3:35 PM

## 2025-01-06 NOTE — FLOWSHEET NOTE
01/06/25 1409   Right Leg Edema Point of Measurement   Leg circumference 46 cm   Ankle circumference 27 cm   Foot circumference 29 cm   Compression Therapy 2 layer compression wrap   Left Leg Edema Point of Measurement   Leg circumference 43.5 cm   Ankle circumference 26 cm   Foot circumference 30 cm   Compression Therapy 2 layer compression wrap   RLE Neurovascular Assessment   Capillary Refill Less than/Equal to 3 seconds   Temperature Warm   LLE Neurovascular Assessment   Capillary Refill Less than/Equal to 3 seconds   Temperature Warm   Wound 10/09/23 Leg Right;Lower #2   Date First Assessed/Time First Assessed: 10/09/23 1528   Present on Original Admission: Yes  Wound Approximate Age at First Assessment (Weeks): (c)   Primary Wound Type: Venous Ulcer  Location: Leg  Wound Location Orientation: Right;Lower  Wound Descr...   Wound Image    Wound Etiology Venous   Dressing Status Intact   Wound Cleansed Soap and water   Dressing/Treatment Xeroform   Wound Length (cm) 10 cm   Wound Width (cm) 9 cm   Wound Depth (cm) 0.1 cm   Wound Surface Area (cm^2) 90 cm^2   Change in Wound Size % (l*w) 90.6   Wound Volume (cm^3) 9 cm^3   Wound Healing % 91   Wound Assessment Pale granulation tissue   Drainage Amount Moderate (25-50%)   Drainage Description Serosanguinous   Odor Mild   Ariadne-wound Assessment Fragile   Margins Undefined edges   Wound Thickness Description not for Pressure Injury Full thickness   Wound 10/09/23 Pretibial Left;Lower #3   Date First Assessed/Time First Assessed: 10/09/23 1528   Present on Original Admission: Yes  Wound Approximate Age at First Assessment (Weeks): (c)   Primary Wound Type: Venous Ulcer  Location: Pretibial  Wound Location Orientation: Left;Lower  Wound ...   Wound Image    Wound Etiology Venous   Dressing Status Intact   Wound Cleansed Soap and water   Dressing/Treatment Xeroform   Wound Length (cm) 10 cm   Wound Width (cm) 10.8 cm   Wound Depth (cm) 0.1 cm   Wound Surface Area

## 2025-01-09 ENCOUNTER — HOSPITAL ENCOUNTER (OUTPATIENT)
Dept: WOUND CARE | Age: 60
Discharge: HOME | End: 2025-01-09
Payer: COMMERCIAL

## 2025-01-09 VITALS
RESPIRATION RATE: 12 BRPM | SYSTOLIC BLOOD PRESSURE: 146 MMHG | TEMPERATURE: 97.7 F | DIASTOLIC BLOOD PRESSURE: 81 MMHG | HEART RATE: 84 BPM

## 2025-01-09 DIAGNOSIS — I83.003 VENOUS STASIS ULCER OF ANKLE LIMITED TO BREAKDOWN OF SKIN WITH VARICOSE VEINS, UNSPECIFIED LATERALITY (HCC): Primary | ICD-10-CM

## 2025-01-09 DIAGNOSIS — L97.301 VENOUS STASIS ULCER OF ANKLE LIMITED TO BREAKDOWN OF SKIN WITH VARICOSE VEINS, UNSPECIFIED LATERALITY (HCC): Primary | ICD-10-CM

## 2025-01-09 PROCEDURE — 29581 APPL MULTLAYER CMPRN SYS LEG: CPT

## 2025-01-09 RX ORDER — BACITRACIN ZINC AND POLYMYXIN B SULFATE 500; 1000 [USP'U]/G; [USP'U]/G
OINTMENT TOPICAL ONCE
OUTPATIENT
Start: 2025-01-09 | End: 2025-01-09

## 2025-01-09 RX ORDER — SODIUM CHLOR/HYPOCHLOROUS ACID 0.033 %
SOLUTION, IRRIGATION IRRIGATION ONCE
OUTPATIENT
Start: 2025-01-09 | End: 2025-01-09

## 2025-01-09 RX ORDER — CLOBETASOL PROPIONATE 0.5 MG/G
OINTMENT TOPICAL ONCE
OUTPATIENT
Start: 2025-01-09 | End: 2025-01-09

## 2025-01-09 RX ORDER — LIDOCAINE HYDROCHLORIDE 40 MG/ML
SOLUTION TOPICAL ONCE
OUTPATIENT
Start: 2025-01-09 | End: 2025-01-09

## 2025-01-09 RX ORDER — SODIUM CHLOR/HYPOCHLOROUS ACID 0.033 %
SOLUTION, IRRIGATION IRRIGATION ONCE
Status: COMPLETED | OUTPATIENT
Start: 2025-01-09 | End: 2025-01-09

## 2025-01-09 RX ORDER — MUPIROCIN 20 MG/G
OINTMENT TOPICAL ONCE
OUTPATIENT
Start: 2025-01-09 | End: 2025-01-09

## 2025-01-09 RX ORDER — LIDOCAINE 40 MG/G
CREAM TOPICAL ONCE
OUTPATIENT
Start: 2025-01-09 | End: 2025-01-09

## 2025-01-09 RX ORDER — BETAMETHASONE DIPROPIONATE 0.5 MG/G
CREAM TOPICAL ONCE
OUTPATIENT
Start: 2025-01-09 | End: 2025-01-09

## 2025-01-09 RX ORDER — NEOMYCIN/BACITRACIN/POLYMYXINB 3.5-400-5K
OINTMENT (GRAM) TOPICAL ONCE
OUTPATIENT
Start: 2025-01-09 | End: 2025-01-09

## 2025-01-09 RX ORDER — LIDOCAINE HYDROCHLORIDE 20 MG/ML
JELLY TOPICAL ONCE
OUTPATIENT
Start: 2025-01-09 | End: 2025-01-09

## 2025-01-09 RX ORDER — TRIAMCINOLONE ACETONIDE 1 MG/G
OINTMENT TOPICAL ONCE
OUTPATIENT
Start: 2025-01-09 | End: 2025-01-09

## 2025-01-09 RX ORDER — LIDOCAINE 50 MG/G
OINTMENT TOPICAL ONCE
OUTPATIENT
Start: 2025-01-09 | End: 2025-01-09

## 2025-01-09 RX ORDER — GENTAMICIN SULFATE 1 MG/G
OINTMENT TOPICAL ONCE
OUTPATIENT
Start: 2025-01-09 | End: 2025-01-09

## 2025-01-09 RX ORDER — GINSENG 100 MG
CAPSULE ORAL ONCE
OUTPATIENT
Start: 2025-01-09 | End: 2025-01-09

## 2025-01-09 RX ADMIN — Medication: at 16:35

## 2025-01-09 NOTE — FLOWSHEET NOTE
% (l*w) 84.65   Wound Volume (cm^3) 19.8 cm^3   Wound Healing % 85   Wound Assessment Slough;Pink/red;Erythema;Fibrinous   Drainage Amount Copious (>75 % saturated)   Drainage Description Green;Serosanguinous   Odor Mild   Ariadne-wound Assessment Blanchable erythema;Edematous;Erosion;Hemosiderin staining (brown yellow);Maceration   Margins Undefined edges   Wound Thickness Description not for Pressure Injury Full thickness   Pain Assessment   Pain Assessment None - Denies Pain     Patient takes Xarelto.  Clinician visit only.

## 2025-01-09 NOTE — WOUND CARE
Multilayer Compression Wrap   (Not Unna) Below the Knee    NAME:  Jozef Felix  YOB: 1965  MEDICAL RECORD NUMBER:  [id number]  DATE:  1/9/2025    Multilayer compression wrap: Removed old Multilayer wrap if indicated and wash leg with mild soap/water.  Applied moisturizing agent to dry skin as needed.   Applied primary and secondary dressing as ordered.  Applied multilayered dressing below the knee to right lower leg.  Applied multilayered dressing below the knee to left lower leg.  Instructed patient/caregiver not to remove dressing and to keep it clean and dry.   Instructed patient/caregiver on complications to report to provider, such as pain, numbness in toes, heavy drainage, and slippage of dressing.  Instructed patient on purpose of compression dressing and on activity and exercise recommendations.      Electronically signed by Maria Elena Sequeira RN on 1/9/2025 at 4:41 PM

## 2025-01-13 ENCOUNTER — HOSPITAL ENCOUNTER (OUTPATIENT)
Dept: WOUND CARE | Age: 60
Discharge: HOME | End: 2025-01-13
Payer: COMMERCIAL

## 2025-01-13 VITALS
WEIGHT: 315 LBS | RESPIRATION RATE: 16 BRPM | BODY MASS INDEX: 39.17 KG/M2 | DIASTOLIC BLOOD PRESSURE: 106 MMHG | TEMPERATURE: 98.6 F | SYSTOLIC BLOOD PRESSURE: 163 MMHG | HEART RATE: 91 BPM | HEIGHT: 75 IN

## 2025-01-13 DIAGNOSIS — I83.003 VENOUS STASIS ULCER OF ANKLE LIMITED TO BREAKDOWN OF SKIN WITH VARICOSE VEINS, UNSPECIFIED LATERALITY (HCC): Primary | ICD-10-CM

## 2025-01-13 DIAGNOSIS — L97.301 VENOUS STASIS ULCER OF ANKLE LIMITED TO BREAKDOWN OF SKIN WITH VARICOSE VEINS, UNSPECIFIED LATERALITY (HCC): Primary | ICD-10-CM

## 2025-01-13 PROCEDURE — 29581 APPL MULTLAYER CMPRN SYS LEG: CPT

## 2025-01-13 RX ORDER — MUPIROCIN 20 MG/G
OINTMENT TOPICAL ONCE
Status: CANCELLED | OUTPATIENT
Start: 2025-01-13 | End: 2025-01-13

## 2025-01-13 RX ORDER — GINSENG 100 MG
CAPSULE ORAL ONCE
Status: CANCELLED | OUTPATIENT
Start: 2025-01-13 | End: 2025-01-13

## 2025-01-13 RX ORDER — LIDOCAINE 50 MG/G
OINTMENT TOPICAL ONCE
Status: CANCELLED | OUTPATIENT
Start: 2025-01-13 | End: 2025-01-13

## 2025-01-13 RX ORDER — LIDOCAINE HYDROCHLORIDE 40 MG/ML
SOLUTION TOPICAL ONCE
Status: CANCELLED | OUTPATIENT
Start: 2025-01-13 | End: 2025-01-13

## 2025-01-13 RX ORDER — CLOBETASOL PROPIONATE 0.5 MG/G
OINTMENT TOPICAL ONCE
Status: CANCELLED | OUTPATIENT
Start: 2025-01-13 | End: 2025-01-13

## 2025-01-13 RX ORDER — LIDOCAINE 40 MG/G
CREAM TOPICAL ONCE
Status: CANCELLED | OUTPATIENT
Start: 2025-01-13 | End: 2025-01-13

## 2025-01-13 RX ORDER — LIDOCAINE HYDROCHLORIDE 20 MG/ML
JELLY TOPICAL ONCE
Status: CANCELLED | OUTPATIENT
Start: 2025-01-13 | End: 2025-01-13

## 2025-01-13 RX ORDER — BETAMETHASONE DIPROPIONATE 0.5 MG/G
CREAM TOPICAL ONCE
Status: CANCELLED | OUTPATIENT
Start: 2025-01-13 | End: 2025-01-13

## 2025-01-13 RX ORDER — NEOMYCIN/BACITRACIN/POLYMYXINB 3.5-400-5K
OINTMENT (GRAM) TOPICAL ONCE
Status: CANCELLED | OUTPATIENT
Start: 2025-01-13 | End: 2025-01-13

## 2025-01-13 RX ORDER — GENTAMICIN SULFATE 1 MG/G
OINTMENT TOPICAL ONCE
Status: CANCELLED | OUTPATIENT
Start: 2025-01-13 | End: 2025-01-13

## 2025-01-13 RX ORDER — CIPROFLOXACIN 500 MG/1
500 TABLET, FILM COATED ORAL 2 TIMES DAILY
Qty: 20 TABLET | Refills: 0 | Status: SHIPPED | OUTPATIENT
Start: 2025-01-13 | End: 2025-01-23

## 2025-01-13 RX ORDER — BACITRACIN ZINC AND POLYMYXIN B SULFATE 500; 1000 [USP'U]/G; [USP'U]/G
OINTMENT TOPICAL ONCE
Status: CANCELLED | OUTPATIENT
Start: 2025-01-13 | End: 2025-01-13

## 2025-01-13 RX ORDER — SODIUM CHLOR/HYPOCHLOROUS ACID 0.033 %
SOLUTION, IRRIGATION IRRIGATION ONCE
Status: CANCELLED | OUTPATIENT
Start: 2025-01-13 | End: 2025-01-13

## 2025-01-13 RX ORDER — TRIAMCINOLONE ACETONIDE 1 MG/G
OINTMENT TOPICAL ONCE
Status: CANCELLED | OUTPATIENT
Start: 2025-01-13 | End: 2025-01-13

## 2025-01-13 ASSESSMENT — PAIN DESCRIPTION - LOCATION: LOCATION: LEG

## 2025-01-13 ASSESSMENT — PAIN DESCRIPTION - ORIENTATION: ORIENTATION: LEFT;RIGHT

## 2025-01-13 ASSESSMENT — PAIN DESCRIPTION - DESCRIPTORS: DESCRIPTORS: BURNING;ACHING

## 2025-01-13 ASSESSMENT — PAIN SCALES - GENERAL: PAINLEVEL_OUTOF10: 4

## 2025-01-13 NOTE — FLOWSHEET NOTE
01/13/25 1311   Right Leg Edema Point of Measurement   Leg circumference 44.5 cm   Ankle circumference 27.5 cm   Foot circumference 31 cm   Compression Therapy 4 layer compression wrap   Left Leg Edema Point of Measurement   Leg circumference 42 cm   Ankle circumference 28 cm   Foot circumference 26 cm   Compression Therapy 4 layer compression wrap   Wound 10/09/23 Pretibial Left;Lower #3   Date First Assessed/Time First Assessed: 10/09/23 1528   Present on Original Admission: Yes  Wound Approximate Age at First Assessment (Weeks): (c)   Primary Wound Type: Venous Ulcer  Location: Pretibial  Wound Location Orientation: Left;Lower  Wound ...   Wound Image    Wound Etiology Venous   Dressing Status Old drainage noted   Wound Cleansed Cleansed with saline;Soap and water;Vashe   Dressing/Treatment Xeroform   Wound Length (cm) 42 cm   Wound Width (cm) 28 cm   Wound Depth (cm) 26 cm   Wound Surface Area (cm^2) 1176 cm^2   Change in Wound Size % (l*w) 8.84   Wound Volume (cm^3) 70356 cm^3   Wound Healing % -63295   Wound Assessment Slough;Pink/red;Erythema;Fibrinous   Drainage Amount Copious (>75 % saturated)   Drainage Description Green   Odor Malodorous/putrid   Ariadne-wound Assessment Blanchable erythema;Edematous;Erosion;Hemosiderin staining (brown yellow);Maceration   Margins Undefined edges   Wound Thickness Description not for Pressure Injury Full thickness   Wound 10/09/23 Leg Right;Lower #2   Date First Assessed/Time First Assessed: 10/09/23 1528   Present on Original Admission: Yes  Wound Approximate Age at First Assessment (Weeks): (c)   Primary Wound Type: Venous Ulcer  Location: Leg  Wound Location Orientation: Right;Lower  Wound Descr...   Wound Image    Wound Etiology Venous   Dressing Status Old drainage noted   Wound Cleansed Cleansed with saline;Soap and water;Vashe   Dressing/Treatment Xeroform   Wound Length (cm) 38.5 cm   Wound Width (cm) 25 cm   Wound Depth (cm) 0.1 cm   Wound Surface Area (cm^2)

## 2025-01-13 NOTE — WOUND CARE
Multilayer Compression Wrap   (Not Unna) Below the Knee    NAME:  Jozef Felix  YOB: 1965  MEDICAL RECORD NUMBER:  [id number]  DATE:  1/13/2025    Multilayer compression wrap: Removed old Multilayer wrap if indicated and wash leg with mild soap/water.  Applied moisturizing agent to dry skin as needed.   Applied primary and secondary dressing as ordered.  Applied multilayered dressing below the knee to right lower leg.  Applied multilayered dressing below the knee to left lower leg.  Instructed patient/caregiver not to remove dressing and to keep it clean and dry.   Instructed patient/caregiver on complications to report to provider, such as pain, numbness in toes, heavy drainage, and slippage of dressing.  Instructed patient on purpose of compression dressing and on activity and exercise recommendations.      Electronically signed by KAROLINA VALLE RN on 1/13/2025 at 1:46 PM

## 2025-01-16 ENCOUNTER — HOSPITAL ENCOUNTER (OUTPATIENT)
Dept: WOUND CARE | Age: 60
Discharge: HOME | End: 2025-01-16
Payer: COMMERCIAL

## 2025-01-16 VITALS
HEIGHT: 75 IN | RESPIRATION RATE: 12 BRPM | BODY MASS INDEX: 39.17 KG/M2 | WEIGHT: 315 LBS | DIASTOLIC BLOOD PRESSURE: 89 MMHG | SYSTOLIC BLOOD PRESSURE: 160 MMHG | TEMPERATURE: 98 F | HEART RATE: 85 BPM

## 2025-01-16 DIAGNOSIS — I83.003 VENOUS STASIS ULCER OF ANKLE LIMITED TO BREAKDOWN OF SKIN WITH VARICOSE VEINS, UNSPECIFIED LATERALITY (HCC): Primary | ICD-10-CM

## 2025-01-16 DIAGNOSIS — L97.301 VENOUS STASIS ULCER OF ANKLE LIMITED TO BREAKDOWN OF SKIN WITH VARICOSE VEINS, UNSPECIFIED LATERALITY (HCC): Primary | ICD-10-CM

## 2025-01-16 PROCEDURE — 29581 APPL MULTLAYER CMPRN SYS LEG: CPT

## 2025-01-16 RX ORDER — GINSENG 100 MG
CAPSULE ORAL ONCE
OUTPATIENT
Start: 2025-01-16 | End: 2025-01-16

## 2025-01-16 RX ORDER — LIDOCAINE HYDROCHLORIDE 40 MG/ML
SOLUTION TOPICAL ONCE
OUTPATIENT
Start: 2025-01-16 | End: 2025-01-16

## 2025-01-16 RX ORDER — NEOMYCIN/BACITRACIN/POLYMYXINB 3.5-400-5K
OINTMENT (GRAM) TOPICAL ONCE
OUTPATIENT
Start: 2025-01-16 | End: 2025-01-16

## 2025-01-16 RX ORDER — TRIAMCINOLONE ACETONIDE 1 MG/G
OINTMENT TOPICAL ONCE
OUTPATIENT
Start: 2025-01-16 | End: 2025-01-16

## 2025-01-16 RX ORDER — LIDOCAINE 50 MG/G
OINTMENT TOPICAL ONCE
OUTPATIENT
Start: 2025-01-16 | End: 2025-01-16

## 2025-01-16 RX ORDER — LIDOCAINE HYDROCHLORIDE 20 MG/ML
JELLY TOPICAL ONCE
OUTPATIENT
Start: 2025-01-16 | End: 2025-01-16

## 2025-01-16 RX ORDER — BACITRACIN ZINC AND POLYMYXIN B SULFATE 500; 1000 [USP'U]/G; [USP'U]/G
OINTMENT TOPICAL ONCE
OUTPATIENT
Start: 2025-01-16 | End: 2025-01-16

## 2025-01-16 RX ORDER — BETAMETHASONE DIPROPIONATE 0.5 MG/G
CREAM TOPICAL ONCE
OUTPATIENT
Start: 2025-01-16 | End: 2025-01-16

## 2025-01-16 RX ORDER — LIDOCAINE 40 MG/G
CREAM TOPICAL ONCE
OUTPATIENT
Start: 2025-01-16 | End: 2025-01-16

## 2025-01-16 RX ORDER — CLOBETASOL PROPIONATE 0.5 MG/G
OINTMENT TOPICAL ONCE
OUTPATIENT
Start: 2025-01-16 | End: 2025-01-16

## 2025-01-16 RX ORDER — MUPIROCIN 20 MG/G
OINTMENT TOPICAL ONCE
OUTPATIENT
Start: 2025-01-16 | End: 2025-01-16

## 2025-01-16 RX ORDER — SODIUM CHLOR/HYPOCHLOROUS ACID 0.033 %
SOLUTION, IRRIGATION IRRIGATION ONCE
OUTPATIENT
Start: 2025-01-16 | End: 2025-01-16

## 2025-01-16 RX ORDER — GENTAMICIN SULFATE 1 MG/G
OINTMENT TOPICAL ONCE
OUTPATIENT
Start: 2025-01-16 | End: 2025-01-16

## 2025-01-16 NOTE — WOUND CARE
[facility]  [address] [place] [address]  Phone [phone]   Fax [phone]          NAME:  Jozef Felix  YOB: 1965  DATE:  1/16/2025      To Whom It May Concern:    The above named patient was in our clinic for his 2:00 PM scheduled dressing change this afternoon up until this time of 4:30 PM.     If you should have any questions or concerns, please feel free to call us at the clinic M-F during business hours.    Respectfully,    JOSEPH Arredondo  Wound Care Staff Clinician

## 2025-01-16 NOTE — WOUND CARE
Multilayer Compression Wrap   (Not Unna) Below the Knee    NAME:  Jozef Felix  YOB: 1965  MEDICAL RECORD NUMBER:  [id number]  DATE:  1/16/2025    Multilayer compression wrap: Removed old Multilayer wrap if indicated and wash leg with mild soap/water.  Applied moisturizing agent to dry skin as needed.   Applied primary and secondary dressing as ordered.  Applied multilayered dressing below the knee to right lower leg.  Applied multilayered dressing below the knee to left lower leg.  Instructed patient/caregiver not to remove dressing and to keep it clean and dry.   Instructed patient/caregiver on complications to report to provider, such as pain, numbness in toes, heavy drainage, and slippage of dressing.  Instructed patient on purpose of compression dressing and on activity and exercise recommendations.      Electronically signed by Maria Elena Sequeira RN on 1/16/2025 at 5:09 PM

## 2025-01-16 NOTE — FLOWSHEET NOTE
01/16/25 1652   Right Leg Edema Point of Measurement   Leg circumference 52 cm   Ankle circumference 26 cm   Foot circumference 31 cm   Compression Therapy 4 layer compression wrap   Left Leg Edema Point of Measurement   Leg circumference 45 cm   Ankle circumference 25 cm   Foot circumference 30 cm   Compression Therapy 4 layer compression wrap   Wound 10/09/23 Leg Right;Lower #2   Date First Assessed/Time First Assessed: 10/09/23 1528   Present on Original Admission: Yes  Wound Approximate Age at First Assessment (Weeks): (c)   Primary Wound Type: Venous Ulcer  Location: Leg  Wound Location Orientation: Right;Lower  Wound Descr...   Wound Image    Wound Etiology Venous   Dressing Status Old drainage noted   Wound Cleansed Cleansed with saline;Soap and water;Vashe   Dressing/Treatment Xeroform   Wound Length (cm) 34 cm   Wound Width (cm) 25 cm   Wound Depth (cm) 0.1 cm   Wound Surface Area (cm^2) 850 cm^2   Change in Wound Size % (l*w) 11.18   Wound Volume (cm^3) 85 cm^3   Wound Healing % 11   Wound Assessment Slough;Pink/red   Drainage Amount Copious (>75 % saturated)   Drainage Description Serosanguinous   Odor Malodorous/putrid   Ariadne-wound Assessment Edematous;Blanchable erythema;Hemosiderin staining (brown yellow);Maceration   Margins Undefined edges   Wound Thickness Description not for Pressure Injury Full thickness   Wound 10/09/23 Pretibial Left;Lower #3   Date First Assessed/Time First Assessed: 10/09/23 1528   Present on Original Admission: Yes  Wound Approximate Age at First Assessment (Weeks): (c)   Primary Wound Type: Venous Ulcer  Location: Pretibial  Wound Location Orientation: Left;Lower  Wound ...   Wound Image    Wound Etiology Venous   Dressing Status Old drainage noted   Wound Cleansed Cleansed with saline;Soap and water;Vashe   Dressing/Treatment Xeroform   Wound Length (cm) 43 cm   Wound Width (cm) 22 cm   Wound Depth (cm) 0.1 cm   Wound Surface Area (cm^2) 946 cm^2   Change in Wound Size %

## 2025-01-20 ENCOUNTER — HOSPITAL ENCOUNTER (OUTPATIENT)
Dept: WOUND CARE | Age: 60
Discharge: HOME | End: 2025-01-20
Payer: COMMERCIAL

## 2025-01-20 VITALS
HEIGHT: 75 IN | BODY MASS INDEX: 39.17 KG/M2 | RESPIRATION RATE: 16 BRPM | SYSTOLIC BLOOD PRESSURE: 165 MMHG | DIASTOLIC BLOOD PRESSURE: 93 MMHG | HEART RATE: 77 BPM | TEMPERATURE: 98.4 F | WEIGHT: 315 LBS

## 2025-01-20 DIAGNOSIS — I83.003 VENOUS STASIS ULCER OF ANKLE LIMITED TO BREAKDOWN OF SKIN WITH VARICOSE VEINS, UNSPECIFIED LATERALITY (HCC): Primary | ICD-10-CM

## 2025-01-20 DIAGNOSIS — L97.301 VENOUS STASIS ULCER OF ANKLE LIMITED TO BREAKDOWN OF SKIN WITH VARICOSE VEINS, UNSPECIFIED LATERALITY (HCC): Primary | ICD-10-CM

## 2025-01-20 PROCEDURE — 29581 APPL MULTLAYER CMPRN SYS LEG: CPT

## 2025-01-20 RX ORDER — LIDOCAINE 40 MG/G
CREAM TOPICAL ONCE
OUTPATIENT
Start: 2025-01-20 | End: 2025-01-20

## 2025-01-20 RX ORDER — GINSENG 100 MG
CAPSULE ORAL ONCE
OUTPATIENT
Start: 2025-01-20 | End: 2025-01-20

## 2025-01-20 RX ORDER — GENTAMICIN SULFATE 1 MG/G
OINTMENT TOPICAL ONCE
OUTPATIENT
Start: 2025-01-20 | End: 2025-01-20

## 2025-01-20 RX ORDER — LIDOCAINE HYDROCHLORIDE 20 MG/ML
JELLY TOPICAL ONCE
Status: DISCONTINUED | OUTPATIENT
Start: 2025-01-20 | End: 2025-01-21 | Stop reason: HOSPADM

## 2025-01-20 RX ORDER — LIDOCAINE 50 MG/G
OINTMENT TOPICAL ONCE
OUTPATIENT
Start: 2025-01-20 | End: 2025-01-20

## 2025-01-20 RX ORDER — CIPROFLOXACIN 500 MG/1
500 TABLET, FILM COATED ORAL 2 TIMES DAILY
Qty: 20 TABLET | Refills: 0 | Status: SHIPPED | OUTPATIENT
Start: 2025-01-20 | End: 2025-01-30

## 2025-01-20 RX ORDER — BACITRACIN ZINC AND POLYMYXIN B SULFATE 500; 1000 [USP'U]/G; [USP'U]/G
OINTMENT TOPICAL ONCE
OUTPATIENT
Start: 2025-01-20 | End: 2025-01-20

## 2025-01-20 RX ORDER — LIDOCAINE HYDROCHLORIDE 40 MG/ML
SOLUTION TOPICAL ONCE
OUTPATIENT
Start: 2025-01-20 | End: 2025-01-20

## 2025-01-20 RX ORDER — SODIUM CHLOR/HYPOCHLOROUS ACID 0.033 %
SOLUTION, IRRIGATION IRRIGATION ONCE
OUTPATIENT
Start: 2025-01-20 | End: 2025-01-20

## 2025-01-20 RX ORDER — CLOBETASOL PROPIONATE 0.5 MG/G
OINTMENT TOPICAL ONCE
OUTPATIENT
Start: 2025-01-20 | End: 2025-01-20

## 2025-01-20 RX ORDER — NEOMYCIN/BACITRACIN/POLYMYXINB 3.5-400-5K
OINTMENT (GRAM) TOPICAL ONCE
OUTPATIENT
Start: 2025-01-20 | End: 2025-01-20

## 2025-01-20 RX ORDER — LIDOCAINE HYDROCHLORIDE 20 MG/ML
JELLY TOPICAL ONCE
OUTPATIENT
Start: 2025-01-20 | End: 2025-01-20

## 2025-01-20 RX ORDER — MUPIROCIN 20 MG/G
OINTMENT TOPICAL ONCE
OUTPATIENT
Start: 2025-01-20 | End: 2025-01-20

## 2025-01-20 RX ORDER — BETAMETHASONE DIPROPIONATE 0.5 MG/G
CREAM TOPICAL ONCE
OUTPATIENT
Start: 2025-01-20 | End: 2025-01-20

## 2025-01-20 RX ORDER — TRIAMCINOLONE ACETONIDE 1 MG/G
OINTMENT TOPICAL ONCE
OUTPATIENT
Start: 2025-01-20 | End: 2025-01-20

## 2025-01-20 RX ORDER — SODIUM CHLOR/HYPOCHLOROUS ACID 0.033 %
SOLUTION, IRRIGATION IRRIGATION ONCE
Status: COMPLETED | OUTPATIENT
Start: 2025-01-20 | End: 2025-01-20

## 2025-01-20 RX ADMIN — Medication: at 16:19

## 2025-01-20 NOTE — WOUND CARE
Discharge Instructions for  [facility]  [address] [place] [address]  [address]  [place] [address]  Phone [phone]   Fax [phone]      NAME:  Jozef Felix  YOB: 1965  MEDICAL RECORD NUMBER:  [id number]  DATE:  1/20/2025    Return Appointment:  2 weeks with Alfa Edouard,   Return appointment with clinician for dressing changes twice weekly    Instructions:   Bilateral Lower Legs:  Cleanse with warm water and mild soap   MUST SHOWER DAILY!  Moisturize lower legs  Xeroform to Left Lower Leg  Cover with   Wrap with 4 Layer Wrap Compression  Dressing change 2x weekly    DRAWTEX between toes  Cover with ABD pad or super absorptive pads.   - change daily     Augmentin  and Cipro ordered today    Transition to Velcro Compression Wraps when available    Should you experience increased redness, swelling, pain, foul odor, size of wound(s), or have a temperature over 101 degrees please contact the Wound Healing Center at [phone] or if after hours contact your primary care physician or go to the hospital emergency department.    PLEASE NOTE: IF YOU ARE UNABLE TO OBTAIN WOUND SUPPLIES, CONTINUE TO USE THE SUPPLIES YOU HAVE AVAILABLE UNTIL YOU ARE ABLE TO REACH US. IT IS MOST IMPORTANT TO KEEP THE WOUND COVERED AT ALL TIMES.    Electronically signed KATHERIN GIBBS RN on 1/20/2025 at 3:44 PM

## 2025-01-20 NOTE — DISCHARGE INSTRUCTIONS
Bilateral Lower Legs:  Cleanse with warm water and mild soap   MUST SHOWER DAILY!  Moisturize lower legs  Xeroform to Left Lower Leg  Cover with   Wrap with 4 Layer Wrap Compression  Dressing change 2x weekly     DRAWTEX between toes  Cover with ABD pad or super absorptive pads.   - change daily      Augmentin  and Cipro ordered today     Transition to Velcro Compression Wraps when available     Should you experience increased redness, swelling, pain, foul odor, size of wound(s), or have a temperature over 101 degrees please contact the [facility]aling Center at [phone] or if after hours contact your primary care physician or go to the hospital emergency department.     PLEASE NOTE: IF YOU ARE UNABLE TO OBTAIN WOUND SUPPLIES, CONTINUE TO USE THE SUPPLIES YOU HAVE AVAILABLE UNTIL YOU ARE ABLE TO REACH US. IT IS MOST IMPORTANT TO KEEP THE WOUND COVERED AT ALL TIMES.

## 2025-01-20 NOTE — WOUND CARE
Multilayer Compression Wrap   (Not Unna) Below the Knee    NAME:  Jozef Felix  YOB: 1965  MEDICAL RECORD NUMBER:  [id number]  DATE:  1/20/2025    Multilayer compression wrap: Removed old Multilayer wrap if indicated and wash leg with mild soap/water.  Applied moisturizing agent to dry skin as needed.   Applied primary and secondary dressing as ordered.  Applied multilayered dressing below the knee to right lower leg.  Applied multilayered dressing below the knee to left lower leg.  Instructed patient/caregiver not to remove dressing and to keep it clean and dry.   Instructed patient/caregiver on complications to report to provider, such as pain, numbness in toes, heavy drainage, and slippage of dressing.  Instructed patient on purpose of compression dressing and on activity and exercise recommendations.      Electronically signed by KATHERIN GIBBS RN on 1/20/2025 at 4:45 PM

## 2025-01-20 NOTE — FLOWSHEET NOTE
01/20/25 1501   Wound 10/09/23 Leg Right;Lower #2   Date First Assessed/Time First Assessed: 10/09/23 1528   Present on Original Admission: Yes  Wound Approximate Age at First Assessment (Weeks): (c)   Primary Wound Type: Venous Ulcer  Location: Leg  Wound Location Orientation: Right;Lower  Wound Descr...   Wound Image     Wound Etiology Venous   Dressing Status Old drainage noted   Wound Cleansed Soap and water;Vashe   Dressing/Treatment Xeroform   Wound Length (cm) 25 cm   Wound Width (cm) 27 cm   Wound Depth (cm) 0.1 cm   Wound Surface Area (cm^2) 675 cm^2   Change in Wound Size % (l*w) 29.47   Wound Volume (cm^3) 67.5 cm^3   Wound Healing % 29   Wound Assessment Slough;Pink/red   Drainage Amount Large (50-75% saturated)   Drainage Description Serosanguinous;Green   Odor Malodorous/putrid   Ariadne-wound Assessment Maceration;Edematous   Wound Thickness Description not for Pressure Injury Full thickness   Wound 10/09/23 Pretibial Left;Lower #3   Date First Assessed/Time First Assessed: 10/09/23 1528   Present on Original Admission: Yes  Wound Approximate Age at First Assessment (Weeks): (c)   Primary Wound Type: Venous Ulcer  Location: Pretibial  Wound Location Orientation: Left;Lower  Wound ...   Wound Image    Wound Etiology Venous   Dressing Status Old drainage noted   Wound Cleansed Vashe;Soap and water   Dressing/Treatment Xeroform   Wound Length (cm) 56 cm   Wound Width (cm) 26 cm   Wound Depth (cm) 0.1 cm   Wound Surface Area (cm^2) 1456 cm^2   Change in Wound Size % (l*w) -12.87   Wound Volume (cm^3) 145.6 cm^3   Wound Healing % -13   Wound Assessment Slough;Pink/red;Erythema;Fibrinous   Drainage Amount Large (50-75% saturated)   Drainage Description Green;Serosanguinous   Odor Malodorous/putrid   Ariadne-wound Assessment Maceration;Edematous   Wound Thickness Description not for Pressure Injury Full thickness   Wound 01/20/25 Toe (Comment  which one) Right Right Toes / Foot -web spaces   Date First

## 2025-01-27 ENCOUNTER — HOSPITAL ENCOUNTER (OUTPATIENT)
Dept: WOUND CARE | Age: 60
Discharge: HOME | End: 2025-01-27
Payer: COMMERCIAL

## 2025-01-27 VITALS
BODY MASS INDEX: 39.17 KG/M2 | WEIGHT: 315 LBS | HEIGHT: 75 IN | OXYGEN SATURATION: 94 % | HEART RATE: 79 BPM | SYSTOLIC BLOOD PRESSURE: 158 MMHG | DIASTOLIC BLOOD PRESSURE: 88 MMHG | RESPIRATION RATE: 18 BRPM | TEMPERATURE: 97.9 F

## 2025-01-27 DIAGNOSIS — I83.003 VENOUS STASIS ULCER OF ANKLE LIMITED TO BREAKDOWN OF SKIN WITH VARICOSE VEINS, UNSPECIFIED LATERALITY (HCC): Primary | ICD-10-CM

## 2025-01-27 DIAGNOSIS — L97.301 VENOUS STASIS ULCER OF ANKLE LIMITED TO BREAKDOWN OF SKIN WITH VARICOSE VEINS, UNSPECIFIED LATERALITY (HCC): Primary | ICD-10-CM

## 2025-01-27 PROCEDURE — 29581 APPL MULTLAYER CMPRN SYS LEG: CPT

## 2025-01-27 RX ORDER — LIDOCAINE HYDROCHLORIDE 20 MG/ML
JELLY TOPICAL ONCE
OUTPATIENT
Start: 2025-01-27 | End: 2025-01-27

## 2025-01-27 RX ORDER — MUPIROCIN 20 MG/G
OINTMENT TOPICAL ONCE
OUTPATIENT
Start: 2025-01-27 | End: 2025-01-27

## 2025-01-27 RX ORDER — GINSENG 100 MG
CAPSULE ORAL ONCE
OUTPATIENT
Start: 2025-01-27 | End: 2025-01-27

## 2025-01-27 RX ORDER — LIDOCAINE 50 MG/G
OINTMENT TOPICAL ONCE
OUTPATIENT
Start: 2025-01-27 | End: 2025-01-27

## 2025-01-27 RX ORDER — TRIAMCINOLONE ACETONIDE 1 MG/G
OINTMENT TOPICAL ONCE
OUTPATIENT
Start: 2025-01-27 | End: 2025-01-27

## 2025-01-27 RX ORDER — BACITRACIN ZINC AND POLYMYXIN B SULFATE 500; 1000 [USP'U]/G; [USP'U]/G
OINTMENT TOPICAL ONCE
OUTPATIENT
Start: 2025-01-27 | End: 2025-01-27

## 2025-01-27 RX ORDER — CLOBETASOL PROPIONATE 0.5 MG/G
OINTMENT TOPICAL ONCE
OUTPATIENT
Start: 2025-01-27 | End: 2025-01-27

## 2025-01-27 RX ORDER — NEOMYCIN/BACITRACIN/POLYMYXINB 3.5-400-5K
OINTMENT (GRAM) TOPICAL ONCE
OUTPATIENT
Start: 2025-01-27 | End: 2025-01-27

## 2025-01-27 RX ORDER — LIDOCAINE 40 MG/G
CREAM TOPICAL ONCE
OUTPATIENT
Start: 2025-01-27 | End: 2025-01-27

## 2025-01-27 RX ORDER — SODIUM CHLOR/HYPOCHLOROUS ACID 0.033 %
SOLUTION, IRRIGATION IRRIGATION ONCE
OUTPATIENT
Start: 2025-01-27 | End: 2025-01-27

## 2025-01-27 RX ORDER — GENTAMICIN SULFATE 1 MG/G
OINTMENT TOPICAL ONCE
OUTPATIENT
Start: 2025-01-27 | End: 2025-01-27

## 2025-01-27 RX ORDER — BETAMETHASONE DIPROPIONATE 0.5 MG/G
CREAM TOPICAL ONCE
OUTPATIENT
Start: 2025-01-27 | End: 2025-01-27

## 2025-01-27 RX ORDER — LIDOCAINE HYDROCHLORIDE 40 MG/ML
SOLUTION TOPICAL ONCE
OUTPATIENT
Start: 2025-01-27 | End: 2025-01-27

## 2025-01-27 NOTE — FLOWSHEET NOTE
01/27/25 1422   Right Leg Edema Point of Measurement   Leg circumference 41.5 cm   Ankle circumference 27 cm   Foot circumference 29.5 cm   Compression Therapy 4 layer compression wrap   Left Leg Edema Point of Measurement   Leg circumference 42.5 cm   Ankle circumference 25 cm   Foot circumference 29.5 cm   Compression Therapy 4 layer compression wrap   Wound 10/09/23 Leg Right;Lower #2   Date First Assessed/Time First Assessed: 10/09/23 1528   Present on Original Admission: Yes  Wound Approximate Age at First Assessment (Weeks): (c)   Primary Wound Type: Venous Ulcer  Location: Leg  Wound Location Orientation: Right;Lower  Wound Descr...   Wound Image    Wound Etiology Venous   Dressing Status Old drainage noted   Wound Cleansed Soap and water   Dressing/Treatment Xeroform;ABD   Wound Length (cm) 30.5 cm   Wound Width (cm) 21.5 cm   Wound Depth (cm) 0.1 cm   Wound Surface Area (cm^2) 655.75 cm^2   Change in Wound Size % (l*w) 31.48   Wound Volume (cm^3) 65.575 cm^3   Wound Healing % 31   Wound Assessment Pink/red   Drainage Amount Copious (>75 % saturated)   Drainage Description Serosanguinous   Odor Malodorous/putrid   Ariadne-wound Assessment Maceration   Wound Thickness Description not for Pressure Injury Full thickness   Wound 10/09/23 Pretibial Left;Lower #3   Date First Assessed/Time First Assessed: 10/09/23 1528   Present on Original Admission: Yes  Wound Approximate Age at First Assessment (Weeks): (c)   Primary Wound Type: Venous Ulcer  Location: Pretibial  Wound Location Orientation: Left;Lower  Wound ...   Wound Image    Wound Etiology Venous   Dressing Status Old drainage noted   Wound Cleansed Soap and water   Dressing/Treatment Xeroform;ABD   Wound Length (cm) 36 cm   Wound Width (cm) 26.5 cm   Wound Depth (cm) 0.1 cm   Wound Surface Area (cm^2) 954 cm^2   Change in Wound Size % (l*w) 26.05   Wound Volume (cm^3) 95.4 cm^3   Wound Healing % 26   Wound Assessment Pink/red   Drainage Amount Copious (>75

## 2025-01-27 NOTE — WOUND CARE
Multilayer Compression Wrap   (Not Unna) Below the Knee    NAME:  Jozef Felix  YOB: 1965  MEDICAL RECORD NUMBER:  [id number]  DATE:  1/27/2025    Multilayer compression wrap: Removed old Multilayer wrap if indicated and wash leg with mild soap/water.  Applied primary and secondary dressing as ordered.  Applied multilayered dressing below the knee to right lower leg.  Applied multilayered dressing below the knee to left lower leg.  Instructed patient/caregiver not to remove dressing and to keep it clean and dry.   Instructed patient/caregiver on complications to report to provider, such as pain, numbness in toes, heavy drainage, and slippage of dressing.  Instructed patient on purpose of compression dressing and on activity and exercise recommendations.      Electronically signed by Ana Maria Rider PT, WCC on 1/27/2025 at 3:40 PM

## 2025-02-03 ENCOUNTER — HOSPITAL ENCOUNTER (OUTPATIENT)
Dept: WOUND CARE | Age: 60
Discharge: HOME | End: 2025-02-03
Payer: COMMERCIAL

## 2025-02-03 VITALS
DIASTOLIC BLOOD PRESSURE: 93 MMHG | HEART RATE: 75 BPM | SYSTOLIC BLOOD PRESSURE: 169 MMHG | TEMPERATURE: 98.1 F | RESPIRATION RATE: 18 BRPM

## 2025-02-03 DIAGNOSIS — L97.301 VENOUS STASIS ULCER OF ANKLE LIMITED TO BREAKDOWN OF SKIN WITH VARICOSE VEINS, UNSPECIFIED LATERALITY (HCC): Primary | Chronic | ICD-10-CM

## 2025-02-03 DIAGNOSIS — B35.3 TINEA PEDIS OF BOTH FEET: ICD-10-CM

## 2025-02-03 DIAGNOSIS — I83.003 VENOUS STASIS ULCER OF ANKLE LIMITED TO BREAKDOWN OF SKIN WITH VARICOSE VEINS, UNSPECIFIED LATERALITY (HCC): Primary | Chronic | ICD-10-CM

## 2025-02-03 PROCEDURE — 29581 APPL MULTLAYER CMPRN SYS LEG: CPT

## 2025-02-03 RX ORDER — TERBINAFINE HYDROCHLORIDE 250 MG/1
250 TABLET ORAL DAILY
Qty: 30 TABLET | Refills: 0 | Status: SHIPPED | OUTPATIENT
Start: 2025-02-03 | End: 2025-03-05

## 2025-02-03 NOTE — WOUND CARE
Discharge Instructions for  [facility]  [address] [place] [address]  [address]  [place] [address]  Phone [phone]   Fax [phone]      NAME:  Jozef Felix  YOB: 1965  MEDICAL RECORD NUMBER:  [id number]  DATE:  2/3/2025    Return Appointment:  2 weeks with Alfa Edouard DO  Return appointment with clinician for dressing changes twice weekly    Instructions:   Bilateral Lower Legs:  Cleanse with warm water and mild soap   MUST SHOWER DAILY!  Moisturize lower legs  Xeroform to Left Lower Leg  Cover with   Wrap with 4 Layer Wrap Compression  Dressing change 2x weekly    DRAWTEX between toes  Cover with ABD pad or super absorptive pads.   - change daily     Continue taking antibiotics  Lamisil ordered this visit. Please  from pharmacy and take as prescribed.     Transition to Velcro Compression Wraps when available    Should you experience increased redness, swelling, pain, foul odor, size of wound(s), or have a temperature over 101 degrees please contact the Wound Healing Center at [phone] or if after hours contact your primary care physician or go to the hospital emergency department.    PLEASE NOTE: IF YOU ARE UNABLE TO OBTAIN WOUND SUPPLIES, CONTINUE TO USE THE SUPPLIES YOU HAVE AVAILABLE UNTIL YOU ARE ABLE TO REACH US. IT IS MOST IMPORTANT TO KEEP THE WOUND COVERED AT ALL TIMES.    Electronically signed KAROLINA VALLE RN on 2/3/2025 at 2:26 PM

## 2025-02-03 NOTE — WOUND CARE
Multilayer Compression Wrap   (Not Unna) Below the Knee    NAME:  Jozef Felix  YOB: 1965  MEDICAL RECORD NUMBER:  [id number]  DATE:  2/3/2025    Multilayer compression wrap: Removed old Multilayer wrap if indicated and wash leg with mild soap/water.  Applied moisturizing agent to dry skin as needed.   Applied primary and secondary dressing as ordered.  Applied multilayered dressing below the knee to right lower leg.  Applied multilayered dressing below the knee to left lower leg.  Instructed patient/caregiver not to remove dressing and to keep it clean and dry.   Instructed patient/caregiver on complications to report to provider, such as pain, numbness in toes, heavy drainage, and slippage of dressing.  Instructed patient on purpose of compression dressing and on activity and exercise recommendations.      Electronically signed by KAROLINA VALLE RN on 2/3/2025 at 3:08 PM

## 2025-02-03 NOTE — FLOWSHEET NOTE
02/03/25 1345   Right Leg Edema Point of Measurement   Leg circumference 42 cm   Ankle circumference 29 cm   Foot circumference 28 cm   Compression Therapy 4 layer compression wrap   Left Leg Edema Point of Measurement   Leg circumference 40 cm   Ankle circumference 28 cm   Foot circumference 30 cm   Compression Therapy 4 layer compression wrap   Wound 01/20/25 Toe (Comment  which one) Right Right Toes / Foot -web spaces   Date First Assessed/Time First Assessed: 01/20/25 1638   Wound Approximate Age at First Assessment (Weeks): 3 weeks  Primary Wound Type: Venous Ulcer  Location: Toe (Comment  which one)  Wound Location Orientation: Right  Wound Description (Comments):...   Wound Image    Wound Etiology Venous   Dressing Status New drainage noted   Wound Cleansed Soap and water;Vashe   Dressing/Treatment Alginate with Ag   Wound Length (cm) 5 cm   Wound Width (cm) 7 cm   Wound Depth (cm) 0.1 cm   Wound Surface Area (cm^2) 35 cm^2   Change in Wound Size % (l*w) 72.33   Wound Volume (cm^3) 3.5 cm^3   Wound Healing % 72   Wound Assessment Pink/red   Drainage Amount Large (50-75% saturated)   Drainage Description Serous;Yellow   Odor Malodorous/putrid   Ariadne-wound Assessment Maceration;Edematous   Wound Thickness Description not for Pressure Injury Full thickness   Wound 01/20/25 Toe (Comment  which one) Left LEFT toes web spaces / foot   Date First Assessed/Time First Assessed: 01/20/25 1639   Wound Approximate Age at First Assessment (Weeks): 3 weeks  Primary Wound Type: Venous Ulcer  Location: Toe (Comment  which one)  Wound Location Orientation: Left  Wound Description (Comments): ...   Wound Image    Wound Etiology Venous   Dressing Status New drainage noted   Wound Cleansed Vashe;Soap and water   Dressing/Treatment Alginate with Ag   Wound Length (cm) 12 cm   Wound Width (cm) 12 cm   Wound Depth (cm) 0.1 cm   Wound Surface Area (cm^2) 144 cm^2   Change in Wound Size % (l*w) -51.58   Wound Volume (cm^3) 14.4

## 2025-02-10 ENCOUNTER — HOSPITAL ENCOUNTER (OUTPATIENT)
Dept: WOUND CARE | Age: 60
Discharge: HOME | End: 2025-02-10
Payer: COMMERCIAL

## 2025-02-10 VITALS
RESPIRATION RATE: 18 BRPM | SYSTOLIC BLOOD PRESSURE: 176 MMHG | TEMPERATURE: 98.9 F | HEART RATE: 92 BPM | HEIGHT: 75 IN | WEIGHT: 315 LBS | DIASTOLIC BLOOD PRESSURE: 99 MMHG | BODY MASS INDEX: 39.17 KG/M2

## 2025-02-10 DIAGNOSIS — I83.003 VENOUS STASIS ULCER OF ANKLE LIMITED TO BREAKDOWN OF SKIN WITH VARICOSE VEINS, UNSPECIFIED LATERALITY (HCC): Primary | ICD-10-CM

## 2025-02-10 DIAGNOSIS — L97.301 VENOUS STASIS ULCER OF ANKLE LIMITED TO BREAKDOWN OF SKIN WITH VARICOSE VEINS, UNSPECIFIED LATERALITY (HCC): Primary | ICD-10-CM

## 2025-02-10 PROCEDURE — 29581 APPL MULTLAYER CMPRN SYS LEG: CPT

## 2025-02-10 RX ORDER — GINSENG 100 MG
CAPSULE ORAL ONCE
OUTPATIENT
Start: 2025-02-10 | End: 2025-02-10

## 2025-02-10 RX ORDER — TRIAMCINOLONE ACETONIDE 1 MG/G
OINTMENT TOPICAL ONCE
OUTPATIENT
Start: 2025-02-10 | End: 2025-02-10

## 2025-02-10 RX ORDER — CLOBETASOL PROPIONATE 0.5 MG/G
OINTMENT TOPICAL ONCE
OUTPATIENT
Start: 2025-02-10 | End: 2025-02-10

## 2025-02-10 RX ORDER — SODIUM CHLOR/HYPOCHLOROUS ACID 0.033 %
SOLUTION, IRRIGATION IRRIGATION ONCE
OUTPATIENT
Start: 2025-02-10 | End: 2025-02-10

## 2025-02-10 RX ORDER — BACITRACIN ZINC AND POLYMYXIN B SULFATE 500; 1000 [USP'U]/G; [USP'U]/G
OINTMENT TOPICAL ONCE
OUTPATIENT
Start: 2025-02-10 | End: 2025-02-10

## 2025-02-10 RX ORDER — BETAMETHASONE DIPROPIONATE 0.5 MG/G
CREAM TOPICAL ONCE
OUTPATIENT
Start: 2025-02-10 | End: 2025-02-10

## 2025-02-10 RX ORDER — LIDOCAINE 40 MG/G
CREAM TOPICAL ONCE
OUTPATIENT
Start: 2025-02-10 | End: 2025-02-10

## 2025-02-10 RX ORDER — LIDOCAINE HYDROCHLORIDE 20 MG/ML
JELLY TOPICAL ONCE
OUTPATIENT
Start: 2025-02-10 | End: 2025-02-10

## 2025-02-10 RX ORDER — LIDOCAINE HYDROCHLORIDE 40 MG/ML
SOLUTION TOPICAL ONCE
OUTPATIENT
Start: 2025-02-10 | End: 2025-02-10

## 2025-02-10 RX ORDER — GENTAMICIN SULFATE 1 MG/G
OINTMENT TOPICAL ONCE
OUTPATIENT
Start: 2025-02-10 | End: 2025-02-10

## 2025-02-10 RX ORDER — MUPIROCIN 20 MG/G
OINTMENT TOPICAL ONCE
OUTPATIENT
Start: 2025-02-10 | End: 2025-02-10

## 2025-02-10 RX ORDER — NEOMYCIN/BACITRACIN/POLYMYXINB 3.5-400-5K
OINTMENT (GRAM) TOPICAL ONCE
OUTPATIENT
Start: 2025-02-10 | End: 2025-02-10

## 2025-02-10 RX ORDER — LIDOCAINE 50 MG/G
OINTMENT TOPICAL ONCE
OUTPATIENT
Start: 2025-02-10 | End: 2025-02-10

## 2025-02-10 NOTE — FLOWSHEET NOTE
02/10/25 1400   Right Leg Edema Point of Measurement   Leg circumference 42 cm   Ankle circumference 29 cm   Foot circumference 28 cm   Compression Therapy 2 layer compression wrap   Left Leg Edema Point of Measurement   Leg circumference 40 cm   Ankle circumference 28 cm   Foot circumference 30 cm   Compression Therapy 2 layer compression wrap   Wound 01/20/25 Toe (Comment  which one) Right Right Toes / Foot -web spaces   Date First Assessed/Time First Assessed: 01/20/25 1638   Wound Approximate Age at First Assessment (Weeks): 3 weeks  Primary Wound Type: Venous Ulcer  Location: Toe (Comment  which one)  Wound Location Orientation: Right  Wound Description (Comments):...   Wound Image    Wound Etiology Venous   Dressing Status New drainage noted   Wound Cleansed Soap and water;Vashe   Dressing/Treatment Alginate with Ag   Wound Length (cm) 5 cm   Wound Width (cm) 7 cm   Wound Depth (cm) 0.1 cm   Wound Surface Area (cm^2) 35 cm^2   Change in Wound Size % (l*w) 72.33   Wound Volume (cm^3) 3.5 cm^3   Wound Healing % 72   Wound Assessment Slough;Pink/red   Drainage Amount Large (50-75% saturated)   Drainage Description Serosanguinous   Odor Malodorous/putrid   Ariadne-wound Assessment Maceration;Edematous   Wound Thickness Description not for Pressure Injury Full thickness   Wound 01/20/25 Toe (Comment  which one) Left LEFT toes web spaces / foot   Date First Assessed/Time First Assessed: 01/20/25 1639   Wound Approximate Age at First Assessment (Weeks): 3 weeks  Primary Wound Type: Venous Ulcer  Location: Toe (Comment  which one)  Wound Location Orientation: Left  Wound Description (Comments): ...   Wound Image    Wound Etiology Venous   Dressing Status New drainage noted   Wound Cleansed Vashe;Soap and water   Dressing/Treatment Alginate with Ag   Wound Length (cm) 12 cm   Wound Width (cm) 12 cm   Wound Depth (cm) 0.1 cm   Wound Surface Area (cm^2) 144 cm^2   Change in Wound Size % (l*w) -51.58   Wound Volume (cm^3)

## 2025-02-10 NOTE — WOUND CARE
Multilayer Compression Wrap   (Not Unna) Below the Knee    NAME:  Jozef Felix  YOB: 1965  MEDICAL RECORD NUMBER:  [id number]  DATE:  2/10/2025    Multilayer compression wrap: Removed old Multilayer wrap if indicated and wash leg with mild soap/water.  Applied moisturizing agent to dry skin as needed.   Applied primary and secondary dressing as ordered.  Applied multilayered dressing below the knee to right lower leg.  Applied multilayered dressing below the knee to left lower leg.  Instructed patient/caregiver not to remove dressing and to keep it clean and dry.   Instructed patient/caregiver on complications to report to provider, such as pain, numbness in toes, heavy drainage, and slippage of dressing.  Instructed patient on purpose of compression dressing and on activity and exercise recommendations.      Electronically signed by KAROLINA VALLE RN on 2/10/2025 at 2:58 PM

## 2025-02-17 ENCOUNTER — HOSPITAL ENCOUNTER (OUTPATIENT)
Dept: WOUND CARE | Age: 60
Discharge: HOME | End: 2025-02-17
Payer: COMMERCIAL

## 2025-02-17 VITALS
TEMPERATURE: 99 F | WEIGHT: 315 LBS | BODY MASS INDEX: 39.17 KG/M2 | SYSTOLIC BLOOD PRESSURE: 164 MMHG | RESPIRATION RATE: 20 BRPM | HEIGHT: 75 IN | HEART RATE: 95 BPM | DIASTOLIC BLOOD PRESSURE: 105 MMHG | OXYGEN SATURATION: 93 %

## 2025-02-17 DIAGNOSIS — L97.301 VENOUS STASIS ULCER OF ANKLE LIMITED TO BREAKDOWN OF SKIN WITH VARICOSE VEINS, UNSPECIFIED LATERALITY (HCC): Primary | Chronic | ICD-10-CM

## 2025-02-17 DIAGNOSIS — I83.003 VENOUS STASIS ULCER OF ANKLE LIMITED TO BREAKDOWN OF SKIN WITH VARICOSE VEINS, UNSPECIFIED LATERALITY (HCC): Primary | Chronic | ICD-10-CM

## 2025-02-17 PROCEDURE — 29581 APPL MULTLAYER CMPRN SYS LEG: CPT

## 2025-02-17 RX ORDER — LIDOCAINE HYDROCHLORIDE 20 MG/ML
JELLY TOPICAL ONCE
OUTPATIENT
Start: 2025-02-17 | End: 2025-02-17

## 2025-02-17 RX ORDER — LIDOCAINE 40 MG/G
CREAM TOPICAL ONCE
OUTPATIENT
Start: 2025-02-17 | End: 2025-02-17

## 2025-02-17 RX ORDER — TRIAMCINOLONE ACETONIDE 1 MG/G
OINTMENT TOPICAL ONCE
OUTPATIENT
Start: 2025-02-17 | End: 2025-02-17

## 2025-02-17 RX ORDER — NEOMYCIN/BACITRACIN/POLYMYXINB 3.5-400-5K
OINTMENT (GRAM) TOPICAL ONCE
OUTPATIENT
Start: 2025-02-17 | End: 2025-02-17

## 2025-02-17 RX ORDER — GENTAMICIN SULFATE 1 MG/G
OINTMENT TOPICAL ONCE
OUTPATIENT
Start: 2025-02-17 | End: 2025-02-17

## 2025-02-17 RX ORDER — BACITRACIN ZINC AND POLYMYXIN B SULFATE 500; 1000 [USP'U]/G; [USP'U]/G
OINTMENT TOPICAL ONCE
OUTPATIENT
Start: 2025-02-17 | End: 2025-02-17

## 2025-02-17 RX ORDER — MUPIROCIN 20 MG/G
OINTMENT TOPICAL ONCE
OUTPATIENT
Start: 2025-02-17 | End: 2025-02-17

## 2025-02-17 RX ORDER — CLOBETASOL PROPIONATE 0.5 MG/G
OINTMENT TOPICAL ONCE
OUTPATIENT
Start: 2025-02-17 | End: 2025-02-17

## 2025-02-17 RX ORDER — LIDOCAINE HYDROCHLORIDE 40 MG/ML
SOLUTION TOPICAL ONCE
OUTPATIENT
Start: 2025-02-17 | End: 2025-02-17

## 2025-02-17 RX ORDER — SODIUM CHLOR/HYPOCHLOROUS ACID 0.033 %
SOLUTION, IRRIGATION IRRIGATION ONCE
OUTPATIENT
Start: 2025-02-17 | End: 2025-02-17

## 2025-02-17 RX ORDER — GINSENG 100 MG
CAPSULE ORAL ONCE
OUTPATIENT
Start: 2025-02-17 | End: 2025-02-17

## 2025-02-17 RX ORDER — BETAMETHASONE DIPROPIONATE 0.5 MG/G
CREAM TOPICAL ONCE
OUTPATIENT
Start: 2025-02-17 | End: 2025-02-17

## 2025-02-17 RX ORDER — METRONIDAZOLE 500 MG/1
500 TABLET ORAL 2 TIMES DAILY
Qty: 60 TABLET | Refills: 0 | Status: SHIPPED | OUTPATIENT
Start: 2025-02-17 | End: 2025-03-19

## 2025-02-17 RX ORDER — LIDOCAINE 50 MG/G
OINTMENT TOPICAL ONCE
OUTPATIENT
Start: 2025-02-17 | End: 2025-02-17

## 2025-02-17 ASSESSMENT — PAIN SCALES - GENERAL: PAINLEVEL_OUTOF10: 4

## 2025-02-17 NOTE — FLOWSHEET NOTE
02/17/25 1340   Right Leg Edema Point of Measurement   Leg circumference 43 cm   Ankle circumference 27.5 cm   Foot circumference 28.5 cm   Compression Therapy 4 layer compression wrap   Left Leg Edema Point of Measurement   Leg circumference 50 cm   Ankle circumference 25.5 cm   Foot circumference 29 cm   Compression Therapy 4 layer compression wrap   Wound 02/10/25 Foot Plantar;Left #5   Date First Assessed/Time First Assessed: 02/10/25 1452   Present on Original Admission: No  Wound Approximate Age at First Assessment (Weeks): 1 weeks  Primary Wound Type: Venous Ulcer  Location: Foot  Wound Location Orientation: Plantar;Left  Wound D...   Wound Image    Wound Etiology Venous   Dressing Status Old drainage noted;New drainage noted   Wound Cleansed Soap and water   Dressing/Treatment Gauze dressing/dressing sponge   Wound Length (cm) 5 cm   Wound Width (cm) 8 cm   Wound Depth (cm) 0.1 cm   Wound Surface Area (cm^2) 40 cm^2   Change in Wound Size % (l*w) 0   Wound Volume (cm^3) 4 cm^3   Wound Healing % 0   Wound Assessment Pink/red   Drainage Amount Moderate (25-50%)   Drainage Description Serosanguinous   Odor Malodorous/putrid   Ariadne-wound Assessment Maceration   Wound Thickness Description not for Pressure Injury Full thickness   Wound 01/20/25 Toe (Comment  which one) Left LEFT toes web spaces / foot   Date First Assessed/Time First Assessed: 01/20/25 1639   Wound Approximate Age at First Assessment (Weeks): 3 weeks  Primary Wound Type: Venous Ulcer  Location: Toe (Comment  which one)  Wound Location Orientation: Left  Wound Description (Comments): ...   Wound Image    Wound Etiology Venous   Dressing Status New drainage noted   Wound Cleansed Vashe;Soap and water   Dressing/Treatment Alginate with Ag   Wound Length (cm) 20 cm   Wound Width (cm) 23 cm   Wound Depth (cm) 0.1 cm   Wound Surface Area (cm^2) 460 cm^2   Change in Wound Size % (l*w) -384.21   Wound Volume (cm^3) 46 cm^3   Wound Healing % -384

## 2025-02-17 NOTE — DISCHARGE INSTRUCTIONS
Instructions:   Bilateral Lower Legs:  Cleanse with warm water and mild soap   MUST SHOWER DAILY!  Moisturize lower legs  Xeroform to open areas  Cover with ABD or optilock as needed  Wrap with 4 Layer Wrap Compression  Dressing change 2x weekly     Silver alginate between toes.  Cover with ABD as needed.     Continue taking antibiotics  Lamisil please take as prescribed.   Transition to Velcro Compression Wraps when available

## 2025-02-17 NOTE — WOUND CARE
Discharge Instructions for  [facility]  [address] [place] [address]  [address]  [place] [address]  Phone [phone]   Fax [phone]      NAME:  Jozef Felix  YOB: 1965  MEDICAL RECORD NUMBER:  [id number]  DATE:  2/17/2025    Return Appointment:  2 weeks with Alfa Edouard, DO  Return appointment with clinician for dressing changes twice weekly    Instructions:   Bilateral Lower Legs:  Cleanse with warm water and mild soap   MUST SHOWER DAILY!  Moisturize lower legs  Xeroform to open areas  Cover with ABD or optilock as needed  Wrap with 4 Layer Wrap Compression  Dressing change 2x weekly    Silver alginate between toes.  Cover with ABD as needed.    Continue taking antibiotics  Lamisil please take as prescribed.   Transition to Velcro Compression Wraps when available    Should you experience increased redness, swelling, pain, foul odor, size of wound(s), or have a temperature over 101 degrees please contact the W[facility] Healing Center at [phone] or if after hours contact your primary care physician or go to the hospital emergency department.    PLEASE NOTE: IF YOU ARE UNABLE TO OBTAIN WOUND SUPPLIES, CONTINUE TO USE THE SUPPLIES YOU HAVE AVAILABLE UNTIL YOU ARE ABLE TO REACH US. IT IS MOST IMPORTANT TO KEEP THE WOUND COVERED AT ALL TIMES.    Electronically signed Rm Alejandra RN on 2/17/2025 at 2:08 PM

## 2025-02-17 NOTE — WOUND CARE
Multilayer Compression Wrap   (Not Unna) Below the Knee    NAME:  Jozef Felix  YOB: 1965  MEDICAL RECORD NUMBER:  [id number]  DATE:  2/17/2025    Multilayer compression wrap: Removed old Multilayer wrap if indicated and wash leg with mild soap/water.  Applied moisturizing agent to dry skin as needed.   Applied primary and secondary dressing as ordered.  Applied multilayered dressing below the knee to right lower leg.  Applied multilayered dressing below the knee to left lower leg.  Instructed patient/caregiver not to remove dressing and to keep it clean and dry.   Instructed patient/caregiver on complications to report to provider, such as pain, numbness in toes, heavy drainage, and slippage of dressing.  Instructed patient on purpose of compression dressing and on activity and exercise recommendations.      Electronically signed by Rm Alejandra RN on 2/17/2025 at 3:07 PM

## 2025-02-20 ENCOUNTER — HOSPITAL ENCOUNTER (OUTPATIENT)
Dept: WOUND CARE | Age: 60
Discharge: HOME | End: 2025-02-20
Payer: COMMERCIAL

## 2025-02-20 VITALS — HEIGHT: 75 IN | WEIGHT: 315 LBS | BODY MASS INDEX: 39.17 KG/M2

## 2025-02-20 DIAGNOSIS — L97.301 VENOUS STASIS ULCER OF ANKLE LIMITED TO BREAKDOWN OF SKIN WITH VARICOSE VEINS, UNSPECIFIED LATERALITY (HCC): Primary | ICD-10-CM

## 2025-02-20 DIAGNOSIS — I83.003 VENOUS STASIS ULCER OF ANKLE LIMITED TO BREAKDOWN OF SKIN WITH VARICOSE VEINS, UNSPECIFIED LATERALITY (HCC): Primary | ICD-10-CM

## 2025-02-20 PROCEDURE — 99213 OFFICE O/P EST LOW 20 MIN: CPT

## 2025-02-20 RX ORDER — LIDOCAINE 50 MG/G
OINTMENT TOPICAL ONCE
OUTPATIENT
Start: 2025-02-20 | End: 2025-02-20

## 2025-02-20 RX ORDER — BETAMETHASONE DIPROPIONATE 0.5 MG/G
CREAM TOPICAL ONCE
OUTPATIENT
Start: 2025-02-20 | End: 2025-02-20

## 2025-02-20 RX ORDER — GENTAMICIN SULFATE 1 MG/G
OINTMENT TOPICAL ONCE
OUTPATIENT
Start: 2025-02-20 | End: 2025-02-20

## 2025-02-20 RX ORDER — LIDOCAINE HYDROCHLORIDE 20 MG/ML
JELLY TOPICAL ONCE
OUTPATIENT
Start: 2025-02-20 | End: 2025-02-20

## 2025-02-20 RX ORDER — LIDOCAINE 40 MG/G
CREAM TOPICAL ONCE
OUTPATIENT
Start: 2025-02-20 | End: 2025-02-20

## 2025-02-20 RX ORDER — TRIAMCINOLONE ACETONIDE 1 MG/G
OINTMENT TOPICAL ONCE
OUTPATIENT
Start: 2025-02-20 | End: 2025-02-20

## 2025-02-20 RX ORDER — SODIUM CHLOR/HYPOCHLOROUS ACID 0.033 %
SOLUTION, IRRIGATION IRRIGATION ONCE
OUTPATIENT
Start: 2025-02-20 | End: 2025-02-20

## 2025-02-20 RX ORDER — LIDOCAINE HYDROCHLORIDE 40 MG/ML
SOLUTION TOPICAL ONCE
OUTPATIENT
Start: 2025-02-20 | End: 2025-02-20

## 2025-02-20 RX ORDER — BACITRACIN ZINC AND POLYMYXIN B SULFATE 500; 1000 [USP'U]/G; [USP'U]/G
OINTMENT TOPICAL ONCE
OUTPATIENT
Start: 2025-02-20 | End: 2025-02-20

## 2025-02-20 RX ORDER — NEOMYCIN/BACITRACIN/POLYMYXINB 3.5-400-5K
OINTMENT (GRAM) TOPICAL ONCE
OUTPATIENT
Start: 2025-02-20 | End: 2025-02-20

## 2025-02-20 RX ORDER — MUPIROCIN 20 MG/G
OINTMENT TOPICAL ONCE
OUTPATIENT
Start: 2025-02-20 | End: 2025-02-20

## 2025-02-20 RX ORDER — CLOBETASOL PROPIONATE 0.5 MG/G
OINTMENT TOPICAL ONCE
OUTPATIENT
Start: 2025-02-20 | End: 2025-02-20

## 2025-02-20 RX ORDER — GINSENG 100 MG
CAPSULE ORAL ONCE
OUTPATIENT
Start: 2025-02-20 | End: 2025-02-20

## 2025-02-20 NOTE — WOUND CARE
Multilayer Compression Wrap   (Not Unna) Below the Knee    NAME:  Jozef Feilx  YOB: 1965  MEDICAL RECORD NUMBER:  [id number]  DATE:  2/20/2025    Multilayer compression wrap: Removed old Multilayer wrap if indicated and wash leg with mild soap/water.  Applied moisturizing agent to dry skin as needed.   Applied primary and secondary dressing as ordered.  Applied multilayered dressing below the knee to right lower leg.  Applied multilayered dressing below the knee to left lower leg.  Instructed patient/caregiver not to remove dressing and to keep it clean and dry.   Instructed patient/caregiver on complications to report to provider, such as pain, numbness in toes, heavy drainage, and slippage of dressing.  Instructed patient on purpose of compression dressing and on activity and exercise recommendations.      Electronically signed by NOE KRAUSE RN on 2/20/2025 at 3:42 PM

## 2025-02-20 NOTE — FLOWSHEET NOTE
02/20/25 1533   Right Leg Edema Point of Measurement   Leg circumference 42 cm   Ankle circumference 27 cm   Foot circumference 28 cm   Compression Therapy 4 layer compression wrap   Left Leg Edema Point of Measurement   Leg circumference 44 cm   Ankle circumference 24 cm   Foot circumference 28 cm   Compression Therapy 4 layer compression wrap   Wound 02/10/25 Foot Plantar;Left #5   Date First Assessed/Time First Assessed: 02/10/25 1452   Present on Original Admission: No  Wound Approximate Age at First Assessment (Weeks): 1 weeks  Primary Wound Type: Venous Ulcer  Location: Foot  Wound Location Orientation: Plantar;Left  Wound D...   Wound Image    Wound Etiology Venous   Dressing Status Old drainage noted;New drainage noted   Wound Cleansed Soap and water   Dressing/Treatment Gauze dressing/dressing sponge   Wound Length (cm) 5 cm   Wound Width (cm) 8 cm   Wound Depth (cm) 0.1 cm   Wound Surface Area (cm^2) 40 cm^2   Change in Wound Size % (l*w) 0   Wound Volume (cm^3) 4 cm^3   Wound Healing % 0   Wound Assessment Pink/red   Drainage Amount Moderate (25-50%)   Drainage Description Serosanguinous   Odor Malodorous/putrid   Ariadne-wound Assessment Maceration   Wound Thickness Description not for Pressure Injury Full thickness   Wound 01/20/25 Toe (Comment  which one) Right Right Toes / Foot -web spaces   Date First Assessed/Time First Assessed: 01/20/25 1638   Wound Approximate Age at First Assessment (Weeks): 3 weeks  Primary Wound Type: Venous Ulcer  Location: Toe (Comment  which one)  Wound Location Orientation: Right  Wound Description (Comments):...   Wound Image    Wound Etiology Venous   Dressing Status New drainage noted   Wound Cleansed Soap and water;Vashe   Dressing/Treatment Alginate with Ag   Wound Length (cm) 7 cm   Wound Width (cm) 5 cm   Wound Depth (cm) 0.1 cm   Wound Surface Area (cm^2) 35 cm^2   Change in Wound Size % (l*w) 72.33   Wound Volume (cm^3) 3.5 cm^3   Wound Healing % 72   Wound

## 2025-02-24 ENCOUNTER — HOSPITAL ENCOUNTER (OUTPATIENT)
Dept: WOUND CARE | Age: 60
Discharge: HOME | End: 2025-02-24
Payer: COMMERCIAL

## 2025-02-24 VITALS
TEMPERATURE: 98 F | DIASTOLIC BLOOD PRESSURE: 100 MMHG | RESPIRATION RATE: 20 BRPM | SYSTOLIC BLOOD PRESSURE: 165 MMHG | BODY MASS INDEX: 39.17 KG/M2 | OXYGEN SATURATION: 94 % | WEIGHT: 315 LBS | HEIGHT: 75 IN | HEART RATE: 94 BPM

## 2025-02-24 DIAGNOSIS — I83.003 VENOUS STASIS ULCER OF ANKLE LIMITED TO BREAKDOWN OF SKIN WITH VARICOSE VEINS, UNSPECIFIED LATERALITY (HCC): Primary | ICD-10-CM

## 2025-02-24 DIAGNOSIS — L97.301 VENOUS STASIS ULCER OF ANKLE LIMITED TO BREAKDOWN OF SKIN WITH VARICOSE VEINS, UNSPECIFIED LATERALITY (HCC): Primary | ICD-10-CM

## 2025-02-24 PROCEDURE — 29581 APPL MULTLAYER CMPRN SYS LEG: CPT

## 2025-02-24 PROCEDURE — 99213 OFFICE O/P EST LOW 20 MIN: CPT | Performed by: FAMILY MEDICINE

## 2025-02-24 RX ORDER — LIDOCAINE HYDROCHLORIDE 40 MG/ML
SOLUTION TOPICAL ONCE
Status: CANCELLED | OUTPATIENT
Start: 2025-02-24 | End: 2025-02-24

## 2025-02-24 RX ORDER — LIDOCAINE HYDROCHLORIDE 20 MG/ML
JELLY TOPICAL PRN
OUTPATIENT
Start: 2025-02-24

## 2025-02-24 RX ORDER — LIDOCAINE 50 MG/G
OINTMENT TOPICAL PRN
OUTPATIENT
Start: 2025-02-24

## 2025-02-24 RX ORDER — NEOMYCIN/BACITRACIN/POLYMYXINB 3.5-400-5K
OINTMENT (GRAM) TOPICAL PRN
OUTPATIENT
Start: 2025-02-24

## 2025-02-24 RX ORDER — LIDOCAINE 50 MG/G
OINTMENT TOPICAL ONCE
Status: CANCELLED | OUTPATIENT
Start: 2025-02-24 | End: 2025-02-24

## 2025-02-24 RX ORDER — GENTAMICIN SULFATE 1 MG/G
OINTMENT TOPICAL PRN
OUTPATIENT
Start: 2025-02-24

## 2025-02-24 RX ORDER — NEOMYCIN/BACITRACIN/POLYMYXINB 3.5-400-5K
OINTMENT (GRAM) TOPICAL PRN
Status: CANCELLED | OUTPATIENT
Start: 2025-02-24

## 2025-02-24 RX ORDER — BETAMETHASONE DIPROPIONATE 0.5 MG/G
CREAM TOPICAL PRN
OUTPATIENT
Start: 2025-02-24

## 2025-02-24 RX ORDER — GINSENG 100 MG
CAPSULE ORAL ONCE
Status: CANCELLED | OUTPATIENT
Start: 2025-02-24 | End: 2025-02-24

## 2025-02-24 RX ORDER — BACITRACIN ZINC AND POLYMYXIN B SULFATE 500; 1000 [USP'U]/G; [USP'U]/G
OINTMENT TOPICAL ONCE
Status: CANCELLED | OUTPATIENT
Start: 2025-02-24 | End: 2025-02-24

## 2025-02-24 RX ORDER — LIDOCAINE 40 MG/G
CREAM TOPICAL PRN
Status: CANCELLED | OUTPATIENT
Start: 2025-02-24

## 2025-02-24 RX ORDER — LIDOCAINE 40 MG/G
CREAM TOPICAL PRN
OUTPATIENT
Start: 2025-02-24

## 2025-02-24 RX ORDER — SODIUM CHLOR/HYPOCHLOROUS ACID 0.033 %
SOLUTION, IRRIGATION IRRIGATION ONCE
Status: CANCELLED | OUTPATIENT
Start: 2025-02-24 | End: 2025-02-24

## 2025-02-24 RX ORDER — LIDOCAINE HYDROCHLORIDE 20 MG/ML
JELLY TOPICAL ONCE
Status: CANCELLED | OUTPATIENT
Start: 2025-02-24 | End: 2025-02-24

## 2025-02-24 RX ORDER — MUPIROCIN 20 MG/G
OINTMENT TOPICAL PRN
Status: CANCELLED | OUTPATIENT
Start: 2025-02-24

## 2025-02-24 RX ORDER — MUPIROCIN 20 MG/G
OINTMENT TOPICAL PRN
OUTPATIENT
Start: 2025-02-24

## 2025-02-24 RX ORDER — CLOBETASOL PROPIONATE 0.5 MG/G
OINTMENT TOPICAL PRN
OUTPATIENT
Start: 2025-02-24

## 2025-02-24 RX ORDER — GINSENG 100 MG
CAPSULE ORAL PRN
OUTPATIENT
Start: 2025-02-24

## 2025-02-24 RX ORDER — SODIUM CHLOR/HYPOCHLOROUS ACID 0.033 %
SOLUTION, IRRIGATION IRRIGATION PRN
OUTPATIENT
Start: 2025-02-24

## 2025-02-24 RX ORDER — NEOMYCIN/BACITRACIN/POLYMYXINB 3.5-400-5K
OINTMENT (GRAM) TOPICAL ONCE
Status: CANCELLED | OUTPATIENT
Start: 2025-02-24 | End: 2025-02-24

## 2025-02-24 RX ORDER — TRIAMCINOLONE ACETONIDE 1 MG/G
OINTMENT TOPICAL ONCE
Status: CANCELLED | OUTPATIENT
Start: 2025-02-24 | End: 2025-02-24

## 2025-02-24 RX ORDER — TRIAMCINOLONE ACETONIDE 1 MG/G
OINTMENT TOPICAL PRN
OUTPATIENT
Start: 2025-02-24

## 2025-02-24 RX ORDER — LIDOCAINE HYDROCHLORIDE 20 MG/ML
JELLY TOPICAL PRN
Status: CANCELLED | OUTPATIENT
Start: 2025-02-24

## 2025-02-24 RX ORDER — CLOBETASOL PROPIONATE 0.5 MG/G
OINTMENT TOPICAL PRN
Status: CANCELLED | OUTPATIENT
Start: 2025-02-24

## 2025-02-24 RX ORDER — BACITRACIN ZINC AND POLYMYXIN B SULFATE 500; 1000 [USP'U]/G; [USP'U]/G
OINTMENT TOPICAL PRN
OUTPATIENT
Start: 2025-02-24

## 2025-02-24 RX ORDER — GINSENG 100 MG
CAPSULE ORAL PRN
Status: CANCELLED | OUTPATIENT
Start: 2025-02-24

## 2025-02-24 RX ORDER — MUPIROCIN 20 MG/G
OINTMENT TOPICAL ONCE
Status: CANCELLED | OUTPATIENT
Start: 2025-02-24 | End: 2025-02-24

## 2025-02-24 RX ORDER — LIDOCAINE 40 MG/G
CREAM TOPICAL ONCE
Status: CANCELLED | OUTPATIENT
Start: 2025-02-24 | End: 2025-02-24

## 2025-02-24 RX ORDER — BETAMETHASONE DIPROPIONATE 0.5 MG/G
CREAM TOPICAL PRN
Status: CANCELLED | OUTPATIENT
Start: 2025-02-24

## 2025-02-24 RX ORDER — TRIAMCINOLONE ACETONIDE 1 MG/G
OINTMENT TOPICAL PRN
Status: CANCELLED | OUTPATIENT
Start: 2025-02-24

## 2025-02-24 RX ORDER — LIDOCAINE HYDROCHLORIDE 40 MG/ML
SOLUTION TOPICAL PRN
OUTPATIENT
Start: 2025-02-24

## 2025-02-24 RX ORDER — SODIUM CHLOR/HYPOCHLOROUS ACID 0.033 %
SOLUTION, IRRIGATION IRRIGATION PRN
Status: CANCELLED | OUTPATIENT
Start: 2025-02-24

## 2025-02-24 RX ORDER — CLOBETASOL PROPIONATE 0.5 MG/G
OINTMENT TOPICAL ONCE
Status: CANCELLED | OUTPATIENT
Start: 2025-02-24 | End: 2025-02-24

## 2025-02-24 RX ORDER — SILVER SULFADIAZINE 10 MG/G
CREAM TOPICAL PRN
Status: CANCELLED | OUTPATIENT
Start: 2025-02-24

## 2025-02-24 RX ORDER — LIDOCAINE 50 MG/G
OINTMENT TOPICAL PRN
Status: CANCELLED | OUTPATIENT
Start: 2025-02-24

## 2025-02-24 RX ORDER — LIDOCAINE HYDROCHLORIDE 40 MG/ML
SOLUTION TOPICAL PRN
Status: CANCELLED | OUTPATIENT
Start: 2025-02-24

## 2025-02-24 RX ORDER — GENTAMICIN SULFATE 1 MG/G
OINTMENT TOPICAL ONCE
Status: CANCELLED | OUTPATIENT
Start: 2025-02-24 | End: 2025-02-24

## 2025-02-24 RX ORDER — BETAMETHASONE DIPROPIONATE 0.5 MG/G
CREAM TOPICAL ONCE
Status: CANCELLED | OUTPATIENT
Start: 2025-02-24 | End: 2025-02-24

## 2025-02-24 RX ORDER — BACITRACIN ZINC AND POLYMYXIN B SULFATE 500; 1000 [USP'U]/G; [USP'U]/G
OINTMENT TOPICAL PRN
Status: CANCELLED | OUTPATIENT
Start: 2025-02-24

## 2025-02-24 RX ORDER — GENTAMICIN SULFATE 1 MG/G
OINTMENT TOPICAL PRN
Status: CANCELLED | OUTPATIENT
Start: 2025-02-24

## 2025-02-24 NOTE — WOUND CARE
Discharge Instructions for  [facility]  [address] [place] [address]  [address] [place] [address]  Phone [phone]   Fax [phone]      NAME:  Jozef Felix  YOB: 1965  MEDICAL RECORD NUMBER:  [id number]  DATE:  2/24/2025    Return Appointment:  2 weeks with Alfa Edouard, DO  Return appointment with clinician for dressing changes twice weekly    Instructions:   Bilateral Lower Legs:  Cleanse with warm water and mild soap   Moisturize lower legs  Adaptic to open areas  Cover with ABD or optilock as needed  Wrap with 4 Layer Wrap Compression  Dressing change 2x weekly    Plain alginate sheet cut into strips between toes.  Cover with ABD as needed.    Continue taking antibiotics and Lamisil; take as prescribed  Transition to Velcro Compression Wraps when available        Should you experience increased redness, swelling, pain, foul odor, size of wound(s), or have a temperature over 101 degrees please contact the Wound Healing Center at [phone] or if after hours contact your primary care physician or go to the hospital emergency department.    PLEASE NOTE: IF YOU ARE UNABLE TO OBTAIN WOUND SUPPLIES, CONTINUE TO USE THE SUPPLIES YOU HAVE AVAILABLE UNTIL YOU ARE ABLE TO REACH US. IT IS MOST IMPORTANT TO KEEP THE WOUND COVERED AT ALL TIMES.    Electronically signed Ana Maria Rider PT, WCC on 2/24/2025 at 3:04 PM

## 2025-02-24 NOTE — WOUND CARE
Multilayer Compression Wrap   (Not Unna) Below the Knee    NAME:  Jozef Felix  YOB: 1965  MEDICAL RECORD NUMBER:  [id number]  DATE:  2/24/2025    Multilayer compression wrap: Removed old Multilayer wrap if indicated and wash leg with mild soap/water.  Applied primary and secondary dressing as ordered.  Applied multilayered dressing below the knee to right lower leg.  Applied multilayered dressing below the knee to left lower leg.  Instructed patient/caregiver not to remove dressing and to keep it clean and dry.   Instructed patient/caregiver on complications to report to provider, such as pain, numbness in toes, heavy drainage, and slippage of dressing.  Instructed patient on purpose of compression dressing and on activity and exercise recommendations.      Electronically signed by Ana Maria Rider PT, WCC on 2/24/2025 at 3:47 PM

## 2025-02-24 NOTE — DISCHARGE INSTRUCTIONS
Return Appointment:  2 weeks with Alfa Edouard,   Return appointment with clinician for dressing changes twice weekly     Instructions:   Bilateral Lower Legs:  Cleanse with warm water and mild soap   Moisturize lower legs  Adaptic to open areas  Cover with ABD or optilock as needed  Wrap with 4 Layer Wrap Compression  Dressing change 2x weekly     Plain alginate sheet cut into strips between toes.  Cover with ABD as needed.     Continue taking antibiotics and Lamisil; take as prescribed  Transition to Velcro Compression Wraps when available

## 2025-02-24 NOTE — FLOWSHEET NOTE
02/24/25 1453   Right Leg Edema Point of Measurement   Leg circumference 42 cm   Ankle circumference 27 cm   Foot circumference 28 cm   Compression Therapy 4 layer compression wrap   Left Leg Edema Point of Measurement   Leg circumference 43 cm   Ankle circumference 24 cm   Foot circumference 29.5 cm   Compression Therapy 4 layer compression wrap   Wound 02/10/25 Foot Plantar;Left #5   Date First Assessed/Time First Assessed: 02/10/25 1452   Present on Original Admission: No  Wound Approximate Age at First Assessment (Weeks): 1 weeks  Primary Wound Type: Venous Ulcer  Location: Foot  Wound Location Orientation: Plantar;Left  Wound D...   Wound Image    Wound Etiology Venous   Dressing Status Old drainage noted   Wound Cleansed Soap and water   Dressing/Treatment Xeroform   Wound Length (cm) 1.5 cm   Wound Width (cm) 2 cm   Wound Depth (cm) 0.1 cm   Wound Surface Area (cm^2) 3 cm^2   Change in Wound Size % (l*w) 92.5   Wound Volume (cm^3) 0.3 cm^3   Wound Healing % 93   Wound Assessment Pink/red   Drainage Amount Moderate (25-50%)   Drainage Description Serosanguinous   Odor Malodorous/putrid   Wound Thickness Description not for Pressure Injury Full thickness   Wound 01/20/25 Toe (Comment  which one) Right Right Toes / Foot -web spaces   Date First Assessed/Time First Assessed: 01/20/25 1638   Wound Approximate Age at First Assessment (Weeks): 3 weeks  Primary Wound Type: Venous Ulcer  Location: Toe (Comment  which one)  Wound Location Orientation: Right  Wound Description (Comments):...   Wound Image    Wound Etiology Venous   Dressing Status Old drainage noted   Wound Cleansed Soap and water   Dressing/Treatment Alginate with Ag   Wound Length (cm) 9 cm   Wound Width (cm) 12 cm   Wound Depth (cm) 0.1 cm   Wound Surface Area (cm^2) 108 cm^2   Change in Wound Size % (l*w) 14.62   Wound Volume (cm^3) 10.8 cm^3   Wound Healing % 15   Wound Assessment Pink/red   Drainage Amount Large (50-75% saturated)   Drainage

## 2025-02-24 NOTE — PROGRESS NOTES
[facility]   [facility]   Medical Staff Progress Note     Jozef Felix  MEDICAL RECORD NUMBER:  [id number]  AGE: 59 y.o.   GENDER: male  : 1965  EPISODE DATE:  2025    Chief complaint and reason for visit:     Chief Complaint   Patient presents with    Wound Check     Bilateral foot and leg wounds      Patient presenting for follow up evaluation of wound(s) per chief complaint.  Patient has some itching to the top of her right foot.  Overall edema is controlled with compression.  Large amount odor noted to lower extremities.  Patient still not able to shower at home    Subjective and ROS: Symptoms, wound related issues, or other pertinent wound history since last visit: no new wound care issues. Tolerating current treatment. No systemic complaints above baseline.    History of Wound Context: Per original history and physical on this patient. Changes in history since last evaluation: none    Medical Decision Making:     Problem List Items Addressed This Visit          Circulatory    Venous stasis ulcer of ankle limited to breakdown of skin with varicose veins (HCC) - Primary (Chronic)    Relevant Orders    Initiate Outpatient Wound Care Protocol       Wounds and Treatment Plan:      Return Appointment:  2 weeks with Alfa Edouard DO  Return appointment with clinician for dressing changes twice weekly     Instructions:   Bilateral Lower Legs:  Cleanse with warm water and mild soap   Moisturize lower legs  Adaptic to open areas  Cover with ABD or optilock as needed  Wrap with 4 Layer Wrap Compression  Dressing change 2x weekly     Plain alginate sheet cut into strips between toes.  Cover with ABD as needed.     Continue taking antibiotics and Lamisil; take as prescribed  Transition to Velcro Compression Wraps when available        Other associated diagnoses or problems addressed:  Lymphedema    Pertinent imaging reviewed including

## 2025-02-26 ENCOUNTER — TELEPHONE (OUTPATIENT)
Dept: WOUND CARE | Age: 60
End: 2025-02-26

## 2025-02-26 RX ORDER — TERBINAFINE HYDROCHLORIDE 250 MG/1
250 TABLET ORAL DAILY
Qty: 30 TABLET | Refills: 0 | Status: SHIPPED | OUTPATIENT
Start: 2025-02-26

## 2025-02-26 NOTE — TELEPHONE ENCOUNTER
Returned call to patient concerning Terbinafine refill order.  Order put in by Dr Edouard on 2/3/25, patient stated he did not .  Message left for patient to call pharmacy for refill.

## 2025-02-27 ENCOUNTER — HOSPITAL ENCOUNTER (OUTPATIENT)
Dept: WOUND CARE | Age: 60
Discharge: HOME | End: 2025-02-27
Payer: COMMERCIAL

## 2025-02-27 VITALS
BODY MASS INDEX: 39.17 KG/M2 | HEIGHT: 75 IN | DIASTOLIC BLOOD PRESSURE: 89 MMHG | RESPIRATION RATE: 18 BRPM | HEART RATE: 84 BPM | WEIGHT: 315 LBS | SYSTOLIC BLOOD PRESSURE: 151 MMHG | OXYGEN SATURATION: 95 %

## 2025-02-27 DIAGNOSIS — I83.003 VENOUS STASIS ULCER OF ANKLE LIMITED TO BREAKDOWN OF SKIN WITH VARICOSE VEINS, UNSPECIFIED LATERALITY (HCC): Primary | ICD-10-CM

## 2025-02-27 DIAGNOSIS — L97.301 VENOUS STASIS ULCER OF ANKLE LIMITED TO BREAKDOWN OF SKIN WITH VARICOSE VEINS, UNSPECIFIED LATERALITY (HCC): Primary | ICD-10-CM

## 2025-02-27 PROCEDURE — 29581 APPL MULTLAYER CMPRN SYS LEG: CPT

## 2025-02-27 RX ORDER — GINSENG 100 MG
CAPSULE ORAL PRN
OUTPATIENT
Start: 2025-02-27

## 2025-02-27 RX ORDER — TRIAMCINOLONE ACETONIDE 1 MG/G
OINTMENT TOPICAL PRN
OUTPATIENT
Start: 2025-02-27

## 2025-02-27 RX ORDER — LIDOCAINE HYDROCHLORIDE 20 MG/ML
JELLY TOPICAL PRN
OUTPATIENT
Start: 2025-02-27

## 2025-02-27 RX ORDER — SODIUM CHLOR/HYPOCHLOROUS ACID 0.033 %
SOLUTION, IRRIGATION IRRIGATION PRN
OUTPATIENT
Start: 2025-02-27

## 2025-02-27 RX ORDER — LIDOCAINE 50 MG/G
OINTMENT TOPICAL PRN
OUTPATIENT
Start: 2025-02-27

## 2025-02-27 RX ORDER — NEOMYCIN/BACITRACIN/POLYMYXINB 3.5-400-5K
OINTMENT (GRAM) TOPICAL PRN
OUTPATIENT
Start: 2025-02-27

## 2025-02-27 RX ORDER — BACITRACIN ZINC AND POLYMYXIN B SULFATE 500; 1000 [USP'U]/G; [USP'U]/G
OINTMENT TOPICAL PRN
OUTPATIENT
Start: 2025-02-27

## 2025-02-27 RX ORDER — GENTAMICIN SULFATE 1 MG/G
OINTMENT TOPICAL PRN
OUTPATIENT
Start: 2025-02-27

## 2025-02-27 RX ORDER — LIDOCAINE 40 MG/G
CREAM TOPICAL PRN
OUTPATIENT
Start: 2025-02-27

## 2025-02-27 RX ORDER — LIDOCAINE HYDROCHLORIDE 40 MG/ML
SOLUTION TOPICAL PRN
OUTPATIENT
Start: 2025-02-27

## 2025-02-27 RX ORDER — CLOBETASOL PROPIONATE 0.5 MG/G
OINTMENT TOPICAL PRN
OUTPATIENT
Start: 2025-02-27

## 2025-02-27 RX ORDER — MUPIROCIN 20 MG/G
OINTMENT TOPICAL PRN
OUTPATIENT
Start: 2025-02-27

## 2025-02-27 RX ORDER — BETAMETHASONE DIPROPIONATE 0.5 MG/G
CREAM TOPICAL PRN
OUTPATIENT
Start: 2025-02-27

## 2025-02-27 NOTE — WOUND CARE
Multilayer Compression Wrap   (Not Unna) Below the Knee    NAME:  Jozef Felix  YOB: 1965  MEDICAL RECORD NUMBER:  [id number]  DATE:  2/27/2025    Multilayer compression wrap: Removed old Multilayer wrap if indicated and wash leg with mild soap/water.  Applied moisturizing agent to dry skin as needed.   Applied primary and secondary dressing as ordered.  Applied multilayered dressing below the knee to right lower leg.  Applied multilayered dressing below the knee to left lower leg.  Instructed patient/caregiver not to remove dressing and to keep it clean and dry.   Instructed patient/caregiver on complications to report to provider, such as pain, numbness in toes, heavy drainage, and slippage of dressing.  Instructed patient on purpose of compression dressing and on activity and exercise recommendations.      Electronically signed by Sadie Bedolla RN on 2/27/2025 at 4:01 PM

## 2025-02-27 NOTE — FLOWSHEET NOTE
02/27/25 1505   Right Leg Edema Point of Measurement   Compression Therapy 4 layer compression wrap   Left Leg Edema Point of Measurement   Compression Therapy 4 layer compression wrap   Wound 01/20/25 Toe (Comment  which one) Right Right Toes / Foot -web spaces   Date First Assessed/Time First Assessed: 01/20/25 1638   Wound Approximate Age at First Assessment (Weeks): 3 weeks  Primary Wound Type: Venous Ulcer  Location: Toe (Comment  which one)  Wound Location Orientation: Right  Wound Description (Comments):...   Wound Image    Wound Etiology Venous   Dressing Status Old drainage noted   Wound Cleansed Soap and water   Dressing/Treatment Alginate with Ag  (sorbact)   Wound Length (cm) 5 cm   Wound Width (cm) 9 cm   Wound Depth (cm) 0.1 cm   Wound Surface Area (cm^2) 45 cm^2   Change in Wound Size % (l*w) 64.43   Wound Volume (cm^3) 4.5 cm^3   Wound Healing % 64   Wound Assessment Pink/red   Drainage Amount Large (50-75% saturated)   Drainage Description Serous   Odor Moderate   Ariadne-wound Assessment Maceration   Wound Thickness Description not for Pressure Injury Full thickness   Wound 10/09/23 Leg Right;Lower #2   Date First Assessed/Time First Assessed: 10/09/23 1528   Present on Original Admission: Yes  Wound Approximate Age at First Assessment (Weeks): (c)   Primary Wound Type: Venous Ulcer  Location: Leg  Wound Location Orientation: Right;Lower  Wound Descr...   Wound Image    Wound Etiology Venous   Dressing Status Old drainage noted   Wound Cleansed Soap and water   Wound Length (cm) 27 cm   Wound Width (cm) 21 cm   Wound Depth (cm) 0.1 cm   Wound Surface Area (cm^2) 567 cm^2   Change in Wound Size % (l*w) 40.75   Wound Volume (cm^3) 56.7 cm^3   Wound Healing % 41   Wound Assessment Pink/red   Drainage Amount Moderate (25-50%)   Drainage Description Serosanguinous   Odor Moderate   Ariadne-wound Assessment Edematous   Wound Thickness Description not for Pressure Injury Full thickness   Wound 10/09/23

## 2025-03-03 ENCOUNTER — HOSPITAL ENCOUNTER (OUTPATIENT)
Dept: WOUND CARE | Age: 60
Discharge: HOME | End: 2025-03-03
Payer: COMMERCIAL

## 2025-03-03 VITALS
HEART RATE: 75 BPM | DIASTOLIC BLOOD PRESSURE: 89 MMHG | SYSTOLIC BLOOD PRESSURE: 156 MMHG | RESPIRATION RATE: 18 BRPM | TEMPERATURE: 97.8 F

## 2025-03-03 DIAGNOSIS — I83.003 VENOUS STASIS ULCER OF ANKLE LIMITED TO BREAKDOWN OF SKIN WITH VARICOSE VEINS, UNSPECIFIED LATERALITY (HCC): Primary | ICD-10-CM

## 2025-03-03 DIAGNOSIS — L97.301 VENOUS STASIS ULCER OF ANKLE LIMITED TO BREAKDOWN OF SKIN WITH VARICOSE VEINS, UNSPECIFIED LATERALITY (HCC): Primary | ICD-10-CM

## 2025-03-03 PROCEDURE — 29581 APPL MULTLAYER CMPRN SYS LEG: CPT

## 2025-03-03 RX ORDER — BETAMETHASONE DIPROPIONATE 0.5 MG/G
CREAM TOPICAL PRN
OUTPATIENT
Start: 2025-03-03

## 2025-03-03 RX ORDER — CLOBETASOL PROPIONATE 0.5 MG/G
OINTMENT TOPICAL PRN
OUTPATIENT
Start: 2025-03-03

## 2025-03-03 RX ORDER — LIDOCAINE HYDROCHLORIDE 20 MG/ML
JELLY TOPICAL PRN
OUTPATIENT
Start: 2025-03-03

## 2025-03-03 RX ORDER — LIDOCAINE 40 MG/G
CREAM TOPICAL PRN
OUTPATIENT
Start: 2025-03-03

## 2025-03-03 RX ORDER — SODIUM CHLOR/HYPOCHLOROUS ACID 0.033 %
SOLUTION, IRRIGATION IRRIGATION PRN
Status: CANCELLED | OUTPATIENT
Start: 2025-03-03

## 2025-03-03 RX ORDER — BACITRACIN ZINC AND POLYMYXIN B SULFATE 500; 1000 [USP'U]/G; [USP'U]/G
OINTMENT TOPICAL PRN
OUTPATIENT
Start: 2025-03-03

## 2025-03-03 RX ORDER — MUPIROCIN 20 MG/G
OINTMENT TOPICAL PRN
OUTPATIENT
Start: 2025-03-03

## 2025-03-03 RX ORDER — NEOMYCIN/BACITRACIN/POLYMYXINB 3.5-400-5K
OINTMENT (GRAM) TOPICAL PRN
OUTPATIENT
Start: 2025-03-03

## 2025-03-03 RX ORDER — TRIAMCINOLONE ACETONIDE 1 MG/G
OINTMENT TOPICAL PRN
OUTPATIENT
Start: 2025-03-03

## 2025-03-03 RX ORDER — LIDOCAINE HYDROCHLORIDE 40 MG/ML
SOLUTION TOPICAL PRN
OUTPATIENT
Start: 2025-03-03

## 2025-03-03 RX ORDER — GENTAMICIN SULFATE 1 MG/G
OINTMENT TOPICAL PRN
OUTPATIENT
Start: 2025-03-03

## 2025-03-03 RX ORDER — GINSENG 100 MG
CAPSULE ORAL PRN
OUTPATIENT
Start: 2025-03-03

## 2025-03-03 RX ORDER — LIDOCAINE 50 MG/G
OINTMENT TOPICAL PRN
OUTPATIENT
Start: 2025-03-03

## 2025-03-03 NOTE — FLOWSHEET NOTE
Wound Healing % 53   Wound Assessment Pink/red   Drainage Amount Large (50-75% saturated)   Drainage Description Serous   Odor Malodorous/putrid   Ariadne-wound Assessment Maceration   Wound Thickness Description not for Pressure Injury Full thickness   Wound 10/09/23 Leg Right;Lower #2   Date First Assessed/Time First Assessed: 10/09/23 1528   Present on Original Admission: Yes  Wound Approximate Age at First Assessment (Weeks): (c)   Primary Wound Type: Venous Ulcer  Location: Leg  Wound Location Orientation: Right;Lower  Wound Descr...   Wound Image    Wound Etiology Venous   Dressing Status Old drainage noted   Wound Cleansed Soap and water   Dressing/Treatment   (Adaptic)   Wound Length (cm) 27 cm   Wound Width (cm) 21 cm   Wound Depth (cm) 0.1 cm   Wound Surface Area (cm^2) 567 cm^2   Change in Wound Size % (l*w) 40.75   Wound Volume (cm^3) 56.7 cm^3   Wound Healing % 41   Wound Assessment Pink/red   Drainage Amount Large (50-75% saturated)   Drainage Description Serosanguinous   Odor Malodorous/putrid   Ariadne-wound Assessment Edematous   Wound Thickness Description not for Pressure Injury Full thickness   Wound 10/09/23 Pretibial Left;Lower #3   Date First Assessed/Time First Assessed: 10/09/23 1528   Present on Original Admission: Yes  Wound Approximate Age at First Assessment (Weeks): (c)   Primary Wound Type: Venous Ulcer  Location: Pretibial  Wound Location Orientation: Left;Lower  Wound ...   Wound Image    Wound Etiology Venous   Dressing Status Old drainage noted   Wound Cleansed Soap and water   Dressing/Treatment   (Adaptic)   Wound Length (cm) 21 cm   Wound Width (cm) 13 cm   Wound Depth (cm) 0.1 cm   Wound Surface Area (cm^2) 273 cm^2   Change in Wound Size % (l*w) 78.84   Wound Volume (cm^3) 27.3 cm^3   Wound Healing % 79   Wound Assessment Pink/red   Drainage Amount Large (50-75% saturated)   Drainage Description Serosanguinous   Odor Malodorous/putrid   Wound Thickness Description not for Pressure

## 2025-03-03 NOTE — WOUND CARE
Multilayer Compression Wrap   (Not Unna) Below the Knee    NAME:  Jozef Felix  YOB: 1965  MEDICAL RECORD NUMBER:  [id number]  DATE:  3/3/2025    Multilayer compression wrap: Removed old Multilayer wrap if indicated and wash leg with mild soap/water.  Applied moisturizing agent to dry skin as needed.   Applied primary and secondary dressing as ordered.  Applied multilayered dressing below the knee to right lower leg.  Applied multilayered dressing below the knee to left lower leg.  Instructed patient/caregiver not to remove dressing and to keep it clean and dry.   Instructed patient/caregiver on complications to report to provider, such as pain, numbness in toes, heavy drainage, and slippage of dressing.  Instructed patient on purpose of compression dressing and on activity and exercise recommendations.      Electronically signed by Namita Pal RN on 3/3/2025 at 2:16 PM

## 2025-03-06 ENCOUNTER — HOSPITAL ENCOUNTER (OUTPATIENT)
Dept: WOUND CARE | Age: 60
Discharge: HOME | End: 2025-03-06
Payer: COMMERCIAL

## 2025-03-06 VITALS
DIASTOLIC BLOOD PRESSURE: 86 MMHG | TEMPERATURE: 97.2 F | HEART RATE: 72 BPM | SYSTOLIC BLOOD PRESSURE: 135 MMHG | RESPIRATION RATE: 18 BRPM | OXYGEN SATURATION: 97 %

## 2025-03-06 DIAGNOSIS — I83.003 VENOUS STASIS ULCER OF ANKLE LIMITED TO BREAKDOWN OF SKIN WITH VARICOSE VEINS, UNSPECIFIED LATERALITY (HCC): Primary | ICD-10-CM

## 2025-03-06 DIAGNOSIS — L97.301 VENOUS STASIS ULCER OF ANKLE LIMITED TO BREAKDOWN OF SKIN WITH VARICOSE VEINS, UNSPECIFIED LATERALITY (HCC): Primary | ICD-10-CM

## 2025-03-06 PROCEDURE — 29581 APPL MULTLAYER CMPRN SYS LEG: CPT

## 2025-03-06 PROCEDURE — 99213 OFFICE O/P EST LOW 20 MIN: CPT

## 2025-03-06 RX ORDER — LIDOCAINE 50 MG/G
OINTMENT TOPICAL PRN
OUTPATIENT
Start: 2025-03-06

## 2025-03-06 RX ORDER — TRIAMCINOLONE ACETONIDE 1 MG/G
OINTMENT TOPICAL PRN
OUTPATIENT
Start: 2025-03-06

## 2025-03-06 RX ORDER — BACITRACIN ZINC AND POLYMYXIN B SULFATE 500; 1000 [USP'U]/G; [USP'U]/G
OINTMENT TOPICAL PRN
OUTPATIENT
Start: 2025-03-06

## 2025-03-06 RX ORDER — LIDOCAINE HYDROCHLORIDE 40 MG/ML
SOLUTION TOPICAL PRN
OUTPATIENT
Start: 2025-03-06

## 2025-03-06 RX ORDER — SODIUM CHLOR/HYPOCHLOROUS ACID 0.033 %
SOLUTION, IRRIGATION IRRIGATION PRN
OUTPATIENT
Start: 2025-03-06

## 2025-03-06 RX ORDER — LIDOCAINE 40 MG/G
CREAM TOPICAL PRN
OUTPATIENT
Start: 2025-03-06

## 2025-03-06 RX ORDER — NEOMYCIN/BACITRACIN/POLYMYXINB 3.5-400-5K
OINTMENT (GRAM) TOPICAL PRN
OUTPATIENT
Start: 2025-03-06

## 2025-03-06 RX ORDER — LIDOCAINE HYDROCHLORIDE 20 MG/ML
JELLY TOPICAL PRN
OUTPATIENT
Start: 2025-03-06

## 2025-03-06 RX ORDER — GENTAMICIN SULFATE 1 MG/G
OINTMENT TOPICAL PRN
OUTPATIENT
Start: 2025-03-06

## 2025-03-06 RX ORDER — BETAMETHASONE DIPROPIONATE 0.5 MG/G
CREAM TOPICAL PRN
OUTPATIENT
Start: 2025-03-06

## 2025-03-06 RX ORDER — MUPIROCIN 20 MG/G
OINTMENT TOPICAL PRN
OUTPATIENT
Start: 2025-03-06

## 2025-03-06 RX ORDER — CLOBETASOL PROPIONATE 0.5 MG/G
OINTMENT TOPICAL PRN
OUTPATIENT
Start: 2025-03-06

## 2025-03-06 RX ORDER — SODIUM CHLOR/HYPOCHLOROUS ACID 0.033 %
SOLUTION, IRRIGATION IRRIGATION PRN
Status: DISCONTINUED | OUTPATIENT
Start: 2025-03-06 | End: 2025-03-07 | Stop reason: HOSPADM

## 2025-03-06 RX ORDER — GINSENG 100 MG
CAPSULE ORAL PRN
OUTPATIENT
Start: 2025-03-06

## 2025-03-06 RX ADMIN — Medication: at 15:45

## 2025-03-06 ASSESSMENT — PAIN SCALES - GENERAL: PAINLEVEL_OUTOF10: 0

## 2025-03-06 NOTE — WOUND CARE
Multilayer Compression Wrap   (Not Unna) Below the Knee    NAME:  Jozef Felix  YOB: 1965  MEDICAL RECORD NUMBER:  [id number]  DATE:  3/6/2025    Multilayer compression wrap: Removed old Multilayer wrap if indicated and wash leg with mild soap/water.  Applied moisturizing agent to dry skin as needed.   Applied primary and secondary dressing as ordered.  Applied multilayered dressing below the knee to right lower leg.  Applied multilayered dressing below the knee to left lower leg.  Instructed patient/caregiver not to remove dressing and to keep it clean and dry.   Instructed patient/caregiver on complications to report to provider, such as pain, numbness in toes, heavy drainage, and slippage of dressing.  Instructed patient on purpose of compression dressing and on activity and exercise recommendations.      Electronically signed by NOE KRAUSE RN on 3/6/2025 at 3:42 PM

## 2025-03-06 NOTE — FLOWSHEET NOTE
Wound Assessment Pink/red   Drainage Amount Small (< 25%)   Drainage Description Serosanguinous   Odor Mild   Ariadne-wound Assessment Dry/flaky   Wound Thickness Description not for Pressure Injury Full thickness   Wound 10/09/23 Leg Right;Lower #2   Date First Assessed/Time First Assessed: 10/09/23 1528   Present on Original Admission: Yes  Wound Approximate Age at First Assessment (Weeks): (c)   Primary Wound Type: Venous Ulcer  Location: Leg  Wound Location Orientation: Right;Lower  Wound Descr...   Wound Image    Wound Etiology Venous   Dressing Status Old drainage noted   Wound Cleansed Soap and water   Wound Length (cm) 11 cm   Wound Width (cm) 5 cm   Wound Depth (cm) 0.1 cm   Wound Surface Area (cm^2) 55 cm^2   Change in Wound Size % (l*w) 94.25   Wound Volume (cm^3) 5.5 cm^3   Wound Healing % 94   Wound Assessment Pink/red   Drainage Amount Moderate (25-50%)   Drainage Description Serosanguinous   Odor Mild   Ariadne-wound Assessment Edematous   Wound Thickness Description not for Pressure Injury Full thickness   Wound 10/09/23 Pretibial Left;Lower #3   Date First Assessed/Time First Assessed: 10/09/23 1528   Present on Original Admission: Yes  Wound Approximate Age at First Assessment (Weeks): (c)   Primary Wound Type: Venous Ulcer  Location: Pretibial  Wound Location Orientation: Left;Lower  Wound ...   Wound Image    Wound Etiology Venous   Dressing Status Old drainage noted   Wound Cleansed Soap and water   Dressing/Treatment   (adaptic)   Wound Length (cm) 7 cm   Wound Width (cm) 5 cm   Wound Depth (cm) 0.1 cm   Wound Surface Area (cm^2) 35 cm^2   Change in Wound Size % (l*w) 97.29   Wound Volume (cm^3) 3.5 cm^3   Wound Healing % 97   Wound Assessment Pink/red   Drainage Amount Moderate (25-50%)   Drainage Description Serosanguinous   Odor Mild   Wound Thickness Description not for Pressure Injury Full thickness   Pain Assessment   Pain Assessment None - Denies Pain   Pain Level 0

## 2025-03-08 ENCOUNTER — HOSPITAL ENCOUNTER (INPATIENT)
Age: 60
LOS: 11 days | Discharge: NURSING, REHAB OR LONG-TERM CARE | DRG: 872 | End: 2025-03-19
Attending: EMERGENCY MEDICINE | Admitting: INTERNAL MEDICINE
Payer: COMMERCIAL

## 2025-03-08 ENCOUNTER — APPOINTMENT (OUTPATIENT)
Dept: CT IMAGING | Age: 60
DRG: 872 | End: 2025-03-08
Payer: COMMERCIAL

## 2025-03-08 ENCOUNTER — APPOINTMENT (OUTPATIENT)
Dept: GENERAL RADIOLOGY | Age: 60
DRG: 872 | End: 2025-03-08
Payer: COMMERCIAL

## 2025-03-08 DIAGNOSIS — A41.9 SEPSIS, DUE TO UNSPECIFIED ORGANISM, UNSPECIFIED WHETHER ACUTE ORGAN DYSFUNCTION PRESENT (HCC): Primary | ICD-10-CM

## 2025-03-08 DIAGNOSIS — L03.119 CELLULITIS OF LOWER EXTREMITY, UNSPECIFIED LATERALITY: ICD-10-CM

## 2025-03-08 DIAGNOSIS — R06.02 SHORTNESS OF BREATH: ICD-10-CM

## 2025-03-08 LAB
ALBUMIN SERPL-MCNC: 3.3 G/DL (ref 3.2–4.6)
ALBUMIN/GLOB SERPL: 0.8 (ref 1–1.9)
ALP SERPL-CCNC: 79 U/L (ref 40–129)
ALT SERPL-CCNC: 13 U/L (ref 8–55)
ANION GAP SERPL CALC-SCNC: 11 MMOL/L (ref 7–16)
APPEARANCE UR: CLEAR
AST SERPL-CCNC: 18 U/L (ref 15–37)
BACTERIA URNS QL MICRO: 0 /HPF
BASOPHILS # BLD: 0.03 K/UL (ref 0–0.2)
BASOPHILS NFR BLD: 0.2 % (ref 0–2)
BILIRUB SERPL-MCNC: 0.5 MG/DL (ref 0–1.2)
BILIRUB UR QL: NEGATIVE
BUN SERPL-MCNC: 15 MG/DL (ref 8–23)
CALCIUM SERPL-MCNC: 9.1 MG/DL (ref 8.8–10.2)
CHLORIDE SERPL-SCNC: 105 MMOL/L (ref 98–107)
CO2 SERPL-SCNC: 26 MMOL/L (ref 20–29)
COLOR UR: ABNORMAL
CREAT SERPL-MCNC: 0.93 MG/DL (ref 0.8–1.3)
CRP SERPL HS-MCNC: 56 MG/L (ref 0–3)
DIFFERENTIAL METHOD BLD: ABNORMAL
EOSINOPHIL # BLD: 0.02 K/UL (ref 0–0.8)
EOSINOPHIL NFR BLD: 0.2 % (ref 0.5–7.8)
EPI CELLS #/AREA URNS HPF: ABNORMAL /HPF
ERYTHROCYTE [DISTWIDTH] IN BLOOD BY AUTOMATED COUNT: 13.2 % (ref 11.9–14.6)
FLUAV RNA SPEC QL NAA+PROBE: NOT DETECTED
FLUBV RNA SPEC QL NAA+PROBE: NOT DETECTED
GLOBULIN SER CALC-MCNC: 4.1 G/DL (ref 2.3–3.5)
GLUCOSE SERPL-MCNC: 98 MG/DL (ref 70–99)
GLUCOSE UR STRIP.AUTO-MCNC: NEGATIVE MG/DL
HCT VFR BLD AUTO: 40.3 % (ref 41.1–50.3)
HGB BLD-MCNC: 13.7 G/DL (ref 13.6–17.2)
HGB UR QL STRIP: NEGATIVE
IMM GRANULOCYTES # BLD AUTO: 0.06 K/UL (ref 0–0.5)
IMM GRANULOCYTES NFR BLD AUTO: 0.5 % (ref 0–5)
KETONES UR QL STRIP.AUTO: NEGATIVE MG/DL
LACTATE SERPL-SCNC: 1.4 MMOL/L (ref 0.5–2)
LEUKOCYTE ESTERASE UR QL STRIP.AUTO: ABNORMAL
LYMPHOCYTES # BLD: 0.86 K/UL (ref 0.5–4.6)
LYMPHOCYTES NFR BLD: 6.8 % (ref 13–44)
MCH RBC QN AUTO: 34.7 PG (ref 26.1–32.9)
MCHC RBC AUTO-ENTMCNC: 34 G/DL (ref 31.4–35)
MCV RBC AUTO: 102 FL (ref 82–102)
MONOCYTES # BLD: 0.88 K/UL (ref 0.1–1.3)
MONOCYTES NFR BLD: 6.9 % (ref 4–12)
MUCOUS THREADS URNS QL MICRO: ABNORMAL /LPF
NEUTS SEG # BLD: 10.86 K/UL (ref 1.7–8.2)
NEUTS SEG NFR BLD: 85.4 % (ref 43–78)
NITRITE UR QL STRIP.AUTO: NEGATIVE
NRBC # BLD: 0 K/UL (ref 0–0.2)
NT PRO BNP: 59 PG/ML (ref 0–125)
OTHER OBSERVATIONS: ABNORMAL
PH UR STRIP: 7.5 (ref 5–9)
PLATELET # BLD AUTO: 192 K/UL (ref 150–450)
PMV BLD AUTO: 9.8 FL (ref 9.4–12.3)
POTASSIUM SERPL-SCNC: 4.2 MMOL/L (ref 3.5–5.1)
PROCALCITONIN SERPL-MCNC: 0.13 NG/ML (ref 0–0.1)
PROT SERPL-MCNC: 7.5 G/DL (ref 6.3–8.2)
PROT UR STRIP-MCNC: 30 MG/DL
RBC # BLD AUTO: 3.95 M/UL (ref 4.23–5.6)
RBC #/AREA URNS HPF: ABNORMAL /HPF
SARS-COV-2 RDRP RESP QL NAA+PROBE: NOT DETECTED
SODIUM SERPL-SCNC: 142 MMOL/L (ref 136–145)
SOURCE: NORMAL
SP GR UR REFRACTOMETRY: 1.02 (ref 1–1.02)
UROBILINOGEN UR QL STRIP.AUTO: 1 EU/DL (ref 0.2–1)
WBC # BLD AUTO: 12.7 K/UL (ref 4.3–11.1)
WBC URNS QL MICRO: ABNORMAL /HPF

## 2025-03-08 PROCEDURE — 83880 ASSAY OF NATRIURETIC PEPTIDE: CPT

## 2025-03-08 PROCEDURE — 87154 CUL TYP ID BLD PTHGN 6+ TRGT: CPT

## 2025-03-08 PROCEDURE — 74176 CT ABD & PELVIS W/O CONTRAST: CPT

## 2025-03-08 PROCEDURE — 87186 SC STD MICRODIL/AGAR DIL: CPT

## 2025-03-08 PROCEDURE — 2580000003 HC RX 258: Performed by: EMERGENCY MEDICINE

## 2025-03-08 PROCEDURE — 99285 EMERGENCY DEPT VISIT HI MDM: CPT

## 2025-03-08 PROCEDURE — 85025 COMPLETE CBC W/AUTO DIFF WBC: CPT

## 2025-03-08 PROCEDURE — 83605 ASSAY OF LACTIC ACID: CPT

## 2025-03-08 PROCEDURE — 94640 AIRWAY INHALATION TREATMENT: CPT

## 2025-03-08 PROCEDURE — 93005 ELECTROCARDIOGRAM TRACING: CPT | Performed by: EMERGENCY MEDICINE

## 2025-03-08 PROCEDURE — 71046 X-RAY EXAM CHEST 2 VIEWS: CPT

## 2025-03-08 PROCEDURE — 84145 PROCALCITONIN (PCT): CPT

## 2025-03-08 PROCEDURE — 87040 BLOOD CULTURE FOR BACTERIA: CPT

## 2025-03-08 PROCEDURE — 86141 C-REACTIVE PROTEIN HS: CPT

## 2025-03-08 PROCEDURE — 87502 INFLUENZA DNA AMP PROBE: CPT

## 2025-03-08 PROCEDURE — 96375 TX/PRO/DX INJ NEW DRUG ADDON: CPT

## 2025-03-08 PROCEDURE — 96365 THER/PROPH/DIAG IV INF INIT: CPT

## 2025-03-08 PROCEDURE — 87077 CULTURE AEROBIC IDENTIFY: CPT

## 2025-03-08 PROCEDURE — 6370000000 HC RX 637 (ALT 250 FOR IP): Performed by: EMERGENCY MEDICINE

## 2025-03-08 PROCEDURE — 2700000000 HC OXYGEN THERAPY PER DAY

## 2025-03-08 PROCEDURE — 87635 SARS-COV-2 COVID-19 AMP PRB: CPT

## 2025-03-08 PROCEDURE — 96374 THER/PROPH/DIAG INJ IV PUSH: CPT

## 2025-03-08 PROCEDURE — 6360000002 HC RX W HCPCS: Performed by: INTERNAL MEDICINE

## 2025-03-08 PROCEDURE — 1100000000 HC RM PRIVATE

## 2025-03-08 PROCEDURE — 6360000002 HC RX W HCPCS: Performed by: EMERGENCY MEDICINE

## 2025-03-08 PROCEDURE — 87205 SMEAR GRAM STAIN: CPT

## 2025-03-08 PROCEDURE — 94761 N-INVAS EAR/PLS OXIMETRY MLT: CPT

## 2025-03-08 PROCEDURE — 80053 COMPREHEN METABOLIC PANEL: CPT

## 2025-03-08 PROCEDURE — 81001 URINALYSIS AUTO W/SCOPE: CPT

## 2025-03-08 RX ORDER — ONDANSETRON 4 MG/1
4 TABLET, ORALLY DISINTEGRATING ORAL EVERY 8 HOURS PRN
Status: DISCONTINUED | OUTPATIENT
Start: 2025-03-08 | End: 2025-03-19 | Stop reason: HOSPADM

## 2025-03-08 RX ORDER — IPRATROPIUM BROMIDE AND ALBUTEROL SULFATE 2.5; .5 MG/3ML; MG/3ML
1 SOLUTION RESPIRATORY (INHALATION)
Status: COMPLETED | OUTPATIENT
Start: 2025-03-08 | End: 2025-03-08

## 2025-03-08 RX ORDER — ALLOPURINOL 300 MG/1
300 TABLET ORAL DAILY
Status: DISCONTINUED | OUTPATIENT
Start: 2025-03-09 | End: 2025-03-19 | Stop reason: HOSPADM

## 2025-03-08 RX ORDER — ACETAMINOPHEN 500 MG
1000 TABLET ORAL
Status: COMPLETED | OUTPATIENT
Start: 2025-03-08 | End: 2025-03-08

## 2025-03-08 RX ORDER — ONDANSETRON 2 MG/ML
4 INJECTION INTRAMUSCULAR; INTRAVENOUS EVERY 6 HOURS PRN
Status: DISCONTINUED | OUTPATIENT
Start: 2025-03-08 | End: 2025-03-19 | Stop reason: HOSPADM

## 2025-03-08 RX ORDER — ONDANSETRON 2 MG/ML
4 INJECTION INTRAMUSCULAR; INTRAVENOUS
Status: COMPLETED | OUTPATIENT
Start: 2025-03-08 | End: 2025-03-08

## 2025-03-08 RX ORDER — SODIUM CHLORIDE 9 MG/ML
INJECTION, SOLUTION INTRAVENOUS PRN
Status: DISCONTINUED | OUTPATIENT
Start: 2025-03-08 | End: 2025-03-19 | Stop reason: HOSPADM

## 2025-03-08 RX ORDER — SODIUM CHLORIDE 0.9 % (FLUSH) 0.9 %
5-40 SYRINGE (ML) INJECTION EVERY 12 HOURS SCHEDULED
Status: DISCONTINUED | OUTPATIENT
Start: 2025-03-09 | End: 2025-03-19 | Stop reason: HOSPADM

## 2025-03-08 RX ORDER — CYCLOBENZAPRINE HCL 10 MG
5 TABLET ORAL NIGHTLY
Status: DISCONTINUED | OUTPATIENT
Start: 2025-03-09 | End: 2025-03-14

## 2025-03-08 RX ORDER — SODIUM CHLORIDE 9 MG/ML
INJECTION, SOLUTION INTRAVENOUS CONTINUOUS
Status: ACTIVE | OUTPATIENT
Start: 2025-03-09 | End: 2025-03-09

## 2025-03-08 RX ORDER — POTASSIUM CHLORIDE 7.45 MG/ML
10 INJECTION INTRAVENOUS PRN
Status: DISCONTINUED | OUTPATIENT
Start: 2025-03-08 | End: 2025-03-19 | Stop reason: HOSPADM

## 2025-03-08 RX ORDER — POTASSIUM CHLORIDE 1500 MG/1
40 TABLET, EXTENDED RELEASE ORAL PRN
Status: DISCONTINUED | OUTPATIENT
Start: 2025-03-08 | End: 2025-03-19 | Stop reason: HOSPADM

## 2025-03-08 RX ORDER — SODIUM CHLORIDE 0.9 % (FLUSH) 0.9 %
5-40 SYRINGE (ML) INJECTION PRN
Status: DISCONTINUED | OUTPATIENT
Start: 2025-03-08 | End: 2025-03-19 | Stop reason: HOSPADM

## 2025-03-08 RX ORDER — POLYETHYLENE GLYCOL 3350 17 G/17G
17 POWDER, FOR SOLUTION ORAL DAILY PRN
Status: DISCONTINUED | OUTPATIENT
Start: 2025-03-08 | End: 2025-03-19 | Stop reason: HOSPADM

## 2025-03-08 RX ORDER — ACETAMINOPHEN 650 MG/1
650 SUPPOSITORY RECTAL EVERY 6 HOURS PRN
Status: DISCONTINUED | OUTPATIENT
Start: 2025-03-08 | End: 2025-03-15

## 2025-03-08 RX ORDER — FUROSEMIDE 40 MG/1
40 TABLET ORAL DAILY
Status: DISCONTINUED | OUTPATIENT
Start: 2025-03-09 | End: 2025-03-19 | Stop reason: HOSPADM

## 2025-03-08 RX ORDER — GABAPENTIN 300 MG/1
300 CAPSULE ORAL 3 TIMES DAILY
Status: DISCONTINUED | OUTPATIENT
Start: 2025-03-09 | End: 2025-03-14

## 2025-03-08 RX ORDER — OXYCODONE HYDROCHLORIDE 5 MG/1
5 TABLET ORAL EVERY 4 HOURS PRN
Refills: 0 | Status: DISCONTINUED | OUTPATIENT
Start: 2025-03-08 | End: 2025-03-19 | Stop reason: HOSPADM

## 2025-03-08 RX ORDER — FAMOTIDINE 20 MG/1
40 TABLET, FILM COATED ORAL 2 TIMES DAILY
Status: DISCONTINUED | OUTPATIENT
Start: 2025-03-09 | End: 2025-03-19 | Stop reason: HOSPADM

## 2025-03-08 RX ORDER — MAGNESIUM SULFATE IN WATER 40 MG/ML
2000 INJECTION, SOLUTION INTRAVENOUS PRN
Status: DISCONTINUED | OUTPATIENT
Start: 2025-03-08 | End: 2025-03-19 | Stop reason: HOSPADM

## 2025-03-08 RX ORDER — TAMSULOSIN HYDROCHLORIDE 0.4 MG/1
0.4 CAPSULE ORAL DAILY
Status: DISCONTINUED | OUTPATIENT
Start: 2025-03-09 | End: 2025-03-19 | Stop reason: HOSPADM

## 2025-03-08 RX ORDER — MORPHINE SULFATE 4 MG/ML
4 INJECTION, SOLUTION INTRAMUSCULAR; INTRAVENOUS
Refills: 0 | Status: COMPLETED | OUTPATIENT
Start: 2025-03-08 | End: 2025-03-08

## 2025-03-08 RX ORDER — TERBINAFINE HYDROCHLORIDE 250 MG/1
250 TABLET ORAL DAILY
Status: DISCONTINUED | OUTPATIENT
Start: 2025-03-09 | End: 2025-03-19 | Stop reason: HOSPADM

## 2025-03-08 RX ORDER — 0.9 % SODIUM CHLORIDE 0.9 %
1000 INTRAVENOUS SOLUTION INTRAVENOUS ONCE
Status: COMPLETED | OUTPATIENT
Start: 2025-03-08 | End: 2025-03-08

## 2025-03-08 RX ORDER — SERTRALINE HYDROCHLORIDE 100 MG/1
150 TABLET, FILM COATED ORAL DAILY
Status: DISCONTINUED | OUTPATIENT
Start: 2025-03-09 | End: 2025-03-19 | Stop reason: HOSPADM

## 2025-03-08 RX ORDER — DILTIAZEM HYDROCHLORIDE 240 MG/1
240 CAPSULE, COATED, EXTENDED RELEASE ORAL DAILY
Status: DISCONTINUED | OUTPATIENT
Start: 2025-03-09 | End: 2025-03-19 | Stop reason: HOSPADM

## 2025-03-08 RX ORDER — ACETAMINOPHEN 325 MG/1
650 TABLET ORAL EVERY 6 HOURS PRN
Status: DISCONTINUED | OUTPATIENT
Start: 2025-03-08 | End: 2025-03-15

## 2025-03-08 RX ORDER — PROCHLORPERAZINE EDISYLATE 5 MG/ML
10 INJECTION INTRAMUSCULAR; INTRAVENOUS ONCE
Status: COMPLETED | OUTPATIENT
Start: 2025-03-08 | End: 2025-03-08

## 2025-03-08 RX ADMIN — ACETAMINOPHEN 1000 MG: 500 TABLET, FILM COATED ORAL at 21:14

## 2025-03-08 RX ADMIN — SODIUM CHLORIDE 1000 ML: 9 INJECTION, SOLUTION INTRAVENOUS at 21:12

## 2025-03-08 RX ADMIN — PROCHLORPERAZINE EDISYLATE 10 MG: 5 INJECTION INTRAMUSCULAR; INTRAVENOUS at 23:08

## 2025-03-08 RX ADMIN — IPRATROPIUM BROMIDE AND ALBUTEROL SULFATE 1 DOSE: 2.5; .5 SOLUTION RESPIRATORY (INHALATION) at 22:29

## 2025-03-08 RX ADMIN — MORPHINE SULFATE 4 MG: 4 INJECTION INTRAVENOUS at 21:13

## 2025-03-08 RX ADMIN — Medication 2500 MG: at 22:52

## 2025-03-08 RX ADMIN — PIPERACILLIN AND TAZOBACTAM 4500 MG: 4; .5 INJECTION, POWDER, LYOPHILIZED, FOR SOLUTION INTRAVENOUS at 21:10

## 2025-03-08 RX ADMIN — ONDANSETRON 4 MG: 2 INJECTION, SOLUTION INTRAMUSCULAR; INTRAVENOUS at 21:13

## 2025-03-08 ASSESSMENT — PAIN DESCRIPTION - DESCRIPTORS: DESCRIPTORS: ACHING

## 2025-03-08 ASSESSMENT — PAIN DESCRIPTION - ORIENTATION
ORIENTATION: LEFT;LOWER
ORIENTATION: LOWER;LEFT;RIGHT

## 2025-03-08 ASSESSMENT — PAIN SCALES - GENERAL
PAINLEVEL_OUTOF10: 10
PAINLEVEL_OUTOF10: 10

## 2025-03-08 ASSESSMENT — PAIN DESCRIPTION - LOCATION
LOCATION: FLANK;BACK
LOCATION: FLANK

## 2025-03-08 NOTE — ED TRIAGE NOTES
Pt arrives via ems from home. Ems called for back pain, dysuria, abdominal pain, dark urine x today. Hx of UTI's.

## 2025-03-09 ENCOUNTER — APPOINTMENT (OUTPATIENT)
Dept: ULTRASOUND IMAGING | Age: 60
DRG: 872 | End: 2025-03-09
Payer: COMMERCIAL

## 2025-03-09 LAB
ANION GAP SERPL CALC-SCNC: 8 MMOL/L (ref 7–16)
BASOPHILS # BLD: 0.02 K/UL (ref 0–0.2)
BASOPHILS NFR BLD: 0.2 % (ref 0–2)
BUN SERPL-MCNC: 17 MG/DL (ref 8–23)
CALCIUM SERPL-MCNC: 8.3 MG/DL (ref 8.8–10.2)
CHLORIDE SERPL-SCNC: 102 MMOL/L (ref 98–107)
CO2 SERPL-SCNC: 25 MMOL/L (ref 20–29)
CREAT SERPL-MCNC: 0.91 MG/DL (ref 0.8–1.3)
DIFFERENTIAL METHOD BLD: ABNORMAL
EKG ATRIAL RATE: 102 BPM
EKG DIAGNOSIS: NORMAL
EKG P AXIS: 49 DEGREES
EKG P-R INTERVAL: 194 MS
EKG Q-T INTERVAL: 341 MS
EKG QRS DURATION: 119 MS
EKG QTC CALCULATION (BAZETT): 447 MS
EKG R AXIS: 21 DEGREES
EKG T AXIS: 66 DEGREES
EKG VENTRICULAR RATE: 103 BPM
EOSINOPHIL # BLD: 0.01 K/UL (ref 0–0.8)
EOSINOPHIL NFR BLD: 0.1 % (ref 0.5–7.8)
ERYTHROCYTE [DISTWIDTH] IN BLOOD BY AUTOMATED COUNT: 13.5 % (ref 11.9–14.6)
GLUCOSE SERPL-MCNC: 105 MG/DL (ref 70–99)
HCT VFR BLD AUTO: 38 % (ref 41.1–50.3)
HGB BLD-MCNC: 12.7 G/DL (ref 13.6–17.2)
IMM GRANULOCYTES # BLD AUTO: 0.06 K/UL (ref 0–0.5)
IMM GRANULOCYTES NFR BLD AUTO: 0.5 % (ref 0–5)
LYMPHOCYTES # BLD: 1.18 K/UL (ref 0.5–4.6)
LYMPHOCYTES NFR BLD: 9.1 % (ref 13–44)
MCH RBC QN AUTO: 34.6 PG (ref 26.1–32.9)
MCHC RBC AUTO-ENTMCNC: 33.4 G/DL (ref 31.4–35)
MCV RBC AUTO: 103.5 FL (ref 82–102)
MONOCYTES # BLD: 1.25 K/UL (ref 0.1–1.3)
MONOCYTES NFR BLD: 9.6 % (ref 4–12)
NEUTS SEG # BLD: 10.46 K/UL (ref 1.7–8.2)
NEUTS SEG NFR BLD: 80.5 % (ref 43–78)
NRBC # BLD: 0 K/UL (ref 0–0.2)
PLATELET # BLD AUTO: 175 K/UL (ref 150–450)
PMV BLD AUTO: 9.7 FL (ref 9.4–12.3)
POTASSIUM SERPL-SCNC: 4 MMOL/L (ref 3.5–5.1)
RBC # BLD AUTO: 3.67 M/UL (ref 4.23–5.6)
SODIUM SERPL-SCNC: 135 MMOL/L (ref 136–145)
WBC # BLD AUTO: 13 K/UL (ref 4.3–11.1)

## 2025-03-09 PROCEDURE — 85025 COMPLETE CBC W/AUTO DIFF WBC: CPT

## 2025-03-09 PROCEDURE — 6360000002 HC RX W HCPCS: Performed by: INTERNAL MEDICINE

## 2025-03-09 PROCEDURE — 80048 BASIC METABOLIC PNL TOTAL CA: CPT

## 2025-03-09 PROCEDURE — 93010 ELECTROCARDIOGRAM REPORT: CPT | Performed by: INTERNAL MEDICINE

## 2025-03-09 PROCEDURE — 2500000003 HC RX 250 WO HCPCS: Performed by: INTERNAL MEDICINE

## 2025-03-09 PROCEDURE — 2580000003 HC RX 258: Performed by: INTERNAL MEDICINE

## 2025-03-09 PROCEDURE — 6370000000 HC RX 637 (ALT 250 FOR IP): Performed by: INTERNAL MEDICINE

## 2025-03-09 PROCEDURE — 36415 COLL VENOUS BLD VENIPUNCTURE: CPT

## 2025-03-09 PROCEDURE — 76705 ECHO EXAM OF ABDOMEN: CPT

## 2025-03-09 PROCEDURE — 1100000000 HC RM PRIVATE

## 2025-03-09 RX ORDER — PROCHLORPERAZINE EDISYLATE 5 MG/ML
10 INJECTION INTRAMUSCULAR; INTRAVENOUS EVERY 6 HOURS PRN
Status: DISCONTINUED | OUTPATIENT
Start: 2025-03-09 | End: 2025-03-19 | Stop reason: HOSPADM

## 2025-03-09 RX ORDER — SODIUM CHLORIDE 9 MG/ML
INJECTION, SOLUTION INTRAVENOUS CONTINUOUS
Status: ACTIVE | OUTPATIENT
Start: 2025-03-09 | End: 2025-03-10

## 2025-03-09 RX ADMIN — SODIUM CHLORIDE, PRESERVATIVE FREE 10 ML: 5 INJECTION INTRAVENOUS at 21:18

## 2025-03-09 RX ADMIN — ONDANSETRON 4 MG: 2 INJECTION, SOLUTION INTRAMUSCULAR; INTRAVENOUS at 10:20

## 2025-03-09 RX ADMIN — GABAPENTIN 300 MG: 300 CAPSULE ORAL at 21:17

## 2025-03-09 RX ADMIN — PROCHLORPERAZINE EDISYLATE 10 MG: 5 INJECTION INTRAMUSCULAR; INTRAVENOUS at 17:52

## 2025-03-09 RX ADMIN — SODIUM CHLORIDE: 9 INJECTION, SOLUTION INTRAVENOUS at 14:14

## 2025-03-09 RX ADMIN — VANCOMYCIN HYDROCHLORIDE 1500 MG: 10 INJECTION, POWDER, LYOPHILIZED, FOR SOLUTION INTRAVENOUS at 17:57

## 2025-03-09 RX ADMIN — OXYCODONE HYDROCHLORIDE 5 MG: 5 TABLET ORAL at 18:02

## 2025-03-09 RX ADMIN — MICONAZOLE NITRATE: 20 POWDER TOPICAL at 21:18

## 2025-03-09 RX ADMIN — CEFEPIME 2000 MG: 2 INJECTION, POWDER, FOR SOLUTION INTRAVENOUS at 03:13

## 2025-03-09 RX ADMIN — SODIUM CHLORIDE: 9 INJECTION, SOLUTION INTRAVENOUS at 00:46

## 2025-03-09 RX ADMIN — CYCLOBENZAPRINE HYDROCHLORIDE 5 MG: 10 TABLET, FILM COATED ORAL at 21:17

## 2025-03-09 RX ADMIN — FAMOTIDINE 40 MG: 20 TABLET, FILM COATED ORAL at 00:52

## 2025-03-09 RX ADMIN — CEFEPIME 2000 MG: 2 INJECTION, POWDER, FOR SOLUTION INTRAVENOUS at 12:38

## 2025-03-09 RX ADMIN — FAMOTIDINE 40 MG: 20 TABLET, FILM COATED ORAL at 21:17

## 2025-03-09 RX ADMIN — RIVAROXABAN 20 MG: 20 TABLET, FILM COATED ORAL at 17:52

## 2025-03-09 RX ADMIN — GABAPENTIN 300 MG: 300 CAPSULE ORAL at 00:51

## 2025-03-09 RX ADMIN — CYCLOBENZAPRINE HYDROCHLORIDE 5 MG: 10 TABLET, FILM COATED ORAL at 00:52

## 2025-03-09 RX ADMIN — RIVAROXABAN 20 MG: 20 TABLET, FILM COATED ORAL at 00:51

## 2025-03-09 RX ADMIN — CEFEPIME 2000 MG: 2 INJECTION, POWDER, FOR SOLUTION INTRAVENOUS at 19:42

## 2025-03-09 ASSESSMENT — PAIN SCALES - GENERAL
PAINLEVEL_OUTOF10: 0
PAINLEVEL_OUTOF10: 8

## 2025-03-09 ASSESSMENT — PAIN DESCRIPTION - DESCRIPTORS: DESCRIPTORS: SHARP

## 2025-03-09 ASSESSMENT — PAIN DESCRIPTION - ORIENTATION: ORIENTATION: LEFT;ANTERIOR;POSTERIOR

## 2025-03-09 ASSESSMENT — PAIN DESCRIPTION - LOCATION: LOCATION: ABDOMEN;BACK

## 2025-03-09 ASSESSMENT — PAIN - FUNCTIONAL ASSESSMENT: PAIN_FUNCTIONAL_ASSESSMENT: PREVENTS OR INTERFERES SOME ACTIVE ACTIVITIES AND ADLS

## 2025-03-09 NOTE — ED PROVIDER NOTES
Enterococcus faecium by PCR NOT DETECTED NOTDET      Listeria monocytogenes by PCR NOT DETECTED NOTDET      STAPHYLOCOCCUS Detected (A) NOTDET      Staphylococcus Aureus Detected (A) NOTDET      Staphylococcus epidermidis by PCR NOT DETECTED NOTDET      Staphylococcus lugdunensis by PCR NOT DETECTED NOTDET      STREPTOCOCCUS NOT DETECTED NOTDET      Streptococcus agalactiae (Group B) NOT DETECTED NOTDET      Strep pneumoniae NOT DETECTED NOTDET      Strep pyogenes,(Grp. A) NOT DETECTED NOTDET      Acinetobacter calcoac baumannii complex by PCR NOT DETECTED NOTDET      Bacteroides fragilis by PCR NOT DETECTED NOTDET      Enterobacteriaceae by PCR NOT DETECTED NOTDET      Enterobacter cloacae complex by PCR NOT DETECTED NOTDET      Escherichia Coli NOT DETECTED NOTDET      Klebsiella aerogenes by PCR NOT DETECTED NOTDET      Klebsiella oxytoca by PCR NOT DETECTED NOTDET      Klebsiella pneumoniae group by PCR NOT DETECTED NOTDET      Proteus by PCR NOT DETECTED NOTDET      Salmonella species by PCR NOT DETECTED NOTDET      Serratia marcescens by PCR NOT DETECTED NOTDET      Haemophilus Influenzae by PCR NOT DETECTED NOTDET      Neisseria meningitidis by PCR NOT DETECTED NOTDET      Pseudomonas aeruginosa NOT DETECTED NOTDET      Stenotrophomonas maltophilia by PCR NOT DETECTED NOTDET      Candida albicans by PCR NOT DETECTED NOTDET      Candida auris by PCR NOT DETECTED NOTDET      Candida glabrata NOT DETECTED NOTDET      Candida krusei by PCR NOT DETECTED NOTDET      Candida parapsilosis by PCR NOT DETECTED NOTDET      Candida tropicalis by PCR NOT DETECTED NOTDET      Cryptococcus neoformans/gattii by PCR NOT DETECTED NOTDET      Resistant gene targets        Resistant gene meca/c & mrej by PCR Detected (A) NOTDET      [employer] test comment        False positive results may rarely occur. Correlate with clinical,epidemiologic, and other laboratory findings   XR CHEST (2 VW)    Narrative    CXR    HISTORY:  Shortness

## 2025-03-09 NOTE — H&P
fat-containing inguinal hernias left greater than right. - LYMPH NODES: No significant retroperitoneal, mesenteric, or pelvic adenopathy. - BONES: No fracture or significant bone lesion. - VASCULATURE: Normal - OTHER: No ascites.     Punctate bilateral nonobstructing renal calculi. Sludge versus cholelithiasis. Electronically signed by Rocky Arriaga    XR CHEST (2 VW)  Result Date: 3/8/2025  CXR HISTORY: Shortness of breath. COMPARISON: None. TECHNIQUE: 2 views submitted for review. FINDINGS: The lungs are adequately expanded..  There is minimal prominence of bronchopulmonary markings.  The cardiac silhouette measures within normal.  Pulmonary vascularity is prominent. Osseous structures are within normal limits for age.     Minimal prominence of bronchopulmonary markings. Please correlate for viral etiologies vs pulmonary edema. Electronically signed by Kate Reich        Signed:  William Harrison MD    Part of this note may have been written by using a voice dictation software.  The note has been proof read but may still contain some grammatical/other typographical errors.

## 2025-03-09 NOTE — ED NOTES
TRANSFER - OUT REPORT:    Verbal report given to Medardo RUIZ on Jozef Felix  being transferred to [facility] for routine progression of patient care       Report consisted of patient's Situation, Background, Assessment and   Recommendations(SBAR).     Information from the following report(s) Nurse Handoff Report was reviewed with the receiving nurse.    [place] Assessment:    Presents to emergency department  because of falls (Syncope, seizure, or loss of consciousness): No  Age > 70: No  Altered Mental Status, Intoxication with alcohol or substance confusion (Disorientation, impaired judgment, poor safety awaremess, or inability to follow instructions): No  Impaired Mobility: Ambulates or transfers with assistive devices or assistance; Unable to ambulate or transer.: Yes             Lines:   Peripheral IV 03/08/25 Left;Posterior Hand (Active)   Site Assessment Clean, dry & intact 03/08/25 1915   Phlebitis Assessment No symptoms 03/08/25 1915   Infiltration Assessment 0 03/08/25 1915   Dressing Status Clean, dry & intact 03/08/25 1915   Dressing Type Transparent 03/08/25 1915       Peripheral IV 03/08/25 Right Antecubital (Active)   Site Assessment Clean, dry & intact 03/08/25 1928   Line Status Blood return noted;Flushed;Specimen collected 03/08/25 1928   Phlebitis Assessment No symptoms 03/08/25 1928   Infiltration Assessment 0 03/08/25 1928   Dressing Status Clean, dry & intact 03/08/25 1928   Dressing Type Transparent 03/08/25 1928        Opportunity for questions and clarification was provided.      Patient transported with:  O2 @ 2lpm and Registered Nurse           Álvaro, Jonelle, RN  03/08/25 [id number]

## 2025-03-09 NOTE — ACP (ADVANCE CARE PLANNING)
Advance Care Planning   General Advance Care Planning (ACP) Conversation    Date of Conversation: 3/8/2025  Conducted with: Patient with Decision Making Capacity  Other persons present: None    Healthcare Decision Maker:    Primary Decision Maker: Sadie Basurto - Spouse - [phone]    Today we documented Decision Maker(s) consistent with Legal Next of Kin hierarchy.  Content/Action Overview:  Has ACP document(s) on file - reflects the patient's care preferences  Reviewed DNR/DNI and patient elects Full Code (Attempt Resuscitation)        Length of Voluntary ACP Conversation in minutes:  <16 minutes (Non-Billable)    Sandy Corbin RN

## 2025-03-10 ENCOUNTER — APPOINTMENT (OUTPATIENT)
Dept: GENERAL RADIOLOGY | Age: 60
DRG: 872 | End: 2025-03-10
Payer: COMMERCIAL

## 2025-03-10 PROBLEM — R78.81 GRAM-POSITIVE BACTEREMIA: Status: ACTIVE | Noted: 2025-03-10

## 2025-03-10 LAB
ALBUMIN SERPL-MCNC: 2.7 G/DL (ref 3.2–4.6)
ALBUMIN/GLOB SERPL: 0.7 (ref 1–1.9)
ALP SERPL-CCNC: 69 U/L (ref 40–129)
ALT SERPL-CCNC: 10 U/L (ref 8–55)
ANION GAP SERPL CALC-SCNC: 9 MMOL/L (ref 7–16)
AST SERPL-CCNC: 15 U/L (ref 15–37)
BASOPHILS # BLD: 0.02 K/UL (ref 0–0.2)
BASOPHILS NFR BLD: 0.2 % (ref 0–2)
BILIRUB SERPL-MCNC: 0.8 MG/DL (ref 0–1.2)
BUN SERPL-MCNC: 14 MG/DL (ref 8–23)
CALCIUM SERPL-MCNC: 8.7 MG/DL (ref 8.8–10.2)
CHLORIDE SERPL-SCNC: 102 MMOL/L (ref 98–107)
CO2 SERPL-SCNC: 25 MMOL/L (ref 20–29)
CREAT SERPL-MCNC: 0.74 MG/DL (ref 0.8–1.3)
DIFFERENTIAL METHOD BLD: ABNORMAL
EOSINOPHIL # BLD: 0.02 K/UL (ref 0–0.8)
EOSINOPHIL NFR BLD: 0.2 % (ref 0.5–7.8)
ERYTHROCYTE [DISTWIDTH] IN BLOOD BY AUTOMATED COUNT: 13.2 % (ref 11.9–14.6)
GLOBULIN SER CALC-MCNC: 4.1 G/DL (ref 2.3–3.5)
GLUCOSE BLD STRIP.AUTO-MCNC: 95 MG/DL (ref 65–100)
GLUCOSE SERPL-MCNC: 106 MG/DL (ref 70–99)
HCT VFR BLD AUTO: 39.2 % (ref 41.1–50.3)
HGB BLD-MCNC: 12.9 G/DL (ref 13.6–17.2)
IMM GRANULOCYTES # BLD AUTO: 0.04 K/UL (ref 0–0.5)
IMM GRANULOCYTES NFR BLD AUTO: 0.3 % (ref 0–5)
LYMPHOCYTES # BLD: 0.8 K/UL (ref 0.5–4.6)
LYMPHOCYTES NFR BLD: 6.2 % (ref 13–44)
MCH RBC QN AUTO: 34.9 PG (ref 26.1–32.9)
MCHC RBC AUTO-ENTMCNC: 32.9 G/DL (ref 31.4–35)
MCV RBC AUTO: 105.9 FL (ref 82–102)
MONOCYTES # BLD: 0.94 K/UL (ref 0.1–1.3)
MONOCYTES NFR BLD: 7.3 % (ref 4–12)
NEUTS SEG # BLD: 11.02 K/UL (ref 1.7–8.2)
NEUTS SEG NFR BLD: 85.8 % (ref 43–78)
NRBC # BLD: 0 K/UL (ref 0–0.2)
PLATELET # BLD AUTO: 143 K/UL (ref 150–450)
PMV BLD AUTO: 9.5 FL (ref 9.4–12.3)
POTASSIUM SERPL-SCNC: 4 MMOL/L (ref 3.5–5.1)
PROT SERPL-MCNC: 6.9 G/DL (ref 6.3–8.2)
RBC # BLD AUTO: 3.7 M/UL (ref 4.23–5.6)
SERVICE CMNT-IMP: NORMAL
SODIUM SERPL-SCNC: 135 MMOL/L (ref 136–145)
VANCOMYCIN SERPL-MCNC: 8.4 UG/ML
WBC # BLD AUTO: 12.8 K/UL (ref 4.3–11.1)

## 2025-03-10 PROCEDURE — 97161 PT EVAL LOW COMPLEX 20 MIN: CPT

## 2025-03-10 PROCEDURE — 36415 COLL VENOUS BLD VENIPUNCTURE: CPT

## 2025-03-10 PROCEDURE — 85025 COMPLETE CBC W/AUTO DIFF WBC: CPT

## 2025-03-10 PROCEDURE — 6360000002 HC RX W HCPCS: Performed by: INTERNAL MEDICINE

## 2025-03-10 PROCEDURE — 2580000003 HC RX 258: Performed by: INTERNAL MEDICINE

## 2025-03-10 PROCEDURE — 72100 X-RAY EXAM L-S SPINE 2/3 VWS: CPT

## 2025-03-10 PROCEDURE — 97165 OT EVAL LOW COMPLEX 30 MIN: CPT

## 2025-03-10 PROCEDURE — 97535 SELF CARE MNGMENT TRAINING: CPT

## 2025-03-10 PROCEDURE — 87040 BLOOD CULTURE FOR BACTERIA: CPT

## 2025-03-10 PROCEDURE — 29581 APPL MULTLAYER CMPRN SYS LEG: CPT

## 2025-03-10 PROCEDURE — 80053 COMPREHEN METABOLIC PANEL: CPT

## 2025-03-10 PROCEDURE — 80202 ASSAY OF VANCOMYCIN: CPT

## 2025-03-10 PROCEDURE — 1100000000 HC RM PRIVATE

## 2025-03-10 PROCEDURE — 82962 GLUCOSE BLOOD TEST: CPT

## 2025-03-10 PROCEDURE — 2500000003 HC RX 250 WO HCPCS: Performed by: INTERNAL MEDICINE

## 2025-03-10 PROCEDURE — 87205 SMEAR GRAM STAIN: CPT

## 2025-03-10 PROCEDURE — 97530 THERAPEUTIC ACTIVITIES: CPT

## 2025-03-10 PROCEDURE — 6370000000 HC RX 637 (ALT 250 FOR IP): Performed by: INTERNAL MEDICINE

## 2025-03-10 PROCEDURE — 72170 X-RAY EXAM OF PELVIS: CPT

## 2025-03-10 RX ORDER — MINERAL OIL/HYDROPHIL PETROLAT
OINTMENT (GRAM) TOPICAL
Status: DISCONTINUED | OUTPATIENT
Start: 2025-03-10 | End: 2025-03-19 | Stop reason: HOSPADM

## 2025-03-10 RX ADMIN — OXYCODONE HYDROCHLORIDE 5 MG: 5 TABLET ORAL at 09:25

## 2025-03-10 RX ADMIN — OXYCODONE HYDROCHLORIDE 5 MG: 5 TABLET ORAL at 02:42

## 2025-03-10 RX ADMIN — FUROSEMIDE 40 MG: 40 TABLET ORAL at 08:16

## 2025-03-10 RX ADMIN — VANCOMYCIN HYDROCHLORIDE 1250 MG: 10 INJECTION, POWDER, LYOPHILIZED, FOR SOLUTION INTRAVENOUS at 21:38

## 2025-03-10 RX ADMIN — DILTIAZEM HYDROCHLORIDE 240 MG: 240 CAPSULE, EXTENDED RELEASE ORAL at 08:16

## 2025-03-10 RX ADMIN — FAMOTIDINE 40 MG: 20 TABLET, FILM COATED ORAL at 08:16

## 2025-03-10 RX ADMIN — FAMOTIDINE 40 MG: 20 TABLET, FILM COATED ORAL at 21:30

## 2025-03-10 RX ADMIN — MICONAZOLE NITRATE: 20 POWDER TOPICAL at 08:18

## 2025-03-10 RX ADMIN — RIVAROXABAN 20 MG: 20 TABLET, FILM COATED ORAL at 17:16

## 2025-03-10 RX ADMIN — TAMSULOSIN HYDROCHLORIDE 0.4 MG: 0.4 CAPSULE ORAL at 08:16

## 2025-03-10 RX ADMIN — CEFEPIME 2000 MG: 2 INJECTION, POWDER, FOR SOLUTION INTRAVENOUS at 02:38

## 2025-03-10 RX ADMIN — GABAPENTIN 300 MG: 300 CAPSULE ORAL at 08:16

## 2025-03-10 RX ADMIN — CYCLOBENZAPRINE HYDROCHLORIDE 5 MG: 10 TABLET, FILM COATED ORAL at 21:30

## 2025-03-10 RX ADMIN — OXYCODONE HYDROCHLORIDE 5 MG: 5 TABLET ORAL at 15:06

## 2025-03-10 RX ADMIN — MICONAZOLE NITRATE: 20 POWDER TOPICAL at 21:48

## 2025-03-10 RX ADMIN — ALLOPURINOL 300 MG: 300 TABLET ORAL at 08:16

## 2025-03-10 RX ADMIN — SODIUM CHLORIDE, PRESERVATIVE FREE 10 ML: 5 INJECTION INTRAVENOUS at 08:17

## 2025-03-10 RX ADMIN — VANCOMYCIN HYDROCHLORIDE 1250 MG: 10 INJECTION, POWDER, LYOPHILIZED, FOR SOLUTION INTRAVENOUS at 12:11

## 2025-03-10 RX ADMIN — GABAPENTIN 300 MG: 300 CAPSULE ORAL at 15:06

## 2025-03-10 RX ADMIN — OXYCODONE HYDROCHLORIDE 5 MG: 5 TABLET ORAL at 23:09

## 2025-03-10 RX ADMIN — VANCOMYCIN HYDROCHLORIDE 1500 MG: 10 INJECTION, POWDER, LYOPHILIZED, FOR SOLUTION INTRAVENOUS at 03:35

## 2025-03-10 RX ADMIN — CEFEPIME 2000 MG: 2 INJECTION, POWDER, FOR SOLUTION INTRAVENOUS at 10:59

## 2025-03-10 RX ADMIN — GABAPENTIN 300 MG: 300 CAPSULE ORAL at 21:30

## 2025-03-10 RX ADMIN — SERTRALINE 150 MG: 100 TABLET, FILM COATED ORAL at 08:16

## 2025-03-10 RX ADMIN — SODIUM CHLORIDE, PRESERVATIVE FREE 10 ML: 5 INJECTION INTRAVENOUS at 21:39

## 2025-03-10 ASSESSMENT — PAIN DESCRIPTION - LOCATION
LOCATION: BACK

## 2025-03-10 ASSESSMENT — PAIN DESCRIPTION - DESCRIPTORS: DESCRIPTORS: SORE

## 2025-03-10 ASSESSMENT — PAIN SCALES - GENERAL
PAINLEVEL_OUTOF10: 7
PAINLEVEL_OUTOF10: 3
PAINLEVEL_OUTOF10: 6
PAINLEVEL_OUTOF10: 6
PAINLEVEL_OUTOF10: 3
PAINLEVEL_OUTOF10: 5
PAINLEVEL_OUTOF10: 6

## 2025-03-10 ASSESSMENT — PAIN DESCRIPTION - ORIENTATION
ORIENTATION: LEFT
ORIENTATION: POSTERIOR;MID;LOWER
ORIENTATION: LOWER

## 2025-03-10 ASSESSMENT — PAIN - FUNCTIONAL ASSESSMENT: PAIN_FUNCTIONAL_ASSESSMENT: ACTIVITIES ARE NOT PREVENTED

## 2025-03-10 ASSESSMENT — PAIN DESCRIPTION - PAIN TYPE: TYPE: ACUTE PAIN;CHRONIC PAIN

## 2025-03-10 NOTE — ACP (ADVANCE CARE PLANNING)
Advanced Care Planning (Initial Encounter)      Name: Jozef Felix            Age: 59 y.o.        Sex: male  : 1965    MRN:     [id number]    Date of ACP Conversation: 03/10/25    Conversation Conducted with: Patient with Decision Making Capacity     Patient is AAOx3 and has adequate capacity to make medical decisions.   In the event that he is no longer able to make medical decision, he would like his wife to make medical decisions.       Discussed the current conditions, workup/management plans as well as prognosis. The patient has been informed and is fully aware of the sufficient risks of the active diagnosis and risk for further deterioration due to the underlying condition.      In the event of cardiopulmonary arrest,  patient would like us to perform resuscitation including chest compression and intubation.      Time Spent face to face with patient/family:  16 mins (This is addition to time spent for clinical care)        Code Status:   Code Status: Full Code   Designated Health Care Decision Maker: Sadie Esparza MD

## 2025-03-10 NOTE — WOUND CARE
Consulted for Sacrum. Familiar to Wound team from previous admission, last seen 12/9. Follows [facility], last seen 3/6; dressing w/ multilayer compression wrap: Vaseline, adaptic to open areas, Opticell Ag between toes. Pt has compression wraps changed at clinic every Monday&Thursday. Bariatric bed already in place.    BLE edematous, dry/flaky, fungal columns, small serosanguinous, mild odor. Applied lotion today but will order Aquaphor for next dressing change, oil emulsion to open areas, Opticell Ag between toes, ABD, rolled gauze, coban. Will plan to change BLE compression wraps every Monday&Thursday.    LLE          RLE          Buttocks w/ MASD of kissing surfaces; pink/red, blanchable erythema, scant serosanguinous, no odor. Recommend incontinence care, zinc barrier cream BID&PRN, pink silicone border foam every other day&PRN.

## 2025-03-11 ENCOUNTER — APPOINTMENT (OUTPATIENT)
Dept: MRI IMAGING | Age: 60
DRG: 872 | End: 2025-03-11
Payer: COMMERCIAL

## 2025-03-11 LAB
ALBUMIN SERPL-MCNC: 2.5 G/DL (ref 3.2–4.6)
ALBUMIN/GLOB SERPL: 0.6 (ref 1–1.9)
ALP SERPL-CCNC: 75 U/L (ref 40–129)
ALT SERPL-CCNC: 7 U/L (ref 8–55)
ANION GAP SERPL CALC-SCNC: 8 MMOL/L (ref 7–16)
AST SERPL-CCNC: 18 U/L (ref 15–37)
BASOPHILS # BLD: 0.03 K/UL (ref 0–0.2)
BASOPHILS NFR BLD: 0.3 % (ref 0–2)
BILIRUB SERPL-MCNC: 0.4 MG/DL (ref 0–1.2)
BUN SERPL-MCNC: 14 MG/DL (ref 8–23)
CALCIUM SERPL-MCNC: 8.8 MG/DL (ref 8.8–10.2)
CHLORIDE SERPL-SCNC: 99 MMOL/L (ref 98–107)
CO2 SERPL-SCNC: 27 MMOL/L (ref 20–29)
CREAT SERPL-MCNC: 0.74 MG/DL (ref 0.8–1.3)
DIFFERENTIAL METHOD BLD: ABNORMAL
EOSINOPHIL # BLD: 0.07 K/UL (ref 0–0.8)
EOSINOPHIL NFR BLD: 0.8 % (ref 0.5–7.8)
ERYTHROCYTE [DISTWIDTH] IN BLOOD BY AUTOMATED COUNT: 13.2 % (ref 11.9–14.6)
GLOBULIN SER CALC-MCNC: 4 G/DL (ref 2.3–3.5)
GLUCOSE SERPL-MCNC: 132 MG/DL (ref 70–99)
HCT VFR BLD AUTO: 36.3 % (ref 41.1–50.3)
HGB BLD-MCNC: 11.9 G/DL (ref 13.6–17.2)
IMM GRANULOCYTES # BLD AUTO: 0.04 K/UL (ref 0–0.5)
IMM GRANULOCYTES NFR BLD AUTO: 0.4 % (ref 0–5)
LYMPHOCYTES # BLD: 0.8 K/UL (ref 0.5–4.6)
LYMPHOCYTES NFR BLD: 8.9 % (ref 13–44)
MCH RBC QN AUTO: 34.7 PG (ref 26.1–32.9)
MCHC RBC AUTO-ENTMCNC: 32.8 G/DL (ref 31.4–35)
MCV RBC AUTO: 105.8 FL (ref 82–102)
MONOCYTES # BLD: 0.82 K/UL (ref 0.1–1.3)
MONOCYTES NFR BLD: 9.1 % (ref 4–12)
NEUTS SEG # BLD: 7.22 K/UL (ref 1.7–8.2)
NEUTS SEG NFR BLD: 80.5 % (ref 43–78)
NRBC # BLD: 0 K/UL (ref 0–0.2)
PLATELET # BLD AUTO: 137 K/UL (ref 150–450)
PMV BLD AUTO: 10 FL (ref 9.4–12.3)
POTASSIUM SERPL-SCNC: 4 MMOL/L (ref 3.5–5.1)
PROT SERPL-MCNC: 6.5 G/DL (ref 6.3–8.2)
RBC # BLD AUTO: 3.43 M/UL (ref 4.23–5.6)
SODIUM SERPL-SCNC: 134 MMOL/L (ref 136–145)
WBC # BLD AUTO: 9 K/UL (ref 4.3–11.1)

## 2025-03-11 PROCEDURE — 2500000003 HC RX 250 WO HCPCS: Performed by: INTERNAL MEDICINE

## 2025-03-11 PROCEDURE — 80053 COMPREHEN METABOLIC PANEL: CPT

## 2025-03-11 PROCEDURE — 6370000000 HC RX 637 (ALT 250 FOR IP): Performed by: INTERNAL MEDICINE

## 2025-03-11 PROCEDURE — 36415 COLL VENOUS BLD VENIPUNCTURE: CPT

## 2025-03-11 PROCEDURE — 85025 COMPLETE CBC W/AUTO DIFF WBC: CPT

## 2025-03-11 PROCEDURE — 1100000000 HC RM PRIVATE

## 2025-03-11 PROCEDURE — 6360000002 HC RX W HCPCS: Performed by: INTERNAL MEDICINE

## 2025-03-11 PROCEDURE — 2580000003 HC RX 258: Performed by: INTERNAL MEDICINE

## 2025-03-11 RX ADMIN — SERTRALINE 150 MG: 100 TABLET, FILM COATED ORAL at 08:17

## 2025-03-11 RX ADMIN — OXYCODONE HYDROCHLORIDE 5 MG: 5 TABLET ORAL at 13:25

## 2025-03-11 RX ADMIN — SODIUM CHLORIDE, PRESERVATIVE FREE 10 ML: 5 INJECTION INTRAVENOUS at 20:34

## 2025-03-11 RX ADMIN — FAMOTIDINE 40 MG: 20 TABLET, FILM COATED ORAL at 20:32

## 2025-03-11 RX ADMIN — OXYCODONE HYDROCHLORIDE 5 MG: 5 TABLET ORAL at 17:08

## 2025-03-11 RX ADMIN — FAMOTIDINE 40 MG: 20 TABLET, FILM COATED ORAL at 08:17

## 2025-03-11 RX ADMIN — VANCOMYCIN HYDROCHLORIDE 1250 MG: 10 INJECTION, POWDER, LYOPHILIZED, FOR SOLUTION INTRAVENOUS at 11:12

## 2025-03-11 RX ADMIN — TAMSULOSIN HYDROCHLORIDE 0.4 MG: 0.4 CAPSULE ORAL at 08:17

## 2025-03-11 RX ADMIN — OXYCODONE HYDROCHLORIDE 5 MG: 5 TABLET ORAL at 04:56

## 2025-03-11 RX ADMIN — MICONAZOLE NITRATE: 20 POWDER TOPICAL at 20:35

## 2025-03-11 RX ADMIN — OXYCODONE HYDROCHLORIDE 5 MG: 5 TABLET ORAL at 08:55

## 2025-03-11 RX ADMIN — GABAPENTIN 300 MG: 300 CAPSULE ORAL at 13:25

## 2025-03-11 RX ADMIN — VANCOMYCIN HYDROCHLORIDE 1250 MG: 10 INJECTION, POWDER, LYOPHILIZED, FOR SOLUTION INTRAVENOUS at 04:45

## 2025-03-11 RX ADMIN — CYCLOBENZAPRINE HYDROCHLORIDE 5 MG: 10 TABLET, FILM COATED ORAL at 20:32

## 2025-03-11 RX ADMIN — VANCOMYCIN HYDROCHLORIDE 1250 MG: 10 INJECTION, POWDER, LYOPHILIZED, FOR SOLUTION INTRAVENOUS at 20:42

## 2025-03-11 RX ADMIN — GABAPENTIN 300 MG: 300 CAPSULE ORAL at 08:17

## 2025-03-11 RX ADMIN — GABAPENTIN 300 MG: 300 CAPSULE ORAL at 20:32

## 2025-03-11 RX ADMIN — SODIUM CHLORIDE, PRESERVATIVE FREE 10 ML: 5 INJECTION INTRAVENOUS at 08:17

## 2025-03-11 RX ADMIN — MICONAZOLE NITRATE: 20 POWDER TOPICAL at 08:16

## 2025-03-11 RX ADMIN — FUROSEMIDE 40 MG: 40 TABLET ORAL at 08:17

## 2025-03-11 RX ADMIN — OXYCODONE HYDROCHLORIDE 5 MG: 5 TABLET ORAL at 21:50

## 2025-03-11 RX ADMIN — RIVAROXABAN 20 MG: 20 TABLET, FILM COATED ORAL at 17:08

## 2025-03-11 RX ADMIN — DILTIAZEM HYDROCHLORIDE 240 MG: 240 CAPSULE, EXTENDED RELEASE ORAL at 08:17

## 2025-03-11 RX ADMIN — ALLOPURINOL 300 MG: 300 TABLET ORAL at 08:17

## 2025-03-11 ASSESSMENT — PAIN DESCRIPTION - DESCRIPTORS: DESCRIPTORS: DISCOMFORT

## 2025-03-11 ASSESSMENT — PAIN SCALES - GENERAL
PAINLEVEL_OUTOF10: 7
PAINLEVEL_OUTOF10: 6
PAINLEVEL_OUTOF10: 7
PAINLEVEL_OUTOF10: 6

## 2025-03-11 ASSESSMENT — PAIN DESCRIPTION - LOCATION
LOCATION: BACK

## 2025-03-11 ASSESSMENT — PAIN DESCRIPTION - ORIENTATION
ORIENTATION: LEFT
ORIENTATION: LEFT
ORIENTATION: LOWER
ORIENTATION: LEFT
ORIENTATION: LEFT

## 2025-03-11 NOTE — NURSE NAVIGATOR
This RN Navigator introduced self to patient and/or family at patient bedside. Patient informed that RN Navigator will be following patient for at least 30 days post discharge to act as a resource for any needs that may arise following discharge in relation to their sepsis diagnosis and treatment.     Patient verbalizes understanding of generalized education of sepsis disease process, s/s to monitor for and when to contact physician or visit Emergency Department.   Sepsis education sheet given to patient with this RN Navigator contact information. Patient informed they will be contacted 48-72 hours following discharge.   Contact information verified by patient and/or family member. RN Navigator will continue to follow.       Met with patient at chairside. Patient drowsy during meeting but A&Ox4. Patient states he lives with spouse, MIL and nephew in home for assistance. States he has 3 small dogs at home that like to jump in his lap. Educated that patient must be careful with small dogs and leg wounds.   No new needs identified this visit.

## 2025-03-12 ENCOUNTER — APPOINTMENT (OUTPATIENT)
Dept: NON INVASIVE DIAGNOSTICS | Age: 60
DRG: 872 | End: 2025-03-12
Payer: COMMERCIAL

## 2025-03-12 LAB
ALBUMIN SERPL-MCNC: 2.5 G/DL (ref 3.2–4.6)
ALBUMIN/GLOB SERPL: 0.7 (ref 1–1.9)
ALP SERPL-CCNC: 91 U/L (ref 40–129)
ALT SERPL-CCNC: 18 U/L (ref 8–55)
ANION GAP SERPL CALC-SCNC: 9 MMOL/L (ref 7–16)
AST SERPL-CCNC: 27 U/L (ref 15–37)
BACTERIA SPEC CULT: ABNORMAL
BACTERIA SPEC CULT: ABNORMAL
BASOPHILS # BLD: 0.02 K/UL (ref 0–0.2)
BASOPHILS NFR BLD: 0.3 % (ref 0–2)
BILIRUB SERPL-MCNC: 0.4 MG/DL (ref 0–1.2)
BUN SERPL-MCNC: 13 MG/DL (ref 8–23)
CALCIUM SERPL-MCNC: 8.3 MG/DL (ref 8.8–10.2)
CHLORIDE SERPL-SCNC: 98 MMOL/L (ref 98–107)
CK SERPL-CCNC: 78 U/L (ref 21–215)
CO2 SERPL-SCNC: 28 MMOL/L (ref 20–29)
CREAT SERPL-MCNC: 0.74 MG/DL (ref 0.8–1.3)
DIFFERENTIAL METHOD BLD: ABNORMAL
ECHO BSA: 2.96 M2
ECHO IVC PROX: 3.2 CM
ECHO LV EF PHYSICIAN: 55 %
EOSINOPHIL # BLD: 0.1 K/UL (ref 0–0.8)
EOSINOPHIL NFR BLD: 1.4 % (ref 0.5–7.8)
ERYTHROCYTE [DISTWIDTH] IN BLOOD BY AUTOMATED COUNT: 13.1 % (ref 11.9–14.6)
GLOBULIN SER CALC-MCNC: 3.5 G/DL (ref 2.3–3.5)
GLUCOSE SERPL-MCNC: 132 MG/DL (ref 70–99)
GRAM STN SPEC: ABNORMAL
HCT VFR BLD AUTO: 36.1 % (ref 41.1–50.3)
HGB BLD-MCNC: 11.9 G/DL (ref 13.6–17.2)
IMM GRANULOCYTES # BLD AUTO: 0.03 K/UL (ref 0–0.5)
IMM GRANULOCYTES NFR BLD AUTO: 0.4 % (ref 0–5)
LYMPHOCYTES # BLD: 0.78 K/UL (ref 0.5–4.6)
LYMPHOCYTES NFR BLD: 10.9 % (ref 13–44)
MCH RBC QN AUTO: 34.7 PG (ref 26.1–32.9)
MCHC RBC AUTO-ENTMCNC: 33 G/DL (ref 31.4–35)
MCV RBC AUTO: 105.2 FL (ref 82–102)
MONOCYTES # BLD: 0.68 K/UL (ref 0.1–1.3)
MONOCYTES NFR BLD: 9.5 % (ref 4–12)
NEUTS SEG # BLD: 5.57 K/UL (ref 1.7–8.2)
NEUTS SEG NFR BLD: 77.5 % (ref 43–78)
NRBC # BLD: 0 K/UL (ref 0–0.2)
PLATELET # BLD AUTO: 148 K/UL (ref 150–450)
PMV BLD AUTO: 9.8 FL (ref 9.4–12.3)
POTASSIUM SERPL-SCNC: 4 MMOL/L (ref 3.5–5.1)
PROT SERPL-MCNC: 6 G/DL (ref 6.3–8.2)
RBC # BLD AUTO: 3.43 M/UL (ref 4.23–5.6)
SERVICE CMNT-IMP: ABNORMAL
SODIUM SERPL-SCNC: 135 MMOL/L (ref 136–145)
VANCOMYCIN SERPL-MCNC: 12.6 UG/ML
WBC # BLD AUTO: 7.2 K/UL (ref 4.3–11.1)

## 2025-03-12 PROCEDURE — 2500000003 HC RX 250 WO HCPCS: Performed by: INTERNAL MEDICINE

## 2025-03-12 PROCEDURE — 80053 COMPREHEN METABOLIC PANEL: CPT

## 2025-03-12 PROCEDURE — 2580000003 HC RX 258: Performed by: INTERNAL MEDICINE

## 2025-03-12 PROCEDURE — 93308 TTE F-UP OR LMTD: CPT | Performed by: INTERNAL MEDICINE

## 2025-03-12 PROCEDURE — 85025 COMPLETE CBC W/AUTO DIFF WBC: CPT

## 2025-03-12 PROCEDURE — 80202 ASSAY OF VANCOMYCIN: CPT

## 2025-03-12 PROCEDURE — 36415 COLL VENOUS BLD VENIPUNCTURE: CPT

## 2025-03-12 PROCEDURE — 6360000002 HC RX W HCPCS: Performed by: INTERNAL MEDICINE

## 2025-03-12 PROCEDURE — 1100000000 HC RM PRIVATE

## 2025-03-12 PROCEDURE — 93325 DOPPLER ECHO COLOR FLOW MAPG: CPT | Performed by: INTERNAL MEDICINE

## 2025-03-12 PROCEDURE — 93321 DOPPLER ECHO F-UP/LMTD STD: CPT | Performed by: INTERNAL MEDICINE

## 2025-03-12 PROCEDURE — 6370000000 HC RX 637 (ALT 250 FOR IP): Performed by: INTERNAL MEDICINE

## 2025-03-12 PROCEDURE — 97112 NEUROMUSCULAR REEDUCATION: CPT

## 2025-03-12 PROCEDURE — 97530 THERAPEUTIC ACTIVITIES: CPT

## 2025-03-12 PROCEDURE — 97535 SELF CARE MNGMENT TRAINING: CPT

## 2025-03-12 PROCEDURE — 6360000004 HC RX CONTRAST MEDICATION

## 2025-03-12 PROCEDURE — 93325 DOPPLER ECHO COLOR FLOW MAPG: CPT

## 2025-03-12 PROCEDURE — 6360000002 HC RX W HCPCS

## 2025-03-12 PROCEDURE — 87040 BLOOD CULTURE FOR BACTERIA: CPT

## 2025-03-12 PROCEDURE — 82550 ASSAY OF CK (CPK): CPT

## 2025-03-12 RX ORDER — DEXAMETHASONE SODIUM PHOSPHATE 10 MG/ML
4 INJECTION, SOLUTION INTRA-ARTICULAR; INTRALESIONAL; INTRAMUSCULAR; INTRAVENOUS; SOFT TISSUE ONCE
Status: COMPLETED | OUTPATIENT
Start: 2025-03-12 | End: 2025-03-12

## 2025-03-12 RX ADMIN — OXYCODONE HYDROCHLORIDE 5 MG: 5 TABLET ORAL at 23:31

## 2025-03-12 RX ADMIN — SODIUM CHLORIDE, PRESERVATIVE FREE 10 ML: 5 INJECTION INTRAVENOUS at 20:50

## 2025-03-12 RX ADMIN — VANCOMYCIN HYDROCHLORIDE 1250 MG: 10 INJECTION, POWDER, LYOPHILIZED, FOR SOLUTION INTRAVENOUS at 20:47

## 2025-03-12 RX ADMIN — OXYCODONE HYDROCHLORIDE 5 MG: 5 TABLET ORAL at 13:13

## 2025-03-12 RX ADMIN — CYCLOBENZAPRINE HYDROCHLORIDE 5 MG: 10 TABLET, FILM COATED ORAL at 20:50

## 2025-03-12 RX ADMIN — VANCOMYCIN HYDROCHLORIDE 1250 MG: 10 INJECTION, POWDER, LYOPHILIZED, FOR SOLUTION INTRAVENOUS at 12:09

## 2025-03-12 RX ADMIN — OXYCODONE HYDROCHLORIDE 5 MG: 5 TABLET ORAL at 02:51

## 2025-03-12 RX ADMIN — TAMSULOSIN HYDROCHLORIDE 0.4 MG: 0.4 CAPSULE ORAL at 08:52

## 2025-03-12 RX ADMIN — DEXAMETHASONE SODIUM PHOSPHATE 4 MG: 10 INJECTION INTRAMUSCULAR; INTRAVENOUS at 08:54

## 2025-03-12 RX ADMIN — OXYCODONE HYDROCHLORIDE 5 MG: 5 TABLET ORAL at 18:01

## 2025-03-12 RX ADMIN — FUROSEMIDE 40 MG: 40 TABLET ORAL at 08:53

## 2025-03-12 RX ADMIN — DILTIAZEM HYDROCHLORIDE 240 MG: 240 CAPSULE, EXTENDED RELEASE ORAL at 08:53

## 2025-03-12 RX ADMIN — GABAPENTIN 300 MG: 300 CAPSULE ORAL at 20:50

## 2025-03-12 RX ADMIN — FAMOTIDINE 40 MG: 20 TABLET, FILM COATED ORAL at 08:52

## 2025-03-12 RX ADMIN — FAMOTIDINE 40 MG: 20 TABLET, FILM COATED ORAL at 20:50

## 2025-03-12 RX ADMIN — OXYCODONE HYDROCHLORIDE 5 MG: 5 TABLET ORAL at 08:51

## 2025-03-12 RX ADMIN — ALLOPURINOL 300 MG: 300 TABLET ORAL at 08:52

## 2025-03-12 RX ADMIN — GABAPENTIN 300 MG: 300 CAPSULE ORAL at 15:57

## 2025-03-12 RX ADMIN — GABAPENTIN 300 MG: 300 CAPSULE ORAL at 08:52

## 2025-03-12 RX ADMIN — MICONAZOLE NITRATE: 20 POWDER TOPICAL at 11:00

## 2025-03-12 RX ADMIN — ACETAMINOPHEN 650 MG: 325 TABLET, FILM COATED ORAL at 20:48

## 2025-03-12 RX ADMIN — VANCOMYCIN HYDROCHLORIDE 1250 MG: 10 INJECTION, POWDER, LYOPHILIZED, FOR SOLUTION INTRAVENOUS at 05:09

## 2025-03-12 RX ADMIN — SERTRALINE 150 MG: 100 TABLET, FILM COATED ORAL at 08:52

## 2025-03-12 RX ADMIN — ONDANSETRON 4 MG: 4 TABLET, ORALLY DISINTEGRATING ORAL at 08:51

## 2025-03-12 RX ADMIN — RIVAROXABAN 20 MG: 20 TABLET, FILM COATED ORAL at 18:01

## 2025-03-12 RX ADMIN — SULFUR HEXAFLUORIDE 2 ML: KIT at 10:39

## 2025-03-12 RX ADMIN — MICONAZOLE NITRATE: 20 POWDER TOPICAL at 20:51

## 2025-03-12 ASSESSMENT — PAIN SCALES - GENERAL
PAINLEVEL_OUTOF10: 8
PAINLEVEL_OUTOF10: 7
PAINLEVEL_OUTOF10: 5
PAINLEVEL_OUTOF10: 8
PAINLEVEL_OUTOF10: 6
PAINLEVEL_OUTOF10: 5

## 2025-03-12 ASSESSMENT — PAIN DESCRIPTION - LOCATION
LOCATION: BACK
LOCATION: BACK;LEG
LOCATION: BACK;GENERALIZED
LOCATION: BACK

## 2025-03-12 ASSESSMENT — PAIN DESCRIPTION - DESCRIPTORS
DESCRIPTORS: ACHING;DISCOMFORT
DESCRIPTORS: DISCOMFORT
DESCRIPTORS: DISCOMFORT

## 2025-03-12 ASSESSMENT — PAIN DESCRIPTION - ORIENTATION
ORIENTATION: RIGHT;LEFT;LOWER
ORIENTATION: LOWER

## 2025-03-12 NOTE — NURSE NAVIGATOR
Follow up visit made with patient at chairside. Patient states he has been having N/V today and it feels like a kidney stone did in the past. Patient states he has informed his physician. Navigator will take patient sepsis booklet tomorrow morning.   No new needs identified at this visit. Navigator will continue to visit.

## 2025-03-13 ENCOUNTER — APPOINTMENT (OUTPATIENT)
Dept: MRI IMAGING | Age: 60
DRG: 872 | End: 2025-03-13
Payer: COMMERCIAL

## 2025-03-13 LAB
ACB COMPLEX DNA BLD POS QL NAA+NON-PROBE: NOT DETECTED
ACB COMPLEX DNA BLD POS QL NAA+NON-PROBE: NOT DETECTED
ACCESSION NUMBER, LLC1M: ABNORMAL
ACCESSION NUMBER, LLC1M: ABNORMAL
ANION GAP SERPL CALC-SCNC: 10 MMOL/L (ref 7–16)
B FRAGILIS DNA BLD POS QL NAA+NON-PROBE: NOT DETECTED
B FRAGILIS DNA BLD POS QL NAA+NON-PROBE: NOT DETECTED
BACTERIA SPEC CULT: ABNORMAL
BASOPHILS # BLD: 0.01 K/UL (ref 0–0.2)
BASOPHILS NFR BLD: 0.1 % (ref 0–2)
BIOFIRE TEST COMMENT: ABNORMAL
BIOFIRE TEST COMMENT: ABNORMAL
BUN SERPL-MCNC: 14 MG/DL (ref 8–23)
C ALBICANS DNA BLD POS QL NAA+NON-PROBE: NOT DETECTED
C ALBICANS DNA BLD POS QL NAA+NON-PROBE: NOT DETECTED
C AURIS DNA BLD POS QL NAA+NON-PROBE: NOT DETECTED
C AURIS DNA BLD POS QL NAA+NON-PROBE: NOT DETECTED
C GATTII+NEOFOR DNA BLD POS QL NAA+N-PRB: NOT DETECTED
C GATTII+NEOFOR DNA BLD POS QL NAA+N-PRB: NOT DETECTED
C GLABRATA DNA BLD POS QL NAA+NON-PROBE: NOT DETECTED
C GLABRATA DNA BLD POS QL NAA+NON-PROBE: NOT DETECTED
C KRUSEI DNA BLD POS QL NAA+NON-PROBE: NOT DETECTED
C KRUSEI DNA BLD POS QL NAA+NON-PROBE: NOT DETECTED
C PARAP DNA BLD POS QL NAA+NON-PROBE: NOT DETECTED
C PARAP DNA BLD POS QL NAA+NON-PROBE: NOT DETECTED
C TROPICLS DNA BLD POS QL NAA+NON-PROBE: NOT DETECTED
C TROPICLS DNA BLD POS QL NAA+NON-PROBE: NOT DETECTED
CALCIUM SERPL-MCNC: 8.9 MG/DL (ref 8.8–10.2)
CHLORIDE SERPL-SCNC: 99 MMOL/L (ref 98–107)
CK SERPL-CCNC: 42 U/L (ref 21–215)
CO2 SERPL-SCNC: 29 MMOL/L (ref 20–29)
CREAT SERPL-MCNC: 0.7 MG/DL (ref 0.8–1.3)
DIFFERENTIAL METHOD BLD: ABNORMAL
E CLOAC COMP DNA BLD POS NAA+NON-PROBE: NOT DETECTED
E CLOAC COMP DNA BLD POS NAA+NON-PROBE: NOT DETECTED
E COLI DNA BLD POS QL NAA+NON-PROBE: NOT DETECTED
E COLI DNA BLD POS QL NAA+NON-PROBE: NOT DETECTED
E FAECALIS DNA BLD POS QL NAA+NON-PROBE: NOT DETECTED
E FAECALIS DNA BLD POS QL NAA+NON-PROBE: NOT DETECTED
E FAECIUM DNA BLD POS QL NAA+NON-PROBE: NOT DETECTED
E FAECIUM DNA BLD POS QL NAA+NON-PROBE: NOT DETECTED
ENTEROBACTERALES DNA BLD POS NAA+N-PRB: NOT DETECTED
ENTEROBACTERALES DNA BLD POS NAA+N-PRB: NOT DETECTED
EOSINOPHIL # BLD: 0.01 K/UL (ref 0–0.8)
EOSINOPHIL NFR BLD: 0.1 % (ref 0.5–7.8)
ERYTHROCYTE [DISTWIDTH] IN BLOOD BY AUTOMATED COUNT: 12.7 % (ref 11.9–14.6)
GLUCOSE SERPL-MCNC: 145 MG/DL (ref 70–99)
GP B STREP DNA BLD POS QL NAA+NON-PROBE: NOT DETECTED
GP B STREP DNA BLD POS QL NAA+NON-PROBE: NOT DETECTED
GRAM STN SPEC: ABNORMAL
HAEM INFLU DNA BLD POS QL NAA+NON-PROBE: NOT DETECTED
HAEM INFLU DNA BLD POS QL NAA+NON-PROBE: NOT DETECTED
HCT VFR BLD AUTO: 33.9 % (ref 41.1–50.3)
HGB BLD-MCNC: 11.5 G/DL (ref 13.6–17.2)
IMM GRANULOCYTES # BLD AUTO: 0.03 K/UL (ref 0–0.5)
IMM GRANULOCYTES NFR BLD AUTO: 0.4 % (ref 0–5)
K OXYTOCA DNA BLD POS QL NAA+NON-PROBE: NOT DETECTED
K OXYTOCA DNA BLD POS QL NAA+NON-PROBE: NOT DETECTED
KLEBSIELLA SP DNA BLD POS QL NAA+NON-PRB: NOT DETECTED
L MONOCYTOG DNA BLD POS QL NAA+NON-PROBE: NOT DETECTED
L MONOCYTOG DNA BLD POS QL NAA+NON-PROBE: NOT DETECTED
LYMPHOCYTES # BLD: 0.77 K/UL (ref 0.5–4.6)
LYMPHOCYTES NFR BLD: 9 % (ref 13–44)
MCH RBC QN AUTO: 34.3 PG (ref 26.1–32.9)
MCHC RBC AUTO-ENTMCNC: 33.9 G/DL (ref 31.4–35)
MCV RBC AUTO: 101.2 FL (ref 82–102)
MECA+MECC+MREJ ISLT/SPM QL: DETECTED
MECA+MECC+MREJ ISLT/SPM QL: DETECTED
MONOCYTES # BLD: 0.71 K/UL (ref 0.1–1.3)
MONOCYTES NFR BLD: 8.3 % (ref 4–12)
N MEN DNA BLD POS QL NAA+NON-PROBE: NOT DETECTED
N MEN DNA BLD POS QL NAA+NON-PROBE: NOT DETECTED
NEUTS SEG # BLD: 6.99 K/UL (ref 1.7–8.2)
NEUTS SEG NFR BLD: 82.1 % (ref 43–78)
NRBC # BLD: 0 K/UL (ref 0–0.2)
P AERUGINOSA DNA BLD POS NAA+NON-PROBE: NOT DETECTED
P AERUGINOSA DNA BLD POS NAA+NON-PROBE: NOT DETECTED
PLATELET # BLD AUTO: 171 K/UL (ref 150–450)
PMV BLD AUTO: 10 FL (ref 9.4–12.3)
POTASSIUM SERPL-SCNC: 4 MMOL/L (ref 3.5–5.1)
PROTEUS SP DNA BLD POS QL NAA+NON-PROBE: NOT DETECTED
PROTEUS SP DNA BLD POS QL NAA+NON-PROBE: NOT DETECTED
RBC # BLD AUTO: 3.35 M/UL (ref 4.23–5.6)
RESISTANT GENE TARGETS: ABNORMAL
RESISTANT GENE TARGETS: ABNORMAL
S AUREUS DNA BLD POS QL NAA+NON-PROBE: DETECTED
S AUREUS DNA BLD POS QL NAA+NON-PROBE: DETECTED
S AUREUS+CONS DNA BLD POS NAA+NON-PROBE: DETECTED
S AUREUS+CONS DNA BLD POS NAA+NON-PROBE: DETECTED
S EPIDERMIDIS DNA BLD POS QL NAA+NON-PRB: NOT DETECTED
S EPIDERMIDIS DNA BLD POS QL NAA+NON-PRB: NOT DETECTED
S LUGDUNENSIS DNA BLD POS QL NAA+NON-PRB: NOT DETECTED
S LUGDUNENSIS DNA BLD POS QL NAA+NON-PRB: NOT DETECTED
S MALTOPHILIA DNA BLD POS QL NAA+NON-PRB: NOT DETECTED
S MALTOPHILIA DNA BLD POS QL NAA+NON-PRB: NOT DETECTED
S MARCESCENS DNA BLD POS NAA+NON-PROBE: NOT DETECTED
S MARCESCENS DNA BLD POS NAA+NON-PROBE: NOT DETECTED
S PNEUM DNA BLD POS QL NAA+NON-PROBE: NOT DETECTED
S PNEUM DNA BLD POS QL NAA+NON-PROBE: NOT DETECTED
S PYO DNA BLD POS QL NAA+NON-PROBE: NOT DETECTED
S PYO DNA BLD POS QL NAA+NON-PROBE: NOT DETECTED
SALMONELLA DNA BLD POS QL NAA+NON-PROBE: NOT DETECTED
SALMONELLA DNA BLD POS QL NAA+NON-PROBE: NOT DETECTED
SERVICE CMNT-IMP: ABNORMAL
SERVICE CMNT-IMP: ABNORMAL
SODIUM SERPL-SCNC: 137 MMOL/L (ref 136–145)
STREPTOCOCCUS DNA BLD POS NAA+NON-PROBE: NOT DETECTED
STREPTOCOCCUS DNA BLD POS NAA+NON-PROBE: NOT DETECTED
WBC # BLD AUTO: 8.5 K/UL (ref 4.3–11.1)

## 2025-03-13 PROCEDURE — 6360000002 HC RX W HCPCS: Performed by: NURSE PRACTITIONER

## 2025-03-13 PROCEDURE — 36415 COLL VENOUS BLD VENIPUNCTURE: CPT

## 2025-03-13 PROCEDURE — 6360000004 HC RX CONTRAST MEDICATION: Performed by: INTERNAL MEDICINE

## 2025-03-13 PROCEDURE — 6360000002 HC RX W HCPCS: Performed by: INTERNAL MEDICINE

## 2025-03-13 PROCEDURE — 72158 MRI LUMBAR SPINE W/O & W/DYE: CPT

## 2025-03-13 PROCEDURE — 6370000000 HC RX 637 (ALT 250 FOR IP): Performed by: INTERNAL MEDICINE

## 2025-03-13 PROCEDURE — 6360000002 HC RX W HCPCS

## 2025-03-13 PROCEDURE — 87040 BLOOD CULTURE FOR BACTERIA: CPT

## 2025-03-13 PROCEDURE — 2580000003 HC RX 258: Performed by: INTERNAL MEDICINE

## 2025-03-13 PROCEDURE — 2580000003 HC RX 258

## 2025-03-13 PROCEDURE — 80048 BASIC METABOLIC PNL TOTAL CA: CPT

## 2025-03-13 PROCEDURE — 82550 ASSAY OF CK (CPK): CPT

## 2025-03-13 PROCEDURE — 2580000003 HC RX 258: Performed by: NURSE PRACTITIONER

## 2025-03-13 PROCEDURE — 85025 COMPLETE CBC W/AUTO DIFF WBC: CPT

## 2025-03-13 PROCEDURE — 29581 APPL MULTLAYER CMPRN SYS LEG: CPT

## 2025-03-13 PROCEDURE — 1100000000 HC RM PRIVATE

## 2025-03-13 PROCEDURE — A9579 GAD-BASE MR CONTRAST NOS,1ML: HCPCS | Performed by: INTERNAL MEDICINE

## 2025-03-13 PROCEDURE — 2500000003 HC RX 250 WO HCPCS: Performed by: INTERNAL MEDICINE

## 2025-03-13 RX ORDER — MORPHINE SULFATE 4 MG/ML
4 INJECTION, SOLUTION INTRAMUSCULAR; INTRAVENOUS ONCE
Refills: 0 | Status: COMPLETED | OUTPATIENT
Start: 2025-03-13 | End: 2025-03-13

## 2025-03-13 RX ADMIN — GABAPENTIN 300 MG: 300 CAPSULE ORAL at 09:09

## 2025-03-13 RX ADMIN — FAMOTIDINE 40 MG: 20 TABLET, FILM COATED ORAL at 20:27

## 2025-03-13 RX ADMIN — OXYCODONE HYDROCHLORIDE 5 MG: 5 TABLET ORAL at 20:27

## 2025-03-13 RX ADMIN — LORAZEPAM 1 MG: 2 INJECTION INTRAMUSCULAR; INTRAVENOUS at 13:04

## 2025-03-13 RX ADMIN — SODIUM CHLORIDE, PRESERVATIVE FREE 10 ML: 5 INJECTION INTRAVENOUS at 09:10

## 2025-03-13 RX ADMIN — MICONAZOLE NITRATE: 20 POWDER TOPICAL at 09:11

## 2025-03-13 RX ADMIN — GADOTERIDOL 33 ML: 279.3 INJECTION, SOLUTION INTRAVENOUS at 14:28

## 2025-03-13 RX ADMIN — DAPTOMYCIN 950 MG: 500 INJECTION, POWDER, LYOPHILIZED, FOR SOLUTION INTRAVENOUS at 15:11

## 2025-03-13 RX ADMIN — MORPHINE SULFATE 4 MG: 4 INJECTION INTRAVENOUS at 13:04

## 2025-03-13 RX ADMIN — CYCLOBENZAPRINE HYDROCHLORIDE 5 MG: 10 TABLET, FILM COATED ORAL at 20:28

## 2025-03-13 RX ADMIN — ALLOPURINOL 300 MG: 300 TABLET ORAL at 09:09

## 2025-03-13 RX ADMIN — DILTIAZEM HYDROCHLORIDE 240 MG: 240 CAPSULE, EXTENDED RELEASE ORAL at 09:09

## 2025-03-13 RX ADMIN — RIVAROXABAN 20 MG: 20 TABLET, FILM COATED ORAL at 17:16

## 2025-03-13 RX ADMIN — SODIUM CHLORIDE, PRESERVATIVE FREE 10 ML: 5 INJECTION INTRAVENOUS at 20:28

## 2025-03-13 RX ADMIN — VANCOMYCIN HYDROCHLORIDE 1250 MG: 10 INJECTION, POWDER, LYOPHILIZED, FOR SOLUTION INTRAVENOUS at 04:57

## 2025-03-13 RX ADMIN — GABAPENTIN 300 MG: 300 CAPSULE ORAL at 15:20

## 2025-03-13 RX ADMIN — ACETAMINOPHEN 650 MG: 325 TABLET, FILM COATED ORAL at 15:23

## 2025-03-13 RX ADMIN — TAMSULOSIN HYDROCHLORIDE 0.4 MG: 0.4 CAPSULE ORAL at 09:10

## 2025-03-13 RX ADMIN — FUROSEMIDE 40 MG: 40 TABLET ORAL at 09:09

## 2025-03-13 RX ADMIN — MICONAZOLE NITRATE: 20 POWDER TOPICAL at 20:27

## 2025-03-13 RX ADMIN — FAMOTIDINE 40 MG: 20 TABLET, FILM COATED ORAL at 09:09

## 2025-03-13 RX ADMIN — SERTRALINE 150 MG: 100 TABLET, FILM COATED ORAL at 09:09

## 2025-03-13 RX ADMIN — GABAPENTIN 300 MG: 300 CAPSULE ORAL at 20:28

## 2025-03-13 ASSESSMENT — PAIN DESCRIPTION - ORIENTATION
ORIENTATION: LOWER;MID
ORIENTATION: RIGHT;LEFT

## 2025-03-13 ASSESSMENT — PAIN SCALES - GENERAL
PAINLEVEL_OUTOF10: 9
PAINLEVEL_OUTOF10: 3

## 2025-03-13 ASSESSMENT — PAIN DESCRIPTION - LOCATION
LOCATION: BACK
LOCATION: BACK

## 2025-03-13 ASSESSMENT — PAIN DESCRIPTION - DESCRIPTORS
DESCRIPTORS: DISCOMFORT;ACHING
DESCRIPTORS: ACHING

## 2025-03-13 ASSESSMENT — PAIN - FUNCTIONAL ASSESSMENT: PAIN_FUNCTIONAL_ASSESSMENT: ACTIVITIES ARE NOT PREVENTED

## 2025-03-13 NOTE — NURSE NAVIGATOR
Follow up visit with patient to deliver sepsis booklet. Patient appeared to be slightly confused this visit, informed RN assigned to that patient. No new needs identified this visit. Navigator will continue to follow.

## 2025-03-13 NOTE — WOUND CARE
BLE compression wraps changed; will continue to follow Monday & Thursday. Pt w/ slight confusion, lightly drowsy; Pt states he didn't sleep well last night and feels his memory hasn't been too good; confirms taking pain meds.    RLE          LLE

## 2025-03-14 LAB
ANION GAP SERPL CALC-SCNC: 11 MMOL/L (ref 7–16)
BASOPHILS # BLD: 0.01 K/UL (ref 0–0.2)
BASOPHILS NFR BLD: 0.1 % (ref 0–2)
BUN SERPL-MCNC: 14 MG/DL (ref 8–23)
CALCIUM SERPL-MCNC: 9.6 MG/DL (ref 8.8–10.2)
CHLORIDE SERPL-SCNC: 98 MMOL/L (ref 98–107)
CK SERPL-CCNC: 22 U/L (ref 21–215)
CO2 SERPL-SCNC: 31 MMOL/L (ref 20–29)
CREAT SERPL-MCNC: 0.73 MG/DL (ref 0.8–1.3)
DIFFERENTIAL METHOD BLD: ABNORMAL
EOSINOPHIL # BLD: 0 K/UL (ref 0–0.8)
EOSINOPHIL NFR BLD: 0 % (ref 0.5–7.8)
ERYTHROCYTE [DISTWIDTH] IN BLOOD BY AUTOMATED COUNT: 12.9 % (ref 11.9–14.6)
GLUCOSE SERPL-MCNC: 135 MG/DL (ref 70–99)
HCT VFR BLD AUTO: 37.2 % (ref 41.1–50.3)
HGB BLD-MCNC: 12.1 G/DL (ref 13.6–17.2)
IMM GRANULOCYTES # BLD AUTO: 0.04 K/UL (ref 0–0.5)
IMM GRANULOCYTES NFR BLD AUTO: 0.4 % (ref 0–5)
LYMPHOCYTES # BLD: 0.65 K/UL (ref 0.5–4.6)
LYMPHOCYTES NFR BLD: 7.2 % (ref 13–44)
MCH RBC QN AUTO: 34.4 PG (ref 26.1–32.9)
MCHC RBC AUTO-ENTMCNC: 32.5 G/DL (ref 31.4–35)
MCV RBC AUTO: 105.7 FL (ref 82–102)
MONOCYTES # BLD: 0.21 K/UL (ref 0.1–1.3)
MONOCYTES NFR BLD: 2.3 % (ref 4–12)
NEUTS SEG # BLD: 8.15 K/UL (ref 1.7–8.2)
NEUTS SEG NFR BLD: 90 % (ref 43–78)
NRBC # BLD: 0 K/UL (ref 0–0.2)
PLATELET # BLD AUTO: 191 K/UL (ref 150–450)
PMV BLD AUTO: 10 FL (ref 9.4–12.3)
POTASSIUM SERPL-SCNC: 4.5 MMOL/L (ref 3.5–5.1)
RBC # BLD AUTO: 3.52 M/UL (ref 4.23–5.6)
SODIUM SERPL-SCNC: 140 MMOL/L (ref 136–145)
WBC # BLD AUTO: 9.1 K/UL (ref 4.3–11.1)

## 2025-03-14 PROCEDURE — 80048 BASIC METABOLIC PNL TOTAL CA: CPT

## 2025-03-14 PROCEDURE — 6360000002 HC RX W HCPCS: Performed by: NURSE PRACTITIONER

## 2025-03-14 PROCEDURE — 36415 COLL VENOUS BLD VENIPUNCTURE: CPT

## 2025-03-14 PROCEDURE — 6360000002 HC RX W HCPCS

## 2025-03-14 PROCEDURE — 2700000000 HC OXYGEN THERAPY PER DAY

## 2025-03-14 PROCEDURE — 6370000000 HC RX 637 (ALT 250 FOR IP): Performed by: INTERNAL MEDICINE

## 2025-03-14 PROCEDURE — 94760 N-INVAS EAR/PLS OXIMETRY 1: CPT

## 2025-03-14 PROCEDURE — 6370000000 HC RX 637 (ALT 250 FOR IP)

## 2025-03-14 PROCEDURE — 85025 COMPLETE CBC W/AUTO DIFF WBC: CPT

## 2025-03-14 PROCEDURE — 2500000003 HC RX 250 WO HCPCS

## 2025-03-14 PROCEDURE — 97530 THERAPEUTIC ACTIVITIES: CPT

## 2025-03-14 PROCEDURE — 82550 ASSAY OF CK (CPK): CPT

## 2025-03-14 PROCEDURE — 2060000000 HC ICU INTERMEDIATE R&B

## 2025-03-14 PROCEDURE — 2500000003 HC RX 250 WO HCPCS: Performed by: INTERNAL MEDICINE

## 2025-03-14 PROCEDURE — 2580000003 HC RX 258: Performed by: NURSE PRACTITIONER

## 2025-03-14 RX ORDER — CYCLOBENZAPRINE HCL 10 MG
10 TABLET ORAL NIGHTLY
Status: DISCONTINUED | OUTPATIENT
Start: 2025-03-14 | End: 2025-03-19 | Stop reason: HOSPADM

## 2025-03-14 RX ORDER — GABAPENTIN 400 MG/1
400 CAPSULE ORAL 3 TIMES DAILY
Status: DISCONTINUED | OUTPATIENT
Start: 2025-03-14 | End: 2025-03-19 | Stop reason: HOSPADM

## 2025-03-14 RX ORDER — DEXAMETHASONE SODIUM PHOSPHATE 10 MG/ML
4 INJECTION, SOLUTION INTRA-ARTICULAR; INTRALESIONAL; INTRAMUSCULAR; INTRAVENOUS; SOFT TISSUE ONCE
Status: COMPLETED | OUTPATIENT
Start: 2025-03-14 | End: 2025-03-14

## 2025-03-14 RX ORDER — SODIUM CHLORIDE 0.9 % (FLUSH) 0.9 %
5-40 SYRINGE (ML) INJECTION EVERY 12 HOURS SCHEDULED
Status: DISCONTINUED | OUTPATIENT
Start: 2025-03-14 | End: 2025-03-19 | Stop reason: HOSPADM

## 2025-03-14 RX ORDER — LIDOCAINE HYDROCHLORIDE 10 MG/ML
50 INJECTION, SOLUTION EPIDURAL; INFILTRATION; INTRACAUDAL; PERINEURAL ONCE
Status: DISCONTINUED | OUTPATIENT
Start: 2025-03-14 | End: 2025-03-19 | Stop reason: HOSPADM

## 2025-03-14 RX ORDER — SODIUM CHLORIDE 0.9 % (FLUSH) 0.9 %
5-40 SYRINGE (ML) INJECTION PRN
Status: DISCONTINUED | OUTPATIENT
Start: 2025-03-14 | End: 2025-03-19 | Stop reason: HOSPADM

## 2025-03-14 RX ORDER — SODIUM CHLORIDE 9 MG/ML
INJECTION, SOLUTION INTRAVENOUS PRN
Status: DISCONTINUED | OUTPATIENT
Start: 2025-03-14 | End: 2025-03-19 | Stop reason: HOSPADM

## 2025-03-14 RX ORDER — PREDNISONE 20 MG/1
40 TABLET ORAL DAILY
Status: COMPLETED | OUTPATIENT
Start: 2025-03-15 | End: 2025-03-19

## 2025-03-14 RX ADMIN — SODIUM CHLORIDE, PRESERVATIVE FREE 10 ML: 5 INJECTION INTRAVENOUS at 22:01

## 2025-03-14 RX ADMIN — RIVAROXABAN 20 MG: 20 TABLET, FILM COATED ORAL at 17:22

## 2025-03-14 RX ADMIN — SODIUM CHLORIDE, PRESERVATIVE FREE 10 ML: 5 INJECTION INTRAVENOUS at 09:45

## 2025-03-14 RX ADMIN — TAMSULOSIN HYDROCHLORIDE 0.4 MG: 0.4 CAPSULE ORAL at 09:44

## 2025-03-14 RX ADMIN — FUROSEMIDE 40 MG: 40 TABLET ORAL at 09:44

## 2025-03-14 RX ADMIN — MICONAZOLE NITRATE: 20 POWDER TOPICAL at 22:19

## 2025-03-14 RX ADMIN — SERTRALINE 150 MG: 100 TABLET, FILM COATED ORAL at 09:47

## 2025-03-14 RX ADMIN — GABAPENTIN 300 MG: 300 CAPSULE ORAL at 09:44

## 2025-03-14 RX ADMIN — MICONAZOLE NITRATE: 20 POWDER TOPICAL at 09:46

## 2025-03-14 RX ADMIN — OXYCODONE HYDROCHLORIDE 5 MG: 5 TABLET ORAL at 09:41

## 2025-03-14 RX ADMIN — DAPTOMYCIN 950 MG: 500 INJECTION, POWDER, LYOPHILIZED, FOR SOLUTION INTRAVENOUS at 17:21

## 2025-03-14 RX ADMIN — DEXAMETHASONE SODIUM PHOSPHATE 4 MG: 10 INJECTION INTRAMUSCULAR; INTRAVENOUS at 00:28

## 2025-03-14 RX ADMIN — OXYCODONE HYDROCHLORIDE 5 MG: 5 TABLET ORAL at 22:13

## 2025-03-14 RX ADMIN — OXYCODONE HYDROCHLORIDE 5 MG: 5 TABLET ORAL at 14:43

## 2025-03-14 RX ADMIN — FAMOTIDINE 40 MG: 20 TABLET, FILM COATED ORAL at 09:44

## 2025-03-14 RX ADMIN — DILTIAZEM HYDROCHLORIDE 240 MG: 240 CAPSULE, EXTENDED RELEASE ORAL at 09:44

## 2025-03-14 RX ADMIN — DEXAMETHASONE SODIUM PHOSPHATE 4 MG: 10 INJECTION INTRAMUSCULAR; INTRAVENOUS at 17:22

## 2025-03-14 RX ADMIN — GABAPENTIN 300 MG: 300 CAPSULE ORAL at 14:43

## 2025-03-14 RX ADMIN — ALLOPURINOL 300 MG: 300 TABLET ORAL at 09:45

## 2025-03-14 RX ADMIN — FAMOTIDINE 40 MG: 20 TABLET, FILM COATED ORAL at 22:13

## 2025-03-14 RX ADMIN — CYCLOBENZAPRINE HYDROCHLORIDE 10 MG: 10 TABLET, FILM COATED ORAL at 22:12

## 2025-03-14 RX ADMIN — GABAPENTIN 400 MG: 400 CAPSULE ORAL at 17:22

## 2025-03-14 ASSESSMENT — PAIN DESCRIPTION - LOCATION
LOCATION: LEG
LOCATION: BACK
LOCATION: LEG
LOCATION: LEG

## 2025-03-14 ASSESSMENT — PAIN DESCRIPTION - ORIENTATION
ORIENTATION: RIGHT;LEFT
ORIENTATION: MID
ORIENTATION: LEFT;RIGHT
ORIENTATION: LEFT

## 2025-03-14 ASSESSMENT — PAIN DESCRIPTION - DESCRIPTORS
DESCRIPTORS: ACHING
DESCRIPTORS: ACHING
DESCRIPTORS: STABBING;TEARING

## 2025-03-14 ASSESSMENT — PAIN - FUNCTIONAL ASSESSMENT: PAIN_FUNCTIONAL_ASSESSMENT: ACTIVITIES ARE NOT PREVENTED

## 2025-03-14 ASSESSMENT — PAIN SCALES - GENERAL
PAINLEVEL_OUTOF10: 10
PAINLEVEL_OUTOF10: 5
PAINLEVEL_OUTOF10: 5
PAINLEVEL_OUTOF10: 0
PAINLEVEL_OUTOF10: 5

## 2025-03-15 LAB
ANION GAP SERPL CALC-SCNC: 9 MMOL/L (ref 7–16)
BACTERIA SPEC CULT: NORMAL
BASOPHILS # BLD: 0.01 K/UL (ref 0–0.2)
BASOPHILS NFR BLD: 0.1 % (ref 0–2)
BUN SERPL-MCNC: 19 MG/DL (ref 8–23)
CALCIUM SERPL-MCNC: 9.6 MG/DL (ref 8.8–10.2)
CHLORIDE SERPL-SCNC: 100 MMOL/L (ref 98–107)
CO2 SERPL-SCNC: 30 MMOL/L (ref 20–29)
CREAT SERPL-MCNC: 0.73 MG/DL (ref 0.8–1.3)
DIFFERENTIAL METHOD BLD: ABNORMAL
EOSINOPHIL # BLD: 0 K/UL (ref 0–0.8)
EOSINOPHIL NFR BLD: 0 % (ref 0.5–7.8)
ERYTHROCYTE [DISTWIDTH] IN BLOOD BY AUTOMATED COUNT: 12.8 % (ref 11.9–14.6)
GLUCOSE BLD STRIP.AUTO-MCNC: 140 MG/DL (ref 65–100)
GLUCOSE SERPL-MCNC: 145 MG/DL (ref 70–99)
HCT VFR BLD AUTO: 36 % (ref 41.1–50.3)
HGB BLD-MCNC: 11.9 G/DL (ref 13.6–17.2)
IMM GRANULOCYTES # BLD AUTO: 0.07 K/UL (ref 0–0.5)
IMM GRANULOCYTES NFR BLD AUTO: 0.5 % (ref 0–5)
LYMPHOCYTES # BLD: 0.93 K/UL (ref 0.5–4.6)
LYMPHOCYTES NFR BLD: 7.2 % (ref 13–44)
MCH RBC QN AUTO: 34.5 PG (ref 26.1–32.9)
MCHC RBC AUTO-ENTMCNC: 33.1 G/DL (ref 31.4–35)
MCV RBC AUTO: 104.3 FL (ref 82–102)
MONOCYTES # BLD: 0.78 K/UL (ref 0.1–1.3)
MONOCYTES NFR BLD: 6 % (ref 4–12)
NEUTS SEG # BLD: 11.21 K/UL (ref 1.7–8.2)
NEUTS SEG NFR BLD: 86.2 % (ref 43–78)
NRBC # BLD: 0 K/UL (ref 0–0.2)
PLATELET # BLD AUTO: 242 K/UL (ref 150–450)
PMV BLD AUTO: 10 FL (ref 9.4–12.3)
POTASSIUM SERPL-SCNC: 4.2 MMOL/L (ref 3.5–5.1)
RBC # BLD AUTO: 3.45 M/UL (ref 4.23–5.6)
SERVICE CMNT-IMP: ABNORMAL
SERVICE CMNT-IMP: NORMAL
SODIUM SERPL-SCNC: 139 MMOL/L (ref 136–145)
WBC # BLD AUTO: 13 K/UL (ref 4.3–11.1)

## 2025-03-15 PROCEDURE — 2580000003 HC RX 258: Performed by: NURSE PRACTITIONER

## 2025-03-15 PROCEDURE — 82962 GLUCOSE BLOOD TEST: CPT

## 2025-03-15 PROCEDURE — 6370000000 HC RX 637 (ALT 250 FOR IP)

## 2025-03-15 PROCEDURE — 05HF33Z INSERTION OF INFUSION DEVICE INTO LEFT CEPHALIC VEIN, PERCUTANEOUS APPROACH: ICD-10-PCS

## 2025-03-15 PROCEDURE — 2500000003 HC RX 250 WO HCPCS

## 2025-03-15 PROCEDURE — 36415 COLL VENOUS BLD VENIPUNCTURE: CPT

## 2025-03-15 PROCEDURE — 6370000000 HC RX 637 (ALT 250 FOR IP): Performed by: INTERNAL MEDICINE

## 2025-03-15 PROCEDURE — C1751 CATH, INF, PER/CENT/MIDLINE: HCPCS

## 2025-03-15 PROCEDURE — 2060000000 HC ICU INTERMEDIATE R&B

## 2025-03-15 PROCEDURE — 85025 COMPLETE CBC W/AUTO DIFF WBC: CPT

## 2025-03-15 PROCEDURE — 2500000003 HC RX 250 WO HCPCS: Performed by: INTERNAL MEDICINE

## 2025-03-15 PROCEDURE — 80048 BASIC METABOLIC PNL TOTAL CA: CPT

## 2025-03-15 PROCEDURE — 76937 US GUIDE VASCULAR ACCESS: CPT

## 2025-03-15 PROCEDURE — 36410 VNPNXR 3YR/> PHY/QHP DX/THER: CPT

## 2025-03-15 PROCEDURE — 6360000002 HC RX W HCPCS: Performed by: NURSE PRACTITIONER

## 2025-03-15 PROCEDURE — B54NZZA ULTRASONOGRAPHY OF LEFT UPPER EXTREMITY VEINS, GUIDANCE: ICD-10-PCS

## 2025-03-15 RX ORDER — ACETAMINOPHEN 500 MG
1000 TABLET ORAL EVERY 8 HOURS SCHEDULED
Status: DISCONTINUED | OUTPATIENT
Start: 2025-03-15 | End: 2025-03-19 | Stop reason: HOSPADM

## 2025-03-15 RX ADMIN — MICONAZOLE NITRATE: 20 POWDER TOPICAL at 07:54

## 2025-03-15 RX ADMIN — GABAPENTIN 400 MG: 400 CAPSULE ORAL at 07:55

## 2025-03-15 RX ADMIN — FAMOTIDINE 40 MG: 20 TABLET, FILM COATED ORAL at 22:25

## 2025-03-15 RX ADMIN — OXYCODONE HYDROCHLORIDE 5 MG: 5 TABLET ORAL at 17:29

## 2025-03-15 RX ADMIN — GABAPENTIN 400 MG: 400 CAPSULE ORAL at 22:25

## 2025-03-15 RX ADMIN — SODIUM CHLORIDE, PRESERVATIVE FREE 10 ML: 5 INJECTION INTRAVENOUS at 22:27

## 2025-03-15 RX ADMIN — ACETAMINOPHEN 1000 MG: 500 TABLET, FILM COATED ORAL at 22:25

## 2025-03-15 RX ADMIN — TAMSULOSIN HYDROCHLORIDE 0.4 MG: 0.4 CAPSULE ORAL at 07:56

## 2025-03-15 RX ADMIN — FUROSEMIDE 40 MG: 40 TABLET ORAL at 07:54

## 2025-03-15 RX ADMIN — MICONAZOLE NITRATE: 20 POWDER TOPICAL at 22:27

## 2025-03-15 RX ADMIN — ALLOPURINOL 300 MG: 300 TABLET ORAL at 07:56

## 2025-03-15 RX ADMIN — OXYCODONE HYDROCHLORIDE 5 MG: 5 TABLET ORAL at 22:25

## 2025-03-15 RX ADMIN — CYCLOBENZAPRINE HYDROCHLORIDE 10 MG: 10 TABLET, FILM COATED ORAL at 22:25

## 2025-03-15 RX ADMIN — PREDNISONE 40 MG: 20 TABLET ORAL at 07:56

## 2025-03-15 RX ADMIN — OXYCODONE HYDROCHLORIDE 5 MG: 5 TABLET ORAL at 12:46

## 2025-03-15 RX ADMIN — OXYCODONE HYDROCHLORIDE 5 MG: 5 TABLET ORAL at 07:54

## 2025-03-15 RX ADMIN — SODIUM CHLORIDE, PRESERVATIVE FREE 10 ML: 5 INJECTION INTRAVENOUS at 07:57

## 2025-03-15 RX ADMIN — DAPTOMYCIN 950 MG: 500 INJECTION, POWDER, LYOPHILIZED, FOR SOLUTION INTRAVENOUS at 17:29

## 2025-03-15 RX ADMIN — GABAPENTIN 400 MG: 400 CAPSULE ORAL at 12:46

## 2025-03-15 RX ADMIN — DILTIAZEM HYDROCHLORIDE 240 MG: 240 CAPSULE, EXTENDED RELEASE ORAL at 07:56

## 2025-03-15 RX ADMIN — RIVAROXABAN 20 MG: 20 TABLET, FILM COATED ORAL at 17:30

## 2025-03-15 RX ADMIN — SODIUM CHLORIDE, PRESERVATIVE FREE 10 ML: 5 INJECTION INTRAVENOUS at 22:31

## 2025-03-15 RX ADMIN — SERTRALINE 150 MG: 100 TABLET, FILM COATED ORAL at 07:55

## 2025-03-15 RX ADMIN — FAMOTIDINE 40 MG: 20 TABLET, FILM COATED ORAL at 07:56

## 2025-03-15 RX ADMIN — SODIUM CHLORIDE, PRESERVATIVE FREE 10 ML: 5 INJECTION INTRAVENOUS at 22:30

## 2025-03-15 ASSESSMENT — PAIN SCALES - GENERAL
PAINLEVEL_OUTOF10: 8
PAINLEVEL_OUTOF10: 4
PAINLEVEL_OUTOF10: 7
PAINLEVEL_OUTOF10: 7
PAINLEVEL_OUTOF10: 9
PAINLEVEL_OUTOF10: 5
PAINLEVEL_OUTOF10: 7

## 2025-03-15 ASSESSMENT — PAIN DESCRIPTION - LOCATION
LOCATION: BACK

## 2025-03-15 ASSESSMENT — PAIN DESCRIPTION - DESCRIPTORS
DESCRIPTORS: STABBING
DESCRIPTORS: STABBING
DESCRIPTORS: POUNDING;PRESSURE;SHARP
DESCRIPTORS: STABBING
DESCRIPTORS: OTHER (COMMENT)

## 2025-03-15 ASSESSMENT — PAIN DESCRIPTION - ORIENTATION
ORIENTATION: MID

## 2025-03-15 ASSESSMENT — PAIN - FUNCTIONAL ASSESSMENT: PAIN_FUNCTIONAL_ASSESSMENT: PREVENTS OR INTERFERES SOME ACTIVE ACTIVITIES AND ADLS

## 2025-03-16 LAB
ANION GAP SERPL CALC-SCNC: 11 MMOL/L (ref 7–16)
BASOPHILS # BLD: 0.02 K/UL (ref 0–0.2)
BASOPHILS NFR BLD: 0.2 % (ref 0–2)
BUN SERPL-MCNC: 23 MG/DL (ref 8–23)
CALCIUM SERPL-MCNC: 8.6 MG/DL (ref 8.8–10.2)
CHLORIDE SERPL-SCNC: 101 MMOL/L (ref 98–107)
CO2 SERPL-SCNC: 28 MMOL/L (ref 20–29)
CREAT SERPL-MCNC: 0.8 MG/DL (ref 0.8–1.3)
DIFFERENTIAL METHOD BLD: ABNORMAL
EOSINOPHIL # BLD: 0.01 K/UL (ref 0–0.8)
EOSINOPHIL NFR BLD: 0.1 % (ref 0.5–7.8)
ERYTHROCYTE [DISTWIDTH] IN BLOOD BY AUTOMATED COUNT: 13 % (ref 11.9–14.6)
GLUCOSE SERPL-MCNC: 124 MG/DL (ref 70–99)
HCT VFR BLD AUTO: 35.7 % (ref 41.1–50.3)
HGB BLD-MCNC: 11.9 G/DL (ref 13.6–17.2)
IMM GRANULOCYTES # BLD AUTO: 0.09 K/UL (ref 0–0.5)
IMM GRANULOCYTES NFR BLD AUTO: 0.7 % (ref 0–5)
LYMPHOCYTES # BLD: 1.58 K/UL (ref 0.5–4.6)
LYMPHOCYTES NFR BLD: 12.5 % (ref 13–44)
MCH RBC QN AUTO: 35.1 PG (ref 26.1–32.9)
MCHC RBC AUTO-ENTMCNC: 33.3 G/DL (ref 31.4–35)
MCV RBC AUTO: 105.3 FL (ref 82–102)
MONOCYTES # BLD: 0.91 K/UL (ref 0.1–1.3)
MONOCYTES NFR BLD: 7.2 % (ref 4–12)
NEUTS SEG # BLD: 10.03 K/UL (ref 1.7–8.2)
NEUTS SEG NFR BLD: 79.3 % (ref 43–78)
NRBC # BLD: 0 K/UL (ref 0–0.2)
PLATELET # BLD AUTO: 228 K/UL (ref 150–450)
PMV BLD AUTO: 10.3 FL (ref 9.4–12.3)
POTASSIUM SERPL-SCNC: 3.9 MMOL/L (ref 3.5–5.1)
RBC # BLD AUTO: 3.39 M/UL (ref 4.23–5.6)
SODIUM SERPL-SCNC: 140 MMOL/L (ref 136–145)
WBC # BLD AUTO: 12.6 K/UL (ref 4.3–11.1)

## 2025-03-16 PROCEDURE — 36415 COLL VENOUS BLD VENIPUNCTURE: CPT

## 2025-03-16 PROCEDURE — 97530 THERAPEUTIC ACTIVITIES: CPT

## 2025-03-16 PROCEDURE — 85025 COMPLETE CBC W/AUTO DIFF WBC: CPT

## 2025-03-16 PROCEDURE — 6370000000 HC RX 637 (ALT 250 FOR IP)

## 2025-03-16 PROCEDURE — 6370000000 HC RX 637 (ALT 250 FOR IP): Performed by: INTERNAL MEDICINE

## 2025-03-16 PROCEDURE — 80048 BASIC METABOLIC PNL TOTAL CA: CPT

## 2025-03-16 PROCEDURE — 2580000003 HC RX 258

## 2025-03-16 PROCEDURE — 2060000000 HC ICU INTERMEDIATE R&B

## 2025-03-16 PROCEDURE — 2500000003 HC RX 250 WO HCPCS: Performed by: INTERNAL MEDICINE

## 2025-03-16 PROCEDURE — 6360000002 HC RX W HCPCS

## 2025-03-16 PROCEDURE — 2500000003 HC RX 250 WO HCPCS

## 2025-03-16 RX ADMIN — DICLOFENAC SODIUM 4 G: 10 GEL TOPICAL at 20:46

## 2025-03-16 RX ADMIN — PREDNISONE 40 MG: 20 TABLET ORAL at 09:56

## 2025-03-16 RX ADMIN — ACETAMINOPHEN 1000 MG: 500 TABLET, FILM COATED ORAL at 20:42

## 2025-03-16 RX ADMIN — OXYCODONE HYDROCHLORIDE 5 MG: 5 TABLET ORAL at 20:43

## 2025-03-16 RX ADMIN — CYCLOBENZAPRINE HYDROCHLORIDE 10 MG: 10 TABLET, FILM COATED ORAL at 20:42

## 2025-03-16 RX ADMIN — OXYCODONE HYDROCHLORIDE 5 MG: 5 TABLET ORAL at 12:45

## 2025-03-16 RX ADMIN — RIVAROXABAN 20 MG: 20 TABLET, FILM COATED ORAL at 17:06

## 2025-03-16 RX ADMIN — FUROSEMIDE 40 MG: 40 TABLET ORAL at 09:57

## 2025-03-16 RX ADMIN — ACETAMINOPHEN 1000 MG: 500 TABLET, FILM COATED ORAL at 15:13

## 2025-03-16 RX ADMIN — SODIUM CHLORIDE, PRESERVATIVE FREE 10 ML: 5 INJECTION INTRAVENOUS at 10:01

## 2025-03-16 RX ADMIN — GABAPENTIN 400 MG: 400 CAPSULE ORAL at 20:42

## 2025-03-16 RX ADMIN — GABAPENTIN 400 MG: 400 CAPSULE ORAL at 15:13

## 2025-03-16 RX ADMIN — FAMOTIDINE 40 MG: 20 TABLET, FILM COATED ORAL at 09:57

## 2025-03-16 RX ADMIN — MICONAZOLE NITRATE: 20 POWDER TOPICAL at 10:00

## 2025-03-16 RX ADMIN — FAMOTIDINE 40 MG: 20 TABLET, FILM COATED ORAL at 20:42

## 2025-03-16 RX ADMIN — TAMSULOSIN HYDROCHLORIDE 0.4 MG: 0.4 CAPSULE ORAL at 09:57

## 2025-03-16 RX ADMIN — DAPTOMYCIN 950 MG: 500 INJECTION, POWDER, LYOPHILIZED, FOR SOLUTION INTRAVENOUS at 15:13

## 2025-03-16 RX ADMIN — ALLOPURINOL 300 MG: 300 TABLET ORAL at 09:56

## 2025-03-16 RX ADMIN — DILTIAZEM HYDROCHLORIDE 240 MG: 240 CAPSULE, EXTENDED RELEASE ORAL at 09:56

## 2025-03-16 RX ADMIN — SODIUM CHLORIDE, PRESERVATIVE FREE 10 ML: 5 INJECTION INTRAVENOUS at 20:45

## 2025-03-16 RX ADMIN — ACETAMINOPHEN 1000 MG: 500 TABLET, FILM COATED ORAL at 06:09

## 2025-03-16 RX ADMIN — MICONAZOLE NITRATE: 20 POWDER TOPICAL at 20:46

## 2025-03-16 RX ADMIN — SERTRALINE 150 MG: 100 TABLET, FILM COATED ORAL at 09:57

## 2025-03-16 RX ADMIN — GABAPENTIN 400 MG: 400 CAPSULE ORAL at 09:56

## 2025-03-16 ASSESSMENT — PAIN DESCRIPTION - DESCRIPTORS
DESCRIPTORS: ACHING
DESCRIPTORS: ACHING

## 2025-03-16 ASSESSMENT — PAIN DESCRIPTION - LOCATION
LOCATION: BACK
LOCATION: BACK

## 2025-03-16 ASSESSMENT — PAIN SCALES - GENERAL
PAINLEVEL_OUTOF10: 8
PAINLEVEL_OUTOF10: 6
PAINLEVEL_OUTOF10: 3
PAINLEVEL_OUTOF10: 6

## 2025-03-16 ASSESSMENT — PAIN DESCRIPTION - ORIENTATION
ORIENTATION: LEFT
ORIENTATION: LOWER

## 2025-03-17 LAB
25(OH)D3 SERPL-MCNC: 9.8 NG/ML (ref 30–100)
ANION GAP SERPL CALC-SCNC: 10 MMOL/L (ref 7–16)
BACTERIA SPEC CULT: NORMAL
BASOPHILS # BLD: 0.02 K/UL (ref 0–0.2)
BASOPHILS NFR BLD: 0.2 % (ref 0–2)
BUN SERPL-MCNC: 20 MG/DL (ref 8–23)
CALCIUM SERPL-MCNC: 8.7 MG/DL (ref 8.8–10.2)
CHLORIDE SERPL-SCNC: 103 MMOL/L (ref 98–107)
CO2 SERPL-SCNC: 29 MMOL/L (ref 20–29)
CREAT SERPL-MCNC: 0.75 MG/DL (ref 0.8–1.3)
DIFFERENTIAL METHOD BLD: ABNORMAL
EOSINOPHIL # BLD: 0.04 K/UL (ref 0–0.8)
EOSINOPHIL NFR BLD: 0.3 % (ref 0.5–7.8)
ERYTHROCYTE [DISTWIDTH] IN BLOOD BY AUTOMATED COUNT: 13 % (ref 11.9–14.6)
GLUCOSE SERPL-MCNC: 120 MG/DL (ref 70–99)
HCT VFR BLD AUTO: 38.2 % (ref 41.1–50.3)
HGB BLD-MCNC: 12.5 G/DL (ref 13.6–17.2)
IMM GRANULOCYTES # BLD AUTO: 0.14 K/UL (ref 0–0.5)
IMM GRANULOCYTES NFR BLD AUTO: 1.2 % (ref 0–5)
LYMPHOCYTES # BLD: 1.79 K/UL (ref 0.5–4.6)
LYMPHOCYTES NFR BLD: 15.7 % (ref 13–44)
MCH RBC QN AUTO: 34.5 PG (ref 26.1–32.9)
MCHC RBC AUTO-ENTMCNC: 32.7 G/DL (ref 31.4–35)
MCV RBC AUTO: 105.5 FL (ref 82–102)
MONOCYTES # BLD: 0.88 K/UL (ref 0.1–1.3)
MONOCYTES NFR BLD: 7.7 % (ref 4–12)
NEUTS SEG # BLD: 8.56 K/UL (ref 1.7–8.2)
NEUTS SEG NFR BLD: 74.9 % (ref 43–78)
NRBC # BLD: 0 K/UL (ref 0–0.2)
PLATELET # BLD AUTO: 239 K/UL (ref 150–450)
PMV BLD AUTO: 9.6 FL (ref 9.4–12.3)
POTASSIUM SERPL-SCNC: 3.8 MMOL/L (ref 3.5–5.1)
RBC # BLD AUTO: 3.62 M/UL (ref 4.23–5.6)
SERVICE CMNT-IMP: NORMAL
SODIUM SERPL-SCNC: 141 MMOL/L (ref 136–145)
WBC # BLD AUTO: 11.4 K/UL (ref 4.3–11.1)

## 2025-03-17 PROCEDURE — 2060000000 HC ICU INTERMEDIATE R&B

## 2025-03-17 PROCEDURE — 36415 COLL VENOUS BLD VENIPUNCTURE: CPT

## 2025-03-17 PROCEDURE — 97530 THERAPEUTIC ACTIVITIES: CPT

## 2025-03-17 PROCEDURE — 6370000000 HC RX 637 (ALT 250 FOR IP): Performed by: INTERNAL MEDICINE

## 2025-03-17 PROCEDURE — 29581 APPL MULTLAYER CMPRN SYS LEG: CPT

## 2025-03-17 PROCEDURE — 82306 VITAMIN D 25 HYDROXY: CPT

## 2025-03-17 PROCEDURE — 6360000002 HC RX W HCPCS

## 2025-03-17 PROCEDURE — 80048 BASIC METABOLIC PNL TOTAL CA: CPT

## 2025-03-17 PROCEDURE — 97110 THERAPEUTIC EXERCISES: CPT

## 2025-03-17 PROCEDURE — 99232 SBSQ HOSP IP/OBS MODERATE 35: CPT | Performed by: PHYSICAL MEDICINE & REHABILITATION

## 2025-03-17 PROCEDURE — 2500000003 HC RX 250 WO HCPCS

## 2025-03-17 PROCEDURE — 6370000000 HC RX 637 (ALT 250 FOR IP)

## 2025-03-17 PROCEDURE — 2500000003 HC RX 250 WO HCPCS: Performed by: INTERNAL MEDICINE

## 2025-03-17 PROCEDURE — 2580000003 HC RX 258

## 2025-03-17 PROCEDURE — 85025 COMPLETE CBC W/AUTO DIFF WBC: CPT

## 2025-03-17 RX ORDER — VITAMIN B COMPLEX
6000 TABLET ORAL DAILY
Status: DISCONTINUED | OUTPATIENT
Start: 2025-03-17 | End: 2025-03-19 | Stop reason: HOSPADM

## 2025-03-17 RX ORDER — VITAMIN B COMPLEX
2000 TABLET ORAL DAILY
Status: DISCONTINUED | OUTPATIENT
Start: 2025-03-24 | End: 2025-03-19 | Stop reason: HOSPADM

## 2025-03-17 RX ADMIN — ALLOPURINOL 300 MG: 300 TABLET ORAL at 08:51

## 2025-03-17 RX ADMIN — FAMOTIDINE 40 MG: 20 TABLET, FILM COATED ORAL at 21:47

## 2025-03-17 RX ADMIN — DILTIAZEM HYDROCHLORIDE 240 MG: 240 CAPSULE, EXTENDED RELEASE ORAL at 08:51

## 2025-03-17 RX ADMIN — DICLOFENAC SODIUM 4 G: 10 GEL TOPICAL at 22:03

## 2025-03-17 RX ADMIN — FUROSEMIDE 40 MG: 40 TABLET ORAL at 08:51

## 2025-03-17 RX ADMIN — PREDNISONE 40 MG: 20 TABLET ORAL at 08:51

## 2025-03-17 RX ADMIN — DICLOFENAC SODIUM 4 G: 10 GEL TOPICAL at 08:48

## 2025-03-17 RX ADMIN — ACETAMINOPHEN 1000 MG: 500 TABLET, FILM COATED ORAL at 05:58

## 2025-03-17 RX ADMIN — FAMOTIDINE 40 MG: 20 TABLET, FILM COATED ORAL at 08:51

## 2025-03-17 RX ADMIN — SODIUM CHLORIDE, PRESERVATIVE FREE 10 ML: 5 INJECTION INTRAVENOUS at 08:57

## 2025-03-17 RX ADMIN — VITAMIN D, TAB 1000IU (100/BT) 6000 UNITS: 25 TAB at 14:42

## 2025-03-17 RX ADMIN — SODIUM CHLORIDE, PRESERVATIVE FREE 10 ML: 5 INJECTION INTRAVENOUS at 21:56

## 2025-03-17 RX ADMIN — DAPTOMYCIN 950 MG: 500 INJECTION, POWDER, LYOPHILIZED, FOR SOLUTION INTRAVENOUS at 16:28

## 2025-03-17 RX ADMIN — GABAPENTIN 400 MG: 400 CAPSULE ORAL at 21:47

## 2025-03-17 RX ADMIN — GABAPENTIN 400 MG: 400 CAPSULE ORAL at 13:54

## 2025-03-17 RX ADMIN — GABAPENTIN 400 MG: 400 CAPSULE ORAL at 08:51

## 2025-03-17 RX ADMIN — OXYCODONE HYDROCHLORIDE 5 MG: 5 TABLET ORAL at 16:34

## 2025-03-17 RX ADMIN — ANTI-FUNGAL POWDER MICONAZOLE NITRATE TALC FREE: 1.42 POWDER TOPICAL at 08:47

## 2025-03-17 RX ADMIN — RIVAROXABAN 20 MG: 20 TABLET, FILM COATED ORAL at 17:05

## 2025-03-17 RX ADMIN — SERTRALINE 150 MG: 100 TABLET, FILM COATED ORAL at 08:51

## 2025-03-17 RX ADMIN — CYCLOBENZAPRINE HYDROCHLORIDE 10 MG: 10 TABLET, FILM COATED ORAL at 21:47

## 2025-03-17 RX ADMIN — ACETAMINOPHEN 1000 MG: 500 TABLET, FILM COATED ORAL at 13:54

## 2025-03-17 RX ADMIN — MICONAZOLE NITRATE: 20 POWDER TOPICAL at 08:48

## 2025-03-17 RX ADMIN — WHITE PETROLATUM 41 % TOPICAL OINTMENT: at 08:52

## 2025-03-17 RX ADMIN — MICONAZOLE NITRATE: 20 POWDER TOPICAL at 22:03

## 2025-03-17 RX ADMIN — OXYCODONE HYDROCHLORIDE 5 MG: 5 TABLET ORAL at 21:58

## 2025-03-17 RX ADMIN — ACETAMINOPHEN 1000 MG: 500 TABLET, FILM COATED ORAL at 21:47

## 2025-03-17 RX ADMIN — TAMSULOSIN HYDROCHLORIDE 0.4 MG: 0.4 CAPSULE ORAL at 08:51

## 2025-03-17 ASSESSMENT — PAIN DESCRIPTION - LOCATION
LOCATION: BACK
LOCATION: BACK

## 2025-03-17 ASSESSMENT — PAIN SCALES - GENERAL
PAINLEVEL_OUTOF10: 8
PAINLEVEL_OUTOF10: 6
PAINLEVEL_OUTOF10: 5
PAINLEVEL_OUTOF10: 0
PAINLEVEL_OUTOF10: 4
PAINLEVEL_OUTOF10: 2
PAINLEVEL_OUTOF10: 6

## 2025-03-17 ASSESSMENT — PAIN DESCRIPTION - DESCRIPTORS
DESCRIPTORS: ACHING

## 2025-03-17 ASSESSMENT — PAIN DESCRIPTION - ORIENTATION
ORIENTATION: LOWER;MID
ORIENTATION: LOWER
ORIENTATION: LEFT
ORIENTATION: LOWER;MID

## 2025-03-17 NOTE — CONSULTS
59-year-old male admitted with sepsis secondary to MRSA bacteremia.  Blood cultures positive on 3/8.  Neurosurgery consulted for back pain with MRI revealing lumbar stenosis.  There is no evidence of infectious process on MRI.  Imaging reviewed with Dr. Cordova.  While patient does have some stenosis that is likely contributing to his pain he is currently too ill for a surgical intervention.  Discussed with primary team (Dr. Evans)  may trial steroids, gabapentin but will ultimately defer to their medical management given systemic infection.  Patient may follow-up with our office on an outpatient basis once he is infection free.    India Mcgregor, APRN - CNP    
Infectious Disease Consult      Today's Date: 3/12/2025   Admit Date: 3/8/2025  YOB: 1965    Impression:   MRSA septicemia (3/8/25); repeat BCX 3/10 +; TTE pending  Source, likely skin given chronic ulcers  Hardware/implants: none  Repeat BCX 3/12 pending   BLE cellulitis with chronic venous stasis ulcers  Previous cx of left leg +proteus penneri, E. Avium (12/02/2024) s/p cipro/augmentin  Back pain: plain films negative, MRI lumbar pending to rule out infection   Buttock wound: wound care   Chronic A-fib on Xarelto  Bilateral non-obstructing renal stones  Morbid obesity: complicates care, impacts abx dosing    Plan:   Currently on Vancomycin q8h, I have discussed with pharmD and need to consider switching to Daptomycin if sensitive to isolate given his BMI and frequency will not be ideal for dispo later. Maintain on Vancomycin for now until susceptibility returns. Will obtain baseline CK today.   Will repeat BCX 3/12.   Follow results of TTE, if positive, will need to consider [place]  Follow MRI of lumbar spine result to rule out discitis/OM of spine.   ID will follow along    Anti-infectives:   Vancomycin 3/8-    Subjective:   Date of Consultation:  March 12, 2025  Date of Admission: 3/8/2025   Referring Provider: Stoney Davila  Reason for consult: \"GPC MRSA on repeat BCXs, currently on Vanc\"    Patient is a 59 y.o. male with PMH of chronic venous ulcers of BLE, HTN, ED, OM of hand, restless leg syndrome, chronic Afib, GERD, BPH, anxiety, kidney stones who presented to D on 3/8/25 with increased back pain and concern about possible kidney stone. Pt endorsed fever and chills at home.   Pt has wounds in BLE that he has been following wound care for this.   In ED, workup revealed WBC of 12.7, Tmax of 100.4, BCX positive for MRSA and repeat BCX 1/2 sets positive for MRSA. CXR with minimal prominence of bronchopulmonary markings concern for viral vs pulmonary edema. Rapid flu/covid was negative.   CT 
Ultrasound was used to find the vein which was compressible and without any ultrasound features of an artery or nerve bundle. A single lumen Midline was placed to the left cephalic (refer to IV assessment flowsheet for further documentation). No immediate complications noted. Patient tolerated well.     Line is okay to use: yes      
dizziness.      Objective     Visit Vitals  BP (!) 152/90   Pulse 62   Temp 97.7 °F (36.5 °C) (Oral)   Resp 18   Ht 1.905 m (6' 3\")   Wt (!) 165.6 kg (365 lb)   SpO2 96%   BMI 45.62 kg/m²          Physical Exam:     GENERAL: Alert and orientated. Pleasant, NAD.  HEENT: EOMI, NCAT.  HEART: RRR, no appreciable m/g/r.  LUNGS: CTA B/L. Even and unlabored breathing.  ABD: Soft,  non-distended  EXT: +DP/PT pulses. No calf tenderness to palpation. No edema  SKIN: No visible open wounds  NEURO: CN 2-12 intact  MSK:  Motor:           - Inspection: No atrophy/fasciculations/tremors          - Bulk: normal          - Tone: no spasticity or rigidity           - Muscle Strength:                  RUE: at least 3/5 proximally and 3/5 distally                  LUE: at least 3/5 proximally and 3/5 distally                  RLE: at least 3/5 proximally and 3/5 distally                  LLE: at least 3/5 proximally and 3/5 distally  PSYCH: calm and cooperative, normal insight and judgement     Recent Results (from the past 72 hours)   Basic Metabolic Panel w/ Reflex to MG    Collection Time: 03/15/25  6:08 AM   Result Value Ref Range    Sodium 139 136 - 145 mmol/L    Potassium 4.2 3.5 - 5.1 mmol/L    Chloride 100 98 - 107 mmol/L    CO2 30 (H) 20 - 29 mmol/L    Anion Gap 9 7 - 16 mmol/L    Glucose 145 (H) 70 - 99 mg/dL    BUN 19 8 - 23 MG/DL    Creatinine 0.73 (L) 0.80 - 1.30 MG/DL    Est, Glom Filt Rate >90 >60 ml/min/1.73m2    Calcium 9.6 8.8 - 10.2 MG/DL   CBC with Auto Differential    Collection Time: 03/15/25  6:08 AM   Result Value Ref Range    WBC 13.0 (H) 4.3 - 11.1 K/uL    RBC 3.45 (L) 4.23 - 5.6 M/uL    Hemoglobin 11.9 (L) 13.6 - 17.2 g/dL    Hematocrit 36.0 (L) 41.1 - 50.3 %    .3 (H) 82 - 102 FL    MCH 34.5 (H) 26.1 - 32.9 PG    MCHC 33.1 31.4 - 35.0 g/dL    RDW 12.8 11.9 - 14.6 %    Platelets 242 150 - 450 K/uL    MPV 10.0 9.4 - 12.3 FL    nRBC 0.00 0.0 - 0.2 K/uL    Differential Type AUTOMATED      Neutrophils %

## 2025-03-17 NOTE — WOUND CARE
BLE compression wraps changed; will continue to change Monday & Thursday. Pt more alert/oriented today than last assessment.    RLE             LLE

## 2025-03-18 LAB
ANION GAP SERPL CALC-SCNC: 9 MMOL/L (ref 7–16)
BACTERIA SPEC CULT: NORMAL
BASOPHILS # BLD: 0.03 K/UL (ref 0–0.2)
BASOPHILS NFR BLD: 0.3 % (ref 0–2)
BUN SERPL-MCNC: 22 MG/DL (ref 8–23)
CALCIUM SERPL-MCNC: 8.7 MG/DL (ref 8.8–10.2)
CHLORIDE SERPL-SCNC: 102 MMOL/L (ref 98–107)
CO2 SERPL-SCNC: 30 MMOL/L (ref 20–29)
CREAT SERPL-MCNC: 0.83 MG/DL (ref 0.8–1.3)
DIFFERENTIAL METHOD BLD: ABNORMAL
EOSINOPHIL # BLD: 0.06 K/UL (ref 0–0.8)
EOSINOPHIL NFR BLD: 0.5 % (ref 0.5–7.8)
ERYTHROCYTE [DISTWIDTH] IN BLOOD BY AUTOMATED COUNT: 13 % (ref 11.9–14.6)
GLUCOSE SERPL-MCNC: 105 MG/DL (ref 70–99)
HCT VFR BLD AUTO: 36.3 % (ref 41.1–50.3)
HGB BLD-MCNC: 12.5 G/DL (ref 13.6–17.2)
IMM GRANULOCYTES # BLD AUTO: 0.17 K/UL (ref 0–0.5)
IMM GRANULOCYTES NFR BLD AUTO: 1.6 % (ref 0–5)
LYMPHOCYTES # BLD: 2.24 K/UL (ref 0.5–4.6)
LYMPHOCYTES NFR BLD: 20.5 % (ref 13–44)
MCH RBC QN AUTO: 35.3 PG (ref 26.1–32.9)
MCHC RBC AUTO-ENTMCNC: 34.4 G/DL (ref 31.4–35)
MCV RBC AUTO: 102.5 FL (ref 82–102)
MONOCYTES # BLD: 0.84 K/UL (ref 0.1–1.3)
MONOCYTES NFR BLD: 7.7 % (ref 4–12)
NEUTS SEG # BLD: 7.58 K/UL (ref 1.7–8.2)
NEUTS SEG NFR BLD: 69.4 % (ref 43–78)
NRBC # BLD: 0 K/UL (ref 0–0.2)
PLATELET # BLD AUTO: 244 K/UL (ref 150–450)
PMV BLD AUTO: 9.6 FL (ref 9.4–12.3)
POTASSIUM SERPL-SCNC: 3.6 MMOL/L (ref 3.5–5.1)
RBC # BLD AUTO: 3.54 M/UL (ref 4.23–5.6)
SERVICE CMNT-IMP: NORMAL
SODIUM SERPL-SCNC: 141 MMOL/L (ref 136–145)
WBC # BLD AUTO: 10.9 K/UL (ref 4.3–11.1)

## 2025-03-18 PROCEDURE — 2060000000 HC ICU INTERMEDIATE R&B

## 2025-03-18 PROCEDURE — 2500000003 HC RX 250 WO HCPCS

## 2025-03-18 PROCEDURE — 2580000003 HC RX 258

## 2025-03-18 PROCEDURE — 85025 COMPLETE CBC W/AUTO DIFF WBC: CPT

## 2025-03-18 PROCEDURE — 6360000002 HC RX W HCPCS

## 2025-03-18 PROCEDURE — 97530 THERAPEUTIC ACTIVITIES: CPT

## 2025-03-18 PROCEDURE — 36415 COLL VENOUS BLD VENIPUNCTURE: CPT

## 2025-03-18 PROCEDURE — 80048 BASIC METABOLIC PNL TOTAL CA: CPT

## 2025-03-18 PROCEDURE — 6370000000 HC RX 637 (ALT 250 FOR IP): Performed by: INTERNAL MEDICINE

## 2025-03-18 PROCEDURE — 2500000003 HC RX 250 WO HCPCS: Performed by: INTERNAL MEDICINE

## 2025-03-18 PROCEDURE — 6370000000 HC RX 637 (ALT 250 FOR IP)

## 2025-03-18 RX ADMIN — SODIUM CHLORIDE, PRESERVATIVE FREE 10 ML: 5 INJECTION INTRAVENOUS at 22:01

## 2025-03-18 RX ADMIN — SERTRALINE 150 MG: 100 TABLET, FILM COATED ORAL at 09:02

## 2025-03-18 RX ADMIN — MICONAZOLE NITRATE: 20 POWDER TOPICAL at 22:01

## 2025-03-18 RX ADMIN — ALLOPURINOL 300 MG: 300 TABLET ORAL at 09:00

## 2025-03-18 RX ADMIN — PREDNISONE 40 MG: 20 TABLET ORAL at 09:01

## 2025-03-18 RX ADMIN — MICONAZOLE NITRATE: 20 POWDER TOPICAL at 09:12

## 2025-03-18 RX ADMIN — GABAPENTIN 400 MG: 400 CAPSULE ORAL at 22:00

## 2025-03-18 RX ADMIN — ACETAMINOPHEN 1000 MG: 500 TABLET, FILM COATED ORAL at 22:00

## 2025-03-18 RX ADMIN — ACETAMINOPHEN 1000 MG: 500 TABLET, FILM COATED ORAL at 06:14

## 2025-03-18 RX ADMIN — RIVAROXABAN 20 MG: 20 TABLET, FILM COATED ORAL at 18:21

## 2025-03-18 RX ADMIN — DAPTOMYCIN 950 MG: 500 INJECTION, POWDER, LYOPHILIZED, FOR SOLUTION INTRAVENOUS at 16:16

## 2025-03-18 RX ADMIN — OXYCODONE HYDROCHLORIDE 5 MG: 5 TABLET ORAL at 16:34

## 2025-03-18 RX ADMIN — CYCLOBENZAPRINE HYDROCHLORIDE 10 MG: 10 TABLET, FILM COATED ORAL at 22:00

## 2025-03-18 RX ADMIN — DICLOFENAC SODIUM 4 G: 10 GEL TOPICAL at 22:06

## 2025-03-18 RX ADMIN — SODIUM CHLORIDE, PRESERVATIVE FREE 10 ML: 5 INJECTION INTRAVENOUS at 09:05

## 2025-03-18 RX ADMIN — VITAMIN D, TAB 1000IU (100/BT) 6000 UNITS: 25 TAB at 09:03

## 2025-03-18 RX ADMIN — SODIUM CHLORIDE, PRESERVATIVE FREE 10 ML: 5 INJECTION INTRAVENOUS at 21:59

## 2025-03-18 RX ADMIN — GABAPENTIN 400 MG: 400 CAPSULE ORAL at 13:34

## 2025-03-18 RX ADMIN — DILTIAZEM HYDROCHLORIDE 240 MG: 240 CAPSULE, EXTENDED RELEASE ORAL at 09:00

## 2025-03-18 RX ADMIN — FUROSEMIDE 40 MG: 40 TABLET ORAL at 09:01

## 2025-03-18 RX ADMIN — TAMSULOSIN HYDROCHLORIDE 0.4 MG: 0.4 CAPSULE ORAL at 09:03

## 2025-03-18 RX ADMIN — FAMOTIDINE 40 MG: 20 TABLET, FILM COATED ORAL at 09:00

## 2025-03-18 RX ADMIN — FAMOTIDINE 40 MG: 20 TABLET, FILM COATED ORAL at 21:59

## 2025-03-18 RX ADMIN — SODIUM CHLORIDE, PRESERVATIVE FREE 10 ML: 5 INJECTION INTRAVENOUS at 09:12

## 2025-03-18 RX ADMIN — GABAPENTIN 400 MG: 400 CAPSULE ORAL at 09:01

## 2025-03-18 RX ADMIN — ACETAMINOPHEN 1000 MG: 500 TABLET, FILM COATED ORAL at 13:34

## 2025-03-18 ASSESSMENT — PAIN SCALES - GENERAL
PAINLEVEL_OUTOF10: 6
PAINLEVEL_OUTOF10: 6
PAINLEVEL_OUTOF10: 7
PAINLEVEL_OUTOF10: 3

## 2025-03-18 ASSESSMENT — PAIN DESCRIPTION - DESCRIPTORS
DESCRIPTORS: ACHING;STABBING
DESCRIPTORS: ACHING;STABBING

## 2025-03-18 ASSESSMENT — PAIN DESCRIPTION - ORIENTATION
ORIENTATION: LOWER
ORIENTATION: LOWER

## 2025-03-18 ASSESSMENT — PAIN DESCRIPTION - LOCATION
LOCATION: BACK

## 2025-03-19 ENCOUNTER — HOSPITAL ENCOUNTER (INPATIENT)
Age: 60
LOS: 8 days | Discharge: HOME | DRG: 091 | End: 2025-03-27
Attending: STUDENT IN AN ORGANIZED HEALTH CARE EDUCATION/TRAINING PROGRAM | Admitting: STUDENT IN AN ORGANIZED HEALTH CARE EDUCATION/TRAINING PROGRAM
Payer: COMMERCIAL

## 2025-03-19 VITALS
SYSTOLIC BLOOD PRESSURE: 138 MMHG | BODY MASS INDEX: 39.17 KG/M2 | HEART RATE: 65 BPM | HEIGHT: 75 IN | WEIGHT: 315 LBS | OXYGEN SATURATION: 93 % | TEMPERATURE: 97.9 F | RESPIRATION RATE: 19 BRPM | DIASTOLIC BLOOD PRESSURE: 87 MMHG

## 2025-03-19 DIAGNOSIS — M79.605 PAIN OF LEFT LOWER EXTREMITY: Primary | ICD-10-CM

## 2025-03-19 PROBLEM — B95.62 BACTEREMIA DUE TO METHICILLIN RESISTANT STAPHYLOCOCCUS AUREUS: Status: ACTIVE | Noted: 2025-03-19

## 2025-03-19 PROBLEM — L03.90 CELLULITIS: Status: RESOLVED | Noted: 2024-12-10 | Resolved: 2025-03-19

## 2025-03-19 PROBLEM — A41.9 SEPSIS (HCC): Status: RESOLVED | Noted: 2019-11-14 | Resolved: 2025-03-19

## 2025-03-19 PROBLEM — R78.81 BACTEREMIA DUE TO METHICILLIN RESISTANT STAPHYLOCOCCUS AUREUS: Status: ACTIVE | Noted: 2025-03-19

## 2025-03-19 LAB
ANION GAP SERPL CALC-SCNC: 10 MMOL/L (ref 7–16)
BASOPHILS # BLD: 0.02 K/UL (ref 0–0.2)
BASOPHILS NFR BLD: 0.2 % (ref 0–2)
BUN SERPL-MCNC: 24 MG/DL (ref 8–23)
CALCIUM SERPL-MCNC: 8.9 MG/DL (ref 8.8–10.2)
CHLORIDE SERPL-SCNC: 101 MMOL/L (ref 98–107)
CO2 SERPL-SCNC: 30 MMOL/L (ref 20–29)
CREAT SERPL-MCNC: 0.85 MG/DL (ref 0.8–1.3)
DIFFERENTIAL METHOD BLD: ABNORMAL
EOSINOPHIL # BLD: 0.07 K/UL (ref 0–0.8)
EOSINOPHIL NFR BLD: 0.6 % (ref 0.5–7.8)
ERYTHROCYTE [DISTWIDTH] IN BLOOD BY AUTOMATED COUNT: 12.9 % (ref 11.9–14.6)
GLUCOSE SERPL-MCNC: 95 MG/DL (ref 70–99)
HCT VFR BLD AUTO: 38.6 % (ref 41.1–50.3)
HGB BLD-MCNC: 12.8 G/DL (ref 13.6–17.2)
IMM GRANULOCYTES # BLD AUTO: 0.17 K/UL (ref 0–0.5)
IMM GRANULOCYTES NFR BLD AUTO: 1.4 % (ref 0–5)
LYMPHOCYTES # BLD: 2.1 K/UL (ref 0.5–4.6)
LYMPHOCYTES NFR BLD: 16.7 % (ref 13–44)
MCH RBC QN AUTO: 34.8 PG (ref 26.1–32.9)
MCHC RBC AUTO-ENTMCNC: 33.2 G/DL (ref 31.4–35)
MCV RBC AUTO: 104.9 FL (ref 82–102)
MONOCYTES # BLD: 0.85 K/UL (ref 0.1–1.3)
MONOCYTES NFR BLD: 6.8 % (ref 4–12)
NEUTS SEG # BLD: 9.38 K/UL (ref 1.7–8.2)
NEUTS SEG NFR BLD: 74.3 % (ref 43–78)
NRBC # BLD: 0 K/UL (ref 0–0.2)
PLATELET # BLD AUTO: 259 K/UL (ref 150–450)
PMV BLD AUTO: 9.9 FL (ref 9.4–12.3)
POTASSIUM SERPL-SCNC: 3.8 MMOL/L (ref 3.5–5.1)
RBC # BLD AUTO: 3.68 M/UL (ref 4.23–5.6)
SODIUM SERPL-SCNC: 142 MMOL/L (ref 136–145)
WBC # BLD AUTO: 12.6 K/UL (ref 4.3–11.1)

## 2025-03-19 PROCEDURE — 6370000000 HC RX 637 (ALT 250 FOR IP): Performed by: PHYSICAL MEDICINE & REHABILITATION

## 2025-03-19 PROCEDURE — 85025 COMPLETE CBC W/AUTO DIFF WBC: CPT

## 2025-03-19 PROCEDURE — 2500000003 HC RX 250 WO HCPCS

## 2025-03-19 PROCEDURE — 6360000002 HC RX W HCPCS

## 2025-03-19 PROCEDURE — 6370000000 HC RX 637 (ALT 250 FOR IP): Performed by: INTERNAL MEDICINE

## 2025-03-19 PROCEDURE — 6370000000 HC RX 637 (ALT 250 FOR IP)

## 2025-03-19 PROCEDURE — 2500000003 HC RX 250 WO HCPCS: Performed by: INTERNAL MEDICINE

## 2025-03-19 PROCEDURE — 2580000003 HC RX 258

## 2025-03-19 PROCEDURE — 2500000003 HC RX 250 WO HCPCS: Performed by: PHYSICAL MEDICINE & REHABILITATION

## 2025-03-19 PROCEDURE — 80048 BASIC METABOLIC PNL TOTAL CA: CPT

## 2025-03-19 PROCEDURE — 36415 COLL VENOUS BLD VENIPUNCTURE: CPT

## 2025-03-19 PROCEDURE — 1180000000 HC REHAB R&B

## 2025-03-19 RX ORDER — ACETAMINOPHEN 500 MG
1000 TABLET ORAL EVERY 8 HOURS SCHEDULED
Status: CANCELLED | OUTPATIENT
Start: 2025-03-19

## 2025-03-19 RX ORDER — FUROSEMIDE 40 MG/1
40 TABLET ORAL DAILY
Status: DISCONTINUED | OUTPATIENT
Start: 2025-03-20 | End: 2025-03-27 | Stop reason: HOSPADM

## 2025-03-19 RX ORDER — ALLOPURINOL 300 MG/1
300 TABLET ORAL DAILY
Status: CANCELLED | OUTPATIENT
Start: 2025-03-20

## 2025-03-19 RX ORDER — DILTIAZEM HYDROCHLORIDE 240 MG/1
240 CAPSULE, COATED, EXTENDED RELEASE ORAL DAILY
Status: DISCONTINUED | OUTPATIENT
Start: 2025-03-20 | End: 2025-03-27 | Stop reason: HOSPADM

## 2025-03-19 RX ORDER — FAMOTIDINE 20 MG/1
40 TABLET, FILM COATED ORAL 2 TIMES DAILY
Status: DISCONTINUED | OUTPATIENT
Start: 2025-03-19 | End: 2025-03-27 | Stop reason: HOSPADM

## 2025-03-19 RX ORDER — ONDANSETRON 4 MG/1
4 TABLET, ORALLY DISINTEGRATING ORAL EVERY 8 HOURS PRN
Status: CANCELLED | OUTPATIENT
Start: 2025-03-19

## 2025-03-19 RX ORDER — FAMOTIDINE 20 MG/1
40 TABLET, FILM COATED ORAL 2 TIMES DAILY
Status: CANCELLED | OUTPATIENT
Start: 2025-03-19

## 2025-03-19 RX ORDER — ONDANSETRON 2 MG/ML
4 INJECTION INTRAMUSCULAR; INTRAVENOUS EVERY 6 HOURS PRN
Status: CANCELLED | OUTPATIENT
Start: 2025-03-19

## 2025-03-19 RX ORDER — VITAMIN B COMPLEX
6000 TABLET ORAL DAILY
Status: CANCELLED | OUTPATIENT
Start: 2025-03-20 | End: 2025-03-24

## 2025-03-19 RX ORDER — SENNOSIDES A AND B 8.6 MG/1
1 TABLET, FILM COATED ORAL 2 TIMES DAILY PRN
Status: DISCONTINUED | OUTPATIENT
Start: 2025-03-19 | End: 2025-03-27 | Stop reason: HOSPADM

## 2025-03-19 RX ORDER — VITAMIN B COMPLEX
2000 TABLET ORAL DAILY
Status: CANCELLED | OUTPATIENT
Start: 2025-03-24

## 2025-03-19 RX ORDER — FUROSEMIDE 40 MG/1
40 TABLET ORAL DAILY
Status: CANCELLED | OUTPATIENT
Start: 2025-03-20

## 2025-03-19 RX ORDER — ALLOPURINOL 300 MG/1
300 TABLET ORAL DAILY
Status: DISCONTINUED | OUTPATIENT
Start: 2025-03-20 | End: 2025-03-27 | Stop reason: HOSPADM

## 2025-03-19 RX ORDER — MINERAL OIL/HYDROPHIL PETROLAT
OINTMENT (GRAM) TOPICAL
Status: CANCELLED | OUTPATIENT
Start: 2025-03-20

## 2025-03-19 RX ORDER — TAMSULOSIN HYDROCHLORIDE 0.4 MG/1
0.4 CAPSULE ORAL DAILY
Status: CANCELLED | OUTPATIENT
Start: 2025-03-20

## 2025-03-19 RX ORDER — TERBINAFINE HYDROCHLORIDE 250 MG/1
250 TABLET ORAL DAILY
Status: CANCELLED | OUTPATIENT
Start: 2025-03-20

## 2025-03-19 RX ORDER — CYCLOBENZAPRINE HCL 10 MG
10 TABLET ORAL NIGHTLY
Status: DISCONTINUED | OUTPATIENT
Start: 2025-03-19 | End: 2025-03-27 | Stop reason: HOSPADM

## 2025-03-19 RX ORDER — CYCLOBENZAPRINE HCL 10 MG
10 TABLET ORAL NIGHTLY
Status: CANCELLED | OUTPATIENT
Start: 2025-03-19

## 2025-03-19 RX ORDER — ONDANSETRON 4 MG/1
4 TABLET, ORALLY DISINTEGRATING ORAL EVERY 8 HOURS PRN
Status: DISCONTINUED | OUTPATIENT
Start: 2025-03-19 | End: 2025-03-27 | Stop reason: HOSPADM

## 2025-03-19 RX ORDER — ACETAMINOPHEN 500 MG
1000 TABLET ORAL EVERY 8 HOURS SCHEDULED
Status: DISCONTINUED | OUTPATIENT
Start: 2025-03-19 | End: 2025-03-27 | Stop reason: HOSPADM

## 2025-03-19 RX ORDER — SODIUM CHLORIDE 0.9 % (FLUSH) 0.9 %
5-40 SYRINGE (ML) INJECTION EVERY 12 HOURS SCHEDULED
Status: CANCELLED | OUTPATIENT
Start: 2025-03-19

## 2025-03-19 RX ORDER — GABAPENTIN 400 MG/1
400 CAPSULE ORAL 3 TIMES DAILY
Status: CANCELLED | OUTPATIENT
Start: 2025-03-19

## 2025-03-19 RX ORDER — DILTIAZEM HYDROCHLORIDE 240 MG/1
240 CAPSULE, COATED, EXTENDED RELEASE ORAL DAILY
Status: CANCELLED | OUTPATIENT
Start: 2025-03-20

## 2025-03-19 RX ORDER — SODIUM CHLORIDE 0.9 % (FLUSH) 0.9 %
5-40 SYRINGE (ML) INJECTION EVERY 12 HOURS SCHEDULED
Status: DISCONTINUED | OUTPATIENT
Start: 2025-03-19 | End: 2025-03-27 | Stop reason: HOSPADM

## 2025-03-19 RX ORDER — MINERAL OIL/HYDROPHIL PETROLAT
OINTMENT (GRAM) TOPICAL
Status: DISCONTINUED | OUTPATIENT
Start: 2025-03-20 | End: 2025-03-27 | Stop reason: HOSPADM

## 2025-03-19 RX ORDER — SENNOSIDES 8.8 MG/5ML
5 LIQUID ORAL 2 TIMES DAILY PRN
Status: CANCELLED | OUTPATIENT
Start: 2025-03-19

## 2025-03-19 RX ORDER — GABAPENTIN 400 MG/1
400 CAPSULE ORAL 3 TIMES DAILY
Status: DISCONTINUED | OUTPATIENT
Start: 2025-03-19 | End: 2025-03-27 | Stop reason: HOSPADM

## 2025-03-19 RX ORDER — TERBINAFINE HYDROCHLORIDE 250 MG/1
250 TABLET ORAL DAILY
Status: DISCONTINUED | OUTPATIENT
Start: 2025-03-20 | End: 2025-03-27 | Stop reason: HOSPADM

## 2025-03-19 RX ORDER — VITAMIN B COMPLEX
2000 TABLET ORAL DAILY
Status: DISCONTINUED | OUTPATIENT
Start: 2025-03-24 | End: 2025-03-27 | Stop reason: HOSPADM

## 2025-03-19 RX ORDER — VITAMIN B COMPLEX
6000 TABLET ORAL DAILY
Status: COMPLETED | OUTPATIENT
Start: 2025-03-20 | End: 2025-03-23

## 2025-03-19 RX ORDER — TAMSULOSIN HYDROCHLORIDE 0.4 MG/1
0.4 CAPSULE ORAL DAILY
Status: DISCONTINUED | OUTPATIENT
Start: 2025-03-20 | End: 2025-03-27 | Stop reason: HOSPADM

## 2025-03-19 RX ORDER — ONDANSETRON 2 MG/ML
4 INJECTION INTRAMUSCULAR; INTRAVENOUS EVERY 6 HOURS PRN
Status: DISCONTINUED | OUTPATIENT
Start: 2025-03-19 | End: 2025-03-27 | Stop reason: HOSPADM

## 2025-03-19 RX ADMIN — ALLOPURINOL 300 MG: 300 TABLET ORAL at 09:27

## 2025-03-19 RX ADMIN — SODIUM CHLORIDE, PRESERVATIVE FREE 10 ML: 5 INJECTION INTRAVENOUS at 09:27

## 2025-03-19 RX ADMIN — GABAPENTIN 400 MG: 400 CAPSULE ORAL at 13:41

## 2025-03-19 RX ADMIN — VITAMIN D, TAB 1000IU (100/BT) 6000 UNITS: 25 TAB at 09:26

## 2025-03-19 RX ADMIN — DILTIAZEM HYDROCHLORIDE 240 MG: 240 CAPSULE, EXTENDED RELEASE ORAL at 09:27

## 2025-03-19 RX ADMIN — PREDNISONE 40 MG: 20 TABLET ORAL at 09:26

## 2025-03-19 RX ADMIN — DAPTOMYCIN 950 MG: 500 INJECTION, POWDER, LYOPHILIZED, FOR SOLUTION INTRAVENOUS at 16:22

## 2025-03-19 RX ADMIN — CYCLOBENZAPRINE 10 MG: 10 TABLET, FILM COATED ORAL at 22:12

## 2025-03-19 RX ADMIN — GABAPENTIN 400 MG: 400 CAPSULE ORAL at 22:11

## 2025-03-19 RX ADMIN — ACETAMINOPHEN 1000 MG: 500 TABLET, FILM COATED ORAL at 22:12

## 2025-03-19 RX ADMIN — RIVAROXABAN 20 MG: 20 TABLET, FILM COATED ORAL at 18:18

## 2025-03-19 RX ADMIN — FAMOTIDINE 40 MG: 20 TABLET, FILM COATED ORAL at 22:11

## 2025-03-19 RX ADMIN — ACETAMINOPHEN 1000 MG: 500 TABLET, FILM COATED ORAL at 13:41

## 2025-03-19 RX ADMIN — ACETAMINOPHEN 1000 MG: 500 TABLET, FILM COATED ORAL at 06:16

## 2025-03-19 RX ADMIN — SODIUM CHLORIDE, PRESERVATIVE FREE 10 ML: 5 INJECTION INTRAVENOUS at 22:14

## 2025-03-19 RX ADMIN — FAMOTIDINE 40 MG: 20 TABLET, FILM COATED ORAL at 09:26

## 2025-03-19 RX ADMIN — FUROSEMIDE 40 MG: 40 TABLET ORAL at 09:26

## 2025-03-19 RX ADMIN — TAMSULOSIN HYDROCHLORIDE 0.4 MG: 0.4 CAPSULE ORAL at 09:26

## 2025-03-19 RX ADMIN — GABAPENTIN 400 MG: 400 CAPSULE ORAL at 09:26

## 2025-03-19 RX ADMIN — SERTRALINE 150 MG: 100 TABLET, FILM COATED ORAL at 09:26

## 2025-03-19 ASSESSMENT — PAIN SCALES - GENERAL
PAINLEVEL_OUTOF10: 0
PAINLEVEL_OUTOF10: 0

## 2025-03-19 NOTE — DISCHARGE SUMMARY
03/08/2025 08:13 PM    APPEARANCE CLEAR 03/08/2025 08:13 PM    PROTEINU 30 03/08/2025 08:13 PM    GLUCOSEU Negative 03/08/2025 08:13 PM    GLUCOSEU Negative 11/16/2022 02:03 AM    KETUA Negative 03/08/2025 08:13 PM    BILIRUBINUR Negative 03/08/2025 08:13 PM    BLOODU Negative 03/08/2025 08:13 PM    UROBILINOGEN 1.0 03/08/2025 08:13 PM    NITRU Negative 03/08/2025 08:13 PM    LEUKOCYTESUR TRACE 03/08/2025 08:13 PM    WBCUA 0-3 03/08/2025 08:13 PM    RBCUA 5-10 03/08/2025 08:13 PM    BACTERIA 0 03/08/2025 08:13 PM    LABCAST 0-2 11/16/2022 02:03 AM    MUCUS 1+ 03/08/2025 08:13 PM        Microbiology:  Results       Procedure Component Value Units Date/Time    Culture, Blood 1 [[id number]] Collected: 03/13/25 1043    Order Status: Completed Specimen: Blood Updated: 03/18/25 0736     Special Requests RIGHT HAND        Culture NO GROWTH 5 DAYS       Culture, Blood 1 [[id number]] Collected: 03/13/25 0900    Order Status: Canceled Specimen: Blood     Culture, Blood 2 [[id number]] Collected: 03/12/25 0909    Order Status: Completed Specimen: Blood Updated: 03/17/25 0718     Special Requests --        RIGHT  FOOT       Culture NO GROWTH 5 DAYS       Culture, Blood 1 [[id number]]  (Abnormal) Collected: 03/10/25 0906    Order Status: Completed Specimen: Blood Updated: 03/12/25 0926     Special Requests --        RIGHT  HAND       Gram Stain Gram positive cocci         AEROBIC BOTTLE POSITIVE               RESULTS VERIFIED, PHONED TO AND READ BACK BY LYNETTE DRISCOLL RN @2277 ON 3.11.25 [facility]           Culture       Methicillin Resistant Staphylococcus aureus                  For identification and susceptibility refer to culture [id number]          Culture, Blood 1 [[id number]] Collected: 03/10/25 0800    Order Status: Completed Specimen: Blood Updated: 03/15/25 0729     Special Requests RIGHT HAND        Culture NO GROWTH 5 DAYS       Influenza A/B, Molecular [[id number]] Collected: 03/08/25 2117    Order Status: Completed

## 2025-03-19 NOTE — PLAN OF CARE
Problem: Discharge Planning  Goal: Discharge to home or other facility with appropriate resources  3/15/2025 0254 by Juliana Chavarria RN  Outcome: Progressing  3/14/2025 1341 by Maryam Brnenan RN  Outcome: Progressing     Problem: Skin/Tissue Integrity  Goal: Skin integrity remains intact  Description: 1.  Monitor for areas of redness and/or skin breakdown  2.  Assess vascular access sites hourly  3.  Every 4-6 hours minimum:  Change oxygen saturation probe site  4.  Every 4-6 hours:  If on nasal continuous positive airway pressure, respiratory therapy assess nares and determine need for appliance change or resting period  3/15/2025 0254 by Juliana Chavarria, RN  Outcome: Progressing  3/14/2025 1341 by Maryam Brennan RN  Outcome: Progressing  Flowsheets (Taken 3/14/2025 0900)  Skin Integrity Remains Intact: Monitor for areas of redness and/or skin breakdown     Problem: Safety - Adult  Goal: Free from fall injury  3/15/2025 0254 by Juliana Chavarria RN  Outcome: Progressing  3/14/2025 1341 by Maryam Brennan RN  Outcome: Progressing     
  Problem: Discharge Planning  Goal: Discharge to home or other facility with appropriate resources  3/15/2025 0257 by Juliana Chavarria RN  Outcome: Progressing  3/15/2025 0254 by Juliana Chavarria RN  Outcome: Progressing  3/14/2025 1341 by Maryma Brennan RN  Outcome: Progressing     Problem: Skin/Tissue Integrity  Goal: Skin integrity remains intact  Description: 1.  Monitor for areas of redness and/or skin breakdown  2.  Assess vascular access sites hourly  3.  Every 4-6 hours minimum:  Change oxygen saturation probe site  4.  Every 4-6 hours:  If on nasal continuous positive airway pressure, respiratory therapy assess nares and determine need for appliance change or resting period  3/15/2025 0257 by Juliana Chavarria RN  Outcome: Progressing  3/15/2025 0254 by Juliana Chavarria RN  Outcome: Progressing  3/14/2025 1341 by Maryam Brennan RN  Outcome: Progressing  Flowsheets (Taken 3/14/2025 0900)  Skin Integrity Remains Intact: Monitor for areas of redness and/or skin breakdown     Problem: Safety - Adult  Goal: Free from fall injury  3/15/2025 0257 by Juliana Chavarria RN  Outcome: Progressing  3/15/2025 0254 by Juliana Chavarria RN  Outcome: Progressing  3/14/2025 1341 by Maryam Brennan RN  Outcome: Progressing     
  Problem: Discharge Planning  Goal: Discharge to home or other facility with appropriate resources  3/15/2025 0258 by Juliana Chavarria RN  Outcome: Progressing  3/15/2025 0257 by Juliana Chavarria RN  Outcome: Progressing  3/15/2025 0254 by Juliana Chavarria RN  Outcome: Progressing  3/14/2025 1341 by Maryam Brennan RN  Outcome: Progressing     Problem: Skin/Tissue Integrity  Goal: Skin integrity remains intact  Description: 1.  Monitor for areas of redness and/or skin breakdown  2.  Assess vascular access sites hourly  3.  Every 4-6 hours minimum:  Change oxygen saturation probe site  4.  Every 4-6 hours:  If on nasal continuous positive airway pressure, respiratory therapy assess nares and determine need for appliance change or resting period  3/15/2025 0258 by Juliana Chavarria RN  Outcome: Progressing  3/15/2025 0257 by Juliana Chavarria RN  Outcome: Progressing  3/15/2025 0254 by Juliana Chavarria RN  Outcome: Progressing  3/14/2025 1341 by Maryam Brennan RN  Outcome: Progressing  Flowsheets (Taken 3/14/2025 0900)  Skin Integrity Remains Intact: Monitor for areas of redness and/or skin breakdown     Problem: Safety - Adult  Goal: Free from fall injury  3/15/2025 0258 by Juliana Chavarria RN  Outcome: Progressing  3/15/2025 0257 by Juliana Chavarria RN  Outcome: Progressing  3/15/2025 0254 by Juliana Chavarria RN  Outcome: Progressing  3/14/2025 1341 by Maryam Brennan RN  Outcome: Progressing     
  Problem: Discharge Planning  Goal: Discharge to home or other facility with appropriate resources  3/15/2025 1232 by Maryam Brennan RN  Outcome: Progressing  3/15/2025 0258 by Juliana Chavarria RN  Outcome: Progressing  3/15/2025 0257 by Juliana Chavarria RN  Outcome: Progressing  3/15/2025 0254 by Juliana Chavarria RN  Outcome: Progressing     Problem: Skin/Tissue Integrity  Goal: Skin integrity remains intact  Description: 1.  Monitor for areas of redness and/or skin breakdown  2.  Assess vascular access sites hourly  3.  Every 4-6 hours minimum:  Change oxygen saturation probe site  4.  Every 4-6 hours:  If on nasal continuous positive airway pressure, respiratory therapy assess nares and determine need for appliance change or resting period  3/15/2025 1232 by Maryam Brennan RN  Outcome: Progressing  Flowsheets (Taken 3/15/2025 0800)  Skin Integrity Remains Intact: Monitor for areas of redness and/or skin breakdown  3/15/2025 0258 by Juliana Chavarria RN  Outcome: Progressing  3/15/2025 0257 by Juliana Chavarria RN  Outcome: Progressing  3/15/2025 0254 by Juliana Chavarria RN  Outcome: Progressing     Problem: Safety - Adult  Goal: Free from fall injury  3/15/2025 1232 by Maryam Brennan RN  Outcome: Progressing  3/15/2025 0258 by Juliana Chavarria RN  Outcome: Progressing  3/15/2025 0257 by Juliana Chavarria RN  Outcome: Progressing  3/15/2025 0254 by Juliana Chavarria RN  Outcome: Progressing     
  Problem: Discharge Planning  Goal: Discharge to home or other facility with appropriate resources  3/19/2025 0106 by Chela Jay, RN  Outcome: Progressing  3/18/2025 1525 by Emma Turner RN  Outcome: Progressing     Problem: Skin/Tissue Integrity  Goal: Skin integrity remains intact  Description: 1.  Monitor for areas of redness and/or skin breakdown  2.  Assess vascular access sites hourly  3.  Every 4-6 hours minimum:  Change oxygen saturation probe site  4.  Every 4-6 hours:  If on nasal continuous positive airway pressure, respiratory therapy assess nares and determine need for appliance change or resting period  3/19/2025 0106 by Chela Jay, RN  Outcome: Progressing  3/18/2025 1525 by Emma Turner RN  Outcome: Progressing     Problem: Safety - Adult  Goal: Free from fall injury  3/19/2025 0106 by Chela Jay, RN  Outcome: Progressing  3/18/2025 1525 by Emma Turner RN  Outcome: Progressing     
  Problem: Discharge Planning  Goal: Discharge to home or other facility with appropriate resources  Outcome: Progressing     Problem: Skin/Tissue Integrity  Goal: Skin integrity remains intact  Description: 1.  Monitor for areas of redness and/or skin breakdown  2.  Assess vascular access sites hourly  3.  Every 4-6 hours minimum:  Change oxygen saturation probe site  4.  Every 4-6 hours:  If on nasal continuous positive airway pressure, respiratory therapy assess nares and determine need for appliance change or resting period  Outcome: Progressing  Flowsheets (Taken 3/14/2025 0900)  Skin Integrity Remains Intact: Monitor for areas of redness and/or skin breakdown     Problem: Safety - Adult  Goal: Free from fall injury  Outcome: Progressing     
  Problem: Discharge Planning  Goal: Discharge to home or other facility with appropriate resources  Outcome: Progressing  Flowsheets (Taken 3/13/2025 0745)  Discharge to home or other facility with appropriate resources: Identify barriers to discharge with patient and caregiver     Problem: Skin/Tissue Integrity  Goal: Skin integrity remains intact  Description: 1.  Monitor for areas of redness and/or skin breakdown  2.  Assess vascular access sites hourly  3.  Every 4-6 hours minimum:  Change oxygen saturation probe site  4.  Every 4-6 hours:  If on nasal continuous positive airway pressure, respiratory therapy assess nares and determine need for appliance change or resting period  Outcome: Progressing  Flowsheets (Taken 3/13/2025 0745)  Skin Integrity Remains Intact: Monitor for areas of redness and/or skin breakdown     Problem: Safety - Adult  Goal: Free from fall injury  Outcome: Progressing     
  Problem: Discharge Planning  Goal: Discharge to home or other facility with appropriate resources  Outcome: Progressing  Flowsheets (Taken 3/9/2025 0732)  Discharge to home or other facility with appropriate resources: Identify barriers to discharge with patient and caregiver     Problem: Skin/Tissue Integrity  Goal: Skin integrity remains intact  Description: 1.  Monitor for areas of redness and/or skin breakdown  2.  Assess vascular access sites hourly  3.  Every 4-6 hours minimum:  Change oxygen saturation probe site  4.  Every 4-6 hours:  If on nasal continuous positive airway pressure, respiratory therapy assess nares and determine need for appliance change or resting period  Outcome: Progressing  Flowsheets  Taken 3/9/2025 1438  Skin Integrity Remains Intact: Monitor for areas of redness and/or skin breakdown  Taken 3/9/2025 0732  Skin Integrity Remains Intact: Monitor for areas of redness and/or skin breakdown     Problem: Safety - Adult  Goal: Free from fall injury  Outcome: Progressing     
  Problem: Skin/Tissue Integrity  Goal: Skin integrity remains intact  Description: 1.  Monitor for areas of redness and/or skin breakdown  2.  Assess vascular access sites hourly  3.  Every 4-6 hours minimum:  Change oxygen saturation probe site  4.  Every 4-6 hours:  If on nasal continuous positive airway pressure, respiratory therapy assess nares and determine need for appliance change or resting period  3/17/2025 1009 by Analia Lemos, RN  Outcome: Progressing  3/17/2025 0125 by Amy Chavarria, RN  Outcome: Progressing  Flowsheets (Taken 3/16/2025 2010)  Skin Integrity Remains Intact:   Monitor for areas of redness and/or skin breakdown   Assess vascular access sites hourly   Every 4-6 hours minimum: Change oxygen saturation probe site     
Encourage patient to monitor pain and request assistance     Problem: Skin/Tissue Integrity  Goal: Skin integrity remains intact  Description: 1.  Monitor for areas of redness and/or skin breakdown  2.  Assess vascular access sites hourly  3.  Every 4-6 hours minimum:  Change oxygen saturation probe site  4.  Every 4-6 hours:  If on nasal continuous positive airway pressure, respiratory therapy assess nares and determine need for appliance change or resting period  3/9/2025 2041 by Mikey Velez, RN  Outcome: Progressing  Flowsheets (Taken 3/9/2025 1930)  Skin Integrity Remains Intact:   Monitor for areas of redness and/or skin breakdown   Assess vascular access sites hourly   Every 4-6 hours minimum: Change oxygen saturation probe site   Every 4-6 hours: If on nasal continuous positive airway pressure, respiratory therapy assesses nares and determine need for appliance change or resting period  3/9/2025 1439 by Latrice Hernandez, RN  Outcome: Progressing  Flowsheets  Taken 3/9/2025 1438  Skin Integrity Remains Intact: Monitor for areas of redness and/or skin breakdown  Taken 3/9/2025 0732  Skin Integrity Remains Intact: Monitor for areas of redness and/or skin breakdown

## 2025-03-19 NOTE — PROGRESS NOTES
4 Eyes Skin Assessment     NAME:  Jozef Felix  YOB: 1965  MEDICAL RECORD NUMBER:  [id number]    The patient is being assessed for  Admission    I agree that at least one RN has performed a thorough Head to Toe Skin Assessment on the patient. ALL assessment sites listed below have been assessed.      Areas assessed by both nurses:    Head, Face, Ears, Shoulders, Back, Chest, Arms, Elbows, Hands, Sacrum. Buttock, Coccyx, Ischium, and Legs. Feet and Heels        Does the Patient have a Wound? Yes wound(s) were present on assessment. LDA wound assessment was Initiated and completed by RN       Augustine Prevention initiated by RN: Yes  Wound Care Orders initiated by RN: Yes    Pressure Injury (Stage 3,4, Unstageable, DTI, NWPT, and Complex wounds) if present, place Wound referral order by RN under : No    New Ostomies, if present place, Ostomy referral order under : No     Bilateral redness on each lower ext., redness in between toes on bilateral feet. Scattered ecchymosis. Wound care onboard for care of lower extremities.       Nurse 1 eSignature: Electronically signed by Katrin Black RN on 3/19/25 at 6:53 PM EDT    **SHARE this note so that the co-signing nurse can place an eSignature**    Nurse 2 eSignature: {Esignature:[id number]}

## 2025-03-19 NOTE — CARE COORDINATION
MSN, CM:  patient continues to wait on insurance auth.  Spoke with wife yesterday and answered all questions regarding [facility] and insurance.  Hope to get approval soon.  Case Management will continue to follow.   
MSN, CM:  patient with MRI pending.  Patient continues on IV abx and steroids.  PT/OT recommend outpatient PT.  Patient continues on 2L NC with room air at baseline.  Case Management will continue to follow.  
MSN, CM:  patient's MRI revealed severe spinal stenosis.  Patient may need CLINTON.  Repeat blood cultures pending.  Patient continues on IV abx and ID following.  Intramed referred and following.  Patient needs PICC placement.  Case Management will continue to follow.  
MSN, CM:  spoke with Juan Daniel this AM about patient's IV abx cost.  There will be no out of pocket cost for this patient.  Discharged planning ongoing with wife/patient/MD.  Case Management will continue to follow.   
MSN, ERICKSON:  patient to be discharged to Acute rehab ([facility]) today.  Patient and family agree with this discharge plan.  Patient has met all milestones for this admission.  Staff to transport patient to facility.       03/19/25 2000   Service Assessment   Patient Orientation Alert and Oriented   Cognition Alert   Services At/After Discharge   Transition of Care Consult (CM Consult) Acute Rehab  ([facility])   Services At/After Discharge None   Mode of Transport at Discharge Other (see comment)  (Hospital staff)   Confirm Follow Up Transport Self   Condition of Participation: Discharge Planning   The Plan for Transition of Care is related to the following treatment goals: Acute rehab   The Patient and/or Patient Representative was provided with a Choice of Provider? Patient;Patient Representative   Name of the Patient Representative who was provided with the Choice of Provider and agrees with the Discharge Plan?  Wife   The Patient and/Or Patient Representative agree with the Discharge Plan? Yes   Freedom of Choice list was provided with basic dialogue that supports the patient's individualized plan of care/goals, treatment preferences, and shares the quality data associated with the providers?  Yes       
MSN, cM:  patient with BLE venous stasis ulcers; wound care consulted.  Blood cultures positive with repeat blood cultures pending.  Patient continues on IV abx and lasix.  ID consulted for abx regiment.  Case Management will continue to follow.   
Midline placed. MD will discharge tomorrow. ERICKSON spoke to on-call RN from [facility], he confirmed that they received the referral yesterday. He will contact the pts spouse regarding cost (~$400/wk) and plan for education and training tomorrow morning. RN asked that his Dapto be given prior to discharge, updated MD. ERICKSON ordered HH with [facility] for RN/PT/OT. Given OPAT instructions:   Daptomycin 950mg IV q24h EOT 3/26/25   Routine midline care. No PICC needed   Weekly labs: CBC with diff, Creatinine, CK, LFTs   Fax results [phone]   HH to remove midline catheter at EOT   Is currently on 1L supplemental, doubtful he will need supplemental oxygen at discharge.       
Pt has been recommended for rehab.  LMSW follow up with pt/spouse about rehab recommendation.  Couple prefer acute rehab to SNF if pt is appropriate.  Referral sent to [facility] to please assess pt for rehab.  Pt insurance will require a precert for any rehab placement.    
None   Bathroom Accessibility Accessible   Home Equipment Cane;Wheelchair - Manual   Receives Help From Family   Prior Level of Assist for ADLs Independent   Prior Level of Assist for Homemaking Independent   Homemaking Responsibilities Yes   Ambulation Assistance Independent   Prior Level of Assist for Transfers Independent   Active  Yes   Mode of Transportation SUV   Occupation Full time employment   Discharge Planning   Type of Residence House   Living Arrangements Spouse/Significant Other;Children;Family Members  (Wife, God son, mother in law)   Current Services Prior To Admission Durable Medical Equipment   Current DME Prior to Arrival Cane;Wheelchair   DME Ordered? No   Potential Assistance Purchasing Medications No   Type of Home Care Services None   Patient expects to be discharged to: House   One/Two Story Residence One story   History of falls? 1

## 2025-03-19 NOTE — PRE-CERTIFICATION NOTE
[facility]  Pre-admission Assessment    Facility Information: [facility]  Patient Name: Jacquelin Felix        MRN: [id number]    : 1965 (59 y.o.)  Gender: male   Ethnicity:Not of , /a, or Irish origin  Race:White    COVERAGE INFORMATION  Active Insurance as of 3/8/2025       Primary Coverage       Payor Plan Insurance Group Employer/Plan Group    [facility] [id number]       Payor Address Payor Phone Number Payor Fax Number Effective Dates    [address]   2020 - None Entered    [facility] [phone]         Subscriber Name Subscriber Birth Date Member ID       JACQUELIN FELIX 1965 [id number]                 Auth Number- [id number]  Update Due: 3/25/25    Contact is Karis at [phone]. Fax update to [phone]    PHYSICIAN/REFERRAL INFORMATION  Attending Physician: Avinash Burks MD   Admitted From: [facility]  Date of Admission to the Hospital: 3/8/2025  7:03 PM  Date Patient Eligible for Admission: 3/19/2025    PRIOR LIVING SITUATION/LEVEL OF FUNCTION:  Social/Functional History  Lives With: Spouse, Family, Son (wife, God son, and mother in law)  Type of Home: House  Home Layout: One level  Home Access: Stairs to enter without rails  Entrance Stairs - Number of Steps: 3  Bathroom Shower/Tub: Tub/Shower unit  Bathroom Toilet: Standard  Bathroom Equipment: None  Bathroom Accessibility: Accessible  Home Equipment: Cane, Wheelchair - Manual  Receives Help From: Family  Prior Level of Assist for ADLs: Independent  Prior Level of Assist for Homemaking: Independent  Homemaking Responsibilities: Yes  Prior Level of Assist for Transfers: Independent  Active : Yes  Mode of Transportation: [facility]  Occupation: Full time employment  Additional Comments: Lives with spouse in one story home and uses walking stick for ambulation or w/c at work ([employer])   Has lack of transportation kept you from medical

## 2025-03-19 NOTE — PROGRESS NOTES
Hospitalist Progress Note   Admit Date:  3/8/2025  7:03 PM   Name:  Jozef Felix   Age:  59 y.o.  Sex:  male  :  1965   MRN:  [id number]   Room:  [facility]/    Presenting/Chief Complaint: Dysuria     Reason(s) for Admission: Cellulitis [L03.90]  Cellulitis of lower extremity, unspecified laterality [L03.119]  Sepsis, due to unspecified organism, unspecified whether acute organ dysfunction present ([facility]) [A41.9]     Hospital Course:   Jozef Felix is a 59 y.o. male with medical history of chronic venous stasis ulcers, restless leg syndrome, obesity, chronic A-fib, GERD, BPH, anxiety, and kidney stones who presents emergency room with worsening back pain.  Patient reports having fever and chills prior to arrival.  Patient also follows with wound care for his venous stasis ulcers with last visit was on 3/6.      Since admission, CT abdomen pelvis showing punctuate bilateral nonobstructing renal calculi and sludge versus cholelithiasis.  Rapid COVID and flu are negative.  Wound care consulted for bilateral lower extremity cellulitis.  Blood cultures positive with MRSA on 3/8.  Repeat blood cultures with gram-positive cocci echocardiogram normal without vegetation. May need CLINTON.  Infectious disease consulted on 3/12. Continuing with IV vancomycin.  Cefepime stopped on 3/10.  Hospital course further complicated by lumbar back pain.  X-ray with anterolisthesis at L3-L4 with likely severe spinal canal stenosis. Lumbar MRI on 3/11 showed severe spinal stenosis. No evidence of discitis or osteomyelitis.  May need orthopedic spine for evaluation.    Subjective & 24hr Events:   No overnight events documented.    Lumbar MRI without evidence of discitis or osteomyelitis.  Does show severe spinal stenosis.  Will need orthopedic spine once infection clears.  Repeat blood cultures 1/2 bottles with GPC. ID consulted.   Echo normal without vegetation. May need CLINTON?  Vancomycin switched to 
       Hospitalist Progress Note   Admit Date:  3/8/2025  7:03 PM   Name:  Jozef Felix   Age:  59 y.o.  Sex:  male  :  1965   MRN:  [id number]   Room:  [facility]/    Presenting/Chief Complaint: Dysuria     Reason(s) for Admission: Cellulitis [L03.90]  Cellulitis of lower extremity, unspecified laterality [L03.119]  Sepsis, due to unspecified organism, unspecified whether acute organ dysfunction present ([facility]) [A41.9]     Hospital Course:   Jozef Felix is a 59 y.o. male with medical history of chronic venous stasis ulcers, restless leg syndrome, obesity, chronic A-fib, GERD, BPH, anxiety, and kidney stones who presents emergency room with worsening back pain.  Patient reports having fever and chills prior to arrival.  Patient also follows with wound care for his venous stasis ulcers with last visit was on 3/6.      Since admission, CT abdomen pelvis showing punctuate bilateral nonobstructing renal calculi and sludge versus cholelithiasis.  Rapid COVID and flu are negative.  Wound care consulted for bilateral lower extremity cellulitis.  Blood cultures positive with MRSA on 3/8.  Repeat blood cultures with gram-positive cocci echocardiogram normal without vegetation. May need CLINTON.  Infectious disease consulted on 3/12. Continuing with IV vancomycin.  Cefepime stopped on 3/10.  Hospital course further complicated by lumbar back pain.  X-ray with anterolisthesis at L3-L4 with likely severe spinal canal stenosis. Lumbar MRI on 3/11 showed severe spinal stenosis. No evidence of discitis or osteomyelitis.  May need orthopedic spine for evaluation.    Subjective & 24hr Events:   No overnight events documented.    Lumbar MRI without evidence of discitis or osteomyelitis.  Does show severe spinal stenosis.  NSG consulted. Conservative management for now.  Repeat blood cultures no growth. ID consulted. Signed off on 3/14.  Echo normal without vegetation.   Vancomycin switched to daptomycin.    Assessment & 
       Hospitalist Progress Note   Admit Date:  3/8/2025  7:03 PM   Name:  Jozef Felix   Age:  59 y.o.  Sex:  male  :  1965   MRN:  [id number]   Room:  [facility]/    Presenting/Chief Complaint: Dysuria     Reason(s) for Admission: Cellulitis [L03.90]  Cellulitis of lower extremity, unspecified laterality [L03.119]  Sepsis, due to unspecified organism, unspecified whether acute organ dysfunction present ([facility]) [A41.9]     Hospital Course:   Jozef Felix is a 59 y.o. male with medical history of chronic venous stasis ulcers, restless leg syndrome, obesity, chronic A-fib, GERD, BPH, anxiety, and kidney stones who presents emergency room with worsening back pain.  Patient reports having fever and chills prior to arrival. CT abdomen pelvis showing punctuate bilateral nonobstructing renal calculi and sludge versus cholelithiasis. Wound care consulted for bilateral lower extremity cellulitis.  Blood cultures positive with MRSA on 3/8.  Repeat blood cultures positive in 1 out of 2 sets.  Infectious disease consulted.  Lumbar MRI on 3/11 showed severe spinal stenosis. No evidence of discitis or osteomyelitis.  Patient remains on daptomycin per infectious disease with end of treatment planned 3/26/25.  Inpatient rehab has accepted patient.  Pending precertification for inpatient rehab.    Subjective & 24hr Events:   Denied any complaints this morning.  Reports feeling improved.  Complained about \"letting go of his health.\"  He is motivated to start taking care of himself again.  Denied any other complaints.      Assessment & Plan:     Sepsis due to bilateral lower extremity cellulitis   MRSA bacteremia  -Met criteria for sepsis on admission with leukocytosis, fever, tachycardia, and tachypnea. Source of lower extremity wounds.    -Blood culture on 3/8 with MRSA  -Repeat blood cultures 3/12 no growth to date  -ID Signed off 3/14.  -TTE without vegetation.  -Wound care following  -Daptomycin (3/13 -EOT 
  Physician Progress Note      PATIENT:               JACQUELIN ROJAS  CSN #:                  [id number]  :                       1965  ADMIT DATE:       3/8/2025 7:03 PM  DISCH DATE:  RESPONDING  PROVIDER #:        Halle Esparza MD          QUERY TEXT:    Patient admitted with Sepsis due to Bilateral lower extremity cellulitis due   to Chronic venous stasis ulcers and MRSA Bacteremia. Pt noted to have BPH and   was treated with Flomax.?If possible, please document in progress notes and   discharge summary if you are evaluating and/or treating any of the following:  The medical record reflects the following:  Risk Factors: age 59yrs, history of BPH, morbid obesity, cholelithiasis  Clinical Indicators:  -CT scan with bilateral nonobstructing renal calculi, Nephrolithiasis  -creatinine 0.74  -pro-manolo 0.13, CPR high sensitivity 56, WBC 12.7 - 13.0 - 12.8  -UA dark yellow trace leukocyte esterase, 1+ mucus    Treatment: Flomax  Options provided:  -- BPH with partial/complete urinary obstruction  -- BPH with urinary retention without obstruction  -- Other - I will add my own diagnosis  -- Disagree - Not applicable / Not valid  -- Disagree - Clinically unable to determine / Unknown  -- Refer to Clinical Documentation Reviewer    PROVIDER RESPONSE TEXT:    This patient has BPH with partial/complete urinary obstruction.    Query created by: Kenzie Parsons on 3/11/2025 12:36 PM      Electronically signed by:  Halle Esparza MD 3/11/2025 12:41 PM          
ACUTE OCCUPATIONAL THERAPY GOALS:   (Developed with and agreed upon by patient and/or caregiver.)  1. Patient will complete lower body bathing and dressing with MODIFIED INDEPENDENCE and adaptive equipment as needed.     2. Patient will complete toileting with INDEPENDENCE.  3. Patient will complete grooming ADL  with INDEPENDENCE.  4. Patient will tolerate 25 minutes of OT treatment with 1-2 rest breaks to increase activity tolerance for ADLs.   5. Patient will complete functional transfers with MODIFIED INDEPENDENCE and adaptive equipment as needed.   6. Patient will tolerate 10 minutes BUE exercises to increase strength for safe, functional transfers.      Timeframe: 7 visits    OCCUPATIONAL THERAPY: Daily Note AM   OT Visit Days: 3   Time In/Out  OT Charge Capture  Rehab Caseload Tracker  OT Orders    Jozef Felix is a 59 y.o. male   PRIMARY DIAGNOSIS: Sepsis ([facility])  Cellulitis [L03.90]  Cellulitis of lower extremity, unspecified laterality [L03.119]  Sepsis, due to unspecified organism, unspecified whether acute organ dysfunction present ([facility]) [A41.9]       Inpatient: Payor: DIONTE HELLER / [place] / Product Type: *No Product type* /     ASSESSMENT:     REHAB RECOMMENDATIONS:   Recommendation to date pending progress:  Setting:  Rehab    Equipment:    To Be Determined--pt has walking stick and wheelchair at home      ASSESSMENT:  Mr. Felix presents with decreased activity tolerance, balance, strength and mobility impacting ADLs. Pt increased activity tolerance and decreased assistance required for mobility today. Pt overall SBA-CGA rolling walker for functional transfers and mobility of community distances in hallway, standing rest break required, chair follow for rest breaks, but did not end up needing it today. Pt then performed BLE exercises seated in chair in grid below. Pt reported completing ADLs prior to treatment. Pt completed all activity on room air and sats >90% throughout. Pt is progressing 
ACUTE PHYSICAL THERAPY GOALS:   (Developed with and agreed upon by patient and/or caregiver.)  LTG:  (1.)Mr. Felix will move from supine to sit and sit to supine , scoot up and down, and roll side to side in bed with MODIFIED INDEPENDENCE within 7 treatment day(s).    (2.)Mr. Felix will transfer from bed to chair and chair to bed with MODIFIED INDEPENDENCE using the least restrictive device within 7 treatment day(s).    (3.)Mr. Felix will ambulate with MODIFIED INDEPENDENCE for 100 feet with the least restrictive device within 7 treatment day(s).  (4.)Mr. Felix will participate in therapeutic activity/exercises x 25 minutes for increased strength within 7 treatment days.       PHYSICAL THERAPY: Daily Note AM   (Link to Caseload Tracking: PT Visit Days : 2  Time In/Out PT Charge Capture  Rehab Caseload Tracker  Orders    Jozef Felix is a 59 y.o. male   PRIMARY DIAGNOSIS: Sepsis ([facility])  Cellulitis [L03.90]  Cellulitis of lower extremity, unspecified laterality [L03.119]  Sepsis, due to unspecified organism, unspecified whether acute organ dysfunction present ([facility]) [A41.9]       In[place]: [place] / Product Type: *No Product type* /     ASSESSMENT:     REHAB RECOMMENDATIONS:   Recommendation to date pending progress:  Setting:  Outpatient Therapy    Equipment:    To Be Determined     ASSESSMENT:  Mr. Felix was sitting EOB upon arrival and agreeable to therapy.  Pt participated in seated EOB activities today with supervision-Becky.  Pt stood several times today with SBA/BRW.  Pt ambulated in rinaldi today with chair follow and CGA/BRW.  He has increased trunk flexion and decreased B LE step length.  Pt fatigued with walking today and took several seated rest breaks.  He has overall decreased activity tolerance.  Pt progressed in ambulation today.     SUBJECTIVE:   Mr. Felix states, \"You're a trip\"     Social/Functional Lives With: Spouse, Family, Son (wife, God son, and mother in law)  Type 
ACUTE PHYSICAL THERAPY GOALS:   (Developed with and agreed upon by patient and/or caregiver.)  LTG:  (1.)Mr. Felix will move from supine to sit and sit to supine , scoot up and down, and roll side to side in bed with MODIFIED INDEPENDENCE within 7 treatment day(s).    (2.)Mr. Felix will transfer from bed to chair and chair to bed with MODIFIED INDEPENDENCE using the least restrictive device within 7 treatment day(s).    (3.)Mr. Felix will ambulate with MODIFIED INDEPENDENCE for 100 feet with the least restrictive device within 7 treatment day(s).  (4.)Mr. Felix will participate in therapeutic activity/exercises x 25 minutes for increased strength within 7 treatment days.       PHYSICAL THERAPY: Daily Note AM   (Link to Caseload Tracking: PT Visit Days : 5  Time In/Out PT Charge Capture  Rehab Caseload Tracker  Orders    Jozef Felix is a 59 y.o. male   PRIMARY DIAGNOSIS: Sepsis ([facility])  Cellulitis [L03.90]  Cellulitis of lower extremity, unspecified laterality [L03.119]  Sepsis, due to unspecified organism, unspecified whether acute organ dysfunction present ([facility]) [A41.9]       In[place]: [place] / Product Type: *No Product type* /     ASSESSMENT:     REHAB RECOMMENDATIONS:   Recommendation to date pending progress:  Setting:  Rehab    Equipment:    To Be Determined     ASSESSMENT:  Mr. Felix was sitting up in recliner chair upon contact and agreeable to PT. Patient is highly motivated to participate. Patient transferred to standing with SBA and ambulated 40' x 4 with bariatric RW, CGA, chair follow for safety and cues for sequencing with rolling walker/pursed lipped breathing. Patient took a rest break in between ambulation bouts due to c/o fatigue and generalized weakness. Patient returned to room where he participated in LE exercises to  BLE's with cues for improved quality of exercise. Steady progress toward PT goals. Will continue PT efforts.     SUBJECTIVE:   Mr. Felix states, 
ACUTE PHYSICAL THERAPY GOALS:   (Developed with and agreed upon by patient and/or caregiver.)  LTG:  (1.)Mr. Felix will move from supine to sit and sit to supine , scoot up and down, and roll side to side in bed with MODIFIED INDEPENDENCE within 7 treatment day(s).    (2.)Mr. Felix will transfer from bed to chair and chair to bed with MODIFIED INDEPENDENCE using the least restrictive device within 7 treatment day(s).    (3.)Mr. Felix will ambulate with MODIFIED INDEPENDENCE for 100 feet with the least restrictive device within 7 treatment day(s).  (4.)Mr. Felix will participate in therapeutic activity/exercises x 25 minutes for increased strength within 7 treatment days.    ________________________________________________________________________________________________        PHYSICAL THERAPY Initial Assessment and AM  (Link to Caseload Tracking: PT Visit Days : 1  Acknowledge Orders  Time In/Out  PT Charge Capture  Rehab Caseload Tracker    Jozef Felix is a 59 y.o. male   PRIMARY DIAGNOSIS: Sepsis ([facility])  Cellulitis [L03.90]  Cellulitis of lower extremity, unspecified laterality [L03.119]  Sepsis, due to unspecified organism, unspecified whether acute organ dysfunction present ([facility]) [A41.9]       Reason for Referral: Generalized Muscle Weakness (M62.81)  Difficulty in walking, Not elsewhere classified (R26.2)  Inpatient: [place] / Product Type: *No Product type* /     ASSESSMENT:     REHAB RECOMMENDATIONS:   Recommendation to date pending progress:  Setting:  Outpatient Therapy    Equipment:    None     ASSESSMENT:  Mr. Felix was admitted to the hospital with c/o  LE wounds.  Pt found septic with MRSA infection.  He has a history of chronic venous stasis.  Pt presents to PT with decreased AROM and strength in B LEs.  He was able to stand today with CGA-Keith x 1-2 and fair standing balance.  Pt ambulated with CGA/RW and decreased B LE step length.  Pt also noted with increased trunk 
Accessed chart to assist primary RN.  
Called to check status on precert with [employer] for [facility] admission.  Case is still pending at this time.  
Going to MRI via stretcher.   
Patient came down to MRI department for MRI scan today. Attempted to begin scan but patient was unable to do MRI at this time due to too much pain when lying flat and claustrophobia when entering the scanner. EAL  
Patient declined cpap tonight. He is currently on 2L NC with a SpO2 greater than 90% and no respiratory distress noted at this time.   
Patient has been accepted for admission to [facility] for admission and pre cert initiated for Await Determination       
Patient on clear liquid diet, wife brought milkshake for patient to drink. I informed both patient and wife that he couldn't have it due to his current diet order. When I went back to patients room at 2120 to do my assessment the milkshake was gone and patients wife stated \"I let him drink the milkshake\".  
Pt in bed resting rr are even and unlabored he remains on room air tolerating well. He has a c/o pain 10/10 MD notified about pts pain and questions. Abd soft bowel sounds are active. Swelling noted to BLE dressings clean dry and intact. Safety measures in place.   
Pt is up in chair this AM. He had a c/o pain 7/10 back pain. He was given PO pain med oxy 10mg per MD order. Pt states he did not sleep well, he stated he gets disoriented and confused. He is alert and oriented at current time on room air and tolerating well. He was able to take AM meds. Abd is soft bowel sounds are active no issues noted. Safety measures in place.   
Pt offered cpap and declilned at this time.  
Pt resting in chair comfortably at this time, alert and oriented times 4. No distress noted, respirations even and unlabored on room air. Pt denies pain at this time. Pt instructed to call for assistance if needed, call light in place, will prepare for bedside shift report.   
Pt resting in chair, awakens when spoken to. Pt on 2 L NC, reports left back pain 7/10. Pt denies, nausea and vomiting. Pt made aware of clear liquid diet order, but just ate cheezits.  LE leg dressings in place. Pt encouraged to call for assistance if needed call light in reach, will monitor.   
Pt resting in recliner comfortably at this time, alert and oriented times 4. No distress noted, respirations even and unlabored on room air. Pt denies pain at this time. Pt instructed to call for assistance if needed, call light in place, will continue to monitor. Will prepare for bedside shift report.   
Pt sitting up in bed, alert oriented times 3. Pt complaints of back pain 3/10, no distress at this time. Pt on 2 L NC, bilateral LE wounds. Pt encouraged to call for assistance if needed call light in reach, will monitor.   
RN to attempt to assess patient skin with noted wound on buttocks on LDA. Patient stated \" that is very kind of you but that is not necessary.\" RN to educate patient on importance of wound management and assessment. Patient continued to refuse. RN to continue current plan of care.   
Remains off floor in MRI  
Roxicodone 5 mg 1 tab po given for complaints of back pain 6/10  
Roxicodone 5 mg 1 tab po given for complaints of left back pain 6/10  
Roxicodone 5 mg 1 tab po given for complaints of left back pain 6/10. Pt sitting up in chair. Will monitor  
Roxicodone 5 mg 1 tab po given for complaints of left back pain 7/10  
Roxicodone 5mg 1 tab po given for complaints of left back pain 6/10  
Sitting up in chair with eyes closed, no s/sx of distress at this time. Call light in reach, will monitor.   
Spoke with patient regarding cpap orders.  Patient states that he thinks he wore a cpap here during this stay but then all of a sudden the orders changed.  Patient states that he prefers to sleep with NC on rather than the mask with the cpap.  Patient stated that if he changes his mind, he will have RN contact RT for cpap.  
TRANSFER - IN REPORT:    Verbal report received from JOSEPH Sal on Jozef Chandrayor  being received from ED for routine progression of patient care      Report consisted of patient's Situation, Background, Assessment and   Recommendations(SBAR).     Information from the following report(s) Nurse Handoff Report was reviewed with the receiving nurse.    Opportunity for questions and clarification was provided.      
TRANSFER - OUT REPORT:    Verbal report given to JOSEPH Wilkes on Jozef Felix  being transferred to 9th Floor Rehab  for routine progression of patient care       Report consisted of patient's Situation, Background, Assessment and   Recommendations(SBAR).     Information from the following report(s) Nurse Handoff Report, Adult Overview, and Event Log was reviewed with the receiving nurse.           Lines:   Peripheral IV 03/08/25 Right Antecubital (Active)   Site Assessment Clean, dry & intact 03/19/25 0826   Line Status Capped;Flushed 03/19/25 0826   Line Care Cap changed;Connections checked and tightened 03/19/25 0826   Phlebitis Assessment No symptoms 03/19/25 0826   Infiltration Assessment 0 03/19/25 0826   Alcohol Cap Used Yes 03/19/25 0826   Dressing Status Clean, dry & intact 03/19/25 0826   Dressing Type Transparent 03/19/25 0826        Opportunity for questions and clarification was provided.      Patient transported with:  O2 @ 1lpm and Registered Nurse       
The beginning of Daylight Saving Time occurred at 0200 hrs. Documentation of patient care and medications administered is done with respect to the time change.   
VANCO DAILY NOTE  [facility]   Pharmacy Pharmacokinetic Monitoring Service - Vancomycin    Consulting Provider: Hunter   Indication: Cellulitis, Sepsis, MRSA bacteremia  Target Concentration: Goal AUC/-600 mg*hr/L  Day of Therapy: 4  Additional Antimicrobials: Cefepime    Pertinent Laboratory Values:   Wt Readings from Last 1 Encounters:   03/09/25 (!) 165.8 kg (365 lb 9.6 oz)     Temp Readings from Last 1 Encounters:   03/12/25 97.9 °F (36.6 °C) (Oral)     Recent Labs     03/10/25  0315 03/11/25  0649 03/12/25  0320   BUN 14 14 13   CREATININE 0.74* 0.74* 0.74*   WBC 12.8* 9.0 7.2     Estimated Creatinine Clearance: 176 mL/min (A) (based on SCr of 0.74 mg/dL (L)).    Lab Results   Component Value Date/Time    VANCOTROUGH 16.5 06/18/2020 07:59 AM    VANCORANDOM 12.6 03/12/2025 03:20 AM       MRSA Nasal Swab: N/A. Non-respiratory infection    Assessment:  Date/Time Dose Concentration AUC   03/10 1500 mg q12h 8.4 360   03/12 1250 mg q8h 12.6 474   Note: Serum concentrations collected for AUC dosing may appear elevated if collected in close proximity to the dose administered, this is not necessarily an indication of toxicity    Plan:  Dosing recommendations based on Bayesian software  Based on level obtained this morning, AUC is currently therapeutic. Will continue vancomycin 1250 mg q8h and monitor renal function for any changes.   Renal labs as indicated   Vancomycin concentrations will be ordered as clinically indicated.   Pharmacy will continue to monitor patient and adjust therapy as indicated    Thank you for the consult,  LILIANA CURRAN RPH                  
VANCO DAILY NOTE  [facility]   Pharmacy Pharmacokinetic Monitoring Service - Vancomycin    Consulting Provider: Hunter   Indication: Cellulitis, Sepsis, MRSA bacteremia  Target Concentration: Goal AUC/-600 mg*hr/L  Day of Therapy: 1  Additional Antimicrobials: Cefepime    Pertinent Laboratory Values:   Wt Readings from Last 1 Encounters:   03/09/25 (!) 165.8 kg (365 lb 9.6 oz)     Temp Readings from Last 1 Encounters:   03/09/25 98.4 °F (36.9 °C) (Oral)     Recent Labs     03/08/25  1928 03/09/25  0439   BUN 15 17   CREATININE 0.93 0.91   WBC 12.7* 13.0*   PROCAL 0.13*  --    LACTSEPSIS 1.4  --      Estimated Creatinine Clearance: 143 mL/min (based on SCr of 0.91 mg/dL).    Lab Results   Component Value Date/Time    VANCOTROUGH 16.5 06/18/2020 07:59 AM    VANCORANDOM 10.9 08/06/2023 06:35 AM       MRSA Nasal Swab: N/A. Non-respiratory infection    Assessment:  Date/Time Dose Concentration AUC         Note: Serum concentrations collected for AUC dosing may appear elevated if collected in close proximity to the dose administered, this is not necessarily an indication of toxicity    Plan:  Dosing recommendations based on Bayesian software  Continue vancomycin 1500 mg q 12 hours  Anticipated AUC of 528 and trough concentration of 15.6 at steady state  Renal labs as indicated   Vancomycin concentrations will be ordered as clinically appropriate  Pharmacy will continue to monitor patient and adjust therapy as indicated    Thank you for the consult,  Mikey Dorman RPH            
VANCO DAILY NOTE  [facility]   Pharmacy Pharmacokinetic Monitoring Service - Vancomycin    Consulting Provider: Hunter   Indication: Cellulitis, Sepsis, MRSA bacteremia  Target Concentration: Goal AUC/-600 mg*hr/L  Day of Therapy: 2  Additional Antimicrobials: Cefepime    Pertinent Laboratory Values:   Wt Readings from Last 1 Encounters:   03/09/25 (!) 165.8 kg (365 lb 9.6 oz)     Temp Readings from Last 1 Encounters:   03/10/25 98.8 °F (37.1 °C) (Oral)     Recent Labs     03/08/25  1928 03/09/25  0339 03/10/25  0315   BUN 15 17 14   CREATININE 0.93 0.91 0.74*   WBC 12.7* 13.0* 12.8*   PROCAL 0.13*  --   --    LACTSEPSIS 1.4  --   --      Estimated Creatinine Clearance: 176 mL/min (A) (based on SCr of 0.74 mg/dL (L)).    Lab Results   Component Value Date/Time    VANCOTROUGH 16.5 06/18/2020 07:59 AM    VANCORANDOM 8.4 03/10/2025 03:15 AM       MRSA Nasal Swab: N/A. Non-respiratory infection    Assessment:  Date/Time Dose Concentration AUC   03/10 1500 mg q12h 8.4 360   Note: Serum concentrations collected for AUC dosing may appear elevated if collected in close proximity to the dose administered, this is not necessarily an indication of toxicity    Plan:  Dosing recommendations based on Bayesian software  Based on patients most recent AUC of 360 based on the vancomycin level of 8.4, his regimen is subtherapeutic. Will change vancomycin to 1250 mg q8h.   Anticipated AUC of 450 and trough concentration of 9.9 at steady state  Renal labs as indicated   Vancomycin concentrations will be ordered for 03/11 @ 0300.   Pharmacy will continue to monitor patient and adjust therapy as indicated    Thank you for the consult,  LILIANA CURRAN RPH              
VANCO DAILY NOTE  [facility]   Pharmacy Pharmacokinetic Monitoring Service - Vancomycin    Consulting Provider: Hunter   Indication: Cellulitis, Sepsis, MRSA bacteremia  Target Concentration: Goal AUC/-600 mg*hr/L  Day of Therapy: 3  Additional Antimicrobials: Cefepime    Pertinent Laboratory Values:   Wt Readings from Last 1 Encounters:   03/09/25 (!) 165.8 kg (365 lb 9.6 oz)     Temp Readings from Last 1 Encounters:   03/11/25 97.9 °F (36.6 °C) (Oral)     Recent Labs     03/08/25  1928 03/09/25  0339 03/10/25  0315 03/11/25  0649   BUN 15 17 14 14   CREATININE 0.93 0.91 0.74* 0.74*   WBC 12.7* 13.0* 12.8* 9.0   PROCAL 0.13*  --   --   --    LACTSEPSIS 1.4  --   --   --      Estimated Creatinine Clearance: 176 mL/min (A) (based on SCr of 0.74 mg/dL (L)).    Lab Results   Component Value Date/Time    VANCOTROUGH 16.5 06/18/2020 07:59 AM    VANCORANDOM 8.4 03/10/2025 03:15 AM       MRSA Nasal Swab: N/A. Non-respiratory infection    Assessment:  Date/Time Dose Concentration AUC   03/10 1500 mg q12h 8.4 360   Note: Serum concentrations collected for AUC dosing may appear elevated if collected in close proximity to the dose administered, this is not necessarily an indication of toxicity    Plan:  Dosing recommendations based on Bayesian software  Patient was switched to vancomycin 1250 mg q8h yesterday. The patient has had no changes in renal function, so will continue vancomycin 1250 mg q8h.   Anticipated AUC of 450 and trough concentration of 9.9 at steady state  Renal labs as indicated   Vancomycin concentrations will be ordered for 03/12 @0300  Pharmacy will continue to monitor patient and adjust therapy as indicated    Thank you for the consult,  LILIANA CURRAN RPH                
VANCO DAILY NOTE  [facility]   Pharmacy Pharmacokinetic Monitoring Service - Vancomycin    Consulting Provider: Hunter   Indication: Cellulitis, Sepsis  Target Concentration: Goal AUC/-600 mg*hr/L  Day of Therapy: 1  Additional Antimicrobials: Cefepime    Pertinent Laboratory Values:   Wt Readings from Last 1 Encounters:   03/08/25 (!) 167.5 kg (369 lb 4.8 oz)     Temp Readings from Last 1 Encounters:   03/08/25 99 °F (37.2 °C) (Oral)     Recent Labs     03/08/25  1928   BUN 15   CREATININE 0.93   WBC 12.7*   PROCAL 0.13*   LACTSEPSIS 1.4     Estimated Creatinine Clearance: 141 mL/min (based on SCr of 0.93 mg/dL).    Lab Results   Component Value Date/Time    VANCOTROUGH 16.5 06/18/2020 07:59 AM    VANCORANDOM 10.9 08/06/2023 06:35 AM       MRSA Nasal Swab: N/A. Non-respiratory infection    Assessment:  Date/Time Dose Concentration AUC         Note: Serum concentrations collected for AUC dosing may appear elevated if collected in close proximity to the dose administered, this is not necessarily an indication of toxicity    Plan:  Dosing recommendations based on Bayesian software  Start vancomycin 2500 mg x1, then 1500 mg q 12 hours  Anticipated AUC of 528 and trough concentration of 15.6 at steady state  Renal labs as indicated   Vancomycin concentrations will be ordered as clinically appropriate  Pharmacy will continue to monitor patient and adjust therapy as indicated    Thank you for the consult,  Kathy Masterson, PharmD, BCPS        
Family, Son (wife, God son, and mother in law)  Type of Home: House  Home Layout: One level  Home Access: Stairs to enter without rails  Entrance Stairs - Number of Steps: 3  Bathroom Shower/Tub: Tub/Shower unit  Bathroom Toilet: Standard  Bathroom Equipment: None  Bathroom Accessibility: Accessible  Home Equipment: Cane, Wheelchair - Manual  Receives Help From: Family  Prior Level of Assist for ADLs: Independent  Prior Level of Assist for Homemaking: Independent  Homemaking Responsibilities: Yes  Prior Level of Assist for Transfers: Independent  Active : Yes  Mode of Transportation: SUV  Occupation: Full time employment  Additional Comments: Lives with spouse in one story home and uses walking stick for ambulation or w/c at work ([employer])  OBJECTIVE:     PAIN: VITALS / O2: PRECAUTION / LINES / DRAINS:   Pre Treatment: 0         Post Treatment: none  stated Vitals        Oxygen    None    RESTRICTIONS/PRECAUTIONS:  Restrictions/Precautions  Restrictions/Precautions: Isolation, Fall Risk  Restrictions/Precautions: Isolation, Fall Risk     MOBILITY: I Mod I S SBA CGA Min Mod Max Total  NT x2 Comments:   Bed Mobility    Rolling [] [] [] [] [] [] [] [] [] [] []    Supine to Sit [] [] [] [] [] [] [] [] [] [] []    Scooting [] [] [] [] [] [] [] [] [] [] []    Sit to Supine [] [] [] [] [] [] [] [] [] [] []    Transfers    Sit to Stand [] [] [] [x] [] [] [] [] [] [] []    Bed to Chair [] [] [] [] [] [] [] [] [] [] []    Stand to Sit [] [] [] [x] [] [] [] [] [] [] []     [] [] [] [] [] [] [] [] [] [] []    I=Independent, Mod I=Modified Independent, S=Supervision, SBA=Standby Assistance, CGA=Contact Guard Assistance,   Min=Minimal Assistance, Mod=Moderate Assistance, Max=Maximal Assistance, Total=Total Assistance, NT=Not Tested    BALANCE: Good Fair+ Fair Fair- Poor NT Comments   Sitting Static [x] [] [] [] [] []    Sitting Dynamic [] [x] [] [] [] []              Standing Static [] [x] [] [] [] []    Standing Dynamic 
Spouse, Family, Son (wife, God son, and mother in law)  Type of Home: House  Home Layout: One level  Home Access: Stairs to enter without rails  Entrance Stairs - Number of Steps: 3  Bathroom Shower/Tub: Tub/Shower unit  Bathroom Toilet: Standard  Bathroom Equipment: None  Bathroom Accessibility: Accessible  Home Equipment: Cane, Wheelchair - Manual  Receives Help From: Family  Prior Level of Assist for ADLs: Independent  Prior Level of Assist for Homemaking: Independent  Homemaking Responsibilities: Yes  Prior Level of Assist for Transfers: Independent  Active : Yes  Mode of Transportation: [employer]  Occupation: Full time employment  Additional Comments: Lives with spouse in one story home and uses walking stick for ambulation or w/c at work ([employer])  OBJECTIVE:     PAIN: VITALS / O2: PRECAUTION / LINES / DRAINS:   Pre Treatment:   Pain Assessment: None - Denies Pain      Post Treatment: none  stated Vitals        Oxygen    None    RESTRICTIONS/PRECAUTIONS:        MOBILITY: I Mod I S SBA CGA Min Mod Max Total  NT x2 Comments:   Bed Mobility    Rolling [] [] [] [] [] [] [] [] [] [] []    Supine to Sit [] [] [] [] [] [] [] [] [] [] []    Scooting [] [] [] [] [] [] [] [] [] [] []    Sit to Supine [] [] [] [] [] [] [] [] [] [] []    Transfers    Sit to Stand [] [] [] [x] [x] [] [] [] [] [] []    Bed to Chair [] [] [] [] [] [] [] [] [] [] []    Stand to Sit [] [] [] [x] [x] [] [] [] [] [] []     [] [] [] [] [] [] [] [] [] [] []    I=Independent, Mod I=Modified Independent, S=Supervision, SBA=Standby Assistance, CGA=Contact Guard Assistance,   Min=Minimal Assistance, Mod=Moderate Assistance, Max=Maximal Assistance, Total=Total Assistance, NT=Not Tested    BALANCE: Good Fair+ Fair Fair- Poor NT Comments   Sitting Static [x] [] [] [] [] []    Sitting Dynamic [] [x] [] [] [] []              Standing Static [] [x] [] [] [] []    Standing Dynamic [] [] [x] [] [] []      GAIT: I Mod I S SBA CGA Min Mod Max Total  NT x2 
information on SNAP benefits to see if he qualifies as well as information regarding local food [employer] due to his reports of food insecurity. Pt thanks RD for her time.      RD will initiate Blayne and Ensure high protein to assist with wound healing.     Current Nutrition Therapies:  ADULT DIET; Regular; 3 carb choices (45 gm/meal)    Current Intake:   Average Meal Intake: % Average Supplements Intake: None Ordered      Anthropometric Measures:  Height: 190.5 cm (6' 3\")  Current Body Wt: 165.6 kg (365 lb 1.3 oz) (3/12), Weight source: Not specified  BMI: 45.6, Obese Class 3 (BMI 40.0 or greater)  Admission Body Weight: 167.5 kg (369 lb 4.3 oz) (3/8)  Ideal Body Weight (Kg) (Calculated): 89 kg (196 lbs), 186.3 %  BMI Category Obese Class 3 (BMI 40.0 or greater)    Comparative Standards:  Energy (kcal/day): 5661-6137 (20-25 kcal/kg) (Kcal/kg Weight used: 89 kg Ideal  Protein (g/day): 107-134 (1.2-1.5 g/kg) Weight Used: (Ideal) 89 kg  Fluid (ml/day):   (1 ml/kcal)    Nutrition Diagnosis:   Increased nutrient needs related to  (wound healing) as evidenced by  (cellulitis with chronic ulcers followed by wound care)    Nutrition Goal(s):      Active Goal: Maintain adequate nutrition status (throughout admission)       Discharge Planning:    Continue current diet    Becky Olivares RD    
ulcers  Complicated MRSA bacteremia  Met criteria for sepsis on admission with leukocytosis, fever, tachycardia, and tachypnea. Source likely lower extremity wounds.    -Blood culture on 3/8 with MRSA  -Repeat blood cultures 1/2 bottles GPC. Speciation pending  -CK 75  -ID consulted on 3/12.  -Echo normal without vegetation.  -May need CLINTON.  -Wound care following  -Continue with vancomycin. Cefepime stopped on 3/10.  -As needed pain medications    Lumbar back pain  Severe spinal stenosis?  -X-ray of pelvis and lumbar spine with anterolisthesis at L3-L4 with likely severe spinal canal stenosis.  -Lumbar MRI with and without contrast pending.  -May need orthopedic spine for evaluation pending on above  -Trial Decadron 4 mg x 1 on 3/12.  -PRN pain meds. Need to reduce as able.    Cholelithiasis  Nausea and vomiting, resolved  -Abdominal ultrasound with moderate hepatomegaly with diffuse fatty degeneration of liver, moderate thickening of wall of gallbladder without gallstones, acute on chronic cholecystitis cannot be excluded.  -Continue to monitor liver enzymes  -OP follow-up for an elective cholecystectomy if warranted    Diastolic heart failure, not in exacerbation  -Echo on 12/24 EF 55%. Diastolic dysfunction. RVSP 9 mmHg.  -Appears euvolemic on exam.  -Continue with Lasix 40 mg daily    Nonobstructive nephrolithiasis  -CT scan with bilateral nonobstructing renal calculi  -Continue with Flomax     Hypertension  Chronic atrial fibrillation with secondary hypercoagulable state  -Will continue with Lasix, Cardizem.  -Will continue with Xarelto     Obesity  High risk of sleep apnea  Body mass index is 45.62 kg/m².  Increased risk of all cause mortality, complicating care  -OP sleep study  -CPAP nightly    GERD  -Continue PPI     BPH  -Continue Flomax     Anxiety  -Continue home zoloft     Gout  -Continue allopurinol    Anticipated Discharge Arrangements:   Therapy has not evaluated yet    PT/OT evals ordered?  Therapy 
Comments:   Level of Assistance [] [] [] [] [x] [] [] [] [] [] [] X 2   Distance   70 ft    DME Bariatric RW    Gait Quality Decreased ismael , Decreased step length, and Trunk flexion    Weightbearing Status      Stairs      I=Independent, Mod I=Modified Independent, S=Supervision, SBA=Standby Assistance, CGA=Contact Guard Assistance,   Min=Minimal Assistance, Mod=Moderate Assistance, Max=Maximal Assistance, Total=Total Assistance, NT=Not Tested    PLAN:   FREQUENCY AND DURATION: 3 times/week for duration of hospital stay or until stated goals are met, whichever comes first.    TREATMENT:   TREATMENT:   Therapeutic Activity (23 Minutes): Therapeutic activity included Scooting, Transfer Training, Ambulation on level ground, Sitting balance , and Standing balance to improve functional Activity tolerance, Balance, Mobility, and Strength.    TREATMENT GRID:  N/A    AFTER TREATMENT PRECAUTIONS: Call light within reach, Needs within reach, and sitting on BSC; CNA alerted    INTERDISCIPLINARY COLLABORATION:  RN/ PCT, PT/ PTA, and Rehab Attendant    EDUCATION:      TIME IN/OUT:  Time In: 1007  Time Out: 1033  Minutes: 26    Ariana Turner, PT    
DIET; Regular; 3 carb choices (45 gm/meal)  VTE prophylaxis: Already on anticoagulation  Code status: Full Code    Non-peripheral Lines and Tubes (if present):          Telemetry (if present):  Cardiac/Telemetry Monitor On: No      Hospital Problems:  Principal Problem:    Sepsis (HCC)  Active Problems:    Obesity, morbid    Hyperlipidemia    Hypertension    Venous (peripheral) insufficiency    EHSAN (generalized anxiety disorder)    Chronic atrial fibrillation (HCC)    Lymphedema of both lower extremities    Bilateral lower leg cellulitis    Cellulitis    Gram-positive bacteremia  Resolved Problems:    * No resolved hospital problems. *      Objective:   Patient Vitals for the past 24 hrs:   Temp Pulse Resp BP SpO2   03/12/25 0353 98.2 °F (36.8 °C) 84 18 127/83 94 %   03/12/25 0321 -- -- 18 -- --   03/11/25 2320 99.5 °F (37.5 °C) 83 20 (!) 146/85 97 %   03/11/25 2220 -- -- 22 -- --   03/11/25 2002 98.1 °F (36.7 °C) 78 18 (!) 142/78 96 %   03/11/25 1504 98.1 °F (36.7 °C) 80 18 119/72 100 %   03/11/25 1100 97.9 °F (36.6 °C) 81 18 131/67 100 %   03/11/25 0723 98.8 °F (37.1 °C) 82 18 139/77 94 %       Oxygen Therapy  SpO2: 94 %  Pulse via Oximetry: 102 beats per minute  Pulse Oximeter Device Mode: Intermittent  O2 Device: Nasal cannula  O2 Flow Rate (L/min): 2 L/min    Estimated body mass index is 45.7 kg/m² as calculated from the following:    Height as of this encounter: 1.905 m (6' 3\").    Weight as of this encounter: 165.8 kg (365 lb 9.6 oz).    Intake/Output Summary (Last 24 hours) at 3/12/2025 0639  Last data filed at 3/12/2025 0251  Gross per 24 hour   Intake 1666 ml   Output 900 ml   Net 766 ml         Physical Exam:     General:    Ill appearing.    Head:  Normocephalic, atraumatic  Eyes:  Sclerae appear normal.  Pupils equally round.  ENT:  Nares appear normal.  Moist oral mucosa  Neck:  No restricted ROM.  Trachea midline   CV:   RRR.  No m/r/g.  No jugular venous distension.  Lungs:   CTAB.  No wheezing.  
evaluated yet    PT/OT evals ordered?  Therapy evals ordered  Diet:  ADULT DIET; Clear Liquid  VTE prophylaxis: Already on anticoagulation  Code status: Full Code    Non-peripheral Lines and Tubes (if present):          Telemetry (if present):  Cardiac/Telemetry Monitor On: No      Hospital Problems:  Principal Problem:    Sepsis (HCC)  Active Problems:    Obesity, morbid    Hyperlipidemia    Hypertension    Venous (peripheral) insufficiency    EHSAN (generalized anxiety disorder)    Chronic atrial fibrillation (HCC)    Lymphedema of both lower extremities    Bilateral lower leg cellulitis    Cellulitis    Gram-positive bacteremia  Resolved Problems:    * No resolved hospital problems. *      Objective:   Patient Vitals for the past 24 hrs:   Temp Pulse Resp BP SpO2   03/11/25 0324 99.5 °F (37.5 °C) 90 20 134/77 96 %   03/10/25 2320 99.3 °F (37.4 °C) 95 20 136/79 94 %   03/10/25 1923 99.1 °F (37.3 °C) 94 20 128/77 97 %   03/10/25 1538 100 °F (37.8 °C) 94 18 (!) 150/85 98 %   03/10/25 1122 98.2 °F (36.8 °C) 93 18 127/81 96 %   03/10/25 0735 98.8 °F (37.1 °C) 94 20 (!) 147/93 98 %       Oxygen Therapy  SpO2: 96 %  Pulse via Oximetry: 102 beats per minute  Pulse Oximeter Device Mode: Intermittent  O2 Device: Nasal cannula  O2 Flow Rate (L/min): 2 L/min    Estimated body mass index is 45.7 kg/m² as calculated from the following:    Height as of this encounter: 1.905 m (6' 3\").    Weight as of this encounter: 165.8 kg (365 lb 9.6 oz).    Intake/Output Summary (Last 24 hours) at 3/11/2025 0642  Last data filed at 3/11/2025 0600  Gross per 24 hour   Intake 786 ml   Output 1375 ml   Net -589 ml         Physical Exam:     General:    Ill appearing.    Head:  Normocephalic, atraumatic  Eyes:  Sclerae appear normal.  Pupils equally round.  ENT:  Nares appear normal.  Moist oral mucosa  Neck:  No restricted ROM.  Trachea midline   CV:   RRR.  No m/r/g.  No jugular venous distension.  Lungs:   CTAB.  No wheezing.  Symmetric 
97.9 °F (36.6 °C) 55 17 (!) 132/92 98 %       Oxygen Therapy  SpO2: 95 %  Pulse via Oximetry: 102 beats per minute  Pulse Oximeter Device Mode: Intermittent  Pulse Oximeter Device Location: Finger  O2 Device: Nasal cannula  Skin Assessment: Clean, dry, & intact  Skin Protection for O2 Device: No  O2 Flow Rate (L/min): 1 L/min    Estimated body mass index is 45.62 kg/m² as calculated from the following:    Height as of this encounter: 1.905 m (6' 3\").    Weight as of this encounter: 165.6 kg (365 lb).    Intake/Output Summary (Last 24 hours) at 3/17/2025 0658  Last data filed at 3/17/2025 0529  Gross per 24 hour   Intake 1440 ml   Output 2650 ml   Net -1210 ml         Physical Exam:     General:    Ill appearing.  Sitting in recliner.  States he is tired and still having pain with ambulation. Satting on room air.  No acute distress.  CV:   RRR.  No m/r/g.  No jugular venous distension.  Lungs:   CTAB.  No wheezing.  Symmetric expansion.  Abdomen:   Soft, nontender, nondistended.  Extremities: No cyanosis or clubbing.  B/L LE swelling and erythema as well as malodor.  Pictures below:  Skin:     Dry scaly skin with surrounding erythema.  Cracked with open lesions.  Malodorous.  Neuro:  CN II-XII grossly intact.    Psych:  Normal mood and affect.              I have personally reviewed labs and tests:  Recent Labs:  Recent Results (from the past 48 hours)   POCT Glucose    Collection Time: 03/15/25 11:07 AM   Result Value Ref Range    POC Glucose 140 (H) 65 - 100 mg/dL    Performed by: Austin    CBC with Auto Differential    Collection Time: 03/16/25  6:12 AM   Result Value Ref Range    WBC 12.6 (H) 4.3 - 11.1 K/uL    RBC 3.39 (L) 4.23 - 5.6 M/uL    Hemoglobin 11.9 (L) 13.6 - 17.2 g/dL    Hematocrit 35.7 (L) 41.1 - 50.3 %    .3 (H) 82 - 102 FL    MCH 35.1 (H) 26.1 - 32.9 PG    MCHC 33.3 31.4 - 35.0 g/dL    RDW 13.0 11.9 - 14.6 %    Platelets 228 150 - 450 K/uL    MPV 10.3 9.4 - 12.3 FL    nRBC 
balance, and dynamic balance in order to prepare for functional task, prepare for seated ADLs, prepare for standing ADLs, prepare for functional transfer, increase safety awareness, prepare for discharge home , and prepare for self care..   Self Care (25 minutes): Patient participated in toileting, lower body dressing, grooming, functional mobility, functional transfer, energy conservation, and assistive device in unsupported sitting and standing with minimal verbal, manual, and tactile cueing to increase independence, decrease assistance required, increase activity tolerance, and increase safety awareness. The patient was educated on role of occupational therapy, proper use of assistive device, recommended equipment, transfer training and safety, and energy conservation techniques during ADL/IADLS and patient verbalized understanding.     TREATMENT GRID:  N/A    AFTER TREATMENT PRECAUTIONS: Call light within reach, Chair, Needs within reach, and RN notified    INTERDISCIPLINARY COLLABORATION:  RN/ PCT and PT/ PTA    EDUCATION:       TOTAL TREATMENT DURATION AND TIME:  Time In: 1052  Time Out: 1130  Minutes: 38    CHAPITO Do, OT              
NOT DETECTED NOTDET      Pseudomonas aeruginosa NOT DETECTED NOTDET      Stenotrophomonas maltophilia by PCR NOT DETECTED NOTDET      Candida albicans by PCR NOT DETECTED NOTDET      Candida auris by PCR NOT DETECTED NOTDET      Candida glabrata NOT DETECTED NOTDET      Candida krusei by PCR NOT DETECTED NOTDET      Candida parapsilosis by PCR NOT DETECTED NOTDET      Candida tropicalis by PCR NOT DETECTED NOTDET      Cryptococcus neoformans/gattii by PCR NOT DETECTED NOTDET      Resistant gene targets        Resistant gene meca/c & mrej by PCR Detected (A) NOTDET      [employer] test comment        False positive results may rarely occur. Correlate with clinical,epidemiologic, and other laboratory findings   Culture, Blood, PCR ID Panel    Collection Time: 03/08/25  6:28 PM    Specimen: Blood   Result Value Ref Range    Accession Number [id number]     Enterococcus faecalis by PCR NOT DETECTED NOTDET      Enterococcus faecium by PCR NOT DETECTED NOTDET      Listeria monocytogenes by PCR NOT DETECTED NOTDET      STAPHYLOCOCCUS Detected (A) NOTDET      Staphylococcus Aureus Detected (A) NOTDET      Staphylococcus epidermidis by PCR NOT DETECTED NOTDET      Staphylococcus lugdunensis by PCR NOT DETECTED NOTDET      STREPTOCOCCUS NOT DETECTED NOTDET      Streptococcus agalactiae (Group B) NOT DETECTED NOTDET      Strep pneumoniae NOT DETECTED NOTDET      Strep pyogenes,(Grp. A) NOT DETECTED NOTDET      Acinetobacter calcoac baumannii complex by PCR NOT DETECTED NOTDET      Bacteroides fragilis by PCR NOT DETECTED NOTDET      Enterobacteriaceae by PCR NOT DETECTED NOTDET      Enterobacter cloacae complex by PCR NOT DETECTED NOTDET      Escherichia Coli NOT DETECTED NOTDET      Klebsiella aerogenes by PCR NOT DETECTED NOTDET      Klebsiella oxytoca by PCR NOT DETECTED NOTDET      Klebsiella pneumoniae group by PCR NOT DETECTED NOTDET      Proteus by PCR NOT DETECTED NOTDET      Salmonella species by PCR NOT DETECTED NOTDET   
increase activity tolerance. The patient was educated on role of occupational therapy, proper use of assistive device, and transfer training and safety and patient verbalized understanding.     TREATMENT GRID:  N/A    AFTER TREATMENT PRECAUTIONS: Call light within reach, Chair, Needs within reach, and RN notified    INTERDISCIPLINARY COLLABORATION:  RN/ PCT and PT/ PTA    EDUCATION:  Education Given To: Patient  Education Provided: Role of Therapy;Plan of Care  Education Method: Verbal  Barriers to Learning: None  Education Outcome: Verbalized understanding    TOTAL TREATMENT DURATION AND TIME:  Time In: 0839  Time Out: 0900  Minutes: 21    CHAPITO Do, OT                
Monocytes % 2.3 (L) 4.0 - 12.0 %    Eosinophils % 0.0 (L) 0.5 - 7.8 %    Basophils % 0.1 0.0 - 2.0 %    Immature Granulocytes % 0.4 0.0 - 5.0 %    Neutrophils Absolute 8.15 1.70 - 8.20 K/UL    Lymphocytes Absolute 0.65 0.50 - 4.60 K/UL    Monocytes Absolute 0.21 0.10 - 1.30 K/UL    Eosinophils Absolute 0.00 0.00 - 0.80 K/UL    Basophils Absolute 0.01 0.00 - 0.20 K/UL    Immature Granulocytes Absolute 0.04 0.0 - 0.5 K/UL   CK    Collection Time: 03/14/25  6:48 AM   Result Value Ref Range    Total CK 22 21 - 215 U/L        Microbiology:  Reviewed    Studies:  Reviewed    Signed By: PRICILLA Rubio - CNP     March 14, 2025           Spent 50 min seeing patient today. More than 50% of the time documented was spent in face-to-face contact with the patient and in the care of the patient on the floor/unit where the patient is located. Plan of care discussed with ID attending, Dr. Morley       
PRN    oxyCODONE (ROXICODONE) immediate release tablet 5 mg  5 mg Oral Q4H PRN       Signed:  Avinash Burks MD    Part of this note may have been written by using a voice dictation software.  The note has been proof read but may still contain some grammatical/other typographical errors.    
Reviewed    Signed By: Tyree Mattson, APRN - CNP     March 13, 2025           Spent 35 min seeing patient today. More than 50% of the time documented was spent in face-to-face contact with the patient and in the care of the patient on the floor/unit where the patient is located. Plan discussed with ID attending, Dr. Morley       
tablet 40 mEq  40 mEq Oral PRN    Or    potassium chloride 10 mEq/100 mL IVPB (Peripheral Line)  10 mEq IntraVENous PRN    magnesium sulfate 2000 mg in 50 mL IVPB premix  2,000 mg IntraVENous PRN    ondansetron (ZOFRAN-ODT) disintegrating tablet 4 mg  4 mg Oral Q8H PRN    Or    ondansetron (ZOFRAN) injection 4 mg  4 mg IntraVENous Q6H PRN    polyethylene glycol (GLYCOLAX) packet 17 g  17 g Oral Daily PRN    oxyCODONE (ROXICODONE) immediate release tablet 5 mg  5 mg Oral Q4H PRN       Signed:  Stoney Davila MD    Part of this note may have been written by using a voice dictation software.  The note has been proof read but may still contain some grammatical/other typographical errors.  
      Recent Labs     03/08/25 2013   COVID19 Not detected       Current Meds:  Current Facility-Administered Medications   Medication Dose Route Frequency    vancomycin (VANCOCIN) 1500 mg in sodium chloride 0.9 % 250 mL IVPB  1,500 mg IntraVENous Q12H    allopurinol (ZYLOPRIM) tablet 300 mg  300 mg Oral Daily    cyclobenzaprine (FLEXERIL) tablet 5 mg  5 mg Oral Nightly    dilTIAZem (CARDIZEM CD) extended release capsule 240 mg  240 mg Oral Daily    famotidine (PEPCID) tablet 40 mg  40 mg Oral BID    furosemide (LASIX) tablet 40 mg  40 mg Oral Daily    gabapentin (NEURONTIN) capsule 300 mg  300 mg Oral TID    rivaroxaban (XARELTO) tablet 20 mg  20 mg Oral Daily    sertraline (ZOLOFT) tablet 150 mg  150 mg Oral Daily    tamsulosin (FLOMAX) capsule 0.4 mg  0.4 mg Oral Daily    terbinafine (LAMISIL) tablet 250 mg (Patient Supplied)  250 mg Oral Daily    sodium chloride flush 0.9 % injection 5-40 mL  5-40 mL IntraVENous 2 times per day    sodium chloride flush 0.9 % injection 5-40 mL  5-40 mL IntraVENous PRN    0.9 % sodium chloride infusion   IntraVENous PRN    potassium chloride (KLOR-CON M) extended release tablet 40 mEq  40 mEq Oral PRN    Or    potassium bicarb-citric acid (EFFER-K) effervescent tablet 40 mEq  40 mEq Oral PRN    Or    potassium chloride 10 mEq/100 mL IVPB (Peripheral Line)  10 mEq IntraVENous PRN    magnesium sulfate 2000 mg in 50 mL IVPB premix  2,000 mg IntraVENous PRN    ondansetron (ZOFRAN-ODT) disintegrating tablet 4 mg  4 mg Oral Q8H PRN    Or    ondansetron (ZOFRAN) injection 4 mg  4 mg IntraVENous Q6H PRN    polyethylene glycol (GLYCOLAX) packet 17 g  17 g Oral Daily PRN    acetaminophen (TYLENOL) tablet 650 mg  650 mg Oral Q6H PRN    Or    acetaminophen (TYLENOL) suppository 650 mg  650 mg Rectal Q6H PRN    0.9 % sodium chloride infusion   IntraVENous Continuous    ceFEPIme (MAXIPIME) 2,000 mg in sodium chloride 0.9 % 100 mL IVPB (addEASE)  2,000 mg IntraVENous Q8H    oxyCODONE 
0

## 2025-03-20 LAB
ANION GAP SERPL CALC-SCNC: 10 MMOL/L (ref 7–16)
BASOPHILS # BLD: 0.02 K/UL (ref 0–0.2)
BASOPHILS NFR BLD: 0.2 % (ref 0–2)
BUN SERPL-MCNC: 26 MG/DL (ref 8–23)
CALCIUM SERPL-MCNC: 9 MG/DL (ref 8.8–10.2)
CHLORIDE SERPL-SCNC: 101 MMOL/L (ref 98–107)
CO2 SERPL-SCNC: 29 MMOL/L (ref 20–29)
CREAT SERPL-MCNC: 0.76 MG/DL (ref 0.8–1.3)
DIFFERENTIAL METHOD BLD: ABNORMAL
EOSINOPHIL # BLD: 0.08 K/UL (ref 0–0.8)
EOSINOPHIL NFR BLD: 0.6 % (ref 0.5–7.8)
ERYTHROCYTE [DISTWIDTH] IN BLOOD BY AUTOMATED COUNT: 13.3 % (ref 11.9–14.6)
GLUCOSE SERPL-MCNC: 103 MG/DL (ref 70–99)
HCT VFR BLD AUTO: 38.3 % (ref 41.1–50.3)
HGB BLD-MCNC: 13.1 G/DL (ref 13.6–17.2)
IMM GRANULOCYTES # BLD AUTO: 0.17 K/UL (ref 0–0.5)
IMM GRANULOCYTES NFR BLD AUTO: 1.3 % (ref 0–5)
LYMPHOCYTES # BLD: 1.92 K/UL (ref 0.5–4.6)
LYMPHOCYTES NFR BLD: 14.6 % (ref 13–44)
MCH RBC QN AUTO: 34.9 PG (ref 26.1–32.9)
MCHC RBC AUTO-ENTMCNC: 34.2 G/DL (ref 31.4–35)
MCV RBC AUTO: 102.1 FL (ref 82–102)
MONOCYTES # BLD: 0.75 K/UL (ref 0.1–1.3)
MONOCYTES NFR BLD: 5.7 % (ref 4–12)
NEUTS SEG # BLD: 10.24 K/UL (ref 1.7–8.2)
NEUTS SEG NFR BLD: 77.6 % (ref 43–78)
NRBC # BLD: 0.02 K/UL (ref 0–0.2)
PLATELET # BLD AUTO: 237 K/UL (ref 150–450)
PMV BLD AUTO: 9.3 FL (ref 9.4–12.3)
POTASSIUM SERPL-SCNC: 4 MMOL/L (ref 3.5–5.1)
PREALB SERPL-MCNC: 30 MG/DL (ref 18–36)
RBC # BLD AUTO: 3.75 M/UL (ref 4.23–5.6)
SODIUM SERPL-SCNC: 139 MMOL/L (ref 136–145)
WBC # BLD AUTO: 13.2 K/UL (ref 4.3–11.1)

## 2025-03-20 PROCEDURE — 2580000003 HC RX 258: Performed by: PHYSICAL MEDICINE & REHABILITATION

## 2025-03-20 PROCEDURE — 97161 PT EVAL LOW COMPLEX 20 MIN: CPT

## 2025-03-20 PROCEDURE — 97530 THERAPEUTIC ACTIVITIES: CPT

## 2025-03-20 PROCEDURE — 2500000003 HC RX 250 WO HCPCS: Performed by: PHYSICAL MEDICINE & REHABILITATION

## 2025-03-20 PROCEDURE — 1180000000 HC REHAB R&B

## 2025-03-20 PROCEDURE — 99223 1ST HOSP IP/OBS HIGH 75: CPT | Performed by: PHYSICAL MEDICINE & REHABILITATION

## 2025-03-20 PROCEDURE — 29581 APPL MULTLAYER CMPRN SYS LEG: CPT

## 2025-03-20 PROCEDURE — 97110 THERAPEUTIC EXERCISES: CPT

## 2025-03-20 PROCEDURE — 6370000000 HC RX 637 (ALT 250 FOR IP): Performed by: NURSE PRACTITIONER

## 2025-03-20 PROCEDURE — 97166 OT EVAL MOD COMPLEX 45 MIN: CPT

## 2025-03-20 PROCEDURE — 97116 GAIT TRAINING THERAPY: CPT

## 2025-03-20 PROCEDURE — 36415 COLL VENOUS BLD VENIPUNCTURE: CPT

## 2025-03-20 PROCEDURE — 85025 COMPLETE CBC W/AUTO DIFF WBC: CPT

## 2025-03-20 PROCEDURE — 84134 ASSAY OF PREALBUMIN: CPT

## 2025-03-20 PROCEDURE — 6370000000 HC RX 637 (ALT 250 FOR IP): Performed by: PHYSICAL MEDICINE & REHABILITATION

## 2025-03-20 PROCEDURE — 80048 BASIC METABOLIC PNL TOTAL CA: CPT

## 2025-03-20 PROCEDURE — 97535 SELF CARE MNGMENT TRAINING: CPT

## 2025-03-20 PROCEDURE — 6360000002 HC RX W HCPCS: Performed by: PHYSICAL MEDICINE & REHABILITATION

## 2025-03-20 PROCEDURE — 2W1RX6Z COMPRESSION OF LEFT LOWER LEG USING PRESSURE DRESSING: ICD-10-PCS | Performed by: PHYSICAL MEDICINE & REHABILITATION

## 2025-03-20 RX ORDER — TRAMADOL HYDROCHLORIDE 50 MG/1
50 TABLET ORAL EVERY 4 HOURS PRN
Status: DISCONTINUED | OUTPATIENT
Start: 2025-03-20 | End: 2025-03-21 | Stop reason: SDUPTHER

## 2025-03-20 RX ADMIN — GABAPENTIN 400 MG: 400 CAPSULE ORAL at 13:07

## 2025-03-20 RX ADMIN — VITAMIN D, TAB 1000IU (100/BT) 6000 UNITS: 25 TAB at 08:50

## 2025-03-20 RX ADMIN — TAMSULOSIN HYDROCHLORIDE 0.4 MG: 0.4 CAPSULE ORAL at 08:51

## 2025-03-20 RX ADMIN — GABAPENTIN 400 MG: 400 CAPSULE ORAL at 08:51

## 2025-03-20 RX ADMIN — SODIUM CHLORIDE, PRESERVATIVE FREE 10 ML: 5 INJECTION INTRAVENOUS at 09:03

## 2025-03-20 RX ADMIN — RIVAROXABAN 20 MG: 20 TABLET, FILM COATED ORAL at 17:25

## 2025-03-20 RX ADMIN — ALLOPURINOL 300 MG: 300 TABLET ORAL at 08:51

## 2025-03-20 RX ADMIN — CYCLOBENZAPRINE 10 MG: 10 TABLET, FILM COATED ORAL at 21:42

## 2025-03-20 RX ADMIN — ACETAMINOPHEN 1000 MG: 500 TABLET, FILM COATED ORAL at 13:07

## 2025-03-20 RX ADMIN — DILTIAZEM HYDROCHLORIDE 240 MG: 240 CAPSULE, EXTENDED RELEASE ORAL at 08:51

## 2025-03-20 RX ADMIN — GABAPENTIN 400 MG: 400 CAPSULE ORAL at 21:43

## 2025-03-20 RX ADMIN — SERTRALINE 150 MG: 100 TABLET, FILM COATED ORAL at 08:51

## 2025-03-20 RX ADMIN — ACETAMINOPHEN 1000 MG: 500 TABLET, FILM COATED ORAL at 06:12

## 2025-03-20 RX ADMIN — ACETAMINOPHEN 1000 MG: 500 TABLET, FILM COATED ORAL at 21:41

## 2025-03-20 RX ADMIN — TRAMADOL HYDROCHLORIDE 50 MG: 50 TABLET, COATED ORAL at 17:47

## 2025-03-20 RX ADMIN — FUROSEMIDE 40 MG: 40 TABLET ORAL at 08:51

## 2025-03-20 RX ADMIN — FAMOTIDINE 40 MG: 20 TABLET, FILM COATED ORAL at 08:51

## 2025-03-20 RX ADMIN — TRAMADOL HYDROCHLORIDE 50 MG: 50 TABLET, COATED ORAL at 21:53

## 2025-03-20 RX ADMIN — DAPTOMYCIN 950 MG: 500 INJECTION, POWDER, LYOPHILIZED, FOR SOLUTION INTRAVENOUS at 17:24

## 2025-03-20 RX ADMIN — SODIUM CHLORIDE, PRESERVATIVE FREE 10 ML: 5 INJECTION INTRAVENOUS at 21:46

## 2025-03-20 RX ADMIN — DIPHENHYDRAMINE HYDROCHLORIDE 5 ML: 12.5 LIQUID ORAL at 17:47

## 2025-03-20 RX ADMIN — WHITE PETROLATUM 41 % TOPICAL OINTMENT: at 09:04

## 2025-03-20 RX ADMIN — FAMOTIDINE 40 MG: 20 TABLET, FILM COATED ORAL at 21:53

## 2025-03-20 ASSESSMENT — PAIN DESCRIPTION - LOCATION
LOCATION: BACK
LOCATION: BACK

## 2025-03-20 ASSESSMENT — PAIN SCALES - GENERAL
PAINLEVEL_OUTOF10: 7
PAINLEVEL_OUTOF10: 0
PAINLEVEL_OUTOF10: 8

## 2025-03-20 ASSESSMENT — PAIN - FUNCTIONAL ASSESSMENT
PAIN_FUNCTIONAL_ASSESSMENT: ACTIVITIES ARE NOT PREVENTED
PAIN_FUNCTIONAL_ASSESSMENT: ACTIVITIES ARE NOT PREVENTED

## 2025-03-20 ASSESSMENT — PAIN DESCRIPTION - ORIENTATION
ORIENTATION: LEFT;LOWER
ORIENTATION: POSTERIOR;LOWER;LEFT

## 2025-03-20 ASSESSMENT — PAIN DESCRIPTION - DESCRIPTORS
DESCRIPTORS: ACHING
DESCRIPTORS: ACHING

## 2025-03-20 ASSESSMENT — PAIN DESCRIPTION - PAIN TYPE: TYPE: CHRONIC PAIN

## 2025-03-20 NOTE — WOUND CARE
BLE compression wraps to be changed; Pt currently sitting on bedside commode. Pt requested to return at a later time as he wants to take a shower. PT/OT schedules running until 2:30, okay to remove compression wraps prior to shower; will return at later interval to replace compression wrap dressings.    1430  BLE compression wraps changed. Continued improvement w/ BLE; only open area on R medial ankle. Will continue to follow Monday & Thursdays for dressing changes.    RLE        LLE

## 2025-03-20 NOTE — PROGRESS NOTES
[facility] OCCUPATIONAL THERAPY DAILY NOTE        OT Concurrent Minutes  Time In: 1355  Time Out: 1436  Minutes: 41        Subjective: Pt agreeable to treatment. \"I'm just very worried how I'm going to pay that\" in regards to payment on insurance difficulties.  Pain: No pain expressed.  Interdisciplinary Communication: Collaborated with RN and CM  regarding pt status and pt performance.   Precautions: Falls, Posey Alarm, and BLE wraps , contact precautions    RA SPO2 > 90%  FUNCTIONAL MOBILITY:       Bed Mobility NT    Sit to Stand NT    Transfer  NT  Transfer Type: NA  Equipment: NA    Ambulation NT  Equipment: NA       - Activity Tolerance - Range of Motion - Strengthening   Pt completed BUE therapeutic exercises while sitting to address AROM, strength, coordination, and activity tolerance to promote safe return to maximal independence with I/ADLs. Pt required rest breaks throughout. Pt completed seated  Exercise   [x] Bicep Curls 4 sets of 20 reps   [x] Chest Press   [x] Straight Arm Front Raises    [x] Forward Rows   Pt completed 3 sets of 20 repetitions utilizing 4 lb. dowel christy.     Education on proper technique and pacing with BUE exercises, rest breaks required in between.     Therapeutic listening provided for pt while expressed concerns/frustrations with his insurance payment coming up, pt tearful and anxious. Improved following exercises and therapist talked with CM.        Education   Benefits of OT and Energy Conservation, Pacing     Assessment:  Pt demonstrated good participation in OT treatment. Pt continues to benefit from skilled OT services to address remaining deficits and progress toward baseline level of independence and safety. Patient ended session seated in recliner with call bell and needs within reach, with chair/bed alarm activated, with legs elevated, and wound care management .   Plan: Continue OT POC.     DIANE STARKS OT   3/20/2025

## 2025-03-20 NOTE — PROGRESS NOTES
Goal: \"Get some kind of control and understanding of my situation\".   Pain: RN notified  and reported 3-4/10 pain BLE's .  Precautions: Falls, Posey Alarm, and BLE wraps  Prior Level of Function: Pt was IND with ADL/IADL tasks; reported last 5 years difficulty with bowel/bladder uncertain. Walking stick prior in community and in home. Sponge baths and showering at family's when he could. GLF in December , in the shower.   Lines:IV  O2 Device:  1L via NC, titrated to RA with SPO2 > 90% with activity and while resting. Notified RN of pt status    LIVING SITUATION:    Environment   Living Alone No   Support System Spouse/Significant Other  and God son 21 y/o, pt's spouse's wife stays with them d/t stroke   Home Set Up 1 Level Home   Home Entrance 2 Step(s) to Enter no railing    Bathroom Setup  Tub Shower, Shower Curtain, High Commode , and pt has been showering at brothers d/t WIS with chair d/t per pt report spouse \"germ aphob\"; garden tub at his home   Current DME     Work Status Working full-time prior, working at [employer] at [employer] and able to use w/c    Driving Status Active prior to admission     UPPER EXTREMITY ASSESSMENT:   ALFRED ODELL   General Evaluation WFL WFL   Muscle Tone Normal Normal   FMC WFL WFL   GMC WFL WFL   Sensation Light Touch: intact Light Touch: intact and reported tingling/numbness L hand digits 4 and 5   ROM WFL WFL     STRENGTH: ALFRED ODELL   Shoulder Flexion 4+/5 Completed full range of motion against gravity with moderate-maximum resistance  4+/5 Completed full range of motion against gravity with moderate-maximum resistance    Elbow Flexion 4+/5 Completed full range of motion against gravity with moderate-maximum resistance  4+/5 Completed full range of motion against gravity with moderate-maximum resistance    Elbow Extension  4+/5 Completed full range of motion against gravity with moderate-maximum resistance  4+/5 Completed full range of motion against gravity with moderate-maximum  resistance     4+/5 Completed full range of motion against gravity with moderate-maximum resistance  4+/5 Completed full range of motion against gravity with moderate-maximum resistance      BALANCE/POSTURE:   Static Dynamic   Sitting Good: Able to maintain balance without UE support; exhibits some postural sway Good: Accepts moderate challenge; can maintain balance while picking object up off the floor   Standing Good: Able to maintain balance without UE support; exhibits some postural sway Fair: Accepts minimal challenge; can maintain balance while turning head/trunk     FUNCTIONAL MOBILITY:       Bed Mobility NT    Sit to Stand CGA    Transfer  CGA  Transfer Type: sts  Equipment: RW    Ambulation NT  Equipment: NA  Limited on time constraints     ACTIVITIES OF DAILY LIVING:   Score Comments   Eating Independent MOD I seated   Oral Hygiene Setup or clean-up assistance S/U A seated for g/h tasks   Shower/Bathe Partial/moderate assistance partial sponge bath seated in recliner chair this date, MIN A for LE's   Upper Body  Dressing Supervision or touching assistance SPV seated to don/doff front facing gown   Lower Body Dressing Partial/moderate assistance Pt with required MIN-MOD A for brief management and then pt donned pants with touching standing with FWW and able to thread BLE's   Donning/[employer] Footwear Partial/moderate assistance pt able to perform figure four, some difficulties, based on clinical judgement, pt has B wraps LE's   Toileting Hygiene Partial/moderate assistance (pt not needing to void at this time, based on clinical judgement)     Toilet Transfer Supervision or touching assistance CGA FWW     COGNITION:      BIMs 14/15   Aphasia Not observed presently   Safety Awareness Intact   Problem Solving Intact   Attention to Task Intact   Divided Attention Intact and Impaired   STM Intact and Impaired   Impulsivity Not present     Hearing/Speech/Vision:   Expression of Ideas and Wants: Without  and/or Caregiver training. Patient to be seen at least 5 times per week for at least 1.5 hours of OT per day as appropriate. Patient ended session seated in recliner with call bell and needs within reach, with chair/bed alarm activated, and with legs elevated.    DIANE STARKS, OT   3/20/2025

## 2025-03-20 NOTE — PROGRESS NOTES
PHYSICAL THERAPY EXAMINATION    PT Individual Minutes  Time In: 1004  Time Out: 1054  Minutes: 50                   Total Treatment Time:   50 minutes         HPI:  (per MD report)Jozef Felix is a 59 y.o. male patient who presented to [facility] on 3/8/2025 secondary to increased back pain with concerns for a kidney stone.  He complained of fever chills and general weakness.  He has a history of chronic venous stasis ulcers and regularly follows up with wound care.  He was admitted after meeting criteria for sepsis thought secondary to bilateral lower extremity cellulitis.  Blood cultures were obtained.  He was started on broad-spectrum antibiotics of cefepime and vancomycin.  Blood cultures eventually grew MRSA.  Infectious disease was consulted and recommended daptomycin for which he will require through 3/26.  There was no evidence on lumbar MRI of discitis or osteomyelitis.  It did show severe spinal stenosis.He underwent CT of the abdomen and pelvis showing bilateral nonobstructing renal calculi and sludge versus cholelithiasis.  He was seen by wound care team and started on bilateral lower extremity compression wraps.     Patient still shows significant functional deficits. We continue to recommend inpatient hospital rehabilitation as reasonable and necessary due to ongoing acute medical issues which have not changed since initial pre-admission evaluation. Preadmission screening reviewed, approved the patient for admission to the IRF. Attending service cleared patient for transfer to rehab today. Patient continues to have ongoing medical issues outlined above requiring continued physician medical supervision. Due to ongoing functional deficits, patient will benefit from continued intensive therapies.     Past Medical History:   Past Medical History:   Diagnosis Date    Anxiety     Arrhythmia     a-fib    Cellulitis of leg 11/17/2019    Erectile dysfunction 1/20/2014    EHSAN (generalized  Supervision   Rolling to Right: Supervision    Supine to Sit 3    Minimal assistance    Sit to Supine 4   Minimal assistance    Sit to/from Stand 4   Sit to Stand: Contact guard assistance   Stand to Sit: Contact guard assistance       Bed to/from Chair 4       Stand Pivot Transfers: Contact guard assistance (with RW)     W/c<>bsc transfers SPT with RW and CGA with cues         Car Transfer 88   Unable to assess        WHEELCHAIR MOBILITY/MANAGEMENT Quality Indicator Score Assistance Required  Comments   Able to Propel 50' w/ 2 Turns  NA     NA   Able to Propel 150'  NA      Wheelchair management  Wheelchair Size: 20x18  Wheelchair Type: Standard  Wheelchair Cushion: Standard  Pressure Relief Type:  (sit<>Stand with RW)  Level of Assistance for Pressure Relief Activities: Contact guard assistance  Wheelchair Parts Management: No  Propulsion: No         AMBULATION Quality Indicator Score AMBULATION Assessment   Assistive device Prior to Admission Straight Cane and No A/D Patient unable to ambulate as per PLOF. Initiated gait trianing with RW this AM      Surface: Level tile  Device: Rolling Walker  Other Apparatus: Wheelchair follow (gait belt)  Assistance: Contact guard assistance  Quality of Gait: slow cont step through gait with midl flexed posture.  Gait Deviations: Decreased step height, Decreased step length, Slow Gisella  Distance: 65 ft x 1  Comments: on room air, 02 sat 94% after ambulating 65 ft  More Ambulation?: No         Walk 10' 88      Walk 50' with 2 Turns 88      Walk 150' 88      Walking 10' on Unlevel Surfaces 88      Picking Up Object From Floor 88            STEPS/STAIRS Quality Indicator Score STAIRS Assessment   1 Curb Step 88   Stairs?: No (to be assessed this PM)      4 Steps 88      12 Steps 9        Pt. Left in recliner call bell in reach needs in reach bed/chair alarm activated LE's elevated         PHYSICAL THERAPY PLAN OF CARE    Order received from MD for physical therapy services and

## 2025-03-20 NOTE — FLOWSHEET NOTE
Case Management Assessment  Initial Evaluation    Date/Time of Evaluation: 3/20/2025 3:57 PM  Assessment Completed by: Lester Aldana    If patient is discharged prior to next notation, then this note serves as note for discharge by case management.    Patient Name: Jozef Felix                   YOB: 1965  Diagnosis: Bacteremia due to methicillin resistant Staphylococcus aureus [R78.81, B95.62]                   Date / Time: 3/19/2025  4:49 PM    Patient Admission Status: REHAB IP   Readmission Risk (Low < 19, Mod (19-27), High > 27): Readmission Risk Score: 20.4    Current PCP: Anthony Aleman MD  PCP verified by CM? (P) Yes (Dr. Aleman)    Chart Reviewed: Yes      History Provided by: (P) Patient  Patient Orientation: (P) Alert and Oriented    Patient Cognition: (P) Alert    Hospitalization in the last 30 days (Readmission):  No    If yes, Readmission Assessment in CM Navigator will be completed.    Advance Directives:      Code Status: Full Code   Patient's Primary Decision Maker is: (P) Legal Next of Kin    Primary Decision Maker: Sadie Basurto - Spouse - [phone]    Discharge Planning:    Patient lives with: (P) Spouse/Significant Other Type of Home: (P) House  Primary Care Giver: (P) Self  Patient Support Systems include: (P) Spouse/Significant Other   Current Financial resources: (P) Other (Comment) ([place] [employer]  Current community resources: (P) None  Current services prior to admission: (P) None            Current DME:              Type of Home Care services:  None    ADLS  Prior functional level: (P) Independent in ADLs/IADLs  Current functional level: (P) Independent in ADLs/IADLs    PT AM-PAC:   /24  OT AM-PAC:   /24    Family can provide assistance at DC: (P) Yes  Would you like Case Management to discuss the discharge plan with any other family members/significant others, and if so, who? (P) Yes (Spouse - Sadie Haskins)  Plans to Return to Present Housing: (P)    Can patient return to prior living arrangement Yes   Ability to make needs known: Good   Family able to assist with home care needs: Yes   Would you like for me to discuss the discharge plan with any other family members/significant others, and if so, who? Yes  (Spouse - Sadie Haskins)   Financial Resources Other (Comment)  ([facility])   Community Resources None   Social/Functional History   Lives With Spouse   Type of Home House   Home Layout One level   Home Access Level entry   Entrance Stairs - Number of Steps 2 step with rails out back;   Bathroom Shower/Tub Tub/Shower unit   Bathroom Toilet Standard   Bathroom Equipment None   Bathroom Accessibility Accessible   Home Equipment Cane;Walker - Standard   Receives Help From Family   Prior Level of Assist for ADLs Independent   Prior Level of Assist for Homemaking Independent   Active  Yes   Mode of Transportation Car   Occupation Full time employment   Discharge Planning   Type of Residence House   Living Arrangements Spouse/Significant Other   Current Services Prior To Admission None   Potential Assistance Needed N/A   DME Ordered? No   Potential Assistance Purchasing Medications Yes   Patient expects to be discharged to: House

## 2025-03-20 NOTE — PROGRESS NOTES
Pt c/o pain in left lower back. Pt states he takes tramadol PRN at home. On call hospitalist notified.

## 2025-03-20 NOTE — H&P
[facility]  Inpatient Rehab Program    Admission History and Physical Exam  INPATIENT REHABILITATION CENTER      IRC Admission Date: 3/19/2025  Primary Care Provider: Anthony Aleman MD  Specialty Group / Referring Service: Medicine service      Chief Complaint : Global weakness and difficulty ambulating due to bilateral lower extremity cellulitis  Admitting Diagnosis:   Bacteremia due to methicillin resistant Staphylococcus aureus [R78.81, B95.62]    Principal Problem:    Bacteremia due to methicillin resistant Staphylococcus aureus  Resolved Problems:    * No resolved hospital problems. *      Acute Rehab Diagnoses:  Encounter for rehabilitation Z51.89  Abnormality of gait and mobility R26.9  Decreased independence for activities of daily living (ADL) Z78.9  Physical debility / deconditioning R53.81      Medical Dx:  Past Medical History:   Diagnosis Date    Anxiety     Arrhythmia     a-fib    Cellulitis of leg 11/17/2019    Erectile dysfunction 1/20/2014    EHSAN (generalized anxiety disorder) 5/7/2014    GERD (gastroesophageal reflux disease)     Gout 1/20/2014    History of peptic ulcer     Hypertension     Nausea & vomiting     Obesity, morbid 2/26/2018    Osteoarthritis of hand, primary localized 1/20/2014    Personal history of urinary calculi     x2    Venous (peripheral) insufficiency 12/3/2019       Date of Evaluation: March 20, 2025      HPI: (Copied from HPI of rehab consult dated 3/17/2025): Jozef Felix is a 59 y.o. male patient who presented to [facility] on 3/8/2025 secondary to increased back pain with concerns for a kidney stone.  He complained of fever chills and general weakness.  He has a history of chronic venous stasis ulcers and regularly follows up with wound care.  He was admitted after meeting criteria for sepsis thought secondary to bilateral lower extremity cellulitis.  Blood cultures were obtained.  He was started on broad-spectrum  morning blood draws, avoiding middle of the night vital signs or disturbances,  minimizing noise.   Keep  lights on and blinds open during the day; lights and TV off at night; minimize tethers including urinary catheters; use glasses, hearing aids, and dentures as appropriate.  Avoid long periods of sleep during the day. Brief naps can be helpful but long periods of sleep can disrupt the sleep wake cycle for this patient.  Reorientation and visual cues for orientation should be tried  Maintain hydration and bowel function        Disposition: Patient is expected to return home with spouse / family supervision and continued rehabilitation with home health therapy.      Patient exhibits the ability to tolerate active participation in at least three hours of therapy services per day, five days a week to include Physical Therapy and Occupational Therapy in addition to SLP if indicated. Furthermore, underlying medical problems present potential risk for decompensation and therefore requires continued close physician monitoring.  Other medical complications include but are not limited to: thromboembolism / pulmonary embolism, skin breakdown, pneumonia due to decreased mobility, CVA, MI, cardiac arrhythmias due to HTN, postural hypotension. Patient continues to require 24-hour rehabilitation nursing and CM assistance to help manage with patients progress, plan of care and overall safe discharge; this care can only be accomplished within the acute rehabilitation setting while addressing functional needs. Rehabilitation services could not be safely provided at a lower level of care such as a skilled nursing facility or nursing home.     Greater than 55 minutes were spent in direct patient care, discussing current medications, medical conditions, dispo planning, and plans for continuation of therapy in the community. More than 50% of total time was spent in counseling and coordination of care with referring physicians/staff,

## 2025-03-20 NOTE — PROGRESS NOTES
Patient declined cpap. He is currently on 2L NC with SpO2 greater than 90% and no respiratory distress noted at this time.

## 2025-03-20 NOTE — PROGRESS NOTES
PHYSICAL THERAPY DAILY NOTE    PT Individual Minutes  Time In: 1554  Time Out: 1651  Minutes: 57                 Total Treatment Time:  57 Minutes    Pt. Seen for: PM, Balance Training, Gait Training, Patient Education, Therapeutic Exercise, Transfer Training, and Other     Subjective: Patient reporting he wants to get stronger before he goes home. Reports he needs to get stronger and loose weight.          Objective:  Restrictions/Precautions: Contact Precautions, Fall Risk, Bed Alarm, Other (Comment) (posey alarm)  Activity Level: Up as Tolerated, Up with Assist  Required Braces or Orthoses?: No              Other Position/Activity Restrictions: LE elevation for edema control         GROSS ASSESSMENT   Patient resting in recliner at beginning of treatment        COGNITION Daily Assessment    Overall Orientation Status: Within Normal Limits   Overall Cognitive Status: WNL        BED/MAT MOBILITY Daily Assessment     Bridging: Minimal assistance  Rolling to Left: Supervision  Rolling to Right: Supervision  Supine to Sit: Minimal assistance  Sit to Supine: Minimal assistance  Scooting: Supervision  Bed Mobility Comments: NT        TRANSFERS Daily Assessment    Sit to Stand: Contact guard assistance  Stand to Sit: Contact guard assistance  Stand Pivot Transfers: Contact guard assistance (with RW, recliner <> w/c, commode transfers, chair transfers)  Car Transfer: Unable to assess  Comment: Increased time and effort to complete with altered body mechanics          GAIT Daily Assessment    Surface: Level tile  Device: Rolling Walker  Other Apparatus: Wheelchair follow (gait belt)  Assistance: Contact guard assistance  Quality of Gait: slow cont step through gait with mild flexed posture.  Gait Deviations: Decreased step height;Decreased step length;Slow Gisella  Distance: 120 ft  Comments: on room air, 02 sat 95% after ambulating 120ft  More Ambulation?: No              STEPS/STAIRS Daily Assessment    Stairs?: Yes   #

## 2025-03-21 PROCEDURE — 97110 THERAPEUTIC EXERCISES: CPT

## 2025-03-21 PROCEDURE — 97530 THERAPEUTIC ACTIVITIES: CPT

## 2025-03-21 PROCEDURE — 2580000003 HC RX 258: Performed by: PHYSICAL MEDICINE & REHABILITATION

## 2025-03-21 PROCEDURE — 6370000000 HC RX 637 (ALT 250 FOR IP): Performed by: PHYSICAL MEDICINE & REHABILITATION

## 2025-03-21 PROCEDURE — 97150 GROUP THERAPEUTIC PROCEDURES: CPT

## 2025-03-21 PROCEDURE — 6360000002 HC RX W HCPCS: Performed by: PHYSICAL MEDICINE & REHABILITATION

## 2025-03-21 PROCEDURE — 99232 SBSQ HOSP IP/OBS MODERATE 35: CPT | Performed by: PHYSICAL MEDICINE & REHABILITATION

## 2025-03-21 PROCEDURE — 1180000000 HC REHAB R&B

## 2025-03-21 PROCEDURE — 2500000003 HC RX 250 WO HCPCS: Performed by: PHYSICAL MEDICINE & REHABILITATION

## 2025-03-21 PROCEDURE — 97116 GAIT TRAINING THERAPY: CPT

## 2025-03-21 RX ORDER — TRAMADOL HYDROCHLORIDE 50 MG/1
50 TABLET ORAL EVERY 6 HOURS PRN
Status: DISCONTINUED | OUTPATIENT
Start: 2025-03-21 | End: 2025-03-24

## 2025-03-21 RX ORDER — HYDROCODONE BITARTRATE AND ACETAMINOPHEN 5; 325 MG/1; MG/1
1 TABLET ORAL EVERY 6 HOURS PRN
Refills: 0 | Status: DISCONTINUED | OUTPATIENT
Start: 2025-03-21 | End: 2025-03-24

## 2025-03-21 RX ADMIN — GABAPENTIN 400 MG: 400 CAPSULE ORAL at 14:18

## 2025-03-21 RX ADMIN — VITAMIN D, TAB 1000IU (100/BT) 6000 UNITS: 25 TAB at 08:25

## 2025-03-21 RX ADMIN — FUROSEMIDE 40 MG: 40 TABLET ORAL at 08:26

## 2025-03-21 RX ADMIN — CYCLOBENZAPRINE 10 MG: 10 TABLET, FILM COATED ORAL at 20:46

## 2025-03-21 RX ADMIN — TAMSULOSIN HYDROCHLORIDE 0.4 MG: 0.4 CAPSULE ORAL at 08:26

## 2025-03-21 RX ADMIN — DAPTOMYCIN 950 MG: 500 INJECTION, POWDER, LYOPHILIZED, FOR SOLUTION INTRAVENOUS at 16:18

## 2025-03-21 RX ADMIN — ACETAMINOPHEN 1000 MG: 500 TABLET, FILM COATED ORAL at 06:30

## 2025-03-21 RX ADMIN — RIVAROXABAN 20 MG: 20 TABLET, FILM COATED ORAL at 17:26

## 2025-03-21 RX ADMIN — FAMOTIDINE 40 MG: 20 TABLET, FILM COATED ORAL at 08:26

## 2025-03-21 RX ADMIN — HYDROCODONE BITARTRATE AND ACETAMINOPHEN 1 TABLET: 5; 325 TABLET ORAL at 15:03

## 2025-03-21 RX ADMIN — GABAPENTIN 400 MG: 400 CAPSULE ORAL at 20:46

## 2025-03-21 RX ADMIN — SODIUM CHLORIDE, PRESERVATIVE FREE 10 ML: 5 INJECTION INTRAVENOUS at 20:47

## 2025-03-21 RX ADMIN — ALLOPURINOL 300 MG: 300 TABLET ORAL at 08:26

## 2025-03-21 RX ADMIN — TRAMADOL HYDROCHLORIDE 50 MG: 50 TABLET, COATED ORAL at 17:26

## 2025-03-21 RX ADMIN — DILTIAZEM HYDROCHLORIDE 240 MG: 240 CAPSULE, EXTENDED RELEASE ORAL at 08:26

## 2025-03-21 RX ADMIN — FAMOTIDINE 40 MG: 20 TABLET, FILM COATED ORAL at 20:46

## 2025-03-21 RX ADMIN — GABAPENTIN 400 MG: 400 CAPSULE ORAL at 08:26

## 2025-03-21 RX ADMIN — SODIUM CHLORIDE, PRESERVATIVE FREE 10 ML: 5 INJECTION INTRAVENOUS at 08:26

## 2025-03-21 RX ADMIN — SERTRALINE 150 MG: 100 TABLET, FILM COATED ORAL at 08:26

## 2025-03-21 RX ADMIN — ACETAMINOPHEN 1000 MG: 500 TABLET, FILM COATED ORAL at 14:18

## 2025-03-21 RX ADMIN — HYDROCODONE BITARTRATE AND ACETAMINOPHEN 1 TABLET: 5; 325 TABLET ORAL at 20:46

## 2025-03-21 ASSESSMENT — PAIN DESCRIPTION - ORIENTATION
ORIENTATION: POSTERIOR;LEFT
ORIENTATION: POSTERIOR;LEFT

## 2025-03-21 ASSESSMENT — PAIN DESCRIPTION - FREQUENCY
FREQUENCY: INTERMITTENT
FREQUENCY: INTERMITTENT

## 2025-03-21 ASSESSMENT — PAIN DESCRIPTION - ONSET
ONSET: GRADUAL
ONSET: GRADUAL

## 2025-03-21 ASSESSMENT — PAIN DESCRIPTION - LOCATION
LOCATION: BACK
LOCATION: BACK

## 2025-03-21 ASSESSMENT — PAIN SCALES - GENERAL
PAINLEVEL_OUTOF10: 6
PAINLEVEL_OUTOF10: 0
PAINLEVEL_OUTOF10: 0
PAINLEVEL_OUTOF10: 7
PAINLEVEL_OUTOF10: 0
PAINLEVEL_OUTOF10: 7

## 2025-03-21 ASSESSMENT — PAIN DESCRIPTION - DESCRIPTORS
DESCRIPTORS: ACHING;SORE
DESCRIPTORS: ACHING;SORE

## 2025-03-21 ASSESSMENT — PAIN DESCRIPTION - PAIN TYPE
TYPE: CHRONIC PAIN
TYPE: CHRONIC PAIN

## 2025-03-21 NOTE — PROGRESS NOTES
PHYSICAL THERAPY DAILY NOTE    PT Individual Minutes  Time In: 1434  Time Out: 1527  Minutes: 53                 Total Treatment Time:  53 Minutes    Pt. Seen for: PM, Balance Training, Gait Training, Patient Education, Therapeutic Exercise, Transfer Training, and Other     Subjective: Patient reporting he gets tired when walking and exercising but he needs to get stronger. Reports he wants to be able to walk without the RW         Objective:  Restrictions/Precautions: Contact Precautions, Fall Risk, Bed Alarm, Other (Comment) (posey alarm)  Activity Level: Up as Tolerated, Up with Assist  Required Braces or Orthoses?: No              Other Position/Activity Restrictions: LE elevation for edema control         GROSS ASSESSMENT   Patient resting in recliner at beginning of treatment        COGNITION Daily Assessment    Overall Orientation Status: Within Normal Limits   Overall Cognitive Status: WNL        BED/MAT MOBILITY Daily Assessment     Bed Mobility Comments: NT        TRANSFERS Daily Assessment    Sit to Stand: Stand by assistance  Stand to Sit: Stand by assistance  Stand Pivot Transfers: Stand by assistance (with RW, w/c to recliner, commode transfers using grab bar, on/off Nu Step)  Comment: Increased time and effort to complete with one time cue for body mechanics          GAIT Daily Assessment    Surface: Level tile  Device: Rolling Walker  Other Apparatus: Wheelchair follow  Assistance: Stand by assistance  Quality of Gait: slow cont step through gait with mild flexed posture.  Gait Deviations: Decreased step height;Decreased step length;Slow Gisella  Distance: 150 ft x 1, 120 ft x 1  75 ft x 1 with 3 to 5 min seated rest breaks between  Comments: on room air, 02 sat 94% after ambulating 200ft  More Ambulation?: No              STEPS/STAIRS Daily Assessment    Stairs?: No              BALANCE Daily Assessment   Static standing balance activities during toileting activities. Able to complete hygiene and  Patient received dietary handouts and education.     Plan/Recommendations: Continue presurgical guidelines.  -Portion size/ 9-inch plate method  -Prioritize protein & produce  -Track 1,500 kcal, 150 g carbs, 113 g protein, & 50 g fat per day

## 2025-03-21 NOTE — PROGRESS NOTES
[facility] OCCUPATIONAL THERAPY DAILY NOTE  OT Individual Minutes  Time In: 0916  Time Out: 1030  Minutes: 74      OT Concurrent Minutes  Time In: 1300  Time Out: 1345  Minutes: 45        Subjective: Pt agreeable to treatment. \"Thank you for letting me do this, something I enjoy\"  Pain: No pain expressed.  Interdisciplinary Communication: Collaborated with PT and RN regarding pt status and pt performance.   Precautions: Falls, Posey Alarm, and BLE wraps   Lines:N/A  O2 Device: RA    FUNCTIONAL MOBILITY:       Bed Mobility NT    Sit to Stand NT    Transfer  NT  Transfer Type: NA  Equipment: NA    Ambulation NT  Equipment: NA       - Activity Tolerance - Coordination - Energy Conservation/Pacing  - Range of Motion - Strengthening   Pt completed therapeutic activities to challenge  BUE AROM/STR, B/L hand(s) dexterity/pinch, FM/GM coordination, bi-manual coordination, in-hand translation, activity tolerance, problem solving, and faciltiate improved mental health/wellbeing  in preparation for safe return to max (I) with I/ADLs. Pt activities graded up. Pt tolerated increase and exercises well with no c/o pain.. Rest breaks utilized as needed throughout..   Pt continued fine motor assembly/construction task. Pt completed remaining 25% of painting and preparation of object(s) to assemble. Pt then used B/L hands to collect, apply adhesive, place together and manipulate into sustained position for adhesive drying. Adhesive placed in cup at LUE length and applied with small brush. Pt completed ~90% before object collapsed. Pt exhibited proper relaxation and breathing techniques. Pt then re-attempted with assist from tape to reinforce and assist in holding pieces in place. Pt completed 100% with increased time overall.      Education   Benefits of OT and Energy Conservation, Pacing     Assessment: Patient tolerated session well overall. Pt with spouse on phone on entry, pt verbalized constructive and helpful conversation with spouse.  Overall mental health appears improved from AM session(s). Pt demonstrated good participation in OT treatment. Pt continues to benefit from skilled OT services to address remaining deficits and progress toward baseline level of independence and safety. Patient ended session seated in recliner  with rehab tech .   Plan: Continue OT POC.     DIA MELO OT   3/21/2025

## 2025-03-21 NOTE — PROGRESS NOTES
PHYSICAL THERAPY DAILY NOTE    PT Individual Minutes  Time In: 1030  Time Out: 1115  Minutes: 45                 Total Treatment Time:  45 Minutes    Pt. Seen for: AM, Balance Training, Gait Training, Patient Education, Therapeutic Exercise, Transfer Training, and Other     Subjective: Patient reporting he feels OK. Reports he had to take pain medication last night for his back pain         Objective:  Restrictions/Precautions: Contact Precautions, Fall Risk, Bed Alarm, Other (Comment) (posey alarm)  Activity Level: Up as Tolerated, Up with Assist  Required Braces or Orthoses?: No              Other Position/Activity Restrictions: LE elevation for edema control         GROSS ASSESSMENT   Patient resting in w/c at beginning of treatment        COGNITION Daily Assessment    Overall Orientation Status: Within Normal Limits   Overall Cognitive Status: WNL        BED/MAT MOBILITY Daily Assessment     Bed Mobility Comments: NT        TRANSFERS Daily Assessment    Sit to Stand: Stand by assistance  Stand to Sit: Stand by assistance  Stand Pivot Transfers: Stand by assistance (with RW, w/c to recliner, commode transfers using grab bar)  Comment: Increased time and effort to complete with one time cue for body mechanics          GAIT Daily Assessment    Surface: Level tile  Device: Rolling Walker  Other Apparatus: Wheelchair follow  Assistance: Stand by assistance  Quality of Gait: slow cont step through gait with mild flexed posture.  Gait Deviations: Decreased step height;Decreased step length;Slow Gisella  Distance: 130 ft x 1  20 ft x 1  Comments: on room air, 02 sat 95% after ambulating 130 ft  More Ambulation?: No              STEPS/STAIRS Daily Assessment    Stairs?: No              BALANCE Daily Assessment   Static standing balance activities during toileting activities. Able to complete hygiene and manage lower body clothing with no loss of balance. Using grab bar prn for balance during toileting.  Static Sitting:  room air during functional mobility and exercise. Progressing towards goals.         Plan of Care: Continue with POC and progress as tolerated.     Tyree Sanchez, PT  3/21/2025

## 2025-03-21 NOTE — PROGRESS NOTES
[facility] OCCUPATIONAL THERAPY DAILY NOTE  OT Individual Minutes  Time In: 0916  Time Out: 1030  Minutes: 74               Subjective: Pt agreeable to treatment. \"I like to put together model planes\"  Pain:  Pt reporting increased pain/discomfort across L low back around/into L abdomen in referred kidney pain region. Pt rpeorts kidney and gall stones present. MD presented during session and made aware, MD advised would address as appropriate. No barrier to therapy at present time .  Interdisciplinary Communication: Collaborated with Physician, PT, and RN regarding pt status and pt performance.   Precautions:  Falls, Posey Alarm, and BLE wraps   Lines:N/A  O2 Device: RA    FUNCTIONAL MOBILITY:       Bed Mobility NT    Sit to Stand NT    Transfer  NT  Transfer Type: NA  Equipment: NA    Ambulation NT  Equipment: NA       - Activity Tolerance - Cognition - Coordination - Energy Conservation/Pacing  - Range of Motion - Strengthening   Pt completed therapeutic activities to challenge  BUE AROM/STR, B/L hand(s) dexterity/pinch, FM/GM coordination, bi-manual coordination, in-hand translation, activity tolerance, and problem solving in preparation for safe return to max (I) with I/ADLs. Pt tolerated activities well with no c/o pain. . Rest breaks utilized as needed throughout..   Pt completed nut/washer/bolt activity seated at table. Pt used L hand(s) to retrieve pieces from collection on pt L. Pt then matched pieces and used B/L hands to assemble on raised board as instructed. Pt completed 7 sets with significantly increased time >15 minutes and 3-5 verbal cues for directions, error recognition, problem solving and empathetic encouragement. 2-3 overall errors noted. Increased time for compassionate discussion and empathetic listening. Pt transitioned to task to continue challenge for BUE and facilitate improved mental health/cognitive re-direction. Pt completed fine motor object assembly task seated at table. Pt set up with

## 2025-03-21 NOTE — PROGRESS NOTES
[place] [address]  Tel: [phone]     Physical Medicine and Rehabilitation Progress Note      Jozef Felix  Admit Date: 3/19/2025  Admit Diagnosis: Bacteremia due to methicillin resistant Staphylococcus aureus [R78.81, B95.62]    Subjective     Patient seen this morning this morning participating with OT in therapy gym.  He is anxious this morning about some financial situations at home.  Case management has already been notified and is working to assist him.  He complains of back pain for which tramadol ordered last night did not really help that much.  He does usually take tramadol at home.  All questions and concerns were addressed at this time.    Objective:     Current Facility-Administered Medications   Medication Dose Route Frequency    traMADol (ULTRAM) tablet 50 mg  50 mg Oral Q6H PRN    HYDROcodone-acetaminophen (NORCO) 5-325 MG per tablet 1 tablet  1 tablet Oral Q6H PRN    magic (miracle) mouthwash  5 mL Swish & Spit 4x Daily PRN    acetaminophen (TYLENOL) tablet 1,000 mg  1,000 mg Oral 3 times per day    allopurinol (ZYLOPRIM) tablet 300 mg  300 mg Oral Daily    cyclobenzaprine (FLEXERIL) tablet 10 mg  10 mg Oral Nightly    DAPTOmycin (CUBICIN) 950 mg in sodium chloride (PF) 0.9 % 19 mL IV syringe  8 mg/kg (Adjusted) IntraVENous Daily before dinner    diclofenac sodium (VOLTAREN) 1 % gel 4 g  4 g Topical 4x Daily PRN    dilTIAZem (CARDIZEM CD) extended release capsule 240 mg  240 mg Oral Daily    famotidine (PEPCID) tablet 40 mg  40 mg Oral BID    furosemide (LASIX) tablet 40 mg  40 mg Oral Daily    gabapentin (NEURONTIN) capsule 400 mg  400 mg Oral TID    miconazole (MICOTIN) 2 % powder   Topical BID PRN    mineral oil-hydrophilic petrolatum (AQUAPHOR) ointment   Topical Once per day on Monday Thursday    ondansetron (ZOFRAN-ODT) disintegrating tablet 4 mg  4 mg Oral Q8H PRN    Or    ondansetron (ZOFRAN) injection 4 mg  4 mg  IntraVENous Q6H PRN    rivaroxaban (XARELTO) tablet 20 mg  20 mg Oral Daily    sertraline (ZOLOFT) tablet 150 mg  150 mg Oral Daily    sodium chloride flush 0.9 % injection 5-40 mL  5-40 mL IntraVENous 2 times per day    tamsulosin (FLOMAX) capsule 0.4 mg  0.4 mg Oral Daily    terbinafine (LAMISIL) tablet 250 mg (Patient Supplied)  250 mg Oral Daily    Vitamin D (CHOLECALCIFEROL) tablet 6,000 Units  6,000 Units Oral Daily    Followed by    [START ON 3/24/2025] Vitamin D (CHOLECALCIFEROL) tablet 2,000 Units  2,000 Units Oral Daily    senna (SENOKOT) tablet 8.6 mg  1 tablet Oral BID PRN     Allergies   Allergen Reactions    Codeine Nausea Only    Sulfa Antibiotics Rash       Visit Vitals  /70   Pulse 66   Temp 97.9 °F (36.6 °C) (Oral)   Resp 18   Ht 1.905 m (6' 3\")   Wt (!) 159.9 kg (352 lb 8 oz)   SpO2 91%   BMI 44.06 kg/m²          Review of Systems:  CV: Denies chest pain or palpitations  Pulmonary: Denies cough or shortness of breath  : Denies dysuria or hematuria         Physical Exam:     GENERAL: Alert, NAD.  HEENT: EOMI, NC/AT  LUNGS: Even and unlabored breathing.  NEURO: No new focal motor or sensory deficits.        Labs/Studies:  Recent Results (from the past 72 hours)   Basic Metabolic Panel w/ Reflex to MG    Collection Time: 03/19/25  6:24 AM   Result Value Ref Range    Sodium 142 136 - 145 mmol/L    Potassium 3.8 3.5 - 5.1 mmol/L    Chloride 101 98 - 107 mmol/L    CO2 30 (H) 20 - 29 mmol/L    Anion Gap 10 7 - 16 mmol/L    Glucose 95 70 - 99 mg/dL    BUN 24 (H) 8 - 23 MG/DL    Creatinine 0.85 0.80 - 1.30 MG/DL    Est, Glom Filt Rate >90 >60 ml/min/1.73m2    Calcium 8.9 8.8 - 10.2 MG/DL   CBC with Auto Differential    Collection Time: 03/19/25  6:24 AM   Result Value Ref Range    WBC 12.6 (H) 4.3 - 11.1 K/uL    RBC 3.68 (L) 4.23 - 5.6 M/uL    Hemoglobin 12.8 (L) 13.6 - 17.2 g/dL    Hematocrit 38.6 (L) 41.1 - 50.3 %    .9 (H) 82 - 102 FL    MCH 34.8 (H) 26.1 - 32.9 PG    MCHC 33.2 31.4 -

## 2025-03-21 NOTE — PROGRESS NOTES
attempted initial visit with patient after receiving consult. Patient ws completing physical therapy at the time, but engaged briefly with .  Patient was receptive to follow up at a later time when his therapy is complete.  provided pastoral presence and empathetic listening.   is available to follow up when amenable.   Peace be with you,  Signed by  TOSHIA BerriosDiv.   [phone]

## 2025-03-22 PROCEDURE — 6360000002 HC RX W HCPCS: Performed by: PHYSICAL MEDICINE & REHABILITATION

## 2025-03-22 PROCEDURE — 97116 GAIT TRAINING THERAPY: CPT

## 2025-03-22 PROCEDURE — 2580000003 HC RX 258: Performed by: PHYSICAL MEDICINE & REHABILITATION

## 2025-03-22 PROCEDURE — 97530 THERAPEUTIC ACTIVITIES: CPT

## 2025-03-22 PROCEDURE — 97535 SELF CARE MNGMENT TRAINING: CPT

## 2025-03-22 PROCEDURE — 97110 THERAPEUTIC EXERCISES: CPT

## 2025-03-22 PROCEDURE — 1180000000 HC REHAB R&B

## 2025-03-22 PROCEDURE — 2500000003 HC RX 250 WO HCPCS: Performed by: PHYSICAL MEDICINE & REHABILITATION

## 2025-03-22 PROCEDURE — 6370000000 HC RX 637 (ALT 250 FOR IP): Performed by: PHYSICAL MEDICINE & REHABILITATION

## 2025-03-22 RX ADMIN — TAMSULOSIN HYDROCHLORIDE 0.4 MG: 0.4 CAPSULE ORAL at 08:28

## 2025-03-22 RX ADMIN — FAMOTIDINE 40 MG: 20 TABLET, FILM COATED ORAL at 08:27

## 2025-03-22 RX ADMIN — ACETAMINOPHEN 1000 MG: 500 TABLET, FILM COATED ORAL at 14:03

## 2025-03-22 RX ADMIN — SERTRALINE 150 MG: 100 TABLET, FILM COATED ORAL at 08:28

## 2025-03-22 RX ADMIN — CYCLOBENZAPRINE 10 MG: 10 TABLET, FILM COATED ORAL at 20:11

## 2025-03-22 RX ADMIN — DILTIAZEM HYDROCHLORIDE 240 MG: 240 CAPSULE, EXTENDED RELEASE ORAL at 08:27

## 2025-03-22 RX ADMIN — SODIUM CHLORIDE, PRESERVATIVE FREE 10 ML: 5 INJECTION INTRAVENOUS at 20:11

## 2025-03-22 RX ADMIN — HYDROCODONE BITARTRATE AND ACETAMINOPHEN 1 TABLET: 5; 325 TABLET ORAL at 11:40

## 2025-03-22 RX ADMIN — VITAMIN D, TAB 1000IU (100/BT) 6000 UNITS: 25 TAB at 08:28

## 2025-03-22 RX ADMIN — FAMOTIDINE 40 MG: 20 TABLET, FILM COATED ORAL at 20:11

## 2025-03-22 RX ADMIN — HYDROCODONE BITARTRATE AND ACETAMINOPHEN 1 TABLET: 5; 325 TABLET ORAL at 18:05

## 2025-03-22 RX ADMIN — GABAPENTIN 400 MG: 400 CAPSULE ORAL at 20:11

## 2025-03-22 RX ADMIN — HYDROCODONE BITARTRATE AND ACETAMINOPHEN 1 TABLET: 5; 325 TABLET ORAL at 05:34

## 2025-03-22 RX ADMIN — GABAPENTIN 400 MG: 400 CAPSULE ORAL at 08:28

## 2025-03-22 RX ADMIN — SODIUM CHLORIDE, PRESERVATIVE FREE 10 ML: 5 INJECTION INTRAVENOUS at 11:55

## 2025-03-22 RX ADMIN — ALLOPURINOL 300 MG: 300 TABLET ORAL at 08:27

## 2025-03-22 RX ADMIN — DAPTOMYCIN 950 MG: 500 INJECTION, POWDER, LYOPHILIZED, FOR SOLUTION INTRAVENOUS at 16:03

## 2025-03-22 RX ADMIN — TRAMADOL HYDROCHLORIDE 50 MG: 50 TABLET, COATED ORAL at 20:10

## 2025-03-22 RX ADMIN — ACETAMINOPHEN 1000 MG: 500 TABLET, FILM COATED ORAL at 20:11

## 2025-03-22 RX ADMIN — RIVAROXABAN 20 MG: 20 TABLET, FILM COATED ORAL at 17:12

## 2025-03-22 RX ADMIN — FUROSEMIDE 40 MG: 40 TABLET ORAL at 08:28

## 2025-03-22 RX ADMIN — GABAPENTIN 400 MG: 400 CAPSULE ORAL at 14:03

## 2025-03-22 ASSESSMENT — PAIN DESCRIPTION - DESCRIPTORS
DESCRIPTORS: ACHING
DESCRIPTORS: ACHING

## 2025-03-22 ASSESSMENT — PAIN DESCRIPTION - LOCATION
LOCATION: BACK

## 2025-03-22 ASSESSMENT — PAIN SCALES - GENERAL
PAINLEVEL_OUTOF10: 0
PAINLEVEL_OUTOF10: 0
PAINLEVEL_OUTOF10: 7
PAINLEVEL_OUTOF10: 10

## 2025-03-22 ASSESSMENT — PAIN DESCRIPTION - ORIENTATION
ORIENTATION: RIGHT
ORIENTATION: LEFT;LOWER

## 2025-03-22 NOTE — PLAN OF CARE
Problem: Safety - Adult  Goal: Free from fall injury  3/21/2025 2100 by Jayshree Aceves RN  Outcome: Progressing  3/21/2025 0942 by Jessy Kiran RN  Outcome: Progressing     Problem: Skin/Tissue Integrity  Goal: Skin integrity remains intact  Description: 1.  Monitor for areas of redness and/or skin breakdown  2.  Assess vascular access sites hourly  3.  Every 4-6 hours minimum:  Change oxygen saturation probe site  4.  Every 4-6 hours:  If on nasal continuous positive airway pressure, respiratory therapy assess nares and determine need for appliance change or resting period  3/21/2025 2100 by Jayshree Aceves RN  Outcome: Progressing  3/21/2025 0942 by Jessy Kiran RN  Outcome: Progressing     Problem: Pain  Goal: Verbalizes/displays adequate comfort level or baseline comfort level  3/21/2025 2100 by Jayshree Aceves RN  Outcome: Progressing  3/21/2025 0942 by Jessy Kiran RN  Outcome: Progressing

## 2025-03-22 NOTE — PROGRESS NOTES
PHYSICAL THERAPY DAILY NOTE    PT Individual Minutes  Time In: 1354  Time Out: 1424  Minutes: 30                 Total Treatment Time:  30 Minutes    Pt. Seen for: PM, Balance Training, Gait Training, Patient Education, Transfer Training, and Other     Subjective: Patient reporting he had more pain medication but he is till hurting. Reports using ice pack to area and that helps some.Reports he thinks it might be his kidney stones. Reports he would like to follow up with spine doctor regarding his back x ray and MRI         Objective:  Restrictions/Precautions: Contact Precautions, Fall Risk, Bed Alarm, Other (Comment) (posey alarm)  Activity Level: Up as Tolerated, Up with Assist  Required Braces or Orthoses?: No              Other Position/Activity Restrictions: LE elevation for edema control         GROSS ASSESSMENT   Patient resting in recliner at beginning of treatment        COGNITION Daily Assessment    Overall Orientation Status: Within Normal Limits   Overall Cognitive Status: WNL        BED/MAT MOBILITY Daily Assessment     Bed Mobility Comments: NT        TRANSFERS Daily Assessment    Sit to Stand: Supervision  Stand to Sit: Supervision  Stand Pivot Transfers: Supervision (with RW)  Comment: Increased time and effort to complete with one time cue for body mechanics          GAIT Daily Assessment    Surface: Level tile  Device: Rolling Walker  Assistance: Stand by assistance  Quality of Gait: gait training with one time cue for improved upright posture  Gait Deviations: Decreased step height;Decreased step length;Slow Gisella  Distance: 150 ft x 2, 75 ft x 1 with 5 min seated rest break between attempts  Comments: on room air, 02 sat 94% after ambulating 150 ft  More Ambulation?: No    Gait training x 20 ft x 2 with RW in figure 8 pattern around 3 bolsters spaced 5 ft apart with SBA and cues.          STEPS/STAIRS Daily Assessment    Stairs?: No             BALANCE Daily Assessment    Static Sitting:  Normal:  Pt. able to maintain balance w/o UE support  Dynamic Sitting: Good - accepts moderate challenge;  can maintain balance while picking object off the floor  Static Standing: Good:  Pt. able to maintain balance w/o UE support;  exhibits some postural sway  Dynamic Standing: Fair - accepts minimal challenge;  can maintain balance while turning head/trunk       WHEELCHAIR MOBILITY Daily Assessment    Wheelchair Size: 22x18  Wheelchair Type: Standard  Wheelchair Cushion: Standard  Pressure Relief Type:  (sit<>Stand with RW)  Level of Assistance for Pressure Relief Activities: Stand by assistance  Wheelchair Parts Management: No  Propulsion: No                     LOWER EXTREMITY EXERCISES   NA     Pain level: 4 to 6 out of 10 out of 10  Pain Location:  left lateral lower rib cage area from front <> back  Pain Interventions: Medication per order, rest, positioning,relaxation, body mechanics      Vital Signs:  /74   Pulse 64   Temp 97.5 °F (36.4 °C) (Oral)   Resp 18   Ht 1.905 m (6' 3\")   Wt (!) 159.7 kg (352 lb 1.6 oz)   SpO2 92%   BMI 44.01 kg/m²   On room air, 02 sat 94 to 97% during treatment    Education:    Education Given To: Patient  Education Provided: Safety;Transfer Training;Energy Conservation;Fall Prevention Strategies;Precautions;Mobility Training  Education Method: Demonstration;Verbal  Barriers to Learning: None  Education Outcome: Verbalized understanding;Demonstrated understanding;Continued education needed     Interdisciplinary Communication: spoke with RN regarding patient c/o pain and request for pain medication    Pt. Left in recliner call bell in reach needs in reach bed/chair alarm activated         Assessment:Progressing towards goals. Pain limiting functional endurance.         Plan of Care: Continue with POC and progress as tolerated.     Tyree Sanchez, PT  3/22/2025

## 2025-03-22 NOTE — PROGRESS NOTES
Spiritual Health History and Assessment/Progress Note  [facility]    (P) Spiritual/Emotional Needs,  ,  ,      Name: Jozef Felix MRN: [id number]    Age: 59 y.o.     Sex: male   Language: English   Am[employer]: [employer]   Bacteremia due to methicillin resistant Staphylococcus aureus     Date: 3/22/2025            Total Time Calculated: (P) 30 min              Spiritual Assessment began in [facility]        Referral/Consult From: (P) Rounding   Encounter Overview/Reason: (P) Spiritual/Emotional Needs  Service Provided For: (P) Patient    Renay, Belief, Meaning:   Patient identifies as spiritual and has beliefs or practices that help with coping during difficult times  Family/Friends No family/friends present      Importance and Influence:  Patient has no beliefs influential to healthcare decision-making identified during this visit  Family/Friends No family/friends present    Community:  Patient feels well-supported. Support system includes: Spouse/Partner  Family/Friends No family/friends present    Assessment and Plan of Care:     Patient Interventions include: Facilitated expression of thoughts and feelings, Explored spiritual coping/struggle/distress, Affirmed coping skills/support systems, and Provided sacramental/Mandaen ritual  Family/Friends Interventions include: No family/friends present    Patient Plan of Care: Spiritual Care available upon further referral  Family/Friends Plan of Care: No family/friends present    Electronically signed by RUBI RUBIO on 3/22/2025 at 3:52 PM

## 2025-03-22 NOTE — PROGRESS NOTES
PHYSICAL THERAPY DAILY NOTE    PT Individual Minutes  Time In: 0955  Time Out: 1044  Minutes: 49                 Total Treatment Time:  49 Minutes    Pt. Seen for: AM, Balance Training, Gait Training, Patient Education, Therapeutic Exercise, Transfer Training, and Other     Subjective: Patient reporting he is hurting more today. Reports he had pain medication earlier. Reports minimal pain relief. Reports he did not sleep well last night and does not feel as good today         Objective:  Restrictions/Precautions: Contact Precautions, Fall Risk, Bed Alarm, Other (Comment) (posey alarm)  Activity Level: Up as Tolerated, Up with Assist  Required Braces or Orthoses?: No              Other Position/Activity Restrictions: LE elevation for edema control         GROSS ASSESSMENT   Patient resting in recliner at beginning of treatment        COGNITION Daily Assessment    Overall Orientation Status: Within Normal Limits   Overall Cognitive Status: WNL        BED/MAT MOBILITY Daily Assessment     Bed Mobility Comments: NT        TRANSFERS Daily Assessment    Sit to Stand: Stand by assistance  Stand to Sit: Stand by assistance  Stand Pivot Transfers: Stand by assistance (with RW, w/c to recliner, commode transfers using grab bar, on/off Nu Step)  Comment: Increased time and effort to complete with one time cue for body mechanics          GAIT Daily Assessment    Surface: Level tile  Device: Rolling Walker  Assistance: Stand by assistance  Quality of Gait: slow cont step through gait with mild flexed posture.  Gait Deviations: Decreased step height;Decreased step length;Slow Gisella  Distance: 150 ft x 2,  75 ft x 1  with 3 to 5 min seated rest breaks between  Comments: on room air, 02 sat 95% after ambulating 150 ft  More Ambulation?: No    Gait training x 20 ft x 2 with RW in figure 8 pattern around 3 bolsters spaced 5 ft apart with SBA and cues.          STEPS/STAIRS Daily Assessment    Stairs?: Yes   # Steps : 4  Stairs  Height: 6\"  Rails: Bilateral  Assistance: Contact guard assistance  Comment: single step at a time leading up with RLE and down with LLE. Increased time and effort to complete with 4 min seated rest break after going up steps.          BALANCE Daily Assessment   Static standing balance activities during toileting activities. Able to complete hygiene and manage lower body clothing with no loss of balance. Using grab bar prn for balance during toileting.  Static Sitting: Normal:  Pt. able to maintain balance w/o UE support  Dynamic Sitting: Good - accepts moderate challenge;  can maintain balance while picking object off the floor  Static Standing: Good:  Pt. able to maintain balance w/o UE support;  exhibits some postural sway  Dynamic Standing: Fair - accepts minimal challenge;  can maintain balance while turning head/trunk       WHEELCHAIR MOBILITY Daily Assessment    Wheelchair Size: 22x18  Wheelchair Type: Standard  Wheelchair Cushion: Standard  Pressure Relief Type:  (sit<>Stand with RW)  Level of Assistance for Pressure Relief Activities: Stand by assistance  Wheelchair Parts Management: No  Propulsion: No                     LOWER EXTREMITY EXERCISES   Nu Step x 10 mins at level 1     Pain level: 4 to 6 out of 10 out of 10  Pain Location:  left lateral lower rib cage area from front <> back  Pain Interventions: Medication per order, rest, positioning,relaxation, body mechanics      Vital Signs:  /74   Pulse 64   Temp 97.5 °F (36.4 °C) (Oral)   Resp 18   Ht 1.905 m (6' 3\")   Wt (!) 159.7 kg (352 lb 1.6 oz)   SpO2 92%   BMI 44.01 kg/m²   On room air, 02 sat 94 to 97% during treatment    Education:    Education Given To: Patient  Education Provided: Safety;Transfer Training;Energy Conservation;Fall Prevention Strategies;Precautions;Mobility Training  Education Method: Demonstration;Verbal  Barriers to Learning: None  Education Outcome: Verbalized understanding;Demonstrated understanding;Continued  education needed     Interdisciplinary Communication: spoke with RN regarding patient c/o pain and request for pain medication    Pt. Left in recliner call bell in reach needs in reach bed/chair alarm activated         Assessment:Decreased tolerance to treatment due to increased pain. Increased number of rest breaks for pain control. No decline in functional status         Plan of Care: Continue with POC and progress as tolerated.     Tyree Sanchez, PT  3/22/2025

## 2025-03-22 NOTE — PROGRESS NOTES
[facility] OCCUPATIONAL THERAPY DAILY NOTE  OT Individual Minutes  Time In: 1240  Time Out: 1346  Minutes: 66               Subjective: Pt agreeable to treatment.   Pain: Pt endorsed L flank pain with UB dowel exercises, task adapted to pt tolerance.   Interdisciplinary Communication: Collaborated with PT regarding pt performance.   Precautions: Falls, Contact  Lines:N/A  O2 Device: RA    FUNCTIONAL MOBILITY:       Bed Mobility Dependent  and NT    Sit to Stand SBA    Transfer  Supervision  Transfer Type: SPT  Equipment: RW    Ambulation SBA  Equipment: RW      - Activity Tolerance - Self-Care - Strengthening   Pt completed functional RUE task while wearing 1.5 lb wrist weight to address functional strength and endurance.     Pt completed hand hygiene task in stance at sink with SBA. Pt with limited standing tolerance.         - Activity Tolerance - Strengthening     Pt completed 12 minutes on the upper body ergometer, ½ forward and ½ backward, with light and moderate resistance to increase upper body strength and activity tolerance for integration into functional tasks.    Pt completed 1 x 20 reps of bicep curls using 3 lb dowel christy. Pt initiated chest press exercise however reported increase in L flank pain, so exercise terminated. Pt completed 2 x 10 reps of seated marches. Pt required rest breaks throughout.      Education   Benefits of OT and Energy Conservation, Pacing     Assessment: Pt demonstrated good participation in OT treatment. Pt with decreased general strength and endurance impacting independence with ADLs. Pt continues to benefit from skilled OT services to address remaining deficits and progress toward baseline level of independence and safety. Patient ended session seated in w/c with call bell and needs within reach and with chair/bed alarm activated.   Plan: Continue OT POC.     Latoya Green, OT   3/22/2025

## 2025-03-23 VITALS
TEMPERATURE: 98.4 F | DIASTOLIC BLOOD PRESSURE: 61 MMHG | OXYGEN SATURATION: 94 % | RESPIRATION RATE: 18 BRPM | BODY MASS INDEX: 39.17 KG/M2 | SYSTOLIC BLOOD PRESSURE: 120 MMHG | WEIGHT: 315 LBS | HEART RATE: 79 BPM | HEIGHT: 75 IN

## 2025-03-23 PROCEDURE — 2500000003 HC RX 250 WO HCPCS: Performed by: PHYSICAL MEDICINE & REHABILITATION

## 2025-03-23 PROCEDURE — 97150 GROUP THERAPEUTIC PROCEDURES: CPT

## 2025-03-23 PROCEDURE — 2580000003 HC RX 258: Performed by: PHYSICAL MEDICINE & REHABILITATION

## 2025-03-23 PROCEDURE — 6360000002 HC RX W HCPCS: Performed by: PHYSICAL MEDICINE & REHABILITATION

## 2025-03-23 PROCEDURE — 6370000000 HC RX 637 (ALT 250 FOR IP): Performed by: PHYSICAL MEDICINE & REHABILITATION

## 2025-03-23 PROCEDURE — 1180000000 HC REHAB R&B

## 2025-03-23 RX ADMIN — SERTRALINE 150 MG: 100 TABLET, FILM COATED ORAL at 09:30

## 2025-03-23 RX ADMIN — FAMOTIDINE 40 MG: 20 TABLET, FILM COATED ORAL at 19:52

## 2025-03-23 RX ADMIN — TRAMADOL HYDROCHLORIDE 50 MG: 50 TABLET, COATED ORAL at 17:46

## 2025-03-23 RX ADMIN — VITAMIN D, TAB 1000IU (100/BT) 6000 UNITS: 25 TAB at 09:30

## 2025-03-23 RX ADMIN — FUROSEMIDE 40 MG: 40 TABLET ORAL at 09:31

## 2025-03-23 RX ADMIN — ACETAMINOPHEN 1000 MG: 500 TABLET, FILM COATED ORAL at 22:21

## 2025-03-23 RX ADMIN — ALLOPURINOL 300 MG: 300 TABLET ORAL at 09:32

## 2025-03-23 RX ADMIN — DAPTOMYCIN 950 MG: 500 INJECTION, POWDER, LYOPHILIZED, FOR SOLUTION INTRAVENOUS at 17:47

## 2025-03-23 RX ADMIN — SODIUM CHLORIDE, PRESERVATIVE FREE 20 ML: 5 INJECTION INTRAVENOUS at 09:38

## 2025-03-23 RX ADMIN — HYDROCODONE BITARTRATE AND ACETAMINOPHEN 1 TABLET: 5; 325 TABLET ORAL at 06:23

## 2025-03-23 RX ADMIN — RIVAROXABAN 20 MG: 20 TABLET, FILM COATED ORAL at 17:47

## 2025-03-23 RX ADMIN — HYDROCODONE BITARTRATE AND ACETAMINOPHEN 1 TABLET: 5; 325 TABLET ORAL at 19:52

## 2025-03-23 RX ADMIN — FAMOTIDINE 40 MG: 20 TABLET, FILM COATED ORAL at 09:31

## 2025-03-23 RX ADMIN — HYDROCODONE BITARTRATE AND ACETAMINOPHEN 1 TABLET: 5; 325 TABLET ORAL at 13:28

## 2025-03-23 RX ADMIN — ACETAMINOPHEN 1000 MG: 500 TABLET, FILM COATED ORAL at 15:14

## 2025-03-23 RX ADMIN — SODIUM CHLORIDE, PRESERVATIVE FREE 10 ML: 5 INJECTION INTRAVENOUS at 19:57

## 2025-03-23 RX ADMIN — TAMSULOSIN HYDROCHLORIDE 0.4 MG: 0.4 CAPSULE ORAL at 09:32

## 2025-03-23 RX ADMIN — GABAPENTIN 400 MG: 400 CAPSULE ORAL at 19:53

## 2025-03-23 RX ADMIN — TRAMADOL HYDROCHLORIDE 50 MG: 50 TABLET, COATED ORAL at 09:37

## 2025-03-23 RX ADMIN — HYDROCODONE BITARTRATE AND ACETAMINOPHEN 1 TABLET: 5; 325 TABLET ORAL at 00:04

## 2025-03-23 RX ADMIN — CYCLOBENZAPRINE 10 MG: 10 TABLET, FILM COATED ORAL at 19:52

## 2025-03-23 RX ADMIN — DILTIAZEM HYDROCHLORIDE 240 MG: 240 CAPSULE, EXTENDED RELEASE ORAL at 09:32

## 2025-03-23 RX ADMIN — GABAPENTIN 400 MG: 400 CAPSULE ORAL at 09:31

## 2025-03-23 RX ADMIN — GABAPENTIN 400 MG: 400 CAPSULE ORAL at 15:14

## 2025-03-23 ASSESSMENT — PAIN DESCRIPTION - DESCRIPTORS
DESCRIPTORS: ACHING;SORE
DESCRIPTORS: ACHING;SORE
DESCRIPTORS: ACHING
DESCRIPTORS: ACHING

## 2025-03-23 ASSESSMENT — PAIN - FUNCTIONAL ASSESSMENT
PAIN_FUNCTIONAL_ASSESSMENT: PREVENTS OR INTERFERES SOME ACTIVE ACTIVITIES AND ADLS

## 2025-03-23 ASSESSMENT — PAIN SCALES - GENERAL
PAINLEVEL_OUTOF10: 7
PAINLEVEL_OUTOF10: 5
PAINLEVEL_OUTOF10: 7
PAINLEVEL_OUTOF10: 5
PAINLEVEL_OUTOF10: 0
PAINLEVEL_OUTOF10: 6
PAINLEVEL_OUTOF10: 4
PAINLEVEL_OUTOF10: 0

## 2025-03-23 ASSESSMENT — PAIN DESCRIPTION - FREQUENCY
FREQUENCY: INTERMITTENT

## 2025-03-23 ASSESSMENT — PAIN DESCRIPTION - LOCATION
LOCATION: BACK
LOCATION: GENERALIZED
LOCATION: BACK

## 2025-03-23 ASSESSMENT — PAIN DESCRIPTION - ORIENTATION
ORIENTATION: RIGHT
ORIENTATION: RIGHT;LEFT

## 2025-03-23 ASSESSMENT — PAIN DESCRIPTION - ONSET
ONSET: PROGRESSIVE

## 2025-03-23 ASSESSMENT — PAIN DESCRIPTION - PAIN TYPE
TYPE: CHRONIC PAIN

## 2025-03-23 NOTE — PLAN OF CARE
Problem: Safety - Adult  Goal: Free from fall injury  Outcome: Progressing  Flowsheets (Taken 3/23/2025 1300 by Marii Calvo, RN)  Free From Fall Injury: Instruct family/caregiver on patient safety     Problem: Skin/Tissue Integrity  Goal: Skin integrity remains intact  Description: 1.  Monitor for areas of redness and/or skin breakdown  2.  Assess vascular access sites hourly  3.  Every 4-6 hours minimum:  Change oxygen saturation probe site  4.  Every 4-6 hours:  If on nasal continuous positive airway pressure, respiratory therapy assess nares and determine need for appliance change or resting period  Outcome: Progressing  Flowsheets (Taken 3/23/2025 1300 by Marii Calvo, RN)  Skin Integrity Remains Intact: Monitor for areas of redness and/or skin breakdown     Problem: Pain  Goal: Verbalizes/displays adequate comfort level or baseline comfort level  Outcome: Progressing

## 2025-03-23 NOTE — PROGRESS NOTES
PHYSICAL THERAPY DAILY NOTE    PT Individual Minutes  Time In: 0921  Time Out: 0924  Minutes: 3     PT Concurrent Minutes  Time In: 0841  Time Out: 0921  Minutes: 40           Total Treatment Time:  30 Minutes    Pt. Seen for: AM, Gait Training, Patient Education, Therapeutic Exercise, Transfer Training, and Other     Subjective: patient reporting he is still having the left side rib cage pain. Reports using ice packs and pain medication. Reports he is not sleeping well at night due to pain. Reports he was using a tall walking stick to ambulate with prior to hospitalization. Reports he wants to be able to walk with his walking stick again.         Objective:  Restrictions/Precautions: Contact Precautions, Fall Risk, Bed Alarm, Other (Comment) (posey alarm)  Activity Level: Up as Tolerated, Up with Assist  Required Braces or Orthoses?: No              Other Position/Activity Restrictions: LE elevation for edema control         GROSS ASSESSMENT   Patient resting in recliner at beginning of treatment        COGNITION Daily Assessment    Overall Orientation Status: Within Normal Limits   Overall Cognitive Status: WNL        BED/MAT MOBILITY Daily Assessment     Bed Mobility Comments: NT        TRANSFERS Daily Assessment    Sit to Stand: Supervision  Stand to Sit: Supervision  Stand Pivot Transfers: Supervision;Stand by assistance (with RW, SBA with tall stick)  Comment: Increased time and effort to complete with one time cue for body mechanics          GAIT Daily Assessment    Surface: Level tile  Device: Rolling Walker;Single point cane (tall walking stick)  Assistance: Contact guard assistance;Supervision (supervision with RW, CGA with walking stick)  Quality of Gait: gait training with one time cue for improved upright posture  Gait Deviations: Decreased step height;Decreased step length;Slow Gisella  Distance: 100ft x 1 with RW, 100ft x 1 with tall walking stick R hand, 100ft x 1 with tall walking stick R hand and  straight cane left hand faciltiating reciprocating gait pattern with 2 devices, 150 ft x 1 with Tall walking stick R hand  Comments: on room air, 02 sat 95% after ambulating 150 ft  More Ambulation?: No              STEPS/STAIRS Daily Assessment    Stairs?: No              BALANCE Daily Assessment    Static Sitting: Normal:  Pt. able to maintain balance w/o UE support  Dynamic Sitting: Normal - accepts maximal challenge & can shift weight easily fully in all directions  Static Standing: Good:  Pt. able to maintain balance w/o UE support;  exhibits some postural sway  Dynamic Standing: Fair - accepts minimal challenge;  can maintain balance while turning head/trunk       WHEELCHAIR MOBILITY Daily Assessment    Wheelchair Size: 22x18  Wheelchair Type: Standard  Wheelchair Cushion: Standard  Pressure Relief Type:  (sit<>Stand with RW)  Level of Assistance for Pressure Relief Activities: Supervision  Wheelchair Parts Management: No  Propulsion: No                     LOWER EXTREMITY EXERCISES   LE motomed x 10 mins at level 2     Pain level: 4 to 6 out of 10 out of 10  Pain Location:  left lateral lower rib cage area from front <> back  Pain Interventions: Medication per order, rest, positioning,relaxation, body mechanics    Vital Signs:  BP (!) 145/93   Pulse 69   Temp 97.8 °F (36.6 °C) (Oral)   Resp 20   Ht 1.905 m (6' 3\")   Wt (!) 156.5 kg (345 lb 1.6 oz)   SpO2 96%   BMI 43.13 kg/m²       Education:    Education Given To: Patient  Education Provided: Safety;Transfer Training;Energy Conservation;Fall Prevention Strategies;Precautions;Mobility Training  Education Method: Demonstration;Verbal  Barriers to Learning: None  Education Outcome: Verbalized understanding;Demonstrated understanding;Continued education needed     Interdisciplinary Communication: NA    Pt. Left in recliner call bell in reach needs in reach bed/chair alarm activated         Assessment: Improved gait posture ambulating with tall walking

## 2025-03-23 NOTE — PLAN OF CARE
Problem: Safety - Adult  Goal: Free from fall injury  Outcome: Progressing     Problem: Skin/Tissue Integrity  Goal: Skin integrity remains intact  Description: 1.  Monitor for areas of redness and/or skin breakdown  2.  Assess vascular access sites hourly  3.  Every 4-6 hours minimum:  Change oxygen saturation probe site  4.  Every 4-6 hours:  If on nasal continuous positive airway pressure, respiratory therapy assess nares and determine need for appliance change or resting period  Outcome: Progressing     Problem: Pain  Goal: Verbalizes/displays adequate comfort level or baseline comfort level  Outcome: Progressing

## 2025-03-24 LAB
ANION GAP SERPL CALC-SCNC: 11 MMOL/L (ref 7–16)
BASOPHILS # BLD: 0.05 K/UL (ref 0–0.2)
BASOPHILS NFR BLD: 0.5 % (ref 0–2)
BUN SERPL-MCNC: 34 MG/DL (ref 8–23)
CALCIUM SERPL-MCNC: 10.2 MG/DL (ref 8.8–10.2)
CHLORIDE SERPL-SCNC: 97 MMOL/L (ref 98–107)
CK SERPL-CCNC: 30 U/L (ref 21–215)
CO2 SERPL-SCNC: 28 MMOL/L (ref 20–29)
CREAT SERPL-MCNC: 0.91 MG/DL (ref 0.8–1.3)
DIFFERENTIAL METHOD BLD: ABNORMAL
EOSINOPHIL # BLD: 0.11 K/UL (ref 0–0.8)
EOSINOPHIL NFR BLD: 1.1 % (ref 0.5–7.8)
ERYTHROCYTE [DISTWIDTH] IN BLOOD BY AUTOMATED COUNT: 13.3 % (ref 11.9–14.6)
GLUCOSE SERPL-MCNC: 110 MG/DL (ref 70–99)
HCT VFR BLD AUTO: 43.3 % (ref 41.1–50.3)
HGB BLD-MCNC: 14.6 G/DL (ref 13.6–17.2)
IMM GRANULOCYTES # BLD AUTO: 0.07 K/UL (ref 0–0.5)
IMM GRANULOCYTES NFR BLD AUTO: 0.7 % (ref 0–5)
LYMPHOCYTES # BLD: 1.62 K/UL (ref 0.5–4.6)
LYMPHOCYTES NFR BLD: 16.6 % (ref 13–44)
MAGNESIUM SERPL-MCNC: 2.2 MG/DL (ref 1.8–2.4)
MCH RBC QN AUTO: 34.4 PG (ref 26.1–32.9)
MCHC RBC AUTO-ENTMCNC: 33.7 G/DL (ref 31.4–35)
MCV RBC AUTO: 102.1 FL (ref 82–102)
MONOCYTES # BLD: 0.91 K/UL (ref 0.1–1.3)
MONOCYTES NFR BLD: 9.4 % (ref 4–12)
NEUTS SEG # BLD: 6.97 K/UL (ref 1.7–8.2)
NEUTS SEG NFR BLD: 71.7 % (ref 43–78)
NRBC # BLD: 0 K/UL (ref 0–0.2)
PLATELET # BLD AUTO: 215 K/UL (ref 150–450)
PMV BLD AUTO: 10 FL (ref 9.4–12.3)
POTASSIUM SERPL-SCNC: 4.6 MMOL/L (ref 3.5–5.1)
RBC # BLD AUTO: 4.24 M/UL (ref 4.23–5.6)
SODIUM SERPL-SCNC: 136 MMOL/L (ref 136–145)
WBC # BLD AUTO: 9.7 K/UL (ref 4.3–11.1)

## 2025-03-24 PROCEDURE — 97110 THERAPEUTIC EXERCISES: CPT

## 2025-03-24 PROCEDURE — 6370000000 HC RX 637 (ALT 250 FOR IP): Performed by: STUDENT IN AN ORGANIZED HEALTH CARE EDUCATION/TRAINING PROGRAM

## 2025-03-24 PROCEDURE — 6370000000 HC RX 637 (ALT 250 FOR IP): Performed by: PHYSICAL MEDICINE & REHABILITATION

## 2025-03-24 PROCEDURE — 82550 ASSAY OF CK (CPK): CPT

## 2025-03-24 PROCEDURE — 97530 THERAPEUTIC ACTIVITIES: CPT

## 2025-03-24 PROCEDURE — 2500000003 HC RX 250 WO HCPCS: Performed by: PHYSICAL MEDICINE & REHABILITATION

## 2025-03-24 PROCEDURE — 29581 APPL MULTLAYER CMPRN SYS LEG: CPT

## 2025-03-24 PROCEDURE — 1180000000 HC REHAB R&B

## 2025-03-24 PROCEDURE — 85025 COMPLETE CBC W/AUTO DIFF WBC: CPT

## 2025-03-24 PROCEDURE — 97535 SELF CARE MNGMENT TRAINING: CPT

## 2025-03-24 PROCEDURE — 83735 ASSAY OF MAGNESIUM: CPT

## 2025-03-24 PROCEDURE — 2W1QX6Z COMPRESSION OF RIGHT LOWER LEG USING PRESSURE DRESSING: ICD-10-PCS | Performed by: PHYSICAL MEDICINE & REHABILITATION

## 2025-03-24 PROCEDURE — 97116 GAIT TRAINING THERAPY: CPT

## 2025-03-24 PROCEDURE — 99232 SBSQ HOSP IP/OBS MODERATE 35: CPT | Performed by: STUDENT IN AN ORGANIZED HEALTH CARE EDUCATION/TRAINING PROGRAM

## 2025-03-24 PROCEDURE — 2580000003 HC RX 258: Performed by: PHYSICAL MEDICINE & REHABILITATION

## 2025-03-24 PROCEDURE — 36415 COLL VENOUS BLD VENIPUNCTURE: CPT

## 2025-03-24 PROCEDURE — 80048 BASIC METABOLIC PNL TOTAL CA: CPT

## 2025-03-24 PROCEDURE — 6360000002 HC RX W HCPCS: Performed by: PHYSICAL MEDICINE & REHABILITATION

## 2025-03-24 RX ORDER — HYDROCODONE BITARTRATE AND ACETAMINOPHEN 5; 325 MG/1; MG/1
1 TABLET ORAL EVERY 6 HOURS PRN
Refills: 0 | Status: DISCONTINUED | OUTPATIENT
Start: 2025-03-24 | End: 2025-03-27 | Stop reason: HOSPADM

## 2025-03-24 RX ORDER — TRAMADOL HYDROCHLORIDE 50 MG/1
50 TABLET ORAL EVERY 6 HOURS PRN
Status: DISCONTINUED | OUTPATIENT
Start: 2025-03-24 | End: 2025-03-27 | Stop reason: HOSPADM

## 2025-03-24 RX ADMIN — HYDROCODONE BITARTRATE AND ACETAMINOPHEN 1 TABLET: 5; 325 TABLET ORAL at 16:07

## 2025-03-24 RX ADMIN — SODIUM CHLORIDE, PRESERVATIVE FREE 10 ML: 5 INJECTION INTRAVENOUS at 21:32

## 2025-03-24 RX ADMIN — FAMOTIDINE 40 MG: 20 TABLET, FILM COATED ORAL at 21:30

## 2025-03-24 RX ADMIN — FUROSEMIDE 40 MG: 40 TABLET ORAL at 09:19

## 2025-03-24 RX ADMIN — HYDROCODONE BITARTRATE AND ACETAMINOPHEN 1 TABLET: 5; 325 TABLET ORAL at 05:25

## 2025-03-24 RX ADMIN — VITAMIN D, TAB 1000IU (100/BT) 2000 UNITS: 25 TAB at 09:19

## 2025-03-24 RX ADMIN — SERTRALINE 150 MG: 100 TABLET, FILM COATED ORAL at 09:19

## 2025-03-24 RX ADMIN — SODIUM CHLORIDE, PRESERVATIVE FREE 10 ML: 5 INJECTION INTRAVENOUS at 09:19

## 2025-03-24 RX ADMIN — CYCLOBENZAPRINE 10 MG: 10 TABLET, FILM COATED ORAL at 21:31

## 2025-03-24 RX ADMIN — GABAPENTIN 400 MG: 400 CAPSULE ORAL at 14:17

## 2025-03-24 RX ADMIN — TRAMADOL HYDROCHLORIDE 50 MG: 50 TABLET, COATED ORAL at 21:29

## 2025-03-24 RX ADMIN — ACETAMINOPHEN 1000 MG: 500 TABLET, FILM COATED ORAL at 14:17

## 2025-03-24 RX ADMIN — DAPTOMYCIN 950 MG: 500 INJECTION, POWDER, LYOPHILIZED, FOR SOLUTION INTRAVENOUS at 16:09

## 2025-03-24 RX ADMIN — RIVAROXABAN 20 MG: 20 TABLET, FILM COATED ORAL at 18:12

## 2025-03-24 RX ADMIN — GABAPENTIN 400 MG: 400 CAPSULE ORAL at 21:31

## 2025-03-24 RX ADMIN — DILTIAZEM HYDROCHLORIDE 240 MG: 240 CAPSULE, EXTENDED RELEASE ORAL at 09:19

## 2025-03-24 RX ADMIN — TRAMADOL HYDROCHLORIDE 50 MG: 50 TABLET, COATED ORAL at 09:25

## 2025-03-24 RX ADMIN — FAMOTIDINE 40 MG: 20 TABLET, FILM COATED ORAL at 09:19

## 2025-03-24 RX ADMIN — ALLOPURINOL 300 MG: 300 TABLET ORAL at 09:19

## 2025-03-24 RX ADMIN — GABAPENTIN 400 MG: 400 CAPSULE ORAL at 09:19

## 2025-03-24 RX ADMIN — ACETAMINOPHEN 1000 MG: 500 TABLET, FILM COATED ORAL at 21:29

## 2025-03-24 RX ADMIN — TAMSULOSIN HYDROCHLORIDE 0.4 MG: 0.4 CAPSULE ORAL at 09:19

## 2025-03-24 ASSESSMENT — PAIN DESCRIPTION - ORIENTATION
ORIENTATION: LOWER
ORIENTATION: POSTERIOR

## 2025-03-24 ASSESSMENT — PAIN SCALES - WONG BAKER
WONGBAKER_NUMERICALRESPONSE: NO HURT
WONGBAKER_NUMERICALRESPONSE: NO HURT

## 2025-03-24 ASSESSMENT — PAIN - FUNCTIONAL ASSESSMENT
PAIN_FUNCTIONAL_ASSESSMENT: ACTIVITIES ARE NOT PREVENTED

## 2025-03-24 ASSESSMENT — PAIN DESCRIPTION - ONSET
ONSET: GRADUAL

## 2025-03-24 ASSESSMENT — PAIN SCALES - GENERAL
PAINLEVEL_OUTOF10: 0
PAINLEVEL_OUTOF10: 7
PAINLEVEL_OUTOF10: 0
PAINLEVEL_OUTOF10: 6
PAINLEVEL_OUTOF10: 0
PAINLEVEL_OUTOF10: 7

## 2025-03-24 ASSESSMENT — PAIN DESCRIPTION - PAIN TYPE
TYPE: CHRONIC PAIN

## 2025-03-24 ASSESSMENT — PAIN DESCRIPTION - FREQUENCY
FREQUENCY: INTERMITTENT

## 2025-03-24 ASSESSMENT — PAIN DESCRIPTION - DESCRIPTORS
DESCRIPTORS: SHARP;STABBING
DESCRIPTORS: ACHING;SHARP
DESCRIPTORS: SHARP;STABBING

## 2025-03-24 ASSESSMENT — PAIN DESCRIPTION - LOCATION
LOCATION: BACK

## 2025-03-24 NOTE — PROGRESS NOTES
PHYSICAL THERAPY DAILY NOTE    PT Individual Minutes  Time In: 1149  Time Out: 1221  Minutes: 32                 Total Treatment Time:  32 Minutes    Pt. Seen for: AM, Gait Training, Patient Education, Transfer Training, and Other     Subjective: Patient reporting feels OK. Reports he had a shower and his LE dressings changed. Reports his left lateral lower rib cage is still hurting and he is not sleeping well at night. Reports his low back is hurting. Reports the ice pack is helping the pain.Asking about follow up with pain management MD and spine doctor         Objective:  Restrictions/Precautions: Contact Precautions, Fall Risk, Bed Alarm, Other (Comment) (posey alarm)  Activity Level: Up as Tolerated, Up with Assist  Required Braces or Orthoses?: No              Other Position/Activity Restrictions: LE elevation for edema control         GROSS ASSESSMENT   Patient resting in recliner. Min assist to put on lower body clothing        COGNITION Daily Assessment    Overall Orientation Status: Within Normal Limits   Overall Cognitive Status: WNL        BED/MAT MOBILITY Daily Assessment     Bed Mobility Comments: NT        TRANSFERS Daily Assessment    Sit to Stand: Modified independent  Stand to Sit: Modified independent  Stand Pivot Transfers: Modified independent (with RW)  Comment: Increased time and effort to complete with one time cue for body mechanics          GAIT Daily Assessment    Surface: Level tile  Device: Rolling Walker  Assistance: Supervision  Quality of Gait: gait training with one time cue for improved upright posture and positioning of RW  Gait Deviations: Decreased step height;Decreased step length;Slow Gisella  Distance: 200ft x  2 with 5 min seated rest break between  Comments: on room air, 02 sat 95% after ambulating 200 ft  More Ambulation?: No              STEPS/STAIRS Daily Assessment    Stairs?: Yes   # Steps : 4  Stairs Height: 6\"  Rails: Bilateral  Assistance: Stand by  assistance  Comment: single step at a time leading up with RLE and down with LLE. Increased time and effort to complete with 4 min seated rest break after going up steps.   4 min seated rest break after going up steps.        BALANCE Daily Assessment    Static Sitting: Normal:  Pt. able to maintain balance w/o UE support  Dynamic Sitting: Normal - accepts maximal challenge & can shift weight easily fully in all directions  Static Standing: Good:  Pt. able to maintain balance w/o UE support;  exhibits some postural sway  Dynamic Standing: Fair - accepts minimal challenge;  can maintain balance while turning head/trunk       WHEELCHAIR MOBILITY Daily Assessment          NA                LOWER EXTREMITY EXERCISES   NA     Pain level: 3 to 5 out of 10  Pain Location:  low back and left lateral lower rib cage  Pain Interventions: Medication per order, rest, positioning,relaxation, iec pack      Vital Signs:  /65   Pulse 76   Temp 97.9 °F (36.6 °C) (Oral)   Resp 18   Ht 1.905 m (6' 3\")   Wt (!) 156.5 kg (345 lb 1.6 oz)   SpO2 96%   BMI 43.13 kg/m²   02 sat 94 to 97% during treatment. HR 77 to 94    Education:    Education Given To: Patient  Education Provided: Safety;Transfer Training;Energy Conservation;Fall Prevention Strategies;Precautions;Mobility Training  Education Method: Demonstration;Verbal  Barriers to Learning: None  Education Outcome: Verbalized understanding;Demonstrated understanding;Continued education needed     Interdisciplinary Communication: NA    Pt. Left in recliner call bell in reach needs in reach         Assessment: Progressing towards goals. Tendency towards flexed posture ambulating with RW but able to correct with cues.         Plan of Care: Continue with POC and progress as tolerated.       Tyree Sanchez, PT  3/24/2025

## 2025-03-24 NOTE — PROGRESS NOTES
[facility] OCCUPATIONAL THERAPY DAILY NOTE  OT Individual Minutes  Time In: 0928  Time Out: 1025  Minutes: 57               Subjective: Pt agreeable to treatment. \"Thank you so much\"  Pain: No pain expressed.  Interdisciplinary Communication: Collaborated with PT and RN regarding pt status and pt performance.   Precautions:  Falls, Posey Alarm, and BLE wraps   Lines:N/A  O2 Device: RA      FUNCTIONAL MOBILITY:       Bed Mobility N/A up in recliner on entry    Sit to Stand SBA and CGA    Transfer  SBA and CGA  Transfer Type: SPT  Equipment: Grab Bars and RW    Ambulation NT  Equipment: NA      ACTIVITIES OF DAILY LIVING:       Eating       Oral Hygiene Setup or clean-up assistance S/U seated   Shower/Bathe Partial/moderate assistance SBA/Min A - CGA UB/LB bathing seated and standing at TTB wtih grab bars, HHSH and LHS, sig incr time, Min A - CGA for assist completing drying BLE and safety in standing in shower   Upper Body  Dressing Supervision or touching assistance Sup-V doff/don shirt seated   Lower Body Dressing Partial/moderate assistance Min A - CGA doff pants and brief seated and standing at grab bars, don brief seated and standing at RW with incr time and RW PRN, sig incr time overall with rest breaks as needed   Donning/[place] Footwear Partial/moderate assistance Mod A doff/don socks seated, able to doff socks, assist to don, total A for removing BLE bandages (coord with woundcare to change bandages after ADL per note/communication)   Toileting Hygiene       Toilet Transfer       Education Adaptive ADL Techniques, AE/DME Training, Benefits of OT, Energy Conservation, Pacing, Functional Transfer Training, Gil Dressing Strategies, Precautions, Rolling Walker Management, and Safety Awareness     Assessment: Patient tolerated session well overall. Significantly increased time overall for ADL required. BLE open to air end of session awaiting wound care for BLE bandage change. Pt demonstrated good participation in OT

## 2025-03-24 NOTE — PROGRESS NOTES
PHYSICAL THERAPY DAILY NOTE    PT Individual Minutes  Time In: 1436  Time Out: 1537  Minutes: 61                 Total Treatment Time:  61 Minutes    Pt. Seen for: PM, Gait Training, Patient Education, Therapeutic Exercise, Transfer Training, and Other     Subjective: Patient reporting he is tired this PM but ready to exercise. Reports he would like to walk with his walking stick but does not feel as steady on his feet when using walking stick vs RW         Objective:  Restrictions/Precautions: Contact Precautions, Fall Risk, Bed Alarm, Other (Comment) (posey alarm)  Activity Level: Up as Tolerated, Up with Assist  Required Braces or Orthoses?: No              Other Position/Activity Restrictions: LE elevation for edema control         GROSS ASSESSMENT   Patient resting in recliner at beginning of treatment        COGNITION Daily Assessment    Overall Orientation Status: Within Normal Limits   Overall Cognitive Status: WNL        BED/MAT MOBILITY Daily Assessment     Bed Mobility Comments: NT        TRANSFERS Daily Assessment    Sit to Stand: Modified independent  Stand to Sit: Modified independent  Stand Pivot Transfers: Modified independent (with RW, for recliner, w/c and commode)  Comment: Increased time and effort to complete          GAIT Daily Assessment    Surface: Level tile  Device:  (tall walking stick)  Assistance: Stand by assistance  Quality of Gait: gait training with one time cue for improved upright posture with improved step length and step clearance  Gait Deviations: Decreased step height;Decreased step length;Slow Gisella  Distance: 100ft x 1  Comments: on room air, 02 sat 95% after ambulating 100ft  More Ambulation?: No              STEPS/STAIRS Daily Assessment    Stairs?: No   # Steps : 4  Stairs Height: 6\"  Rails: Bilateral  Assistance: Stand by assistance  Comment: single step at a time leading up with RLE and down with LLE. Increased time and effort to complete with 4 min seated rest break

## 2025-03-24 NOTE — PROGRESS NOTES
[place] [address]  Tel: [phone]     Physical Medicine and Rehabilitation Progress Note      Jozef Felix  Admit Date: 3/19/2025  Admit Diagnosis: Bacteremia due to methicillin resistant Staphylococcus aureus [R78.81, B95.62]    Subjective     Patient seen this morning sitting up straight in bedside chair.  Discussed back pain with patient.  Concerned about being able to continue with pain medication at the time of discharge, agreeable to pain management consult as an outpatient to follow-up with back pain, discussed possible injections for spondylolisthesis.  All questions and concerns addressed.    Objective:     Current Facility-Administered Medications   Medication Dose Route Frequency    HYDROcodone-acetaminophen (NORCO) 5-325 MG per tablet 1 tablet  1 tablet Oral Q6H PRN    traMADol (ULTRAM) tablet 50 mg  50 mg Oral Q6H PRN    magic (miracle) mouthwash  5 mL Swish & Spit 4x Daily PRN    acetaminophen (TYLENOL) tablet 1,000 mg  1,000 mg Oral 3 times per day    allopurinol (ZYLOPRIM) tablet 300 mg  300 mg Oral Daily    cyclobenzaprine (FLEXERIL) tablet 10 mg  10 mg Oral Nightly    DAPTOmycin (CUBICIN) 950 mg in sodium chloride (PF) 0.9 % 19 mL IV syringe  8 mg/kg (Adjusted) IntraVENous Daily before dinner    diclofenac sodium (VOLTAREN) 1 % gel 4 g  4 g Topical 4x Daily PRN    dilTIAZem (CARDIZEM CD) extended release capsule 240 mg  240 mg Oral Daily    famotidine (PEPCID) tablet 40 mg  40 mg Oral BID    furosemide (LASIX) tablet 40 mg  40 mg Oral Daily    gabapentin (NEURONTIN) capsule 400 mg  400 mg Oral TID    miconazole (MICOTIN) 2 % powder   Topical BID PRN    mineral oil-hydrophilic petrolatum (AQUAPHOR) ointment   Topical Once per day on Monday Thursday    ondansetron (ZOFRAN-ODT) disintegrating tablet 4 mg  4 mg Oral Q8H PRN    Or    ondansetron (ZOFRAN) injection 4 mg  4 mg IntraVENous Q6H PRN    rivaroxaban (XARELTO) tablet 20  Differential Type AUTOMATED      Neutrophils % 71.7 43.0 - 78.0 %    Lymphocytes % 16.6 13.0 - 44.0 %    Monocytes % 9.4 4.0 - 12.0 %    Eosinophils % 1.1 0.5 - 7.8 %    Basophils % 0.5 0.0 - 2.0 %    Immature Granulocytes % 0.7 0.0 - 5.0 %    Neutrophils Absolute 6.97 1.70 - 8.20 K/UL    Lymphocytes Absolute 1.62 0.50 - 4.60 K/UL    Monocytes Absolute 0.91 0.10 - 1.30 K/UL    Eosinophils Absolute 0.11 0.00 - 0.80 K/UL    Basophils Absolute 0.05 0.00 - 0.20 K/UL    Immature Granulocytes Absolute 0.07 0.0 - 0.5 K/UL   Magnesium    Collection Time: 03/24/25  5:10 AM   Result Value Ref Range    Magnesium 2.2 1.8 - 2.4 mg/dL   CK    Collection Time: 03/24/25  5:10 AM   Result Value Ref Range    Total CK 30 21 - 215 U/L       B/B  Last BM (including prior to admit): 03/23/25    Urine Assessment  Urinary Status: Voiding  Urinary Incontinence: Absent  Urine Color: Yellow/straw  Urine Appearance: Clear  Urine Odor: No odor         Functional Assessment:  Per PT:     Bed mobility  Bridging: Minimal assistance  Rolling to Left: Supervision  Rolling to Right: Supervision  Supine to Sit: Minimal assistance  Sit to Supine: Minimal assistance  Scooting: Supervision  Bed Mobility Comments: NT   Transfers  Sit to Stand: Modified independent  Stand to Sit: Modified independent  Stand Pivot Transfers: Modified independent (with RW)  Car Transfer: Unable to assess  Comment: Increased time and effort to complete with one time cue for body mechanics   Ambulation  Surface: Level tile  Device: Rolling Walker  Other Apparatus: Wheelchair follow  Assistance: Supervision  Quality of Gait: gait training with one time cue for improved upright posture and positioning of RW  Gait Deviations: Decreased step height, Decreased step length, Slow Gisella  Distance: 200ft x  2 with 5 min seated rest break between  Comments: on room air, 02 sat 95% after ambulating 200 ft  More Ambulation?: No           Stairs  # Steps : 4  Stairs Height: 6\"  Rails:

## 2025-03-24 NOTE — CONSULTS
Consult received for midline assessment. Line has brisk blood return, flushes easy, and no signs of complications. RN notified of normal findings.

## 2025-03-24 NOTE — WOUND CARE
BLE compression wraps changed, already showered this AM. Continued improvement w/ BLE; open area on R medial has epithelialized/calloused over, new open area on R lateral from Pt scrubbing hyperkeratosis.    RLE; 1x0.5x0.1cm lateral from aggressive scrubbing, scant sanguinous. Dressed open area w/ oil emulsion gauze, ABD.        LLE        L/R toes

## 2025-03-25 PROCEDURE — 2500000003 HC RX 250 WO HCPCS: Performed by: PHYSICAL MEDICINE & REHABILITATION

## 2025-03-25 PROCEDURE — 97530 THERAPEUTIC ACTIVITIES: CPT

## 2025-03-25 PROCEDURE — 97116 GAIT TRAINING THERAPY: CPT

## 2025-03-25 PROCEDURE — 6370000000 HC RX 637 (ALT 250 FOR IP): Performed by: PHYSICAL MEDICINE & REHABILITATION

## 2025-03-25 PROCEDURE — 6370000000 HC RX 637 (ALT 250 FOR IP): Performed by: STUDENT IN AN ORGANIZED HEALTH CARE EDUCATION/TRAINING PROGRAM

## 2025-03-25 PROCEDURE — 97110 THERAPEUTIC EXERCISES: CPT

## 2025-03-25 PROCEDURE — 6360000002 HC RX W HCPCS: Performed by: PHYSICAL MEDICINE & REHABILITATION

## 2025-03-25 PROCEDURE — 1180000000 HC REHAB R&B

## 2025-03-25 PROCEDURE — 2580000003 HC RX 258: Performed by: PHYSICAL MEDICINE & REHABILITATION

## 2025-03-25 PROCEDURE — 97150 GROUP THERAPEUTIC PROCEDURES: CPT

## 2025-03-25 PROCEDURE — 99233 SBSQ HOSP IP/OBS HIGH 50: CPT | Performed by: STUDENT IN AN ORGANIZED HEALTH CARE EDUCATION/TRAINING PROGRAM

## 2025-03-25 RX ADMIN — FUROSEMIDE 40 MG: 40 TABLET ORAL at 07:42

## 2025-03-25 RX ADMIN — SERTRALINE 150 MG: 100 TABLET, FILM COATED ORAL at 07:46

## 2025-03-25 RX ADMIN — RIVAROXABAN 20 MG: 20 TABLET, FILM COATED ORAL at 17:06

## 2025-03-25 RX ADMIN — HYDROCODONE BITARTRATE AND ACETAMINOPHEN 1 TABLET: 5; 325 TABLET ORAL at 00:33

## 2025-03-25 RX ADMIN — HYDROCODONE BITARTRATE AND ACETAMINOPHEN 1 TABLET: 5; 325 TABLET ORAL at 22:45

## 2025-03-25 RX ADMIN — GABAPENTIN 400 MG: 400 CAPSULE ORAL at 14:21

## 2025-03-25 RX ADMIN — ALLOPURINOL 300 MG: 300 TABLET ORAL at 07:42

## 2025-03-25 RX ADMIN — DAPTOMYCIN 950 MG: 500 INJECTION, POWDER, LYOPHILIZED, FOR SOLUTION INTRAVENOUS at 16:15

## 2025-03-25 RX ADMIN — ACETAMINOPHEN 1000 MG: 500 TABLET, FILM COATED ORAL at 05:47

## 2025-03-25 RX ADMIN — GABAPENTIN 400 MG: 400 CAPSULE ORAL at 07:42

## 2025-03-25 RX ADMIN — FAMOTIDINE 40 MG: 20 TABLET, FILM COATED ORAL at 20:12

## 2025-03-25 RX ADMIN — SODIUM CHLORIDE, PRESERVATIVE FREE 10 ML: 5 INJECTION INTRAVENOUS at 07:48

## 2025-03-25 RX ADMIN — DILTIAZEM HYDROCHLORIDE 240 MG: 240 CAPSULE, EXTENDED RELEASE ORAL at 07:42

## 2025-03-25 RX ADMIN — GABAPENTIN 400 MG: 400 CAPSULE ORAL at 20:12

## 2025-03-25 RX ADMIN — TAMSULOSIN HYDROCHLORIDE 0.4 MG: 0.4 CAPSULE ORAL at 07:42

## 2025-03-25 RX ADMIN — HYDROCODONE BITARTRATE AND ACETAMINOPHEN 1 TABLET: 5; 325 TABLET ORAL at 17:08

## 2025-03-25 RX ADMIN — VITAMIN D, TAB 1000IU (100/BT) 2000 UNITS: 25 TAB at 07:42

## 2025-03-25 RX ADMIN — FAMOTIDINE 40 MG: 20 TABLET, FILM COATED ORAL at 07:42

## 2025-03-25 RX ADMIN — ACETAMINOPHEN 1000 MG: 500 TABLET, FILM COATED ORAL at 14:31

## 2025-03-25 RX ADMIN — HYDROCODONE BITARTRATE AND ACETAMINOPHEN 1 TABLET: 5; 325 TABLET ORAL at 10:49

## 2025-03-25 RX ADMIN — SODIUM CHLORIDE, PRESERVATIVE FREE 10 ML: 5 INJECTION INTRAVENOUS at 20:14

## 2025-03-25 RX ADMIN — CYCLOBENZAPRINE 10 MG: 10 TABLET, FILM COATED ORAL at 20:13

## 2025-03-25 ASSESSMENT — PAIN SCALES - GENERAL
PAINLEVEL_OUTOF10: 7
PAINLEVEL_OUTOF10: 1
PAINLEVEL_OUTOF10: 6
PAINLEVEL_OUTOF10: 0
PAINLEVEL_OUTOF10: 7
PAINLEVEL_OUTOF10: 7
PAINLEVEL_OUTOF10: 1
PAINLEVEL_OUTOF10: 0
PAINLEVEL_OUTOF10: 0

## 2025-03-25 ASSESSMENT — PAIN DESCRIPTION - DESCRIPTORS
DESCRIPTORS: ACHING;THROBBING
DESCRIPTORS: ACHING
DESCRIPTORS: ACHING;DISCOMFORT
DESCRIPTORS: ACHING;THROBBING

## 2025-03-25 ASSESSMENT — PAIN DESCRIPTION - ONSET: ONSET: GRADUAL

## 2025-03-25 ASSESSMENT — PAIN DESCRIPTION - FREQUENCY: FREQUENCY: INTERMITTENT

## 2025-03-25 ASSESSMENT — PAIN DESCRIPTION - LOCATION
LOCATION: ABDOMEN;LEG
LOCATION: BACK

## 2025-03-25 ASSESSMENT — PAIN DESCRIPTION - ORIENTATION
ORIENTATION: RIGHT;LEFT
ORIENTATION: POSTERIOR

## 2025-03-25 ASSESSMENT — PAIN DESCRIPTION - PAIN TYPE
TYPE: CHRONIC PAIN
TYPE: CHRONIC PAIN

## 2025-03-25 ASSESSMENT — PAIN SCALES - WONG BAKER: WONGBAKER_NUMERICALRESPONSE: NO HURT

## 2025-03-25 NOTE — PROGRESS NOTES
[place] [address]  Tel: [phone]     Physical Medicine and Rehabilitation Progress Note      Jozef Felix  Admit Date: 3/19/2025  Admit Diagnosis: Bacteremia due to methicillin resistant Staphylococcus aureus [R78.81, B95.62]    Subjective     Patient seen this morning sitting up in wheelchair.  We discussed request for back brace, per PT [facility] will be here, can fit patient for back brace to help with low back pain.  We discussed follow-up with neurosurgery for possible surgical options.  Team care conference held and attended.  All questions and concerns addressed.    Objective:     Current Facility-Administered Medications   Medication Dose Route Frequency    HYDROcodone-acetaminophen (NORCO) 5-325 MG per tablet 1 tablet  1 tablet Oral Q6H PRN    traMADol (ULTRAM) tablet 50 mg  50 mg Oral Q6H PRN    magic (miracle) mouthwash  5 mL Swish & Spit 4x Daily PRN    acetaminophen (TYLENOL) tablet 1,000 mg  1,000 mg Oral 3 times per day    allopurinol (ZYLOPRIM) tablet 300 mg  300 mg Oral Daily    cyclobenzaprine (FLEXERIL) tablet 10 mg  10 mg Oral Nightly    DAPTOmycin (CUBICIN) 950 mg in sodium chloride (PF) 0.9 % 19 mL IV syringe  8 mg/kg (Adjusted) IntraVENous Daily before dinner    diclofenac sodium (VOLTAREN) 1 % gel 4 g  4 g Topical 4x Daily PRN    dilTIAZem (CARDIZEM CD) extended release capsule 240 mg  240 mg Oral Daily    famotidine (PEPCID) tablet 40 mg  40 mg Oral BID    furosemide (LASIX) tablet 40 mg  40 mg Oral Daily    gabapentin (NEURONTIN) capsule 400 mg  400 mg Oral TID    miconazole (MICOTIN) 2 % powder   Topical BID PRN    mineral oil-hydrophilic petrolatum (AQUAPHOR) ointment   Topical Once per day on Monday Thursday    ondansetron (ZOFRAN-ODT) disintegrating tablet 4 mg  4 mg Oral Q8H PRN    Or    ondansetron (ZOFRAN) injection 4 mg  4 mg IntraVENous Q6H PRN    rivaroxaban (XARELTO) tablet 20 mg  20 mg Oral  AUTOMATED      Neutrophils % 71.7 43.0 - 78.0 %    Lymphocytes % 16.6 13.0 - 44.0 %    Monocytes % 9.4 4.0 - 12.0 %    Eosinophils % 1.1 0.5 - 7.8 %    Basophils % 0.5 0.0 - 2.0 %    Immature Granulocytes % 0.7 0.0 - 5.0 %    Neutrophils Absolute 6.97 1.70 - 8.20 K/UL    Lymphocytes Absolute 1.62 0.50 - 4.60 K/UL    Monocytes Absolute 0.91 0.10 - 1.30 K/UL    Eosinophils Absolute 0.11 0.00 - 0.80 K/UL    Basophils Absolute 0.05 0.00 - 0.20 K/UL    Immature Granulocytes Absolute 0.07 0.0 - 0.5 K/UL   Magnesium    Collection Time: 03/24/25  5:10 AM   Result Value Ref Range    Magnesium 2.2 1.8 - 2.4 mg/dL   CK    Collection Time: 03/24/25  5:10 AM   Result Value Ref Range    Total CK 30 21 - 215 U/L       B/B  Last BM (including prior to admit): 03/25/25    Urine Assessment  Urinary Status: Voiding  Urinary Incontinence: Absent  Urine Color: Yellow/straw  Urine Appearance: Clear  Urine Odor: No odor         Functional Assessment:  Per PT:     Bed mobility  Bridging: Minimal assistance  Rolling to Left: Supervision  Rolling to Right: Supervision  Supine to Sit: Minimal assistance  Sit to Supine: Minimal assistance  Scooting: Supervision  Bed Mobility Comments: NT   Transfers  Sit to Stand: Modified independent  Stand to Sit: Modified independent  Stand Pivot Transfers: Modified independent (with RW, for recliner, w/c and commode)  Car Transfer: Minimal Assistance (with LEs)  Comment: Increased time and effort to complete   Ambulation  Surface: Level tile  Device: Rolling Walker  Other Apparatus: Wheelchair follow  Assistance: Supervision  Quality of Gait: gait training with one time cue for improved upright posture with improved step length and step clearance  Gait Deviations: Decreased step height, Decreased step length, Slow Gisella  Distance: 150 ft x 2  100ft x 1 with 3 to 4 min seated rest break between atempts  Comments: on room air, 02 sat 95% after ambulating 150 ft  More Ambulation?: No           Stairs  #

## 2025-03-25 NOTE — CARE COORDINATION
RN CM in room for bedside rounds. Patient sitting up in chair, alert and oriented, family Sadie present via phone. Tentative DC 3/27. Will need  RN, PT, OT.  list given. Patient with bilateral leg wounds and will need wound care via  RN and/or wound center. Will send referral to [facility] and they accepted. No home equipment needs at this time, JOSEPH ALMENDAREZ will continue to follow.

## 2025-03-25 NOTE — PROGRESS NOTES
PHYSICAL THERAPY DAILY NOTE    PT Individual Minutes  Time In: 1346  Time Out: 1432  Minutes: 46                 Total Treatment Time:  46 Minutes    Pt. Seen for: PM, Equipment Assessment, Gait Training, Patient Education, Therapeutic Exercise, Transfer Training, and Other     Subjective: Patient reporting he would like to have a back brace and would like to follow up with a spine doctor. Expressing fatigue after exercises.         Objective:  Restrictions/Precautions: Contact Precautions, Fall Risk, Bed Alarm, Other (Comment) (posey alarm)  Activity Level: Up as Tolerated, Up with Assist  Required Braces or Orthoses?: No         Other Position/Activity Restrictions: LE elevation for edema control         GROSS ASSESSMENT   Patient resting in recliner at beginning of treatment        COGNITION Daily Assessment    Overall Orientation Status: Within Normal Limits   Overall Cognitive Status: WNL        BED/MAT MOBILITY Daily Assessment     Bed Mobility Comments: NT        TRANSFERS Daily Assessment    Sit to Stand: Modified independent  Stand to Sit: Modified independent  Stand Pivot Transfers: Modified independent (with RW, for recliner, w/c and commode)  Car Transfer: Minimal Assistance (with LEs)  Comment: Increased time and effort to complete          GAIT Daily Assessment    Surface: Level tile  Device: Rolling Walker  Assistance: Supervision  Quality of Gait: gait training with one time cue for improved upright posture with improved step length and step clearance  Gait Deviations: Decreased step height;Decreased step length;Slow Gisella  Distance: 100ft x 1  Comments: on room air, 02 sat 95% after ambulating 100 ft  More Ambulation?: No              STEPS/STAIRS Daily Assessment    NT             BALANCE Daily Assessment    Static Sitting: Normal:  Pt. able to maintain balance w/o UE support  Dynamic Sitting: Normal - accepts maximal challenge & can shift weight easily fully in all directions  Static Standing:

## 2025-03-25 NOTE — PROGRESS NOTES
[facility] OCCUPATIONAL THERAPY DAILY NOTE         OT Concurrent Minutes  Time In: 1300  Time Out: 1345  Minutes: 45        Subjective: Pt agreeable to treatment. \"I can feel it\"  Pain: No pain expressed.  Interdisciplinary Communication: Collaborated with PT and RN regarding pt status, pt performance, and handoff communication.   Precautions: Falls, Posey Alarm, and BLE wraps   Lines:N/A  O2 Device: RA    FUNCTIONAL MOBILITY:       Bed Mobility NT    Sit to Stand NT    Transfer  NT  Transfer Type: NA  Equipment: NA    Ambulation NT  Equipment: NA       - Activity Tolerance - Balance - Coordination - Energy Conservation/Pacing  - Range of Motion - Strengthening   Pt completed therapeutic exercise seated to challenge BUE AROM/STR/coordination and activity tolerance in preparation for safe return to max (I) with I/ADLs. Pt exercises graded up. Pt tolerated increase and exercises well with no c/o pain.Increased seated rest break duration and frequency required secondary to fatigue.  Arm bike; low 2-5w resistance; 370 rotations; 10 min steady pace forward  Forward rows; 4 lb dowel bar; 4x15  Bicep curls; 9 lb dowel bar; 4x15  Seated chest press; 4 lb dowel bar; 4x15  Seated shoulder press; 4 lb dowel bar; 4x15  Seated anterior shoulder raise; 4 lb dowel bar; 4x15     Education   Benefits of OT, Energy Conservation, Pacing, Precautions, and Safety Awareness     Assessment: Patient tolerated session well overall. Pt demonstrated good participation in OT treatment. Pt continues to benefit from skilled OT services to address remaining deficits and progress toward baseline level of independence and safety. Patient ended session seated in gym with PT.   Plan: Continue OT POC.     DIA MELO OT   3/25/2025

## 2025-03-25 NOTE — PLAN OF CARE
Problem: Safety - Adult  Goal: Free from fall injury  3/25/2025 1221 by Mike Huston, RN  Outcome: Progressing  3/24/2025 2345 by Yulissa Chavarria, RN  Outcome: Progressing

## 2025-03-25 NOTE — PROGRESS NOTES
PHYSICAL THERAPY DAILY NOTE    PT Individual Minutes  Time In: 0919  Time Out: 1004  Minutes: 45                 Total Treatment Time:  45 Minutes    Pt. Seen for: AM, Gait Training, Patient Education, Transfer Training, and Other     Subjective: Patient reporting his legs and arms feel weak and shaky when walking longer distances. Reports he hopes he gets to stay in rehab until the end of the week.          Objective:  Restrictions/Precautions: Contact Precautions, Fall Risk, Bed Alarm, Other (Comment) (posey alarm)  Activity Level: Up as Tolerated, Up with Assist  Required Braces or Orthoses?: No              Other Position/Activity Restrictions: LE elevation for edema control         GROSS ASSESSMENT   Patient resting in recliner at beginning of treatment.        COGNITION Daily Assessment    Overall Orientation Status: Within Normal Limits   Overall Cognitive Status: WNL        BED/MAT MOBILITY Daily Assessment     Bed Mobility Comments: NT        TRANSFERS Daily Assessment    Sit to Stand: Modified independent  Stand to Sit: Modified independent  Stand Pivot Transfers: Modified independent (with RW, for recliner, w/c and commode)  Car Transfer: Minimal Assistance (with LEs)  Comment: Increased time and effort to complete          GAIT Daily Assessment    Surface: Level tile  Device: Rolling Walker  Assistance: Supervision  Quality of Gait: gait training with one time cue for improved upright posture with improved step length and step clearance  Gait Deviations: Decreased step height;Decreased step length;Slow Gisella  Distance: 150 ft x 2  100ft x 1 with 3 to 4 min seated rest break between atempts  Comments: on room air, 02 sat 95% after ambulating 150 ft  More Ambulation?: No              STEPS/STAIRS Daily Assessment    Stairs?: Yes   # Steps : 4  Stairs Height: 6\"  Rails: Bilateral  Assistance: Supervision  Comment: single step at a time leading up with RLE and down with LLE. Increased time and effort to

## 2025-03-25 NOTE — PROGRESS NOTES
[facility] OCCUPATIONAL THERAPY DAILY NOTE           OT Concurrent Minutes  Time In: 1430  Time Out: 1515  Minutes: 45       Subjective: Pt agreeable to treatment. \"I'd like to get in the chair over there\"  Pain: No pain expressed.  Interdisciplinary Communication: Collaborated with PT and RN regarding pt status, pt performance, and handoff communication.   Precautions: Falls, Posey Alarm, and BLE wraps   Lines:N/A  O2 Device: RA    FUNCTIONAL MOBILITY:       Bed Mobility NT    Sit to Stand SBA    Transfer  SBA and CGA  Transfer Type: SPT  Equipment: RW    Ambulation SBA and CGA  Equipment: RW       - Activity Tolerance - Cognition - Coordination - Energy Conservation/Pacing  - Range of Motion - Strengthening   Pt completed therapeutic activities to challenge  BUE AROM/STR, B/L hand(s) dexterity/pinch, FM/GM coordination, bi-manual coordination, activity tolerance, complex problem solving, and STM recall in preparation for safe return to max (I) with I/ADLs. Pt activities graded up. Pt tolerated increase and exercises well with no c/o pain.. Rest breaks utilized as needed throughout..   Pt completed object retrieval activity seated at table.Pt used B/L hand(s) to spread, stretch and flatten putty. Pt located and retrieved small red beads and pennies with B/L hand(s) and placed into container at midline. Pt graded up to Green, medium-hard, theraputty with 18 small red beads and 10 pennies. Once completed pt flattened out putty with B/L hands and rolling pin. Pt then retrieved beads with B/L hands and pressed into putty. Pt required significantly increased time, 5+ drops noted and 1-3 verbal cues for directions.   Pt completed home-management board activity seated at table. Pt completed Lock(s) activity; pt retrieved key ring from  using BUE and completed 9/9 locks on board. Pt completed with minimally increased time, >5 minutes, and no verbal cues required.     Education   Adaptive ADL Techniques, AE/DME Training,

## 2025-03-25 NOTE — PLAN OF CARE
Problem: Safety - Adult  Goal: Free from fall injury  3/24/2025 2345 by Yulissa Chavarria, RN  Outcome: Progressing  3/24/2025 1018 by Ana Rosa Robb, RN  Outcome: Progressing

## 2025-03-25 NOTE — PATIENT CARE CONFERENCE
[facility] [place] [place]  INPATIENT REHABILITATION  TEAM CONFERENCE NOTE    Conference Date: 3/25/2025  Admit Date: 3/19/2025  4:49 PM  Patient Name: Jozef Felix    MRN: [id number]    : 1965 (59 y.o.)  Rehabilitation Admitting Diagnosis: Bacteremia due to methicillin resistant Staphylococcus aureus [R78.81, B95.62]           Team Members Present at Conference:  : Lindsey Delgadillo CM  Nurse:Mike Huston RN  Occupational Therapist:Dano Dunbar OTR/L  Physical Therapist: Tyree Sanchez PT  Speech Therapist: ZAIRA    Patient and wife Sadie (on phone) present for conference  ---------------------------------------------------------------------------------------------------    Case Management:  Patient's PLOF: Independent with IADLs, Independent with ADLs, and Independent with mobility  Patient's Prior Living Situation: Lives with spouse/significant other  Baseline Supervision/Assistance: None  Current issues/needs regarding patient and family discharge status: no barriers    ---------------------------------------------------------------------------------------------------    Functional Assessment:  Most Recent Mobility Scores Per Physical Therapy:   Bed/Mat Mobility Bed Mobility Comments: NT   Transfers Sit to Stand: Modified independent  Stand to Sit: Modified independent  Stand Pivot Transfers: Modified independent (with RW, for recliner, w/c and commode)  Comment: Increased time and effort to complete    Gait Surface: Level tile  Device:  (tall walking stick)  Assistance: Stand by assistance  Quality of Gait: gait training with one time cue for improved upright posture with improved step length and step clearance  Gait Deviations: Decreased step height;Decreased step length;Slow Gisella  Distance: 100ft x 1  Comments: on room air, 02 sat 95% after ambulating 100ft  More Ambulation?: No          Steps/Stairs Stairs?: No   # Steps : 4  Stairs Height: 6\"  Rails:  Depth (cm) 0.1 cm --   Wound Surface Area (cm^2) 1290 cm^2 --   Wound Volume (cm^3) 129 cm^3 --   Wound Assessment Denuded;Slough --   Drainage Amount Large (50-75% saturated) --   Drainage Description Serous --   Odor Moderate --   Ariadne-wound Assessment Denuded;Edematous --   Margins Attached edges --   Wound Thickness Description not for Pressure Injury Full thickness --       Active Orders   Date Order Priority Status Authorizing Provider   03/24/25 1041 Wound care Routine Active Heena Bauer DO       Wound 01/20/25 Toe (Comment  which one) Right Right Toes / Foot -web spaces (Active)   Date First Assessed/Time First Assessed: 01/20/25 1638   Wound Approximate Age at First Assessment (Weeks): 3 weeks  Primary Wound Type: Venous Ulcer  Location: Toe (Comment  which one)  Wound Location Orientation: Right  Wound Description (Comments):...      Assessments 1/20/2025  3:01 PM 3/24/2025  8:43 PM   Wound Image      Wound Etiology Venous --   Dressing Status New drainage noted Clean;Dry;Intact   Wound Cleansed Soap and water;Vashe --   Dressing/Treatment Alginate with Ag --   Wound Length (cm) 11 cm --   Wound Width (cm) 11.5 cm --   Wound Depth (cm) 0.1 cm --   Wound Surface Area (cm^2) 126.5 cm^2 --   Wound Volume (cm^3) 12.65 cm^3 --   Wound Assessment Pink/red --   Drainage Amount Large (50-75% saturated) --   Drainage Description Serous;Yellow --   Odor Malodorous/putrid --   Ariadne-wound Assessment Maceration;Edematous --   Wound Thickness Description not for Pressure Injury Full thickness --       Active Orders   Date Order Priority Status Authorizing Provider   03/24/25 1041 Wound care Routine Active Heena Bauer DO       Wound 01/20/25 Toe (Comment  which one) Left LEFT toes web spaces / foot (Active)   Date First Assessed/Time First Assessed: 01/20/25 1639   Wound Approximate Age at First Assessment (Weeks): 3 weeks  Primary Wound Type: Venous Ulcer  Location: Toe (Comment  which one)  Wound

## 2025-03-25 NOTE — PROGRESS NOTES
Occupational Therapy Note:    Attempted to see patient this AM for occupational therapy treatment  session. Patient seated in recliner on phone call. Pt aware of therapy session, pt requesting \"10 minutes\" for phone call. Upon re-entry pt on different phone call in menu section. Session deferred until later time. Will follow and re-attempt as schedule permits/patient available. Thank you,    DIA MELO, OT

## 2025-03-26 LAB
BASOPHILS # BLD: 0.04 K/UL (ref 0–0.2)
BASOPHILS NFR BLD: 0.5 % (ref 0–2)
DIFFERENTIAL METHOD BLD: ABNORMAL
EOSINOPHIL # BLD: 0.12 K/UL (ref 0–0.8)
EOSINOPHIL NFR BLD: 1.5 % (ref 0.5–7.8)
ERYTHROCYTE [DISTWIDTH] IN BLOOD BY AUTOMATED COUNT: 13.4 % (ref 11.9–14.6)
HCT VFR BLD AUTO: 42.3 % (ref 41.1–50.3)
HGB BLD-MCNC: 14.8 G/DL (ref 13.6–17.2)
IMM GRANULOCYTES # BLD AUTO: 0.03 K/UL (ref 0–0.5)
IMM GRANULOCYTES NFR BLD AUTO: 0.4 % (ref 0–5)
LYMPHOCYTES # BLD: 1.41 K/UL (ref 0.5–4.6)
LYMPHOCYTES NFR BLD: 18 % (ref 13–44)
MCH RBC QN AUTO: 35.3 PG (ref 26.1–32.9)
MCHC RBC AUTO-ENTMCNC: 35 G/DL (ref 31.4–35)
MCV RBC AUTO: 101 FL (ref 82–102)
MONOCYTES # BLD: 0.99 K/UL (ref 0.1–1.3)
MONOCYTES NFR BLD: 12.6 % (ref 4–12)
NEUTS SEG # BLD: 5.25 K/UL (ref 1.7–8.2)
NEUTS SEG NFR BLD: 67 % (ref 43–78)
NRBC # BLD: 0 K/UL (ref 0–0.2)
PLATELET # BLD AUTO: 182 K/UL (ref 150–450)
PMV BLD AUTO: 10 FL (ref 9.4–12.3)
RBC # BLD AUTO: 4.19 M/UL (ref 4.23–5.6)
WBC # BLD AUTO: 7.8 K/UL (ref 4.3–11.1)

## 2025-03-26 PROCEDURE — 97116 GAIT TRAINING THERAPY: CPT

## 2025-03-26 PROCEDURE — 97110 THERAPEUTIC EXERCISES: CPT

## 2025-03-26 PROCEDURE — 6370000000 HC RX 637 (ALT 250 FOR IP): Performed by: STUDENT IN AN ORGANIZED HEALTH CARE EDUCATION/TRAINING PROGRAM

## 2025-03-26 PROCEDURE — 36415 COLL VENOUS BLD VENIPUNCTURE: CPT

## 2025-03-26 PROCEDURE — 2580000003 HC RX 258: Performed by: PHYSICAL MEDICINE & REHABILITATION

## 2025-03-26 PROCEDURE — 6370000000 HC RX 637 (ALT 250 FOR IP): Performed by: PHYSICAL MEDICINE & REHABILITATION

## 2025-03-26 PROCEDURE — 1180000000 HC REHAB R&B

## 2025-03-26 PROCEDURE — 6360000002 HC RX W HCPCS: Performed by: PHYSICAL MEDICINE & REHABILITATION

## 2025-03-26 PROCEDURE — 97530 THERAPEUTIC ACTIVITIES: CPT

## 2025-03-26 PROCEDURE — 85025 COMPLETE CBC W/AUTO DIFF WBC: CPT

## 2025-03-26 PROCEDURE — 99232 SBSQ HOSP IP/OBS MODERATE 35: CPT | Performed by: STUDENT IN AN ORGANIZED HEALTH CARE EDUCATION/TRAINING PROGRAM

## 2025-03-26 RX ADMIN — GABAPENTIN 400 MG: 400 CAPSULE ORAL at 21:30

## 2025-03-26 RX ADMIN — FAMOTIDINE 40 MG: 20 TABLET, FILM COATED ORAL at 21:30

## 2025-03-26 RX ADMIN — CYCLOBENZAPRINE 10 MG: 10 TABLET, FILM COATED ORAL at 21:31

## 2025-03-26 RX ADMIN — HYDROCODONE BITARTRATE AND ACETAMINOPHEN 1 TABLET: 5; 325 TABLET ORAL at 14:53

## 2025-03-26 RX ADMIN — TAMSULOSIN HYDROCHLORIDE 0.4 MG: 0.4 CAPSULE ORAL at 07:34

## 2025-03-26 RX ADMIN — ACETAMINOPHEN 1000 MG: 500 TABLET, FILM COATED ORAL at 14:28

## 2025-03-26 RX ADMIN — DILTIAZEM HYDROCHLORIDE 240 MG: 240 CAPSULE, EXTENDED RELEASE ORAL at 07:34

## 2025-03-26 RX ADMIN — DAPTOMYCIN 950 MG: 500 INJECTION, POWDER, LYOPHILIZED, FOR SOLUTION INTRAVENOUS at 15:33

## 2025-03-26 RX ADMIN — FUROSEMIDE 40 MG: 40 TABLET ORAL at 07:34

## 2025-03-26 RX ADMIN — ACETAMINOPHEN 1000 MG: 500 TABLET, FILM COATED ORAL at 04:37

## 2025-03-26 RX ADMIN — GABAPENTIN 400 MG: 400 CAPSULE ORAL at 14:29

## 2025-03-26 RX ADMIN — ALLOPURINOL 300 MG: 300 TABLET ORAL at 07:35

## 2025-03-26 RX ADMIN — GABAPENTIN 400 MG: 400 CAPSULE ORAL at 07:35

## 2025-03-26 RX ADMIN — RIVAROXABAN 20 MG: 20 TABLET, FILM COATED ORAL at 17:24

## 2025-03-26 RX ADMIN — FAMOTIDINE 40 MG: 20 TABLET, FILM COATED ORAL at 07:34

## 2025-03-26 RX ADMIN — VITAMIN D, TAB 1000IU (100/BT) 2000 UNITS: 25 TAB at 07:34

## 2025-03-26 RX ADMIN — HYDROCODONE BITARTRATE AND ACETAMINOPHEN 1 TABLET: 5; 325 TABLET ORAL at 21:31

## 2025-03-26 RX ADMIN — SERTRALINE 150 MG: 100 TABLET, FILM COATED ORAL at 07:34

## 2025-03-26 RX ADMIN — HYDROCODONE BITARTRATE AND ACETAMINOPHEN 1 TABLET: 5; 325 TABLET ORAL at 04:37

## 2025-03-26 RX ADMIN — ACETAMINOPHEN 1000 MG: 500 TABLET, FILM COATED ORAL at 21:30

## 2025-03-26 ASSESSMENT — PAIN DESCRIPTION - LOCATION
LOCATION: BACK
LOCATION: BACK
LOCATION: LEG

## 2025-03-26 ASSESSMENT — PAIN DESCRIPTION - ORIENTATION
ORIENTATION: RIGHT;LEFT
ORIENTATION: POSTERIOR

## 2025-03-26 ASSESSMENT — PAIN DESCRIPTION - ONSET
ONSET: GRADUAL
ONSET: GRADUAL

## 2025-03-26 ASSESSMENT — PAIN SCALES - GENERAL
PAINLEVEL_OUTOF10: 3
PAINLEVEL_OUTOF10: 7
PAINLEVEL_OUTOF10: 7
PAINLEVEL_OUTOF10: 0
PAINLEVEL_OUTOF10: 0
PAINLEVEL_OUTOF10: 6

## 2025-03-26 ASSESSMENT — PAIN DESCRIPTION - FREQUENCY
FREQUENCY: INTERMITTENT
FREQUENCY: INTERMITTENT

## 2025-03-26 ASSESSMENT — PAIN DESCRIPTION - DESCRIPTORS
DESCRIPTORS: ACHING
DESCRIPTORS: ACHING
DESCRIPTORS: ACHING;THROBBING

## 2025-03-26 ASSESSMENT — PAIN DESCRIPTION - PAIN TYPE
TYPE: CHRONIC PAIN
TYPE: CHRONIC PAIN

## 2025-03-26 ASSESSMENT — PAIN - FUNCTIONAL ASSESSMENT: PAIN_FUNCTIONAL_ASSESSMENT: PREVENTS OR INTERFERES SOME ACTIVE ACTIVITIES AND ADLS

## 2025-03-26 NOTE — PROGRESS NOTES
Occupational Therapy Note:    Attempted to see patient this AM for occupational therapy treatment  session. Patient seated in room, pt declining therapy at present time stating spouse had just been admitted to ER and was on phone with spouse/family. Staff aware, CM notified and aware, CM advised would discuss with pt. Will follow and re-attempt as schedule permits/patient available.   Thank you,    DIA MELO, OT

## 2025-03-26 NOTE — PROGRESS NOTES
Stays in chair.  Assist to stand at times.  Slept couple hours.  Eating/drinking when not sleeping.  Po pain med x 2.  Bilateral leg compression wraps in place.  Patient has continuous requests for ADL needs and verbalizes being leary about going home, that he gets better care here.  Chair alarm on, call light in reach.

## 2025-03-26 NOTE — PROGRESS NOTES
[place] [address]  Tel: [phone]     Physical Medicine and Rehabilitation Progress Note      Jozef Felix  Admit Date: 3/19/2025  Admit Diagnosis: Bacteremia due to methicillin resistant Staphylococcus aureus [R78.81, B95.62]    Subjective     Patient seen today sitting up in reclining chair. Patient states he is distraught because his wife is downstairs in the ED with chest pain. We discussed his AM labs. Urology follow up at discharge. All questions and concerns addressed.    Objective:     Current Facility-Administered Medications   Medication Dose Route Frequency    HYDROcodone-acetaminophen (NORCO) 5-325 MG per tablet 1 tablet  1 tablet Oral Q6H PRN    traMADol (ULTRAM) tablet 50 mg  50 mg Oral Q6H PRN    magic (miracle) mouthwash  5 mL Swish & Spit 4x Daily PRN    acetaminophen (TYLENOL) tablet 1,000 mg  1,000 mg Oral 3 times per day    allopurinol (ZYLOPRIM) tablet 300 mg  300 mg Oral Daily    cyclobenzaprine (FLEXERIL) tablet 10 mg  10 mg Oral Nightly    DAPTOmycin (CUBICIN) 950 mg in sodium chloride (PF) 0.9 % 19 mL IV syringe  8 mg/kg (Adjusted) IntraVENous Daily before dinner    diclofenac sodium (VOLTAREN) 1 % gel 4 g  4 g Topical 4x Daily PRN    dilTIAZem (CARDIZEM CD) extended release capsule 240 mg  240 mg Oral Daily    famotidine (PEPCID) tablet 40 mg  40 mg Oral BID    furosemide (LASIX) tablet 40 mg  40 mg Oral Daily    gabapentin (NEURONTIN) capsule 400 mg  400 mg Oral TID    miconazole (MICOTIN) 2 % powder   Topical BID PRN    mineral oil-hydrophilic petrolatum (AQUAPHOR) ointment   Topical Once per day on Monday Thursday    ondansetron (ZOFRAN-ODT) disintegrating tablet 4 mg  4 mg Oral Q8H PRN    Or    ondansetron (ZOFRAN) injection 4 mg  4 mg IntraVENous Q6H PRN    rivaroxaban (XARELTO) tablet 20 mg  20 mg Oral Daily    sertraline (ZOLOFT) tablet 150 mg  150 mg Oral Daily    sodium chloride flush 0.9 % injection 5-40

## 2025-03-26 NOTE — PROGRESS NOTES
[facility] OCCUPATIONAL THERAPY DAILY NOTE  OT Individual Minutes  Time In: 1320  Time Out: 1345  Minutes: 25               Subjective: Pt agreeable to treatment. \"I did an extra set\"  Pain: No pain expressed.  Interdisciplinary Communication: Collaborated with Physician, PT, and RN regarding pt status, pt performance, and handoff communication.   Precautions: Falls, Posey Alarm, and BLE wraps   Lines:N/A  O2 Device: RA    FUNCTIONAL MOBILITY:       Bed Mobility NT    Sit to Stand Supervision    Transfer  Supervision  Transfer Type: SPT  Equipment: RW    Ambulation Supervision  Equipment: RW       - Activity Tolerance - Balance - Coordination - Energy Conservation/Pacing  - Range of Motion - Strengthening   Pt completed therapeutic exercise seated to challenge BUE AROM/STR/coordination and activity tolerance in preparation for safe return to max (I) with I/ADLs. Pt exercises graded up. Pt tolerated increase and exercises well with no c/o pain.Increased seated rest break duration and frequency required secondary to fatigue.  Forward rows; 5 lb dowel bar; 5x15  Bicep curls; 10 lb dowel bar; 5x15  Seated chest press; 5 lb dowel bar; 5x15     Education   Adaptive ADL Techniques, AE/DME Training, Benefits of OT, Energy Conservation, Pacing, Functional Transfer Training, Precautions, Rolling Walker Management, and Safety Awareness     Assessment: Patient tolerated session well overall. On entry pt completing toileting and exiting bathroom. Pt verbalized continued anxiety regarding spouse in [facility] ED. Empathetic listening and compassionate discussion, pt spouse presented to floor s/p d/c from ED. Pt requested time to visit with spouse, return at ~1320 after pt visited with spouse. MD presented during session for daily assessment. Pt demonstrated good participation in OT treatment. Pt continues to benefit from skilled OT services to address remaining deficits and progress toward baseline level of independence and safety. Patient

## 2025-03-26 NOTE — PROGRESS NOTES
PHYSICAL THERAPY DAILY NOTE    PT Individual Minutes  Time In: 0916  Time Out: 1002  Minutes: 46                 Total Treatment Time:  46 Minutes    Pt. Seen for: AM, Gait Training, Patient Education, Transfer Training, and Other     Subjective: Patient reporting he feels a little better today. Reports he enjoys doing the rehab and wishes he could stay longer.         Objective:  Restrictions/Precautions: Contact Precautions, Fall Risk, Bed Alarm, Other (Comment) (posey alarm)  Activity Level: Up as Tolerated, Up with Assist  Required Braces or Orthoses?: No              Other Position/Activity Restrictions: LE elevation for edema control         GROSS ASSESSMENT   Patient resting in recliner at beginning of treatment.        COGNITION Daily Assessment    Overall Orientation Status: Within Normal Limits   Overall Cognitive Status: WNL        BED/MAT MOBILITY Daily Assessment     Bed Mobility Comments: NT        TRANSFERS Daily Assessment    Sit to Stand: Modified independent  Stand to Sit: Modified independent  Stand Pivot Transfers: Modified independent (with RW, for recliner, w/c and commode)  Comment: Increased time and effort to complete          GAIT Daily Assessment    Surface: Level tile  Device: Rolling Walker  Assistance: Modified Independent  Quality of Gait: gait training with one time cue for improved upright posture with improved step length and step clearance  Gait Deviations: Decreased step height;Decreased step length;Slow Gisella  Distance: 300ft x 1, 150 ft x 2, 3 to 4 min seated rest breaks between attempts.   Comments: on room air, 02 sat 96% after ambulating 300 ft  More Ambulation?: Yes   Ambulation 2  Surface - 2: ramp  Device 2: Rolling Walker  Assistance 2: Modified Independent  Quality of Gait 2: gait training with one time cue for improved upright posture with improved step length and step clearance  Gait Deviations: Decreased step height;Decreased step length;Slow Gisella  Distance:

## 2025-03-26 NOTE — PROGRESS NOTES
[facility] OCCUPATIONAL THERAPY DAILY NOTE  OT Individual Minutes  Time In: 1430  Time Out: 1517  Minutes: 47               Subjective: Pt agreeable to treatment. \"I'm ready to get at it\"  Pain: No pain expressed.  Interdisciplinary Communication: Collaborated with PT and RN regarding pt status, pt performance, and handoff communication.   Precautions: Falls, Posey Alarm, and BLE wraps   Lines:N/A  O2 Device: RA    FUNCTIONAL MOBILITY:       Bed Mobility NT    Sit to Stand NT    Transfer  NT  Transfer Type: NA  Equipment: NA    Ambulation NT  Equipment: NA       - Activity Tolerance - Balance - Coordination - Energy Conservation/Pacing  - Range of Motion - Strengthening   Pt completed therapeutic exercise seated to challenge BUE AROM/STR/coordination and activity tolerance in preparation for safe return to max (I) with I/ADLs. Pt exercises graded up. Pt tolerated increase and exercises well with no c/o pain.Increased seated rest break duration and frequency required secondary to fatigue.  Arm bike; low-mid 2-5w resistance; 394 rotations; 10 min steady pace forward  Seated shoulder press; 5 lb dowel bar; 4x15  Seated anterior shoulder raise; 4 lb dowel bar; 4x15      - Activity Tolerance - Coordination - Energy Conservation/Pacing  - Range of Motion - Strengthening   Pt completed therapeutic activities to challenge  BUE AROM/STR, FM/GM coordination, FM coordination/dexterity, bi-manual coordination, and activity tolerance in preparation for safe return to max (I) with I/ADLs. Pt tolerated activities well with no c/o pain. . Rest breaks utilized as needed throughout..   Pt completed laundry management activity seated at table. Pt retrieved various laundry articles from pile on R using RUE. Pt then folded items using BUE and sorted by article on L with LUE. Pt completed 28 articles [washcloth(s), towel(s), sock(s), pant(s)/short(s), and shirt(s) and pillow cases]. Pt required no verbal cues required.     Education   Benefits

## 2025-03-27 VITALS
SYSTOLIC BLOOD PRESSURE: 120 MMHG | RESPIRATION RATE: 18 BRPM | HEART RATE: 84 BPM | BODY MASS INDEX: 39.17 KG/M2 | TEMPERATURE: 97.3 F | HEIGHT: 75 IN | OXYGEN SATURATION: 95 % | DIASTOLIC BLOOD PRESSURE: 74 MMHG | WEIGHT: 315 LBS

## 2025-03-27 PROCEDURE — 6370000000 HC RX 637 (ALT 250 FOR IP): Performed by: PHYSICAL MEDICINE & REHABILITATION

## 2025-03-27 PROCEDURE — 2W1QX6Z COMPRESSION OF RIGHT LOWER LEG USING PRESSURE DRESSING: ICD-10-PCS | Performed by: PHYSICAL MEDICINE & REHABILITATION

## 2025-03-27 PROCEDURE — 97530 THERAPEUTIC ACTIVITIES: CPT

## 2025-03-27 PROCEDURE — 97116 GAIT TRAINING THERAPY: CPT

## 2025-03-27 PROCEDURE — 97535 SELF CARE MNGMENT TRAINING: CPT

## 2025-03-27 PROCEDURE — 99239 HOSP IP/OBS DSCHRG MGMT >30: CPT | Performed by: STUDENT IN AN ORGANIZED HEALTH CARE EDUCATION/TRAINING PROGRAM

## 2025-03-27 PROCEDURE — 97110 THERAPEUTIC EXERCISES: CPT

## 2025-03-27 PROCEDURE — 6370000000 HC RX 637 (ALT 250 FOR IP): Performed by: STUDENT IN AN ORGANIZED HEALTH CARE EDUCATION/TRAINING PROGRAM

## 2025-03-27 PROCEDURE — 29581 APPL MULTLAYER CMPRN SYS LEG: CPT

## 2025-03-27 RX ORDER — CHOLECALCIFEROL (VITAMIN D3) 50 MCG
2000 TABLET ORAL DAILY
Qty: 60 TABLET | Refills: 0 | Status: SHIPPED | OUTPATIENT
Start: 2025-03-28

## 2025-03-27 RX ORDER — ONDANSETRON 4 MG/1
4 TABLET, ORALLY DISINTEGRATING ORAL 3 TIMES DAILY PRN
Qty: 21 TABLET | Refills: 0 | Status: SHIPPED | OUTPATIENT
Start: 2025-03-27

## 2025-03-27 RX ORDER — GABAPENTIN 400 MG/1
400 CAPSULE ORAL 3 TIMES DAILY
Qty: 90 CAPSULE | Refills: 0 | Status: SHIPPED | OUTPATIENT
Start: 2025-03-27 | End: 2025-04-26

## 2025-03-27 RX ORDER — HYDROCODONE BITARTRATE AND ACETAMINOPHEN 5; 325 MG/1; MG/1
1 TABLET ORAL EVERY 4 HOURS PRN
Qty: 18 TABLET | Refills: 0 | Status: SHIPPED | OUTPATIENT
Start: 2025-03-27 | End: 2025-03-30

## 2025-03-27 RX ADMIN — TRAMADOL HYDROCHLORIDE 50 MG: 50 TABLET, COATED ORAL at 02:15

## 2025-03-27 RX ADMIN — DILTIAZEM HYDROCHLORIDE 240 MG: 240 CAPSULE, EXTENDED RELEASE ORAL at 07:55

## 2025-03-27 RX ADMIN — HYDROCODONE BITARTRATE AND ACETAMINOPHEN 1 TABLET: 5; 325 TABLET ORAL at 10:44

## 2025-03-27 RX ADMIN — ALLOPURINOL 300 MG: 300 TABLET ORAL at 07:56

## 2025-03-27 RX ADMIN — GABAPENTIN 400 MG: 400 CAPSULE ORAL at 14:13

## 2025-03-27 RX ADMIN — ACETAMINOPHEN 1000 MG: 500 TABLET, FILM COATED ORAL at 06:07

## 2025-03-27 RX ADMIN — FUROSEMIDE 40 MG: 40 TABLET ORAL at 07:55

## 2025-03-27 RX ADMIN — GABAPENTIN 400 MG: 400 CAPSULE ORAL at 07:55

## 2025-03-27 RX ADMIN — SERTRALINE 150 MG: 100 TABLET, FILM COATED ORAL at 07:55

## 2025-03-27 RX ADMIN — VITAMIN D, TAB 1000IU (100/BT) 2000 UNITS: 25 TAB at 07:55

## 2025-03-27 RX ADMIN — TAMSULOSIN HYDROCHLORIDE 0.4 MG: 0.4 CAPSULE ORAL at 07:55

## 2025-03-27 RX ADMIN — FAMOTIDINE 40 MG: 20 TABLET, FILM COATED ORAL at 07:55

## 2025-03-27 RX ADMIN — ACETAMINOPHEN 1000 MG: 500 TABLET, FILM COATED ORAL at 15:52

## 2025-03-27 ASSESSMENT — PAIN SCALES - GENERAL
PAINLEVEL_OUTOF10: 0
PAINLEVEL_OUTOF10: 7
PAINLEVEL_OUTOF10: 0
PAINLEVEL_OUTOF10: 8

## 2025-03-27 ASSESSMENT — PAIN DESCRIPTION - LOCATION
LOCATION: BACK
LOCATION: BACK

## 2025-03-27 ASSESSMENT — PAIN DESCRIPTION - DESCRIPTORS
DESCRIPTORS: ACHING
DESCRIPTORS: ACHING;THROBBING

## 2025-03-27 ASSESSMENT — PAIN DESCRIPTION - ORIENTATION: ORIENTATION: POSTERIOR

## 2025-03-27 NOTE — WOUND CARE
BLE compression wraps changed, already showered this AM. Pt w/ expected DC today. Informed Pt to follow up OP Wound clinic.    LLE          RLE          L/R toes

## 2025-03-27 NOTE — PLAN OF CARE
Problem: Safety - Adult  Goal: Free from fall injury  3/27/2025 0733 by Mike Huston, RN  Outcome: Progressing  3/27/2025 0054 by Yulissa Chavarria, RN  Outcome: Progressing

## 2025-03-27 NOTE — DISCHARGE SUMMARY
[facility]  Inpatient Rehab Program      PHYSICAL MEDICINE & REHABILITATION DISCHARGE SUMMARY     Date: 3/27/2025  Admission Date: 3/19/2025  Discharge Date: 3/27/2025    Primary Care Provider: Anthony Aleman MD       Admitting Diagnosis:   Bacteremia due to methicillin resistant Staphylococcus aureus [R78.81, B95.62]    Principal Problem:    Bacteremia due to methicillin resistant Staphylococcus aureus  Active Problems:    Obesity, morbid    Hypertension    Chronic atrial fibrillation (HCC)  Resolved Problems:    * No resolved hospital problems. *      Past Medical History:   Diagnosis Date    Anxiety     Arrhythmia     a-fib    Cellulitis of leg 11/17/2019    Erectile dysfunction 1/20/2014    EHSAN (generalized anxiety disorder) 5/7/2014    GERD (gastroesophageal reflux disease)     Gout 1/20/2014    History of peptic ulcer     Hypertension     Nausea & vomiting     Obesity, morbid 2/26/2018    Osteoarthritis of hand, primary localized 1/20/2014    Personal history of urinary calculi     x2    Venous (peripheral) insufficiency 12/3/2019         PCP FOLLOW-UP:   Ensure completion of follow-up appointments with urology, neurosurgery, pain management, wound care    Transition of care:  -PCP to review new medications below. PCP to determine whether or not to renew or discontinue these medications at outpatient follow-up appointment  -PCP to assess whether or not patient requires ongoing home health care services, or whether or not patient would benefit from outpatient therapy after completing home health care services after 2-4 weeks.  Renewal of services to be prescribed by PCP.        Discharged Condition: stable      Principal Problem:    Bacteremia due to methicillin resistant Staphylococcus aureus  Active Problems:    Obesity, morbid    Hypertension    Chronic atrial fibrillation (HCC)  Resolved Problems:    * No resolved hospital problems. *          HPI:  \"    (Copied from  mouth 3 times daily as needed for Nausea or Vomiting  What changed: Another medication with the same name was removed. Continue taking this medication, and follow the directions you see here.            CONTINUE taking these medications      acetaminophen 500 MG tablet  Commonly known as: TYLENOL     allopurinol 300 MG tablet  Commonly known as: ZYLOPRIM     cyclobenzaprine 5 MG tablet  Commonly known as: FLEXERIL     dilTIAZem 240 MG extended release capsule  Commonly known as: CARDIZEM CD     famotidine 40 MG tablet  Commonly known as: PEPCID     furosemide 40 MG tablet  Commonly known as: LASIX     meloxicam 15 MG tablet  Commonly known as: MOBIC     naloxone 4 MG/0.1ML Liqd nasal spray  1 spray by Nasal route as needed for Opioid Reversal Use 1 spray intranasally, then discard. Repeat with new spray every 2 min as needed for opioid overdose symptoms, alternating nostrils.     rivaroxaban 20 MG Tabs tablet  Commonly known as: XARELTO     sertraline 50 MG tablet  Commonly known as: ZOLOFT  Take 3 tablets by mouth daily     tamsulosin 0.4 MG capsule  Commonly known as: FLOMAX     terbinafine 250 MG tablet  Commonly known as: LAMISIL  TAKE 1 TABLET BY MOUTH EVERY DAY            STOP taking these medications      hydrALAZINE 25 MG tablet  Commonly known as: APRESOLINE               Where to Get Your Medications        These medications were sent to [facility]/pharmacy #[id number] [address] - P [phone] - F [phone]  [address] [place] [address]      Phone: [phone]   gabapentin 400 MG capsule  HYDROcodone-acetaminophen 5-325 MG per tablet  ondansetron 4 MG disintegrating tablet  vitamin D 50 MCG (2000 UT) Tabs tablet       Information about where to get these medications is not yet available    Ask your nurse or doctor about these medications  naloxone 4 MG/0.1ML Liqd nasal spray              Current Discharge Medication List        STOP taking these medications

## 2025-03-27 NOTE — PLAN OF CARE
Problem: Safety - Adult  Goal: Free from fall injury  3/27/2025 1519 by Mike Huston, RN  Outcome: HH/HSPC Resolved Met  3/27/2025 0733 by Mike Huston, RN  Outcome: Progressing     Problem: Skin/Tissue Integrity  Goal: Skin integrity remains intact  Description: 1.  Monitor for areas of redness and/or skin breakdown  2.  Assess vascular access sites hourly  3.  Every 4-6 hours minimum:  Change oxygen saturation probe site  4.  Every 4-6 hours:  If on nasal continuous positive airway pressure, respiratory therapy assess nares and determine need for appliance change or resting period  Outcome: HH/HSPC Resolved Met     Problem: Pain  Goal: Verbalizes/displays adequate comfort level or baseline comfort level  Outcome: HH/HSPC Resolved Met

## 2025-03-27 NOTE — PROGRESS NOTES
[facility] OCCUPATIONAL THERAPY DISCHARGE NOTE  OT Individual Minutes  Time In: 0926  Time Out: 1003  Minutes: 37               GOALS:  Long Term Goals:  Time Frame for Long Term Goals: 7 days   LTG 1: Patient will dress UB with Setup Assistance using AE/DME PRN. [Goal met 3/27/25]  LTG 2: Patient will dress LB with Setup Assistance using AE/DME PRN. [Goal met 3/27/25]  LTG 3: Patient will don footwear with Setup Assistance using AE/DME PRN. [Goal met 3/27/25]  LTG 4: Patient will bathe with Supervision using AE/DME PRN. [Goal met 3/27/25]  LTG 5: Patient will toilet with Supervision using AE/DME PRN. [Goal met 3/27/25]  LTG 6: Patient/caregiver will verbalize understanding of OT recommendations regarding ADLs, functional transfers, home safety, AE/DME, energy conservation, safety awareness, activity tolerance, and follow-up therapy to increase safety with functional tasks upon discharge. [Goal met 3/27/25]    Subjective: Patient agreeable to therapy. \"Thank you, all of you for all y'all have done\"  Pain: No pain expressed.  Precautions: Falls, Posey Alarm, and BLE wraps   Lines:N/A  O2 Device: RA      FUNCTIONAL MOBILITY:        Bed Mobility N/A up in recliner on entry    Sit to Stand Supervision    Transfer  Supervision  Transfer Type: SPT  Equipment: RW    Ambulation Supervision  Equipment: RW      ACTIVITIES OF DAILY LIVING:    Initial Score Current Score   Eating Independent  MOD I seated Independent  Mod I seated   Oral Hygiene Setup or clean-up assistance  S/U A seated for g/h tasks Setup or clean-up assistance  S/U - Mod I seated at sink   Shower/Bathe Partial/moderate assistance  partial sponge bath seated in recliner chair this date, MIN A for LE's Supervision or touching assistance  S/U / Sup-V UB/LB bathing seated and standing at TTB wtih grab bars, HHSH and LHS, incr time req   Upper Body  Dressing Supervision or touching assistance  SPV seated to don/doff front facing gown Setup or clean-up assistance  S/U

## 2025-03-27 NOTE — PROGRESS NOTES
PHYSICAL THERAPY DISCHARGE SUMMARY    PT Individual Minutes  Time In: 1128  Time Out: 1219  Minutes: 51                   Total Treatment Time:  51 Minutes           Precautions at discharge:      Restrictions/Precautions: Contact Precautions, Fall Risk, Other (Comment)  Activity Level: Up as Tolerated  Required Braces or Orthoses?: No               Other Position/Activity Restrictions: LE elevation for edema control, [facility] [employer] assessed patient for lumbar brace 03/25/2025. Orthotist did not ahve a brace in stock that would fit patient and would need to order back brace. Orthotist to follow up with patient when brace comes in.    Impairments:    Decreased LE strength  [x]     Decreased strength trunk/core  [x]     Decreased AROM   [x]     Decreased PROM  [x]    Decreased endurance  [x]     Decreased balance sitting  []     Decreased balance standing  [x]     Pain   [x]     Slow ambulation velocity  [x]    Decreased coordination  [x]    Decreased safety awareness  []       Functional Limitations:   Decreased independence with bed mobility  []     Decreased independence with functional transfers  []     Decreased independence with ambulation  []     Decreased independence with stair negotiation  []               Objective:     Vital Signs: /70   Pulse 72   Temp 97.3 °F (36.3 °C) (Oral)   Resp 18   Ht 1.905 m (6' 3\")   Wt (!) 159.3 kg (351 lb 3.2 oz)   SpO2 94%   BMI 43.90 kg/m²   02 sat 97% and HR 88 after ambulating 200ft    Pain level: 2 to 6 out of 10  Pain location: left lateral lower rib cage, low back, LE's   Pain interventions: Medication per order, rest, positioning,relaxation, body mechanics       Patient education:    Education Given To: Patient  Education Provided: Safety;Transfer Training;Energy Conservation;Fall Prevention Strategies;Precautions;Mobility Training;Home Exercise Program  Barriers to Learning: None  Education Outcome: Verbalized understanding;Demonstrated

## 2025-03-27 NOTE — PLAN OF CARE
Patient hospitalized in acute care setting from 3/8/2025 to 3/20/2025, and then was admitted to inpatient rehabilitation on 3/20/2025 to 3/27/2025 for ongoing functional deficits.  Recommend out of work for at least 7 days status post discharge from rehabilitation.    Questions or concerns please contact Dr. Bauer [phone]

## 2025-03-31 ENCOUNTER — HOSPITAL ENCOUNTER (OUTPATIENT)
Dept: WOUND CARE | Age: 60
Discharge: HOME | End: 2025-03-31
Payer: COMMERCIAL

## 2025-03-31 VITALS
RESPIRATION RATE: 18 BRPM | WEIGHT: 315 LBS | HEIGHT: 75 IN | TEMPERATURE: 98.1 F | BODY MASS INDEX: 39.17 KG/M2 | SYSTOLIC BLOOD PRESSURE: 145 MMHG | DIASTOLIC BLOOD PRESSURE: 89 MMHG | HEART RATE: 82 BPM

## 2025-03-31 DIAGNOSIS — L97.301 VENOUS STASIS ULCER OF ANKLE LIMITED TO BREAKDOWN OF SKIN WITH VARICOSE VEINS, UNSPECIFIED LATERALITY: Primary | ICD-10-CM

## 2025-03-31 DIAGNOSIS — I83.003 VENOUS STASIS ULCER OF ANKLE LIMITED TO BREAKDOWN OF SKIN WITH VARICOSE VEINS, UNSPECIFIED LATERALITY: Primary | ICD-10-CM

## 2025-03-31 PROCEDURE — 29581 APPL MULTLAYER CMPRN SYS LEG: CPT

## 2025-03-31 RX ORDER — LIDOCAINE HYDROCHLORIDE 20 MG/ML
JELLY TOPICAL PRN
OUTPATIENT
Start: 2025-03-31

## 2025-03-31 RX ORDER — SODIUM CHLOR/HYPOCHLOROUS ACID 0.033 %
SOLUTION, IRRIGATION IRRIGATION PRN
OUTPATIENT
Start: 2025-03-31

## 2025-03-31 RX ORDER — TRIAMCINOLONE ACETONIDE 1 MG/G
OINTMENT TOPICAL PRN
OUTPATIENT
Start: 2025-03-31

## 2025-03-31 RX ORDER — GENTAMICIN SULFATE 1 MG/G
OINTMENT TOPICAL PRN
OUTPATIENT
Start: 2025-03-31

## 2025-03-31 RX ORDER — LIDOCAINE 40 MG/G
CREAM TOPICAL PRN
OUTPATIENT
Start: 2025-03-31

## 2025-03-31 RX ORDER — CLOBETASOL PROPIONATE 0.5 MG/G
OINTMENT TOPICAL PRN
OUTPATIENT
Start: 2025-03-31

## 2025-03-31 RX ORDER — LIDOCAINE HYDROCHLORIDE 40 MG/ML
SOLUTION TOPICAL PRN
OUTPATIENT
Start: 2025-03-31

## 2025-03-31 RX ORDER — LIDOCAINE 50 MG/G
OINTMENT TOPICAL PRN
OUTPATIENT
Start: 2025-03-31

## 2025-03-31 RX ORDER — BACITRACIN ZINC AND POLYMYXIN B SULFATE 500; 1000 [USP'U]/G; [USP'U]/G
OINTMENT TOPICAL PRN
OUTPATIENT
Start: 2025-03-31

## 2025-03-31 RX ORDER — NEOMYCIN/BACITRACIN/POLYMYXINB 3.5-400-5K
OINTMENT (GRAM) TOPICAL PRN
OUTPATIENT
Start: 2025-03-31

## 2025-03-31 RX ORDER — MUPIROCIN 20 MG/G
OINTMENT TOPICAL PRN
OUTPATIENT
Start: 2025-03-31

## 2025-03-31 RX ORDER — BETAMETHASONE DIPROPIONATE 0.5 MG/G
CREAM TOPICAL PRN
OUTPATIENT
Start: 2025-03-31

## 2025-03-31 RX ORDER — GINSENG 100 MG
CAPSULE ORAL PRN
OUTPATIENT
Start: 2025-03-31

## 2025-03-31 NOTE — WOUND CARE
Multilayer Compression Wrap   (Not Unna) Below the Knee    NAME:  Jozef Felix  YOB: 1965  MEDICAL RECORD NUMBER:  [id number]  DATE:  3/31/2025    Multilayer compression wrap: Removed old Multilayer wrap if indicated and wash leg with mild soap/water.  Applied moisturizing agent to dry skin as needed.   Applied primary and secondary dressing as ordered.  Applied multilayered dressing below the knee to right lower leg.  Applied multilayered dressing below the knee to left lower leg.  Instructed patient/caregiver not to remove dressing and to keep it clean and dry.   Instructed patient/caregiver on complications to report to provider, such as pain, numbness in toes, heavy drainage, and slippage of dressing.  Instructed patient on purpose of compression dressing and on activity and exercise recommendations.      Electronically signed by KAROLINA VALLE RN on 3/31/2025 at 4:06 PM

## 2025-03-31 NOTE — WOUND CARE
Discharge Instructions for  [facility]  [address] [place] [address]  [address] [place] [address]  Phone [phone]   Fax [phone]      NAME:  Jozef Felix  YOB: 1965  MEDICAL RECORD NUMBER:  [id number]  DATE:  3/31/2025    Return Appointment:   2 weeks with Alfa Edouard DO      Instructions: Bilateral legs and feet:  Wounds are healed!   2 layer compression wrap applied bilaterally today, but please remove tomorrow.  YOU MUST WASH LEGS DAILY  Apply Vaseline on both legs  Wear velcro compression wraps on both legs every day    NO ACTIVE INFECTION at this time. Infection may return if you do not shower and wash legs daily with soap and water.     Should you experience increased redness, swelling, pain, foul odor, size of wound(s), or have a temperature over 101 degrees please contact the Wound Healing Center at [phone] or if after hours contact your primary care physician or go to the hospital emergency department.    PLEASE NOTE: IF YOU ARE UNABLE TO OBTAIN WOUND SUPPLIES, CONTINUE TO USE THE SUPPLIES YOU HAVE AVAILABLE UNTIL YOU ARE ABLE TO REACH US. IT IS MOST IMPORTANT TO KEEP THE WOUND COVERED AT ALL TIMES.    Electronically signed KAROLINA VALLE RN on 3/31/2025 at 3:40 PM

## 2025-03-31 NOTE — FLOWSHEET NOTE
Location: Toe (Comment  which one)  Woun...   Wound Image    Wound Etiology Venous   Dressing Status New dressing applied   Wound Cleansed Soap and water   Dressing/Treatment Hydrofiber Ag   Wound Length (cm) 0 cm   Wound Width (cm) 0 cm   Wound Depth (cm) 0 cm   Wound Surface Area (cm^2) 0 cm^2   Change in Wound Size % (l*w) 100   Wound Volume (cm^3) 0 cm^3   Wound Healing % 100   Wound Assessment Dry   Drainage Amount None (dry)   Odor None   Ariadne-wound Assessment Dry/flaky;Hyperkeratosis (callous)   [REMOVED] Wound 01/20/25 Toe (Comment  which one) Right Right Toes / Foot -web spaces   Final Assessment Date/Final Assessment Time: 03/31/25 1532  Date First Assessed/Time First Assessed: 01/20/25 1638   Wound Approximate Age at First Assessment (Weeks): 3 weeks  Primary Wound Type: Venous Ulcer  Location: Toe (Comment  which one)  Woun...   Wound Image    Wound Etiology Venous   Dressing Status Clean;Dry;Intact   Wound Cleansed Soap and water   Dressing/Treatment Hydrofiber Ag   Wound Length (cm) 0 cm   Wound Width (cm) 0 cm   Wound Depth (cm) 0 cm   Wound Surface Area (cm^2) 0 cm^2   Change in Wound Size % (l*w) 100   Wound Volume (cm^3) 0 cm^3   Wound Healing % 100   Wound Assessment Dry   Drainage Amount None (dry)   Odor None   Ariadne-wound Assessment Dry/flaky   Wound 03/31/25 Ankle Right;Medial #5   Date First Assessed/Time First Assessed: 03/31/25 1532   Present on Original Admission: Yes  Wound Approximate Age at First Assessment (Weeks): 1 weeks  Primary Wound Type: Vascular Ulcer  Secondary Wound Type - Vascular Ulcer: Venous  Location: Ankle...   Wound Image    Wound Etiology Venous   Dressing Status Clean;Dry;Intact   Wound Cleansed Soap and water   Dressing/Treatment Gauze dressing/dressing sponge   Wound Length (cm) 0.6 cm   Wound Width (cm) 0.7 cm   Wound Depth (cm) 0.1 cm   Wound Surface Area (cm^2) 0.42 cm^2   Wound Volume (cm^3) 0.042 cm^3   Wound Assessment Pink/red   Drainage Amount None (dry)

## 2025-04-01 ENCOUNTER — FOLLOWUP TELEPHONE ENCOUNTER (OUTPATIENT)
Dept: CASE MANAGEMENT | Age: 60
End: 2025-04-01

## 2025-04-01 NOTE — PROGRESS NOTES
Follow up call attempted with patient post IRC discharge. Message left for patient to return call. Awaiting call back.

## 2025-04-07 ENCOUNTER — HOSPITAL ENCOUNTER (OUTPATIENT)
Dept: WOUND CARE | Age: 60
Discharge: HOME | End: 2025-04-07
Payer: COMMERCIAL

## 2025-04-07 VITALS
BODY MASS INDEX: 39.17 KG/M2 | HEIGHT: 75 IN | HEART RATE: 80 BPM | WEIGHT: 315 LBS | SYSTOLIC BLOOD PRESSURE: 169 MMHG | DIASTOLIC BLOOD PRESSURE: 95 MMHG | RESPIRATION RATE: 18 BRPM | TEMPERATURE: 98.5 F

## 2025-04-07 DIAGNOSIS — I83.003 VENOUS STASIS ULCER OF ANKLE LIMITED TO BREAKDOWN OF SKIN WITH VARICOSE VEINS, UNSPECIFIED LATERALITY: Primary | ICD-10-CM

## 2025-04-07 DIAGNOSIS — L97.301 VENOUS STASIS ULCER OF ANKLE LIMITED TO BREAKDOWN OF SKIN WITH VARICOSE VEINS, UNSPECIFIED LATERALITY: Primary | ICD-10-CM

## 2025-04-07 PROCEDURE — 99213 OFFICE O/P EST LOW 20 MIN: CPT

## 2025-04-07 RX ORDER — GINSENG 100 MG
CAPSULE ORAL PRN
OUTPATIENT
Start: 2025-04-07

## 2025-04-07 RX ORDER — NEOMYCIN/BACITRACIN/POLYMYXINB 3.5-400-5K
OINTMENT (GRAM) TOPICAL PRN
OUTPATIENT
Start: 2025-04-07

## 2025-04-07 RX ORDER — LIDOCAINE HYDROCHLORIDE 20 MG/ML
JELLY TOPICAL PRN
Status: DISCONTINUED | OUTPATIENT
Start: 2025-04-07 | End: 2025-04-08 | Stop reason: HOSPADM

## 2025-04-07 RX ORDER — LIDOCAINE HYDROCHLORIDE 20 MG/ML
JELLY TOPICAL PRN
OUTPATIENT
Start: 2025-04-07

## 2025-04-07 RX ORDER — MUPIROCIN 20 MG/G
OINTMENT TOPICAL PRN
OUTPATIENT
Start: 2025-04-07

## 2025-04-07 RX ORDER — LIDOCAINE 40 MG/G
CREAM TOPICAL PRN
OUTPATIENT
Start: 2025-04-07

## 2025-04-07 RX ORDER — BACITRACIN ZINC AND POLYMYXIN B SULFATE 500; 1000 [USP'U]/G; [USP'U]/G
OINTMENT TOPICAL PRN
OUTPATIENT
Start: 2025-04-07

## 2025-04-07 RX ORDER — CLOBETASOL PROPIONATE 0.5 MG/G
OINTMENT TOPICAL PRN
OUTPATIENT
Start: 2025-04-07

## 2025-04-07 RX ORDER — LIDOCAINE HYDROCHLORIDE 40 MG/ML
SOLUTION TOPICAL PRN
OUTPATIENT
Start: 2025-04-07

## 2025-04-07 RX ORDER — SODIUM CHLOR/HYPOCHLOROUS ACID 0.033 %
SOLUTION, IRRIGATION IRRIGATION PRN
OUTPATIENT
Start: 2025-04-07

## 2025-04-07 RX ORDER — GENTAMICIN SULFATE 1 MG/G
OINTMENT TOPICAL PRN
OUTPATIENT
Start: 2025-04-07

## 2025-04-07 RX ORDER — TRIAMCINOLONE ACETONIDE 1 MG/G
OINTMENT TOPICAL PRN
OUTPATIENT
Start: 2025-04-07

## 2025-04-07 RX ORDER — BETAMETHASONE DIPROPIONATE 0.5 MG/G
CREAM TOPICAL PRN
OUTPATIENT
Start: 2025-04-07

## 2025-04-07 RX ORDER — LIDOCAINE 50 MG/G
OINTMENT TOPICAL PRN
OUTPATIENT
Start: 2025-04-07

## 2025-04-07 RX ADMIN — LIDOCAINE HYDROCHLORIDE: 20 JELLY TOPICAL at 15:36

## 2025-04-07 ASSESSMENT — PAIN DESCRIPTION - DESCRIPTORS: DESCRIPTORS: ACHING

## 2025-04-07 ASSESSMENT — PAIN SCALES - GENERAL: PAINLEVEL_OUTOF10: 3

## 2025-04-07 ASSESSMENT — PAIN DESCRIPTION - LOCATION: LOCATION: BACK

## 2025-04-07 NOTE — WOUND CARE
Discharge Instructions for  [facility]  [address] [place] [address]  [address] [place] [address]  Phone [phone]   Fax [phone]      NAME:  Jozef Felix  YOB: 1965  MEDICAL RECORD NUMBER:  [id number]  DATE:  4/7/2025    Return Appointment:   1 week with Alfa Edouard DO      Instructions: Bilateral legs and feet:    YOU MUST WASH LEGS DAILY  Xeroform- apply to wound bed.   Cover with ABD/roll gauze  Wear velcro compression wraps on both legs every day    NO ACTIVE INFECTION at this time. Infection may return if you do not shower and wash legs daily with soap and water.     Should you experience increased redness, swelling, pain, foul odor, size of wound(s), or have a temperature over 101 degrees please contact the Wound Healing Center at [phone] or if after hours contact your primary care physician or go to the hospital emergency department.    PLEASE NOTE: IF YOU ARE UNABLE TO OBTAIN WOUND SUPPLIES, CONTINUE TO USE THE SUPPLIES YOU HAVE AVAILABLE UNTIL YOU ARE ABLE TO REACH US. IT IS MOST IMPORTANT TO KEEP THE WOUND COVERED AT ALL TIMES.    Electronically signed Namita Pal RN on 4/7/2025 at 3:22 PM

## 2025-04-07 NOTE — FLOWSHEET NOTE
Dry/flaky;Edematous   Wound Thickness Description not for Pressure Injury Full thickness   Pain Assessment   Pain Assessment 0-10   Pain Level 3   Pain Location Back   Pain Descriptors Aching

## 2025-04-08 ENCOUNTER — FOLLOWUP TELEPHONE ENCOUNTER (OUTPATIENT)
Dept: CASE MANAGEMENT | Age: 60
End: 2025-04-08

## 2025-04-08 NOTE — PROGRESS NOTES
Follow up call attempted with patient. Left message to return call. Patient did return call from 4/1 yesterday after business hours. Awaiting call back.

## 2025-04-09 ENCOUNTER — FOLLOWUP TELEPHONE ENCOUNTER (OUTPATIENT)
Dept: CASE MANAGEMENT | Age: 60
End: 2025-04-09

## 2025-04-09 NOTE — NURSE NAVIGATOR
Patient called navigator re: wound care. Patient states he was seen at wound healing center x2 weekly prior to hospitalization; now only seen 1x weekly and is to change own dressing other day of week. Patient states he is unable to bend to change dressing and no one in home that can change dressing for him. Navigator phone wound center to ask if patient could return to 2x weekly for dressing changes. Patient has returned to work due to financial reasons and therefore is ineligible for . Navigator is currently waiting for call back from wound center.

## 2025-04-14 ENCOUNTER — HOSPITAL ENCOUNTER (OUTPATIENT)
Dept: WOUND CARE | Age: 60
Discharge: HOME | End: 2025-04-14
Payer: COMMERCIAL

## 2025-04-14 VITALS
DIASTOLIC BLOOD PRESSURE: 93 MMHG | RESPIRATION RATE: 20 BRPM | HEART RATE: 84 BPM | TEMPERATURE: 98.1 F | SYSTOLIC BLOOD PRESSURE: 147 MMHG

## 2025-04-14 DIAGNOSIS — L97.301 VENOUS STASIS ULCER OF ANKLE LIMITED TO BREAKDOWN OF SKIN WITH VARICOSE VEINS, UNSPECIFIED LATERALITY: Primary | Chronic | ICD-10-CM

## 2025-04-14 DIAGNOSIS — I83.003 VENOUS STASIS ULCER OF ANKLE LIMITED TO BREAKDOWN OF SKIN WITH VARICOSE VEINS, UNSPECIFIED LATERALITY: Primary | Chronic | ICD-10-CM

## 2025-04-14 PROCEDURE — 29581 APPL MULTLAYER CMPRN SYS LEG: CPT

## 2025-04-14 RX ORDER — LIDOCAINE HYDROCHLORIDE 20 MG/ML
JELLY TOPICAL PRN
Status: DISCONTINUED | OUTPATIENT
Start: 2025-04-14 | End: 2025-04-15 | Stop reason: HOSPADM

## 2025-04-14 RX ORDER — BACITRACIN ZINC AND POLYMYXIN B SULFATE 500; 1000 [USP'U]/G; [USP'U]/G
OINTMENT TOPICAL PRN
Status: CANCELLED | OUTPATIENT
Start: 2025-04-14

## 2025-04-14 RX ORDER — BETAMETHASONE DIPROPIONATE 0.5 MG/G
CREAM TOPICAL PRN
Status: CANCELLED | OUTPATIENT
Start: 2025-04-14

## 2025-04-14 RX ORDER — NEOMYCIN/BACITRACIN/POLYMYXINB 3.5-400-5K
OINTMENT (GRAM) TOPICAL PRN
Status: CANCELLED | OUTPATIENT
Start: 2025-04-14

## 2025-04-14 RX ORDER — GINSENG 100 MG
CAPSULE ORAL PRN
Status: CANCELLED | OUTPATIENT
Start: 2025-04-14

## 2025-04-14 RX ORDER — CLOBETASOL PROPIONATE 0.5 MG/G
OINTMENT TOPICAL PRN
Status: CANCELLED | OUTPATIENT
Start: 2025-04-14

## 2025-04-14 RX ORDER — LIDOCAINE HYDROCHLORIDE 20 MG/ML
JELLY TOPICAL PRN
Status: CANCELLED | OUTPATIENT
Start: 2025-04-14

## 2025-04-14 RX ORDER — TRIAMCINOLONE ACETONIDE 1 MG/G
OINTMENT TOPICAL PRN
Status: CANCELLED | OUTPATIENT
Start: 2025-04-14

## 2025-04-14 RX ORDER — MUPIROCIN 20 MG/G
OINTMENT TOPICAL PRN
Status: CANCELLED | OUTPATIENT
Start: 2025-04-14

## 2025-04-14 RX ORDER — LIDOCAINE 40 MG/G
CREAM TOPICAL PRN
Status: CANCELLED | OUTPATIENT
Start: 2025-04-14

## 2025-04-14 RX ORDER — SODIUM CHLOR/HYPOCHLOROUS ACID 0.033 %
SOLUTION, IRRIGATION IRRIGATION PRN
Status: CANCELLED | OUTPATIENT
Start: 2025-04-14

## 2025-04-14 RX ORDER — GENTAMICIN SULFATE 1 MG/G
OINTMENT TOPICAL PRN
Status: CANCELLED | OUTPATIENT
Start: 2025-04-14

## 2025-04-14 RX ORDER — LIDOCAINE 50 MG/G
OINTMENT TOPICAL PRN
Status: CANCELLED | OUTPATIENT
Start: 2025-04-14

## 2025-04-14 RX ORDER — LIDOCAINE HYDROCHLORIDE 40 MG/ML
SOLUTION TOPICAL PRN
Status: CANCELLED | OUTPATIENT
Start: 2025-04-14

## 2025-04-14 RX ADMIN — LIDOCAINE HYDROCHLORIDE: 20 JELLY TOPICAL at 14:06

## 2025-04-14 NOTE — WOUND CARE
Discharge Instructions for  [facility]  [address] [place] [address]  [address] [place] [address]  Phone [phone]   Fax [phone]      NAME:  Jozef Felix  YOB: 1965  MEDICAL RECORD NUMBER:  [id number]  DATE:  4/14/2025    Return Appointment:   2 weeks with Alfa Edouard DO      Instructions: Bilateral legs and feet:  Wash with soap and water.  Apply oil emulsion to open areas.  Cover with silver alginate.  Optilock over left foot.  Apply 4 layer compression, change twice weekly for two weeks    NO ACTIVE INFECTION at this time. Infection may return if you do not shower and wash legs daily with soap and water.     Should you experience increased redness, swelling, pain, foul odor, size of wound(s), or have a temperature over 101 degrees please contact the Wound Healing Center at [phone] or if after hours contact your primary care physician or go to the hospital emergency department.    PLEASE NOTE: IF YOU ARE UNABLE TO OBTAIN WOUND SUPPLIES, CONTINUE TO USE THE SUPPLIES YOU HAVE AVAILABLE UNTIL YOU ARE ABLE TO REACH US. IT IS MOST IMPORTANT TO KEEP THE WOUND COVERED AT ALL TIMES.    Electronically signed Rm Alejandra RN on 4/14/2025 at 2:29 PM

## 2025-04-14 NOTE — FLOWSHEET NOTE
Large (50-75% saturated)   Drainage Description Serosanguinous   Odor Moderate   Ariadne-wound Assessment Fragile;Edematous   Margins Undefined edges   Wound Thickness Description not for Pressure Injury Full thickness   Pain Assessment   Pain Assessment 0-10     Taking xarelto

## 2025-04-14 NOTE — WOUND CARE
Multilayer Compression Wrap   (Not Unna) Below the Knee    NAME:  Jozef Felix  YOB: 1965  MEDICAL RECORD NUMBER:  [id number]  DATE:  4/14/2025    Multilayer compression wrap: Removed old Multilayer wrap if indicated and wash leg with mild soap/water.  Applied moisturizing agent to dry skin as needed.   Applied primary and secondary dressing as ordered.  Applied multilayered dressing below the knee to right lower leg.  Applied multilayered dressing below the knee to left lower leg.  Instructed patient/caregiver not to remove dressing and to keep it clean and dry.   Instructed patient/caregiver on complications to report to provider, such as pain, numbness in toes, heavy drainage, and slippage of dressing.  Instructed patient on purpose of compression dressing and on activity and exercise recommendations.      Electronically signed by Rm Alejandra RN on 4/14/2025 at 3:15 PM

## 2025-04-14 NOTE — WOUND CARE
Observed patient remove his dressings and wash with wand sponge. Assisted patient by preparing water in basin and providing instruction. Patient stated he was able to wash his legs Tuesday and Friday himself without assistance. RN remained with patient throughout teaching session to provide any assistance necessary.

## 2025-04-15 ENCOUNTER — FOLLOWUP TELEPHONE ENCOUNTER (OUTPATIENT)
Dept: CASE MANAGEMENT | Age: 60
End: 2025-04-15

## 2025-04-15 NOTE — PROGRESS NOTES
Follow up text made with patient, as he is at work during office hours. Patient states he is still weak but continues to work. Navigator informed that strength will return slowly but to not become sedentary. Patient states \"OK\".   No new needs identified at this time. Navigator will continue to follow.

## 2025-04-17 ENCOUNTER — HOSPITAL ENCOUNTER (OUTPATIENT)
Dept: WOUND CARE | Age: 60
Discharge: HOME | End: 2025-04-17
Payer: COMMERCIAL

## 2025-04-17 VITALS
RESPIRATION RATE: 18 BRPM | SYSTOLIC BLOOD PRESSURE: 168 MMHG | TEMPERATURE: 98.1 F | BODY MASS INDEX: 39.17 KG/M2 | HEIGHT: 75 IN | OXYGEN SATURATION: 94 % | WEIGHT: 315 LBS | DIASTOLIC BLOOD PRESSURE: 89 MMHG | HEART RATE: 85 BPM

## 2025-04-17 DIAGNOSIS — L97.301 VENOUS STASIS ULCER OF ANKLE LIMITED TO BREAKDOWN OF SKIN WITH VARICOSE VEINS, UNSPECIFIED LATERALITY: Primary | Chronic | ICD-10-CM

## 2025-04-17 DIAGNOSIS — I83.003 VENOUS STASIS ULCER OF ANKLE LIMITED TO BREAKDOWN OF SKIN WITH VARICOSE VEINS, UNSPECIFIED LATERALITY: Primary | Chronic | ICD-10-CM

## 2025-04-17 PROCEDURE — 29581 APPL MULTLAYER CMPRN SYS LEG: CPT

## 2025-04-17 RX ORDER — LIDOCAINE 50 MG/G
OINTMENT TOPICAL PRN
OUTPATIENT
Start: 2025-04-17

## 2025-04-17 RX ORDER — NEOMYCIN/BACITRACIN/POLYMYXINB 3.5-400-5K
OINTMENT (GRAM) TOPICAL PRN
OUTPATIENT
Start: 2025-04-17

## 2025-04-17 RX ORDER — LIDOCAINE HYDROCHLORIDE 20 MG/ML
JELLY TOPICAL PRN
Status: DISCONTINUED | OUTPATIENT
Start: 2025-04-17 | End: 2025-04-18 | Stop reason: HOSPADM

## 2025-04-17 RX ORDER — GENTAMICIN SULFATE 1 MG/G
OINTMENT TOPICAL PRN
OUTPATIENT
Start: 2025-04-17

## 2025-04-17 RX ORDER — BACITRACIN ZINC AND POLYMYXIN B SULFATE 500; 1000 [USP'U]/G; [USP'U]/G
OINTMENT TOPICAL PRN
OUTPATIENT
Start: 2025-04-17

## 2025-04-17 RX ORDER — LIDOCAINE 40 MG/G
CREAM TOPICAL PRN
OUTPATIENT
Start: 2025-04-17

## 2025-04-17 RX ORDER — GINSENG 100 MG
CAPSULE ORAL PRN
OUTPATIENT
Start: 2025-04-17

## 2025-04-17 RX ORDER — LIDOCAINE HYDROCHLORIDE 40 MG/ML
SOLUTION TOPICAL PRN
OUTPATIENT
Start: 2025-04-17

## 2025-04-17 RX ORDER — LIDOCAINE HYDROCHLORIDE 20 MG/ML
JELLY TOPICAL PRN
OUTPATIENT
Start: 2025-04-17

## 2025-04-17 RX ORDER — TRIAMCINOLONE ACETONIDE 1 MG/G
OINTMENT TOPICAL PRN
OUTPATIENT
Start: 2025-04-17

## 2025-04-17 RX ORDER — MUPIROCIN 20 MG/G
OINTMENT TOPICAL PRN
OUTPATIENT
Start: 2025-04-17

## 2025-04-17 RX ORDER — CLOBETASOL PROPIONATE 0.5 MG/G
OINTMENT TOPICAL PRN
OUTPATIENT
Start: 2025-04-17

## 2025-04-17 RX ORDER — SODIUM CHLOR/HYPOCHLOROUS ACID 0.033 %
SOLUTION, IRRIGATION IRRIGATION PRN
OUTPATIENT
Start: 2025-04-17

## 2025-04-17 RX ORDER — BETAMETHASONE DIPROPIONATE 0.5 MG/G
CREAM TOPICAL PRN
OUTPATIENT
Start: 2025-04-17

## 2025-04-17 RX ADMIN — LIDOCAINE HYDROCHLORIDE: 20 JELLY TOPICAL at 13:21

## 2025-04-17 ASSESSMENT — PAIN SCALES - GENERAL: PAINLEVEL_OUTOF10: 6

## 2025-04-17 NOTE — WOUND CARE
Observed patient remove his dressings and wash his legs with wand sponge and washcloths. Assisted patient when needed. Patient was appreciative of assistance. This RN applied dressings in accordance with MD orders. Patient was able to apply his socks and shoes without assistance.

## 2025-04-17 NOTE — WOUND CARE
Multilayer Compression Wrap   (Not Unna) Below the Knee    NAME:  Jozef Felix  YOB: 1965  MEDICAL RECORD NUMBER:  [id number]  DATE:  4/17/2025    Multilayer compression wrap: Removed old Multilayer wrap if indicated and wash leg with mild soap/water.  Applied moisturizing agent to dry skin as needed.   Applied primary and secondary dressing as ordered.  Applied multilayered dressing below the knee to right lower leg.  Applied multilayered dressing below the knee to left lower leg.  Instructed patient/caregiver not to remove dressing and to keep it clean and dry.   Instructed patient/caregiver on complications to report to provider, such as pain, numbness in toes, heavy drainage, and slippage of dressing.  Instructed patient on purpose of compression dressing and on activity and exercise recommendations.      Electronically signed by Rm Alejandra RN on 4/17/2025 at 1:29 PM

## 2025-04-17 NOTE — FLOWSHEET NOTE
04/17/25 1300   Right Leg Edema Point of Measurement   Leg circumference 42 cm   Ankle circumference 27 cm   Foot circumference 29.5 cm   Compression Therapy 2 layer compression wrap   Left Leg Edema Point of Measurement   Leg circumference 41 cm   Ankle circumference 23 cm   Foot circumference 30 cm   Compression Therapy 2 layer compression wrap   Wound 04/07/25 Pretibial Proximal;Right   Date First Assessed/Time First Assessed: 04/07/25 1444   Present on Original Admission: Yes  Wound Approximate Age at First Assessment (Weeks): 1 weeks  Location: Pretibial  Wound Location Orientation: Proximal;Right   Wound Image    Wound Etiology Venous   Dressing Status Old drainage noted   Wound Cleansed Soap and water   Dressing/Treatment Other (comment);Alginate with Ag  (oil emulsion)   Wound Length (cm) 4 cm   Wound Width (cm) 20 cm   Wound Depth (cm) 0.1 cm   Wound Surface Area (cm^2) 80 cm^2   Change in Wound Size % (l*w) -25   Wound Volume (cm^3) 8 cm^3   Wound Healing % -25   Wound Assessment Pink/red   Drainage Amount Moderate (25-50%)   Drainage Description Serous   Odor Moderate   Ariadne-wound Assessment Fragile;Edematous   Margins Undefined edges   Wound Thickness Description not for Pressure Injury Full thickness   Wound 03/31/25 Ankle Right;Medial #5   Date First Assessed/Time First Assessed: 03/31/25 1532   Present on Original Admission: Yes  Wound Approximate Age at First Assessment (Weeks): 1 weeks  Primary Wound Type: Vascular Ulcer  Secondary Wound Type - Vascular Ulcer: Venous  Location: Ankle...   Wound Image    Wound Etiology Venous   Dressing Status Old drainage noted   Wound Cleansed Soap and water   Dressing/Treatment Other (comment);Alginate with Ag  (oil emulsion)   Wound Length (cm) 15 cm   Wound Width (cm) 15 cm   Wound Depth (cm) 0.1 cm   Wound Surface Area (cm^2) 225 cm^2   Change in Wound Size % (l*w) -24542.43   Wound Volume (cm^3) 22.5 cm^3   Wound Healing % -[id number]   Wound Assessment Pink/red

## 2025-04-21 ENCOUNTER — HOSPITAL ENCOUNTER (OUTPATIENT)
Dept: WOUND CARE | Age: 60
Discharge: HOME | End: 2025-04-21
Payer: COMMERCIAL

## 2025-04-21 VITALS
DIASTOLIC BLOOD PRESSURE: 93 MMHG | BODY MASS INDEX: 39.17 KG/M2 | SYSTOLIC BLOOD PRESSURE: 159 MMHG | RESPIRATION RATE: 18 BRPM | HEART RATE: 73 BPM | HEIGHT: 75 IN | WEIGHT: 315 LBS | TEMPERATURE: 97.9 F

## 2025-04-21 DIAGNOSIS — I83.003 VENOUS STASIS ULCER OF ANKLE LIMITED TO BREAKDOWN OF SKIN WITH VARICOSE VEINS, UNSPECIFIED LATERALITY (HCC): Primary | ICD-10-CM

## 2025-04-21 DIAGNOSIS — L97.301 VENOUS STASIS ULCER OF ANKLE LIMITED TO BREAKDOWN OF SKIN WITH VARICOSE VEINS, UNSPECIFIED LATERALITY (HCC): Primary | ICD-10-CM

## 2025-04-21 PROCEDURE — 29581 APPL MULTLAYER CMPRN SYS LEG: CPT

## 2025-04-21 RX ORDER — TRIAMCINOLONE ACETONIDE 1 MG/G
OINTMENT TOPICAL PRN
OUTPATIENT
Start: 2025-04-21

## 2025-04-21 RX ORDER — LIDOCAINE 40 MG/G
CREAM TOPICAL PRN
OUTPATIENT
Start: 2025-04-21

## 2025-04-21 RX ORDER — MUPIROCIN 20 MG/G
OINTMENT TOPICAL PRN
OUTPATIENT
Start: 2025-04-21

## 2025-04-21 RX ORDER — NEOMYCIN/BACITRACIN/POLYMYXINB 3.5-400-5K
OINTMENT (GRAM) TOPICAL PRN
OUTPATIENT
Start: 2025-04-21

## 2025-04-21 RX ORDER — BACITRACIN ZINC AND POLYMYXIN B SULFATE 500; 1000 [USP'U]/G; [USP'U]/G
OINTMENT TOPICAL PRN
OUTPATIENT
Start: 2025-04-21

## 2025-04-21 RX ORDER — GENTAMICIN SULFATE 1 MG/G
OINTMENT TOPICAL PRN
OUTPATIENT
Start: 2025-04-21

## 2025-04-21 RX ORDER — SODIUM CHLOR/HYPOCHLOROUS ACID 0.033 %
SOLUTION, IRRIGATION IRRIGATION PRN
Status: CANCELLED | OUTPATIENT
Start: 2025-04-21

## 2025-04-21 RX ORDER — LIDOCAINE 50 MG/G
OINTMENT TOPICAL PRN
OUTPATIENT
Start: 2025-04-21

## 2025-04-21 RX ORDER — LIDOCAINE HYDROCHLORIDE 20 MG/ML
JELLY TOPICAL PRN
OUTPATIENT
Start: 2025-04-21

## 2025-04-21 RX ORDER — GINSENG 100 MG
CAPSULE ORAL PRN
OUTPATIENT
Start: 2025-04-21

## 2025-04-21 RX ORDER — LIDOCAINE HYDROCHLORIDE 20 MG/ML
JELLY TOPICAL PRN
Status: DISCONTINUED | OUTPATIENT
Start: 2025-04-21 | End: 2025-04-22 | Stop reason: HOSPADM

## 2025-04-21 RX ORDER — BETAMETHASONE DIPROPIONATE 0.5 MG/G
CREAM TOPICAL PRN
OUTPATIENT
Start: 2025-04-21

## 2025-04-21 RX ORDER — CLOBETASOL PROPIONATE 0.5 MG/G
OINTMENT TOPICAL PRN
OUTPATIENT
Start: 2025-04-21

## 2025-04-21 RX ORDER — LIDOCAINE HYDROCHLORIDE 40 MG/ML
SOLUTION TOPICAL PRN
OUTPATIENT
Start: 2025-04-21

## 2025-04-21 NOTE — WOUND CARE
Multilayer Compression Wrap   (Not Unna) Below the Knee    NAME:  Jozef Felix  YOB: 1965  MEDICAL RECORD NUMBER:  [id number]  DATE:  4/21/2025    Multilayer compression wrap: Removed old Multilayer wrap if indicated and wash leg with mild soap/water.  Applied moisturizing agent to dry skin as needed.   Applied primary and secondary dressing as ordered.  Applied multilayered dressing below the knee to right lower leg.  Applied multilayered dressing below the knee to left lower leg.  Instructed patient/caregiver not to remove dressing and to keep it clean and dry.   Instructed patient/caregiver on complications to report to provider, such as pain, numbness in toes, heavy drainage, and slippage of dressing.  Instructed patient on purpose of compression dressing and on activity and exercise recommendations.      Electronically signed by KAROLINA VALLE RN on 4/21/2025 at 2:42 PM

## 2025-04-21 NOTE — FLOWSHEET NOTE
04/21/25 1315   Right Leg Edema Point of Measurement   Leg circumference 40 cm   Ankle circumference 23 cm   Foot circumference 30 cm   Compression Therapy 2 layer compression wrap   Left Leg Edema Point of Measurement   Leg circumference 44 cm   Ankle circumference 26 cm   Foot circumference 29 cm   Compression Therapy 2 layer compression wrap   Wound 04/07/25 Pretibial Proximal;Right   Date First Assessed/Time First Assessed: 04/07/25 1444   Present on Original Admission: Yes  Wound Approximate Age at First Assessment (Weeks): 1 weeks  Location: Pretibial  Wound Location Orientation: Proximal;Right   Wound Image    Wound Etiology Venous   Dressing Status Old drainage noted   Wound Cleansed Soap and water   Dressing/Treatment Alginate with Ag   Wound Length (cm) 1 cm   Wound Width (cm) 5 cm   Wound Depth (cm) 0.1 cm   Wound Surface Area (cm^2) 5 cm^2   Change in Wound Size % (l*w) 92.19   Wound Volume (cm^3) 0.5 cm^3   Wound Healing % 92   Wound Assessment Pink/red   Drainage Amount Moderate (25-50%)   Drainage Description Serous   Odor Moderate   Ariadne-wound Assessment Fragile;Edematous   Margins Undefined edges   Wound Thickness Description not for Pressure Injury Full thickness   Wound 03/31/25 Ankle Right;Medial #5   Date First Assessed/Time First Assessed: 03/31/25 1532   Present on Original Admission: Yes  Wound Approximate Age at First Assessment (Weeks): 1 weeks  Primary Wound Type: Vascular Ulcer  Secondary Wound Type - Vascular Ulcer: Venous  Location: Ankle...   Wound Image    Wound Etiology Venous   Dressing Status Old drainage noted   Wound Cleansed Soap and water   Dressing/Treatment Alginate with Ag   Wound Length (cm) 9 cm   Wound Width (cm) 5 cm   Wound Depth (cm) 0.1 cm   Wound Surface Area (cm^2) 45 cm^2   Change in Wound Size % (l*w) -48783.29   Wound Volume (cm^3) 4.5 cm^3   Wound Healing % -53435   Wound Assessment Pink/red   Drainage Amount Moderate (25-50%)   Drainage Description

## 2025-04-22 ENCOUNTER — FOLLOWUP TELEPHONE ENCOUNTER (OUTPATIENT)
Dept: CASE MANAGEMENT | Age: 60
End: 2025-04-22

## 2025-04-22 NOTE — PROGRESS NOTES
This RN navigator has completed following patient for 30 days post-discharge, to prevent hospital readmission. This RN navigator will no longer be following.

## 2025-04-24 ENCOUNTER — HOSPITAL ENCOUNTER (OUTPATIENT)
Dept: WOUND CARE | Age: 60
Discharge: HOME | End: 2025-04-24
Payer: COMMERCIAL

## 2025-04-24 VITALS
DIASTOLIC BLOOD PRESSURE: 97 MMHG | TEMPERATURE: 97.8 F | RESPIRATION RATE: 16 BRPM | HEART RATE: 75 BPM | HEIGHT: 75 IN | WEIGHT: 315 LBS | BODY MASS INDEX: 39.17 KG/M2 | SYSTOLIC BLOOD PRESSURE: 190 MMHG

## 2025-04-24 DIAGNOSIS — I83.003 VENOUS STASIS ULCER OF ANKLE LIMITED TO BREAKDOWN OF SKIN WITH VARICOSE VEINS, UNSPECIFIED LATERALITY (HCC): Primary | ICD-10-CM

## 2025-04-24 DIAGNOSIS — L97.301 VENOUS STASIS ULCER OF ANKLE LIMITED TO BREAKDOWN OF SKIN WITH VARICOSE VEINS, UNSPECIFIED LATERALITY (HCC): Primary | ICD-10-CM

## 2025-04-24 PROCEDURE — 29581 APPL MULTLAYER CMPRN SYS LEG: CPT

## 2025-04-24 RX ORDER — GENTAMICIN SULFATE 1 MG/G
OINTMENT TOPICAL PRN
OUTPATIENT
Start: 2025-04-24

## 2025-04-24 RX ORDER — NEOMYCIN/BACITRACIN/POLYMYXINB 3.5-400-5K
OINTMENT (GRAM) TOPICAL PRN
OUTPATIENT
Start: 2025-04-24

## 2025-04-24 RX ORDER — BACITRACIN ZINC AND POLYMYXIN B SULFATE 500; 1000 [USP'U]/G; [USP'U]/G
OINTMENT TOPICAL PRN
OUTPATIENT
Start: 2025-04-24

## 2025-04-24 RX ORDER — CLOBETASOL PROPIONATE 0.5 MG/G
OINTMENT TOPICAL PRN
OUTPATIENT
Start: 2025-04-24

## 2025-04-24 RX ORDER — GINSENG 100 MG
CAPSULE ORAL PRN
OUTPATIENT
Start: 2025-04-24

## 2025-04-24 RX ORDER — LIDOCAINE HYDROCHLORIDE 20 MG/ML
JELLY TOPICAL PRN
OUTPATIENT
Start: 2025-04-24

## 2025-04-24 RX ORDER — SODIUM CHLOR/HYPOCHLOROUS ACID 0.033 %
SOLUTION, IRRIGATION IRRIGATION PRN
OUTPATIENT
Start: 2025-04-24

## 2025-04-24 RX ORDER — SODIUM CHLOR/HYPOCHLOROUS ACID 0.033 %
SOLUTION, IRRIGATION IRRIGATION PRN
Status: DISCONTINUED | OUTPATIENT
Start: 2025-04-24 | End: 2025-04-25 | Stop reason: HOSPADM

## 2025-04-24 RX ORDER — BETAMETHASONE DIPROPIONATE 0.5 MG/G
CREAM TOPICAL PRN
OUTPATIENT
Start: 2025-04-24

## 2025-04-24 RX ORDER — LIDOCAINE 40 MG/G
CREAM TOPICAL PRN
OUTPATIENT
Start: 2025-04-24

## 2025-04-24 RX ORDER — LIDOCAINE HYDROCHLORIDE 40 MG/ML
SOLUTION TOPICAL PRN
OUTPATIENT
Start: 2025-04-24

## 2025-04-24 RX ORDER — LIDOCAINE 50 MG/G
OINTMENT TOPICAL PRN
OUTPATIENT
Start: 2025-04-24

## 2025-04-24 RX ORDER — TRIAMCINOLONE ACETONIDE 1 MG/G
OINTMENT TOPICAL PRN
OUTPATIENT
Start: 2025-04-24

## 2025-04-24 RX ORDER — MUPIROCIN 20 MG/G
OINTMENT TOPICAL PRN
OUTPATIENT
Start: 2025-04-24

## 2025-04-24 RX ADMIN — Medication: at 14:53

## 2025-04-24 ASSESSMENT — PAIN DESCRIPTION - LOCATION: LOCATION: BACK

## 2025-04-24 ASSESSMENT — PAIN SCALES - GENERAL: PAINLEVEL_OUTOF10: 6

## 2025-04-24 NOTE — FLOWSHEET NOTE
04/24/25 1319   Right Leg Edema Point of Measurement   Leg circumference 42 cm   Ankle circumference 26 cm   Foot circumference 29.4 cm   Compression Therapy 2 layer compression wrap   Left Leg Edema Point of Measurement   Leg circumference 40 cm   Ankle circumference 24 cm   Foot circumference 29 cm   Compression Therapy 2 layer compression wrap   Wound 04/21/25 Foot Left;Dorsal #3   Date First Assessed/Time First Assessed: 04/21/25 1420   Present on Original Admission: Yes  Wound Approximate Age at First Assessment (Weeks): 1 weeks  Location: Foot  Wound Location Orientation: Left;Dorsal  Wound Description (Comments): #3   Wound Image     Wound Etiology Venous   Dressing Status Old drainage noted   Wound Cleansed Soap and water;Vashe   Dressing/Treatment Gauze dressing/dressing sponge   Wound Length (cm) 4 cm   Wound Width (cm) 9 cm   Wound Depth (cm) 0.1 cm   Wound Surface Area (cm^2) 36 cm^2   Change in Wound Size % (l*w) 10   Wound Volume (cm^3) 3.6 cm^3   Wound Healing % 10   Wound Assessment Pink/red   Drainage Amount Small (< 25%)   Drainage Description Serosanguinous   Odor None   Ariadne-wound Assessment Edematous;Dry/flaky   Margins Attached edges   Wound Thickness Description not for Pressure Injury Full thickness   Wound 04/07/25 Pretibial Proximal;Right   Date First Assessed/Time First Assessed: 04/07/25 1444   Present on Original Admission: Yes  Wound Approximate Age at First Assessment (Weeks): 1 weeks  Location: Pretibial  Wound Location Orientation: Proximal;Right   Wound Image    Wound Etiology Venous   Dressing Status Old drainage noted   Wound Cleansed Soap and water;Vashe   Dressing/Treatment Alginate with Ag   Wound Length (cm) 1 cm   Wound Width (cm) 4 cm   Wound Depth (cm) 0.1 cm   Wound Surface Area (cm^2) 4 cm^2   Change in Wound Size % (l*w) 93.75   Wound Volume (cm^3) 0.4 cm^3   Wound Healing % 94   Wound Assessment Pink/red   Drainage Amount Moderate (25-50%)   Drainage Description

## 2025-04-24 NOTE — WOUND CARE
Multilayer Compression Wrap   (Not Unna) Below the Knee    NAME:  Jozef Felix  YOB: 1965  MEDICAL RECORD NUMBER:  [id number]  DATE:  4/24/2025    Multilayer compression wrap: Removed old Multilayer wrap if indicated and wash leg with mild soap/water.  Applied moisturizing agent to dry skin as needed.   Applied primary and secondary dressing as ordered.  Applied multilayered dressing below the knee to right lower leg.  Applied multilayered dressing below the knee to left lower leg.  Instructed patient/caregiver not to remove dressing and to keep it clean and dry.   Instructed patient/caregiver on complications to report to provider, such as pain, numbness in toes, heavy drainage, and slippage of dressing.  Instructed patient on purpose of compression dressing and on activity and exercise recommendations.      Electronically signed by KATHERIN GIBBS RN on 4/24/2025 at 4:33 PM

## 2025-04-24 NOTE — FLOWSHEET NOTE
04/24/25 1319   Right Leg Edema Point of Measurement   Leg circumference 42 cm   Ankle circumference 26 cm   Foot circumference 29.4 cm   Compression Therapy 2 layer compression wrap   Left Leg Edema Point of Measurement   Leg circumference 40 cm   Ankle circumference 24 cm   Foot circumference 29 cm   Compression Therapy 2 layer compression wrap   Wound 04/21/25 Foot Left;Dorsal #3   Date First Assessed/Time First Assessed: 04/21/25 1420   Present on Original Admission: Yes  Wound Approximate Age at First Assessment (Weeks): 1 weeks  Location: Foot  Wound Location Orientation: Left;Dorsal  Wound Description (Comments): #3   Wound Image     Wound Etiology Venous   Dressing Status Old drainage noted   Wound Cleansed Soap and water   Dressing/Treatment Gauze dressing/dressing sponge   Wound Length (cm) 4 cm   Wound Width (cm) 9 cm   Wound Depth (cm) 0.1 cm   Wound Surface Area (cm^2) 36 cm^2   Change in Wound Size % (l*w) 10   Wound Volume (cm^3) 3.6 cm^3   Wound Healing % 10   Wound Assessment Pink/red   Drainage Amount Small (< 25%)   Drainage Description Serosanguinous   Odor None   Ariadne-wound Assessment Edematous;Dry/flaky   Margins Attached edges   Wound Thickness Description not for Pressure Injury Full thickness   Wound 04/07/25 Pretibial Proximal;Right   Date First Assessed/Time First Assessed: 04/07/25 1444   Present on Original Admission: Yes  Wound Approximate Age at First Assessment (Weeks): 1 weeks  Location: Pretibial  Wound Location Orientation: Proximal;Right   Wound Image    Wound Etiology Venous   Dressing Status Old drainage noted   Wound Cleansed Soap and water   Dressing/Treatment Alginate with Ag   Wound Length (cm) 1 cm   Wound Width (cm) 4 cm   Wound Depth (cm) 0.1 cm   Wound Surface Area (cm^2) 4 cm^2   Change in Wound Size % (l*w) 93.75   Wound Volume (cm^3) 0.4 cm^3   Wound Healing % 94   Wound Assessment Pink/red   Drainage Amount Moderate (25-50%)   Drainage Description Serous   Odor

## 2025-04-28 ENCOUNTER — HOSPITAL ENCOUNTER (OUTPATIENT)
Dept: WOUND CARE | Age: 60
Discharge: HOME | End: 2025-04-28
Payer: COMMERCIAL

## 2025-04-28 VITALS
SYSTOLIC BLOOD PRESSURE: 184 MMHG | WEIGHT: 315 LBS | DIASTOLIC BLOOD PRESSURE: 97 MMHG | RESPIRATION RATE: 18 BRPM | BODY MASS INDEX: 39.17 KG/M2 | TEMPERATURE: 98.1 F | OXYGEN SATURATION: 96 % | HEIGHT: 75 IN | HEART RATE: 83 BPM

## 2025-04-28 DIAGNOSIS — L97.301 VENOUS STASIS ULCER OF ANKLE LIMITED TO BREAKDOWN OF SKIN WITH VARICOSE VEINS, UNSPECIFIED LATERALITY (HCC): Primary | Chronic | ICD-10-CM

## 2025-04-28 DIAGNOSIS — I83.003 VENOUS STASIS ULCER OF ANKLE LIMITED TO BREAKDOWN OF SKIN WITH VARICOSE VEINS, UNSPECIFIED LATERALITY (HCC): Primary | Chronic | ICD-10-CM

## 2025-04-28 PROCEDURE — 29581 APPL MULTLAYER CMPRN SYS LEG: CPT

## 2025-04-28 RX ORDER — SODIUM CHLOR/HYPOCHLOROUS ACID 0.033 %
SOLUTION, IRRIGATION IRRIGATION PRN
Status: CANCELLED | OUTPATIENT
Start: 2025-04-28

## 2025-04-28 RX ORDER — BACITRACIN ZINC AND POLYMYXIN B SULFATE 500; 1000 [USP'U]/G; [USP'U]/G
OINTMENT TOPICAL PRN
Status: CANCELLED | OUTPATIENT
Start: 2025-04-28

## 2025-04-28 RX ORDER — NEOMYCIN/BACITRACIN/POLYMYXINB 3.5-400-5K
OINTMENT (GRAM) TOPICAL PRN
Status: CANCELLED | OUTPATIENT
Start: 2025-04-28

## 2025-04-28 RX ORDER — GENTAMICIN SULFATE 1 MG/G
OINTMENT TOPICAL PRN
Status: CANCELLED | OUTPATIENT
Start: 2025-04-28

## 2025-04-28 RX ORDER — LIDOCAINE HYDROCHLORIDE 40 MG/ML
SOLUTION TOPICAL PRN
Status: CANCELLED | OUTPATIENT
Start: 2025-04-28

## 2025-04-28 RX ORDER — LIDOCAINE 40 MG/G
CREAM TOPICAL PRN
Status: CANCELLED | OUTPATIENT
Start: 2025-04-28

## 2025-04-28 RX ORDER — LIDOCAINE 50 MG/G
OINTMENT TOPICAL PRN
Status: CANCELLED | OUTPATIENT
Start: 2025-04-28

## 2025-04-28 RX ORDER — CLOBETASOL PROPIONATE 0.5 MG/G
OINTMENT TOPICAL PRN
Status: CANCELLED | OUTPATIENT
Start: 2025-04-28

## 2025-04-28 RX ORDER — TRIAMCINOLONE ACETONIDE 1 MG/G
OINTMENT TOPICAL PRN
Status: CANCELLED | OUTPATIENT
Start: 2025-04-28

## 2025-04-28 RX ORDER — GINSENG 100 MG
CAPSULE ORAL PRN
Status: CANCELLED | OUTPATIENT
Start: 2025-04-28

## 2025-04-28 RX ORDER — LIDOCAINE HYDROCHLORIDE 20 MG/ML
JELLY TOPICAL PRN
Status: CANCELLED | OUTPATIENT
Start: 2025-04-28

## 2025-04-28 RX ORDER — MUPIROCIN 20 MG/G
OINTMENT TOPICAL PRN
Status: CANCELLED | OUTPATIENT
Start: 2025-04-28

## 2025-04-28 RX ORDER — BETAMETHASONE DIPROPIONATE 0.5 MG/G
CREAM TOPICAL PRN
Status: CANCELLED | OUTPATIENT
Start: 2025-04-28

## 2025-04-28 ASSESSMENT — PAIN SCALES - GENERAL: PAINLEVEL_OUTOF10: 6

## 2025-04-28 NOTE — DISCHARGE INSTRUCTIONS
Instructions: Bilateral legs and feet:  Wash with soap and water.  Apply oil emulsion to open areas.  Cover with silver alginate.  Optilock over left foot.  Apply 4 layer compression, change twice weekly for two weeks     Prescription for Augmentin sent to your pharmacy. Please  and start taking as prescribed.

## 2025-04-28 NOTE — WOUND CARE
Multilayer Compression Wrap   (Not Unna) Below the Knee    NAME:  Jozef Felix  YOB: 1965  MEDICAL RECORD NUMBER:  [id number]  DATE:  4/28/2025    Multilayer compression wrap: Removed old Multilayer wrap if indicated and wash leg with mild soap/water.  Applied moisturizing agent to dry skin as needed.   Applied primary and secondary dressing as ordered.  Applied multilayered dressing below the knee to right lower leg.  Applied multilayered dressing below the knee to left lower leg.  Instructed patient/caregiver not to remove dressing and to keep it clean and dry.   Instructed patient/caregiver on complications to report to provider, such as pain, numbness in toes, heavy drainage, and slippage of dressing.  Instructed patient on purpose of compression dressing and on activity and exercise recommendations.      Electronically signed by Rm Alejandra RN on 4/28/2025 at 2:55 PM

## 2025-04-28 NOTE — WOUND CARE
Discharge Instructions for  [facility]  [address] [place] [address]  [address]  [place] [address]  Phone [phone]   Fax [phone]      NAME:  Jozef Felix  YOB: 1965  MEDICAL RECORD NUMBER:  [id number]  DATE:  4/28/2025    Return Appointment:   2 weeks with Alfa Edouard, DO  Clinician visits twice weekly to change wraps      Instructions: Bilateral legs and feet:  Wash with soap and water.  Apply oil emulsion to open areas.  Cover with silver alginate.  Optilock over left foot.  Apply 4 layer compression, change twice weekly for two weeks    Prescription for Augmentin sent to your pharmacy. Please  and start taking as prescribed.    Mechanically debrided with gauze and saline.     Should you experience increased redness, swelling, pain, foul odor, size of wound(s), or have a temperature over 101 degrees please contact the [facility] Healing Center at [phone] or if after hours contact your primary care physician or go to the hospital emergency department.    PLEASE NOTE: IF YOU ARE UNABLE TO OBTAIN WOUND SUPPLIES, CONTINUE TO USE THE SUPPLIES YOU HAVE AVAILABLE UNTIL YOU ARE ABLE TO REACH US. IT IS MOST IMPORTANT TO KEEP THE WOUND COVERED AT ALL TIMES.    Electronically signed Rm Alejandra RN on 4/28/2025 at 2:06 PM

## 2025-04-28 NOTE — FLOWSHEET NOTE
Wound Healing % -28214   Wound Assessment Pink/red   Drainage Amount Moderate (25-50%)   Drainage Description Serosanguinous   Odor Malodorous/putrid   Ariadne-wound Assessment Edematous   Margins Undefined edges   Wound Thickness Description not for Pressure Injury Full thickness   Wound 04/07/25 Pretibial Proximal;Right   Date First Assessed/Time First Assessed: 04/07/25 1444   Present on Original Admission: Yes  Wound Approximate Age at First Assessment (Weeks): 1 weeks  Location: Pretibial  Wound Location Orientation: Proximal;Right   Wound Image    Wound Etiology Venous   Dressing Status Old drainage noted   Wound Cleansed Soap and water   Dressing/Treatment Alginate with Ag  (oil emulsion)   Wound Length (cm) 1 cm   Wound Width (cm) 11 cm   Wound Depth (cm) 0.1 cm   Wound Surface Area (cm^2) 11 cm^2   Change in Wound Size % (l*w) 82.81   Wound Volume (cm^3) 1.1 cm^3   Wound Healing % 83   Wound Assessment Pink/red   Drainage Amount Moderate (25-50%)   Drainage Description Serosanguinous   Odor Malodorous/putrid   Ariadne-wound Assessment Edematous   Margins Undefined edges   Wound Thickness Description not for Pressure Injury Full thickness   Pain Assessment   Pain Assessment 0-10   Pain Level 6   Taking xarelto

## 2025-05-01 ENCOUNTER — HOSPITAL ENCOUNTER (OUTPATIENT)
Dept: WOUND CARE | Age: 60
Discharge: HOME | End: 2025-05-01
Payer: COMMERCIAL

## 2025-05-01 VITALS
WEIGHT: 315 LBS | HEIGHT: 75 IN | BODY MASS INDEX: 39.17 KG/M2 | TEMPERATURE: 98.5 F | DIASTOLIC BLOOD PRESSURE: 97 MMHG | HEART RATE: 83 BPM | SYSTOLIC BLOOD PRESSURE: 161 MMHG | RESPIRATION RATE: 16 BRPM

## 2025-05-01 DIAGNOSIS — I83.003 VENOUS STASIS ULCER OF ANKLE LIMITED TO BREAKDOWN OF SKIN WITH VARICOSE VEINS, UNSPECIFIED LATERALITY (HCC): Primary | ICD-10-CM

## 2025-05-01 DIAGNOSIS — L97.301 VENOUS STASIS ULCER OF ANKLE LIMITED TO BREAKDOWN OF SKIN WITH VARICOSE VEINS, UNSPECIFIED LATERALITY (HCC): Primary | ICD-10-CM

## 2025-05-01 PROCEDURE — 29581 APPL MULTLAYER CMPRN SYS LEG: CPT

## 2025-05-01 RX ORDER — BACITRACIN ZINC AND POLYMYXIN B SULFATE 500; 1000 [USP'U]/G; [USP'U]/G
OINTMENT TOPICAL PRN
OUTPATIENT
Start: 2025-05-01

## 2025-05-01 RX ORDER — LIDOCAINE 40 MG/G
CREAM TOPICAL PRN
OUTPATIENT
Start: 2025-05-01

## 2025-05-01 RX ORDER — LIDOCAINE 50 MG/G
OINTMENT TOPICAL PRN
OUTPATIENT
Start: 2025-05-01

## 2025-05-01 RX ORDER — GINSENG 100 MG
CAPSULE ORAL PRN
OUTPATIENT
Start: 2025-05-01

## 2025-05-01 RX ORDER — LIDOCAINE HYDROCHLORIDE 20 MG/ML
JELLY TOPICAL PRN
OUTPATIENT
Start: 2025-05-01

## 2025-05-01 RX ORDER — MUPIROCIN 20 MG/G
OINTMENT TOPICAL PRN
OUTPATIENT
Start: 2025-05-01

## 2025-05-01 RX ORDER — GENTAMICIN SULFATE 1 MG/G
OINTMENT TOPICAL PRN
OUTPATIENT
Start: 2025-05-01

## 2025-05-01 RX ORDER — CLOBETASOL PROPIONATE 0.5 MG/G
OINTMENT TOPICAL PRN
OUTPATIENT
Start: 2025-05-01

## 2025-05-01 RX ORDER — NEOMYCIN/BACITRACIN/POLYMYXINB 3.5-400-5K
OINTMENT (GRAM) TOPICAL PRN
OUTPATIENT
Start: 2025-05-01

## 2025-05-01 RX ORDER — SODIUM CHLOR/HYPOCHLOROUS ACID 0.033 %
SOLUTION, IRRIGATION IRRIGATION PRN
OUTPATIENT
Start: 2025-05-01

## 2025-05-01 RX ORDER — LIDOCAINE HYDROCHLORIDE 40 MG/ML
SOLUTION TOPICAL PRN
OUTPATIENT
Start: 2025-05-01

## 2025-05-01 RX ORDER — BETAMETHASONE DIPROPIONATE 0.5 MG/G
CREAM TOPICAL PRN
OUTPATIENT
Start: 2025-05-01

## 2025-05-01 RX ORDER — TRIAMCINOLONE ACETONIDE 1 MG/G
OINTMENT TOPICAL PRN
OUTPATIENT
Start: 2025-05-01

## 2025-05-01 ASSESSMENT — PAIN DESCRIPTION - ORIENTATION: ORIENTATION: RIGHT;LEFT

## 2025-05-01 ASSESSMENT — PAIN SCALES - GENERAL: PAINLEVEL_OUTOF10: 8

## 2025-05-01 ASSESSMENT — PAIN DESCRIPTION - DESCRIPTORS: DESCRIPTORS: ACHING

## 2025-05-01 ASSESSMENT — PAIN DESCRIPTION - LOCATION: LOCATION: LEG

## 2025-05-01 NOTE — WOUND CARE
Multilayer Compression Wrap   (Not Unna) Below the Knee    NAME:  Jozef Felix  YOB: 1965  MEDICAL RECORD NUMBER:  [id number]  DATE:  5/1/2025    Multilayer compression wrap: Removed old Multilayer wrap if indicated and wash leg with mild soap/water.  Applied moisturizing agent to dry skin as needed.   Applied primary and secondary dressing as ordered.  Applied multilayered dressing below the knee to right lower leg.  Applied multilayered dressing below the knee to left lower leg.  Instructed patient/caregiver not to remove dressing and to keep it clean and dry.   Instructed patient/caregiver on complications to report to provider, such as pain, numbness in toes, heavy drainage, and slippage of dressing.  Instructed patient on purpose of compression dressing and on activity and exercise recommendations.      Electronically signed by Maria Elena Sequeira RN on 5/1/2025 at 3:10 PM

## 2025-05-01 NOTE — FLOWSHEET NOTE
05/01/25 1327   Right Leg Edema Point of Measurement   Leg circumference 48 cm   Ankle circumference 26 cm   Foot circumference 32 cm   Compression Therapy 4 layer compression wrap   Left Leg Edema Point of Measurement   Leg circumference 48 cm   Ankle circumference 23 cm   Foot circumference 30 cm   Compression Therapy 4 layer compression wrap   Wound 04/21/25 Foot Left;Dorsal #3   Date First Assessed/Time First Assessed: 04/21/25 1420   Present on Original Admission: Yes  Wound Approximate Age at First Assessment (Weeks): 1 weeks  Location: Foot  Wound Location Orientation: Left;Dorsal  Wound Description (Comments): #3   Wound Image    Wound Etiology Venous   Dressing Status Old drainage noted   Wound Cleansed Cleansed with saline;Soap and water   Dressing/Treatment Alginate with Ag  (Oil Emulsion Gauze)   Wound Length (cm) 2 cm   Wound Width (cm) 5 cm   Wound Depth (cm) 0.1 cm   Wound Surface Area (cm^2) 10 cm^2   Change in Wound Size % (l*w) 75   Wound Volume (cm^3) 1 cm^3   Wound Healing % 75   Wound Assessment Slough;Pink/red   Drainage Amount Moderate (25-50%)   Drainage Description Serosanguinous   Odor Moderate   Ariadne-wound Assessment Edematous;Hemosiderin staining (brown yellow)   Margins Attached edges   Wound Thickness Description not for Pressure Injury Full thickness   Wound 04/07/25 Pretibial Proximal;Right   Date First Assessed/Time First Assessed: 04/07/25 1444   Present on Original Admission: Yes  Wound Approximate Age at First Assessment (Weeks): 1 weeks  Location: Pretibial  Wound Location Orientation: Proximal;Right   Wound Image    Wound Etiology Venous   Dressing Status Old drainage noted   Wound Cleansed Cleansed with saline;Soap and water   Dressing/Treatment Alginate with Ag  (Oil Emulsion Gauze)   Wound Length (cm) 1 cm   Wound Width (cm) 9 cm   Wound Depth (cm) 0.1 cm   Wound Surface Area (cm^2) 9 cm^2   Change in Wound Size % (l*w) 85.94   Wound Volume (cm^3) 0.9 cm^3   Wound Healing

## 2025-05-05 ENCOUNTER — HOSPITAL ENCOUNTER (OUTPATIENT)
Dept: WOUND CARE | Age: 60
Discharge: HOME | End: 2025-05-05
Payer: COMMERCIAL

## 2025-05-05 VITALS
RESPIRATION RATE: 16 BRPM | HEART RATE: 74 BPM | OXYGEN SATURATION: 95 % | TEMPERATURE: 98.2 F | SYSTOLIC BLOOD PRESSURE: 155 MMHG | BODY MASS INDEX: 39.17 KG/M2 | HEIGHT: 75 IN | DIASTOLIC BLOOD PRESSURE: 92 MMHG | WEIGHT: 315 LBS

## 2025-05-05 DIAGNOSIS — L97.301 VENOUS STASIS ULCER OF ANKLE LIMITED TO BREAKDOWN OF SKIN WITH VARICOSE VEINS, UNSPECIFIED LATERALITY (HCC): Primary | ICD-10-CM

## 2025-05-05 DIAGNOSIS — I83.003 VENOUS STASIS ULCER OF ANKLE LIMITED TO BREAKDOWN OF SKIN WITH VARICOSE VEINS, UNSPECIFIED LATERALITY (HCC): Primary | ICD-10-CM

## 2025-05-05 PROCEDURE — 99213 OFFICE O/P EST LOW 20 MIN: CPT | Performed by: FAMILY MEDICINE

## 2025-05-05 PROCEDURE — 29581 APPL MULTLAYER CMPRN SYS LEG: CPT

## 2025-05-05 RX ORDER — BETAMETHASONE DIPROPIONATE 0.5 MG/G
CREAM TOPICAL PRN
Status: CANCELLED | OUTPATIENT
Start: 2025-05-05

## 2025-05-05 RX ORDER — LIDOCAINE HYDROCHLORIDE 40 MG/ML
SOLUTION TOPICAL PRN
Status: CANCELLED | OUTPATIENT
Start: 2025-05-05

## 2025-05-05 RX ORDER — LIDOCAINE HYDROCHLORIDE 20 MG/ML
JELLY TOPICAL PRN
Status: CANCELLED | OUTPATIENT
Start: 2025-05-05

## 2025-05-05 RX ORDER — MUPIROCIN 20 MG/G
OINTMENT TOPICAL PRN
Status: CANCELLED | OUTPATIENT
Start: 2025-05-05

## 2025-05-05 RX ORDER — LIDOCAINE 40 MG/G
CREAM TOPICAL PRN
Status: CANCELLED | OUTPATIENT
Start: 2025-05-05

## 2025-05-05 RX ORDER — GINSENG 100 MG
CAPSULE ORAL PRN
Status: CANCELLED | OUTPATIENT
Start: 2025-05-05

## 2025-05-05 RX ORDER — TRIAMCINOLONE ACETONIDE 1 MG/G
OINTMENT TOPICAL PRN
Status: CANCELLED | OUTPATIENT
Start: 2025-05-05

## 2025-05-05 RX ORDER — SODIUM CHLOR/HYPOCHLOROUS ACID 0.033 %
SOLUTION, IRRIGATION IRRIGATION PRN
Status: CANCELLED | OUTPATIENT
Start: 2025-05-05

## 2025-05-05 RX ORDER — LIDOCAINE 50 MG/G
OINTMENT TOPICAL PRN
Status: CANCELLED | OUTPATIENT
Start: 2025-05-05

## 2025-05-05 RX ORDER — GENTAMICIN SULFATE 1 MG/G
OINTMENT TOPICAL PRN
Status: CANCELLED | OUTPATIENT
Start: 2025-05-05

## 2025-05-05 RX ORDER — BACITRACIN ZINC AND POLYMYXIN B SULFATE 500; 1000 [USP'U]/G; [USP'U]/G
OINTMENT TOPICAL PRN
Status: CANCELLED | OUTPATIENT
Start: 2025-05-05

## 2025-05-05 RX ORDER — NEOMYCIN/BACITRACIN/POLYMYXINB 3.5-400-5K
OINTMENT (GRAM) TOPICAL PRN
Status: CANCELLED | OUTPATIENT
Start: 2025-05-05

## 2025-05-05 RX ORDER — CLOBETASOL PROPIONATE 0.5 MG/G
OINTMENT TOPICAL PRN
Status: CANCELLED | OUTPATIENT
Start: 2025-05-05

## 2025-05-05 NOTE — WOUND CARE
Discharge Instructions for  [facility]  [address] [place] [address]  [address] [place] [address]  Phone [phone]   Fax [phone]      NAME:  Jozef Felix  YOB: 1965  MEDICAL RECORD NUMBER:  [id number]  DATE:  5/5/2025    Return Appointment:   2 weeks with Alfa Edouard, DO  Clinician visits twice weekly to change wraps      Instructions: Bilateral legs and feet:  Wash with soap and water.  Apply oil emulsion to open areas.  Cover with silver alginate.  Apply 4 layer compression, change twice weekly for two weeks    Continue with augmentin as prescribed.    Mechanically debrided with gauze and saline.     Should you experience increased redness, swelling, pain, foul odor, size of wound(s), or have a temperature over 101 degrees please contact the [facility]aling Center at [phone] or if after hours contact your primary care physician or go to the hospital emergency department.    PLEASE NOTE: IF YOU ARE UNABLE TO OBTAIN WOUND SUPPLIES, CONTINUE TO USE THE SUPPLIES YOU HAVE AVAILABLE UNTIL YOU ARE ABLE TO REACH US. IT IS MOST IMPORTANT TO KEEP THE WOUND COVERED AT ALL TIMES.    Electronically signed Sadie Bedolla RN on 5/5/2025 at 4:16 PM

## 2025-05-05 NOTE — WOUND CARE
Multilayer Compression Wrap   (Not Unna) Below the Knee    NAME:  Jozef Felix  YOB: 1965  MEDICAL RECORD NUMBER:  [id number]  DATE:  5/5/2025    Multilayer compression wrap: Removed old Multilayer wrap if indicated and wash leg with mild soap/water.  Applied primary and secondary dressing as ordered.  Applied multilayered dressing below the knee to right lower leg.  Applied multilayered dressing below the knee to left lower leg.  Instructed patient/caregiver not to remove dressing and to keep it clean and dry.   Instructed patient/caregiver on complications to report to provider, such as pain, numbness in toes, heavy drainage, and slippage of dressing.  Instructed patient on purpose of compression dressing and on activity and exercise recommendations.      Electronically signed by Sadie Bedolla RN on 5/5/2025 at 4:44 PM

## 2025-05-05 NOTE — FLOWSHEET NOTE
05/05/25 1543   Right Leg Edema Point of Measurement   Leg circumference 42 cm   Ankle circumference 26 cm   Foot circumference 29 cm   Compression Therapy 4 layer compression wrap   Left Leg Edema Point of Measurement   Leg circumference 39 cm   Ankle circumference 23 cm   Foot circumference 29.5 cm   Compression Therapy 4 layer compression wrap   Wound 04/21/25 Foot Left;Dorsal #3   Date First Assessed/Time First Assessed: 04/21/25 1420   Present on Original Admission: Yes  Wound Approximate Age at First Assessment (Weeks): 1 weeks  Location: Foot  Wound Location Orientation: Left;Dorsal  Wound Description (Comments): #3   Wound Image    Wound Etiology Venous   Dressing Status Old drainage noted   Wound Cleansed Soap and water   Dressing/Treatment Alginate with Ag   Wound Length (cm) 2 cm   Wound Width (cm) 5 cm   Wound Depth (cm) 0.1 cm   Wound Surface Area (cm^2) 10 cm^2   Change in Wound Size % (l*w) 75   Wound Volume (cm^3) 1 cm^3   Wound Healing % 75   Wound Assessment Slough;Pink/red   Drainage Amount Moderate (25-50%)   Drainage Description Serous   Odor Mild   Ariadne-wound Assessment Edematous   Wound Thickness Description not for Pressure Injury Full thickness   Wound 04/07/25 Pretibial Proximal;Right   Date First Assessed/Time First Assessed: 04/07/25 1444   Present on Original Admission: Yes  Wound Approximate Age at First Assessment (Weeks): 1 weeks  Location: Pretibial  Wound Location Orientation: Proximal;Right   Wound Image    Wound Etiology Venous   Dressing Status Old drainage noted   Wound Cleansed Soap and water   Dressing/Treatment Alginate with Ag   Wound Length (cm) 9 cm   Wound Width (cm) 9 cm   Wound Depth (cm) 0.1 cm   Wound Surface Area (cm^2) 81 cm^2   Change in Wound Size % (l*w) -26.56   Wound Volume (cm^3) 8.1 cm^3   Wound Healing % -27   Wound Assessment Slough;Pink/red   Drainage Amount Moderate (25-50%)   Drainage Description Serous   Odor Mild   Ariadne-wound Assessment Edematous

## 2025-05-06 NOTE — PROGRESS NOTES
[facility]   [facility]   Medical Staff Progress Note     Jozef Felix  MEDICAL RECORD NUMBER:  [id number]  AGE: 59 y.o.   GENDER: male  : 1965  EPISODE DATE:  2025    Chief complaint and reason for visit:     Chief Complaint   Patient presents with    Wound Check     Bilateral lower legs      Patient presenting for follow up evaluation of wound(s) per chief complaint.  Patient here with lower extremity weeping and lymphedema.    Subjective and ROS: Symptoms, wound related issues, or other pertinent wound history since last visit: no new wound care issues. Tolerating current treatment. No systemic complaints above baseline.    History of Wound Context: Per original history and physical on this patient. Changes in history since last evaluation: none    Medical Decision Making:     Problem List Items Addressed This Visit          Circulatory    Venous stasis ulcer of ankle limited to breakdown of skin with varicose veins (HCC) - Primary (Chronic)       Wounds and Treatment Plan:    Patient here today with dressing change in lower extremity.  Patient was able to wash his legs himself today in front of the nurse.  Goal is to get him to be able to wash his legs daily at home and use his Velcro device compression devices he has at home.  Patient is refused to do this in the past.  Legs about the same today     Return Appointment:   2 weeks with Alfa Edouard, DO  Clinician visits twice weekly to change wraps        Instructions: Bilateral legs and feet:  Wash with soap and water.  Apply oil emulsion to open areas.  Cover with silver alginate.  Apply 4 layer compression, change twice weekly for two weeks     Continue with augmentin as prescribed.     Mechanically debrided with gauze and saline.   Other associated diagnoses or problems addressed:  Lower extremity edema.  Patient noncompliance.    Pertinent imaging reviewed including independent

## 2025-05-08 ENCOUNTER — HOSPITAL ENCOUNTER (OUTPATIENT)
Dept: WOUND CARE | Age: 60
Discharge: HOME | End: 2025-05-08
Payer: COMMERCIAL

## 2025-05-08 VITALS
SYSTOLIC BLOOD PRESSURE: 147 MMHG | BODY MASS INDEX: 39.17 KG/M2 | WEIGHT: 315 LBS | TEMPERATURE: 97.9 F | RESPIRATION RATE: 18 BRPM | HEIGHT: 75 IN | DIASTOLIC BLOOD PRESSURE: 91 MMHG | HEART RATE: 78 BPM

## 2025-05-08 DIAGNOSIS — I83.003 VENOUS STASIS ULCER OF ANKLE LIMITED TO BREAKDOWN OF SKIN WITH VARICOSE VEINS, UNSPECIFIED LATERALITY (HCC): Primary | Chronic | ICD-10-CM

## 2025-05-08 DIAGNOSIS — L97.301 VENOUS STASIS ULCER OF ANKLE LIMITED TO BREAKDOWN OF SKIN WITH VARICOSE VEINS, UNSPECIFIED LATERALITY (HCC): Primary | Chronic | ICD-10-CM

## 2025-05-08 PROCEDURE — 29581 APPL MULTLAYER CMPRN SYS LEG: CPT

## 2025-05-08 RX ORDER — BETAMETHASONE DIPROPIONATE 0.5 MG/G
CREAM TOPICAL PRN
OUTPATIENT
Start: 2025-05-08

## 2025-05-08 RX ORDER — MUPIROCIN 20 MG/G
OINTMENT TOPICAL PRN
OUTPATIENT
Start: 2025-05-08

## 2025-05-08 RX ORDER — NEOMYCIN/BACITRACIN/POLYMYXINB 3.5-400-5K
OINTMENT (GRAM) TOPICAL PRN
OUTPATIENT
Start: 2025-05-08

## 2025-05-08 RX ORDER — LIDOCAINE HYDROCHLORIDE 20 MG/ML
JELLY TOPICAL PRN
OUTPATIENT
Start: 2025-05-08

## 2025-05-08 RX ORDER — LIDOCAINE 40 MG/G
CREAM TOPICAL PRN
OUTPATIENT
Start: 2025-05-08

## 2025-05-08 RX ORDER — SODIUM CHLOR/HYPOCHLOROUS ACID 0.033 %
SOLUTION, IRRIGATION IRRIGATION PRN
OUTPATIENT
Start: 2025-05-08

## 2025-05-08 RX ORDER — CLOBETASOL PROPIONATE 0.5 MG/G
OINTMENT TOPICAL PRN
OUTPATIENT
Start: 2025-05-08

## 2025-05-08 RX ORDER — GENTAMICIN SULFATE 1 MG/G
OINTMENT TOPICAL PRN
OUTPATIENT
Start: 2025-05-08

## 2025-05-08 RX ORDER — LIDOCAINE 50 MG/G
OINTMENT TOPICAL PRN
OUTPATIENT
Start: 2025-05-08

## 2025-05-08 RX ORDER — LIDOCAINE HYDROCHLORIDE 40 MG/ML
SOLUTION TOPICAL PRN
OUTPATIENT
Start: 2025-05-08

## 2025-05-08 RX ORDER — TRIAMCINOLONE ACETONIDE 1 MG/G
OINTMENT TOPICAL PRN
OUTPATIENT
Start: 2025-05-08

## 2025-05-08 RX ORDER — BACITRACIN ZINC AND POLYMYXIN B SULFATE 500; 1000 [USP'U]/G; [USP'U]/G
OINTMENT TOPICAL PRN
OUTPATIENT
Start: 2025-05-08

## 2025-05-08 RX ORDER — GINSENG 100 MG
CAPSULE ORAL PRN
OUTPATIENT
Start: 2025-05-08

## 2025-05-08 ASSESSMENT — PAIN SCALES - GENERAL: PAINLEVEL_OUTOF10: 4

## 2025-05-08 NOTE — FLOWSHEET NOTE
05/08/25 1530   Right Leg Edema Point of Measurement   Leg circumference 47 cm   Ankle circumference 28 cm   Foot circumference 30 cm   Compression Therapy 4 layer compression wrap   Left Leg Edema Point of Measurement   Leg circumference 38.5 cm   Ankle circumference 24 cm   Foot circumference 29.5 cm   Compression Therapy 4 layer compression wrap   Wound 04/21/25 Foot Left;Dorsal #3   Date First Assessed/Time First Assessed: 04/21/25 1420   Present on Original Admission: Yes  Wound Approximate Age at First Assessment (Weeks): 1 weeks  Location: Foot  Wound Location Orientation: Left;Dorsal  Wound Description (Comments): #3   Wound Image    Wound Etiology Venous   Dressing Status Old drainage noted   Wound Cleansed Soap and water   Dressing/Treatment Alginate with Ag   Wound Length (cm) 2 cm   Wound Width (cm) 5 cm   Wound Depth (cm) 0.1 cm   Wound Surface Area (cm^2) 10 cm^2   Change in Wound Size % (l*w) 75   Wound Volume (cm^3) 1 cm^3   Wound Healing % 75   Wound Assessment Slough;Pink/red   Drainage Amount Moderate (25-50%)   Drainage Description Serous   Odor Mild   Ariadne-wound Assessment Edematous   Margins Attached edges   Wound Thickness Description not for Pressure Injury Full thickness   Wound 04/07/25 Pretibial Proximal;Right   Date First Assessed/Time First Assessed: 04/07/25 1444   Present on Original Admission: Yes  Wound Approximate Age at First Assessment (Weeks): 1 weeks  Location: Pretibial  Wound Location Orientation: Proximal;Right   Wound Image    Wound Etiology Venous   Dressing Status Old drainage noted   Wound Cleansed Soap and water   Dressing/Treatment Alginate with Ag   Wound Length (cm) 9 cm   Wound Width (cm) 9 cm   Wound Depth (cm) 0.1 cm   Wound Surface Area (cm^2) 81 cm^2   Change in Wound Size % (l*w) -26.56   Wound Volume (cm^3) 8.1 cm^3   Wound Healing % -27   Wound Assessment Slough;Pink/red   Drainage Amount Moderate (25-50%)   Drainage Description Serous   Odor Mild

## 2025-05-08 NOTE — WOUND CARE
Multilayer Compression Wrap   (Not Unna) Below the Knee    NAME:  Jozef Felix  YOB: 1965  MEDICAL RECORD NUMBER:  [id number]  DATE:  5/8/2025    Multilayer compression wrap: Removed old Multilayer wrap if indicated and wash leg with mild soap/water.  Applied moisturizing agent to dry skin as needed.   Applied primary and secondary dressing as ordered.  Applied multilayered dressing below the knee to right lower leg.  Applied multilayered dressing below the knee to left lower leg.  Instructed patient/caregiver not to remove dressing and to keep it clean and dry.   Instructed patient/caregiver on complications to report to provider, such as pain, numbness in toes, heavy drainage, and slippage of dressing.  Instructed patient on purpose of compression dressing and on activity and exercise recommendations.      Electronically signed by Rm Alejandra RN on 5/8/2025 at 4:25 PM

## 2025-05-12 ENCOUNTER — HOSPITAL ENCOUNTER (OUTPATIENT)
Dept: WOUND CARE | Age: 60
Discharge: HOME | End: 2025-05-12
Payer: COMMERCIAL

## 2025-05-12 VITALS
TEMPERATURE: 97.7 F | SYSTOLIC BLOOD PRESSURE: 164 MMHG | WEIGHT: 315 LBS | BODY MASS INDEX: 39.17 KG/M2 | DIASTOLIC BLOOD PRESSURE: 93 MMHG | HEIGHT: 75 IN | RESPIRATION RATE: 18 BRPM

## 2025-05-12 DIAGNOSIS — L97.301 VENOUS STASIS ULCER OF ANKLE LIMITED TO BREAKDOWN OF SKIN WITH VARICOSE VEINS, UNSPECIFIED LATERALITY (HCC): Primary | ICD-10-CM

## 2025-05-12 DIAGNOSIS — I83.003 VENOUS STASIS ULCER OF ANKLE LIMITED TO BREAKDOWN OF SKIN WITH VARICOSE VEINS, UNSPECIFIED LATERALITY (HCC): Primary | ICD-10-CM

## 2025-05-12 PROCEDURE — 29581 APPL MULTLAYER CMPRN SYS LEG: CPT

## 2025-05-12 PROCEDURE — 99213 OFFICE O/P EST LOW 20 MIN: CPT | Performed by: FAMILY MEDICINE

## 2025-05-12 RX ORDER — LIDOCAINE HYDROCHLORIDE 20 MG/ML
JELLY TOPICAL PRN
OUTPATIENT
Start: 2025-05-12

## 2025-05-12 RX ORDER — SODIUM CHLOR/HYPOCHLOROUS ACID 0.033 %
SOLUTION, IRRIGATION IRRIGATION PRN
Status: DISCONTINUED | OUTPATIENT
Start: 2025-05-12 | End: 2025-05-13 | Stop reason: HOSPADM

## 2025-05-12 RX ORDER — GENTAMICIN SULFATE 1 MG/G
OINTMENT TOPICAL PRN
OUTPATIENT
Start: 2025-05-12

## 2025-05-12 RX ORDER — LIDOCAINE HYDROCHLORIDE 40 MG/ML
SOLUTION TOPICAL PRN
OUTPATIENT
Start: 2025-05-12

## 2025-05-12 RX ORDER — LIDOCAINE 50 MG/G
OINTMENT TOPICAL PRN
OUTPATIENT
Start: 2025-05-12

## 2025-05-12 RX ORDER — NEOMYCIN/BACITRACIN/POLYMYXINB 3.5-400-5K
OINTMENT (GRAM) TOPICAL PRN
OUTPATIENT
Start: 2025-05-12

## 2025-05-12 RX ORDER — SODIUM CHLOR/HYPOCHLOROUS ACID 0.033 %
SOLUTION, IRRIGATION IRRIGATION PRN
Status: CANCELLED | OUTPATIENT
Start: 2025-05-12

## 2025-05-12 RX ORDER — LIDOCAINE HYDROCHLORIDE 20 MG/ML
JELLY TOPICAL PRN
Status: DISCONTINUED | OUTPATIENT
Start: 2025-05-12 | End: 2025-05-13 | Stop reason: HOSPADM

## 2025-05-12 RX ORDER — MUPIROCIN 20 MG/G
OINTMENT TOPICAL PRN
OUTPATIENT
Start: 2025-05-12

## 2025-05-12 RX ORDER — BACITRACIN ZINC AND POLYMYXIN B SULFATE 500; 1000 [USP'U]/G; [USP'U]/G
OINTMENT TOPICAL PRN
OUTPATIENT
Start: 2025-05-12

## 2025-05-12 RX ORDER — CLOBETASOL PROPIONATE 0.5 MG/G
OINTMENT TOPICAL PRN
OUTPATIENT
Start: 2025-05-12

## 2025-05-12 RX ORDER — BETAMETHASONE DIPROPIONATE 0.5 MG/G
CREAM TOPICAL PRN
OUTPATIENT
Start: 2025-05-12

## 2025-05-12 RX ORDER — LIDOCAINE 40 MG/G
CREAM TOPICAL PRN
OUTPATIENT
Start: 2025-05-12

## 2025-05-12 RX ORDER — TRIAMCINOLONE ACETONIDE 1 MG/G
OINTMENT TOPICAL PRN
OUTPATIENT
Start: 2025-05-12

## 2025-05-12 RX ORDER — GINSENG 100 MG
CAPSULE ORAL PRN
OUTPATIENT
Start: 2025-05-12

## 2025-05-12 RX ADMIN — Medication: at 16:18

## 2025-05-12 RX ADMIN — LIDOCAINE HYDROCHLORIDE: 20 JELLY TOPICAL at 16:18

## 2025-05-12 ASSESSMENT — PAIN DESCRIPTION - ORIENTATION: ORIENTATION: RIGHT

## 2025-05-12 ASSESSMENT — PAIN DESCRIPTION - LOCATION: LOCATION: LEG

## 2025-05-12 ASSESSMENT — PAIN SCALES - GENERAL: PAINLEVEL_OUTOF10: 4

## 2025-05-12 NOTE — FLOWSHEET NOTE
05/12/25 1543   Right Leg Edema Point of Measurement   Leg circumference 41.5 cm   Ankle circumference 25.5 cm   Foot circumference 28.5 cm   Compression Therapy 4 layer compression wrap   Left Leg Edema Point of Measurement   Leg circumference 37.5 cm   Ankle circumference 22 cm   Foot circumference 29 cm   Compression Therapy 4 layer compression wrap   Wound 04/21/25 Foot Left;Dorsal #3   Date First Assessed/Time First Assessed: 04/21/25 1420   Present on Original Admission: Yes  Wound Approximate Age at First Assessment (Weeks): 1 weeks  Location: Foot  Wound Location Orientation: Left;Dorsal  Wound Description (Comments): #3   Wound Image    Wound Etiology Venous   Dressing Status Old drainage noted   Wound Cleansed Soap and water   Dressing/Treatment Alginate with Ag   Wound Length (cm) 3 cm   Wound Width (cm) 7 cm   Wound Depth (cm) 0.1 cm   Wound Surface Area (cm^2) 21 cm^2   Change in Wound Size % (l*w) 47.5   Wound Volume (cm^3) 2.1 cm^3   Wound Healing % 48   Wound Assessment Slough;Pink/red   Drainage Amount Moderate (25-50%)   Drainage Description Serous   Odor Mild   Ariadne-wound Assessment Edematous   Wound Thickness Description not for Pressure Injury Full thickness   Wound 04/07/25 Pretibial Proximal;Right   Date First Assessed/Time First Assessed: 04/07/25 1444   Present on Original Admission: Yes  Wound Approximate Age at First Assessment (Weeks): 1 weeks  Location: Pretibial  Wound Location Orientation: Proximal;Right   Wound Image     Wound Etiology Venous   Dressing Status Old drainage noted   Wound Cleansed Soap and water   Dressing/Treatment Alginate with Ag   Wound Length (cm) 2 cm   Wound Width (cm) 2 cm   Wound Depth (cm) 0.1 cm   Wound Surface Area (cm^2) 4 cm^2   Change in Wound Size % (l*w) 93.75   Wound Volume (cm^3) 0.4 cm^3   Wound Healing % 94   Wound Assessment Slough   Drainage Amount Moderate (25-50%)   Drainage Description Serous   Odor Mild   Ariadne-wound Assessment Edematous

## 2025-05-12 NOTE — WOUND CARE
Multilayer Compression Wrap   (Not Unna) Below the Knee    NAME:  Jozef Felix  YOB: 1965  MEDICAL RECORD NUMBER:  [id number]  DATE:  5/12/2025    Multilayer compression wrap: Removed old Multilayer wrap if indicated and wash leg with mild soap/water.  Applied moisturizing agent to dry skin as needed.   Applied primary and secondary dressing as ordered.  Applied multilayered dressing below the knee to right lower leg.  Applied multilayered dressing below the knee to left lower leg.  Instructed patient/caregiver not to remove dressing and to keep it clean and dry.   Instructed patient/caregiver on complications to report to provider, such as pain, numbness in toes, heavy drainage, and slippage of dressing.  Instructed patient on purpose of compression dressing and on activity and exercise recommendations.      Electronically signed by NOE KRAUSE RN on 5/12/2025 at 4:37 PM

## 2025-05-12 NOTE — WOUND CARE
Discharge Instructions for  [facility]  [address] [place] [address]  [address]  [place] [address]  Phone [phone]   Fax [phone]      NAME:  Jozef Felix  YOB: 1965  MEDICAL RECORD NUMBER:  [id number]  DATE:  5/12/2025    Return Appointment:   2 weeks with Alfa Edouard, DO  Clinician visits twice weekly to change wraps      Instructions: Bilateral legs and feet:  Wash with soap and water.  Apply oil emulsion to open areas.  Cover with silver alginate.  Apply 4 layer compression, change twice weekly for two weeks    Augmentin refill sent today    Mechanically debrided with gauze and saline.   Goal is velcro wraps    Should you experience increased redness, swelling, pain, foul odor, size of wound(s), or have a temperature over 101 degrees please contact the [facility] Center at [phone] or if after hours contact your primary care physician or go to the hospital emergency department.    PLEASE NOTE: IF YOU ARE UNABLE TO OBTAIN WOUND SUPPLIES, CONTINUE TO USE THE SUPPLIES YOU HAVE AVAILABLE UNTIL YOU ARE ABLE TO REACH US. IT IS MOST IMPORTANT TO KEEP THE WOUND COVERED AT ALL TIMES.    Electronically signed NOE KRAUSE RN on 5/12/2025 at 4:03 PM

## 2025-05-12 NOTE — DISCHARGE INSTRUCTIONS
Bilateral legs and feet:  Wash with soap and water.  Apply oil emulsion to open areas.  Cover with silver alginate.  Apply 4 layer compression, change twice weekly for two weeks     Augmentin refill sent today     Mechanically debrided with gauze and saline.   Goal is velcro wraps     Should you experience increased redness, swelling, pain, foul odor, size of wound(s), or have a temperature over 101 degrees please contact the [facility] at [phone] or if after hours contact your primary care physician or go to the hospital emergency department.     PLEASE NOTE: IF YOU ARE UNABLE TO OBTAIN WOUND SUPPLIES, CONTINUE TO USE THE SUPPLIES YOU HAVE AVAILABLE UNTIL YOU ARE ABLE TO REACH US. IT IS MOST IMPORTANT TO KEEP THE WOUND COVERED AT ALL TIMES.

## 2025-05-13 NOTE — PROGRESS NOTES
[facility]   [facility]   Medical Staff Progress Note     Jozef Felix  MEDICAL RECORD NUMBER:  [id number]  AGE: 59 y.o.   GENDER: male  : 1965  EPISODE DATE:  2025    Chief complaint and reason for visit:     Chief Complaint   Patient presents with    Wound Check     Bilateral lower legs      Patient presenting for follow up evaluation of wound(s) per chief complaint.  Some mild improvement noted.    Subjective and ROS: Symptoms, wound related issues, or other pertinent wound history since last visit: no new wound care issues. Tolerating current treatment. No systemic complaints above baseline.    History of Wound Context: Per original history and physical on this patient. Changes in history since last evaluation: none    Medical Decision Making:     Problem List Items Addressed This Visit          Circulatory    Venous stasis ulcer of ankle limited to breakdown of skin with varicose veins (HCC) - Primary (Chronic)       Wounds and Treatment Plan:      Return Appointment:   2 weeks with Alfa Edouard DO  Clinician visits twice weekly to change wraps        Instructions: Bilateral legs and feet:  Wash with soap and water.  Apply oil emulsion to open areas.  Cover with silver alginate.  Apply 4 layer compression, change twice weekly for two weeks     Augmentin refill sent today     Mechanically debrided with gauze and saline.   Goal is velcro wraps  Other associated diagnoses or problems addressed:  Lymphedema    Pertinent imaging reviewed including independent interpretation include:  None    Pertinent labs reviewed.   Medical records and review of external note (s) from other providers done as well.    New lab or imaging orders placed:  None     Prescription drug management: N/A     Discussion of management or test interpretation with N/A.    Comorbid conditions affecting wound healing: As per PMH which was reviewed.    Risk of

## 2025-05-15 ENCOUNTER — HOSPITAL ENCOUNTER (OUTPATIENT)
Dept: WOUND CARE | Age: 60
Discharge: HOME | End: 2025-05-15
Payer: COMMERCIAL

## 2025-05-15 VITALS
TEMPERATURE: 97.5 F | RESPIRATION RATE: 18 BRPM | BODY MASS INDEX: 39.17 KG/M2 | WEIGHT: 315 LBS | SYSTOLIC BLOOD PRESSURE: 173 MMHG | HEIGHT: 75 IN | HEART RATE: 86 BPM | DIASTOLIC BLOOD PRESSURE: 90 MMHG

## 2025-05-15 DIAGNOSIS — L97.301 VENOUS STASIS ULCER OF ANKLE LIMITED TO BREAKDOWN OF SKIN WITH VARICOSE VEINS, UNSPECIFIED LATERALITY (HCC): Primary | ICD-10-CM

## 2025-05-15 DIAGNOSIS — I83.003 VENOUS STASIS ULCER OF ANKLE LIMITED TO BREAKDOWN OF SKIN WITH VARICOSE VEINS, UNSPECIFIED LATERALITY (HCC): Primary | ICD-10-CM

## 2025-05-15 PROCEDURE — 29581 APPL MULTLAYER CMPRN SYS LEG: CPT

## 2025-05-15 RX ORDER — LIDOCAINE 40 MG/G
CREAM TOPICAL PRN
OUTPATIENT
Start: 2025-05-15

## 2025-05-15 RX ORDER — MUPIROCIN 20 MG/G
OINTMENT TOPICAL PRN
OUTPATIENT
Start: 2025-05-15

## 2025-05-15 RX ORDER — CLOBETASOL PROPIONATE 0.5 MG/G
OINTMENT TOPICAL PRN
OUTPATIENT
Start: 2025-05-15

## 2025-05-15 RX ORDER — GINSENG 100 MG
CAPSULE ORAL PRN
OUTPATIENT
Start: 2025-05-15

## 2025-05-15 RX ORDER — LIDOCAINE 50 MG/G
OINTMENT TOPICAL PRN
OUTPATIENT
Start: 2025-05-15

## 2025-05-15 RX ORDER — NEOMYCIN/BACITRACIN/POLYMYXINB 3.5-400-5K
OINTMENT (GRAM) TOPICAL PRN
OUTPATIENT
Start: 2025-05-15

## 2025-05-15 RX ORDER — LIDOCAINE HYDROCHLORIDE 20 MG/ML
JELLY TOPICAL PRN
OUTPATIENT
Start: 2025-05-15

## 2025-05-15 RX ORDER — TRIAMCINOLONE ACETONIDE 1 MG/G
OINTMENT TOPICAL PRN
OUTPATIENT
Start: 2025-05-15

## 2025-05-15 RX ORDER — SODIUM CHLOR/HYPOCHLOROUS ACID 0.033 %
SOLUTION, IRRIGATION IRRIGATION PRN
Status: DISCONTINUED | OUTPATIENT
Start: 2025-05-15 | End: 2025-05-16 | Stop reason: HOSPADM

## 2025-05-15 RX ORDER — BACITRACIN ZINC AND POLYMYXIN B SULFATE 500; 1000 [USP'U]/G; [USP'U]/G
OINTMENT TOPICAL PRN
OUTPATIENT
Start: 2025-05-15

## 2025-05-15 RX ORDER — SODIUM CHLOR/HYPOCHLOROUS ACID 0.033 %
SOLUTION, IRRIGATION IRRIGATION PRN
OUTPATIENT
Start: 2025-05-15

## 2025-05-15 RX ORDER — GENTAMICIN SULFATE 1 MG/G
OINTMENT TOPICAL PRN
OUTPATIENT
Start: 2025-05-15

## 2025-05-15 RX ORDER — LIDOCAINE HYDROCHLORIDE 40 MG/ML
SOLUTION TOPICAL PRN
OUTPATIENT
Start: 2025-05-15

## 2025-05-15 RX ORDER — BETAMETHASONE DIPROPIONATE 0.5 MG/G
CREAM TOPICAL PRN
OUTPATIENT
Start: 2025-05-15

## 2025-05-15 RX ADMIN — Medication: at 16:39

## 2025-05-15 NOTE — FLOWSHEET NOTE
05/15/25 1535   Right Leg Edema Point of Measurement   Leg circumference 41.5 cm   Ankle circumference 25.5 cm   Foot circumference 28 cm   Compression Therapy 4 layer compression wrap   Left Leg Edema Point of Measurement   Leg circumference 37.5 cm   Ankle circumference 23 cm   Foot circumference 29 cm   Compression Therapy 4 layer compression wrap   Wound 04/21/25 Foot Left;Dorsal #3   Date First Assessed/Time First Assessed: 04/21/25 1420   Present on Original Admission: Yes  Wound Approximate Age at First Assessment (Weeks): 1 weeks  Location: Foot  Wound Location Orientation: Left;Dorsal  Wound Description (Comments): #3   Wound Image    Wound Etiology Venous   Dressing Status Old drainage noted   Wound Cleansed Soap and water;Vashe   Dressing/Treatment Alginate with Ag;Petroleum impregnated gauze   Wound Length (cm) 3 cm   Wound Width (cm) 5 cm   Wound Depth (cm) 0.1 cm   Wound Surface Area (cm^2) 15 cm^2   Change in Wound Size % (l*w) 62.5   Wound Volume (cm^3) 1.5 cm^3   Wound Healing % 63   Wound Assessment Pink/red   Drainage Amount Moderate (25-50%)   Drainage Description Serous   Odor None   Ariadne-wound Assessment Edematous;Fragile;Dry/flaky   Wound Thickness Description not for Pressure Injury Full thickness   Wound 04/07/25 Pretibial Proximal;Right   Date First Assessed/Time First Assessed: 04/07/25 1444   Present on Original Admission: Yes  Wound Approximate Age at First Assessment (Weeks): 1 weeks  Location: Pretibial  Wound Location Orientation: Proximal;Right   Wound Image    Wound Etiology Venous   Wound Cleansed Vashe;Soap and water   Dressing/Treatment Petroleum impregnated gauze;Alginate with Ag   Wound Length (cm) 2 cm   Wound Width (cm) 2 cm   Wound Depth (cm) 0.1 cm   Wound Surface Area (cm^2) 4 cm^2   Change in Wound Size % (l*w) 93.75   Wound Volume (cm^3) 0.4 cm^3   Wound Healing % 94   Wound Assessment Pink/red   Drainage Amount Moderate (25-50%)   Drainage Description Serous   Odor

## 2025-05-15 NOTE — WOUND CARE
Multilayer Compression Wrap   (Not Unna) Below the Knee    NAME:  Jozef Feilx  YOB: 1965  MEDICAL RECORD NUMBER:  [id number]  DATE:  5/15/2025    Multilayer compression wrap: Removed old Multilayer wrap if indicated and wash leg with mild soap/water.  Applied moisturizing agent to dry skin as needed.   Applied primary and secondary dressing as ordered.  Applied multilayered dressing below the knee to right lower leg.  Applied multilayered dressing below the knee to left lower leg.  Instructed patient/caregiver not to remove dressing and to keep it clean and dry.   Instructed patient/caregiver on complications to report to provider, such as pain, numbness in toes, heavy drainage, and slippage of dressing.  Instructed patient on purpose of compression dressing and on activity and exercise recommendations.      Electronically signed by KATHERIN GIBBS RN on 5/15/2025 at 4:29 PM

## 2025-05-19 ENCOUNTER — HOSPITAL ENCOUNTER (OUTPATIENT)
Dept: WOUND CARE | Age: 60
Discharge: HOME | End: 2025-05-19
Payer: COMMERCIAL

## 2025-05-19 VITALS
WEIGHT: 315 LBS | BODY MASS INDEX: 39.17 KG/M2 | SYSTOLIC BLOOD PRESSURE: 129 MMHG | HEART RATE: 89 BPM | HEIGHT: 75 IN | TEMPERATURE: 98.7 F | RESPIRATION RATE: 16 BRPM | DIASTOLIC BLOOD PRESSURE: 76 MMHG

## 2025-05-19 DIAGNOSIS — I83.003 VENOUS STASIS ULCER OF ANKLE LIMITED TO BREAKDOWN OF SKIN WITH VARICOSE VEINS, UNSPECIFIED LATERALITY (HCC): Primary | ICD-10-CM

## 2025-05-19 DIAGNOSIS — L97.301 VENOUS STASIS ULCER OF ANKLE LIMITED TO BREAKDOWN OF SKIN WITH VARICOSE VEINS, UNSPECIFIED LATERALITY (HCC): Primary | ICD-10-CM

## 2025-05-19 PROCEDURE — 29581 APPL MULTLAYER CMPRN SYS LEG: CPT

## 2025-05-19 RX ORDER — GINSENG 100 MG
CAPSULE ORAL PRN
OUTPATIENT
Start: 2025-05-19

## 2025-05-19 RX ORDER — MUPIROCIN 20 MG/G
OINTMENT TOPICAL PRN
OUTPATIENT
Start: 2025-05-19

## 2025-05-19 RX ORDER — LIDOCAINE HYDROCHLORIDE 20 MG/ML
JELLY TOPICAL PRN
OUTPATIENT
Start: 2025-05-19

## 2025-05-19 RX ORDER — LIDOCAINE 40 MG/G
CREAM TOPICAL PRN
OUTPATIENT
Start: 2025-05-19

## 2025-05-19 RX ORDER — GENTAMICIN SULFATE 1 MG/G
OINTMENT TOPICAL PRN
OUTPATIENT
Start: 2025-05-19

## 2025-05-19 RX ORDER — TRIAMCINOLONE ACETONIDE 1 MG/G
OINTMENT TOPICAL PRN
OUTPATIENT
Start: 2025-05-19

## 2025-05-19 RX ORDER — BETAMETHASONE DIPROPIONATE 0.5 MG/G
CREAM TOPICAL PRN
OUTPATIENT
Start: 2025-05-19

## 2025-05-19 RX ORDER — CLOBETASOL PROPIONATE 0.5 MG/G
OINTMENT TOPICAL PRN
OUTPATIENT
Start: 2025-05-19

## 2025-05-19 RX ORDER — SODIUM CHLOR/HYPOCHLOROUS ACID 0.033 %
SOLUTION, IRRIGATION IRRIGATION PRN
OUTPATIENT
Start: 2025-05-19

## 2025-05-19 RX ORDER — LIDOCAINE 50 MG/G
OINTMENT TOPICAL PRN
OUTPATIENT
Start: 2025-05-19

## 2025-05-19 RX ORDER — BACITRACIN ZINC AND POLYMYXIN B SULFATE 500; 1000 [USP'U]/G; [USP'U]/G
OINTMENT TOPICAL PRN
OUTPATIENT
Start: 2025-05-19

## 2025-05-19 RX ORDER — LIDOCAINE HYDROCHLORIDE 40 MG/ML
SOLUTION TOPICAL PRN
OUTPATIENT
Start: 2025-05-19

## 2025-05-19 RX ORDER — NEOMYCIN/BACITRACIN/POLYMYXINB 3.5-400-5K
OINTMENT (GRAM) TOPICAL PRN
OUTPATIENT
Start: 2025-05-19

## 2025-05-19 ASSESSMENT — PAIN DESCRIPTION - DESCRIPTORS: DESCRIPTORS: BURNING

## 2025-05-19 ASSESSMENT — PAIN SCALES - GENERAL: PAINLEVEL_OUTOF10: 8

## 2025-05-19 ASSESSMENT — PAIN DESCRIPTION - ORIENTATION: ORIENTATION: RIGHT

## 2025-05-19 ASSESSMENT — PAIN DESCRIPTION - LOCATION: LOCATION: LEG

## 2025-05-19 NOTE — WOUND CARE
Discharge Instructions for  [facility]  [address] [place] [address]  [address]  [place] [address]  Phone [phone]   Fax [phone]      NAME:  Jozef Felix  YOB: 1965  MEDICAL RECORD NUMBER:  [id number]  DATE:  5/19/2025    Return Appointment:   2 weeks with Alfa Edouard DO  Return Appointment: 2x/week with Clinician       Instructions:   Bilateral Lower Legs and Feet:  Wash with soap and water.  Apply wrap of Vashe moistened Kerlix to lower legs and feet for 1 minute   Apply oil emulsion to open areas followed by Silver Alginate.  Weave Silver Alginate (rope) between toes  Cover draining areas with ABD  Secure with cotton batting layer of 4 layer wrap  Apply Tubigrip F from toes to knees   Apply Tubigrip G from knee to mid-calf  Continue 4 layer compression wrap  Dressing change 2x / week     Continue Augmentin as prescribed.    Mechanically debrided with Vashe moistened gauze.   Compression Goal: Velcro Compression Wraps    Should you experience increased redness, swelling, pain, foul odor, size of wound(s), or have a temperature over 101 degrees please contact the [facility]aling Center at [phone] or if after hours contact your primary care physician or go to the hospital emergency department.    PLEASE NOTE: IF YOU ARE UNABLE TO OBTAIN WOUND SUPPLIES, CONTINUE TO USE THE SUPPLIES YOU HAVE AVAILABLE UNTIL YOU ARE ABLE TO REACH US. IT IS MOST IMPORTANT TO KEEP THE WOUND COVERED AT ALL TIMES.    Electronically signed Maria Elena Sequeira RN on 5/19/2025 at 3:33 PM

## 2025-05-19 NOTE — FLOWSHEET NOTE
05/19/25 1505   Right Leg Edema Point of Measurement   Leg circumference 43 cm   Ankle circumference 25 cm   Foot circumference 28.5 cm   Compression Therapy 4 layer compression wrap   Left Leg Edema Point of Measurement   Leg circumference 37 cm   Ankle circumference 22.5 cm   Foot circumference 29 cm   Compression Therapy 4 layer compression wrap   Wound 04/07/25 Pretibial Proximal;Right   Date First Assessed/Time First Assessed: 04/07/25 1444   Present on Original Admission: Yes  Wound Approximate Age at First Assessment (Weeks): 1 weeks  Location: Pretibial  Wound Location Orientation: Proximal;Right   Wound Image    Wound Etiology Venous   Dressing Status Old drainage noted   Wound Cleansed Vashe;Soap and water   Dressing/Treatment Petroleum impregnated gauze;Alginate with Ag   Wound Length (cm) 2 cm   Wound Width (cm) 2 cm   Wound Depth (cm) 0.1 cm   Wound Surface Area (cm^2) 4 cm^2   Change in Wound Size % (l*w) 93.75   Wound Volume (cm^3) 0.4 cm^3   Wound Healing % 94   Wound Assessment Bleeding;Slough   Drainage Amount Moderate (25-50%)   Drainage Description Sanguinous   Odor None   Ariadne-wound Assessment Fragile;Dry/flaky;Edematous   Margins Attached edges   Wound Thickness Description not for Pressure Injury Full thickness   Wound 04/21/25 Foot Left;Dorsal #3   Date First Assessed/Time First Assessed: 04/21/25 1420   Present on Original Admission: Yes  Wound Approximate Age at First Assessment (Weeks): 1 weeks  Location: Foot  Wound Location Orientation: Left;Dorsal  Wound Description (Comments): #3   Wound Image    Wound Etiology Venous   Dressing Status Old drainage noted   Wound Cleansed Soap and water;Vashe   Dressing/Treatment Alginate with Ag;Petroleum impregnated gauze   Wound Length (cm) 3 cm   Wound Width (cm) 5 cm   Wound Depth (cm) 0.1 cm   Wound Surface Area (cm^2) 15 cm^2   Change in Wound Size % (l*w) 62.5   Wound Volume (cm^3) 1.5 cm^3   Wound Healing % 63   Wound Assessment

## 2025-05-19 NOTE — DISCHARGE INSTRUCTIONS
Bilateral Lower Legs and Feet:  Wash with soap and water.  Apply wrap of Vashe moistened Kerlix to lower legs and feet for 1 minute   Apply oil emulsion to open areas followed by Silver Alginate.  Weave Silver Alginate (rope) between toes  Cover draining areas with ABD  Secure with cotton batting layer of 4 layer wrap  Apply Tubigrip F from toes to knees   Apply Tubigrip G from knee to mid-calf  Continue 4 layer compression wrap  Dressing change 2x / week     Continue Augmentin as prescribed.    Mechanically debrided with Vashe moistened gauze.   Compression Goal: Velcro Compression Wraps

## 2025-05-19 NOTE — WOUND CARE
Multilayer Compression Wrap   (Not Unna) Below the Knee    NAME:  Jozef Felix  YOB: 1965  MEDICAL RECORD NUMBER:  [id number]  DATE:  5/19/2025    Multilayer compression wrap: Removed old Multilayer wrap if indicated and wash leg with mild soap/water.  Applied moisturizing agent to dry skin as needed.   Applied primary and secondary dressing as ordered.  Applied multilayered dressing below the knee to right lower leg.  Applied multilayered dressing below the knee to left lower leg.  Instructed patient/caregiver not to remove dressing and to keep it clean and dry.   Instructed patient/caregiver on complications to report to provider, such as pain, numbness in toes, heavy drainage, and slippage of dressing.  Instructed patient on purpose of compression dressing and on activity and exercise recommendations.      Electronically signed by Maria Elena Sequeira RN on 5/19/2025 at 3:11 PM

## 2025-05-22 ENCOUNTER — HOSPITAL ENCOUNTER (OUTPATIENT)
Dept: WOUND CARE | Age: 60
Discharge: HOME | End: 2025-05-22
Payer: COMMERCIAL

## 2025-05-22 VITALS
BODY MASS INDEX: 39.17 KG/M2 | RESPIRATION RATE: 16 BRPM | HEIGHT: 75 IN | HEART RATE: 84 BPM | SYSTOLIC BLOOD PRESSURE: 136 MMHG | WEIGHT: 315 LBS | DIASTOLIC BLOOD PRESSURE: 83 MMHG

## 2025-05-22 DIAGNOSIS — L97.301 VENOUS STASIS ULCER OF ANKLE LIMITED TO BREAKDOWN OF SKIN WITH VARICOSE VEINS, UNSPECIFIED LATERALITY (HCC): Primary | ICD-10-CM

## 2025-05-22 DIAGNOSIS — I83.003 VENOUS STASIS ULCER OF ANKLE LIMITED TO BREAKDOWN OF SKIN WITH VARICOSE VEINS, UNSPECIFIED LATERALITY (HCC): Primary | ICD-10-CM

## 2025-05-22 PROCEDURE — 29581 APPL MULTLAYER CMPRN SYS LEG: CPT

## 2025-05-22 RX ORDER — BACITRACIN ZINC AND POLYMYXIN B SULFATE 500; 1000 [USP'U]/G; [USP'U]/G
OINTMENT TOPICAL PRN
OUTPATIENT
Start: 2025-05-22

## 2025-05-22 RX ORDER — MUPIROCIN 20 MG/G
OINTMENT TOPICAL PRN
OUTPATIENT
Start: 2025-05-22

## 2025-05-22 RX ORDER — BETAMETHASONE DIPROPIONATE 0.5 MG/G
CREAM TOPICAL PRN
OUTPATIENT
Start: 2025-05-22

## 2025-05-22 RX ORDER — SODIUM CHLOR/HYPOCHLOROUS ACID 0.033 %
SOLUTION, IRRIGATION IRRIGATION PRN
OUTPATIENT
Start: 2025-05-22

## 2025-05-22 RX ORDER — GINSENG 100 MG
CAPSULE ORAL PRN
OUTPATIENT
Start: 2025-05-22

## 2025-05-22 RX ORDER — LIDOCAINE HYDROCHLORIDE 20 MG/ML
JELLY TOPICAL PRN
OUTPATIENT
Start: 2025-05-22

## 2025-05-22 RX ORDER — TRIAMCINOLONE ACETONIDE 1 MG/G
OINTMENT TOPICAL PRN
OUTPATIENT
Start: 2025-05-22

## 2025-05-22 RX ORDER — LIDOCAINE 50 MG/G
OINTMENT TOPICAL PRN
OUTPATIENT
Start: 2025-05-22

## 2025-05-22 RX ORDER — CLOBETASOL PROPIONATE 0.5 MG/G
OINTMENT TOPICAL PRN
OUTPATIENT
Start: 2025-05-22

## 2025-05-22 RX ORDER — LIDOCAINE HYDROCHLORIDE 40 MG/ML
SOLUTION TOPICAL PRN
OUTPATIENT
Start: 2025-05-22

## 2025-05-22 RX ORDER — LIDOCAINE 40 MG/G
CREAM TOPICAL PRN
OUTPATIENT
Start: 2025-05-22

## 2025-05-22 RX ORDER — GENTAMICIN SULFATE 1 MG/G
OINTMENT TOPICAL PRN
OUTPATIENT
Start: 2025-05-22

## 2025-05-22 RX ORDER — NEOMYCIN/BACITRACIN/POLYMYXINB 3.5-400-5K
OINTMENT (GRAM) TOPICAL PRN
OUTPATIENT
Start: 2025-05-22

## 2025-05-22 NOTE — WOUND CARE
Multilayer Compression Wrap   (Not Unna) Below the Knee    NAME:  Jozef Felix  YOB: 1965  MEDICAL RECORD NUMBER:  [id number]  DATE:  5/22/2025    Multilayer compression wrap: Removed old Multilayer wrap if indicated and wash leg with mild soap/water.  Applied moisturizing agent to dry skin as needed.   Applied primary and secondary dressing as ordered.  Applied multilayered dressing below the knee to right lower leg.  Applied multilayered dressing below the knee to left lower leg.  Instructed patient/caregiver not to remove dressing and to keep it clean and dry.   Instructed patient/caregiver on complications to report to provider, such as pain, numbness in toes, heavy drainage, and slippage of dressing.  Instructed patient on purpose of compression dressing and on activity and exercise recommendations.      Electronically signed by Namita Pal RN on 5/22/2025 at 4:06 PM

## 2025-05-22 NOTE — FLOWSHEET NOTE
05/22/25 1554   Right Leg Edema Point of Measurement   Leg circumference 47 cm   Ankle circumference 25.5 cm   Foot circumference 28.5 cm   Compression Therapy 4 layer compression wrap   Left Leg Edema Point of Measurement   Leg circumference 41 cm   Ankle circumference 22 cm   Foot circumference 27.5 cm   Compression Therapy 4 layer compression wrap   Wound 04/21/25 Foot Left;Dorsal #3   Date First Assessed/Time First Assessed: 04/21/25 1420   Present on Original Admission: Yes  Wound Approximate Age at First Assessment (Weeks): 1 weeks  Location: Foot  Wound Location Orientation: Left;Dorsal  Wound Description (Comments): #3   Wound Image    Wound Etiology Venous   Dressing Status Old drainage noted   Wound Cleansed Soap and water;Vashe   Dressing/Treatment Alginate with Ag;Petroleum impregnated gauze   Wound Length (cm) 3 cm   Wound Width (cm) 5 cm   Wound Depth (cm) 0.1 cm   Wound Surface Area (cm^2) 15 cm^2   Change in Wound Size % (l*w) 62.5   Wound Volume (cm^3) 1.5 cm^3   Wound Healing % 63   Wound Assessment Slough;Pink/red   Drainage Amount Moderate (25-50%)   Drainage Description Serosanguinous   Odor Mild   Ariadne-wound Assessment Edematous;Fragile;Dry/flaky   Margins Attached edges   Wound Thickness Description not for Pressure Injury Full thickness   Wound 04/07/25 Pretibial Proximal;Right   Date First Assessed/Time First Assessed: 04/07/25 1444   Present on Original Admission: Yes  Wound Approximate Age at First Assessment (Weeks): 1 weeks  Location: Pretibial  Wound Location Orientation: Proximal;Right   Wound Image     Wound Etiology Venous   Dressing Status Old drainage noted   Wound Cleansed Vashe;Soap and water   Dressing/Treatment Petroleum impregnated gauze;Alginate with Ag   Wound Length (cm) 1 cm   Wound Width (cm) 6 cm   Wound Depth (cm) 0.1 cm   Wound Surface Area (cm^2) 6 cm^2   Change in Wound Size % (l*w) 90.63   Wound Volume (cm^3) 0.6 cm^3   Wound Healing % 91   Wound Assessment

## 2025-05-27 ENCOUNTER — HOSPITAL ENCOUNTER (OUTPATIENT)
Dept: WOUND CARE | Age: 60
Discharge: HOME | End: 2025-05-27
Payer: COMMERCIAL

## 2025-05-27 VITALS
WEIGHT: 315 LBS | DIASTOLIC BLOOD PRESSURE: 91 MMHG | SYSTOLIC BLOOD PRESSURE: 128 MMHG | BODY MASS INDEX: 39.17 KG/M2 | TEMPERATURE: 98.2 F | RESPIRATION RATE: 18 BRPM | HEART RATE: 86 BPM | HEIGHT: 75 IN

## 2025-05-27 DIAGNOSIS — I83.003 VENOUS STASIS ULCER OF ANKLE LIMITED TO BREAKDOWN OF SKIN WITH VARICOSE VEINS, UNSPECIFIED LATERALITY (HCC): Primary | Chronic | ICD-10-CM

## 2025-05-27 DIAGNOSIS — L97.301 VENOUS STASIS ULCER OF ANKLE LIMITED TO BREAKDOWN OF SKIN WITH VARICOSE VEINS, UNSPECIFIED LATERALITY (HCC): Primary | Chronic | ICD-10-CM

## 2025-05-27 PROCEDURE — 29581 APPL MULTLAYER CMPRN SYS LEG: CPT

## 2025-05-27 PROCEDURE — 99213 OFFICE O/P EST LOW 20 MIN: CPT

## 2025-05-27 RX ORDER — BETAMETHASONE DIPROPIONATE 0.5 MG/G
CREAM TOPICAL PRN
OUTPATIENT
Start: 2025-05-27

## 2025-05-27 RX ORDER — GENTAMICIN SULFATE 1 MG/G
OINTMENT TOPICAL PRN
OUTPATIENT
Start: 2025-05-27

## 2025-05-27 RX ORDER — CLOBETASOL PROPIONATE 0.5 MG/G
OINTMENT TOPICAL PRN
OUTPATIENT
Start: 2025-05-27

## 2025-05-27 RX ORDER — TRIAMCINOLONE ACETONIDE 1 MG/G
OINTMENT TOPICAL PRN
OUTPATIENT
Start: 2025-05-27

## 2025-05-27 RX ORDER — LIDOCAINE 50 MG/G
OINTMENT TOPICAL PRN
OUTPATIENT
Start: 2025-05-27

## 2025-05-27 RX ORDER — GINSENG 100 MG
CAPSULE ORAL PRN
OUTPATIENT
Start: 2025-05-27

## 2025-05-27 RX ORDER — LIDOCAINE HYDROCHLORIDE 20 MG/ML
JELLY TOPICAL PRN
OUTPATIENT
Start: 2025-05-27

## 2025-05-27 RX ORDER — CIPROFLOXACIN 500 MG/1
500 TABLET, FILM COATED ORAL 2 TIMES DAILY
Qty: 20 TABLET | Refills: 0 | Status: SHIPPED | OUTPATIENT
Start: 2025-05-27 | End: 2025-06-06

## 2025-05-27 RX ORDER — SODIUM CHLOR/HYPOCHLOROUS ACID 0.033 %
SOLUTION, IRRIGATION IRRIGATION PRN
OUTPATIENT
Start: 2025-05-27

## 2025-05-27 RX ORDER — NEOMYCIN/BACITRACIN/POLYMYXINB 3.5-400-5K
OINTMENT (GRAM) TOPICAL PRN
OUTPATIENT
Start: 2025-05-27

## 2025-05-27 RX ORDER — LIDOCAINE HYDROCHLORIDE 40 MG/ML
SOLUTION TOPICAL PRN
OUTPATIENT
Start: 2025-05-27

## 2025-05-27 RX ORDER — LIDOCAINE 40 MG/G
CREAM TOPICAL PRN
OUTPATIENT
Start: 2025-05-27

## 2025-05-27 RX ORDER — MUPIROCIN 20 MG/G
OINTMENT TOPICAL PRN
OUTPATIENT
Start: 2025-05-27

## 2025-05-27 RX ORDER — BACITRACIN ZINC AND POLYMYXIN B SULFATE 500; 1000 [USP'U]/G; [USP'U]/G
OINTMENT TOPICAL PRN
OUTPATIENT
Start: 2025-05-27

## 2025-05-27 ASSESSMENT — PAIN DESCRIPTION - ORIENTATION: ORIENTATION: RIGHT;LEFT

## 2025-05-27 ASSESSMENT — PAIN DESCRIPTION - LOCATION: LOCATION: LEG

## 2025-05-27 ASSESSMENT — PAIN SCALES - GENERAL: PAINLEVEL_OUTOF10: 5

## 2025-05-27 ASSESSMENT — PAIN DESCRIPTION - DESCRIPTORS: DESCRIPTORS: ACHING

## 2025-05-27 NOTE — FLOWSHEET NOTE
05/27/25 1530   Right Leg Edema Point of Measurement   Leg circumference 44 cm   Ankle circumference 26 cm   Foot circumference 29 cm   Compression Therapy 4 layer compression wrap   Left Leg Edema Point of Measurement   Leg circumference 38 cm   Ankle circumference 23 cm   Foot circumference 28 cm   Compression Therapy 4 layer compression wrap   Wound 04/21/25 Foot Left;Dorsal #3   Date First Assessed/Time First Assessed: 04/21/25 1420   Present on Original Admission: Yes  Wound Approximate Age at First Assessment (Weeks): 1 weeks  Location: Foot  Wound Location Orientation: Left;Dorsal  Wound Description (Comments): #3   Wound Image    Wound Etiology Venous   Dressing Status Old drainage noted   Wound Cleansed Soap and water;Vashe   Dressing/Treatment Alginate with Ag;Petroleum impregnated gauze   Wound Length (cm) 8 cm   Wound Width (cm) 12 cm   Wound Depth (cm) 0.1 cm   Wound Surface Area (cm^2) 96 cm^2   Change in Wound Size % (l*w) -140   Wound Volume (cm^3) 9.6 cm^3   Wound Healing % -140   Wound Assessment Slough;Pink/red   Drainage Amount Moderate (25-50%)   Drainage Description Serosanguinous   Odor Mild   Ariadne-wound Assessment Edematous;Fragile;Dry/flaky   Margins Attached edges   Wound Thickness Description not for Pressure Injury Full thickness   Wound 04/07/25 Pretibial Proximal;Right   Date First Assessed/Time First Assessed: 04/07/25 1444   Present on Original Admission: Yes  Wound Approximate Age at First Assessment (Weeks): 1 weeks  Location: Pretibial  Wound Location Orientation: Proximal;Right   Wound Image    Wound Etiology Venous   Dressing Status Old drainage noted   Wound Cleansed Vashe;Soap and water   Dressing/Treatment Petroleum impregnated gauze;Alginate with Ag   Wound Length (cm) 2 cm   Wound Width (cm) 11 cm   Wound Depth (cm) 0.1 cm   Wound Surface Area (cm^2) 22 cm^2   Change in Wound Size % (l*w) 65.63   Wound Volume (cm^3) 2.2 cm^3   Wound Healing % 66   Wound Assessment

## 2025-05-27 NOTE — WOUND CARE
Multilayer Compression Wrap   (Not Unna) Below the Knee    NAME:  Jozef Felix  YOB: 1965  MEDICAL RECORD NUMBER:  [id number]  DATE:  5/27/2025    Multilayer compression wrap: Removed old Multilayer wrap if indicated and wash leg with mild soap/water.  Applied moisturizing agent to dry skin as needed.   Applied primary and secondary dressing as ordered.  Applied multilayered dressing below the knee to right lower leg.  Applied multilayered dressing below the knee to left lower leg.  Instructed patient/caregiver not to remove dressing and to keep it clean and dry.   Instructed patient/caregiver on complications to report to provider, such as pain, numbness in toes, heavy drainage, and slippage of dressing.  Instructed patient on purpose of compression dressing and on activity and exercise recommendations.      Electronically signed by KAROLINA VALLE RN on 5/27/2025 at 3:49 PM

## 2025-05-27 NOTE — WOUND CARE
Discharge Instructions for  [facility]  [address] [place] [address]  [address]  [place] [address]  Phone [phone]   Fax [phone]      NAME:  Jozef Felix  YOB: 1965  MEDICAL RECORD NUMBER:  [id number]  DATE:  5/27/2025    Return Appointment:   2 weeks with Alfa Edouard DO  Return Appointment: 2x/week with Clinician       Instructions:   Bilateral Lower Legs and Feet:  Wash with soap and water.  Apply wrap of Vashe moistened Kerlix to lower legs and feet for 1 minute   Apply oil emulsion to open areas followed by Silver Alginate.  Weave Silver Alginate (rope) between toes  Cover draining areas with ABD  Secure with cotton batting layer of 4 layer wrap  Apply Tubigrip F from toes to knees   Apply Tubigrip G from knee to mid-calf  Continue 4 layer compression wrap  Dressing change 2x / week     Prescription for Cipro ordered this visit. Please  and take as prescribed.     Mechanically debrided with Vashe moistened gauze.   Compression Goal: Velcro Compression Wraps    Should you experience increased redness, swelling, pain, foul odor, size of wound(s), or have a temperature over 101 degrees please contact the Wound Healing Center at [phone] or if after hours contact your primary care physician or go to the hospital emergency department.    PLEASE NOTE: IF YOU ARE UNABLE TO OBTAIN WOUND SUPPLIES, CONTINUE TO USE THE SUPPLIES YOU HAVE AVAILABLE UNTIL YOU ARE ABLE TO REACH US. IT IS MOST IMPORTANT TO KEEP THE WOUND COVERED AT ALL TIMES.    Electronically signed KAROLINA VALLE RN on 5/27/2025 at 4:02 PM

## 2025-05-27 NOTE — DISCHARGE INSTRUCTIONS
Bilateral Lower Legs and Feet:  Wash with soap and water.  Apply wrap of Vashe moistened Kerlix to lower legs and feet for 1 minute   Apply oil emulsion to open areas followed by Silver Alginate.  Weave Silver Alginate (rope) between toes  Cover draining areas with ABD  Secure with cotton batting layer of 4 layer wrap  Apply Tubigrip F from toes to knees   Apply Tubigrip G from knee to mid-calf  Continue 4 layer compression wrap  Dressing change 2x / week     Prescription for Cipro ordered this visit. Please  and take as prescribed.      Mechanically debrided with Vashe moistened gauze.   Compression Goal: Velcro Compression Wraps

## 2025-05-29 ENCOUNTER — HOSPITAL ENCOUNTER (OUTPATIENT)
Dept: WOUND CARE | Age: 60
Discharge: HOME | End: 2025-05-29
Payer: COMMERCIAL

## 2025-05-29 VITALS
HEIGHT: 75 IN | SYSTOLIC BLOOD PRESSURE: 171 MMHG | HEART RATE: 84 BPM | BODY MASS INDEX: 39.17 KG/M2 | WEIGHT: 315 LBS | DIASTOLIC BLOOD PRESSURE: 97 MMHG | TEMPERATURE: 97.6 F | RESPIRATION RATE: 18 BRPM

## 2025-05-29 DIAGNOSIS — I83.003 VENOUS STASIS ULCER OF ANKLE LIMITED TO BREAKDOWN OF SKIN WITH VARICOSE VEINS, UNSPECIFIED LATERALITY (HCC): Primary | ICD-10-CM

## 2025-05-29 DIAGNOSIS — L97.301 VENOUS STASIS ULCER OF ANKLE LIMITED TO BREAKDOWN OF SKIN WITH VARICOSE VEINS, UNSPECIFIED LATERALITY (HCC): Primary | ICD-10-CM

## 2025-05-29 PROCEDURE — 29581 APPL MULTLAYER CMPRN SYS LEG: CPT

## 2025-05-29 RX ORDER — LIDOCAINE 50 MG/G
OINTMENT TOPICAL PRN
OUTPATIENT
Start: 2025-05-29

## 2025-05-29 RX ORDER — LIDOCAINE HYDROCHLORIDE 20 MG/ML
JELLY TOPICAL PRN
OUTPATIENT
Start: 2025-05-29

## 2025-05-29 RX ORDER — TRIAMCINOLONE ACETONIDE 1 MG/G
OINTMENT TOPICAL PRN
OUTPATIENT
Start: 2025-05-29

## 2025-05-29 RX ORDER — LIDOCAINE 40 MG/G
CREAM TOPICAL PRN
OUTPATIENT
Start: 2025-05-29

## 2025-05-29 RX ORDER — GENTAMICIN SULFATE 1 MG/G
OINTMENT TOPICAL PRN
OUTPATIENT
Start: 2025-05-29

## 2025-05-29 RX ORDER — LIDOCAINE HYDROCHLORIDE 40 MG/ML
SOLUTION TOPICAL PRN
OUTPATIENT
Start: 2025-05-29

## 2025-05-29 RX ORDER — SODIUM CHLOR/HYPOCHLOROUS ACID 0.033 %
SOLUTION, IRRIGATION IRRIGATION PRN
OUTPATIENT
Start: 2025-05-29

## 2025-05-29 RX ORDER — GINSENG 100 MG
CAPSULE ORAL PRN
OUTPATIENT
Start: 2025-05-29

## 2025-05-29 RX ORDER — CLOBETASOL PROPIONATE 0.5 MG/G
OINTMENT TOPICAL PRN
OUTPATIENT
Start: 2025-05-29

## 2025-05-29 RX ORDER — BACITRACIN ZINC AND POLYMYXIN B SULFATE 500; 1000 [USP'U]/G; [USP'U]/G
OINTMENT TOPICAL PRN
OUTPATIENT
Start: 2025-05-29

## 2025-05-29 RX ORDER — BETAMETHASONE DIPROPIONATE 0.5 MG/G
CREAM TOPICAL PRN
OUTPATIENT
Start: 2025-05-29

## 2025-05-29 RX ORDER — MUPIROCIN 20 MG/G
OINTMENT TOPICAL PRN
OUTPATIENT
Start: 2025-05-29

## 2025-05-29 RX ORDER — NEOMYCIN/BACITRACIN/POLYMYXINB 3.5-400-5K
OINTMENT (GRAM) TOPICAL PRN
OUTPATIENT
Start: 2025-05-29

## 2025-05-29 ASSESSMENT — PAIN DESCRIPTION - ORIENTATION: ORIENTATION: RIGHT;LEFT

## 2025-05-29 ASSESSMENT — PAIN SCALES - GENERAL: PAINLEVEL_OUTOF10: 7

## 2025-05-29 ASSESSMENT — PAIN DESCRIPTION - DESCRIPTORS: DESCRIPTORS: ACHING

## 2025-05-29 ASSESSMENT — PAIN DESCRIPTION - LOCATION: LOCATION: LEG

## 2025-05-29 NOTE — FLOWSHEET NOTE
05/29/25 1450   Right Leg Edema Point of Measurement   Leg circumference 40 cm   Ankle circumference 26 cm   Foot circumference 30 cm   Compression Therapy 4 layer compression wrap   Left Leg Edema Point of Measurement   Leg circumference 41 cm   Ankle circumference 25 cm   Foot circumference 29 cm   Compression Therapy 4 layer compression wrap   Wound 04/21/25 Foot Left;Dorsal #3   Date First Assessed/Time First Assessed: 04/21/25 1420   Present on Original Admission: Yes  Wound Approximate Age at First Assessment (Weeks): 1 weeks  Location: Foot  Wound Location Orientation: Left;Dorsal  Wound Description (Comments): #3   Wound Image    Wound Etiology Venous   Dressing Status Old drainage noted   Wound Cleansed Soap and water;Vashe   Dressing/Treatment Alginate with Ag;Petroleum impregnated gauze   Wound Length (cm) 8 cm   Wound Width (cm) 12 cm   Wound Depth (cm) 0.1 cm   Wound Surface Area (cm^2) 96 cm^2   Change in Wound Size % (l*w) -140   Wound Volume (cm^3) 9.6 cm^3   Wound Healing % -140   Wound Assessment Slough;Pink/red   Drainage Amount Moderate (25-50%)   Drainage Description Serosanguinous   Odor Mild   Ariadne-wound Assessment Edematous;Fragile;Dry/flaky   Margins Attached edges   Wound Thickness Description not for Pressure Injury Full thickness   Wound 04/07/25 Pretibial Proximal;Right   Date First Assessed/Time First Assessed: 04/07/25 1444   Present on Original Admission: Yes  Wound Approximate Age at First Assessment (Weeks): 1 weeks  Location: Pretibial  Wound Location Orientation: Proximal;Right   Wound Image    Wound Etiology Venous   Dressing Status Old drainage noted   Wound Cleansed Vashe;Soap and water   Dressing/Treatment Petroleum impregnated gauze;Alginate with Ag   Wound Assessment Bleeding;Slough   Drainage Amount Moderate (25-50%)   Drainage Description Serosanguinous   Odor Mild   Ariadne-wound Assessment Fragile;Dry/flaky;Edematous   Margins Attached edges   Wound Thickness Description

## 2025-05-29 NOTE — FLOWSHEET NOTE
05/29/25 1450   Right Leg Edema Point of Measurement   Leg circumference 40 cm   Ankle circumference 26 cm   Foot circumference 30 cm   Compression Therapy 4 layer compression wrap   Left Leg Edema Point of Measurement   Leg circumference 41 cm   Ankle circumference 25 cm   Foot circumference 29 cm   Compression Therapy 4 layer compression wrap   Wound 04/21/25 Foot Left;Dorsal #3   Date First Assessed/Time First Assessed: 04/21/25 1420   Present on Original Admission: Yes  Wound Approximate Age at First Assessment (Weeks): 1 weeks  Location: Foot  Wound Location Orientation: Left;Dorsal  Wound Description (Comments): #3   Wound Image    Wound Etiology Venous   Dressing Status Old drainage noted   Wound Cleansed Soap and water;Vashe   Dressing/Treatment Alginate with Ag;Petroleum impregnated gauze   Wound Length (cm) 8 cm   Wound Width (cm) 12 cm   Wound Depth (cm) 0.1 cm   Wound Surface Area (cm^2) 96 cm^2   Change in Wound Size % (l*w) -140   Wound Volume (cm^3) 9.6 cm^3   Wound Healing % -140   Wound Assessment Slough;Pink/red   Drainage Amount Moderate (25-50%)   Drainage Description Serosanguinous   Odor Mild   Ariadne-wound Assessment Edematous;Fragile;Dry/flaky   Margins Attached edges   Wound Thickness Description not for Pressure Injury Full thickness   Wound 04/07/25 Pretibial Proximal;Right   Date First Assessed/Time First Assessed: 04/07/25 1444   Present on Original Admission: Yes  Wound Approximate Age at First Assessment (Weeks): 1 weeks  Location: Pretibial  Wound Location Orientation: Proximal;Right   Wound Image    Wound Etiology Venous   Dressing Status Old drainage noted   Wound Cleansed Vashe;Soap and water   Dressing/Treatment Petroleum impregnated gauze;Alginate with Ag   Wound Length (cm) 2 cm   Wound Width (cm) 11 cm   Wound Depth (cm) 0.1 cm   Wound Surface Area (cm^2) 22 cm^2   Change in Wound Size % (l*w) 65.63   Wound Volume (cm^3) 2.2 cm^3   Wound Healing % 66   Wound Assessment

## 2025-05-29 NOTE — WOUND CARE
Multilayer Compression Wrap   (Not Unna) Below the Knee    NAME:  Jozef Felix  YOB: 1965  MEDICAL RECORD NUMBER:  [id number]  DATE:  5/29/2025    Multilayer compression wrap: Removed old Multilayer wrap if indicated and wash leg with mild soap/water.  Applied moisturizing agent to dry skin as needed.   Applied primary and secondary dressing as ordered.  Applied multilayered dressing below the knee to right lower leg.  Applied multilayered dressing below the knee to left lower leg.  Instructed patient/caregiver not to remove dressing and to keep it clean and dry.   Instructed patient/caregiver on complications to report to provider, such as pain, numbness in toes, heavy drainage, and slippage of dressing.  Instructed patient on purpose of compression dressing and on activity and exercise recommendations.      Electronically signed by KAROLINA VALLE RN on 5/29/2025 at 3:34 PM

## 2025-05-31 NOTE — PROGRESS NOTES
[facility]   W[facility] and [facility]   Medical Staff Progress Note     Jozef Felix  MEDICAL RECORD NUMBER:  [id number]  AGE: 59 y.o.   GENDER: male  : 1965  EPISODE DATE:  2025    Chief complaint and reason for visit:     Chief Complaint   Patient presents with    Wound Check     Wounds on bilateral lower legs      Patient presenting for follow up evaluation of wound(s) per chief complaint.  Slight improvement in wounds.  Edema is controlled with wraps.    Subjective and ROS: Symptoms, wound related issues, or other pertinent wound history since last visit: no new wound care issues. Tolerating current treatment. No systemic complaints above baseline.    History of Wound Context: Per original history and physical on this patient. Changes in history since last evaluation: none    Medical Decision Making:     Problem List Items Addressed This Visit          Circulatory    Venous stasis ulcer of ankle limited to breakdown of skin with varicose veins (HCC) - Primary (Chronic)       Wounds and Treatment Plan:      Return Appointment:   2 weeks with Alfa Edouard DO  Return Appointment: 2x/week with Clinician         Instructions:   Bilateral Lower Legs and Feet:  Wash with soap and water.  Apply wrap of Vashe moistened Kerlix to lower legs and feet for 1 minute   Apply oil emulsion to open areas followed by Silver Alginate.  Weave Silver Alginate (rope) between toes  Cover draining areas with ABD  Secure with cotton batting layer of 4 layer wrap  Apply Tubigrip F from toes to knees   Apply Tubigrip G from knee to mid-calf  Continue 4 layer compression wrap  Dressing change 2x / week     Prescription for Cipro ordered this visit. Please  and take as prescribed.      Mechanically debrided with Vashe moistened gauze.   Compression Goal: Velcro Compression Wraps     Other associated diagnoses or problems addressed:  Chronic lymphedema and lower

## 2025-06-02 ENCOUNTER — HOSPITAL ENCOUNTER (OUTPATIENT)
Dept: WOUND CARE | Age: 60
Discharge: HOME | End: 2025-06-02
Payer: COMMERCIAL

## 2025-06-02 VITALS
RESPIRATION RATE: 18 BRPM | SYSTOLIC BLOOD PRESSURE: 168 MMHG | TEMPERATURE: 98.1 F | DIASTOLIC BLOOD PRESSURE: 85 MMHG | HEART RATE: 77 BPM

## 2025-06-02 DIAGNOSIS — L97.301 VENOUS STASIS ULCER OF ANKLE LIMITED TO BREAKDOWN OF SKIN WITH VARICOSE VEINS, UNSPECIFIED LATERALITY (HCC): Primary | ICD-10-CM

## 2025-06-02 DIAGNOSIS — I83.003 VENOUS STASIS ULCER OF ANKLE LIMITED TO BREAKDOWN OF SKIN WITH VARICOSE VEINS, UNSPECIFIED LATERALITY (HCC): Primary | ICD-10-CM

## 2025-06-02 PROCEDURE — 29581 APPL MULTLAYER CMPRN SYS LEG: CPT

## 2025-06-02 RX ORDER — LIDOCAINE HYDROCHLORIDE 20 MG/ML
JELLY TOPICAL PRN
OUTPATIENT
Start: 2025-06-02

## 2025-06-02 RX ORDER — NEOMYCIN/BACITRACIN/POLYMYXINB 3.5-400-5K
OINTMENT (GRAM) TOPICAL PRN
OUTPATIENT
Start: 2025-06-02

## 2025-06-02 RX ORDER — LIDOCAINE 50 MG/G
OINTMENT TOPICAL PRN
OUTPATIENT
Start: 2025-06-02

## 2025-06-02 RX ORDER — GENTAMICIN SULFATE 1 MG/G
OINTMENT TOPICAL PRN
OUTPATIENT
Start: 2025-06-02

## 2025-06-02 RX ORDER — LIDOCAINE HYDROCHLORIDE 40 MG/ML
SOLUTION TOPICAL PRN
OUTPATIENT
Start: 2025-06-02

## 2025-06-02 RX ORDER — TRIAMCINOLONE ACETONIDE 1 MG/G
OINTMENT TOPICAL PRN
OUTPATIENT
Start: 2025-06-02

## 2025-06-02 RX ORDER — CLOBETASOL PROPIONATE 0.5 MG/G
OINTMENT TOPICAL PRN
OUTPATIENT
Start: 2025-06-02

## 2025-06-02 RX ORDER — BACITRACIN ZINC AND POLYMYXIN B SULFATE 500; 1000 [USP'U]/G; [USP'U]/G
OINTMENT TOPICAL PRN
OUTPATIENT
Start: 2025-06-02

## 2025-06-02 RX ORDER — BETAMETHASONE DIPROPIONATE 0.5 MG/G
CREAM TOPICAL PRN
OUTPATIENT
Start: 2025-06-02

## 2025-06-02 RX ORDER — LIDOCAINE 40 MG/G
CREAM TOPICAL PRN
OUTPATIENT
Start: 2025-06-02

## 2025-06-02 RX ORDER — MUPIROCIN 20 MG/G
OINTMENT TOPICAL PRN
OUTPATIENT
Start: 2025-06-02

## 2025-06-02 RX ORDER — GINSENG 100 MG
CAPSULE ORAL PRN
OUTPATIENT
Start: 2025-06-02

## 2025-06-02 RX ORDER — SODIUM CHLOR/HYPOCHLOROUS ACID 0.033 %
SOLUTION, IRRIGATION IRRIGATION PRN
OUTPATIENT
Start: 2025-06-02

## 2025-06-02 NOTE — WOUND CARE
Multilayer Compression Wrap   (Not Unna) Below the Knee    NAME:  Jozef Felix  YOB: 1965  MEDICAL RECORD NUMBER:  [id number]  DATE:  6/2/2025    Multilayer compression wrap: Removed old Multilayer wrap if indicated and wash leg with mild soap/water.  Applied moisturizing agent to dry skin as needed.   Applied primary and secondary dressing as ordered.  Applied multilayered dressing below the knee to right lower leg.  Applied multilayered dressing below the knee to left lower leg.  Instructed patient/caregiver not to remove dressing and to keep it clean and dry.   Instructed patient/caregiver on complications to report to provider, such as pain, numbness in toes, heavy drainage, and slippage of dressing.  Instructed patient on purpose of compression dressing and on activity and exercise recommendations.      Electronically signed by Namita Pal RN on 6/2/2025 at 3:59 PM

## 2025-06-02 NOTE — FLOWSHEET NOTE
06/02/25 1516   Right Leg Edema Point of Measurement   Leg circumference 43 cm   Ankle circumference 26 cm   Foot circumference 28 cm   Compression Therapy 4 layer compression wrap   Left Leg Edema Point of Measurement   Leg circumference 40 cm   Ankle circumference 22 cm   Foot circumference 29 cm   Compression Therapy 4 layer compression wrap   Wound 04/21/25 Foot Left;Dorsal #3   Date First Assessed/Time First Assessed: 04/21/25 1420   Present on Original Admission: Yes  Wound Approximate Age at First Assessment (Weeks): 1 weeks  Location: Foot  Wound Location Orientation: Left;Dorsal  Wound Description (Comments): #3   Wound Image    Wound Etiology Venous   Dressing Status Old drainage noted   Wound Cleansed Soap and water;Vashe   Dressing/Treatment Alginate with Ag;Petroleum impregnated gauze   Wound Length (cm) 8 cm   Wound Width (cm) 12 cm   Wound Depth (cm) 0.1 cm   Wound Surface Area (cm^2) 96 cm^2   Change in Wound Size % (l*w) -140   Wound Volume (cm^3) 9.6 cm^3   Wound Healing % -140   Wound Assessment Slough;Pink/red   Drainage Amount Moderate (25-50%)   Drainage Description Serosanguinous   Odor Mild   Ariadne-wound Assessment Edematous;Fragile;Dry/flaky   Margins Attached edges   Wound Thickness Description not for Pressure Injury Full thickness   Wound 04/07/25 Pretibial Proximal;Right   Date First Assessed/Time First Assessed: 04/07/25 1444   Present on Original Admission: Yes  Wound Approximate Age at First Assessment (Weeks): 1 weeks  Location: Pretibial  Wound Location Orientation: Proximal;Right   Wound Image    Wound Etiology Venous   Dressing Status Old drainage noted   Wound Cleansed Vashe;Soap and water   Dressing/Treatment Petroleum impregnated gauze;Alginate with Ag   Wound Length (cm) 2 cm   Wound Width (cm) 11 cm   Wound Depth (cm) 0.1 cm   Wound Surface Area (cm^2) 22 cm^2   Change in Wound Size % (l*w) 65.63   Wound Volume (cm^3) 2.2 cm^3   Wound Healing % 66   Wound Assessment

## 2025-06-05 ENCOUNTER — HOSPITAL ENCOUNTER (OUTPATIENT)
Dept: WOUND CARE | Age: 60
Discharge: HOME | End: 2025-06-05
Payer: COMMERCIAL

## 2025-06-05 VITALS
DIASTOLIC BLOOD PRESSURE: 92 MMHG | SYSTOLIC BLOOD PRESSURE: 149 MMHG | HEART RATE: 74 BPM | TEMPERATURE: 97.8 F | RESPIRATION RATE: 20 BRPM

## 2025-06-05 DIAGNOSIS — I83.003 VENOUS STASIS ULCER OF ANKLE LIMITED TO BREAKDOWN OF SKIN WITH VARICOSE VEINS, UNSPECIFIED LATERALITY (HCC): Primary | ICD-10-CM

## 2025-06-05 DIAGNOSIS — L97.301 VENOUS STASIS ULCER OF ANKLE LIMITED TO BREAKDOWN OF SKIN WITH VARICOSE VEINS, UNSPECIFIED LATERALITY (HCC): Primary | ICD-10-CM

## 2025-06-05 PROCEDURE — 29581 APPL MULTLAYER CMPRN SYS LEG: CPT

## 2025-06-05 RX ORDER — LIDOCAINE HYDROCHLORIDE 20 MG/ML
JELLY TOPICAL PRN
OUTPATIENT
Start: 2025-06-05

## 2025-06-05 RX ORDER — HYDRALAZINE HYDROCHLORIDE 25 MG/1
TABLET, FILM COATED ORAL
COMMUNITY
Start: 2025-05-22

## 2025-06-05 RX ORDER — NEOMYCIN/BACITRACIN/POLYMYXINB 3.5-400-5K
OINTMENT (GRAM) TOPICAL PRN
OUTPATIENT
Start: 2025-06-05

## 2025-06-05 RX ORDER — MUPIROCIN 20 MG/G
OINTMENT TOPICAL PRN
OUTPATIENT
Start: 2025-06-05

## 2025-06-05 RX ORDER — LIDOCAINE 50 MG/G
OINTMENT TOPICAL PRN
OUTPATIENT
Start: 2025-06-05

## 2025-06-05 RX ORDER — GINSENG 100 MG
CAPSULE ORAL PRN
OUTPATIENT
Start: 2025-06-05

## 2025-06-05 RX ORDER — BACITRACIN ZINC AND POLYMYXIN B SULFATE 500; 1000 [USP'U]/G; [USP'U]/G
OINTMENT TOPICAL PRN
OUTPATIENT
Start: 2025-06-05

## 2025-06-05 RX ORDER — SERTRALINE HYDROCHLORIDE 100 MG/1
150 TABLET, FILM COATED ORAL DAILY
COMMUNITY
Start: 2025-05-25

## 2025-06-05 RX ORDER — LIDOCAINE 40 MG/G
CREAM TOPICAL PRN
OUTPATIENT
Start: 2025-06-05

## 2025-06-05 RX ORDER — SODIUM CHLOR/HYPOCHLOROUS ACID 0.033 %
SOLUTION, IRRIGATION IRRIGATION PRN
OUTPATIENT
Start: 2025-06-05

## 2025-06-05 RX ORDER — LIDOCAINE HYDROCHLORIDE 40 MG/ML
SOLUTION TOPICAL PRN
OUTPATIENT
Start: 2025-06-05

## 2025-06-05 RX ORDER — TRIAMCINOLONE ACETONIDE 1 MG/G
OINTMENT TOPICAL PRN
OUTPATIENT
Start: 2025-06-05

## 2025-06-05 RX ORDER — CLOBETASOL PROPIONATE 0.5 MG/G
OINTMENT TOPICAL PRN
OUTPATIENT
Start: 2025-06-05

## 2025-06-05 RX ORDER — BETAMETHASONE DIPROPIONATE 0.5 MG/G
CREAM TOPICAL PRN
OUTPATIENT
Start: 2025-06-05

## 2025-06-05 RX ORDER — GENTAMICIN SULFATE 1 MG/G
OINTMENT TOPICAL PRN
OUTPATIENT
Start: 2025-06-05

## 2025-06-05 NOTE — WOUND CARE
Multilayer Compression Wrap   (Not Unna) Below the Knee    NAME:  Jozef Felix  YOB: 1965  MEDICAL RECORD NUMBER:  [id number]  DATE:  6/5/2025    Multilayer compression wrap: Removed old Multilayer wrap if indicated and wash leg with mild soap/water.  Applied primary and secondary dressing as ordered.  Applied multilayered dressing below the knee to right lower leg.  Applied multilayered dressing below the knee to left lower leg.  Instructed patient/caregiver not to remove dressing and to keep it clean and dry.   Instructed patient/caregiver on complications to report to provider, such as pain, numbness in toes, heavy drainage, and slippage of dressing.  Instructed patient on purpose of compression dressing and on activity and exercise recommendations.      Electronically signed by Sadie Bedolla RN on 6/5/2025 at 4:01 PM

## 2025-06-05 NOTE — FLOWSHEET NOTE
06/05/25 1521   Right Leg Edema Point of Measurement   Leg circumference 42.5 cm   Ankle circumference 28 cm   Foot circumference 30 cm   Compression Therapy 4 layer compression wrap   Left Leg Edema Point of Measurement   Leg circumference 40 cm   Ankle circumference 25 cm   Foot circumference 30 cm   Compression Therapy 4 layer compression wrap   Wound 04/21/25 Foot Left;Dorsal #3   Date First Assessed/Time First Assessed: 04/21/25 1420   Present on Original Admission: Yes  Wound Approximate Age at First Assessment (Weeks): 1 weeks  Location: Foot  Wound Location Orientation: Left;Dorsal  Wound Description (Comments): #3   Wound Image    Wound Etiology Venous   Dressing Status Old drainage noted   Wound Cleansed Soap and water   Dressing/Treatment Alginate with Ag   Wound Length (cm) 5 cm   Wound Width (cm) 8 cm   Wound Depth (cm) 0.1 cm   Wound Surface Area (cm^2) 40 cm^2   Change in Wound Size % (l*w) 0   Wound Volume (cm^3) 4 cm^3   Wound Healing % 0   Wound Assessment [place]/red;Slough   Drainage Amount Large (50-75% saturated)   Drainage Description Serosanguinous   Odor Mild   Ariadne-wound Assessment Edematous;Blanchable erythema   Wound Thickness Description not for Pressure Injury Full thickness   Wound 04/07/25 Pretibial Proximal;Right   Date First Assessed/Time First Assessed: 04/07/25 1444   Present on Original Admission: Yes  Wound Approximate Age at First Assessment (Weeks): 1 weeks  Location: Pretibial  Wound Location Orientation: Proximal;Right   Wound Image    Wound Etiology Venous   Dressing Status Old drainage noted   Wound Cleansed Vashe;Soap and water   Dressing/Treatment Petroleum impregnated gauze;Alginate with Ag   Wound Length (cm) 11 cm   Wound Width (cm) 15 cm   Wound Depth (cm) 0.1 cm   Wound Surface Area (cm^2) 165 cm^2   Change in Wound Size % (l*w) -157.81   Wound Volume (cm^3) 16.5 cm^3   Wound Healing % -158   Wound Assessment Pink/red   Drainage Amount Moderate (25-50%)   Drainage

## 2025-06-09 ENCOUNTER — HOSPITAL ENCOUNTER (OUTPATIENT)
Dept: WOUND CARE | Age: 60
Discharge: HOME | End: 2025-06-09
Payer: COMMERCIAL

## 2025-06-09 VITALS
TEMPERATURE: 98.1 F | OXYGEN SATURATION: 97 % | RESPIRATION RATE: 18 BRPM | SYSTOLIC BLOOD PRESSURE: 180 MMHG | HEART RATE: 76 BPM | DIASTOLIC BLOOD PRESSURE: 98 MMHG

## 2025-06-09 DIAGNOSIS — L97.301 VENOUS STASIS ULCER OF ANKLE LIMITED TO BREAKDOWN OF SKIN WITH VARICOSE VEINS, UNSPECIFIED LATERALITY (HCC): Primary | ICD-10-CM

## 2025-06-09 DIAGNOSIS — I83.003 VENOUS STASIS ULCER OF ANKLE LIMITED TO BREAKDOWN OF SKIN WITH VARICOSE VEINS, UNSPECIFIED LATERALITY (HCC): Primary | ICD-10-CM

## 2025-06-09 PROCEDURE — 29581 APPL MULTLAYER CMPRN SYS LEG: CPT

## 2025-06-09 PROCEDURE — 99213 OFFICE O/P EST LOW 20 MIN: CPT

## 2025-06-09 PROCEDURE — 99213 OFFICE O/P EST LOW 20 MIN: CPT | Performed by: FAMILY MEDICINE

## 2025-06-09 RX ORDER — NEOMYCIN/BACITRACIN/POLYMYXINB 3.5-400-5K
OINTMENT (GRAM) TOPICAL PRN
Status: CANCELLED | OUTPATIENT
Start: 2025-06-09

## 2025-06-09 RX ORDER — LIDOCAINE 50 MG/G
OINTMENT TOPICAL PRN
Status: CANCELLED | OUTPATIENT
Start: 2025-06-09

## 2025-06-09 RX ORDER — LIDOCAINE HYDROCHLORIDE 20 MG/ML
JELLY TOPICAL PRN
Status: CANCELLED | OUTPATIENT
Start: 2025-06-09

## 2025-06-09 RX ORDER — SODIUM CHLOR/HYPOCHLOROUS ACID 0.033 %
SOLUTION, IRRIGATION IRRIGATION PRN
Status: CANCELLED | OUTPATIENT
Start: 2025-06-09

## 2025-06-09 RX ORDER — GINSENG 100 MG
CAPSULE ORAL PRN
Status: CANCELLED | OUTPATIENT
Start: 2025-06-09

## 2025-06-09 RX ORDER — MUPIROCIN 2 %
OINTMENT (GRAM) TOPICAL PRN
Status: CANCELLED | OUTPATIENT
Start: 2025-06-09

## 2025-06-09 RX ORDER — SODIUM CHLOR/HYPOCHLOROUS ACID 0.033 %
SOLUTION, IRRIGATION IRRIGATION PRN
Status: DISCONTINUED | OUTPATIENT
Start: 2025-06-09 | End: 2025-06-10 | Stop reason: HOSPADM

## 2025-06-09 RX ORDER — LIDOCAINE 40 MG/G
CREAM TOPICAL PRN
Status: CANCELLED | OUTPATIENT
Start: 2025-06-09

## 2025-06-09 RX ORDER — CLOBETASOL PROPIONATE 0.5 MG/G
OINTMENT TOPICAL PRN
Status: CANCELLED | OUTPATIENT
Start: 2025-06-09

## 2025-06-09 RX ORDER — TRIAMCINOLONE ACETONIDE 1 MG/G
OINTMENT TOPICAL PRN
Status: CANCELLED | OUTPATIENT
Start: 2025-06-09

## 2025-06-09 RX ORDER — BACITRACIN ZINC AND POLYMYXIN B SULFATE 500; 1000 [USP'U]/G; [USP'U]/G
OINTMENT TOPICAL PRN
Status: CANCELLED | OUTPATIENT
Start: 2025-06-09

## 2025-06-09 RX ORDER — LIDOCAINE HYDROCHLORIDE 40 MG/ML
SOLUTION TOPICAL PRN
Status: CANCELLED | OUTPATIENT
Start: 2025-06-09

## 2025-06-09 RX ORDER — BETAMETHASONE DIPROPIONATE 0.5 MG/G
CREAM TOPICAL PRN
Status: CANCELLED | OUTPATIENT
Start: 2025-06-09

## 2025-06-09 RX ORDER — GENTAMICIN SULFATE 1 MG/G
OINTMENT TOPICAL PRN
Status: CANCELLED | OUTPATIENT
Start: 2025-06-09

## 2025-06-09 RX ADMIN — Medication: at 16:14

## 2025-06-09 ASSESSMENT — PAIN DESCRIPTION - DESCRIPTORS: DESCRIPTORS: ACHING

## 2025-06-09 ASSESSMENT — PAIN DESCRIPTION - LOCATION: LOCATION: LEG

## 2025-06-09 ASSESSMENT — PAIN SCALES - GENERAL: PAINLEVEL_OUTOF10: 4

## 2025-06-09 ASSESSMENT — PAIN DESCRIPTION - ORIENTATION: ORIENTATION: LEFT;RIGHT

## 2025-06-09 NOTE — WOUND CARE
Multilayer Compression Wrap   (Not Unna) Below the Knee    NAME:  Jozef Felix  YOB: 1965  MEDICAL RECORD NUMBER:  [id number]  DATE:  6/9/2025    Multilayer compression wrap: Removed old Multilayer wrap if indicated and wash leg with mild soap/water.  Applied moisturizing agent to dry skin as needed.   Applied primary and secondary dressing as ordered.  Applied multilayered dressing below the knee to right lower leg.  Applied multilayered dressing below the knee to left lower leg.  Instructed patient/caregiver not to remove dressing and to keep it clean and dry.   Instructed patient/caregiver on complications to report to provider, such as pain, numbness in toes, heavy drainage, and slippage of dressing.  Instructed patient on purpose of compression dressing and on activity and exercise recommendations.      Electronically signed by KAROLINA VALLE RN on 6/9/2025 at 3:52 PM

## 2025-06-09 NOTE — WOUND CARE
Discharge Instructions for  [facility]  [address] [place] [address]  [address]  [place] [address]  Phone [phone]   Fax [phone]      NAME:  Jozef Felix  YOB: 1965  MEDICAL RECORD NUMBER:  [id number]  DATE:  6/9/2025    Return Appointment:   2 weeks with Alfa Edouard DO  Return Appointment: 2x/week with Clinician       Instructions:   Bilateral Lower Legs and Feet:  Wash with soap and water.  Apply wrap of Vashe moistened Kerlix to lower legs and feet for 1 minute   Apply oil emulsion to open areas followed by Silver Alginate.  Weave Silver Alginate (rope) between toes  Cover draining areas with ABD  Secure with cotton batting layer of 4 layer wrap  Apply Tubigrip F from toes to knees   Apply Tubigrip G from knee to mid-calf  Continue 4 layer compression wrap  Dressing change 2x / week     Prescription for Cipro ordered this visit. Please  and take as prescribed.     Compression Goal: Velcro Compression Wraps    Should you experience increased redness, swelling, pain, foul odor, size of wound(s), or have a temperature over 101 degrees please contact the Wound Healing Center at [phone] or if after hours contact your primary care physician or go to the hospital emergency department.    PLEASE NOTE: IF YOU ARE UNABLE TO OBTAIN WOUND SUPPLIES, CONTINUE TO USE THE SUPPLIES YOU HAVE AVAILABLE UNTIL YOU ARE ABLE TO REACH US. IT IS MOST IMPORTANT TO KEEP THE WOUND COVERED AT ALL TIMES.    Electronically signed KAROLINA VALLE RN on 6/9/2025 at 4:13 PM

## 2025-06-09 NOTE — FLOWSHEET NOTE
06/09/25 1527   Right Leg Edema Point of Measurement   Leg circumference 48 cm   Ankle circumference 29 cm   Foot circumference 30 cm   Compression Therapy 4 layer compression wrap   Left Leg Edema Point of Measurement   Leg circumference 40 cm   Ankle circumference 23 cm   Foot circumference 29 cm   Compression Therapy 4 layer compression wrap   Wound 04/21/25 Foot Left;Dorsal #3   Date First Assessed/Time First Assessed: 04/21/25 1420   Present on Original Admission: Yes  Wound Approximate Age at First Assessment (Weeks): 1 weeks  Location: Foot  Wound Location Orientation: Left;Dorsal  Wound Description (Comments): #3   Wound Image    Wound Etiology Venous   Dressing Status Old drainage noted   Wound Cleansed Soap and water   Dressing/Treatment Alginate with Ag   Wound Length (cm) 9 cm   Wound Width (cm) 10 cm   Wound Depth (cm) 0.1 cm   Wound Surface Area (cm^2) 90 cm^2   Change in Wound Size % (l*w) -125   Wound Volume (cm^3) 9 cm^3   Wound Healing % -125   Wound Assessment [place]/red;Slough   Drainage Amount Large (50-75% saturated)   Drainage Description Serosanguinous   Odor Mild   Ariadne-wound Assessment Edematous;Maceration   Wound Thickness Description not for Pressure Injury Full thickness   Wound 04/07/25 Pretibial Proximal;Right   Date First Assessed/Time First Assessed: 04/07/25 1444   Present on Original Admission: Yes  Wound Approximate Age at First Assessment (Weeks): 1 weeks  Location: Pretibial  Wound Location Orientation: Proximal;Right   Wound Image    Wound Etiology Venous   Dressing Status Old drainage noted   Wound Cleansed Vashe;Soap and water   Dressing/Treatment Petroleum impregnated gauze;Alginate with Ag   Wound Length (cm) 3 cm   Wound Width (cm) 3.5 cm   Wound Depth (cm) 0.1 cm   Wound Surface Area (cm^2) 10.5 cm^2   Change in Wound Size % (l*w) 83.59   Wound Volume (cm^3) 1.05 cm^3   Wound Healing % 84   Wound Assessment Pink/red   Drainage Amount Moderate (25-50%)   Drainage

## 2025-06-11 NOTE — PROGRESS NOTES
[facility]   Wound Care and [facility] Therapy Center   Medical Staff Progress Note     Jozef Felix  MEDICAL RECORD NUMBER:  [id number]  AGE: 59 y.o.   GENDER: male  : 1965  EPISODE DATE:  2025    Chief complaint and reason for visit:     Chief Complaint   Patient presents with    Wound Check     Wound on bilateral legs and feet      Patient presenting for follow up evaluation of wound(s) per chief complaint.  Patient's legs have improved some.  Less erythema.  Edema somewhat better.    Subjective and ROS: Symptoms, wound related issues, or other pertinent wound history since last visit: no new wound care issues. Tolerating current treatment. No systemic complaints above baseline.    History of Wound Context: Per original history and physical on this patient. Changes in history since last evaluation: none    Medical Decision Making:     Problem List Items Addressed This Visit          Circulatory    Venous stasis ulcer of ankle limited to breakdown of skin with varicose veins (HCC) - Primary (Chronic)       Wounds and Treatment Plan:    Patient states he is working to be able to take care of his own legs.  He will bring his compression device next visit.     Return Appointment:   2 weeks with Alfa Edouard DO  Return Appointment: 2x/week with Clinician         Instructions:   Bilateral Lower Legs and Feet:  Wash with soap and water.  Apply wrap of Vashe moistened Kerlix to lower legs and feet for 1 minute   Apply oil emulsion to open areas followed by Silver Alginate.  Weave Silver Alginate (rope) between toes  Cover draining areas with ABD  Secure with cotton batting layer of 4 layer wrap  Apply Tubigrip F from toes to knees   Apply Tubigrip G from knee to mid-calf  Continue 4 layer compression wrap  Dressing change 2x / week     Prescription for Cipro ordered this visit. Please  and take as prescribed.      Compression Goal: Velcro Compression Wraps  Other

## 2025-06-12 ENCOUNTER — HOSPITAL ENCOUNTER (OUTPATIENT)
Dept: WOUND CARE | Age: 60
Discharge: HOME | End: 2025-06-12
Payer: COMMERCIAL

## 2025-06-12 VITALS
HEART RATE: 64 BPM | OXYGEN SATURATION: 97 % | TEMPERATURE: 97 F | RESPIRATION RATE: 12 BRPM | SYSTOLIC BLOOD PRESSURE: 147 MMHG | DIASTOLIC BLOOD PRESSURE: 87 MMHG

## 2025-06-12 DIAGNOSIS — I83.003 VENOUS STASIS ULCER OF ANKLE LIMITED TO BREAKDOWN OF SKIN WITH VARICOSE VEINS, UNSPECIFIED LATERALITY (HCC): Primary | ICD-10-CM

## 2025-06-12 DIAGNOSIS — L97.301 VENOUS STASIS ULCER OF ANKLE LIMITED TO BREAKDOWN OF SKIN WITH VARICOSE VEINS, UNSPECIFIED LATERALITY (HCC): Primary | ICD-10-CM

## 2025-06-12 PROCEDURE — 29581 APPL MULTLAYER CMPRN SYS LEG: CPT

## 2025-06-12 RX ORDER — LIDOCAINE HYDROCHLORIDE 40 MG/ML
SOLUTION TOPICAL PRN
OUTPATIENT
Start: 2025-06-12

## 2025-06-12 RX ORDER — GINSENG 100 MG
CAPSULE ORAL PRN
OUTPATIENT
Start: 2025-06-12

## 2025-06-12 RX ORDER — MUPIROCIN 2 %
OINTMENT (GRAM) TOPICAL PRN
OUTPATIENT
Start: 2025-06-12

## 2025-06-12 RX ORDER — LIDOCAINE 40 MG/G
CREAM TOPICAL PRN
OUTPATIENT
Start: 2025-06-12

## 2025-06-12 RX ORDER — LIDOCAINE HYDROCHLORIDE 20 MG/ML
JELLY TOPICAL PRN
OUTPATIENT
Start: 2025-06-12

## 2025-06-12 RX ORDER — NEOMYCIN/BACITRACIN/POLYMYXINB 3.5-400-5K
OINTMENT (GRAM) TOPICAL PRN
OUTPATIENT
Start: 2025-06-12

## 2025-06-12 RX ORDER — GENTAMICIN SULFATE 1 MG/G
OINTMENT TOPICAL PRN
OUTPATIENT
Start: 2025-06-12

## 2025-06-12 RX ORDER — BACITRACIN ZINC AND POLYMYXIN B SULFATE 500; 1000 [USP'U]/G; [USP'U]/G
OINTMENT TOPICAL PRN
OUTPATIENT
Start: 2025-06-12

## 2025-06-12 RX ORDER — CLOBETASOL PROPIONATE 0.5 MG/G
OINTMENT TOPICAL PRN
OUTPATIENT
Start: 2025-06-12

## 2025-06-12 RX ORDER — TRIAMCINOLONE ACETONIDE 1 MG/G
OINTMENT TOPICAL PRN
OUTPATIENT
Start: 2025-06-12

## 2025-06-12 RX ORDER — LIDOCAINE 50 MG/G
OINTMENT TOPICAL PRN
OUTPATIENT
Start: 2025-06-12

## 2025-06-12 RX ORDER — BETAMETHASONE DIPROPIONATE 0.5 MG/G
CREAM TOPICAL PRN
OUTPATIENT
Start: 2025-06-12

## 2025-06-12 RX ORDER — SODIUM CHLOR/HYPOCHLOROUS ACID 0.033 %
SOLUTION, IRRIGATION IRRIGATION PRN
OUTPATIENT
Start: 2025-06-12

## 2025-06-12 NOTE — FLOWSHEET NOTE
Slough;Pink/red   Drainage Amount Moderate (25-50%)   Drainage Description Serosanguinous   Odor None   Ariadne-wound Assessment Edematous   Margins Attached edges   Wound Thickness Description not for Pressure Injury Full thickness   Wound 04/21/25 Foot Left;Dorsal #3   Date First Assessed/Time First Assessed: 04/21/25 1420   Present on Original Admission: Yes  Wound Approximate Age at First Assessment (Weeks): 1 weeks  Location: Foot  Wound Location Orientation: Left;Dorsal  Wound Description (Comments): #3   Wound Image    Wound Etiology Venous   Dressing Status Old drainage noted   Wound Cleansed Soap and water;Other (Comment)  (Dakins)   Dressing/Treatment Alginate with Ag   Wound Length (cm) 9 cm   Wound Width (cm) 10 cm   Wound Depth (cm) 0.1 cm   Wound Surface Area (cm^2) 90 cm^2   Change in Wound Size % (l*w) -125   Wound Volume (cm^3) 9 cm^3   Wound Healing % -125   Wound Assessment Slough;Pink/red   Drainage Amount Large (50-75% saturated)   Drainage Description Serosanguinous   Odor None   Ariadne-wound Assessment Edematous   Margins Attached edges   Wound Thickness Description not for Pressure Injury Full thickness   Pain Assessment   Pain Assessment 0-10   Pain Level 2   Patient's Stated Pain Goal 0 - No pain   Pain Location Foot   Pain Orientation Right;Left     Clinician visit only.  Mechanical debridement with gauze and Dakins.

## 2025-06-12 NOTE — WOUND CARE
Multilayer Compression Wrap   (Not Unna) Below the Knee    NAME:  Jozef Felix  YOB: 1965  MEDICAL RECORD NUMBER:  [id number]  DATE:  6/12/2025    Multilayer compression wrap: Removed old Multilayer wrap if indicated and wash leg with mild soap/water.  Applied moisturizing agent to dry skin as needed.   Applied primary and secondary dressing as ordered.  Applied multilayered dressing below the knee to right lower leg.  Applied multilayered dressing below the knee to left lower leg.  Instructed patient/caregiver not to remove dressing and to keep it clean and dry.   Instructed patient/caregiver on complications to report to provider, such as pain, numbness in toes, heavy drainage, and slippage of dressing.  Instructed patient on purpose of compression dressing and on activity and exercise recommendations.      Electronically signed by Maria Elena Sequeira RN on 6/12/2025 at 4:57 PM

## 2025-06-13 RX ORDER — CIPROFLOXACIN 500 MG/1
500 TABLET, FILM COATED ORAL 2 TIMES DAILY
Qty: 28 TABLET | Refills: 0 | Status: SHIPPED | OUTPATIENT
Start: 2025-06-13 | End: 2025-06-27

## 2025-06-16 ENCOUNTER — HOSPITAL ENCOUNTER (OUTPATIENT)
Dept: WOUND CARE | Age: 60
Discharge: HOME | End: 2025-06-16
Payer: COMMERCIAL

## 2025-06-16 VITALS
RESPIRATION RATE: 16 BRPM | DIASTOLIC BLOOD PRESSURE: 84 MMHG | HEART RATE: 85 BPM | OXYGEN SATURATION: 93 % | TEMPERATURE: 98.1 F | SYSTOLIC BLOOD PRESSURE: 139 MMHG

## 2025-06-16 DIAGNOSIS — I83.003 VENOUS STASIS ULCER OF ANKLE LIMITED TO BREAKDOWN OF SKIN WITH VARICOSE VEINS, UNSPECIFIED LATERALITY (HCC): Primary | ICD-10-CM

## 2025-06-16 DIAGNOSIS — L97.301 VENOUS STASIS ULCER OF ANKLE LIMITED TO BREAKDOWN OF SKIN WITH VARICOSE VEINS, UNSPECIFIED LATERALITY (HCC): Primary | ICD-10-CM

## 2025-06-16 PROCEDURE — 29581 APPL MULTLAYER CMPRN SYS LEG: CPT

## 2025-06-16 RX ORDER — BETAMETHASONE DIPROPIONATE 0.5 MG/G
CREAM TOPICAL PRN
OUTPATIENT
Start: 2025-06-16

## 2025-06-16 RX ORDER — MUPIROCIN 2 %
OINTMENT (GRAM) TOPICAL PRN
OUTPATIENT
Start: 2025-06-16

## 2025-06-16 RX ORDER — GABAPENTIN 400 MG/1
400 CAPSULE ORAL
Qty: 30 CAPSULE | Refills: 2 | Status: SHIPPED | OUTPATIENT
Start: 2025-06-16 | End: 2025-09-14

## 2025-06-16 RX ORDER — LIDOCAINE HYDROCHLORIDE 40 MG/ML
SOLUTION TOPICAL PRN
OUTPATIENT
Start: 2025-06-16

## 2025-06-16 RX ORDER — SODIUM CHLOR/HYPOCHLOROUS ACID 0.033 %
SOLUTION, IRRIGATION IRRIGATION PRN
OUTPATIENT
Start: 2025-06-16

## 2025-06-16 RX ORDER — LIDOCAINE 40 MG/G
CREAM TOPICAL PRN
OUTPATIENT
Start: 2025-06-16

## 2025-06-16 RX ORDER — GENTAMICIN SULFATE 1 MG/G
OINTMENT TOPICAL PRN
OUTPATIENT
Start: 2025-06-16

## 2025-06-16 RX ORDER — NEOMYCIN/BACITRACIN/POLYMYXINB 3.5-400-5K
OINTMENT (GRAM) TOPICAL PRN
OUTPATIENT
Start: 2025-06-16

## 2025-06-16 RX ORDER — LIDOCAINE HYDROCHLORIDE 20 MG/ML
JELLY TOPICAL PRN
OUTPATIENT
Start: 2025-06-16

## 2025-06-16 RX ORDER — LIDOCAINE 50 MG/G
OINTMENT TOPICAL PRN
OUTPATIENT
Start: 2025-06-16

## 2025-06-16 RX ORDER — TRIAMCINOLONE ACETONIDE 1 MG/G
OINTMENT TOPICAL PRN
OUTPATIENT
Start: 2025-06-16

## 2025-06-16 RX ORDER — BACITRACIN ZINC AND POLYMYXIN B SULFATE 500; 1000 [USP'U]/G; [USP'U]/G
OINTMENT TOPICAL PRN
OUTPATIENT
Start: 2025-06-16

## 2025-06-16 RX ORDER — CLOBETASOL PROPIONATE 0.5 MG/G
OINTMENT TOPICAL PRN
OUTPATIENT
Start: 2025-06-16

## 2025-06-16 RX ORDER — GINSENG 100 MG
CAPSULE ORAL PRN
OUTPATIENT
Start: 2025-06-16

## 2025-06-16 ASSESSMENT — PAIN DESCRIPTION - ORIENTATION: ORIENTATION: LEFT;RIGHT

## 2025-06-16 ASSESSMENT — PAIN SCALES - GENERAL: PAINLEVEL_OUTOF10: 5

## 2025-06-16 ASSESSMENT — PAIN DESCRIPTION - DESCRIPTORS: DESCRIPTORS: BURNING;SHARP;SHOOTING

## 2025-06-16 ASSESSMENT — PAIN DESCRIPTION - LOCATION: LOCATION: LEG

## 2025-06-16 NOTE — FLOWSHEET NOTE
06/16/25 1547   Right Leg Edema Point of Measurement   Leg circumference 47.5 cm   Ankle circumference 27.5 cm   Foot circumference 29 cm   Compression Therapy 4 layer compression wrap   Left Leg Edema Point of Measurement   Leg circumference 48 cm   Ankle circumference 32.5 cm   Foot circumference 31 cm   Compression Therapy 4 layer compression wrap   Wound 04/21/25 Foot Left;Dorsal #3   Date First Assessed/Time First Assessed: 04/21/25 1420   Present on Original Admission: Yes  Wound Approximate Age at First Assessment (Weeks): 1 weeks  Location: Foot  Wound Location Orientation: Left;Dorsal  Wound Description (Comments): #3   Wound Image     Wound Etiology Venous   Dressing Status Old drainage noted   Wound Cleansed Soap and water;Other (Comment)   Dressing/Treatment Alginate with Ag   Wound Length (cm) 9 cm   Wound Width (cm) 12 cm   Wound Depth (cm) 0.1 cm   Wound Surface Area (cm^2) 108 cm^2   Change in Wound Size % (l*w) -170   Wound Volume (cm^3) 10.8 cm^3   Wound Healing % -170   Wound Assessment Pink/red   Drainage Amount Large (50-75% saturated)   Drainage Description Serosanguinous   Odor None   Ariadne-wound Assessment Edematous   Margins Attached edges   Wound Thickness Description not for Pressure Injury Full thickness   Wound 04/07/25 Pretibial Proximal;Right   Date First Assessed/Time First Assessed: 04/07/25 1444   Present on Original Admission: Yes  Wound Approximate Age at First Assessment (Weeks): 1 weeks  Location: Pretibial  Wound Location Orientation: Proximal;Right   Wound Image    Wound Etiology Venous   Dressing Status Old drainage noted   Wound Cleansed Soap and water;Other (Comment)  (Dakins)   Dressing/Treatment Alginate with Ag   Wound Length (cm) 7 cm   Wound Width (cm) 17 cm   Wound Depth (cm) 0.1 cm   Wound Surface Area (cm^2) 119 cm^2   Change in Wound Size % (l*w) -85.94   Wound Volume (cm^3) 11.9 cm^3   Wound Healing % -86   Wound Assessment Purple/maroon   Drainage Amount Small

## 2025-06-16 NOTE — WOUND CARE
Discharge Instructions for  [facility]  [address] [place] [address]  [address]  [place] [address]  Phone [phone]   Fax [phone]      NAME:  Jozef Felix  YOB: 1965  MEDICAL RECORD NUMBER:  [id number]  DATE:  6/16/2025    Return Appointment:   2 weeks with Alfa Edouard DO  Return Appointment: 2x/week with Clinician       Instructions:   Bilateral Lower Legs and Feet:  Wash with soap and water.  Apply Hysept 0.25% soaked kerlix to legs 1 min  Weave Silver Alginate (rope) between toes  Cover draining areas with ABD  Secure with cotton batting layer of 4 layer wrap  Apply Tubigrip F from toes to knees   Apply Tubigrip G from knee to mid-calf  Continue 4 layer compression wrap  Dressing change 2x / week     Prescription sent to pharmacy for gabapentin 400mg 1 tab at bedtime sent to pharmacy.  Patient to call primary md or pain clinic for appointment for continued use of gabapentin and refills.     Compression Goal: Velcro Compression Wraps  Bring velcro wraps to follow up with MD in 2 weeks.     Should you experience increased redness, swelling, pain, foul odor, size of wound(s), or have a temperature over 101 degrees please contact the [facility] at [phone] or if after hours contact your primary care physician or go to the hospital emergency department.    PLEASE NOTE: IF YOU ARE UNABLE TO OBTAIN WOUND SUPPLIES, CONTINUE TO USE THE SUPPLIES YOU HAVE AVAILABLE UNTIL YOU ARE ABLE TO REACH US. IT IS MOST IMPORTANT TO KEEP THE WOUND COVERED AT ALL TIMES.    Electronically signed KATHERIN GIBBS RN on 6/16/2025 at 4:10 PM

## 2025-06-16 NOTE — DISCHARGE INSTRUCTIONS
Bilateral Lower Legs and Feet:  Wash with soap and water.  Apply Hysept 0.25% soaked kerlix to legs 1 min  Weave Silver Alginate (rope) between toes  Cover draining areas with ABD  Secure with cotton batting layer of 4 layer wrap  Apply Tubigrip F from toes to knees   Apply Tubigrip G from knee to mid-calf  Continue 4 layer compression wrap  Dressing change 2x / week     Prescription sent to pharmacy for gabapentin 400mg 1 tab at bedtime sent to pharmacy.  Patient to call primary md or pain clinic for appointment for continued use of gabapentin and refills.      Compression Goal: Velcro Compression Wraps  Bring velcro wraps to follow up with MD in 2 weeks.      Should you experience increased redness, swelling, pain, foul odor, size of wound(s), or have a temperature over 101 degrees please contact the [facility] at [phone] or if after hours contact your primary care physician or go to the hospital emergency department.     PLEASE NOTE: IF YOU ARE UNABLE TO OBTAIN WOUND SUPPLIES, CONTINUE TO USE THE SUPPLIES YOU HAVE AVAILABLE UNTIL YOU ARE ABLE TO REACH US. IT IS MOST IMPORTANT TO KEEP THE WOUND COVERED AT ALL TIMES.

## 2025-06-19 ENCOUNTER — HOSPITAL ENCOUNTER (OUTPATIENT)
Dept: WOUND CARE | Age: 60
Discharge: HOME | End: 2025-06-19
Payer: COMMERCIAL

## 2025-06-19 VITALS
TEMPERATURE: 98.4 F | SYSTOLIC BLOOD PRESSURE: 158 MMHG | HEART RATE: 73 BPM | RESPIRATION RATE: 18 BRPM | DIASTOLIC BLOOD PRESSURE: 86 MMHG

## 2025-06-19 DIAGNOSIS — I83.003 VENOUS STASIS ULCER OF ANKLE LIMITED TO BREAKDOWN OF SKIN WITH VARICOSE VEINS, UNSPECIFIED LATERALITY (HCC): Primary | Chronic | ICD-10-CM

## 2025-06-19 DIAGNOSIS — L97.301 VENOUS STASIS ULCER OF ANKLE LIMITED TO BREAKDOWN OF SKIN WITH VARICOSE VEINS, UNSPECIFIED LATERALITY (HCC): Primary | Chronic | ICD-10-CM

## 2025-06-19 PROCEDURE — 29581 APPL MULTLAYER CMPRN SYS LEG: CPT

## 2025-06-19 RX ORDER — GINSENG 100 MG
CAPSULE ORAL PRN
OUTPATIENT
Start: 2025-06-19

## 2025-06-19 RX ORDER — BACITRACIN ZINC AND POLYMYXIN B SULFATE 500; 1000 [USP'U]/G; [USP'U]/G
OINTMENT TOPICAL PRN
OUTPATIENT
Start: 2025-06-19

## 2025-06-19 RX ORDER — LIDOCAINE HYDROCHLORIDE 20 MG/ML
JELLY TOPICAL PRN
OUTPATIENT
Start: 2025-06-19

## 2025-06-19 RX ORDER — NEOMYCIN/BACITRACIN/POLYMYXINB 3.5-400-5K
OINTMENT (GRAM) TOPICAL PRN
OUTPATIENT
Start: 2025-06-19

## 2025-06-19 RX ORDER — MUPIROCIN 2 %
OINTMENT (GRAM) TOPICAL PRN
OUTPATIENT
Start: 2025-06-19

## 2025-06-19 RX ORDER — TRIAMCINOLONE ACETONIDE 1 MG/G
OINTMENT TOPICAL PRN
OUTPATIENT
Start: 2025-06-19

## 2025-06-19 RX ORDER — LIDOCAINE HYDROCHLORIDE 40 MG/ML
SOLUTION TOPICAL PRN
OUTPATIENT
Start: 2025-06-19

## 2025-06-19 RX ORDER — GENTAMICIN SULFATE 1 MG/G
OINTMENT TOPICAL PRN
OUTPATIENT
Start: 2025-06-19

## 2025-06-19 RX ORDER — BETAMETHASONE DIPROPIONATE 0.5 MG/G
CREAM TOPICAL PRN
OUTPATIENT
Start: 2025-06-19

## 2025-06-19 RX ORDER — SODIUM CHLOR/HYPOCHLOROUS ACID 0.033 %
SOLUTION, IRRIGATION IRRIGATION PRN
OUTPATIENT
Start: 2025-06-19

## 2025-06-19 RX ORDER — LIDOCAINE 50 MG/G
OINTMENT TOPICAL PRN
OUTPATIENT
Start: 2025-06-19

## 2025-06-19 RX ORDER — CLOBETASOL PROPIONATE 0.5 MG/G
OINTMENT TOPICAL PRN
OUTPATIENT
Start: 2025-06-19

## 2025-06-19 RX ORDER — LIDOCAINE 40 MG/G
CREAM TOPICAL PRN
OUTPATIENT
Start: 2025-06-19

## 2025-06-19 ASSESSMENT — PAIN DESCRIPTION - ORIENTATION: ORIENTATION: RIGHT;LEFT

## 2025-06-19 ASSESSMENT — PAIN DESCRIPTION - LOCATION: LOCATION: LEG

## 2025-06-19 ASSESSMENT — PAIN DESCRIPTION - DESCRIPTORS: DESCRIPTORS: ACHING

## 2025-06-19 ASSESSMENT — PAIN SCALES - GENERAL: PAINLEVEL_OUTOF10: 4

## 2025-06-19 NOTE — FLOWSHEET NOTE
Description Serosanguinous   Odor None   Ariadne-wound Assessment Edematous   Margins Attached edges   Wound Thickness Description not for Pressure Injury Full thickness   Wound 03/31/25 Ankle Right;Medial #5   Date First Assessed/Time First Assessed: 03/31/25 1532   Present on Original Admission: Yes  Wound Approximate Age at First Assessment (Weeks): 1 weeks  Primary Wound Type: Vascular Ulcer  Secondary Wound Type - Vascular Ulcer: Venous  Location: Ankle...   Wound Image    Wound Etiology Venous   Dressing Status Old drainage noted   Wound Cleansed Soap and water;Other (Comment)   Dressing/Treatment Alginate with Ag   Wound Length (cm) 6 cm   Wound Width (cm) 4 cm   Wound Depth (cm) 0.1 cm   Wound Surface Area (cm^2) 24 cm^2   Change in Wound Size % (l*w) -5614.29   Wound Volume (cm^3) 2.4 cm^3   Wound Healing % -5614   Wound Assessment Pink/red   Drainage Amount Moderate (25-50%)   Drainage Description Serosanguinous   Odor None   Ariadne-wound Assessment Edematous   Margins Attached edges   Wound Thickness Description not for Pressure Injury Full thickness   Pain Assessment   Pain Assessment 0-10   Pain Level 4   Patient's Stated Pain Goal 0 - No pain   Pain Location Leg   Pain Orientation Right;Left   Pain Descriptors Aching   Pt is taking Xarelto

## 2025-06-19 NOTE — WOUND CARE
Multilayer Compression Wrap   (Not Unna) Below the Knee    NAME:  Jozef Felix  YOB: 1965  MEDICAL RECORD NUMBER:  [id number]  DATE:  6/19/2025    Multilayer compression wrap: Removed old Multilayer wrap if indicated and wash leg with mild soap/water.  Applied moisturizing agent to dry skin as needed.   Applied primary and secondary dressing as ordered.  Applied multilayered dressing below the knee to right lower leg.  Applied multilayered dressing below the knee to left lower leg.  Instructed patient/caregiver not to remove dressing and to keep it clean and dry.   Instructed patient/caregiver on complications to report to provider, such as pain, numbness in toes, heavy drainage, and slippage of dressing.  Instructed patient on purpose of compression dressing and on activity and exercise recommendations.      Electronically signed by KAROLINA VALLE RN on 6/19/2025 at 4:24 PM

## 2025-06-23 ENCOUNTER — HOSPITAL ENCOUNTER (OUTPATIENT)
Dept: WOUND CARE | Age: 60
Discharge: HOME | End: 2025-06-23
Payer: COMMERCIAL

## 2025-06-23 DIAGNOSIS — L97.301 VENOUS STASIS ULCER OF ANKLE LIMITED TO BREAKDOWN OF SKIN WITH VARICOSE VEINS, UNSPECIFIED LATERALITY (HCC): Primary | ICD-10-CM

## 2025-06-23 DIAGNOSIS — I83.003 VENOUS STASIS ULCER OF ANKLE LIMITED TO BREAKDOWN OF SKIN WITH VARICOSE VEINS, UNSPECIFIED LATERALITY (HCC): Primary | ICD-10-CM

## 2025-06-23 PROCEDURE — 29581 APPL MULTLAYER CMPRN SYS LEG: CPT

## 2025-06-23 RX ORDER — LIDOCAINE 40 MG/G
CREAM TOPICAL PRN
OUTPATIENT
Start: 2025-06-23

## 2025-06-23 RX ORDER — LIDOCAINE HYDROCHLORIDE 40 MG/ML
SOLUTION TOPICAL PRN
OUTPATIENT
Start: 2025-06-23

## 2025-06-23 RX ORDER — SODIUM CHLOR/HYPOCHLOROUS ACID 0.033 %
SOLUTION, IRRIGATION IRRIGATION PRN
OUTPATIENT
Start: 2025-06-23

## 2025-06-23 RX ORDER — BACITRACIN ZINC AND POLYMYXIN B SULFATE 500; 1000 [USP'U]/G; [USP'U]/G
OINTMENT TOPICAL PRN
OUTPATIENT
Start: 2025-06-23

## 2025-06-23 RX ORDER — BETAMETHASONE DIPROPIONATE 0.5 MG/G
CREAM TOPICAL PRN
OUTPATIENT
Start: 2025-06-23

## 2025-06-23 RX ORDER — LIDOCAINE 50 MG/G
OINTMENT TOPICAL PRN
OUTPATIENT
Start: 2025-06-23

## 2025-06-23 RX ORDER — GENTAMICIN SULFATE 1 MG/G
OINTMENT TOPICAL PRN
OUTPATIENT
Start: 2025-06-23

## 2025-06-23 RX ORDER — TRIAMCINOLONE ACETONIDE 1 MG/G
OINTMENT TOPICAL PRN
OUTPATIENT
Start: 2025-06-23

## 2025-06-23 RX ORDER — CLOBETASOL PROPIONATE 0.5 MG/G
OINTMENT TOPICAL PRN
OUTPATIENT
Start: 2025-06-23

## 2025-06-23 RX ORDER — LIDOCAINE HYDROCHLORIDE 20 MG/ML
JELLY TOPICAL PRN
OUTPATIENT
Start: 2025-06-23

## 2025-06-23 RX ORDER — MUPIROCIN 2 %
OINTMENT (GRAM) TOPICAL PRN
OUTPATIENT
Start: 2025-06-23

## 2025-06-23 RX ORDER — GINSENG 100 MG
CAPSULE ORAL PRN
OUTPATIENT
Start: 2025-06-23

## 2025-06-23 RX ORDER — NEOMYCIN/BACITRACIN/POLYMYXINB 3.5-400-5K
OINTMENT (GRAM) TOPICAL PRN
OUTPATIENT
Start: 2025-06-23

## 2025-06-23 NOTE — FLOWSHEET NOTE
06/23/25 1146   Right Leg Edema Point of Measurement   Leg circumference 52.5 cm   Ankle circumference 26.5 cm   Foot circumference 30 cm   Compression Therapy 4 layer compression wrap   Left Leg Edema Point of Measurement   Leg circumference 50 cm   Ankle circumference 22 cm   Foot circumference 29.5 cm   Compression Therapy 4 layer compression wrap   Wound 04/21/25 Foot Left;Dorsal #3   Date First Assessed/Time First Assessed: 04/21/25 1420   Present on Original Admission: Yes  Wound Approximate Age at First Assessment (Weeks): 1 weeks  Location: Foot  Wound Location Orientation: Left;Dorsal  Wound Description (Comments): #3   Wound Image    Wound Etiology Venous   Dressing Status Old drainage noted   Wound Cleansed Other (Comment);Soap and water  (sodium hypochlorite  half strength)   Dressing/Treatment Alginate with Ag   Wound Length (cm) 5 cm   Wound Width (cm) 7 cm   Wound Depth (cm) 0.1 cm   Wound Surface Area (cm^2) 35 cm^2   Change in Wound Size % (l*w) 12.5   Wound Volume (cm^3) 3.5 cm^3   Wound Healing % 13   Wound Assessment Pink/red   Drainage Amount Moderate (25-50%)   Drainage Description Serosanguinous   Odor None   Ariadne-wound Assessment Edematous   Margins Defined edges   Wound Thickness Description not for Pressure Injury Full thickness   Wound 04/07/25 Pretibial Proximal;Right   Date First Assessed/Time First Assessed: 04/07/25 1444   Present on Original Admission: Yes  Wound Approximate Age at First Assessment (Weeks): 1 weeks  Location: Pretibial  Wound Location Orientation: Proximal;Right   Wound Image    Wound Etiology Venous   Dressing Status Old drainage noted   Wound Cleansed Soap and water;Other (Comment)  (Sodium Hypochlorite half strength)   Dressing/Treatment Alginate with Ag   Wound Length (cm) 8 cm   Wound Width (cm) 16 cm   Wound Depth (cm) 0.1 cm   Wound Surface Area (cm^2) 128 cm^2   Change in Wound Size % (l*w) -100   Wound Volume (cm^3) 12.8 cm^3   Wound Healing % -100   Wound

## 2025-06-23 NOTE — WOUND CARE
Multilayer Compression Wrap   (Not Unna) Below the Knee    NAME:  Jozef Felix  YOB: 1965  MEDICAL RECORD NUMBER:  [id number]  DATE:  6/23/2025    Multilayer compression wrap: Removed old Multilayer wrap if indicated and wash leg with mild soap/water.  Applied moisturizing agent to dry skin as needed.   Applied primary and secondary dressing as ordered.  Applied multilayered dressing below the knee to right lower leg.  Applied multilayered dressing below the knee to left lower leg.  Instructed patient/caregiver not to remove dressing and to keep it clean and dry.   Instructed patient/caregiver on complications to report to provider, such as pain, numbness in toes, heavy drainage, and slippage of dressing.  Instructed patient on purpose of compression dressing and on activity and exercise recommendations.      Electronically signed by KATHERIN GIBBS RN on 6/23/2025 at 11:56 AM

## 2025-06-26 ENCOUNTER — HOSPITAL ENCOUNTER (OUTPATIENT)
Dept: WOUND CARE | Age: 60
Discharge: HOME | End: 2025-06-26
Payer: COMMERCIAL

## 2025-06-26 VITALS
HEART RATE: 70 BPM | TEMPERATURE: 98.4 F | SYSTOLIC BLOOD PRESSURE: 141 MMHG | RESPIRATION RATE: 18 BRPM | DIASTOLIC BLOOD PRESSURE: 77 MMHG

## 2025-06-26 DIAGNOSIS — L97.301 VENOUS STASIS ULCER OF ANKLE LIMITED TO BREAKDOWN OF SKIN WITH VARICOSE VEINS, UNSPECIFIED LATERALITY (HCC): Primary | Chronic | ICD-10-CM

## 2025-06-26 DIAGNOSIS — I83.003 VENOUS STASIS ULCER OF ANKLE LIMITED TO BREAKDOWN OF SKIN WITH VARICOSE VEINS, UNSPECIFIED LATERALITY (HCC): Primary | Chronic | ICD-10-CM

## 2025-06-26 PROCEDURE — 29581 APPL MULTLAYER CMPRN SYS LEG: CPT

## 2025-06-26 RX ORDER — LIDOCAINE HYDROCHLORIDE 20 MG/ML
JELLY TOPICAL PRN
OUTPATIENT
Start: 2025-06-26

## 2025-06-26 RX ORDER — MUPIROCIN 2 %
OINTMENT (GRAM) TOPICAL PRN
OUTPATIENT
Start: 2025-06-26

## 2025-06-26 RX ORDER — BACITRACIN ZINC AND POLYMYXIN B SULFATE 500; 1000 [USP'U]/G; [USP'U]/G
OINTMENT TOPICAL PRN
OUTPATIENT
Start: 2025-06-26

## 2025-06-26 RX ORDER — GENTAMICIN SULFATE 1 MG/G
OINTMENT TOPICAL PRN
OUTPATIENT
Start: 2025-06-26

## 2025-06-26 RX ORDER — LIDOCAINE 50 MG/G
OINTMENT TOPICAL PRN
OUTPATIENT
Start: 2025-06-26

## 2025-06-26 RX ORDER — TRIAMCINOLONE ACETONIDE 1 MG/G
OINTMENT TOPICAL PRN
OUTPATIENT
Start: 2025-06-26

## 2025-06-26 RX ORDER — SODIUM CHLOR/HYPOCHLOROUS ACID 0.033 %
SOLUTION, IRRIGATION IRRIGATION PRN
OUTPATIENT
Start: 2025-06-26

## 2025-06-26 RX ORDER — LIDOCAINE 40 MG/G
CREAM TOPICAL PRN
OUTPATIENT
Start: 2025-06-26

## 2025-06-26 RX ORDER — BETAMETHASONE DIPROPIONATE 0.5 MG/G
CREAM TOPICAL PRN
OUTPATIENT
Start: 2025-06-26

## 2025-06-26 RX ORDER — GINSENG 100 MG
CAPSULE ORAL PRN
OUTPATIENT
Start: 2025-06-26

## 2025-06-26 RX ORDER — LIDOCAINE HYDROCHLORIDE 40 MG/ML
SOLUTION TOPICAL PRN
OUTPATIENT
Start: 2025-06-26

## 2025-06-26 RX ORDER — NEOMYCIN/BACITRACIN/POLYMYXINB 3.5-400-5K
OINTMENT (GRAM) TOPICAL PRN
OUTPATIENT
Start: 2025-06-26

## 2025-06-26 RX ORDER — CLOBETASOL PROPIONATE 0.5 MG/G
OINTMENT TOPICAL PRN
OUTPATIENT
Start: 2025-06-26

## 2025-06-26 ASSESSMENT — PAIN DESCRIPTION - DESCRIPTORS: DESCRIPTORS: BURNING

## 2025-06-26 ASSESSMENT — PAIN DESCRIPTION - ORIENTATION: ORIENTATION: RIGHT;LEFT

## 2025-06-26 ASSESSMENT — PAIN SCALES - GENERAL: PAINLEVEL_OUTOF10: 3

## 2025-06-26 ASSESSMENT — PAIN DESCRIPTION - LOCATION: LOCATION: LEG

## 2025-06-26 NOTE — WOUND CARE
Multilayer Compression Wrap   (Not Unna) Below the Knee    NAME:  Jozef Felix  YOB: 1965  MEDICAL RECORD NUMBER:  [id number]  DATE:  6/26/2025    Multilayer compression wrap: Removed old Multilayer wrap if indicated and wash leg with mild soap/water.  Applied moisturizing agent to dry skin as needed.   Applied primary and secondary dressing as ordered.  Applied multilayered dressing below the knee to right lower leg.  Applied multilayered dressing below the knee to left lower leg.  Instructed patient/caregiver not to remove dressing and to keep it clean and dry.   Instructed patient/caregiver on complications to report to provider, such as pain, numbness in toes, heavy drainage, and slippage of dressing.  Instructed patient on purpose of compression dressing and on activity and exercise recommendations.      Electronically signed by KAROLINA VALLE RN on 6/26/2025 at 4:21 PM

## 2025-06-26 NOTE — FLOWSHEET NOTE
06/26/25 1530   Right Leg Edema Point of Measurement   Leg circumference 52 cm   Ankle circumference 26 cm   Foot circumference 30 cm   Compression Therapy 4 layer compression wrap   Left Leg Edema Point of Measurement   Leg circumference 50 cm   Ankle circumference 22 cm   Foot circumference 29 cm   Compression Therapy 4 layer compression wrap   Wound 04/21/25 Foot Left;Dorsal #3   Date First Assessed/Time First Assessed: 04/21/25 1420   Present on Original Admission: Yes  Wound Approximate Age at First Assessment (Weeks): 1 weeks  Location: Foot  Wound Location Orientation: Left;Dorsal  Wound Description (Comments): #3   Wound Image    Wound Etiology Venous   Dressing Status Old drainage noted   Wound Cleansed Soap and water   Dressing/Treatment Alginate with Ag   Wound Length (cm) 4 cm   Wound Width (cm) 7 cm   Wound Depth (cm) 0.1 cm   Wound Surface Area (cm^2) 28 cm^2   Change in Wound Size % (l*w) 30   Wound Volume (cm^3) 2.8 cm^3   Wound Healing % 30   Wound Assessment Pink/red   Drainage Amount Moderate (25-50%)   Drainage Description Serosanguinous   Odor None   Ariadne-wound Assessment Edematous   Margins Defined edges   Wound Thickness Description not for Pressure Injury Full thickness   Wound 04/07/25 Pretibial Proximal;Right   Date First Assessed/Time First Assessed: 04/07/25 1444   Present on Original Admission: Yes  Wound Approximate Age at First Assessment (Weeks): 1 weeks  Location: Pretibial  Wound Location Orientation: Proximal;Right   Wound Image    Wound Etiology Venous   Dressing Status Old drainage noted   Wound Cleansed Soap and water;Other (Comment)   Dressing/Treatment Alginate with Ag   Wound Length (cm) 8 cm   Wound Width (cm) 16 cm   Wound Depth (cm) 0.1 cm   Wound Surface Area (cm^2) 128 cm^2   Change in Wound Size % (l*w) -100   Wound Volume (cm^3) 12.8 cm^3   Wound Healing % -100   Wound Assessment Pink/red   Drainage Amount Moderate (25-50%)   Drainage Description Serosanguinous

## 2025-06-30 ENCOUNTER — HOSPITAL ENCOUNTER (OUTPATIENT)
Dept: WOUND CARE | Age: 60
Discharge: HOME | End: 2025-06-30
Payer: COMMERCIAL

## 2025-06-30 VITALS
DIASTOLIC BLOOD PRESSURE: 94 MMHG | WEIGHT: 315 LBS | HEART RATE: 81 BPM | HEIGHT: 75 IN | BODY MASS INDEX: 39.17 KG/M2 | SYSTOLIC BLOOD PRESSURE: 174 MMHG

## 2025-06-30 DIAGNOSIS — L97.321 VENOUS STASIS ULCER OF LEFT ANKLE LIMITED TO BREAKDOWN OF SKIN WITH VARICOSE VEINS (HCC): Primary | Chronic | ICD-10-CM

## 2025-06-30 DIAGNOSIS — I83.023 VENOUS STASIS ULCER OF LEFT ANKLE LIMITED TO BREAKDOWN OF SKIN WITH VARICOSE VEINS (HCC): Primary | Chronic | ICD-10-CM

## 2025-06-30 PROCEDURE — 11042 DBRDMT SUBQ TIS 1ST 20SQCM/<: CPT | Performed by: FAMILY MEDICINE

## 2025-06-30 PROCEDURE — 11042 DBRDMT SUBQ TIS 1ST 20SQCM/<: CPT

## 2025-06-30 PROCEDURE — 99213 OFFICE O/P EST LOW 20 MIN: CPT | Performed by: FAMILY MEDICINE

## 2025-06-30 ASSESSMENT — PAIN SCALES - GENERAL: PAINLEVEL_OUTOF10: 3

## 2025-06-30 NOTE — WOUND CARE
Discharge Instructions for  [facility]  [address] [place] [address]  [address]  [place] [address]  Phone [phone]   Fax [phone]      NAME:  Jozef Felix  YOB: 1965  MEDICAL RECORD NUMBER:  [id number]  DATE:  6/30/2025    Return Appointment:   2 weeks with Alfa Edouard DO  Return Appointment: 2x/week with Clinician       Instructions:   Bilateral Lower Legs and Feet:  Wash with soap and water.  Apply Hysept 0.25% soaked kerlix to legs 1 min  Weave Silver Alginate (rope) between toes  Cover with cotton padding  Apply traditional unna boot to BLE   then cover with ABD pads over draining areas  Continue 4 layer compression wrap  Apply Tubigrip G from knee to mid-calf  Dressing change 2x / week     [place] topical antibiotics ordered today   Please bring with you to next visit for application  Patient to call primary md or pain clinic for appointment for continued use of gabapentin and refills.     Compression Goal: Velcro Compression Wraps  Bring velcro wraps to follow up with MD in 2 weeks.     Should you experience increased redness, swelling, pain, foul odor, size of wound(s), or have a temperature over 101 degrees please contact the [facility] Center at [phone] or if after hours contact your primary care physician or go to the hospital emergency department.    PLEASE NOTE: IF YOU ARE UNABLE TO OBTAIN WOUND SUPPLIES, CONTINUE TO USE THE SUPPLIES YOU HAVE AVAILABLE UNTIL YOU ARE ABLE TO REACH US. IT IS MOST IMPORTANT TO KEEP THE WOUND COVERED AT ALL TIMES.    Electronically signed Arlette Verma RN on 6/30/2025 at 4:34 PM

## 2025-06-30 NOTE — PROGRESS NOTES
Hearing grossly normal bilaterally. Normal appearance  Respiratory: no chest wall tenderness. no respiratory distress  GI: Abdomen non-tender and benign  Musculoskeletal: Baseline range of motion in joints. Nontender calves. No cyanosis. Edema 3+.  Neurologic: Speech normal. At baseline without new focal deficits. Mental status normal or at baseline    PAST MEDICAL HISTORY        Diagnosis Date    Anxiety     Arrhythmia     a-fib    Cellulitis of leg 2019    Erectile dysfunction 2014    EHSAN (generalized anxiety disorder) 2014    GERD (gastroesophageal reflux disease)     Gout 2014    History of peptic ulcer     Hypertension     Nausea & vomiting     Obesity, morbid (HCC) 2018    Osteoarthritis of hand, primary localized 2014    Personal history of urinary calculi     x2    Venous (peripheral) insufficiency 12/3/2019       PAST SURGICAL HISTORY    Past Surgical History:   Procedure Laterality Date    VASCULAR SURGERY Bilateral 2020    Bilateral iliac venogram, intravascular ultrasound x2.    WISDOM TOOTH EXTRACTION         FAMILY HISTORY    History reviewed. No pertinent family history.    SOCIAL HISTORY    Social History     Tobacco Use    Smoking status: Former     Current packs/day: 0.00     Types: Cigarettes     Quit date: 2011     Years since quittin.9    Smokeless tobacco: Never    Tobacco comments:     Quit smoking: quit in the 80's ; maintains non-smoking status   Substance Use Topics    Alcohol use: No    Drug use: No       ALLERGIES    Allergies   Allergen Reactions    Codeine Nausea Only    Sulfa Antibiotics Rash       MEDICATIONS    Current Outpatient Medications on File Prior to Encounter   Medication Sig Dispense Refill    gabapentin (NEURONTIN) 400 MG capsule Take 1 capsule by mouth nightly for 90 days. 30 capsule 2    hydrALAZINE (APRESOLINE) 25 MG tablet TAKE 1 TABLET (25 MG) BY MOUTH 2 (TWO) TIMES A DAY FOR 60 DOSES      sertraline (ZOLOFT) 100 MG tablet

## 2025-06-30 NOTE — FLOWSHEET NOTE
06/30/25 1631   Right Leg Edema Point of Measurement   Leg circumference 55 cm   Ankle circumference 26 cm   Foot circumference 30 cm   Compression Therapy 4 layer compression wrap   Left Leg Edema Point of Measurement   Leg circumference 52 cm   Ankle circumference 22 cm   Foot circumference 29 cm   Compression Therapy 4 layer compression wrap   Wound 04/21/25 Foot Left;Dorsal #3   Date First Assessed/Time First Assessed: 04/21/25 1420   Present on Original Admission: Yes  Wound Approximate Age at First Assessment (Weeks): 1 weeks  Location: Foot  Wound Location Orientation: Left;Dorsal  Wound Description (Comments): #3   Wound Image    Wound Etiology Venous   Dressing Status Old drainage noted   Wound Cleansed Soap and water   Dressing/Treatment Alginate with Ag   Wound Length (cm) 3 cm   Wound Width (cm) 5 cm   Wound Depth (cm) 0.1 cm   Wound Surface Area (cm^2) 15 cm^2   Change in Wound Size % (l*w) 62.5   Wound Volume (cm^3) 1.5 cm^3   Wound Healing % 63   Wound Assessment Pink/red   Drainage Amount Moderate (25-50%)   Drainage Description Serosanguinous   Odor None   Ariadne-wound Assessment Edematous   Wound Thickness Description not for Pressure Injury Full thickness   Wound 04/07/25 Pretibial Proximal;Right   Date First Assessed/Time First Assessed: 04/07/25 1444   Present on Original Admission: Yes  Wound Approximate Age at First Assessment (Weeks): 1 weeks  Location: Pretibial  Wound Location Orientation: Proximal;Right   Wound Image    Wound Etiology Venous   Dressing Status New drainage noted   Wound Cleansed Soap and water   Dressing/Treatment ABD   Wound Length (cm) 8 cm   Wound Width (cm) 16 cm   Wound Depth (cm) 0.1 cm   Wound Surface Area (cm^2) 128 cm^2   Change in Wound Size % (l*w) -100   Wound Volume (cm^3) 12.8 cm^3   Wound Healing % -100   Wound Assessment Pink/red   Drainage Amount Copious (>75 % saturated)   Drainage Description Serosanguinous   Odor Moderate   Ariadne-wound Assessment

## 2025-07-03 ENCOUNTER — HOSPITAL ENCOUNTER (OUTPATIENT)
Dept: WOUND CARE | Age: 60
Discharge: HOME | End: 2025-07-03
Payer: COMMERCIAL

## 2025-07-03 VITALS
HEART RATE: 81 BPM | TEMPERATURE: 98.1 F | SYSTOLIC BLOOD PRESSURE: 162 MMHG | BODY MASS INDEX: 39.17 KG/M2 | HEIGHT: 75 IN | WEIGHT: 315 LBS | RESPIRATION RATE: 16 BRPM | DIASTOLIC BLOOD PRESSURE: 90 MMHG

## 2025-07-03 PROCEDURE — 29581 APPL MULTLAYER CMPRN SYS LEG: CPT

## 2025-07-03 RX ORDER — MUPIROCIN 2 %
OINTMENT (GRAM) TOPICAL PRN
OUTPATIENT
Start: 2025-07-03

## 2025-07-03 RX ORDER — TRIAMCINOLONE ACETONIDE 1 MG/G
OINTMENT TOPICAL PRN
OUTPATIENT
Start: 2025-07-03

## 2025-07-03 RX ORDER — GENTAMICIN SULFATE 1 MG/G
OINTMENT TOPICAL PRN
OUTPATIENT
Start: 2025-07-03

## 2025-07-03 RX ORDER — LIDOCAINE HYDROCHLORIDE 20 MG/ML
JELLY TOPICAL PRN
OUTPATIENT
Start: 2025-07-03

## 2025-07-03 RX ORDER — LIDOCAINE 40 MG/G
CREAM TOPICAL PRN
OUTPATIENT
Start: 2025-07-03

## 2025-07-03 RX ORDER — CLOBETASOL PROPIONATE 0.5 MG/G
OINTMENT TOPICAL PRN
OUTPATIENT
Start: 2025-07-03

## 2025-07-03 RX ORDER — LIDOCAINE 50 MG/G
OINTMENT TOPICAL PRN
OUTPATIENT
Start: 2025-07-03

## 2025-07-03 RX ORDER — NEOMYCIN/BACITRACIN/POLYMYXINB 3.5-400-5K
OINTMENT (GRAM) TOPICAL PRN
OUTPATIENT
Start: 2025-07-03

## 2025-07-03 RX ORDER — LIDOCAINE HYDROCHLORIDE 40 MG/ML
SOLUTION TOPICAL PRN
OUTPATIENT
Start: 2025-07-03

## 2025-07-03 RX ORDER — GINSENG 100 MG
CAPSULE ORAL PRN
OUTPATIENT
Start: 2025-07-03

## 2025-07-03 RX ORDER — BETAMETHASONE DIPROPIONATE 0.5 MG/G
CREAM TOPICAL PRN
OUTPATIENT
Start: 2025-07-03

## 2025-07-03 RX ORDER — BACITRACIN ZINC AND POLYMYXIN B SULFATE 500; 1000 [USP'U]/G; [USP'U]/G
OINTMENT TOPICAL PRN
OUTPATIENT
Start: 2025-07-03

## 2025-07-03 RX ORDER — SODIUM CHLOR/HYPOCHLOROUS ACID 0.033 %
SOLUTION, IRRIGATION IRRIGATION PRN
OUTPATIENT
Start: 2025-07-03

## 2025-07-03 ASSESSMENT — PAIN DESCRIPTION - LOCATION: LOCATION: BACK;LEG

## 2025-07-03 ASSESSMENT — PAIN DESCRIPTION - ORIENTATION: ORIENTATION: RIGHT

## 2025-07-03 ASSESSMENT — PAIN SCALES - GENERAL: PAINLEVEL_OUTOF10: 6

## 2025-07-03 NOTE — FLOWSHEET NOTE
07/03/25 1530   Right Leg Edema Point of Measurement   Leg circumference 42.5 cm   Ankle circumference 26.5 cm   Foot circumference 29.5 cm   Compression Therapy Unna boot;4 layer compression wrap   Left Leg Edema Point of Measurement   Leg circumference 38.5 cm   Ankle circumference 24.5 cm   Foot circumference 29.5 cm   Compression Therapy 4 layer compression wrap;Unna boot   Wound 04/21/25 Foot Left;Dorsal #3   Date First Assessed/Time First Assessed: 04/21/25 1420   Present on Original Admission: Yes  Wound Approximate Age at First Assessment (Weeks): 1 weeks  Location: Foot  Wound Location Orientation: Left;Dorsal  Wound Description (Comments): #3   Wound Image    Wound Etiology Venous   Dressing Status Old drainage noted   Wound Cleansed Other (Comment);Soap and water  (Dakins)   Wound Length (cm) 2 cm   Wound Width (cm) 5 cm   Wound Depth (cm) 0.1 cm   Wound Surface Area (cm^2) 10 cm^2   Change in Wound Size % (l*w) 75   Wound Volume (cm^3) 1 cm^3   Wound Healing % 75   Wound Assessment Pink/red   Drainage Amount Small (< 25%)   Drainage Description Serosanguinous   Odor None   Ariadne-wound Assessment Edematous   Margins Defined edges   Wound Thickness Description not for Pressure Injury Full thickness   Wound 04/07/25 Pretibial Proximal;Right   Date First Assessed/Time First Assessed: 04/07/25 1444   Present on Original Admission: Yes  Wound Approximate Age at First Assessment (Weeks): 1 weeks  Location: Pretibial  Wound Location Orientation: Proximal;Right   Wound Image    Wound Etiology Venous   Dressing Status New dressing applied   Wound Cleansed Other (Comment);Soap and water  (dakins)   Dressing/Treatment Other (comment)  (unnas boot)   Wound Length (cm) 1 cm   Wound Width (cm) 2.5 cm   Wound Depth (cm) 0.1 cm   Wound Surface Area (cm^2) 2.5 cm^2   Change in Wound Size % (l*w) 96.09   Wound Volume (cm^3) 0.25 cm^3   Wound Healing % 96   Wound Assessment Pink/red   Drainage Amount Moderate (25-50%)

## 2025-07-03 NOTE — WOUND CARE
Multilayer Compression Wrap   (Not Unna) Below the Knee    NAME:  Jozef Felix  YOB: 1965  MEDICAL RECORD NUMBER:  [id number]  DATE:  7/3/2025    Multilayer compression wrap: Removed old Multilayer wrap if indicated and wash leg with mild soap/water.  Applied moisturizing agent to dry skin as needed.   Applied primary and secondary dressing as ordered.  Applied multilayered dressing below the knee to right lower leg.  Applied multilayered dressing below the knee to left lower leg.  Instructed patient/caregiver not to remove dressing and to keep it clean and dry.   Instructed patient/caregiver on complications to report to provider, such as pain, numbness in toes, heavy drainage, and slippage of dressing.  Instructed patient on purpose of compression dressing and on activity and exercise recommendations.      Electronically signed by KATHERIN GIBBS RN on 7/3/2025 at 5:22 PM

## 2025-07-07 ENCOUNTER — HOSPITAL ENCOUNTER (OUTPATIENT)
Dept: WOUND CARE | Age: 60
Discharge: HOME | End: 2025-07-07
Payer: COMMERCIAL

## 2025-07-07 VITALS
RESPIRATION RATE: 18 BRPM | OXYGEN SATURATION: 95 % | SYSTOLIC BLOOD PRESSURE: 186 MMHG | DIASTOLIC BLOOD PRESSURE: 96 MMHG | HEART RATE: 91 BPM | TEMPERATURE: 98.1 F

## 2025-07-07 DIAGNOSIS — L97.301 VENOUS STASIS ULCER OF ANKLE LIMITED TO BREAKDOWN OF SKIN WITH VARICOSE VEINS, UNSPECIFIED LATERALITY (HCC): Primary | ICD-10-CM

## 2025-07-07 DIAGNOSIS — I83.003 VENOUS STASIS ULCER OF ANKLE LIMITED TO BREAKDOWN OF SKIN WITH VARICOSE VEINS, UNSPECIFIED LATERALITY (HCC): Primary | ICD-10-CM

## 2025-07-07 PROCEDURE — 29581 APPL MULTLAYER CMPRN SYS LEG: CPT

## 2025-07-07 RX ORDER — BETAMETHASONE DIPROPIONATE 0.5 MG/G
CREAM TOPICAL PRN
Status: CANCELLED | OUTPATIENT
Start: 2025-07-07

## 2025-07-07 RX ORDER — GENTAMICIN SULFATE 1 MG/G
OINTMENT TOPICAL PRN
Status: CANCELLED | OUTPATIENT
Start: 2025-07-07

## 2025-07-07 RX ORDER — MUPIROCIN 2 %
OINTMENT (GRAM) TOPICAL PRN
Status: CANCELLED | OUTPATIENT
Start: 2025-07-07

## 2025-07-07 RX ORDER — BACITRACIN ZINC AND POLYMYXIN B SULFATE 500; 1000 [USP'U]/G; [USP'U]/G
OINTMENT TOPICAL PRN
Status: CANCELLED | OUTPATIENT
Start: 2025-07-07

## 2025-07-07 RX ORDER — GINSENG 100 MG
CAPSULE ORAL PRN
Status: CANCELLED | OUTPATIENT
Start: 2025-07-07

## 2025-07-07 RX ORDER — LIDOCAINE HYDROCHLORIDE 20 MG/ML
JELLY TOPICAL PRN
Status: CANCELLED | OUTPATIENT
Start: 2025-07-07

## 2025-07-07 RX ORDER — CLOBETASOL PROPIONATE 0.5 MG/G
OINTMENT TOPICAL PRN
Status: CANCELLED | OUTPATIENT
Start: 2025-07-07

## 2025-07-07 RX ORDER — SODIUM CHLOR/HYPOCHLOROUS ACID 0.033 %
SOLUTION, IRRIGATION IRRIGATION PRN
Status: CANCELLED | OUTPATIENT
Start: 2025-07-07

## 2025-07-07 RX ORDER — NEOMYCIN/BACITRACIN/POLYMYXINB 3.5-400-5K
OINTMENT (GRAM) TOPICAL PRN
Status: CANCELLED | OUTPATIENT
Start: 2025-07-07

## 2025-07-07 RX ORDER — TRIAMCINOLONE ACETONIDE 1 MG/G
OINTMENT TOPICAL PRN
Status: CANCELLED | OUTPATIENT
Start: 2025-07-07

## 2025-07-07 RX ORDER — LIDOCAINE 50 MG/G
OINTMENT TOPICAL PRN
Status: CANCELLED | OUTPATIENT
Start: 2025-07-07

## 2025-07-07 RX ORDER — LIDOCAINE HYDROCHLORIDE 40 MG/ML
SOLUTION TOPICAL PRN
Status: CANCELLED | OUTPATIENT
Start: 2025-07-07

## 2025-07-07 RX ORDER — LIDOCAINE 40 MG/G
CREAM TOPICAL PRN
Status: CANCELLED | OUTPATIENT
Start: 2025-07-07

## 2025-07-07 ASSESSMENT — PAIN SCALES - GENERAL: PAINLEVEL_OUTOF10: 6

## 2025-07-07 NOTE — WOUND CARE
Multilayer Compression Wrap   (Not Unna) Below the Knee    NAME:  Jozef Felix  YOB: 1965  MEDICAL RECORD NUMBER:  [id number]  DATE:  7/7/2025    Multilayer compression wrap: Removed old Multilayer wrap if indicated and wash leg with mild soap/water.  Applied moisturizing agent to dry skin as needed.   Applied primary and secondary dressing as ordered.  Applied multilayered dressing below the knee to right lower leg.  Applied multilayered dressing below the knee to left lower leg.  Instructed patient/caregiver not to remove dressing and to keep it clean and dry.   Instructed patient/caregiver on complications to report to provider, such as pain, numbness in toes, heavy drainage, and slippage of dressing.  Instructed patient on purpose of compression dressing and on activity and exercise recommendations.      Electronically signed by Rm Alejandra RN on 7/7/2025 at 4:02 PM

## 2025-07-07 NOTE — FLOWSHEET NOTE
07/07/25 1516   Right Leg Edema Point of Measurement   Leg circumference 41 cm   Ankle circumference 26 cm   Foot circumference 29 cm   Compression Therapy Unna boot;4 layer compression wrap   Left Leg Edema Point of Measurement   Leg circumference 38 cm   Ankle circumference 22 cm   Foot circumference 30 cm   Compression Therapy Unna boot;4 layer compression wrap   Wound 04/21/25 Foot Left;Dorsal #3   Date First Assessed/Time First Assessed: 04/21/25 1420   Present on Original Admission: Yes  Wound Approximate Age at First Assessment (Weeks): 1 weeks  Location: Foot  Wound Location Orientation: Left;Dorsal  Wound Description (Comments): #3   Wound Image    Wound Etiology Venous   Dressing Status Old drainage noted   Wound Cleansed Other (Comment);Soap and water  (dakins)   Dressing/Treatment Alginate with Ag   Wound Length (cm) 2.5 cm   Wound Width (cm) 5 cm   Wound Depth (cm) 0.1 cm   Wound Surface Area (cm^2) 12.5 cm^2   Change in Wound Size % (l*w) 68.75   Wound Volume (cm^3) 1.25 cm^3   Wound Healing % 69   Wound Assessment Pink/red   Drainage Amount Small (< 25%)   Drainage Description Serosanguinous   Odor Mild   Ariadne-wound Assessment Edematous   Margins Defined edges   Wound Thickness Description not for Pressure Injury Full thickness   Wound 04/07/25 Pretibial Proximal;Right   Date First Assessed/Time First Assessed: 04/07/25 1444   Present on Original Admission: Yes  Wound Approximate Age at First Assessment (Weeks): 1 weeks  Location: Pretibial  Wound Location Orientation: Proximal;Right   Wound Image    Wound Etiology Venous   Dressing Status New dressing applied   Wound Cleansed Soap and water  (dakins)   Dressing/Treatment Alginate with Ag   Wound Length (cm) 1 cm   Wound Width (cm) 2.5 cm   Wound Depth (cm) 0.1 cm   Wound Surface Area (cm^2) 2.5 cm^2   Change in Wound Size % (l*w) 96.09   Wound Volume (cm^3) 0.25 cm^3   Wound Healing % 96   Wound Assessment Pink/red   Drainage Amount Moderate

## 2025-07-10 ENCOUNTER — HOSPITAL ENCOUNTER (OUTPATIENT)
Dept: WOUND CARE | Age: 60
Discharge: HOME | End: 2025-07-10
Payer: COMMERCIAL

## 2025-07-10 VITALS — HEIGHT: 75 IN | WEIGHT: 315 LBS | BODY MASS INDEX: 39.17 KG/M2

## 2025-07-10 DIAGNOSIS — I83.003 VENOUS STASIS ULCER OF ANKLE LIMITED TO BREAKDOWN OF SKIN WITH VARICOSE VEINS, UNSPECIFIED LATERALITY (HCC): Primary | ICD-10-CM

## 2025-07-10 DIAGNOSIS — L97.301 VENOUS STASIS ULCER OF ANKLE LIMITED TO BREAKDOWN OF SKIN WITH VARICOSE VEINS, UNSPECIFIED LATERALITY (HCC): Primary | ICD-10-CM

## 2025-07-10 PROCEDURE — 29581 APPL MULTLAYER CMPRN SYS LEG: CPT

## 2025-07-10 RX ORDER — LIDOCAINE 40 MG/G
CREAM TOPICAL PRN
OUTPATIENT
Start: 2025-07-10

## 2025-07-10 RX ORDER — TRIAMCINOLONE ACETONIDE 1 MG/G
OINTMENT TOPICAL PRN
OUTPATIENT
Start: 2025-07-10

## 2025-07-10 RX ORDER — BACITRACIN ZINC AND POLYMYXIN B SULFATE 500; 1000 [USP'U]/G; [USP'U]/G
OINTMENT TOPICAL PRN
OUTPATIENT
Start: 2025-07-10

## 2025-07-10 RX ORDER — BETAMETHASONE DIPROPIONATE 0.5 MG/G
CREAM TOPICAL PRN
OUTPATIENT
Start: 2025-07-10

## 2025-07-10 RX ORDER — NEOMYCIN/BACITRACIN/POLYMYXINB 3.5-400-5K
OINTMENT (GRAM) TOPICAL PRN
OUTPATIENT
Start: 2025-07-10

## 2025-07-10 RX ORDER — SODIUM CHLOR/HYPOCHLOROUS ACID 0.033 %
SOLUTION, IRRIGATION IRRIGATION PRN
OUTPATIENT
Start: 2025-07-10

## 2025-07-10 RX ORDER — LIDOCAINE HYDROCHLORIDE 20 MG/ML
JELLY TOPICAL PRN
OUTPATIENT
Start: 2025-07-10

## 2025-07-10 RX ORDER — MUPIROCIN 2 %
OINTMENT (GRAM) TOPICAL PRN
OUTPATIENT
Start: 2025-07-10

## 2025-07-10 RX ORDER — LIDOCAINE HYDROCHLORIDE 40 MG/ML
SOLUTION TOPICAL PRN
OUTPATIENT
Start: 2025-07-10

## 2025-07-10 RX ORDER — GINSENG 100 MG
CAPSULE ORAL PRN
OUTPATIENT
Start: 2025-07-10

## 2025-07-10 RX ORDER — GENTAMICIN SULFATE 1 MG/G
OINTMENT TOPICAL PRN
OUTPATIENT
Start: 2025-07-10

## 2025-07-10 RX ORDER — CIPROFLOXACIN 500 MG/1
500 TABLET, FILM COATED ORAL 2 TIMES DAILY
Qty: 28 TABLET | Refills: 0 | Status: SHIPPED | OUTPATIENT
Start: 2025-07-10 | End: 2025-07-24

## 2025-07-10 RX ORDER — LIDOCAINE 50 MG/G
OINTMENT TOPICAL PRN
OUTPATIENT
Start: 2025-07-10

## 2025-07-10 RX ORDER — CLOBETASOL PROPIONATE 0.5 MG/G
OINTMENT TOPICAL PRN
OUTPATIENT
Start: 2025-07-10

## 2025-07-10 NOTE — WOUND CARE
Multilayer Compression Wrap   (Not Unna) Below the Knee    NAME:  Jozef Felix  YOB: 1965  MEDICAL RECORD NUMBER:  [id number]  DATE:  7/10/2025    Multilayer compression wrap: Removed old Multilayer wrap if indicated and wash leg with mild soap/water.  Applied moisturizing agent to dry skin as needed.   Applied primary and secondary dressing as ordered.  Applied multilayered dressing below the knee to right lower leg.  Applied multilayered dressing below the knee to left lower leg.  Instructed patient/caregiver not to remove dressing and to keep it clean and dry.   Instructed patient/caregiver on complications to report to provider, such as pain, numbness in toes, heavy drainage, and slippage of dressing.  Instructed patient on purpose of compression dressing and on activity and exercise recommendations.      Electronically signed by Maria Elena Sequeira RN on 7/10/2025 at 5:12 PM

## 2025-07-10 NOTE — FLOWSHEET NOTE
07/10/25 1712   Right Leg Edema Point of Measurement   Leg circumference 41 cm   Ankle circumference 26 cm   Foot circumference 29 cm   Compression Therapy 4 layer compression wrap  (Unna Boot Zinc Layer for Base)   Left Leg Edema Point of Measurement   Leg circumference 38 cm   Ankle circumference 22 cm   Foot circumference 30 cm   Compression Therapy 4 layer compression wrap  (Unna Boot Zinc Layer for Base)   Wound 04/07/25 Pretibial Proximal;Right   Date First Assessed/Time First Assessed: 04/07/25 1444   Present on Original Admission: Yes  Wound Approximate Age at First Assessment (Weeks): 1 weeks  Location: Pretibial  Wound Location Orientation: Proximal;Right   Wound Image    Wound Etiology Venous   Dressing Status Old drainage noted   Wound Cleansed Cleansed with saline;Soap and water  (Dakins-Kerlix Wrap prior to dressing)   Dressing/Treatment Alginate with Ag   Wound Length (cm) 1 cm   Wound Width (cm) 2.5 cm   Wound Depth (cm) 0.1 cm   Wound Surface Area (cm^2) 2.5 cm^2   Change in Wound Size % (l*w) 96.09   Wound Volume (cm^3) 0.25 cm^3   Wound Healing % 96   Wound Assessment Slough;Pink/red;Erythema   Drainage Amount Large (50-75% saturated)   Drainage Description Serosanguinous;Green   Odor Moderate   Ariadne-wound Assessment Blanchable erythema;Edematous;Hemosiderin staining (brown yellow)   Margins Attached edges   Wound Thickness Description not for Pressure Injury Full thickness   Wound 03/31/25 Ankle Right;Medial #5   Date First Assessed/Time First Assessed: 03/31/25 1532   Present on Original Admission: Yes  Wound Approximate Age at First Assessment (Weeks): 1 weeks  Primary Wound Type: Vascular Ulcer  Secondary Wound Type - Vascular Ulcer: Venous  Location: Ankle...   Wound Image    Wound Etiology Venous   Dressing Status Old drainage noted   Wound Cleansed Cleansed with saline;Soap and water  (Dakins-Kerlix Wrap prior to dressing)   Dressing/Treatment Alginate with Ag   Wound Length (cm) 2.5 cm

## 2025-07-14 ENCOUNTER — HOSPITAL ENCOUNTER (OUTPATIENT)
Dept: WOUND CARE | Age: 60
Discharge: HOME | End: 2025-07-14
Payer: COMMERCIAL

## 2025-07-14 VITALS
BODY MASS INDEX: 39.17 KG/M2 | WEIGHT: 315 LBS | HEART RATE: 74 BPM | DIASTOLIC BLOOD PRESSURE: 98 MMHG | TEMPERATURE: 97.5 F | HEIGHT: 75 IN | SYSTOLIC BLOOD PRESSURE: 187 MMHG | RESPIRATION RATE: 18 BRPM

## 2025-07-14 DIAGNOSIS — L97.922 NON-PRESSURE CHRONIC ULCER OF LEFT LOWER LEG WITH FAT LAYER EXPOSED (HCC): ICD-10-CM

## 2025-07-14 DIAGNOSIS — I87.313 CHRONIC VENOUS HYPERTENSION (IDIOPATHIC) WITH ULCER OF BILATERAL LOWER EXTREMITY (HCC): Primary | ICD-10-CM

## 2025-07-14 DIAGNOSIS — L97.919 CHRONIC VENOUS HYPERTENSION (IDIOPATHIC) WITH ULCER OF BILATERAL LOWER EXTREMITY (HCC): Primary | ICD-10-CM

## 2025-07-14 DIAGNOSIS — I89.0 LYMPHEDEMA: ICD-10-CM

## 2025-07-14 DIAGNOSIS — I89.0 LYMPHEDEMA OF BOTH LOWER EXTREMITIES: Chronic | ICD-10-CM

## 2025-07-14 DIAGNOSIS — L97.912 NON-PRESSURE CHRONIC ULCER OF LOWER LEG WITH FAT LAYER EXPOSED, RIGHT (HCC): ICD-10-CM

## 2025-07-14 DIAGNOSIS — L97.929 CHRONIC VENOUS HYPERTENSION (IDIOPATHIC) WITH ULCER OF BILATERAL LOWER EXTREMITY (HCC): Primary | ICD-10-CM

## 2025-07-14 PROCEDURE — 29581 APPL MULTLAYER CMPRN SYS LEG: CPT

## 2025-07-14 PROCEDURE — 99213 OFFICE O/P EST LOW 20 MIN: CPT

## 2025-07-14 RX ORDER — CLOBETASOL PROPIONATE 0.5 MG/G
OINTMENT TOPICAL PRN
Status: CANCELLED | OUTPATIENT
Start: 2025-07-14

## 2025-07-14 RX ORDER — BACITRACIN ZINC AND POLYMYXIN B SULFATE 500; 1000 [USP'U]/G; [USP'U]/G
OINTMENT TOPICAL PRN
Status: CANCELLED | OUTPATIENT
Start: 2025-07-14

## 2025-07-14 RX ORDER — LIDOCAINE HYDROCHLORIDE 40 MG/ML
SOLUTION TOPICAL PRN
Status: CANCELLED | OUTPATIENT
Start: 2025-07-14

## 2025-07-14 RX ORDER — LIDOCAINE HYDROCHLORIDE 20 MG/ML
JELLY TOPICAL PRN
Status: CANCELLED | OUTPATIENT
Start: 2025-07-14

## 2025-07-14 RX ORDER — GINSENG 100 MG
CAPSULE ORAL PRN
Status: CANCELLED | OUTPATIENT
Start: 2025-07-14

## 2025-07-14 RX ORDER — GENTAMICIN SULFATE 1 MG/G
OINTMENT TOPICAL PRN
Status: CANCELLED | OUTPATIENT
Start: 2025-07-14

## 2025-07-14 RX ORDER — LIDOCAINE 50 MG/G
OINTMENT TOPICAL PRN
Status: CANCELLED | OUTPATIENT
Start: 2025-07-14

## 2025-07-14 RX ORDER — SODIUM CHLOR/HYPOCHLOROUS ACID 0.033 %
SOLUTION, IRRIGATION IRRIGATION PRN
Status: CANCELLED | OUTPATIENT
Start: 2025-07-14

## 2025-07-14 RX ORDER — LIDOCAINE 40 MG/G
CREAM TOPICAL PRN
Status: CANCELLED | OUTPATIENT
Start: 2025-07-14

## 2025-07-14 RX ORDER — MUPIROCIN 2 %
OINTMENT (GRAM) TOPICAL PRN
Status: CANCELLED | OUTPATIENT
Start: 2025-07-14

## 2025-07-14 RX ORDER — BETAMETHASONE DIPROPIONATE 0.5 MG/G
CREAM TOPICAL PRN
Status: CANCELLED | OUTPATIENT
Start: 2025-07-14

## 2025-07-14 RX ORDER — TRIAMCINOLONE ACETONIDE 1 MG/G
OINTMENT TOPICAL PRN
Status: CANCELLED | OUTPATIENT
Start: 2025-07-14

## 2025-07-14 RX ORDER — NEOMYCIN/BACITRACIN/POLYMYXINB 3.5-400-5K
OINTMENT (GRAM) TOPICAL PRN
Status: CANCELLED | OUTPATIENT
Start: 2025-07-14

## 2025-07-14 ASSESSMENT — PAIN SCALES - GENERAL: PAINLEVEL_OUTOF10: 6

## 2025-07-14 ASSESSMENT — PAIN DESCRIPTION - LOCATION: LOCATION: LEG;BACK

## 2025-07-14 ASSESSMENT — PAIN DESCRIPTION - DESCRIPTORS: DESCRIPTORS: ACHING

## 2025-07-14 ASSESSMENT — PAIN DESCRIPTION - ORIENTATION: ORIENTATION: RIGHT;LEFT

## 2025-07-14 NOTE — FLOWSHEET NOTE
07/14/25 1510   Right Leg Edema Point of Measurement   Leg circumference 43 cm   Ankle circumference 25 cm   Foot circumference 29 cm   Compression Therapy 4 layer compression wrap  (UNNA boot)   Left Leg Edema Point of Measurement   Leg circumference 37.5 cm   Ankle circumference 23 cm   Foot circumference 28 cm   Compression Therapy 4 layer compression wrap  (UNNA boot)   Wound 03/31/25 Ankle Right;Medial #5   Date First Assessed/Time First Assessed: 03/31/25 1532   Present on Original Admission: Yes  Wound Approximate Age at First Assessment (Weeks): 1 weeks  Primary Wound Type: Vascular Ulcer  Secondary Wound Type - Vascular Ulcer: Venous  Location: Ankle...   Wound Image    Wound Etiology Venous   Dressing Status Old drainage noted   Wound Cleansed Soap and water   Dressing/Treatment Alginate with Ag   Wound Length (cm) 5 cm   Wound Width (cm) 3 cm   Wound Depth (cm) 0.1 cm   Wound Surface Area (cm^2) 15 cm^2   Change in Wound Size % (l*w) -3471.43   Wound Volume (cm^3) 1.5 cm^3   Wound Healing % -3471   Wound Assessment Pink/red;Slough;Erythema   Drainage Amount Large (50-75% saturated)   Drainage Description Serosanguinous   Odor Moderate   Ariadne-wound Assessment Blanchable erythema;Edematous;Erosion;Hemosiderin staining (brown yellow);Maceration;Warm   Margins Attached edges   Wound Thickness Description not for Pressure Injury Full thickness   Wound 04/07/25 Pretibial Proximal;Right   Date First Assessed/Time First Assessed: 04/07/25 1444   Present on Original Admission: Yes  Wound Approximate Age at First Assessment (Weeks): 1 weeks  Location: Pretibial  Wound Location Orientation: Proximal;Right   Wound Image    Wound Etiology Venous   Dressing Status Old drainage noted   Wound Cleansed Soap and water   Dressing/Treatment Alginate with Ag   Wound Length (cm) 1.5 cm   Wound Width (cm) 3 cm   Wound Depth (cm) 0.1 cm   Wound Surface Area (cm^2) 4.5 cm^2   Change in Wound Size % (l*w) 92.97   Wound Volume

## 2025-07-14 NOTE — WOUND CARE
Multilayer Compression Wrap   (Not Unna) Below the Knee    NAME:  Jozef Felix  YOB: 1965  MEDICAL RECORD NUMBER:  [id number]  DATE:  7/14/2025    Multilayer compression wrap: Removed old Multilayer wrap if indicated and wash leg with mild soap/water.  Applied moisturizing agent to dry skin as needed.   Applied primary and secondary dressing as ordered.  Applied multilayered dressing below the knee to right lower leg.  Applied multilayered dressing below the knee to left lower leg.  Instructed patient/caregiver not to remove dressing and to keep it clean and dry.   Instructed patient/caregiver on complications to report to provider, such as pain, numbness in toes, heavy drainage, and slippage of dressing.  Instructed patient on purpose of compression dressing and on activity and exercise recommendations.      Electronically signed by Namita Pal RN on 7/14/2025 at 3:47 PM

## 2025-07-14 NOTE — WOUND CARE
Discharge Instructions for  [facility]  [address] [place] [address]  [address]  [place] [address]  Phone [phone]   Fax [phone]      NAME:  Jozef Felix  YOB: 1965  MEDICAL RECORD NUMBER:  [id number]  DATE:  7/14/2025    Return Appointment:   2 weeks with Alfa Edouard DO  Return Appointment: 2x/week with Clinician       Instructions:   Bilateral Lower Legs and Feet:  Wash with soap and water.  Apply Hysept 0.25% soaked kerlix to legs 1 min  Weave Alginate (rope) between toes or draw aki  Apply calmoseptine to upper wounds  Cover with cotton padding  Apply traditional unna boot to BLE   then cover with ABD pads over draining areas  Continue 4 layer compression wrap  Apply Tubigrip G from knee to mid-calf  Dressing change 2x / week     Unable to obtain [place] topical antibiotics because of cost.    Patient to call primary md or pain clinic for appointment for continued use of gabapentin and refills.     Compression Goal: Velcro Compression Wraps  Bring velcro wraps to follow up with MD in 2 weeks.     Should you experience increased redness, swelling, pain, foul odor, size of wound(s), or have a temperature over 101 degrees please contact the [facility] Healing Center at [phone] or if after hours contact your primary care physician or go to the hospital emergency department.    PLEASE NOTE: IF YOU ARE UNABLE TO OBTAIN WOUND SUPPLIES, CONTINUE TO USE THE SUPPLIES YOU HAVE AVAILABLE UNTIL YOU ARE ABLE TO REACH US. IT IS MOST IMPORTANT TO KEEP THE WOUND COVERED AT ALL TIMES.    Electronically signed Namita Pal RN on 7/14/2025 at 3:28 PM

## 2025-07-17 ENCOUNTER — HOSPITAL ENCOUNTER (OUTPATIENT)
Dept: WOUND CARE | Age: 60
Discharge: HOME | End: 2025-07-17
Payer: COMMERCIAL

## 2025-07-17 VITALS
BODY MASS INDEX: 39.17 KG/M2 | OXYGEN SATURATION: 92 % | HEART RATE: 80 BPM | SYSTOLIC BLOOD PRESSURE: 148 MMHG | TEMPERATURE: 98.1 F | RESPIRATION RATE: 18 BRPM | HEIGHT: 75 IN | WEIGHT: 315 LBS | DIASTOLIC BLOOD PRESSURE: 81 MMHG

## 2025-07-17 DIAGNOSIS — I83.003 VENOUS STASIS ULCER OF ANKLE LIMITED TO BREAKDOWN OF SKIN WITH VARICOSE VEINS, UNSPECIFIED LATERALITY (HCC): ICD-10-CM

## 2025-07-17 DIAGNOSIS — L97.321 VENOUS STASIS ULCER OF LEFT ANKLE LIMITED TO BREAKDOWN OF SKIN WITH VARICOSE VEINS (HCC): Primary | Chronic | ICD-10-CM

## 2025-07-17 DIAGNOSIS — I83.023 VENOUS STASIS ULCER OF LEFT ANKLE LIMITED TO BREAKDOWN OF SKIN WITH VARICOSE VEINS (HCC): Primary | Chronic | ICD-10-CM

## 2025-07-17 DIAGNOSIS — L97.301 VENOUS STASIS ULCER OF ANKLE LIMITED TO BREAKDOWN OF SKIN WITH VARICOSE VEINS, UNSPECIFIED LATERALITY (HCC): ICD-10-CM

## 2025-07-17 PROCEDURE — 29581 APPL MULTLAYER CMPRN SYS LEG: CPT

## 2025-07-17 RX ORDER — NEOMYCIN/BACITRACIN/POLYMYXINB 3.5-400-5K
OINTMENT (GRAM) TOPICAL PRN
OUTPATIENT
Start: 2025-07-17

## 2025-07-17 RX ORDER — GINSENG 100 MG
CAPSULE ORAL PRN
OUTPATIENT
Start: 2025-07-17

## 2025-07-17 RX ORDER — GENTAMICIN SULFATE 1 MG/G
OINTMENT TOPICAL PRN
OUTPATIENT
Start: 2025-07-17

## 2025-07-17 RX ORDER — LIDOCAINE 40 MG/G
CREAM TOPICAL PRN
OUTPATIENT
Start: 2025-07-17

## 2025-07-17 RX ORDER — LIDOCAINE 50 MG/G
OINTMENT TOPICAL PRN
OUTPATIENT
Start: 2025-07-17

## 2025-07-17 RX ORDER — CLOBETASOL PROPIONATE 0.5 MG/G
OINTMENT TOPICAL PRN
OUTPATIENT
Start: 2025-07-17

## 2025-07-17 RX ORDER — MUPIROCIN 2 %
OINTMENT (GRAM) TOPICAL PRN
OUTPATIENT
Start: 2025-07-17

## 2025-07-17 RX ORDER — BACITRACIN ZINC AND POLYMYXIN B SULFATE 500; 1000 [USP'U]/G; [USP'U]/G
OINTMENT TOPICAL PRN
OUTPATIENT
Start: 2025-07-17

## 2025-07-17 RX ORDER — SODIUM CHLOR/HYPOCHLOROUS ACID 0.033 %
SOLUTION, IRRIGATION IRRIGATION PRN
OUTPATIENT
Start: 2025-07-17

## 2025-07-17 RX ORDER — LIDOCAINE HYDROCHLORIDE 20 MG/ML
JELLY TOPICAL PRN
OUTPATIENT
Start: 2025-07-17

## 2025-07-17 RX ORDER — TRIAMCINOLONE ACETONIDE 1 MG/G
OINTMENT TOPICAL PRN
OUTPATIENT
Start: 2025-07-17

## 2025-07-17 RX ORDER — LIDOCAINE HYDROCHLORIDE 40 MG/ML
SOLUTION TOPICAL PRN
OUTPATIENT
Start: 2025-07-17

## 2025-07-17 RX ORDER — BETAMETHASONE DIPROPIONATE 0.5 MG/G
CREAM TOPICAL PRN
OUTPATIENT
Start: 2025-07-17

## 2025-07-17 ASSESSMENT — PAIN SCALES - GENERAL: PAINLEVEL_OUTOF10: 7

## 2025-07-17 ASSESSMENT — ENCOUNTER SYMPTOMS
NAUSEA: 0
VOMITING: 0

## 2025-07-17 NOTE — PROGRESS NOTES
[facility]  Medical Staff Progress Note     Jozef Felxi  MEDICAL RECORD NUMBER: [id number]  AGE: 59 y.o.     GENDER: male    : 1965  EPISODE DATE: 2025    Assessment & Plan  Lymphedema of both lower extremities   Chronic, not at goal (unstable), changes made today: add drawtex rope between toes of left foot     Medical decision making:  Assessment required other independent historian(s): N/A  Comorbid conditions affecting wound healing as noted in PMH and PSH reviewed for this visit  Review of medical records and external note from other providers reviewed for this visit  Pertinent diagnostics were reviewed for this visit; discussed management or test interpretation with other qualified health care professional  Prescription drug management: N/A    Risk of complications and/or mortality of treatment plan:  The patient has a low risk of morbidity and mortality from additional diagnostic testing or treatment. This is due to conditions that affect wound healing as well as patient and procedure risk factors. Patient/caregiver educated and given the opportunity to ask questions. They agree to treatment plan. Pertinent decisions include: N/A .   The patient's diagnosis and/or treatment is significantly limited by the following social drivers of health: lack of transportation, financial limitations or loss of income, less than ideal living conditions, lack of support system, and mental health diagnosis.    Discussed with patient/caregiver factors that negatively affect wound healing: swelling and infection. Discussed with patient/caregiver ways to modify these factors: compression and keeping wound clean and covered. Discussed the benefit of serial debridements. Discussed signs of infection (warmth, redness, swelling, pain, fever >100.5, body aches, chills, loss of appetite, etc.), how and when to contact clinic, when to seek treatment in the ED.

## 2025-07-17 NOTE — WOUND CARE
Multilayer Compression Wrap   (Not Unna) Below the Knee    NAME:  Jozef Felix  YOB: 1965  MEDICAL RECORD NUMBER:  [id number]  DATE:  7/17/2025    Multilayer compression wrap: Removed old Multilayer wrap if indicated and wash leg with mild soap/water.  Applied moisturizing agent to dry skin as needed.   Applied primary and secondary dressing as ordered.  Applied multilayered dressing below the knee to right lower leg.  Applied multilayered dressing below the knee to left lower leg.  Instructed patient/caregiver not to remove dressing and to keep it clean and dry.   Instructed patient/caregiver on complications to report to provider, such as pain, numbness in toes, heavy drainage, and slippage of dressing.  Instructed patient on purpose of compression dressing and on activity and exercise recommendations.      Electronically signed by Rm Alejandra RN on 7/17/2025 at 4:07 PM

## 2025-07-17 NOTE — FLOWSHEET NOTE
07/17/25 1527   Right Leg Edema Point of Measurement   Leg circumference 53 cm   Ankle circumference 26 cm   Foot circumference 28 cm   Compression Therapy 4 layer compression wrap  (unna boot)   Left Leg Edema Point of Measurement   Leg circumference 38 cm   Ankle circumference 27 cm   Foot circumference 30 cm   Compression Therapy 4 layer compression wrap  (unna boot)   Wound 04/21/25 Foot Left;Dorsal #3   Date First Assessed/Time First Assessed: 04/21/25 1420   Present on Original Admission: Yes  Wound Approximate Age at First Assessment (Weeks): 1 weeks  Location: Foot  Wound Location Orientation: Left;Dorsal  Wound Description (Comments): #3   Wound Image    Wound Etiology Venous   Dressing Status Old drainage noted   Wound Cleansed Soap and water   Dressing/Treatment Alginate with Ag   Wound Length (cm) 7.5 cm   Wound Width (cm) 1 cm   Wound Depth (cm) 0.1 cm   Wound Surface Area (cm^2) 7.5 cm^2   Change in Wound Size % (l*w) 81.25   Wound Volume (cm^3) 0.75 cm^3   Wound Healing % 81   Wound Assessment Slough;Pink/red;Erythema   Drainage Amount Copious (>75 % saturated)   Drainage Description Serosanguinous;Green   Odor Moderate   Ariadne-wound Assessment Edematous;Blanchable erythema;Hemosiderin staining (brown yellow);Warm;Maceration   Margins Attached edges   Wound Thickness Description not for Pressure Injury Full thickness   Wound 04/07/25 Pretibial Proximal;Right   Date First Assessed/Time First Assessed: 04/07/25 1444   Present on Original Admission: Yes  Wound Approximate Age at First Assessment (Weeks): 1 weeks  Location: Pretibial  Wound Location Orientation: Proximal;Right   Wound Image    Wound Etiology Venous   Dressing Status Old drainage noted   Wound Cleansed Soap and water   Dressing/Treatment Alginate with Ag   Wound Length (cm) 1.5 cm   Wound Width (cm) 3 cm   Wound Depth (cm) 0.1 cm   Wound Surface Area (cm^2) 4.5 cm^2   Change in Wound Size % (l*w) 92.97   Wound Volume (cm^3) 0.45 cm^3

## 2025-07-21 ENCOUNTER — HOSPITAL ENCOUNTER (OUTPATIENT)
Dept: WOUND CARE | Age: 60
Discharge: HOME | End: 2025-07-21
Payer: COMMERCIAL

## 2025-07-21 VITALS
WEIGHT: 315 LBS | HEIGHT: 75 IN | DIASTOLIC BLOOD PRESSURE: 95 MMHG | RESPIRATION RATE: 18 BRPM | SYSTOLIC BLOOD PRESSURE: 187 MMHG | HEART RATE: 84 BPM | BODY MASS INDEX: 39.17 KG/M2 | TEMPERATURE: 97.2 F

## 2025-07-21 DIAGNOSIS — L97.301 VENOUS STASIS ULCER OF ANKLE LIMITED TO BREAKDOWN OF SKIN WITH VARICOSE VEINS, UNSPECIFIED LATERALITY (HCC): Primary | ICD-10-CM

## 2025-07-21 DIAGNOSIS — I83.003 VENOUS STASIS ULCER OF ANKLE LIMITED TO BREAKDOWN OF SKIN WITH VARICOSE VEINS, UNSPECIFIED LATERALITY (HCC): Primary | ICD-10-CM

## 2025-07-21 PROCEDURE — 29581 APPL MULTLAYER CMPRN SYS LEG: CPT

## 2025-07-21 RX ORDER — SODIUM CHLOR/HYPOCHLOROUS ACID 0.033 %
SOLUTION, IRRIGATION IRRIGATION PRN
Status: CANCELLED | OUTPATIENT
Start: 2025-07-21

## 2025-07-21 RX ORDER — GENTAMICIN SULFATE 1 MG/G
OINTMENT TOPICAL PRN
OUTPATIENT
Start: 2025-07-21

## 2025-07-21 RX ORDER — NEOMYCIN/BACITRACIN/POLYMYXINB 3.5-400-5K
OINTMENT (GRAM) TOPICAL PRN
OUTPATIENT
Start: 2025-07-21

## 2025-07-21 RX ORDER — BACITRACIN ZINC AND POLYMYXIN B SULFATE 500; 1000 [USP'U]/G; [USP'U]/G
OINTMENT TOPICAL PRN
OUTPATIENT
Start: 2025-07-21

## 2025-07-21 RX ORDER — GINSENG 100 MG
CAPSULE ORAL PRN
OUTPATIENT
Start: 2025-07-21

## 2025-07-21 RX ORDER — LIDOCAINE HYDROCHLORIDE 20 MG/ML
JELLY TOPICAL PRN
OUTPATIENT
Start: 2025-07-21

## 2025-07-21 RX ORDER — CLOBETASOL PROPIONATE 0.5 MG/G
OINTMENT TOPICAL PRN
OUTPATIENT
Start: 2025-07-21

## 2025-07-21 RX ORDER — LIDOCAINE 50 MG/G
OINTMENT TOPICAL PRN
OUTPATIENT
Start: 2025-07-21

## 2025-07-21 RX ORDER — LIDOCAINE HYDROCHLORIDE 40 MG/ML
SOLUTION TOPICAL PRN
OUTPATIENT
Start: 2025-07-21

## 2025-07-21 RX ORDER — BETAMETHASONE DIPROPIONATE 0.5 MG/G
CREAM TOPICAL PRN
OUTPATIENT
Start: 2025-07-21

## 2025-07-21 RX ORDER — LIDOCAINE 40 MG/G
CREAM TOPICAL PRN
OUTPATIENT
Start: 2025-07-21

## 2025-07-21 RX ORDER — MUPIROCIN 2 %
OINTMENT (GRAM) TOPICAL PRN
OUTPATIENT
Start: 2025-07-21

## 2025-07-21 RX ORDER — TRIAMCINOLONE ACETONIDE 1 MG/G
OINTMENT TOPICAL PRN
OUTPATIENT
Start: 2025-07-21

## 2025-07-21 ASSESSMENT — PAIN SCALES - GENERAL: PAINLEVEL_OUTOF10: 8

## 2025-07-21 ASSESSMENT — PAIN DESCRIPTION - DESCRIPTORS: DESCRIPTORS: ACHING

## 2025-07-21 ASSESSMENT — PAIN DESCRIPTION - ORIENTATION: ORIENTATION: RIGHT;LEFT

## 2025-07-21 ASSESSMENT — PAIN DESCRIPTION - LOCATION: LOCATION: LEG

## 2025-07-21 NOTE — FLOWSHEET NOTE
07/21/25 1415   Right Leg Edema Point of Measurement   Leg circumference 51 cm   Ankle circumference 22 cm   Foot circumference 28 cm   Compression Therapy 4 layer compression wrap;Unna boot   Left Leg Edema Point of Measurement   Leg circumference 53 cm   Ankle circumference 25 cm   Foot circumference 29 cm   Compression Therapy 4 layer compression wrap;Unna boot   Wound 04/21/25 Foot Left;Dorsal #3   Date First Assessed/Time First Assessed: 04/21/25 1420   Present on Original Admission: Yes  Wound Approximate Age at First Assessment (Weeks): 1 weeks  Location: Foot  Wound Location Orientation: Left;Dorsal  Wound Description (Comments): #3   Wound Image    Wound Etiology Venous   Dressing Status Old drainage noted   Wound Cleansed Soap and water   Dressing/Treatment Alginate with Ag   Wound Length (cm) 7.5 cm   Wound Width (cm) 1 cm   Wound Depth (cm) 0.1 cm   Wound Surface Area (cm^2) 7.5 cm^2   Change in Wound Size % (l*w) 81.25   Wound Volume (cm^3) 0.75 cm^3   Wound Healing % 81   Wound Assessment Slough;Pink/red;Erythema   Drainage Amount Copious (>75 % saturated)   Drainage Description Serosanguinous;Green   Odor Moderate   Ariadne-wound Assessment Edematous;Blanchable erythema;Hemosiderin staining (brown yellow);Warm;Maceration   Margins Attached edges   Wound Thickness Description not for Pressure Injury Full thickness   Wound 04/07/25 Pretibial Proximal;Right   Date First Assessed/Time First Assessed: 04/07/25 1444   Present on Original Admission: Yes  Wound Approximate Age at First Assessment (Weeks): 1 weeks  Location: Pretibial  Wound Location Orientation: Proximal;Right   Wound Image    Wound Etiology Venous   Dressing Status Old drainage noted   Wound Cleansed Soap and water   Dressing/Treatment Alginate with Ag   Wound Length (cm) 1.5 cm   Wound Width (cm) 3 cm   Wound Depth (cm) 0.1 cm   Wound Surface Area (cm^2) 4.5 cm^2   Change in Wound Size % (l*w) 92.97   Wound Volume (cm^3) 0.45 cm^3   Wound

## 2025-07-21 NOTE — WOUND CARE
Multilayer Compression Wrap   (Not Unna) Below the Knee    NAME:  Jozef Felix  YOB: 1965  MEDICAL RECORD NUMBER:  [id number]  DATE:  7/21/2025    Multilayer compression wrap: Removed old Multilayer wrap if indicated and wash leg with mild soap/water.  Applied moisturizing agent to dry skin as needed.   Applied primary and secondary dressing as ordered.  Applied multilayered dressing below the knee to right lower leg.  Applied multilayered dressing below the knee to left lower leg.  Instructed patient/caregiver not to remove dressing and to keep it clean and dry.   Instructed patient/caregiver on complications to report to provider, such as pain, numbness in toes, heavy drainage, and slippage of dressing.  Instructed patient on purpose of compression dressing and on activity and exercise recommendations.      Electronically signed by KAROLINA VALLE RN on 7/21/2025 at 2:13 PM

## 2025-07-24 ENCOUNTER — HOSPITAL ENCOUNTER (OUTPATIENT)
Dept: WOUND CARE | Age: 60
Discharge: HOME | End: 2025-07-24
Payer: COMMERCIAL

## 2025-07-24 VITALS — HEIGHT: 75 IN | WEIGHT: 315 LBS | BODY MASS INDEX: 39.17 KG/M2

## 2025-07-24 DIAGNOSIS — L97.301 VENOUS STASIS ULCER OF ANKLE LIMITED TO BREAKDOWN OF SKIN WITH VARICOSE VEINS, UNSPECIFIED LATERALITY (HCC): Primary | ICD-10-CM

## 2025-07-24 DIAGNOSIS — I83.003 VENOUS STASIS ULCER OF ANKLE LIMITED TO BREAKDOWN OF SKIN WITH VARICOSE VEINS, UNSPECIFIED LATERALITY (HCC): Primary | ICD-10-CM

## 2025-07-24 PROCEDURE — 29581 APPL MULTLAYER CMPRN SYS LEG: CPT

## 2025-07-24 RX ORDER — SODIUM CHLOR/HYPOCHLOROUS ACID 0.033 %
SOLUTION, IRRIGATION IRRIGATION PRN
Status: DISCONTINUED | OUTPATIENT
Start: 2025-07-24 | End: 2025-07-25 | Stop reason: HOSPADM

## 2025-07-24 RX ORDER — GINSENG 100 MG
CAPSULE ORAL PRN
OUTPATIENT
Start: 2025-07-24

## 2025-07-24 RX ORDER — LIDOCAINE 50 MG/G
OINTMENT TOPICAL PRN
OUTPATIENT
Start: 2025-07-24

## 2025-07-24 RX ORDER — GENTAMICIN SULFATE 1 MG/G
OINTMENT TOPICAL PRN
OUTPATIENT
Start: 2025-07-24

## 2025-07-24 RX ORDER — CLOBETASOL PROPIONATE 0.5 MG/G
OINTMENT TOPICAL PRN
OUTPATIENT
Start: 2025-07-24

## 2025-07-24 RX ORDER — LIDOCAINE 40 MG/G
CREAM TOPICAL PRN
OUTPATIENT
Start: 2025-07-24

## 2025-07-24 RX ORDER — TRIAMCINOLONE ACETONIDE 1 MG/G
OINTMENT TOPICAL PRN
OUTPATIENT
Start: 2025-07-24

## 2025-07-24 RX ORDER — SODIUM CHLOR/HYPOCHLOROUS ACID 0.033 %
SOLUTION, IRRIGATION IRRIGATION PRN
OUTPATIENT
Start: 2025-07-24

## 2025-07-24 RX ORDER — NEOMYCIN/BACITRACIN/POLYMYXINB 3.5-400-5K
OINTMENT (GRAM) TOPICAL PRN
OUTPATIENT
Start: 2025-07-24

## 2025-07-24 RX ORDER — MUPIROCIN 2 %
OINTMENT (GRAM) TOPICAL PRN
OUTPATIENT
Start: 2025-07-24

## 2025-07-24 RX ORDER — BACITRACIN ZINC AND POLYMYXIN B SULFATE 500; 1000 [USP'U]/G; [USP'U]/G
OINTMENT TOPICAL PRN
OUTPATIENT
Start: 2025-07-24

## 2025-07-24 RX ORDER — BETAMETHASONE DIPROPIONATE 0.5 MG/G
CREAM TOPICAL PRN
OUTPATIENT
Start: 2025-07-24

## 2025-07-24 RX ORDER — LIDOCAINE HYDROCHLORIDE 40 MG/ML
SOLUTION TOPICAL PRN
OUTPATIENT
Start: 2025-07-24

## 2025-07-24 RX ORDER — LIDOCAINE HYDROCHLORIDE 20 MG/ML
JELLY TOPICAL PRN
OUTPATIENT
Start: 2025-07-24

## 2025-07-24 RX ADMIN — Medication: at 15:53

## 2025-07-24 NOTE — WOUND CARE
Multilayer Compression Wrap   (Not Unna) Below the Knee    NAME:  Jozef Felix  YOB: 1965  MEDICAL RECORD NUMBER:  [id number]  DATE:  7/24/2025    Multilayer compression wrap: Removed old Multilayer wrap if indicated and wash leg with mild soap/water.  Applied moisturizing agent to dry skin as needed.   Applied primary and secondary dressing as ordered.  Applied multilayered dressing below the knee to right lower leg.  Applied multilayered dressing below the knee to left lower leg.  Instructed patient/caregiver not to remove dressing and to keep it clean and dry.   Instructed patient/caregiver on complications to report to provider, such as pain, numbness in toes, heavy drainage, and slippage of dressing.  Instructed patient on purpose of compression dressing and on activity and exercise recommendations.      Electronically signed by KATHERIN GIBBS RN on 7/24/2025 at 3:50 PM

## 2025-07-24 NOTE — FLOWSHEET NOTE
07/24/25 1539   Right Leg Edema Point of Measurement   Leg circumference 47 cm   Ankle circumference 25 cm   Foot circumference 29 cm   Compression Therapy 4 layer compression wrap   Left Leg Edema Point of Measurement   Leg circumference 43 cm   Ankle circumference 22.5 cm   Foot circumference 29 cm   Compression Therapy 4 layer compression wrap   Wound 04/21/25 Foot Left;Dorsal #3   Date First Assessed/Time First Assessed: 04/21/25 1420   Present on Original Admission: Yes  Wound Approximate Age at First Assessment (Weeks): 1 weeks  Location: Foot  Wound Location Orientation: Left;Dorsal  Wound Description (Comments): #3   Wound Image    Wound Etiology Venous   Dressing Status Old drainage noted   Wound Cleansed Vashe;Soap and water   Dressing/Treatment Alginate with Ag   Wound Length (cm) 7 cm   Wound Width (cm) 13 cm   Wound Depth (cm) 0.1 cm   Wound Surface Area (cm^2) 91 cm^2   Change in Wound Size % (l*w) -127.5   Wound Volume (cm^3) 9.1 cm^3   Wound Healing % -128   Wound Assessment Pink/red   Drainage Amount Copious (>75 % saturated)   Drainage Description Serosanguinous;Green   Odor Sweet   Ariadne-wound Assessment Edematous;Blanchable erythema;Hemosiderin staining (brown yellow);Warm;Maceration   Margins Attached edges   Wound Thickness Description not for Pressure Injury Full thickness   Wound 04/07/25 Pretibial Proximal;Right   Date First Assessed/Time First Assessed: 04/07/25 1444   Present on Original Admission: Yes  Wound Approximate Age at First Assessment (Weeks): 1 weeks  Location: Pretibial  Wound Location Orientation: Proximal;Right   Wound Image     Wound Etiology Venous   Dressing Status Old drainage noted   Wound Cleansed Soap and water;Vashe   Dressing/Treatment Zinc paste   Wound Length (cm) 7 cm   Wound Width (cm) 43 cm   Wound Depth (cm) 0.1 cm   Wound Surface Area (cm^2) 301 cm^2   Change in Wound Size % (l*w) -370.31   Wound Volume (cm^3) 30.1 cm^3   Wound Healing % -370   Wound

## 2025-07-28 ENCOUNTER — HOSPITAL ENCOUNTER (OUTPATIENT)
Dept: WOUND CARE | Age: 60
Discharge: HOME | End: 2025-07-28
Payer: COMMERCIAL

## 2025-07-28 VITALS
WEIGHT: 315 LBS | SYSTOLIC BLOOD PRESSURE: 154 MMHG | BODY MASS INDEX: 39.17 KG/M2 | HEART RATE: 83 BPM | TEMPERATURE: 98.8 F | DIASTOLIC BLOOD PRESSURE: 83 MMHG | RESPIRATION RATE: 18 BRPM | HEIGHT: 75 IN

## 2025-07-28 DIAGNOSIS — I83.003 VENOUS STASIS ULCER OF ANKLE LIMITED TO BREAKDOWN OF SKIN WITH VARICOSE VEINS, UNSPECIFIED LATERALITY (HCC): Primary | ICD-10-CM

## 2025-07-28 DIAGNOSIS — L97.301 VENOUS STASIS ULCER OF ANKLE LIMITED TO BREAKDOWN OF SKIN WITH VARICOSE VEINS, UNSPECIFIED LATERALITY (HCC): Primary | ICD-10-CM

## 2025-07-28 PROCEDURE — 99213 OFFICE O/P EST LOW 20 MIN: CPT

## 2025-07-28 RX ORDER — MUPIROCIN 2 %
OINTMENT (GRAM) TOPICAL PRN
OUTPATIENT
Start: 2025-07-28

## 2025-07-28 RX ORDER — LIDOCAINE HYDROCHLORIDE 20 MG/ML
JELLY TOPICAL PRN
OUTPATIENT
Start: 2025-07-28

## 2025-07-28 RX ORDER — LIDOCAINE 50 MG/G
OINTMENT TOPICAL PRN
OUTPATIENT
Start: 2025-07-28

## 2025-07-28 RX ORDER — BACITRACIN ZINC AND POLYMYXIN B SULFATE 500; 1000 [USP'U]/G; [USP'U]/G
OINTMENT TOPICAL PRN
OUTPATIENT
Start: 2025-07-28

## 2025-07-28 RX ORDER — TRIAMCINOLONE ACETONIDE 1 MG/G
OINTMENT TOPICAL PRN
OUTPATIENT
Start: 2025-07-28

## 2025-07-28 RX ORDER — NEOMYCIN/BACITRACIN/POLYMYXINB 3.5-400-5K
OINTMENT (GRAM) TOPICAL PRN
OUTPATIENT
Start: 2025-07-28

## 2025-07-28 RX ORDER — LIDOCAINE 40 MG/G
CREAM TOPICAL PRN
OUTPATIENT
Start: 2025-07-28

## 2025-07-28 RX ORDER — GINSENG 100 MG
CAPSULE ORAL PRN
OUTPATIENT
Start: 2025-07-28

## 2025-07-28 RX ORDER — CLOBETASOL PROPIONATE 0.5 MG/G
OINTMENT TOPICAL PRN
OUTPATIENT
Start: 2025-07-28

## 2025-07-28 RX ORDER — LIDOCAINE HYDROCHLORIDE 40 MG/ML
SOLUTION TOPICAL PRN
OUTPATIENT
Start: 2025-07-28

## 2025-07-28 RX ORDER — BETAMETHASONE DIPROPIONATE 0.5 MG/G
CREAM TOPICAL PRN
OUTPATIENT
Start: 2025-07-28

## 2025-07-28 RX ORDER — SODIUM CHLOR/HYPOCHLOROUS ACID 0.033 %
SOLUTION, IRRIGATION IRRIGATION PRN
OUTPATIENT
Start: 2025-07-28

## 2025-07-28 RX ORDER — GENTAMICIN SULFATE 1 MG/G
OINTMENT TOPICAL PRN
OUTPATIENT
Start: 2025-07-28

## 2025-07-28 NOTE — FLOWSHEET NOTE
07/28/25 1531   Right Leg Edema Point of Measurement   Leg circumference 43 cm   Ankle circumference 25 cm   Foot circumference 28 cm   Compression Therapy 4 layer compression wrap;Unna boot   Left Leg Edema Point of Measurement   Leg circumference 41 cm   Ankle circumference 22 cm   Foot circumference 28 cm   Compression Therapy 4 layer compression wrap;Unna boot   Wound 04/21/25 Foot Left;Dorsal #3   Date First Assessed/Time First Assessed: 04/21/25 1420   Present on Original Admission: Yes  Wound Approximate Age at First Assessment (Weeks): 1 weeks  Location: Foot  Wound Location Orientation: Left;Dorsal  Wound Description (Comments): #3   Wound Image    Wound Etiology Venous   Dressing Status Old drainage noted   Wound Cleansed Vashe;Soap and water   Dressing/Treatment Alginate with Ag   Wound Length (cm) 8 cm   Wound Width (cm) 10.5 cm   Wound Depth (cm) 0.1 cm   Wound Surface Area (cm^2) 84 cm^2   Change in Wound Size % (l*w) -110   Wound Volume (cm^3) 8.4 cm^3   Wound Healing % -110   Wound Assessment Pink/red   Drainage Amount Copious (>75 % saturated)   Drainage Description Serosanguinous;Green   Odor Mild   Ariadne-wound Assessment Edematous;Blanchable erythema;Hemosiderin staining (brown yellow);Warm;Maceration   Margins Attached edges   Wound Thickness Description not for Pressure Injury Full thickness   Wound 04/07/25 Pretibial Proximal;Right   Date First Assessed/Time First Assessed: 04/07/25 1444   Present on Original Admission: Yes  Wound Approximate Age at First Assessment (Weeks): 1 weeks  Location: Pretibial  Wound Location Orientation: Proximal;Right   Wound Image    Wound Etiology Venous   Dressing Status Old drainage noted   Wound Cleansed Soap and water;Vashe   Dressing/Treatment Zinc paste   Wound Length (cm) 10 cm   Wound Width (cm) 18 cm   Wound Depth (cm) 0.1 cm   Wound Surface Area (cm^2) 180 cm^2   Change in Wound Size % (l*w) -181.25   Wound Volume (cm^3) 18 cm^3   Wound Healing % -181

## 2025-07-28 NOTE — WOUND CARE
Discharge Instructions for  [facility]  [address] [place] [address]  [address]  [place] [address]  Phone [phone]   Fax [phone]      NAME:  Jozef Felix  YOB: 1965  MEDICAL RECORD NUMBER:  [id number]  DATE:  7/28/2025    Return Appointment:   2 weeks with Alfa Edouard DO  Return Appointment: 2x/week with Clinician       Instructions:   Bilateral Lower Legs and Feet:  Wash with soap and water.  Apply Hysept 0.25% soaked kerlix to legs 1 min  Weave Alginate (rope) between toes or draw aki  Apply calmoseptine to upper wounds  Cover with cotton padding  Apply traditional unna boot to BLE   then cover with ABD pads over draining areas  Continue 4 layer compression wrap  Dressing change 2x / week     Unable to obtain [place] topical antibiotics because of cost.    Patient to call primary md or pain clinic for appointment for continued use of gabapentin and refills.     Compression Goal: Velcro Compression Wraps  Bring velcro wraps to follow up with MD in 2 weeks.     Should you experience increased redness, swelling, pain, foul odor, size of wound(s), or have a temperature over 101 degrees please contact the [facility]aling Center at [phone] or if after hours contact your primary care physician or go to the hospital emergency department.    PLEASE NOTE: IF YOU ARE UNABLE TO OBTAIN WOUND SUPPLIES, CONTINUE TO USE THE SUPPLIES YOU HAVE AVAILABLE UNTIL YOU ARE ABLE TO REACH US. IT IS MOST IMPORTANT TO KEEP THE WOUND COVERED AT ALL TIMES.    Electronically signed KAROLINA VALLE RN on 7/28/2025 at 3:54 PM

## 2025-07-28 NOTE — WOUND CARE
Multilayer Compression Wrap   (Not Unna) Below the Knee    NAME:  Jozef Felix  YOB: 1965  MEDICAL RECORD NUMBER:  [id number]  DATE:  7/28/2025    Multilayer compression wrap: Removed old Multilayer wrap if indicated and wash leg with mild soap/water.  Applied moisturizing agent to dry skin as needed.   Applied primary and secondary dressing as ordered.  Applied multilayered dressing below the knee to right lower leg.  Applied multilayered dressing below the knee to left lower leg.  Instructed patient/caregiver not to remove dressing and to keep it clean and dry.   Instructed patient/caregiver on complications to report to provider, such as pain, numbness in toes, heavy drainage, and slippage of dressing.  Instructed patient on purpose of compression dressing and on activity and exercise recommendations.      Electronically signed by KAROLINA VALLE RN on 7/28/2025 at 4:05 PM

## 2025-07-31 ENCOUNTER — HOSPITAL ENCOUNTER (OUTPATIENT)
Dept: WOUND CARE | Age: 60
Discharge: HOME | End: 2025-07-31
Payer: COMMERCIAL

## 2025-07-31 VITALS
HEART RATE: 82 BPM | BODY MASS INDEX: 39.17 KG/M2 | DIASTOLIC BLOOD PRESSURE: 86 MMHG | WEIGHT: 315 LBS | SYSTOLIC BLOOD PRESSURE: 166 MMHG | HEIGHT: 75 IN

## 2025-07-31 DIAGNOSIS — L97.301 VENOUS STASIS ULCER OF ANKLE LIMITED TO BREAKDOWN OF SKIN WITH VARICOSE VEINS, UNSPECIFIED LATERALITY (HCC): Primary | Chronic | ICD-10-CM

## 2025-07-31 DIAGNOSIS — I83.003 VENOUS STASIS ULCER OF ANKLE LIMITED TO BREAKDOWN OF SKIN WITH VARICOSE VEINS, UNSPECIFIED LATERALITY (HCC): Primary | Chronic | ICD-10-CM

## 2025-07-31 PROCEDURE — 29581 APPL MULTLAYER CMPRN SYS LEG: CPT

## 2025-07-31 ASSESSMENT — PAIN SCALES - GENERAL: PAINLEVEL_OUTOF10: 8

## 2025-07-31 NOTE — WOUND CARE
Multilayer Compression Wrap   (Not Unna) Below the Knee    NAME:  Jozef Felix  YOB: 1965  MEDICAL RECORD NUMBER:  [id number]  DATE:  7/31/2025    Multilayer compression wrap: Removed old Multilayer wrap if indicated and wash leg with mild soap/water.  Applied moisturizing agent to dry skin as needed.   Applied primary and secondary dressing as ordered.  Applied multilayered dressing below the knee to right lower leg.  Applied multilayered dressing below the knee to left lower leg.  Instructed patient/caregiver not to remove dressing and to keep it clean and dry.   Instructed patient/caregiver on complications to report to provider, such as pain, numbness in toes, heavy drainage, and slippage of dressing.  Instructed patient on purpose of compression dressing and on activity and exercise recommendations.      Electronically signed by Arlette Verma RN on 7/31/2025 at 3:50 PM

## 2025-07-31 NOTE — FLOWSHEET NOTE
07/31/25 1548   Right Leg Edema Point of Measurement   Leg circumference 43 cm   Ankle circumference 25 cm   Foot circumference 28 cm   Compression Therapy 4 layer compression wrap   Left Leg Edema Point of Measurement   Leg circumference 42 cm   Ankle circumference 22 cm   Foot circumference 28 cm   Compression Therapy 4 layer compression wrap   Wound 04/21/25 Foot Left;Dorsal #3   Date First Assessed/Time First Assessed: 04/21/25 1420   Present on Original Admission: Yes  Wound Approximate Age at First Assessment (Weeks): 1 weeks  Location: Foot  Wound Location Orientation: Left;Dorsal  Wound Description (Comments): #3   Wound Image    Wound Etiology Venous   Dressing Status Old drainage noted   Wound Cleansed Vashe;Soap and water   Dressing/Treatment Alginate with Ag   Wound Length (cm) 8 cm   Wound Width (cm) 10 cm   Wound Depth (cm) 0.1 cm   Wound Surface Area (cm^2) 80 cm^2   Change in Wound Size % (l*w) -100   Wound Volume (cm^3) 8 cm^3   Wound Healing % -100   Wound Assessment Pink/red   Drainage Amount Copious (>75 % saturated)   Drainage Description Serosanguinous;Green   Odor Mild   Ariadne-wound Assessment Edematous;Blanchable erythema;Hemosiderin staining (brown yellow);Warm;Maceration   Margins Attached edges   Wound Thickness Description not for Pressure Injury Full thickness   Wound 04/07/25 Pretibial Proximal;Right   Date First Assessed/Time First Assessed: 04/07/25 1444   Present on Original Admission: Yes  Wound Approximate Age at First Assessment (Weeks): 1 weeks  Location: Pretibial  Wound Location Orientation: Proximal;Right   Wound Image    Wound Etiology Venous   Dressing Status Old drainage noted   Wound Cleansed Soap and water;Vashe   Dressing/Treatment Zinc paste   Wound Length (cm) 10 cm   Wound Width (cm) 18 cm   Wound Depth (cm) 0.1 cm   Wound Surface Area (cm^2) 180 cm^2   Change in Wound Size % (l*w) -181.25   Wound Volume (cm^3) 18 cm^3   Wound Healing % -181   Wound Assessment

## 2025-08-04 ENCOUNTER — HOSPITAL ENCOUNTER (OUTPATIENT)
Dept: WOUND CARE | Age: 60
Discharge: HOME | End: 2025-08-04
Payer: COMMERCIAL

## 2025-08-04 VITALS
HEART RATE: 75 BPM | RESPIRATION RATE: 18 BRPM | DIASTOLIC BLOOD PRESSURE: 87 MMHG | WEIGHT: 315 LBS | HEIGHT: 75 IN | SYSTOLIC BLOOD PRESSURE: 155 MMHG | OXYGEN SATURATION: 98 % | BODY MASS INDEX: 39.17 KG/M2 | TEMPERATURE: 98.1 F

## 2025-08-04 PROCEDURE — 29581 APPL MULTLAYER CMPRN SYS LEG: CPT

## 2025-08-04 RX ORDER — LIDOCAINE HYDROCHLORIDE 20 MG/ML
JELLY TOPICAL PRN
Status: CANCELLED | OUTPATIENT
Start: 2025-08-04

## 2025-08-04 RX ORDER — GINSENG 100 MG
CAPSULE ORAL PRN
Status: CANCELLED | OUTPATIENT
Start: 2025-08-04

## 2025-08-04 RX ORDER — SODIUM CHLOR/HYPOCHLOROUS ACID 0.033 %
SOLUTION, IRRIGATION IRRIGATION PRN
Status: CANCELLED | OUTPATIENT
Start: 2025-08-04

## 2025-08-04 RX ORDER — NEOMYCIN/BACITRACIN/POLYMYXINB 3.5-400-5K
OINTMENT (GRAM) TOPICAL PRN
Status: CANCELLED | OUTPATIENT
Start: 2025-08-04

## 2025-08-04 RX ORDER — LIDOCAINE 40 MG/G
CREAM TOPICAL PRN
Status: CANCELLED | OUTPATIENT
Start: 2025-08-04

## 2025-08-04 RX ORDER — TRIAMCINOLONE ACETONIDE 1 MG/G
OINTMENT TOPICAL PRN
Status: CANCELLED | OUTPATIENT
Start: 2025-08-04

## 2025-08-04 RX ORDER — MUPIROCIN 2 %
OINTMENT (GRAM) TOPICAL PRN
Status: CANCELLED | OUTPATIENT
Start: 2025-08-04

## 2025-08-04 RX ORDER — CLOBETASOL PROPIONATE 0.5 MG/G
OINTMENT TOPICAL PRN
Status: CANCELLED | OUTPATIENT
Start: 2025-08-04

## 2025-08-04 RX ORDER — GENTAMICIN SULFATE 1 MG/G
OINTMENT TOPICAL PRN
Status: CANCELLED | OUTPATIENT
Start: 2025-08-04

## 2025-08-04 RX ORDER — BACITRACIN ZINC AND POLYMYXIN B SULFATE 500; 1000 [USP'U]/G; [USP'U]/G
OINTMENT TOPICAL PRN
Status: CANCELLED | OUTPATIENT
Start: 2025-08-04

## 2025-08-04 RX ORDER — BETAMETHASONE DIPROPIONATE 0.5 MG/G
CREAM TOPICAL PRN
Status: CANCELLED | OUTPATIENT
Start: 2025-08-04

## 2025-08-04 RX ORDER — LIDOCAINE HYDROCHLORIDE 40 MG/ML
SOLUTION TOPICAL PRN
Status: CANCELLED | OUTPATIENT
Start: 2025-08-04

## 2025-08-04 RX ORDER — LIDOCAINE 50 MG/G
OINTMENT TOPICAL PRN
Status: CANCELLED | OUTPATIENT
Start: 2025-08-04

## 2025-08-04 ASSESSMENT — PAIN SCALES - GENERAL: PAINLEVEL_OUTOF10: 6

## 2025-08-04 ASSESSMENT — PAIN DESCRIPTION - DESCRIPTORS: DESCRIPTORS: PINS AND NEEDLES

## 2025-08-04 ASSESSMENT — PAIN DESCRIPTION - ORIENTATION: ORIENTATION: RIGHT;LEFT

## 2025-08-04 ASSESSMENT — PAIN DESCRIPTION - LOCATION: LOCATION: FOOT;LEG

## 2025-08-07 ENCOUNTER — HOSPITAL ENCOUNTER (OUTPATIENT)
Dept: WOUND CARE | Age: 60
Discharge: HOME | End: 2025-08-07
Payer: COMMERCIAL

## 2025-08-07 VITALS
SYSTOLIC BLOOD PRESSURE: 154 MMHG | BODY MASS INDEX: 39.17 KG/M2 | HEIGHT: 75 IN | DIASTOLIC BLOOD PRESSURE: 97 MMHG | WEIGHT: 315 LBS | HEART RATE: 85 BPM

## 2025-08-07 DIAGNOSIS — I83.003 VENOUS STASIS ULCER OF ANKLE LIMITED TO BREAKDOWN OF SKIN WITH VARICOSE VEINS, UNSPECIFIED LATERALITY (HCC): Primary | ICD-10-CM

## 2025-08-07 DIAGNOSIS — L97.301 VENOUS STASIS ULCER OF ANKLE LIMITED TO BREAKDOWN OF SKIN WITH VARICOSE VEINS, UNSPECIFIED LATERALITY (HCC): Primary | ICD-10-CM

## 2025-08-07 PROCEDURE — 99213 OFFICE O/P EST LOW 20 MIN: CPT

## 2025-08-07 RX ORDER — GINSENG 100 MG
CAPSULE ORAL PRN
OUTPATIENT
Start: 2025-08-07

## 2025-08-07 RX ORDER — LIDOCAINE 50 MG/G
OINTMENT TOPICAL PRN
OUTPATIENT
Start: 2025-08-07

## 2025-08-07 RX ORDER — BETAMETHASONE DIPROPIONATE 0.5 MG/G
CREAM TOPICAL PRN
OUTPATIENT
Start: 2025-08-07

## 2025-08-07 RX ORDER — BACITRACIN ZINC AND POLYMYXIN B SULFATE 500; 1000 [USP'U]/G; [USP'U]/G
OINTMENT TOPICAL PRN
OUTPATIENT
Start: 2025-08-07

## 2025-08-07 RX ORDER — TRIAMCINOLONE ACETONIDE 1 MG/G
OINTMENT TOPICAL PRN
OUTPATIENT
Start: 2025-08-07

## 2025-08-07 RX ORDER — LIDOCAINE 40 MG/G
CREAM TOPICAL PRN
OUTPATIENT
Start: 2025-08-07

## 2025-08-07 RX ORDER — NEOMYCIN/BACITRACIN/POLYMYXINB 3.5-400-5K
OINTMENT (GRAM) TOPICAL PRN
OUTPATIENT
Start: 2025-08-07

## 2025-08-07 RX ORDER — GENTAMICIN SULFATE 1 MG/G
OINTMENT TOPICAL PRN
OUTPATIENT
Start: 2025-08-07

## 2025-08-07 RX ORDER — LIDOCAINE HYDROCHLORIDE 20 MG/ML
JELLY TOPICAL PRN
OUTPATIENT
Start: 2025-08-07

## 2025-08-07 RX ORDER — CLOBETASOL PROPIONATE 0.5 MG/G
OINTMENT TOPICAL PRN
OUTPATIENT
Start: 2025-08-07

## 2025-08-07 RX ORDER — MUPIROCIN 2 %
OINTMENT (GRAM) TOPICAL PRN
OUTPATIENT
Start: 2025-08-07

## 2025-08-07 RX ORDER — LIDOCAINE HYDROCHLORIDE 40 MG/ML
SOLUTION TOPICAL PRN
OUTPATIENT
Start: 2025-08-07

## 2025-08-07 RX ORDER — SODIUM CHLOR/HYPOCHLOROUS ACID 0.033 %
SOLUTION, IRRIGATION IRRIGATION PRN
Status: DISCONTINUED | OUTPATIENT
Start: 2025-08-07 | End: 2025-08-08 | Stop reason: HOSPADM

## 2025-08-07 RX ORDER — SODIUM CHLOR/HYPOCHLOROUS ACID 0.033 %
SOLUTION, IRRIGATION IRRIGATION PRN
OUTPATIENT
Start: 2025-08-07

## 2025-08-07 RX ADMIN — Medication: at 16:17

## 2025-08-11 ENCOUNTER — HOSPITAL ENCOUNTER (OUTPATIENT)
Dept: WOUND CARE | Age: 60
Discharge: HOME | End: 2025-08-11
Payer: COMMERCIAL

## 2025-08-11 VITALS
BODY MASS INDEX: 39.17 KG/M2 | HEART RATE: 71 BPM | DIASTOLIC BLOOD PRESSURE: 88 MMHG | SYSTOLIC BLOOD PRESSURE: 146 MMHG | WEIGHT: 315 LBS | HEIGHT: 75 IN

## 2025-08-11 DIAGNOSIS — I83.003 VENOUS STASIS ULCER OF ANKLE LIMITED TO BREAKDOWN OF SKIN WITH VARICOSE VEINS, UNSPECIFIED LATERALITY (HCC): Primary | ICD-10-CM

## 2025-08-11 DIAGNOSIS — L97.301 VENOUS STASIS ULCER OF ANKLE LIMITED TO BREAKDOWN OF SKIN WITH VARICOSE VEINS, UNSPECIFIED LATERALITY (HCC): Primary | ICD-10-CM

## 2025-08-11 PROCEDURE — 99213 OFFICE O/P EST LOW 20 MIN: CPT | Performed by: FAMILY MEDICINE

## 2025-08-11 PROCEDURE — 29581 APPL MULTLAYER CMPRN SYS LEG: CPT

## 2025-08-11 RX ORDER — BETAMETHASONE DIPROPIONATE 0.5 MG/G
CREAM TOPICAL PRN
OUTPATIENT
Start: 2025-08-11

## 2025-08-11 RX ORDER — BACITRACIN ZINC AND POLYMYXIN B SULFATE 500; 1000 [USP'U]/G; [USP'U]/G
OINTMENT TOPICAL PRN
OUTPATIENT
Start: 2025-08-11

## 2025-08-11 RX ORDER — GINSENG 100 MG
CAPSULE ORAL PRN
OUTPATIENT
Start: 2025-08-11

## 2025-08-11 RX ORDER — TRIAMCINOLONE ACETONIDE 1 MG/G
OINTMENT TOPICAL PRN
OUTPATIENT
Start: 2025-08-11

## 2025-08-11 RX ORDER — LIDOCAINE HYDROCHLORIDE 20 MG/ML
JELLY TOPICAL PRN
OUTPATIENT
Start: 2025-08-11

## 2025-08-11 RX ORDER — GENTAMICIN SULFATE 1 MG/G
OINTMENT TOPICAL PRN
OUTPATIENT
Start: 2025-08-11

## 2025-08-11 RX ORDER — LIDOCAINE 40 MG/G
CREAM TOPICAL PRN
OUTPATIENT
Start: 2025-08-11

## 2025-08-11 RX ORDER — SODIUM CHLOR/HYPOCHLOROUS ACID 0.033 %
SOLUTION, IRRIGATION IRRIGATION PRN
OUTPATIENT
Start: 2025-08-11

## 2025-08-11 RX ORDER — LIDOCAINE HYDROCHLORIDE 40 MG/ML
SOLUTION TOPICAL PRN
OUTPATIENT
Start: 2025-08-11

## 2025-08-11 RX ORDER — LIDOCAINE 50 MG/G
OINTMENT TOPICAL PRN
OUTPATIENT
Start: 2025-08-11

## 2025-08-11 RX ORDER — MUPIROCIN 2 %
OINTMENT (GRAM) TOPICAL PRN
OUTPATIENT
Start: 2025-08-11

## 2025-08-11 RX ORDER — NEOMYCIN/BACITRACIN/POLYMYXINB 3.5-400-5K
OINTMENT (GRAM) TOPICAL PRN
OUTPATIENT
Start: 2025-08-11

## 2025-08-11 RX ORDER — CLOBETASOL PROPIONATE 0.5 MG/G
OINTMENT TOPICAL PRN
OUTPATIENT
Start: 2025-08-11

## 2025-08-11 ASSESSMENT — PAIN SCALES - GENERAL: PAINLEVEL_OUTOF10: 0

## 2025-08-14 ENCOUNTER — HOSPITAL ENCOUNTER (OUTPATIENT)
Dept: WOUND CARE | Age: 60
Discharge: HOME | End: 2025-08-14
Payer: COMMERCIAL

## 2025-08-14 VITALS
BODY MASS INDEX: 39.17 KG/M2 | HEART RATE: 108 BPM | DIASTOLIC BLOOD PRESSURE: 89 MMHG | SYSTOLIC BLOOD PRESSURE: 155 MMHG | WEIGHT: 315 LBS | HEIGHT: 75 IN

## 2025-08-14 DIAGNOSIS — L97.301 VENOUS STASIS ULCER OF ANKLE LIMITED TO BREAKDOWN OF SKIN WITH VARICOSE VEINS, UNSPECIFIED LATERALITY (HCC): Primary | Chronic | ICD-10-CM

## 2025-08-14 DIAGNOSIS — I83.003 VENOUS STASIS ULCER OF ANKLE LIMITED TO BREAKDOWN OF SKIN WITH VARICOSE VEINS, UNSPECIFIED LATERALITY (HCC): Primary | Chronic | ICD-10-CM

## 2025-08-14 PROCEDURE — 29581 APPL MULTLAYER CMPRN SYS LEG: CPT

## 2025-08-14 ASSESSMENT — PAIN SCALES - GENERAL: PAINLEVEL_OUTOF10: 0

## 2025-08-18 ENCOUNTER — HOSPITAL ENCOUNTER (OUTPATIENT)
Dept: WOUND CARE | Age: 60
Discharge: HOME | End: 2025-08-18
Payer: COMMERCIAL

## 2025-08-18 VITALS
HEIGHT: 75 IN | RESPIRATION RATE: 18 BRPM | DIASTOLIC BLOOD PRESSURE: 90 MMHG | BODY MASS INDEX: 39.17 KG/M2 | WEIGHT: 315 LBS | TEMPERATURE: 98.1 F | SYSTOLIC BLOOD PRESSURE: 151 MMHG | HEART RATE: 89 BPM

## 2025-08-18 DIAGNOSIS — L97.301 VENOUS STASIS ULCER OF ANKLE LIMITED TO BREAKDOWN OF SKIN WITH VARICOSE VEINS, UNSPECIFIED LATERALITY (HCC): Primary | ICD-10-CM

## 2025-08-18 DIAGNOSIS — I83.003 VENOUS STASIS ULCER OF ANKLE LIMITED TO BREAKDOWN OF SKIN WITH VARICOSE VEINS, UNSPECIFIED LATERALITY (HCC): Primary | ICD-10-CM

## 2025-08-18 PROCEDURE — 29581 APPL MULTLAYER CMPRN SYS LEG: CPT

## 2025-08-18 RX ORDER — GENTAMICIN SULFATE 1 MG/G
OINTMENT TOPICAL PRN
OUTPATIENT
Start: 2025-08-18

## 2025-08-18 RX ORDER — BETAMETHASONE DIPROPIONATE 0.5 MG/G
CREAM TOPICAL PRN
OUTPATIENT
Start: 2025-08-18

## 2025-08-18 RX ORDER — LIDOCAINE HYDROCHLORIDE 40 MG/ML
SOLUTION TOPICAL PRN
OUTPATIENT
Start: 2025-08-18

## 2025-08-18 RX ORDER — CLOBETASOL PROPIONATE 0.5 MG/G
OINTMENT TOPICAL PRN
OUTPATIENT
Start: 2025-08-18

## 2025-08-18 RX ORDER — NEOMYCIN/BACITRACIN/POLYMYXINB 3.5-400-5K
OINTMENT (GRAM) TOPICAL PRN
OUTPATIENT
Start: 2025-08-18

## 2025-08-18 RX ORDER — LIDOCAINE HYDROCHLORIDE 20 MG/ML
JELLY TOPICAL PRN
OUTPATIENT
Start: 2025-08-18

## 2025-08-18 RX ORDER — LIDOCAINE 50 MG/G
OINTMENT TOPICAL PRN
OUTPATIENT
Start: 2025-08-18

## 2025-08-18 RX ORDER — GINSENG 100 MG
CAPSULE ORAL PRN
OUTPATIENT
Start: 2025-08-18

## 2025-08-18 RX ORDER — BACITRACIN ZINC AND POLYMYXIN B SULFATE 500; 1000 [USP'U]/G; [USP'U]/G
OINTMENT TOPICAL PRN
OUTPATIENT
Start: 2025-08-18

## 2025-08-18 RX ORDER — LIDOCAINE 40 MG/G
CREAM TOPICAL PRN
OUTPATIENT
Start: 2025-08-18

## 2025-08-18 RX ORDER — SODIUM CHLOR/HYPOCHLOROUS ACID 0.033 %
SOLUTION, IRRIGATION IRRIGATION PRN
OUTPATIENT
Start: 2025-08-18

## 2025-08-18 RX ORDER — MUPIROCIN 2 %
OINTMENT (GRAM) TOPICAL PRN
OUTPATIENT
Start: 2025-08-18

## 2025-08-18 RX ORDER — TRIAMCINOLONE ACETONIDE 1 MG/G
OINTMENT TOPICAL PRN
OUTPATIENT
Start: 2025-08-18

## 2025-08-18 ASSESSMENT — PAIN SCALES - GENERAL: PAINLEVEL_OUTOF10: 0

## 2025-08-21 ENCOUNTER — HOSPITAL ENCOUNTER (OUTPATIENT)
Dept: WOUND CARE | Age: 60
Discharge: HOME | End: 2025-08-21
Payer: COMMERCIAL

## 2025-08-21 VITALS
HEIGHT: 75 IN | SYSTOLIC BLOOD PRESSURE: 151 MMHG | BODY MASS INDEX: 39.17 KG/M2 | HEART RATE: 80 BPM | WEIGHT: 315 LBS | RESPIRATION RATE: 18 BRPM | DIASTOLIC BLOOD PRESSURE: 90 MMHG | TEMPERATURE: 97.3 F

## 2025-08-21 DIAGNOSIS — L97.301 VENOUS STASIS ULCER OF ANKLE LIMITED TO BREAKDOWN OF SKIN WITH VARICOSE VEINS, UNSPECIFIED LATERALITY (HCC): Primary | Chronic | ICD-10-CM

## 2025-08-21 DIAGNOSIS — I83.003 VENOUS STASIS ULCER OF ANKLE LIMITED TO BREAKDOWN OF SKIN WITH VARICOSE VEINS, UNSPECIFIED LATERALITY (HCC): Primary | Chronic | ICD-10-CM

## 2025-08-21 PROCEDURE — 29581 APPL MULTLAYER CMPRN SYS LEG: CPT

## 2025-08-21 RX ORDER — NEOMYCIN/BACITRACIN/POLYMYXINB 3.5-400-5K
OINTMENT (GRAM) TOPICAL PRN
OUTPATIENT
Start: 2025-08-21

## 2025-08-21 RX ORDER — GINSENG 100 MG
CAPSULE ORAL PRN
OUTPATIENT
Start: 2025-08-21

## 2025-08-21 RX ORDER — BETAMETHASONE DIPROPIONATE 0.5 MG/G
CREAM TOPICAL PRN
OUTPATIENT
Start: 2025-08-21

## 2025-08-21 RX ORDER — LIDOCAINE HYDROCHLORIDE 40 MG/ML
SOLUTION TOPICAL PRN
OUTPATIENT
Start: 2025-08-21

## 2025-08-21 RX ORDER — CLOBETASOL PROPIONATE 0.5 MG/G
OINTMENT TOPICAL PRN
OUTPATIENT
Start: 2025-08-21

## 2025-08-21 RX ORDER — MUPIROCIN 2 %
OINTMENT (GRAM) TOPICAL PRN
OUTPATIENT
Start: 2025-08-21

## 2025-08-21 RX ORDER — GENTAMICIN SULFATE 1 MG/G
OINTMENT TOPICAL PRN
OUTPATIENT
Start: 2025-08-21

## 2025-08-21 RX ORDER — TRIAMCINOLONE ACETONIDE 1 MG/G
OINTMENT TOPICAL PRN
OUTPATIENT
Start: 2025-08-21

## 2025-08-21 RX ORDER — SODIUM CHLOR/HYPOCHLOROUS ACID 0.033 %
SOLUTION, IRRIGATION IRRIGATION PRN
OUTPATIENT
Start: 2025-08-21

## 2025-08-21 RX ORDER — LIDOCAINE HYDROCHLORIDE 20 MG/ML
JELLY TOPICAL PRN
OUTPATIENT
Start: 2025-08-21

## 2025-08-21 RX ORDER — BACITRACIN ZINC AND POLYMYXIN B SULFATE 500; 1000 [USP'U]/G; [USP'U]/G
OINTMENT TOPICAL PRN
OUTPATIENT
Start: 2025-08-21

## 2025-08-21 RX ORDER — LIDOCAINE 40 MG/G
CREAM TOPICAL PRN
OUTPATIENT
Start: 2025-08-21

## 2025-08-21 RX ORDER — LIDOCAINE 50 MG/G
OINTMENT TOPICAL PRN
OUTPATIENT
Start: 2025-08-21

## 2025-08-21 ASSESSMENT — PAIN SCALES - GENERAL: PAINLEVEL_OUTOF10: 0

## 2025-08-25 ENCOUNTER — HOSPITAL ENCOUNTER (OUTPATIENT)
Dept: WOUND CARE | Age: 60
Discharge: HOME | End: 2025-08-25
Payer: COMMERCIAL

## 2025-08-25 VITALS
RESPIRATION RATE: 18 BRPM | TEMPERATURE: 98 F | SYSTOLIC BLOOD PRESSURE: 166 MMHG | WEIGHT: 315 LBS | BODY MASS INDEX: 39.17 KG/M2 | HEIGHT: 75 IN | DIASTOLIC BLOOD PRESSURE: 93 MMHG | HEART RATE: 62 BPM

## 2025-08-25 DIAGNOSIS — I83.003 VENOUS STASIS ULCER OF ANKLE LIMITED TO BREAKDOWN OF SKIN WITH VARICOSE VEINS, UNSPECIFIED LATERALITY (HCC): Primary | ICD-10-CM

## 2025-08-25 DIAGNOSIS — L97.301 VENOUS STASIS ULCER OF ANKLE LIMITED TO BREAKDOWN OF SKIN WITH VARICOSE VEINS, UNSPECIFIED LATERALITY (HCC): Primary | ICD-10-CM

## 2025-08-25 PROCEDURE — 29581 APPL MULTLAYER CMPRN SYS LEG: CPT

## 2025-08-25 RX ORDER — LIDOCAINE HYDROCHLORIDE 40 MG/ML
SOLUTION TOPICAL PRN
OUTPATIENT
Start: 2025-08-25

## 2025-08-25 RX ORDER — LIDOCAINE HYDROCHLORIDE 20 MG/ML
JELLY TOPICAL PRN
OUTPATIENT
Start: 2025-08-25

## 2025-08-25 RX ORDER — GENTAMICIN SULFATE 1 MG/G
OINTMENT TOPICAL PRN
OUTPATIENT
Start: 2025-08-25

## 2025-08-25 RX ORDER — CLOBETASOL PROPIONATE 0.5 MG/G
OINTMENT TOPICAL PRN
OUTPATIENT
Start: 2025-08-25

## 2025-08-25 RX ORDER — TRIAMCINOLONE ACETONIDE 1 MG/G
OINTMENT TOPICAL PRN
OUTPATIENT
Start: 2025-08-25

## 2025-08-25 RX ORDER — MUPIROCIN 2 %
OINTMENT (GRAM) TOPICAL PRN
OUTPATIENT
Start: 2025-08-25

## 2025-08-25 RX ORDER — BACITRACIN ZINC AND POLYMYXIN B SULFATE 500; 1000 [USP'U]/G; [USP'U]/G
OINTMENT TOPICAL PRN
OUTPATIENT
Start: 2025-08-25

## 2025-08-25 RX ORDER — LIDOCAINE 50 MG/G
OINTMENT TOPICAL PRN
OUTPATIENT
Start: 2025-08-25

## 2025-08-25 RX ORDER — BETAMETHASONE DIPROPIONATE 0.5 MG/G
CREAM TOPICAL PRN
OUTPATIENT
Start: 2025-08-25

## 2025-08-25 RX ORDER — LIDOCAINE 40 MG/G
CREAM TOPICAL PRN
OUTPATIENT
Start: 2025-08-25

## 2025-08-25 RX ORDER — GINSENG 100 MG
CAPSULE ORAL PRN
OUTPATIENT
Start: 2025-08-25

## 2025-08-25 RX ORDER — SODIUM CHLOR/HYPOCHLOROUS ACID 0.033 %
SOLUTION, IRRIGATION IRRIGATION PRN
OUTPATIENT
Start: 2025-08-25

## 2025-08-25 RX ORDER — NEOMYCIN/BACITRACIN/POLYMYXINB 3.5-400-5K
OINTMENT (GRAM) TOPICAL PRN
OUTPATIENT
Start: 2025-08-25

## 2025-08-25 ASSESSMENT — PAIN SCALES - GENERAL: PAINLEVEL_OUTOF10: 2

## 2025-08-25 ASSESSMENT — PAIN DESCRIPTION - DESCRIPTORS: DESCRIPTORS: ACHING

## 2025-08-25 ASSESSMENT — PAIN DESCRIPTION - ORIENTATION: ORIENTATION: LEFT

## 2025-08-25 ASSESSMENT — PAIN DESCRIPTION - LOCATION: LOCATION: LEG

## 2025-08-28 ENCOUNTER — HOSPITAL ENCOUNTER (OUTPATIENT)
Dept: WOUND CARE | Age: 60
Discharge: HOME | End: 2025-08-28
Payer: COMMERCIAL

## 2025-08-28 VITALS
DIASTOLIC BLOOD PRESSURE: 88 MMHG | HEIGHT: 75 IN | BODY MASS INDEX: 39.17 KG/M2 | SYSTOLIC BLOOD PRESSURE: 165 MMHG | WEIGHT: 315 LBS | HEART RATE: 66 BPM

## 2025-08-28 DIAGNOSIS — L97.301 VENOUS STASIS ULCER OF ANKLE LIMITED TO BREAKDOWN OF SKIN WITH VARICOSE VEINS, UNSPECIFIED LATERALITY (HCC): Primary | Chronic | ICD-10-CM

## 2025-08-28 DIAGNOSIS — I83.003 VENOUS STASIS ULCER OF ANKLE LIMITED TO BREAKDOWN OF SKIN WITH VARICOSE VEINS, UNSPECIFIED LATERALITY (HCC): Primary | Chronic | ICD-10-CM

## 2025-08-28 PROCEDURE — 29581 APPL MULTLAYER CMPRN SYS LEG: CPT

## 2025-08-28 ASSESSMENT — PAIN SCALES - GENERAL: PAINLEVEL_OUTOF10: 0

## 2025-09-02 ENCOUNTER — HOSPITAL ENCOUNTER (OUTPATIENT)
Dept: WOUND CARE | Age: 60
Discharge: HOME | End: 2025-09-02
Payer: COMMERCIAL

## 2025-09-02 VITALS
WEIGHT: 315 LBS | RESPIRATION RATE: 18 BRPM | HEART RATE: 80 BPM | HEIGHT: 75 IN | TEMPERATURE: 98 F | SYSTOLIC BLOOD PRESSURE: 160 MMHG | BODY MASS INDEX: 39.17 KG/M2 | DIASTOLIC BLOOD PRESSURE: 95 MMHG

## 2025-09-02 DIAGNOSIS — L97.301 VENOUS STASIS ULCER OF ANKLE LIMITED TO BREAKDOWN OF SKIN WITH VARICOSE VEINS, UNSPECIFIED LATERALITY (HCC): Primary | ICD-10-CM

## 2025-09-02 DIAGNOSIS — I83.003 VENOUS STASIS ULCER OF ANKLE LIMITED TO BREAKDOWN OF SKIN WITH VARICOSE VEINS, UNSPECIFIED LATERALITY (HCC): Primary | ICD-10-CM

## 2025-09-02 PROCEDURE — 29581 APPL MULTLAYER CMPRN SYS LEG: CPT

## 2025-09-02 PROCEDURE — 99213 OFFICE O/P EST LOW 20 MIN: CPT

## 2025-09-02 RX ORDER — CLOBETASOL PROPIONATE 0.5 MG/G
OINTMENT TOPICAL PRN
OUTPATIENT
Start: 2025-09-02

## 2025-09-02 RX ORDER — SODIUM CHLOR/HYPOCHLOROUS ACID 0.033 %
SOLUTION, IRRIGATION IRRIGATION PRN
Status: DISCONTINUED | OUTPATIENT
Start: 2025-09-02 | End: 2025-09-03 | Stop reason: HOSPADM

## 2025-09-02 RX ORDER — BACITRACIN ZINC AND POLYMYXIN B SULFATE 500; 1000 [USP'U]/G; [USP'U]/G
OINTMENT TOPICAL PRN
OUTPATIENT
Start: 2025-09-02

## 2025-09-02 RX ORDER — LIDOCAINE HYDROCHLORIDE 20 MG/ML
JELLY TOPICAL PRN
Status: DISCONTINUED | OUTPATIENT
Start: 2025-09-02 | End: 2025-09-03 | Stop reason: HOSPADM

## 2025-09-02 RX ORDER — LIDOCAINE 50 MG/G
OINTMENT TOPICAL PRN
OUTPATIENT
Start: 2025-09-02

## 2025-09-02 RX ORDER — LIDOCAINE 40 MG/G
CREAM TOPICAL PRN
OUTPATIENT
Start: 2025-09-02

## 2025-09-02 RX ORDER — GENTAMICIN SULFATE 1 MG/G
OINTMENT TOPICAL PRN
OUTPATIENT
Start: 2025-09-02

## 2025-09-02 RX ORDER — GINSENG 100 MG
CAPSULE ORAL PRN
OUTPATIENT
Start: 2025-09-02

## 2025-09-02 RX ORDER — TRIAMCINOLONE ACETONIDE 1 MG/G
OINTMENT TOPICAL PRN
OUTPATIENT
Start: 2025-09-02

## 2025-09-02 RX ORDER — BETAMETHASONE DIPROPIONATE 0.5 MG/G
CREAM TOPICAL PRN
OUTPATIENT
Start: 2025-09-02

## 2025-09-02 RX ORDER — SODIUM CHLOR/HYPOCHLOROUS ACID 0.033 %
SOLUTION, IRRIGATION IRRIGATION PRN
OUTPATIENT
Start: 2025-09-02

## 2025-09-02 RX ORDER — LIDOCAINE HYDROCHLORIDE 40 MG/ML
SOLUTION TOPICAL PRN
OUTPATIENT
Start: 2025-09-02

## 2025-09-02 RX ORDER — MUPIROCIN 2 %
OINTMENT (GRAM) TOPICAL PRN
OUTPATIENT
Start: 2025-09-02

## 2025-09-02 RX ORDER — NEOMYCIN/BACITRACIN/POLYMYXINB 3.5-400-5K
OINTMENT (GRAM) TOPICAL PRN
OUTPATIENT
Start: 2025-09-02

## 2025-09-02 RX ORDER — LIDOCAINE HYDROCHLORIDE 20 MG/ML
JELLY TOPICAL PRN
OUTPATIENT
Start: 2025-09-02

## 2025-09-02 RX ADMIN — LIDOCAINE HYDROCHLORIDE: 20 JELLY TOPICAL at 16:15

## 2025-09-02 RX ADMIN — Medication 118 ML: at 16:16

## 2025-09-02 ASSESSMENT — PAIN DESCRIPTION - LOCATION: LOCATION: LEG;FOOT

## 2025-09-02 ASSESSMENT — PAIN DESCRIPTION - ORIENTATION: ORIENTATION: RIGHT

## 2025-09-02 ASSESSMENT — PAIN SCALES - GENERAL: PAINLEVEL_OUTOF10: 4

## (undated) DEVICE — DRESSING,GAUZE,XEROFORM,CURAD,5"X9",ST: Brand: CURAD